# Patient Record
Sex: MALE | Race: WHITE | NOT HISPANIC OR LATINO | Employment: OTHER | URBAN - METROPOLITAN AREA
[De-identification: names, ages, dates, MRNs, and addresses within clinical notes are randomized per-mention and may not be internally consistent; named-entity substitution may affect disease eponyms.]

---

## 2018-06-27 ENCOUNTER — APPOINTMENT (OUTPATIENT)
Dept: RADIOLOGY | Facility: MEDICAL CENTER | Age: 68
End: 2018-06-27
Payer: COMMERCIAL

## 2018-06-27 ENCOUNTER — OFFICE VISIT (OUTPATIENT)
Dept: URGENT CARE | Facility: MEDICAL CENTER | Age: 68
End: 2018-06-27
Payer: COMMERCIAL

## 2018-06-27 VITALS
BODY MASS INDEX: 27.79 KG/M2 | SYSTOLIC BLOOD PRESSURE: 124 MMHG | HEIGHT: 70 IN | RESPIRATION RATE: 20 BRPM | WEIGHT: 194.13 LBS | OXYGEN SATURATION: 98 % | TEMPERATURE: 98.7 F | DIASTOLIC BLOOD PRESSURE: 80 MMHG | HEART RATE: 103 BPM

## 2018-06-27 DIAGNOSIS — J20.9 ACUTE BRONCHITIS, UNSPECIFIED ORGANISM: Primary | ICD-10-CM

## 2018-06-27 DIAGNOSIS — R05.9 COUGH: ICD-10-CM

## 2018-06-27 PROCEDURE — 71046 X-RAY EXAM CHEST 2 VIEWS: CPT

## 2018-06-27 PROCEDURE — 99213 OFFICE O/P EST LOW 20 MIN: CPT | Performed by: PHYSICIAN ASSISTANT

## 2018-06-27 RX ORDER — AZITHROMYCIN 250 MG/1
TABLET, FILM COATED ORAL
Qty: 6 TABLET | Refills: 0 | Status: SHIPPED | OUTPATIENT
Start: 2018-06-27 | End: 2018-07-01

## 2018-06-27 RX ORDER — PREDNISONE 10 MG/1
30 TABLET ORAL DAILY
Qty: 15 TABLET | Refills: 0 | Status: SHIPPED | OUTPATIENT
Start: 2018-06-27 | End: 2018-07-02

## 2018-06-27 RX ORDER — BENZONATATE 100 MG/1
100 CAPSULE ORAL 3 TIMES DAILY PRN
Qty: 30 CAPSULE | Refills: 0 | Status: SHIPPED | OUTPATIENT
Start: 2018-06-27 | End: 2020-03-27

## 2018-06-27 NOTE — PROGRESS NOTES
Hx of smoking , pt stated that he is always coughing, but much worse past few months  C/o fatigue, fever , and some diarrhea x 2 days    rhonic noted in Left lung field

## 2018-06-27 NOTE — PROGRESS NOTES
[facility]        NAME: Viv Bethea is a 79 y o  male  : 1950    MRN: [id number]  DATE: 2018  TIME: 1:28 PM    Assessment and Plan   Acute bronchitis, unspecified organism [J20 9]  1  Acute bronchitis, unspecified organism  XR chest pa & lateral    azithromycin (ZITHROMAX) 250 mg tablet    predniSONE 10 mg tablet    benzonatate (TESSALON PERLES) 100 mg capsule         Patient Instructions     No pneumonia noted on exam, will call if radiology report differs  Take azithromycin as directed  Take with food and eat yogurt or a probiotic to decrease GI upset  Take prednisone 30 mg x 5 days  Take with food  Use tessalon as needed for cough  Watch for any fevers or worsening shortness of breath and follow up for this  Follow up with PCP in 3-5 days  Proceed to  ER if symptoms worsen  Chief Complaint     Chief Complaint   Patient presents with    Cough     for months ,          History of Present Illness       This is a 79year old male presenting for cough x "months"  He states that he is a current everyday smoker, and has been coughing for "months to years " However, over the last 7 days the cough has worsened  Associated symptoms include subjective fever x 1 day, diarrhea x 1 day, fatigue, myalgias, intermittent shortness of breath, sinus congestion, sore throat  He denies any vomiting, chest pain  He has never been evaluated for his cough before  No medications for this  Review of Systems   Review of Systems   Constitutional: Positive for chills, fatigue and fever  HENT: Positive for congestion, postnasal drip, rhinorrhea and sore throat  Negative for ear pain  Respiratory: Positive for cough and shortness of breath  Negative for chest tightness and wheezing  Cardiovascular: Negative for chest pain  Gastrointestinal: Positive for diarrhea  Negative for abdominal pain, nausea and vomiting  Musculoskeletal: Positive for arthralgias and myalgias     Skin: Negative for rash  Neurological: Negative for dizziness, weakness and headaches  Current Medications       Current Outpatient Prescriptions:     azithromycin (ZITHROMAX) 250 mg tablet, Take 2 tablets today then 1 tablet daily x 4 days, Disp: 6 tablet, Rfl: 0    benzonatate (TESSALON PERLES) 100 mg capsule, Take 1 capsule (100 mg total) by mouth 3 (three) times a day as needed for cough, Disp: 30 capsule, Rfl: 0    predniSONE 10 mg tablet, Take 3 tablets (30 mg total) by mouth daily for 5 days, Disp: 15 tablet, Rfl: 0    Current Allergies     Allergies as of 06/27/2018    (No Known Allergies)            The following portions of the patient's history were reviewed and updated as appropriate: allergies, current medications, past family history, past medical history, past social history, past surgical history and problem list      History reviewed  No pertinent past medical history  Past Surgical History:   Procedure Laterality Date    COLON SURGERY         No family history on file  Medications have been verified  Objective   /80   Pulse 103   Temp 98 7 °F (37 1 °C) (Temporal)   Resp 20   Ht 5' 10" (1 778 m)   Wt 88 1 kg (194 lb 2 oz)   SpO2 98%   BMI 27 85 kg/m²        Physical Exam     Physical Exam   Constitutional: He appears well-developed and well-nourished  No distress  HENT:   Head: Normocephalic  Right Ear: Tympanic membrane, external ear and ear canal normal  No drainage or tenderness  Left Ear: Tympanic membrane, external ear and ear canal normal  No drainage or tenderness  Nose: Mucosal edema and rhinorrhea present  Mouth/Throat: Uvula is midline and mucous membranes are normal  Posterior oropharyngeal erythema present  No oropharyngeal exudate or posterior oropharyngeal edema  Eyes: Conjunctivae are normal  Pupils are equal, round, and reactive to light  Neck: Normal range of motion  Neck supple     Cardiovascular: Normal rate, regular rhythm, normal heart sounds and intact distal pulses  Pulmonary/Chest: He is in respiratory distress  He has no decreased breath sounds  He has wheezes  He has no rhonchi  He has no rales  Rhonchi and wheezing noted, worst in bilateral upper lung fields  Lymphadenopathy:     He has no cervical adenopathy  Neurological: He is alert  Skin: Skin is warm and dry  He is not diaphoretic  Nursing note and vitals reviewed

## 2018-06-27 NOTE — PATIENT INSTRUCTIONS
No pneumonia noted on exam, will call if radiology report differs  Take azithromycin as directed  Take with food and eat yogurt or a probiotic to decrease GI upset  Take prednisone 30 mg x 5 days  Take with food  Use tessalon as needed for cough  Watch for any fevers or worsening shortness of breath and follow up for this  Follow up with your PCP for persistent symptoms  Go to the ER for any distress  Acute Bronchitis   AMBULATORY CARE:   Acute bronchitis  is swelling and irritation in the air passages of your lungs  This irritation may cause you to cough or have other breathing problems  Acute bronchitis often starts because of another illness, such as a cold or the flu  The illness spreads from your nose and throat to your windpipe and airways  Bronchitis is often called a chest cold  Acute bronchitis lasts about 3 to 6 weeks and is usually not a serious illness  Your cough can last for several weeks  You may have any of the following symptoms:   · A cough with sputum that may be clear, yellow, or green    · Feeling more tired than usual, and body aches    · A fever and chills    · Wheezing when you breathe    · A tight chest or pain when you breathe or cough  Seek care immediately if:   · You cough up blood  · Your lips or fingernails turn blue  · You feel like you are not getting enough air when you breathe  Contact your healthcare provider if:   · You have a fever  · Your breathing problems do not go away or get worse  · Your cough does not get better within 4 weeks  · You have questions or concerns about your condition or care  Self-care:   · Get more rest   Rest helps your body to heal  Slowly start to do more each day  Rest when you feel it is needed  · Avoid irritants in the air  Avoid chemicals, fumes, and dust  Wear a face mask if you must work around dust or fumes  Stay inside on days when air pollution levels are high   If you have allergies, stay inside when pollen counts are high  Do not use aerosol products, such as spray-on deodorant, bug spray, and hair spray  · Do not smoke or be around others who smoke  Nicotine and other chemicals in cigarettes and cigars damages the cilia that move mucus out of your lungs  Ask your healthcare provider for information if you currently smoke and need help to quit  E-cigarettes or smokeless tobacco still contain nicotine  Talk to your healthcare provider before you use these products  · Drink liquids as directed  Liquids help keep your air passages moist and help you cough up mucus  You may need to drink more liquids when you have acute bronchitis  Ask how much liquid to drink each day and which liquids are best for you  · Use a humidifier or vaporizer  Use a cool mist humidifier or a vaporizer to increase air moisture in your home  This may make it easier for you to breathe and help decrease your cough  Prevent acute bronchitis by doing the following:   · Get the vaccinations you need  Ask your healthcare provider if you should get vaccinated against the flu or pneumonia  · Prevent the spread of germs  You can decrease your risk of acute bronchitis and other illnesses by doing the following:     [facility] your hands often with soap and water  Carry germ-killing hand lotion or gel with you  You can use the lotion or gel to clean your hands when soap and water are not available  ¨ Do not touch your eyes, nose, or mouth unless you have washed your hands first     ¨ Always cover your mouth when you cough to prevent the spread of germs  It is best to cough into a tissue or your shirt sleeve instead of into your hand  Ask those around you cover their mouths when they cough  ¨ Try to avoid people who have a cold or the flu  If you are sick, stay away from others as much as possible  Medicines:   Your healthcare provider may  give you any of the following:  · Ibuprofen or acetaminophen  are medicines that help lower your fever  They are available without a doctor's order  Ask your healthcare provider which medicine is right for you  Ask how much to take and how often to take it  Follow directions  These medicines can cause stomach bleeding if not taken correctly  Ibuprofen can cause kidney damage  Do not take ibuprofen if you have kidney disease, an ulcer, or allergies to aspirin  Acetaminophen can cause liver damage  Do not take more than 4,000 milligrams in 24 hours  · Decongestants  help loosen mucus in your lungs and make it easier to cough up  This can help you breathe easier  · Cough suppressants  decrease your urge to cough  If your cough produces mucus, do not take a cough suppressant unless your healthcare provider tells you to  Your healthcare provider may suggest that you take a cough suppressant at night so you can rest     · Inhalers  may be given  Your healthcare provider may give you one or more inhalers to help you breathe easier and cough less  An inhaler gives your medicine to open your airways  Ask your healthcare provider to show you how to use your inhaler correctly  Follow up with your healthcare provider as directed:  Write down questions you have so you will remember to ask them during your follow-up visits  © 2017 [employer]  Information is for End User's use only and may not be sold, redistributed or otherwise used for commercial purposes  All illustrations and images included in CareNotes® are the copyrighted property of [employer]  or [employer]  The above information is an  only  It is not intended as medical advice for individual conditions or treatments  Talk to your doctor, nurse or pharmacist before following any medical regimen to see if it is safe and effective for you

## 2020-03-27 ENCOUNTER — APPOINTMENT (EMERGENCY)
Dept: CT IMAGING | Facility: HOSPITAL | Age: 70
End: 2020-03-27
Payer: COMMERCIAL

## 2020-03-27 ENCOUNTER — HOSPITAL ENCOUNTER (OUTPATIENT)
Facility: HOSPITAL | Age: 70
Setting detail: OBSERVATION
Discharge: HOME | End: 2020-03-28
Attending: EMERGENCY MEDICINE | Admitting: FAMILY MEDICINE
Payer: COMMERCIAL

## 2020-03-27 ENCOUNTER — APPOINTMENT (EMERGENCY)
Dept: RADIOLOGY | Facility: HOSPITAL | Age: 70
End: 2020-03-27
Payer: COMMERCIAL

## 2020-03-27 DIAGNOSIS — J18.9 PNEUMONIA OF LEFT UPPER LOBE DUE TO INFECTIOUS ORGANISM: ICD-10-CM

## 2020-03-27 DIAGNOSIS — I16.0 HYPERTENSIVE URGENCY: ICD-10-CM

## 2020-03-27 DIAGNOSIS — R07.9 CHEST PAIN, UNSPECIFIED TYPE: Primary | ICD-10-CM

## 2020-03-27 DIAGNOSIS — K21.9 GERD (GASTROESOPHAGEAL REFLUX DISEASE): ICD-10-CM

## 2020-03-27 DIAGNOSIS — R91.8 LUNG MASS: ICD-10-CM

## 2020-03-27 DIAGNOSIS — R07.9 CHEST PAIN: ICD-10-CM

## 2020-03-27 PROBLEM — A41.9 SEPSIS (HCC): Status: ACTIVE | Noted: 2020-03-27

## 2020-03-27 PROBLEM — F10.10 ALCOHOL ABUSE: Status: ACTIVE | Noted: 2020-03-27

## 2020-03-27 PROBLEM — R93.89 ABNORMAL COMPUTED TOMOGRAPHY ANGIOGRAPHY (CTA): Status: ACTIVE | Noted: 2020-03-27

## 2020-03-27 LAB
ALBUMIN SERPL BCP-MCNC: 3.4 G/DL (ref 3.5–5)
ALP SERPL-CCNC: 96 U/L (ref 46–116)
ALT SERPL W P-5'-P-CCNC: 32 U/L (ref 12–78)
ANION GAP SERPL CALCULATED.3IONS-SCNC: 10 MMOL/L (ref 4–13)
AST SERPL W P-5'-P-CCNC: 18 U/L (ref 5–45)
BASOPHILS # BLD AUTO: 0.03 THOUSANDS/ΜL (ref 0–0.1)
BASOPHILS NFR BLD AUTO: 0 % (ref 0–1)
BILIRUB SERPL-MCNC: 0.33 MG/DL (ref 0.2–1)
BUN SERPL-MCNC: 16 MG/DL (ref 5–25)
CALCIUM SERPL-MCNC: 9.4 MG/DL (ref 8.3–10.1)
CHLORIDE SERPL-SCNC: 99 MMOL/L (ref 100–108)
CO2 SERPL-SCNC: 26 MMOL/L (ref 21–32)
CREAT SERPL-MCNC: 1.16 MG/DL (ref 0.6–1.3)
EOSINOPHIL # BLD AUTO: 0.06 THOUSAND/ΜL (ref 0–0.61)
EOSINOPHIL NFR BLD AUTO: 1 % (ref 0–6)
ERYTHROCYTE [DISTWIDTH] IN BLOOD BY AUTOMATED COUNT: 13.5 % (ref 11.6–15.1)
GFR SERPL CREATININE-BSD FRML MDRD: 64 ML/MIN/1.73SQ M
GLUCOSE SERPL-MCNC: 99 MG/DL (ref 65–140)
HCT VFR BLD AUTO: 46.2 % (ref 36.5–49.3)
HGB BLD-MCNC: 15 G/DL (ref 12–17)
IMM GRANULOCYTES # BLD AUTO: 0.06 THOUSAND/UL (ref 0–0.2)
IMM GRANULOCYTES NFR BLD AUTO: 1 % (ref 0–2)
LACTATE SERPL-SCNC: 1.2 MMOL/L (ref 0.5–2)
LIPASE SERPL-CCNC: 149 U/L (ref 73–393)
LYMPHOCYTES # BLD AUTO: 1.68 THOUSANDS/ΜL (ref 0.6–4.47)
LYMPHOCYTES NFR BLD AUTO: 13 % (ref 14–44)
MCH RBC QN AUTO: 32.1 PG (ref 26.8–34.3)
MCHC RBC AUTO-ENTMCNC: 32.5 G/DL (ref 31.4–37.4)
MCV RBC AUTO: 99 FL (ref 82–98)
MONOCYTES # BLD AUTO: 1 THOUSAND/ΜL (ref 0.17–1.22)
MONOCYTES NFR BLD AUTO: 8 % (ref 4–12)
NEUTROPHILS # BLD AUTO: 9.82 THOUSANDS/ΜL (ref 1.85–7.62)
NEUTS SEG NFR BLD AUTO: 77 % (ref 43–75)
NRBC BLD AUTO-RTO: 0 /100 WBCS
PLATELET # BLD AUTO: 244 THOUSANDS/UL (ref 149–390)
PMV BLD AUTO: 9.2 FL (ref 8.9–12.7)
POTASSIUM SERPL-SCNC: 4 MMOL/L (ref 3.5–5.3)
PROT SERPL-MCNC: 7.6 G/DL (ref 6.4–8.2)
RBC # BLD AUTO: 4.67 MILLION/UL (ref 3.88–5.62)
SODIUM SERPL-SCNC: 135 MMOL/L (ref 136–145)
TROPONIN I SERPL-MCNC: <0.02 NG/ML
WBC # BLD AUTO: 12.65 THOUSAND/UL (ref 4.31–10.16)

## 2020-03-27 PROCEDURE — 85025 COMPLETE CBC W/AUTO DIFF WBC: CPT | Performed by: EMERGENCY MEDICINE

## 2020-03-27 PROCEDURE — 74175 CTA ABDOMEN W/CONTRAST: CPT

## 2020-03-27 PROCEDURE — 84484 ASSAY OF TROPONIN QUANT: CPT | Performed by: EMERGENCY MEDICINE

## 2020-03-27 PROCEDURE — 84145 PROCALCITONIN (PCT): CPT | Performed by: EMERGENCY MEDICINE

## 2020-03-27 PROCEDURE — 83605 ASSAY OF LACTIC ACID: CPT | Performed by: EMERGENCY MEDICINE

## 2020-03-27 PROCEDURE — 99285 EMERGENCY DEPT VISIT HI MDM: CPT

## 2020-03-27 PROCEDURE — 96374 THER/PROPH/DIAG INJ IV PUSH: CPT

## 2020-03-27 PROCEDURE — 71046 X-RAY EXAM CHEST 2 VIEWS: CPT

## 2020-03-27 PROCEDURE — 83690 ASSAY OF LIPASE: CPT | Performed by: EMERGENCY MEDICINE

## 2020-03-27 PROCEDURE — 93005 ELECTROCARDIOGRAM TRACING: CPT

## 2020-03-27 PROCEDURE — 99285 EMERGENCY DEPT VISIT HI MDM: CPT | Performed by: EMERGENCY MEDICINE

## 2020-03-27 PROCEDURE — 87040 BLOOD CULTURE FOR BACTERIA: CPT | Performed by: EMERGENCY MEDICINE

## 2020-03-27 PROCEDURE — 36415 COLL VENOUS BLD VENIPUNCTURE: CPT | Performed by: EMERGENCY MEDICINE

## 2020-03-27 PROCEDURE — 99220 PR INITIAL OBSERVATION CARE/DAY 70 MINUTES: CPT | Performed by: FAMILY MEDICINE

## 2020-03-27 PROCEDURE — 71275 CT ANGIOGRAPHY CHEST: CPT

## 2020-03-27 PROCEDURE — 80053 COMPREHEN METABOLIC PANEL: CPT | Performed by: EMERGENCY MEDICINE

## 2020-03-27 RX ORDER — LABETALOL 20 MG/4 ML (5 MG/ML) INTRAVENOUS SYRINGE
10 ONCE
Status: COMPLETED | OUTPATIENT
Start: 2020-03-27 | End: 2020-03-27

## 2020-03-27 RX ORDER — CALCIUM CARBONATE 200(500)MG
1000 TABLET,CHEWABLE ORAL DAILY PRN
Status: DISCONTINUED | OUTPATIENT
Start: 2020-03-27 | End: 2020-03-28 | Stop reason: HOSPADM

## 2020-03-27 RX ORDER — NICOTINE 21 MG/24HR
1 PATCH, TRANSDERMAL 24 HOURS TRANSDERMAL DAILY
Status: DISCONTINUED | OUTPATIENT
Start: 2020-03-28 | End: 2020-03-28 | Stop reason: HOSPADM

## 2020-03-27 RX ORDER — AZITHROMYCIN 250 MG/1
250 TABLET, FILM COATED ORAL EVERY 24 HOURS
Status: DISCONTINUED | OUTPATIENT
Start: 2020-03-28 | End: 2020-03-28

## 2020-03-27 RX ORDER — ASPIRIN 325 MG
325 TABLET ORAL ONCE
Status: COMPLETED | OUTPATIENT
Start: 2020-03-27 | End: 2020-03-27

## 2020-03-27 RX ORDER — ACETAMINOPHEN 325 MG/1
650 TABLET ORAL EVERY 4 HOURS PRN
Status: DISCONTINUED | OUTPATIENT
Start: 2020-03-27 | End: 2020-03-28 | Stop reason: HOSPADM

## 2020-03-27 RX ORDER — BENZONATATE 100 MG/1
100 CAPSULE ORAL 3 TIMES DAILY PRN
Status: DISCONTINUED | OUTPATIENT
Start: 2020-03-27 | End: 2020-03-28 | Stop reason: HOSPADM

## 2020-03-27 RX ORDER — AZITHROMYCIN 250 MG/1
250 TABLET, FILM COATED ORAL EVERY 24 HOURS
Status: DISCONTINUED | OUTPATIENT
Start: 2020-03-28 | End: 2020-03-27

## 2020-03-27 RX ADMIN — Medication 1000 MG: at 22:45

## 2020-03-27 RX ADMIN — ASPIRIN 325 MG: 325 TABLET ORAL at 22:30

## 2020-03-27 RX ADMIN — IOHEXOL 100 ML: 350 INJECTION, SOLUTION INTRAVENOUS at 21:10

## 2020-03-27 RX ADMIN — LABETALOL 20 MG/4 ML (5 MG/ML) INTRAVENOUS SYRINGE 10 MG: at 21:18

## 2020-03-28 VITALS
WEIGHT: 208.56 LBS | OXYGEN SATURATION: 94 % | HEIGHT: 70 IN | SYSTOLIC BLOOD PRESSURE: 124 MMHG | TEMPERATURE: 97.5 F | RESPIRATION RATE: 18 BRPM | DIASTOLIC BLOOD PRESSURE: 84 MMHG | HEART RATE: 85 BPM | BODY MASS INDEX: 29.86 KG/M2

## 2020-03-28 PROBLEM — I16.0 HYPERTENSIVE URGENCY: Status: RESOLVED | Noted: 2020-03-27 | Resolved: 2020-03-28

## 2020-03-28 PROBLEM — A41.9 SEPSIS (HCC): Status: RESOLVED | Noted: 2020-03-27 | Resolved: 2020-03-28

## 2020-03-28 PROBLEM — R07.9 CHEST PAIN: Status: RESOLVED | Noted: 2020-03-27 | Resolved: 2020-03-28

## 2020-03-28 LAB
ALBUMIN SERPL BCP-MCNC: 3 G/DL (ref 3.5–5)
ALP SERPL-CCNC: 87 U/L (ref 46–116)
ALT SERPL W P-5'-P-CCNC: 21 U/L (ref 12–78)
ANION GAP SERPL CALCULATED.3IONS-SCNC: 9 MMOL/L (ref 4–13)
AST SERPL W P-5'-P-CCNC: 15 U/L (ref 5–45)
ATRIAL RATE: 109 BPM
BASOPHILS # BLD AUTO: 0.03 THOUSANDS/ΜL (ref 0–0.1)
BASOPHILS NFR BLD AUTO: 0 % (ref 0–1)
BILIRUB SERPL-MCNC: 0.52 MG/DL (ref 0.2–1)
BUN SERPL-MCNC: 15 MG/DL (ref 5–25)
CALCIUM SERPL-MCNC: 8.8 MG/DL (ref 8.3–10.1)
CHLORIDE SERPL-SCNC: 104 MMOL/L (ref 100–108)
CHOLEST SERPL-MCNC: 179 MG/DL (ref 50–200)
CO2 SERPL-SCNC: 21 MMOL/L (ref 21–32)
CREAT SERPL-MCNC: 1.03 MG/DL (ref 0.6–1.3)
EOSINOPHIL # BLD AUTO: 0.05 THOUSAND/ΜL (ref 0–0.61)
EOSINOPHIL NFR BLD AUTO: 1 % (ref 0–6)
ERYTHROCYTE [DISTWIDTH] IN BLOOD BY AUTOMATED COUNT: 13.7 % (ref 11.6–15.1)
EST. AVERAGE GLUCOSE BLD GHB EST-MCNC: 114 MG/DL
GFR SERPL CREATININE-BSD FRML MDRD: 74 ML/MIN/1.73SQ M
GLUCOSE SERPL-MCNC: 108 MG/DL (ref 65–140)
HBA1C MFR BLD: 5.6 %
HCT VFR BLD AUTO: 42 % (ref 36.5–49.3)
HDLC SERPL-MCNC: 46 MG/DL
HGB BLD-MCNC: 13.9 G/DL (ref 12–17)
IMM GRANULOCYTES # BLD AUTO: 0.05 THOUSAND/UL (ref 0–0.2)
IMM GRANULOCYTES NFR BLD AUTO: 1 % (ref 0–2)
LDLC SERPL CALC-MCNC: 113 MG/DL (ref 0–100)
LYMPHOCYTES # BLD AUTO: 1.6 THOUSANDS/ΜL (ref 0.6–4.47)
LYMPHOCYTES NFR BLD AUTO: 17 % (ref 14–44)
MCH RBC QN AUTO: 32.6 PG (ref 26.8–34.3)
MCHC RBC AUTO-ENTMCNC: 33.1 G/DL (ref 31.4–37.4)
MCV RBC AUTO: 99 FL (ref 82–98)
MONOCYTES # BLD AUTO: 1.02 THOUSAND/ΜL (ref 0.17–1.22)
MONOCYTES NFR BLD AUTO: 11 % (ref 4–12)
NEUTROPHILS # BLD AUTO: 6.46 THOUSANDS/ΜL (ref 1.85–7.62)
NEUTS SEG NFR BLD AUTO: 70 % (ref 43–75)
NONHDLC SERPL-MCNC: 133 MG/DL
NRBC BLD AUTO-RTO: 0 /100 WBCS
P AXIS: 40 DEGREES
PLATELET # BLD AUTO: 225 THOUSANDS/UL (ref 149–390)
PMV BLD AUTO: 9.4 FL (ref 8.9–12.7)
POTASSIUM SERPL-SCNC: 4.2 MMOL/L (ref 3.5–5.3)
PR INTERVAL: 158 MS
PROCALCITONIN SERPL-MCNC: 0.08 NG/ML
PROCALCITONIN SERPL-MCNC: 0.09 NG/ML
PROT SERPL-MCNC: 6.8 G/DL (ref 6.4–8.2)
QRS AXIS: -9 DEGREES
QRSD INTERVAL: 80 MS
QT INTERVAL: 312 MS
QTC INTERVAL: 420 MS
RBC # BLD AUTO: 4.26 MILLION/UL (ref 3.88–5.62)
SODIUM SERPL-SCNC: 134 MMOL/L (ref 136–145)
T WAVE AXIS: 58 DEGREES
TRIGL SERPL-MCNC: 99 MG/DL
TROPONIN I SERPL-MCNC: <0.02 NG/ML
TROPONIN I SERPL-MCNC: <0.02 NG/ML
VENTRICULAR RATE: 109 BPM
WBC # BLD AUTO: 9.21 THOUSAND/UL (ref 4.31–10.16)

## 2020-03-28 PROCEDURE — 84484 ASSAY OF TROPONIN QUANT: CPT | Performed by: PHYSICIAN ASSISTANT

## 2020-03-28 PROCEDURE — 94664 DEMO&/EVAL PT USE INHALER: CPT

## 2020-03-28 PROCEDURE — 80053 COMPREHEN METABOLIC PANEL: CPT | Performed by: PHYSICIAN ASSISTANT

## 2020-03-28 PROCEDURE — 99236 HOSP IP/OBS SAME DATE HI 85: CPT | Performed by: INTERNAL MEDICINE

## 2020-03-28 PROCEDURE — 84145 PROCALCITONIN (PCT): CPT | Performed by: PHYSICIAN ASSISTANT

## 2020-03-28 PROCEDURE — 99217 PR OBSERVATION CARE DISCHARGE MANAGEMENT: CPT | Performed by: FAMILY MEDICINE

## 2020-03-28 PROCEDURE — 83036 HEMOGLOBIN GLYCOSYLATED A1C: CPT | Performed by: PHYSICIAN ASSISTANT

## 2020-03-28 PROCEDURE — 93010 ELECTROCARDIOGRAM REPORT: CPT | Performed by: INTERNAL MEDICINE

## 2020-03-28 PROCEDURE — 85025 COMPLETE CBC W/AUTO DIFF WBC: CPT | Performed by: PHYSICIAN ASSISTANT

## 2020-03-28 PROCEDURE — 80061 LIPID PANEL: CPT | Performed by: PHYSICIAN ASSISTANT

## 2020-03-28 PROCEDURE — 94762 N-INVAS EAR/PLS OXIMTRY CONT: CPT

## 2020-03-28 PROCEDURE — 94760 N-INVAS EAR/PLS OXIMETRY 1: CPT

## 2020-03-28 RX ORDER — AMLODIPINE BESYLATE 5 MG/1
5 TABLET ORAL DAILY
Qty: 30 TABLET | Refills: 1 | Status: SHIPPED | OUTPATIENT
Start: 2020-03-29 | End: 2020-03-28 | Stop reason: SDUPTHER

## 2020-03-28 RX ORDER — PANTOPRAZOLE SODIUM 40 MG/1
40 TABLET, DELAYED RELEASE ORAL DAILY
Qty: 40 TABLET | Refills: 1 | Status: SHIPPED | OUTPATIENT
Start: 2020-03-28 | End: 2020-06-03

## 2020-03-28 RX ORDER — AMLODIPINE BESYLATE 5 MG/1
5 TABLET ORAL DAILY
Status: DISCONTINUED | OUTPATIENT
Start: 2020-03-28 | End: 2020-03-28 | Stop reason: HOSPADM

## 2020-03-28 RX ORDER — AMLODIPINE BESYLATE 5 MG/1
5 TABLET ORAL DAILY
Qty: 30 TABLET | Refills: 1 | Status: SHIPPED | OUTPATIENT
Start: 2020-03-28 | End: 2020-03-28 | Stop reason: SDUPTHER

## 2020-03-28 RX ORDER — AMLODIPINE BESYLATE 5 MG/1
5 TABLET ORAL DAILY
Qty: 30 TABLET | Refills: 0 | Status: SHIPPED | OUTPATIENT
Start: 2020-03-29 | End: 2020-08-12 | Stop reason: HOSPADM

## 2020-03-28 RX ADMIN — NICOTINE 1 PATCH: 14 PATCH TRANSDERMAL at 08:45

## 2020-03-28 RX ADMIN — FOLIC ACID: 5 INJECTION, SOLUTION INTRAMUSCULAR; INTRAVENOUS; SUBCUTANEOUS at 08:56

## 2020-03-28 RX ADMIN — AMLODIPINE BESYLATE 5 MG: 5 TABLET ORAL at 14:12

## 2020-03-28 RX ADMIN — AZITHROMYCIN 500 MG: 500 INJECTION, POWDER, LYOPHILIZED, FOR SOLUTION INTRAVENOUS at 00:25

## 2020-03-28 RX ADMIN — ACETAMINOPHEN 650 MG: 325 TABLET ORAL at 00:48

## 2020-03-28 NOTE — ASSESSMENT & PLAN NOTE
· Interestingly patient CTA did show mass suspicious for lung cancer  · masslike opacity in the left upper lobe measuring approximately 4 5 x 4 3 x 4 8 cm with faint internal enhancement suspicious for lung cancer  · Patient will likely need outpatient follow-up and repeat imaging with pulmonology

## 2020-03-28 NOTE — CONSULTS
Pulmonary Consultation   Bucky Rinne 71 y o  male MRN: [id number]  Unit/Bed#: S -01 Encounter: [id number]      Reason for consultation:  Lung mass, pneumonia of the left upper lobe    Requesting physician: Suri Castro PA-C    Impressions/Recommendations:    1  Chest pain-improving  1  Likely multifactorial with hypertensive urgency at time of admission with Sbp >200 on admissionand medication noncompliance  2  Troponin negative x 2  3  Pain may be secondary to #2  2  Abnormal chest CTA   1  Left upper lobe mass like opacity 4 5x4 3x4 8 concerning for malignancy with   2  Suspicion for acute bacterial process low-no change in cough, no sputum production, procalcitonin negative x2, no significant fever or leukocytosis-discontinue abx  3  Continue to follow COVID testing  4  Possible bronchoscopy with biopsy vs PET-CT-will discuss with attending: bronchoscopy would be delayed pending COVID results   3  CTA with ascending thoracic aneurysm   1  Management per IM  2  Will need outpatient follow up   4  Tobacco abuse with emphysema seen on chest CT  1  Needs outpatient pulmonary follow up  2  NRT  3  Complete cessation  5  Alcohol abuse-drinks 1 bottle of whiskey per day  1  CIWA protocol  2  Management per IM    History of Present Illness   HPI:  Bucky Rinne is a 71 y o  male seen in consult for lung  Mass and pneumonia of the left upper lobe  PMH significant for: abdominal surgery resulting in intestinal abscess in 2011-resolved, tobacco abuse and is a current 1ppd smoker, alcohol abuse-drinks 1 bottle of whiskey per day, and HTN with medication non compliance  He presented to the hospital with sudden onset chest pain that began while eating  The pain was described as midsternal and constant rate 5-6/10 prompting ED visit  Denied additional accompanying symptoms    Troponin negative x 2At time of admission CXR identified left upper lobe opacity and CTA dissection of the chest and abdomen identified a left upper lobe mass like opacity 4 5x 4 3x4 8 concerning for malignancy  Started on CAP antibiotic coverage with rocephin and zithromax  Procalcitonin x2 negative without fever and only mild leukocytosis at admission  Pulmonary consult placed to evaluate lung mass and possible pneumonia  To note chest CT also identified ascending thoracic aneurysm 4 4cm      Patient Continues to report chest pressure that is midsternal and extends to the left  neck  Denies: fevers, chills, night sweats, SOB, cough or sputum/hemoptysis  Denies recent weight loss or change in appetite  From a pulmonary standpoint does not follow outpatient with pulmonary  Does not have a daily inhaler or nebulizer regimen  No previous hospitalizations per chart due to breathing  Only exacerbation per chart 6/2018 due to bronchitis and was treated outpatient with zithromax and 5 day prednisone course  Current 1 ppd smoker for the past 35 years  Denies known sick contacts or COVID exposure, but due to travel into [place] for work and abnormal chest imaging COVID sample obtained and is pending       Review of systems:  12 point review of systems was completed and was otherwise negative except as listed in HPI  Historical Information   Past Medical History:   Diagnosis Date    Alcohol abuse 3/27/2020    Community acquired pneumonia 3/27/2020    Hypertension      Past Surgical History:   Procedure Laterality Date    COLON SURGERY       History reviewed  No pertinent family history      Occupational history: works in [place] as     Tobacco history: estimated 35 pack years, current 1 ppd smoker    Meds/Allergies   Current Facility-Administered Medications   Medication Dose Route Frequency    acetaminophen (TYLENOL) tablet 650 mg  650 mg Oral Q4H PRN    azithromycin (ZITHROMAX) tablet 250 mg  250 mg Oral Q24H    And    cefTRIAXone (ROCEPHIN) 1,000 mg in dextrose 5 % 50 mL IVPB  1,000 mg Intravenous Q24H    benzonatate (TESSALON PERLES) capsule 100 mg  100 mg Oral TID PRN    calcium carbonate (TUMS) chewable tablet 1,000 mg  1,000 mg Oral Daily PRN    enoxaparin (LOVENOX) subcutaneous injection 40 mg  40 mg Subcutaneous Daily    folic acid 1 mg, thiamine (VITAMIN B1) 100 mg in sodium chloride 0 9 % 100 mL IV piggyback   Intravenous Daily    nicotine (NICODERM CQ) 14 mg/24hr TD 24 hr patch 1 patch  1 patch Transdermal Daily     No medications prior to admission  No Known Allergies    Vitals: Blood pressure 124/84, pulse 85, temperature 97 5 °F (36 4 °C), temperature source Oral, resp  rate 18, height 5' 10" (1 778 m), weight 94 6 kg (208 lb 8 9 oz), SpO2 94 % , RA, Body mass index is 29 92 kg/m²  Intake/Output Summary (Last 24 hours) at 3/28/2020 0907  Last data filed at 3/28/2020 0856  Gross per 24 hour   Intake    Output 1000 ml   Net -1000 ml           Labs: I have personally reviewed pertinent lab results  , CBC:   Lab Results   Component Value Date    WBC 9 21 03/28/2020    HGB 13 9 03/28/2020    HCT 42 0 03/28/2020    MCV 99 (H) 03/28/2020     03/28/2020    MCH 32 6 03/28/2020    MCHC 33 1 03/28/2020    RDW 13 7 03/28/2020    MPV 9 4 03/28/2020    NRBC 0 03/28/2020   , CMP:   Lab Results   Component Value Date    SODIUM 134 (L) 03/28/2020    K 4 2 03/28/2020     03/28/2020    CO2 21 03/28/2020    BUN 15 03/28/2020    CREATININE 1 03 03/28/2020    CALCIUM 8 8 03/28/2020    AST 15 03/28/2020    ALT 21 03/28/2020    ALKPHOS 87 03/28/2020    EGFR 74 03/28/2020   , Procalcitonin 3/27  09, 3/28  08      Imaging and other studies: I have personally reviewed pertinent reports   , I have personally reviewed pertinent films in PACS and CXR 3/27/2020  IMPRESSION:     Increased opacity within the left upper lung field     CTA dissection protocol chest and abdomen 3/27/2020    CHEST     LUNGS: Emphysematous changes of the lungs    Large masslike opacity in the left upper lobe contacting the lateral superior mediastinum measuring 4 5 x 4 3 x 4 8 cm with faint internal enhancement   There are surrounding nodules   measuring up to 4 mm  Additional 4 mm nodule in the left upper lobe     Thickening of the bronchi likely due to bronchitis  Trace bilateral dependent atelectasis  Elevation of right hemidiaphragm  Small calcified granuloma in the right lower lobe      PLEURA:  Unremarkable      HEART/GREAT VESSELS:  The heart is normal in size  Coronary artery calcifications  No pericardial effusion  No central pulmonary embolism      MEDIASTINUM AND ALYSE:  There is a 2 2 x 1 6 x 2 2 cm fluid density structure in the anterior mediastinum     Bilateral   hilar lymph nodes measure up to 9 mm in short axis  No significant mediastinal lymphadenopathy by CT size criteria  A precarinal lymph node measures 7 mm in short axis       Pulmonary function testing: none    EKG, Pathology, and Other Studies: I have personally reviewed pertinent reports    EKG 3/28/2020ST    Code Status: Level 1 - Full Code      KHANG Fuentes

## 2020-03-28 NOTE — ASSESSMENT & PLAN NOTE
· Patient current daily drinker approx a bottle of whiskey per day   · CIWA protocol    · No signs of withdrawal at this time

## 2020-03-28 NOTE — H&P
H&P- Robe Mckeon 1950, 71 y o  male MRN: [id number]  Unit/Bed#: S -01 Encounter: [id number]  Primary Care Provider: No primary care provider on file  Date and time admitted to hospital: 3/27/2020  7:44 PM    * Chest pain  Assessment & Plan   Patient Presentation:  Chest pain which began while patient was eating  He has had chest pain which has been constant and worse with his heart beat   Likely etiology:  Likely secondary to hypertension but rule out ACS   SHANNAN: 2   Initial Troponin:  Negative  o Trend x3 or to peak   Initial EKG:  No ST T wave changes concerning for ischemia  o Monitor on telemetry   Check fasting lipid panel and A1c   Admit under Observation Status  Abnormal computed tomography angiography (CTA)  Assessment & Plan  · Interestingly patient CTA did show mass suspicious for lung cancer  · masslike opacity in the left upper lobe measuring approximately 4 5 x 4 3 x 4 8 cm with faint internal enhancement suspicious for lung cancer  · Patient will likely need outpatient follow-up and repeat imaging with pulmonology  Community acquired pneumonia  Assessment & Plan  · CTA dissection study showed possible superimposed pneumonia  · Treating as community-acquired with cold rule out  IV antibiotics:  Ceftriaxone and azithromycin  Respiratory protocol, hold on nebs given COVID r/o   Obtain sputum culture and Gram stain, strep and Legionella antigens  Obtain procalcitonin  Monitor respiratory status  Sepsis ([address] )  Assessment & Plan  · Given tachycardia, leukocytosis, pneumonia on CT scan POA   · Present on admission  · Blood cultures pending'  · Wbc 12 on admission currently afebrile   · Monitor WBC and fever   · IV fluids  · Started on ceftriaxone and azithromycin in the ED   · Will continue   · COVID pending     Hypertensive urgency  Assessment & Plan  · Patient noted to be very hypertensive in the emergency department at greater than 740 systolic    · Was given labetalol 10 mg which dropped his pressure is significantly to 990 systolic  · Will hold on further antihypertensives for now  · Patient states that he has not been taking his antihypertensive medications as he should  · Will monitor BP closely  Alcohol abuse  Assessment & Plan  · Patient current daily drinker approx a bottle of whiskey per day   · CIWA protocol  · No signs of withdrawal at this time         VTE Prophylaxis: Enoxaparin (Lovenox)  / sequential compression device   Code Status:  Full code  POLST: There is no POLST form on file for this patient (pre-hospital)  Discussion with family:  Patient    Anticipated Length of Stay:  Patient will be admitted on an Observation basis with an anticipated length of stay of  < 2 midnights  Justification for Hospital Stay:  Chest pain rule out lab to be seen by pulmonology given new finding of lung mass    Total Time for Visit, including Counseling / Coordination of Care: 45 minutes  Greater than 50% of this total time spent on direct patient counseling and coordination of care  Chief Complaint:   Chest pain    History of Present Illness:    Sheila Abdi is a 71 y o  male who presents with chest pain  Patient's past medical history of hypertension but is currently not on any medications  Presents to the emergency department with 1 day history of chest pain which began at 11:00 a m  Today after patient was sitting down and eating  He states that the pain began in the center of his chest and has been constant since then  He described it as about a 6/10 and currently at about a 5/10  He says that his pain is worse with his heartbeat  Does not have any significant family history for cardiac disease  He denies any diaphoresis, arm pain, or jaw pain associated with his symptoms  Interestingly patient did have CTA done to rule out dissection as he was significantly hypertensive in the emergency department    On CTA they found new lung mass which is suspicious for lung cancer with possible superimposed pneumonia  Given CTA findings patient was ruled out for cold bid, however he denies any sick contacts but he does work in [place]  He denies any travel except for going to work  States that he has not had any subjective fevers recently as well  He denies any worsening cough apart from his smoker's cough  He states that he has been smoking approximately a pack a day for the past 30-40 years  He states that his chest pain is worse with exertion  Review of Systems:    Review of Systems   Constitutional: Negative for chills, fatigue, fever and unexpected weight change  Respiratory: Positive for cough and chest tightness  Negative for shortness of breath  Cardiovascular: Positive for chest pain  Negative for palpitations  Gastrointestinal: Negative for abdominal pain, diarrhea, nausea and vomiting  Genitourinary: Negative for decreased urine volume, dysuria, frequency and urgency  Musculoskeletal: Negative for arthralgias  Neurological: Negative for dizziness, syncope, light-headedness and headaches  All other systems reviewed and are negative  Past Medical and Surgical History:     Past Medical History:   Diagnosis Date    Alcohol abuse 3/27/2020    Community acquired pneumonia 3/27/2020    Hypertension        Past Surgical History:   Procedure Laterality Date    COLON SURGERY         Meds/Allergies:    Prior to Admission medications    Medication Sig Start Date End Date Taking? Authorizing Provider   benzonatate (TESSALON PERLES) 100 mg capsule Take 1 capsule (100 mg total) by mouth 3 (three) times a day as needed for cough 6/27/18 3/27/20  Asael Cota PA-C     I have reviewed home medications with patient personally      Allergies: No Known Allergies    Social History:     Marital Status: Single   Occupation:  Works as a   Patient Pre-hospital Living Situation:  Lives at home  Patient Pre-hospital Level of Mobility:  Ambulates  Patient Pre-hospital Diet Restrictions:  Non  Substance Use History:   Social History     Substance and Sexual Activity   Alcohol Use Yes    Comment: every other day     Social History     Tobacco Use   Smoking Status Current Every Day Smoker    Packs/day: 1 00   Smokeless Tobacco Never Used     Social History     Substance and Sexual Activity   Drug Use Never       Family History:    History reviewed  No pertinent family history  Physical Exam:     Vitals:   Blood Pressure: 109/70 (03/27/20 2300)  Pulse: 84 (03/27/20 2300)  Temperature: 98 9 °F (37 2 °C) (03/27/20 2245)  Temp Source: Oral (03/27/20 2245)  Respirations: 20 (03/27/20 2300)  Height: 5' 10" (177 8 cm) (03/27/20 2245)  Weight - Scale: 94 6 kg (208 lb 8 9 oz) (03/27/20 2245)  SpO2: 96 % (03/27/20 2300)    Physical Exam   Constitutional: He is oriented to person, place, and time  He appears well-developed and well-nourished  No distress  HENT:   Head: Normocephalic and atraumatic  Mouth/Throat: Mucous membranes are normal  Mucous membranes are not pale, not dry and not cyanotic  Eyes: Pupils are equal, round, and reactive to light  Conjunctivae and EOM are normal  Right eye exhibits no discharge  Left eye exhibits no discharge  Neck: Normal range of motion  Neck supple  No JVD present  Cardiovascular: Normal rate, regular rhythm and normal heart sounds  No murmur heard  Pulmonary/Chest: Effort normal and breath sounds normal  He has no wheezes  He has no rales  Coughing with inspiration  Abdominal: Soft  Bowel sounds are normal  He exhibits no distension  There is no tenderness  Musculoskeletal: Normal range of motion  He exhibits no edema or tenderness  No lower extremity edema   Neurological: He is alert and oriented to person, place, and time  No cranial nerve deficit or sensory deficit  Skin: Skin is warm and dry  Capillary refill takes less than 2 seconds  He is not diaphoretic  No erythema  No pallor  Psychiatric: He has a normal mood and affect  Nursing note and vitals reviewed  Additional Data:     Lab Results: I have personally reviewed pertinent reports  Results from last 7 days   Lab Units 03/27/20  2031   WBC Thousand/uL 12 65*   HEMOGLOBIN g/dL 15 0   HEMATOCRIT % 46 2   PLATELETS Thousands/uL 244   NEUTROS PCT % 77*   LYMPHS PCT % 13*   MONOS PCT % 8   EOS PCT % 1     Results from last 7 days   Lab Units 03/27/20 2026   SODIUM mmol/L 135*   POTASSIUM mmol/L 4 0   CHLORIDE mmol/L 99*   CO2 mmol/L 26   BUN mg/dL 16   CREATININE mg/dL 1 16   ANION GAP mmol/L 10   CALCIUM mg/dL 9 4   ALBUMIN g/dL 3 4*   TOTAL BILIRUBIN mg/dL 0 33   ALK PHOS U/L 96   ALT U/L 32   AST U/L 18   GLUCOSE RANDOM mg/dL 99                 Results from last 7 days   Lab Units 03/27/20  2303   LACTIC ACID mmol/L 1 2       Imaging: I have personally reviewed pertinent reports  CTA dissection protocol chest and abdomen   Final Result by Batool Collier MD (03/27 2154)      1  Aneurysmal ascending thoracic aorta measuring up to 4 4 cm in diameter  No evidence of aortic dissection  2   Emphysematous changes of the lung with masslike opacity in the left upper lobe measuring approximately 4 5 x 4 3 x 4 8 cm with faint internal enhancement suspicious for lung cancer and probable superimposed pneumonia  Tissue sampling and/or PET/CT    or close interval follow-up is recommended  There are bilateral upper lobe nodules measuring up to 4 mm  3   Thickening of the bronchi compatible with bronchitis  4   Bilateral hilar lymph nodes measure up to 9 mm in short axis  No significant mediastinal lymphadenopathy however there is a 2 2 x 1 6 x 2 2 cm fluid density structure in the anterior mediastinum which likely represents a small amount of loculated    mediastinal fluid however low-density lymph node deposit is not excluded         I personally discussed this study with [address] [place] [address] on 3/27/2020 at 9:54 PM    The study was marked in EPIC for significant notification  Workstation performed: [id number]         XR chest 2 views   ED Interpretation by Nevin Holley DO (03/27 2031)   Left upper lobe infiltrate  Final Result by  (03/27 2333)   Addendum 1 of 1 by Maylin Gallegos MD (03/27 2148)   ADDENDUM:      See CT angiogram of the chest and abdomen performed subsequently for    further evaluation  Final          EKG, Pathology, and Other Studies Reviewed on Admission:   · EKG:  Sinus tachycardia with no ST T wave changes of ischemia  Ventricular rate of 109  · Chest x-ray:  Increased opacity in the left upper lung field  Allscripts / Epic Records Reviewed: Yes     ** Please Note: This note has been constructed using a voice recognition system   **

## 2020-03-28 NOTE — ASSESSMENT & PLAN NOTE
· CTA dissection study showed possible superimposed pneumonia  · Treating as community-acquired with cold rule out  IV antibiotics:  Ceftriaxone and azithromycin  Respiratory protocol, hold on nebs given COVID r/o   Obtain sputum culture and Gram stain, strep and Legionella antigens  Obtain procalcitonin  Monitor respiratory status

## 2020-03-28 NOTE — PLAN OF CARE
Problem: Potential for Falls  Goal: Patient will remain free of falls  Description  INTERVENTIONS:  - Assess patient frequently for physical needs  -  Identify cognitive and physical deficits and behaviors that affect risk of falls  -  [place] fall precautions as indicated by assessment   - Educate patient/family on patient safety including physical limitations  - Instruct patient to call for assistance with activity based on assessment  - Modify environment to reduce risk of injury  - Consider OT/PT consult to assist with strengthening/mobility  Outcome: Progressing     Problem: Nutrition/Hydration-ADULT  Goal: Nutrient/Hydration intake appropriate for improving, restoring or maintaining nutritional needs  Description  Monitor and assess patient's nutrition/hydration status for malnutrition  Collaborate with interdisciplinary team and initiate plan and interventions as ordered  Monitor patient's weight and dietary intake as ordered or per policy  Utilize nutrition screening tool and intervene as necessary  Determine patient's food preferences and provide high-protein, high-caloric foods as appropriate       INTERVENTIONS:  - Monitor oral intake, urinary output, labs, and treatment plans  - Assess nutrition and hydration status and recommend course of action  - Evaluate amount of meals eaten  - Assist patient with eating if necessary   - Allow adequate time for meals  - Recommend/ encourage appropriate diets, oral nutritional supplements, and vitamin/mineral supplements  - Order, calculate, and assess calorie counts as needed  - Recommend, monitor, and adjust tube feedings and TPN/PPN based on assessed needs  - Assess need for intravenous fluids  - Provide specific nutrition/hydration education as appropriate  - Include patient/family/caregiver in decisions related to nutrition  Outcome: Progressing     Problem: PAIN - ADULT  Goal: Verbalizes/displays adequate comfort level or baseline comfort level  Description  Interventions:  - Encourage patient to monitor pain and request assistance  - Assess pain using appropriate pain scale  - Administer analgesics based on type and severity of pain and evaluate response  - Implement non-pharmacological measures as appropriate and evaluate response  - Consider cultural and social influences on pain and pain management  - Notify physician/advanced practitioner if interventions unsuccessful or patient reports new pain  Outcome: Progressing     Problem: INFECTION - ADULT  Goal: Absence or prevention of progression during hospitalization  Description  INTERVENTIONS:  - Assess and monitor for signs and symptoms of infection  - Monitor lab/diagnostic results  - Monitor all insertion sites, i e  indwelling lines, tubes, and drains  - Monitor endotracheal if appropriate and nasal secretions for changes in amount and color  - [place] appropriate cooling/warming therapies per order  - Administer medications as ordered  - Instruct and encourage patient and family to use good hand hygiene technique  - Identify and instruct in appropriate isolation precautions for identified infection/condition  Outcome: Progressing     Problem: SAFETY ADULT  Goal: Maintain or return to baseline ADL function  Description  INTERVENTIONS:  -  Assess patient's ability to carry out ADLs; assess patient's baseline for ADL function and identify physical deficits which impact ability to perform ADLs (bathing, care of mouth/teeth, toileting, grooming, dressing, etc )  - Assess/evaluate cause of self-care deficits   - Assess range of motion  - Assess patient's mobility; develop plan if impaired  - Assess patient's need for assistive devices and provide as appropriate  - Encourage maximum independence but intervene and supervise when necessary  - Involve family in performance of ADLs  - Assess for home care needs following discharge   - Consider OT consult to assist with ADL evaluation and planning for discharge  - Provide patient education as appropriate  Outcome: Progressing  Goal: Maintain or return mobility status to optimal level  Description  INTERVENTIONS:  - Assess patient's baseline mobility status (ambulation, transfers, stairs, etc )    - Identify cognitive and physical deficits and behaviors that affect mobility  - Identify mobility aids required to assist with transfers and/or ambulation (gait belt, sit-to-stand, lift, walker, cane, etc )  - [place] fall precautions as indicated by assessment  - Record patient progress and toleration of activity level on Mobility SBAR; progress patient to next Phase/Stage  - Instruct patient to call for assistance with activity based on assessment  - Consider rehabilitation consult to assist with strengthening/weightbearing, etc   Outcome: Progressing     Problem: DISCHARGE PLANNING  Goal: Discharge to home or other facility with appropriate resources  Description  INTERVENTIONS:  - Identify barriers to discharge w/patient and caregiver  - Arrange for needed discharge resources and transportation as appropriate  - Identify discharge learning needs (meds, wound care, etc )  - Arrange for interpretive services to assist at discharge as needed  - Refer to Case Management Department for coordinating discharge planning if the patient needs post-hospital services based on physician/advanced practitioner order or complex needs related to functional status, cognitive ability, or social support system  Outcome: Progressing     Problem: Knowledge Deficit  Goal: Patient/family/caregiver demonstrates understanding of disease process, treatment plan, medications, and discharge instructions  Description  Complete learning assessment and assess knowledge base    Interventions:  - Provide teaching at level of understanding  - Provide teaching via preferred learning methods  Outcome: Progressing

## 2020-03-28 NOTE — UTILIZATION REVIEW
Initial Clinical Review    Admission: Date/Time/Statement: Admission Orders (From admission, onward)     Ordered        03/27/20 2229  Place in Observation  Once                   Orders Placed This Encounter   Procedures    Place in Observation     Standing Status:   Standing     Number of Occurrences:   1     Order Specific Question:   Admitting Physician     Answer:   Travis Bynum, [address] [place] [address]     Order Specific Question:   Level of Care     Answer:   Med Surg [16]     ED Arrival Information     Expected Arrival Acuity Means of Arrival Escorted By Service Admission Type    - 3/27/2020 19:39 Emergent Walk-In Family Member Hospitalist Emergency    Arrival Complaint    Cough Chest Pain Low grade fever        Chief Complaint   Patient presents with    Chest Pain     pt states new onset fatigue 2 days ago, has chronic cough but unsure if worse than normal  pt states after dinner he feveloped a pain in his upper abdomen/chest      Assessment/Plan:     71year old male presents to ed from home for evaluation and treatment of abdominal and chest pain  PMHX HTN, multiple abdominal surgeries, smoker  On arrival, patient states he developed abdominal pain at approximately 11 am after eating  He  Took tums and pepto bismol without relief  Pain is in hs chest and neck at times  And is worse when he takes a deep breath  Sometimes his is racing  Physical examination significant for tachycardia,  Hypertension, Wbc 1 65, Ct shows emphysematous changes, bronchitis and lung mass suspicious for lung cancer and  Probable pneumonia  X ray shows DOMI infiltrate  Ekg shows sinus tach with occasional pvcs  Blood cultures drawn  Treated in ed with iv rocephin, aspirin and iv labetalol  Admit to observation for pneumonia,  Chest pain, lung mass, sepsis alcohol abuse, hypertension   Plan  Includes Obtain procalcitonin / corona test / lactic acid x2/ Troponin / strep pneumoniae /  Legionella / sputum culture,  Follow up blood culture,  continue iv rocephin and po azithromycin, telemetry, consult pulmonology, [facility] assessments, airborne isolation  Currently [facility] 2/10         ED Triage Vitals [03/1950]   98 7 °F (37 1 °C) (!) 112 18 (!) 200/105 98 %      Oral Monitor         Pain Score       5       03/27/20 94 6 kg (208 lb 8 9 oz)     Additional Vital Signs:     Date/  Time  Temp  Pulse  Resp  BP  MAP (mmHg)  SpO2  O2 Device   03/28/20 0030    86  18      94 %  None (Room air)   03/27/20 2300    84  20  109/70  86  96 %  None (Room air)   03/27/20 2245  98 9 °F (37 2 °C)  84  20  128/81  100  95 %  None (Room air)   03/27/20 2200    88  20  119/80  95  96 %  None (Room air)   03/27/20 2145    84  20  130/79  99  97 %  None (Room air)   03/27/20 2130    80  20  125/73  94  96 %  None (Room air)   03/27/20 2115    108  Abnormal   20  180/106  Abnormal   133  97 %  None (Room air)   03/27/20 2100    110  Abnormal     149/101  Abnormal   121  96 %  None (Room air)   03/27/20 2045    110  Abnormal   20  162/106  Abnormal   127  97 %  None (Room air)   03/27/20 2030    110  Abnormal   20  167/105  Abnormal   130  98 %  None (Room air)   03/27/20 2000              None (Room air)   03/1950  98 7 °F (37 1 °C)  112  Abnormal   18  200/105  Abnormal      98 %  None (Room air)       Pain :  5/10  4/10 3/10        Pertinent Labs/Diagnostic Test Results:     ECG 12 Lead Documentation Only  Date/Time: 3/27/2020 8:26 PM    ECG reviewed by me, the ED Provider: yes    Patient location:  ED   Previous ECG:     Previous ECG:  Unavailable    Comparison to cardiac monitor: Yes    Comments:      Sinus tachycardia rate of 109 beats per minute  Normal intervals  Leftward axis  Normal QRS  No ST T wave abnormalities  Occasional PVCs  No old for comparison    CTA dissection protocol chest and abdomen   Final  (03/27 2154)      1  Aneurysmal ascending thoracic aorta measuring up to 4 4 cm in diameter    No evidence of aortic dissection  2   Emphysematous changes of the lung with masslike opacity in the left upper lobe measuring approximately 4 5 x 4 3 x 4 8 cm with faint internal enhancement suspicious for lung cancer and probable superimposed pneumonia  Tissue sampling and/or PET/CT or close interval follow-up is recommended  There are bilateral upper lobe nodules measuring up to 4 mm  3   Thickening of the bronchi compatible with bronchitis  4   Bilateral hilar lymph nodes measure up to 9 mm in short axis  No significant mediastinal lymphadenopathy however there is a 2 2 x 1 6 x 2 2 cm fluid density structure in the anterior mediastinum which likely represents a small amount of loculated mediastinal fluid however low-density lymph node deposit is not excluded  XR chest 2 views   ED  (03/27 2031)   Left upper lobe infiltrate             Results from last 7 days   Lab Units 03/28/20 0451 03/27/20 2031   WBC Thousand/uL 9 21 12 65*   HEMOGLOBIN g/dL 13 9 15 0   HEMATOCRIT % 42 0 46 2   PLATELETS Thousands/uL 225 244   NEUTROS ABS Thousands/µL 6 46 9 82*         Results from last 7 days   Lab Units 03/28/20 0451 03/27/20 2026   SODIUM mmol/L 134* 135*   POTASSIUM mmol/L 4 2 4 0   CHLORIDE mmol/L 104 99*   CO2 mmol/L 21 26   ANION GAP mmol/L 9 10   BUN mg/dL 15 16   CREATININE mg/dL 1 03 1 16   EGFR ml/min/1 73sq m 74 64   CALCIUM mg/dL 8 8 9 4     Results from last 7 days   Lab Units 03/28/20 0451 03/27/20 2026   AST U/L 15 18   ALT U/L 21 32   ALK PHOS U/L 87 96   TOTAL PROTEIN g/dL 6 8 7 6   ALBUMIN g/dL 3 0* 3 4*   TOTAL BILIRUBIN mg/dL 0 52 0 33         Results from last 7 days   Lab Units 03/28/20  0451 03/27/20 2026   GLUCOSE RANDOM mg/dL 108 99         Results from last 7 days   Lab Units 03/28/20 0031   HEMOGLOBIN A1C % 5 6   EAG mg/dl 114       Results from last 7 days   Lab Units 03/28/20 0451 03/28/20 0031 03/27/20 2026   TROPONIN I ng/mL <0 02 <0 02 <0 02     Results from last 7 days Lab Units 03/28/20  0031 03/27/20  2244   PROCALCITONIN ng/ml 0 08 0 09     Results from last 7 days   Lab Units 03/27/20  2303   LACTIC ACID mmol/L 1 2       Results from last 7 days   Lab Units 03/27/20  2026   LIPASE u/L 149       Results from last 7 days   Lab Units 03/27/20  2215 03/27/20  2200   BLOOD CULTURE  Received in Microbiology Lab  Culture in Progress  Received in Microbiology Lab  Culture in Progress  ED Treatment:   Medication Administration from 03/27/2020 1939 to 03/27/2020 2302       Date/Time Order Dose Route Action     03/27/2020 2118 Labetalol HCl (NORMODYNE) injection 10 mg 10 mg Intravenous Given     03/27/2020 2230 aspirin tablet 325 mg 325 mg Oral Given     03/27/2020 2245 ceftriaxone (ROCEPHIN) 1 g/50 mL in dextrose IVPB 1,000 mg Intravenous New Bag        Past Medical History:   Diagnosis Date    Alcohol abuse 3/27/2020    Community acquired pneumonia 3/27/2020    Hypertension      Present on Admission:  **None**      Admitting Diagnosis:     Lung mass [R91 8]  Chest pain [R07 9]  Hypertensive urgency [I16 0]  Pneumonia of left upper lobe due to infectious organism ([address] ) [J18 1]  Chest pain, unspecified type [R07 9]    Age/Sex: 71 y o  male     Admission Orders:    Scheduled Medications:    Medications:  azithromycin 250 mg Oral Q24H   And      cefTRIAXone 1,000 mg Intravenous Q24H   enoxaparin 40 mg Subcutaneous Daily   folic acid 1 mg, thiamine 100 mg in 0 9% sodium chloride 100 mL IVPB  Intravenous Daily   nicotine 1 patch Transdermal Daily     Continuous IV Infusions:     PRN Meds:    acetaminophen 650 mg Oral Q4H PRN   benzonatate 100 mg Oral TID PRN   calcium carbonate 1,000 mg Oral Daily PRN       IP CONSULT TO PULMONOLOGY    Network Utilization Review Department  [email or web address]  ATTENTION: Please call with any questions or concerns to [phone] and carefully listen to the prompts so that you are directed to the right person   All voicemails are confidential   Shruthi Yahir all requests for admission clinical reviews, approved or denied determinations and any other requests to dedicated fax number below belonging to the campus where the patient is receiving treatment   List of dedicated fax numbers for the Facilities:  [address] [place] [address] DENIALS (Administrative/Medical Necessity) [phone]   [address]  (Maternity/NICU/Pediatrics) [phone]   Angy Boateng [phone]   [facility] [phone]   Saulobrian Gill [phone]   [facility] [phone]   [address] [place] [address] [phone]   [facility]  [phone]   [address] [facility] [address]  [address] [facility] [address] [facility] [phone]

## 2020-03-28 NOTE — ASSESSMENT & PLAN NOTE
· Given tachycardia, leukocytosis, possible pneumonia on CT scan POA   · Present on admission  · Blood cultures pending'    Most likely reactive due to patient's hypertensive urgency  Patient no longer tachycardic and white blood cell count now normal   Blood cultures are pending but procalcitonin negative x2  No need for antibiotics  Doubt underlying bacterial infection  Could have viral pneumonia but he is stable from a respiratory standpoint

## 2020-03-28 NOTE — ASSESSMENT & PLAN NOTE
· Patient noted to be very hypertensive in the emergency department at greater than 255 systolic  · Was given labetalol 10 mg which dropped his pressure is significantly to 008 systolic  Patient states he does have hypertension but does not take any medication for  I am going to prescribe him metoprolol  He is feeling much better overall  Okay for discharge home

## 2020-03-28 NOTE — ASSESSMENT & PLAN NOTE
 Patient Presentation:  Chest pain which began while patient was eating  He has had chest pain which has been constant and worse with his heart beat   Likely etiology:  Likely secondary to hypertension but rule out ACS   SHANNAN: 2   Initial Troponin:  Negative  o Trend x3 or to peak   Initial EKG:  No ST T wave changes concerning for ischemia  o Monitor on telemetry   Check fasting lipid panel and A1c   Admit under Observation Status

## 2020-03-28 NOTE — ASSESSMENT & PLAN NOTE
· Patient current daily drinker approx a bottle of whiskey per day   · No signs of withdrawal at this time

## 2020-03-28 NOTE — DISCHARGE SUMMARY
Discharge- Juaquin Han 1950, 71 y o  male MRN: [id number]    Unit/Bed#: S -01 Encounter: [id number]    Primary Care Provider: No primary care provider on file  Date and time admitted to hospital: 3/27/2020  7:44 PM      Alcohol abuse  Assessment & Plan  · Patient current daily drinker approx a bottle of whiskey per day   · No signs of withdrawal at this time     Abnormal computed tomography angiography (CTA)  Assessment & Plan  · Interestingly patient CTA did show mass suspicious for lung cancer  · masslike opacity in the left upper lobe measuring approximately 4 5 x 4 3 x 4 8 cm with faint internal enhancement suspicious for lung cancer    Discussed with pulmonology  Plan is for outpatient follow-up  This was discussed with patient who expressed understanding  Community acquired pneumonia  Assessment & Plan  · CTA dissection study showed possible superimposed pneumonia    If patient has pneumonia it is likely viral in nature as procalcitonin was negative x2  Antibiotics stopped  COVID-19 testing pending  Patient stable from respiratory standpoint  Okay for discharge  Sepsis (HCC)resolved as of 3/28/2020  Assessment & Plan  · Given tachycardia, leukocytosis, possible pneumonia on CT scan POA   · Present on admission  · Blood cultures pending'    Most likely reactive due to patient's hypertensive urgency  Patient no longer tachycardic and white blood cell count now normal   Blood cultures are pending but procalcitonin negative x2  No need for antibiotics  Doubt underlying bacterial infection  Could have viral pneumonia but he is stable from a respiratory standpoint  Hypertensive urgencyresolved as of 3/28/2020  Assessment & Plan  · Patient noted to be very hypertensive in the emergency department at greater than 320 systolic  · Was given labetalol 10 mg which dropped his pressure is significantly to 433 systolic      Patient states he does have hypertension but does not take any medication for  I am going to prescribe him on amlodipine  He is feeling much better overall  Okay for discharge home  * Chest painresolved as of 3/28/2020  Assessment & Plan   Patient Presentation:  Chest pain which began while patient was eating  He has had chest pain which has been constant and worse with his heart beat   Likely etiology:  Likely secondary to hypertension but rule out ACS   SHANNAN: 2   Per pulmonology, GERD could have also contributed to patient's chest pain  Will prescribe Protonix  Troponins were negative x3  EKG unremarkable  Chest pain better with blood pressure now controlled  I was going to discharge on metoprolol but patient's daughter states that he took in the past and could not tolerate  Therefore, will prescribe amlodipine instead  Disposition:     Home     Reason for Admission:  Chest pain, hypertensive urgency    Discharge Diagnoses:     Principal Problem (Resolved):    Chest pain  Active Problems:    Community acquired pneumonia    Abnormal computed tomography angiography (CTA)    Alcohol abuse  Resolved Problems:    Hypertensive urgency    Sepsis [facility])      Consultations During Hospital Stay:  · Pulmonology    Procedures Performed:     · None    Significant Findings / Test Results:     CTA dissection protocol chest and abdomen [[id number]] Collected: 03/27/20 2119   Order Status: Completed Updated: 03/27/20 2155   Narrative:     CTA - CHEST AND ABDOMEN  - WITHOUT AND WITH IV CONTRAST    INDICATION:   Chest pain or back pain, aortic dissection suspected  COMPARISON:  Radiograph of the chest performed earlier the same day      TECHNIQUE: CT examination of the chest and abdomen was performed both prior to and after the administration of intravenous contrast   Thin section angiographic arterial phase post contrast technique was used in order to evaluate for aortic dissection     3D reformatted images and volume rendering were performed on an independent workstation   Additionally, axial, sagittal, and coronal 2D reformatted images were created from the source data and submitted for interpretation  Radiation dose length product (DLP) for this visit: [id number] mGy-cm    This examination, like all CT scans performed in the [facility], was performed utilizing techniques to minimize radiation dose exposure, including the use of iterative   reconstruction and automated exposure control  IV Contrast:  100 mL of iohexol (OMNIPAQUE)  was administered intravenously without immediate adverse reaction  Enteric Contrast: Enteric contrast was not administered  FINDINGS:     AORTA: The ascending thoracic aorta is aneurysmal measuring 4 4 cm in diameter   The aorta tapers to 2 8 cm at the level of the aortic arch  There is common origin of the right brachiocephalic artery and left common carotid artery   Mild atherosclerotic   plaque of the thoracic aorta   No evidence of aortic dissection   There is mild atherosclerotic narrowing of the left subclavian artery at its origin   Moderate atherosclerotic calcified and noncalcified plaques of the abdominal aorta  CHEST    LUNGS: Emphysematous changes of the lungs   Large masslike opacity in the left upper lobe contacting the lateral superior mediastinum measuring 4 5 x 4 3 x 4 8 cm with faint internal enhancement (series 4, image 101)   There are surrounding nodules   measuring up to 4 mm for example (series 4, image 137) and (series 4, image 111)   Additional 4 mm nodule in the left upper lobe (series 4, image 174)   Thickening of the bronchi likely due to bronchitis   Trace bilateral dependent atelectasis     Elevation of right hemidiaphragm   Small calcified granuloma in the right lower lobe  PLEURA:  Unremarkable  HEART/GREAT VESSELS:  The heart is normal in size   Coronary artery calcifications   No pericardial effusion   No central pulmonary embolism      MEDIASTINUM AND AYLSE: Claudette Packer is a 2 2 x 1 6 x 2 2 cm fluid density structure in the anterior mediastinum (series 4, image 148) which likely represents a small amount of loculated mediastinal fluid and less likely low-density lymphadenopathy   Bilateral   hilar lymph nodes measure up to 9 mm in short axis   No significant mediastinal lymphadenopathy by CT size criteria   A precarinal lymph node measures 7 mm in short axis  CHEST WALL AND LOWER NECK:   Unremarkable  ABDOMEN    LIVER/BILIARY TREE:  Unremarkable  GALLBLADDER:  There are gallstone(s) within the gallbladder, without pericholecystic inflammatory changes  SPLEEN:  Unremarkable  PANCREAS:  Unremarkable  ADRENAL GLANDS:  Mild thickening of bilateral adrenal glands  KIDNEYS/URETERS:  Unremarkable  No hydronephrosis  VISUALIZED STOMACH, BOWEL AND APPENDIX:  No bowel obstruction   Diverticulosis of the colon      VISUALIZED ABDOMINOPELVIC CAVITY:  No ascites or free intraperitoneal air  No lymphadenopathy      ABDOMINAL WALL:  Large lobulated ventral abdominal wall hernia containing multiple loops of nonobstructive small and large bowel  OSSEOUS STRUCTURES:  No acute fracture or destructive osseous lesion  Impression:       1   Aneurysmal ascending thoracic aorta measuring up to 4 4 cm in diameter   No evidence of aortic dissection  2   Emphysematous changes of the lung with masslike opacity in the left upper lobe measuring approximately 4 5 x 4 3 x 4 8 cm with faint internal enhancement suspicious for lung cancer and probable superimposed pneumonia   Tissue sampling and/or PET/CT   or close interval follow-up is recommended   There are bilateral upper lobe nodules measuring up to 4 mm  3   Thickening of the bronchi compatible with bronchitis    4   Bilateral hilar lymph nodes measure up to 9 mm in short axis   No significant mediastinal lymphadenopathy however there is a 2 2 x 1 6 x 2 2 cm fluid density structure in the anterior mediastinum which likely represents a small amount of loculated   mediastinal fluid however low-density lymph node deposit is not excluded      I personally discussed this study with Luis Liu on 3/27/2020 at 9:54 PM   The study was marked in EPIC for significant notification  Workstation performed: [id number]   XR chest 2 views [[id number]] Collected: 03/27/20 2108   Order Status: Completed Updated: 03/27/20 2149   Addenda:       ADDENDUM:   See CT angiogram of the chest and abdomen performed subsequently for   further evaluation  Signed: 03/27/20 2148 by Yaya Mitchell MD   Narrative:     CHEST     INDICATION:   Chest Pain  COMPARISON: Maricel Queenie PERFORMED/VIEWS: Berton Nipple CHEST PA & LATERAL  The frontal view was performed utilizing dual energy radiographic technique  FINDINGS:    Cardiomediastinal silhouette appears unremarkable  Increased opacity in the left upper lung field which may be due to pneumonia in the appropriate clinical setting  No pneumothorax or pleural effusion  Osseous structures appear within normal limits for patient age  Impression:       Increased opacity within the left upper lung field which may be due to pneumonia in the appropriate clinical setting  Incidental Findings:   · Aneurysmal dilatation of ascending thoracic aorta up to 4 4 cm  Left upper lobe lung mass  Both of these findings were discussed with patient in detail including plans for follow-up  Test Results Pending at Discharge (will require follow up): · Blood cultures, COVID-19 test     Outpatient Tests Requested:  · Further imaging regarding left upper lobe lung mass as per pulmonology  · Repeat CT scan in 1 year for aneurysmal dilatation of ascending thoracic aorta  Complications:  None    Hospital Course:     Patient had presented to the emergency room for chest pain  This started 1 day prior to presentation  He has a history of hypertension but does not take any medication for it    He used to take medication for it but stopped taking it due to side effects  His blood pressure was 200/105 upon presentation  It came down to normal with just 1 dose of labetalol      CTA of the chest and abdomen was done to look for any aortic dissection  No aortic dissection was seen  However, aneurysmal dilatation of the ascending thoracic aorta measuring up to 4 4 cm was seen  Also, incidentally a mass in the left upper lobe measuring 4 5 x 4 3 x 4 8 cm was seen concerning for lung cancer  Patient does have a long history of smoking      There was also evidence of superimposed pneumonia and bronchitis  Antibiotics were started but procalcitonin was negative x2  Therefore antibiotics were stopped  Patient was tested for COVID-19 and this test is currently pending  I clinically doubt he has it at this moment as he has not spiked any fevers and is not requiring any supplemental oxygen      Aneurysmal dilatation of ascending thoracic aortic aneurysm was discussed with Cardiology  Because it is 4 4 cm it can be monitored at this time  Etiology is likely due to ongoing tobacco use and uncontrolled hypertension  This finding was discussed with patient including recommendation for repeat CT scan in 1 year  He expressed understanding      Left upper lobe lung mass was discussed with pulmonology  They will reach out to patient and have him follow up on this as an outpatient  This mass was also discussed with patient including the probability that it could be cancer  I also discussed following up with pulmonology as an outpatient  He expressed understanding  I also told him to find a primary care physician and follow-up with them      Patient is otherwise stable and is okay for discharge home  His troponins were negative x3 and his EKG is unremarkable  His chest pain is much improved and he denies any shortness of breath  Etiology of his chest pain was likely due to uncontrolled hypertension     initially I was going to discharge him on metoprolol  However, patient's daughter states that he took it in the past and did not tolerate it  Therefore I will discharge him on amlodipine  Also, I will provide a prescription for pantoprazole as pulmonology feels as if GERD contributed to his chest pain considering it came on after he ate  Condition at Discharge: stable     Discharge Day Visit / Exam:     * Please refer to separate progress note for these details *    Discussion with Family:  With patient's permission, hospital course, CT scan findings and plan going for was discussed with his daughter over the phone  She expressed appreciation  Discharge instructions/Information to patient and family:   See after visit summary for information provided to patient and family  Provisions for Follow-Up Care:  See after visit summary for information related to follow-up care and any pertinent home health orders  Planned Readmission:  None    Discharge Statement:  I spent 30 minutes discharging the patient  This time was spent on the day of discharge  I had direct contact with the patient on the day of discharge  Greater than 50% of the total time was spent examining patient, answering all patient questions, arranging and discussing plan of care with patient as well as directly providing post-discharge instructions  Additional time then spent on discharge activities  Discharge Medications:  See after visit summary for reconciled discharge medications provided to patient and family  ** Please Note: This note has been constructed using a voice recognition system   **

## 2020-03-28 NOTE — ASSESSMENT & PLAN NOTE
· Interestingly patient CTA did show mass suspicious for lung cancer  · masslike opacity in the left upper lobe measuring approximately 4 5 x 4 3 x 4 8 cm with faint internal enhancement suspicious for lung cancer    Discussed with pulmonology  Plan is for outpatient follow-up  This was discussed with patient who expressed understanding

## 2020-03-28 NOTE — ED PROVIDER NOTES
History  Chief Complaint   Patient presents with    Chest Pain     pt states new onset fatigue 2 days ago, has chronic cough but unsure if worse than normal  pt states after dinner he feveloped a pain in his upper abdomen/chest      Patient is a 54-year-old male with a history of hypertension and multiple abdominal surgeries who presents with abdominal and chest pain  Patient states that he developed abdominal pain after eating pork and sauerkraut around 11:00 a m  This morning  He took Tums and Pepto-Bismol without relief  He states that the pain is somewhat vague  Has noticed it in his chest and in his neck at times  He states that the pain is worse when he takes a deep breath or his heart starts racing  He admits to a chronic cough which is at baseline but denies fever, chills, shortness of breath, back pain or other concerns  Patient admits to history of hypertension but does not take any medications for it  He has no other complaints at this time  History provided by:  Patient  Chest Pain   Pain location:  Epigastric and substernal area  Pain radiates to:  Neck  Pain radiates to the back: no    Timing:  Constant  Progression:  Waxing and waning  Chronicity:  New  Associated symptoms: abdominal pain and cough (chronic)    Associated symptoms: no back pain, no diaphoresis, no dizziness, no dysphagia, no fever, no headache, no nausea, no palpitations, no shortness of breath and not vomiting        None       Past Medical History:   Diagnosis Date    Alcohol abuse 3/27/2020    Community acquired pneumonia 3/27/2020    Hypertension        Past Surgical History:   Procedure Laterality Date    COLON SURGERY         History reviewed  No pertinent family history  I have reviewed and agree with the history as documented      E-Cigarette/Vaping     E-Cigarette/Vaping Substances     Social History     Tobacco Use    Smoking status: Current Every Day Smoker     Packs/day: 1 00    Smokeless tobacco: Never Used   Substance Use Topics    Alcohol use: Yes     Comment: every other day    Drug use: Never       Review of Systems   Constitutional: Negative for chills, diaphoresis and fever  HENT: Negative for nosebleeds, sore throat and trouble swallowing  Eyes: Negative for photophobia, pain and visual disturbance  Respiratory: Positive for cough (chronic)  Negative for chest tightness and shortness of breath  Cardiovascular: Positive for chest pain  Negative for palpitations and leg swelling  Gastrointestinal: Positive for abdominal pain  Negative for constipation, diarrhea, nausea and vomiting  Endocrine: Negative for polydipsia and polyuria  Genitourinary: Negative for difficulty urinating, dysuria and hematuria  Musculoskeletal: Negative for back pain, neck pain and neck stiffness  Skin: Negative for pallor and rash  Neurological: Negative for dizziness, syncope, light-headedness and headaches  All other systems reviewed and are negative  Physical Exam  Physical Exam   Constitutional: He is oriented to person, place, and time  He appears well-developed and well-nourished  No distress  HENT:   Head: Normocephalic and atraumatic  Mouth/Throat: Oropharynx is clear and moist and mucous membranes are normal    Eyes: Pupils are equal, round, and reactive to light  EOM are normal    Neck: Normal range of motion  Neck supple  Cardiovascular: Regular rhythm, normal heart sounds, intact distal pulses and normal pulses  Tachycardia present  Pulmonary/Chest: Effort normal and breath sounds normal  No respiratory distress  Abdominal: Soft  He exhibits no distension  There is no tenderness  There is no rigidity, no rebound and no guarding  Musculoskeletal: Normal range of motion  He exhibits no edema or tenderness  Lymphadenopathy:     He has no cervical adenopathy  Neurological: He is alert and oriented to person, place, and time  He has normal strength   No cranial nerve deficit or sensory deficit  Skin: Skin is warm and dry  Capillary refill takes less than 2 seconds  Psychiatric: He has a normal mood and affect  Nursing note and vitals reviewed        Vital Signs  ED Triage Vitals [03/1950]   Temperature Pulse Respirations Blood Pressure SpO2   98 7 °F (37 1 °C) (!) 112 18 (!) 200/105 98 %      Temp Source Heart Rate Source Patient Position - Orthostatic VS BP Location FiO2 (%)   Oral Monitor Sitting Right arm --      Pain Score       5           Vitals:    03/27/20 2145 03/27/20 2200 03/27/20 2245 03/27/20 2300   BP: 130/79 119/80 128/81 109/70   Pulse: 84 88 84 84   Patient Position - Orthostatic VS: Sitting Sitting Sitting Sitting         Visual Acuity      ED Medications  Medications   azithromycin (ZITHROMAX) 500 mg in sodium chloride 0 9% 250mL IVPB 500 mg (has no administration in time range)   nicotine (NICODERM CQ) 14 mg/24hr TD 24 hr patch 1 patch (has no administration in time range)   calcium carbonate (TUMS) chewable tablet 1,000 mg (has no administration in time range)   benzonatate (TESSALON PERLES) capsule 100 mg (has no administration in time range)   enoxaparin (LOVENOX) subcutaneous injection 40 mg (has no administration in time range)   acetaminophen (TYLENOL) tablet 650 mg (has no administration in time range)   cefTRIAXone (ROCEPHIN) 1,000 mg in dextrose 5 % 50 mL IVPB (has no administration in time range)     And   azithromycin (ZITHROMAX) tablet 250 mg (has no administration in time range)   folic acid 1 mg, thiamine (VITAMIN B1) 100 mg in sodium chloride 0 9 % 100 mL IV piggyback (has no administration in time range)   Labetalol HCl (NORMODYNE) injection 10 mg (10 mg Intravenous Given 3/27/20 2118)   iohexol (OMNIPAQUE) 350 MG/ML injection (MULTI-DOSE) 100 mL (100 mL Intravenous Given 3/27/20 2110)   aspirin tablet 325 mg (325 mg Oral Given 3/27/20 2230)   ceftriaxone (ROCEPHIN) 1 g/50 mL in dextrose IVPB (1,000 mg Intravenous New Bag 3/27/20 2245) Diagnostic Studies  Results Reviewed     Procedure Component Value Units Date/Time    Lactic acid x2 Q2H [[id number]]  (Normal) Collected:  03/27/20 2303    Lab Status:  Final result Specimen:  Blood Updated:  03/27/20 2323     LACTIC ACID 1 2 mmol/L     Narrative:       Result may be elevated if tourniquet was used during collection  Coronavirus 2019-nCoV PA [facility] [[id number]] Collected:  03/27/20 2244    Lab Status: In process Specimen:  Nasopharyngeal Swab Updated:  03/27/20 2256    Procalcitonin [[id number]] Collected:  03/27/20 2244    Lab Status: In process Specimen:  Blood from Arm, Right Updated:  03/27/20 2253    Blood culture #1 [[id number]] Collected:  03/27/20 2200    Lab Status: In process Specimen:  Blood from Arm, Right Updated:  03/27/20 2253    Blood culture #2 [[id number]] Collected:  03/27/20 2215    Lab Status:   In process Specimen:  Blood from Hand, Right Updated:  03/27/20 2253    Lactic acid x2 Q2H [[id number]]     Lab Status:  No result Specimen:  Blood     Troponin I [[id number]]  (Normal) Collected:  03/27/20 2026    Lab Status:  Final result Specimen:  Blood Updated:  03/27/20 2049     Troponin I <0 02 ng/mL     Comprehensive metabolic panel [[id number]]  (Abnormal) Collected:  03/27/20 2026    Lab Status:  Final result Specimen:  Blood Updated:  03/27/20 2048     Sodium 135 mmol/L      Potassium 4 0 mmol/L      Chloride 99 mmol/L      CO2 26 mmol/L      ANION GAP 10 mmol/L      BUN 16 mg/dL      Creatinine 1 16 mg/dL      Glucose 99 mg/dL      Calcium 9 4 mg/dL      AST 18 U/L      ALT 32 U/L      Alkaline Phosphatase 96 U/L      Total Protein 7 6 g/dL      Albumin 3 4 g/dL      Total Bilirubin 0 33 mg/dL      eGFR 64 ml/min/1 73sq m     Narrative:       Andrew guidelines for Chronic Kidney Disease (CKD):     Stage 1 with normal or high GFR (GFR > 90 mL/min/1 73 square meters)    Stage 2 Mild CKD (GFR = 60-89 mL/min/1 73 square meters)   Stage 3A Moderate CKD (GFR = 45-59 mL/min/1 73 square meters)    Stage 3B Moderate CKD (GFR = 30-44 mL/min/1 73 square meters)    Stage 4 Severe CKD (GFR = 15-29 mL/min/1 73 square meters)    Stage 5 End Stage CKD (GFR <15 mL/min/1 73 square meters)  Note: GFR calculation is accurate only with a steady state creatinine    Lipase [[id number]]  (Normal) Collected:  03/27/20 2026    Lab Status:  Final result Specimen:  Blood Updated:  03/27/20 2048     Lipase 149 u/L     CBC and differential [[id number]]  (Abnormal) Resulted:  03/27/20 2031    Lab Status:  Final result Specimen:  Blood Updated:  03/27/20 2031     WBC 12 65 Thousand/uL      RBC 4 67 Million/uL      Hemoglobin 15 0 g/dL      Hematocrit 46 2 %      MCV 99 fL      MCH 32 1 pg      MCHC 32 5 g/dL      RDW 13 5 %      MPV 9 2 fL      Platelets 229 Thousands/uL      nRBC 0 /100 WBCs      Neutrophils Relative 77 %      Immat GRANS % 1 %      Lymphocytes Relative 13 %      Monocytes Relative 8 %      Eosinophils Relative 1 %      Basophils Relative 0 %      Neutrophils Absolute 9 82 Thousands/µL      Immature Grans Absolute 0 06 Thousand/uL      Lymphocytes Absolute 1 68 Thousands/µL      Monocytes Absolute 1 00 Thousand/µL      Eosinophils Absolute 0 06 Thousand/µL      Basophils Absolute 0 03 Thousands/µL                  CTA dissection protocol chest and abdomen   Final Result by Lisa Morales MD (03/27 2154)      1  Aneurysmal ascending thoracic aorta measuring up to 4 4 cm in diameter  No evidence of aortic dissection  2   Emphysematous changes of the lung with masslike opacity in the left upper lobe measuring approximately 4 5 x 4 3 x 4 8 cm with faint internal enhancement suspicious for lung cancer and probable superimposed pneumonia  Tissue sampling and/or PET/CT    or close interval follow-up is recommended  There are bilateral upper lobe nodules measuring up to 4 mm  3   Thickening of the bronchi compatible with bronchitis     4   Bilateral hilar lymph nodes measure up to 9 mm in short axis  No significant mediastinal lymphadenopathy however there is a 2 2 x 1 6 x 2 2 cm fluid density structure in the anterior mediastinum which likely represents a small amount of loculated    mediastinal fluid however low-density lymph node deposit is not excluded  I personally discussed this study with Evie Barraza on 3/27/2020 at 9:54 PM    The study was marked in EPIC for significant notification  Workstation performed: [id number]         XR chest 2 views   ED Interpretation by Getachwe Brown DO (03/27 2031)   Left upper lobe infiltrate  Final Result by  (03/27 2336)   Addendum 1 of 1 by Rosy Newsome MD (03/27 2148)   ADDENDUM:      See CT angiogram of the chest and abdomen performed subsequently for    further evaluation  Final                 Procedures  ECG 12 Lead Documentation Only  Date/Time: 3/27/2020 8:26 PM  Performed by: Getachew Brown DO  Authorized by: Getachew Brown DO     ECG reviewed by me, the ED Provider: yes    Patient location:  ED  Previous ECG:     Previous ECG:  Unavailable    Comparison to cardiac monitor: Yes    Comments:      Sinus tachycardia rate of 109 beats per minute  Normal intervals  Leftward axis  Normal QRS  No ST T wave abnormalities  Occasional PVCs  No old for comparison  ED Course  ED Course as of Mar 27 2336   Fri Mar 27, 2020   2100 Blood pressure remains elevated  Will give a dose of IV labetalol  HEART Risk Score      Most Recent Value   Heart Score Risk Calculator   History  0 Filed at: 03/27/2020 2155   ECG  0 Filed at: 03/27/2020 2155   Age  2 Filed at: 03/27/2020 2155   Risk Factors  1 Filed at: 03/27/2020 2155   Troponin  0 Filed at: 03/27/2020 2155   HEART Score  3 Filed at: 03/27/2020 2155                  Initial Sepsis Screening     Row Name 03/27/20 2234                Is the patient's history suggestive of a new or worsening infection?   (!) Yes (Proceed)  -CD        Suspected source of infection  pneumonia  -CD        Are two or more of the following signs & symptoms of infection both present and new to the patient? (!) Yes (Proceed)  -CD        Indicate SIRS criteria  Leukocytosis (WBC > 14046 IJL); Tachycardia > 90 bpm  -CD        If the answer is yes to both questions, suspicion of sepsis is present          If severe sepsis is present AND tissue hypoperfusion perists in the hour after fluid resuscitation or lactate > 4, the patient meets criteria for SEPTIC SHOCK          Are any of the following organ dysfunction criteria present within 6 hours of suspected infection and SIRS criteria that are NOT considered to be chronic conditions? No  -CD        Organ dysfunction          Date of presentation of severe sepsis          Time of presentation of severe sepsis          Tissue hypoperfusion persists in the hour after crystalloid fluid administration, evidenced, by either:          Was hypotension present within one hour of the conclusion of crystalloid fluid administration?           Date of presentation of septic shock          Time of presentation of septic shock            User Key  (r) = Recorded By, (t) = Taken By, (c) = Cosigned By    Initials Name Provider Type    CD Arcenio BloodgoodDO Physician          SHANNAN Risk Score      Most Recent Value   Age >= 72  1 Filed at: 03/27/2020 2324   Known CAD (stenosis >= 50%)  0 Filed at: 03/27/2020 2324   Recent (<=24 hrs) Service Angina  0 Filed at: 03/27/2020 2324   ST Deviation >= 0 5 mm  0 Filed at: 03/27/2020 2324   3+ CAD Risk Factors (FHx, HTN, HLP, DM, Smoker)  1 Filed at: 03/27/2020 2324   Aspirin Use Past 7 Days  0 Filed at: 03/27/2020 2324   Elevated Cardiac Markers  0 Filed at: 03/27/2020 2324   SHANNAN Risk Score (Calculated)  2 Filed at: 03/27/2020 2324              MDM  Number of Diagnoses or Management Options  Chest pain, unspecified type: new and requires workup  Hypertensive urgency: new and requires workup  Lung mass: new and requires workup  Pneumonia of left upper lobe due to infectious organism [facility]): new and requires workup  Diagnosis management comments: Patient presents with nonspecific chest and abdominal pain  Patient was noted to be very hypertensive and tachycardic  I was initially concerned for aortic dissection  However CTA negative for dissection  Does reveal a left upper lobe mass with probable superimposed pneumonia  Will treat with azithromycin and ceftriaxone  Will also test for COVID-19  Patient was given a dose of IV labetalol for hypertensive urgency and concern for hypertensive emergency  He responded with a significant drop in his systolic and diastolic blood pressures  Will continue to monitor blood pressure for hypotension or significant hypertension  Patient will need further workup for the lung mass at some point  Will hospitalize overnight for serial troponins         Amount and/or Complexity of Data Reviewed  Clinical lab tests: ordered and reviewed  Tests in the radiology section of CPT®: ordered and reviewed  Tests in the medicine section of CPT®: ordered and reviewed  Review and summarize past medical records: yes  Discuss the patient with other providers: yes  Independent visualization of images, tracings, or specimens: yes    Risk of Complications, Morbidity, and/or Mortality  Presenting problems: high  Diagnostic procedures: high  Management options: moderate    Patient Progress  Patient progress: stable        Disposition  Final diagnoses:   Chest pain, unspecified type   Hypertensive urgency   Lung mass   Pneumonia of left upper lobe due to infectious organism [facility])     Time reflects when diagnosis was documented in both MDM as applicable and the Disposition within this note     Time User Action Codes Description Comment    3/27/2020 10:28 PM Tl MARTINEZ Add [R07 9] Chest pain, unspecified type     3/27/2020 10:28 PM Joselyn Mitchell [I16 0] Hypertensive urgency     3/27/2020 10:28 PM Tracie Yen Add [R91 8] Lung mass     3/27/2020 10:28 PM Peyton MARTINEZ Add [J18 1] Pneumonia of left upper lobe due to infectious organism [facility])       ED Disposition     ED Disposition Condition Date/Time Comment    Admit Stable Fri Mar 27, 2020 10:28 PM Case was discussed with MARIELLE and the patient's admission status was agreed to be Admission Status: observation status to the service of Dr Tyson Elizalde   Follow-up Information    None         There are no discharge medications for this patient  No discharge procedures on file      PDMP Review     None          ED Provider  Electronically Signed by           Harmeet Herrera DO  03/27/20 2466

## 2020-03-28 NOTE — RESPIRATORY THERAPY NOTE
RT Protocol Note  Maciej Viera 71 y o  male MRN: [id number]  Unit/Bed#: S -01 Encounter: [id number]    Assessment    Principal Problem:    Chest pain  Active Problems:    Hypertensive urgency    Community acquired pneumonia    Sepsis ([address] )    Abnormal computed tomography angiography (CTA)    Alcohol abuse      Home Pulmonary Medications:  NONE       Past Medical History:   Diagnosis Date    Alcohol abuse 3/27/2020    Community acquired pneumonia 3/27/2020    Hypertension      Social History     Socioeconomic History    Marital status: Single     Spouse name: None    Number of children: None    Years of education: None    Highest education level: None   Occupational History    None   Social Needs    Financial resource strain: None    Food insecurity:     Worry: None     Inability: None    Transportation needs:     Medical: None     Non-medical: None   Tobacco Use    Smoking status: Current Every Day Smoker     Packs/day: 1 00    Smokeless tobacco: Never Used   Substance and Sexual Activity    Alcohol use: Yes     Comment: every other day    Drug use: Never    Sexual activity: None   Lifestyle    Physical activity:     Days per week: None     Minutes per session: None    Stress: None   Relationships    Social connections:     Talks on phone: None     Gets together: None     Attends Sikh service: None     Active member of club or organization: None     Attends meetings of clubs or organizations: None     Relationship status: None    Intimate partner violence:     Fear of current or ex partner: None     Emotionally abused: None     Physically abused: None     Forced sexual activity: None   Other Topics Concern    None   Social History Narrative    None       Subjective         Objective    Physical Exam:   Assessment Type: Assess only  General Appearance: Alert, Awake  Respiratory Pattern: Symmetrical, Spontaneous  Chest Assessment: Chest expansion symmetrical  Bilateral Breath Sounds: Diminished  Cough: None    Vitals:  Blood pressure 109/70, pulse 86, temperature 98 9 °F (37 2 °C), temperature source Oral, resp  rate 18, height 5' 10" (1 778 m), weight 94 6 kg (208 lb 8 9 oz), SpO2 94 %  Imaging and other studies: I have personally reviewed pertinent reports  Plan    Respiratory Plan: Discontinue Protocol        Resp Comments: Pt assessed for Respiratory Protocol  BS diminihsed, SPO2 94% on room air  No respiratory nebs needed at this time  D/C protocol  May re-evaluate if there is a change inpt respiratory status

## 2020-03-28 NOTE — ASSESSMENT & PLAN NOTE
· Given tachycardia, leukocytosis, pneumonia on CT scan POA   · Present on admission  · Blood cultures pending'  · Wbc 12 on admission currently afebrile   · Monitor WBC and fever   · IV fluids  · Started on ceftriaxone and azithromycin in the ED   · Will continue   · COVID pending

## 2020-03-28 NOTE — ASSESSMENT & PLAN NOTE
· Patient noted to be very hypertensive in the emergency department at greater than 271 systolic  · Was given labetalol 10 mg which dropped his pressure is significantly to 756 systolic  · Will hold on further antihypertensives for now  · Patient states that he has not been taking his antihypertensive medications as he should  · Will monitor BP closely

## 2020-03-28 NOTE — DISCHARGE INSTR - AVS FIRST PAGE
[address]    Your healthcare provider and/or public health staff have evaluated you and have determined that you do not need to remain in the hospital at this time  At this time you can be isolated at home where you will be monitored by staff from your local or state health department  You should carefully follow the prevention and isolation steps below until a healthcare provider or local or state health department says that you can return to your normal activities  Stay home except to get medical care    People who are mildly ill with COVID-19 are able to isolate at home during their illness  You should restrict activities outside your home, except for getting medical care  Do not go to work, school, or public areas  Avoid using public transportation, ride-sharing, or taxis  Separate yourself from other people and animals in your home    People: As much as possible, you should stay in a specific room and away from other people in your home  Also, you should use a separate bathroom, if available  Animals: You should restrict contact with pets and other animals while you are sick with COVID-19, just like you would around other people  Although there have not been reports of pets or other animals becoming sick with COVID-19, it is still recommended that people sick with COVID-19 limit contact with animals until more information is known about the virus  When possible, have another member of your household care for your animals while you are sick  If you are sick with COVID-19, avoid contact with your pet, including petting, snuggling, being kissed or licked, and sharing food  If you must care for your pet or be around animals while you are sick, wash your hands before and after you interact with pets and wear a facemask  See COVID-19 and Animals for more information      Call ahead before visiting your doctor    If you have a medical appointment, call the healthcare provider and tell them that you have or may have COVID-19  This will help the healthcare providers office take steps to keep other people from getting infected or exposed  Wear a facemask    You should wear a facemask when you are around other people (e g , sharing a room or vehicle) or pets and before you enter a healthcare providers office  If you are not able to wear a facemask (for example, because it causes trouble breathing), then people who live with you should not stay in the same room with you, or they should wear a facemask if they enter your room  Cover your coughs and sneezes    Cover your mouth and nose with a tissue when you cough or sneeze  Throw used tissues in a lined trash can  Immediately wash your hands with soap and water for at least 20 seconds or, if soap and water are not available, clean your hands with an alcohol-based hand  that contains at least 60% alcohol  Clean your hands often    Wash your hands often with soap and water for at least 20 seconds, especially after blowing your nose, coughing, or sneezing; going to the bathroom; and before eating or preparing food  If soap and water are not readily available, use an alcohol-based hand  with at least 60% alcohol, covering all surfaces of your hands and rubbing them together until they feel dry  Soap and water are the best option if hands are visibly dirty  Avoid touching your eyes, nose, and mouth with unwashed hands  Avoid sharing personal household items    You should not share dishes, drinking glasses, cups, eating utensils, towels, or bedding with other people or pets in your home  After using these items, they should be washed thoroughly with soap and water  Clean all high-touch surfaces everyday    High touch surfaces include counters, tabletops, doorknobs, bathroom fixtures, toilets, phones, keyboards, tablets, and bedside tables  Also, clean any surfaces that may have blood, stool, or body fluids on them   Use a household cleaning spray or wipe, according to the label instructions  Labels contain instructions for safe and effective use of the cleaning product including precautions you should take when applying the product, such as wearing gloves and making sure you have good ventilation during use of the product  Monitor your symptoms    Seek prompt medical attention if your illness is worsening (e g , difficulty breathing)  Before seeking care, call your healthcare provider and tell them that you have, or are being evaluated for, COVID-19  Put on a facemask before you enter the facility  These steps will help the healthcare providers office to keep other people in the office or waiting room from getting infected or exposed  Ask your healthcare provider to call the local or [facility] health department  Persons who are placed under active monitoring or facilitated self-monitoring should follow instructions provided by their local health department or occupational health professionals, as appropriate  If you have a medical emergency and need to call 911, notify the dispatch personnel that you have, or are being evaluated for COVID-19  If possible, put on a facemask before emergency medical services arrive  Discontinuing home isolation    Patients with confirmed COVID-19 should remain under home isolation precautions until the risk of secondary transmission to others is thought to be low  The decision to discontinue home isolation precautions should be made on a case-by-case basis, in consultation with healthcare providers and [facility] and [facility] health departments  Source: [employer]     We will call you with the results of your COVID-19 test     Other instructions:    Dear Sydnee Dave,     It was our pleasure to care for you here at [facility]    It is our hope that we were always able to exceed the expected standards for your care during your stay  You were hospitalized due to chest pain and hypertensive urgency  You were cared for on the 3rd floor under the service of Mirian Cast DO with the [facility] who covers for your primary care physician (PCP), No primary care provider on file  , while you were hospitalized  If you have any questions or concerns related to this hospitalization, you may contact us at [phone]  For follow up as well as medication refills, we recommend that you follow up with your primary care physician  A registered nurse will reach out to you by phone within a few days after your discharge to answer any additional questions that you may have after going home  However, at this time we provide for you here, the most important instructions / recommendations at discharge:     · Notable Medication Adjustments -   · Please start taking amlodipine and pantoprazole as directed  · Testing Required after Discharge -   · Further testing will be ordered by our pulmonology team   · Important follow up information -   · Please find a primary care physician and follow-up with them within 1-2 weeks  · Also, please follow-up with our pulmonology team regarding the mass that was noted in your left lung on CT scan  They should be calling you with an appointment  · Other Instructions -   · As discussed, aneurysmal dilation of the ascending thoracic aorta was also noted on the CT scan  When you are established with a primary care physician please have them order repeat CT scan in 1 year  · Please review this entire after visit summary as additional general instructions including medication list, appointments, activity, diet, any pertinent wound care, and other additional recommendations from your care team that may be provided for you        Sincerely,     Mirian Cast DO

## 2020-03-28 NOTE — ASSESSMENT & PLAN NOTE
· CTA dissection study showed possible superimposed pneumonia    If patient has pneumonia it is likely viral in nature as procalcitonin was negative x2  Antibiotics stopped  COVID-19 testing pending  Patient stable from respiratory standpoint  Okay for discharge

## 2020-03-28 NOTE — ASSESSMENT & PLAN NOTE
 Patient Presentation:  Chest pain which began while patient was eating  He has had chest pain which has been constant and worse with his heart beat   Likely etiology:  Likely secondary to hypertension but rule out ACS   SHANNAN: 2      Troponins were negative x3  EKG unremarkable  Chest pain better with blood pressure now controlled  Will discharge on metoprolol

## 2020-03-30 ENCOUNTER — TELEPHONE (OUTPATIENT)
Dept: PULMONOLOGY | Facility: CLINIC | Age: 70
End: 2020-03-30

## 2020-03-30 DIAGNOSIS — R93.89 ABNORMAL COMPUTED TOMOGRAPHY ANGIOGRAPHY (CTA): Primary | ICD-10-CM

## 2020-03-30 LAB — CORONAVIRUS 2019-NCOV: NOT DETECTED

## 2020-03-30 NOTE — TELEPHONE ENCOUNTER
Monique to inform pt of PET scan being scheduled at [place] [id number] for 4/9/2020 at 9:30am  He will than be called with results and determine if and when he needs to follow up with Dr Ankit Jc

## 2020-03-30 NOTE — TELEPHONE ENCOUNTER
Spoke with pt, he stated he was tested for COVID on Friday  He stated if the results are negative, he will go ahead with the PET scan  He will also contact the office if it comes back positive   Angely Reed

## 2020-04-02 LAB
BACTERIA BLD CULT: NORMAL
BACTERIA BLD CULT: NORMAL

## 2020-04-15 ENCOUNTER — TELEPHONE (OUTPATIENT)
Dept: PULMONOLOGY | Facility: CLINIC | Age: 70
End: 2020-04-15

## 2020-04-15 ENCOUNTER — HOSPITAL ENCOUNTER (OUTPATIENT)
Dept: RADIOLOGY | Age: 70
Discharge: HOME | End: 2020-04-15
Payer: COMMERCIAL

## 2020-04-15 DIAGNOSIS — D38.1 NEOPLASM OF UNCERTAIN BEHAVIOR OF LEFT UPPER LOBE OF LUNG: ICD-10-CM

## 2020-04-15 LAB — GLUCOSE SERPL-MCNC: 93 MG/DL (ref 65–140)

## 2020-04-15 PROCEDURE — A9552 F18 FDG: HCPCS

## 2020-04-15 PROCEDURE — 78815 PET IMAGE W/CT SKULL-THIGH: CPT

## 2020-04-15 PROCEDURE — 82948 REAGENT STRIP/BLOOD GLUCOSE: CPT

## 2020-04-20 ENCOUNTER — TELEPHONE (OUTPATIENT)
Dept: PULMONOLOGY | Facility: CLINIC | Age: 70
End: 2020-04-20

## 2020-04-21 ENCOUNTER — TELEMEDICINE (OUTPATIENT)
Dept: PULMONOLOGY | Facility: CLINIC | Age: 70
End: 2020-04-21
Payer: COMMERCIAL

## 2020-04-21 VITALS — HEIGHT: 70 IN | BODY MASS INDEX: 28.63 KG/M2 | WEIGHT: 200 LBS

## 2020-04-21 DIAGNOSIS — R93.89 ABNORMAL COMPUTED TOMOGRAPHY ANGIOGRAPHY (CTA): ICD-10-CM

## 2020-04-21 DIAGNOSIS — R91.8 LUNG MASS: Primary | ICD-10-CM

## 2020-04-21 PROCEDURE — 99213 OFFICE O/P EST LOW 20 MIN: CPT | Performed by: PHYSICIAN ASSISTANT

## 2020-04-29 ENCOUNTER — HOSPITAL ENCOUNTER (OUTPATIENT)
Dept: RADIOLOGY | Facility: HOSPITAL | Age: 70
Discharge: HOME | End: 2020-04-29
Admitting: STUDENT IN AN ORGANIZED HEALTH CARE EDUCATION/TRAINING PROGRAM
Payer: COMMERCIAL

## 2020-04-29 VITALS
HEIGHT: 70 IN | HEART RATE: 92 BPM | BODY MASS INDEX: 28.63 KG/M2 | RESPIRATION RATE: 18 BRPM | WEIGHT: 200 LBS | OXYGEN SATURATION: 97 % | SYSTOLIC BLOOD PRESSURE: 134 MMHG | TEMPERATURE: 97.6 F | DIASTOLIC BLOOD PRESSURE: 82 MMHG

## 2020-04-29 DIAGNOSIS — R91.8 LUNG MASS: ICD-10-CM

## 2020-04-29 LAB
INR PPP: 0.98 (ref 0.84–1.19)
PROTHROMBIN TIME: 12.6 SECONDS (ref 11.6–14.5)

## 2020-04-29 PROCEDURE — 32405 PR BIOPSY LUNG/MEDIASTINUM PERCUTANEOUS NEEDLE: CPT | Performed by: RADIOLOGY

## 2020-04-29 PROCEDURE — 85610 PROTHROMBIN TIME: CPT | Performed by: STUDENT IN AN ORGANIZED HEALTH CARE EDUCATION/TRAINING PROGRAM

## 2020-04-29 PROCEDURE — 32405 HB PERCUT BX LUNG/MEDIASTINUM: CPT

## 2020-04-29 PROCEDURE — 99152 MOD SED SAME PHYS/QHP 5/>YRS: CPT | Performed by: RADIOLOGY

## 2020-04-29 PROCEDURE — 88341 IMHCHEM/IMCYTCHM EA ADD ANTB: CPT | Performed by: PATHOLOGY

## 2020-04-29 PROCEDURE — 88305 TISSUE EXAM BY PATHOLOGIST: CPT | Performed by: PATHOLOGY

## 2020-04-29 PROCEDURE — 88342 IMHCHEM/IMCYTCHM 1ST ANTB: CPT | Performed by: PATHOLOGY

## 2020-04-29 PROCEDURE — 77012 CT SCAN FOR NEEDLE BIOPSY: CPT | Performed by: RADIOLOGY

## 2020-04-29 PROCEDURE — 77012 CT SCAN FOR NEEDLE BIOPSY: CPT

## 2020-04-29 PROCEDURE — 99152 MOD SED SAME PHYS/QHP 5/>YRS: CPT

## 2020-04-29 PROCEDURE — 99153 MOD SED SAME PHYS/QHP EA: CPT

## 2020-04-29 RX ORDER — FENTANYL CITRATE 50 UG/ML
INJECTION, SOLUTION INTRAMUSCULAR; INTRAVENOUS CODE/TRAUMA/SEDATION MEDICATION
Status: COMPLETED | OUTPATIENT
Start: 2020-04-29 | End: 2020-04-29

## 2020-04-29 RX ORDER — SODIUM CHLORIDE 9 MG/ML
75 INJECTION, SOLUTION INTRAVENOUS CONTINUOUS
Status: DISCONTINUED | OUTPATIENT
Start: 2020-04-29 | End: 2020-04-30 | Stop reason: HOSPADM

## 2020-04-29 RX ORDER — MIDAZOLAM HYDROCHLORIDE 2 MG/2ML
INJECTION, SOLUTION INTRAMUSCULAR; INTRAVENOUS CODE/TRAUMA/SEDATION MEDICATION
Status: COMPLETED | OUTPATIENT
Start: 2020-04-29 | End: 2020-04-29

## 2020-04-29 RX ADMIN — FENTANYL CITRATE 25 MCG: 50 INJECTION, SOLUTION INTRAMUSCULAR; INTRAVENOUS at 08:58

## 2020-04-29 RX ADMIN — SODIUM CHLORIDE 75 ML/HR: 0.9 INJECTION, SOLUTION INTRAVENOUS at 06:30

## 2020-04-29 RX ADMIN — MIDAZOLAM 1 MG: 1 INJECTION INTRAMUSCULAR; INTRAVENOUS at 08:46

## 2020-04-29 RX ADMIN — FENTANYL CITRATE 25 MCG: 50 INJECTION, SOLUTION INTRAMUSCULAR; INTRAVENOUS at 09:03

## 2020-04-29 RX ADMIN — MIDAZOLAM 0.5 MG: 1 INJECTION INTRAMUSCULAR; INTRAVENOUS at 08:55

## 2020-04-29 RX ADMIN — MIDAZOLAM 0.5 MG: 1 INJECTION INTRAMUSCULAR; INTRAVENOUS at 09:03

## 2020-04-29 RX ADMIN — FENTANYL CITRATE 50 MCG: 50 INJECTION, SOLUTION INTRAMUSCULAR; INTRAVENOUS at 08:46

## 2020-04-29 RX ADMIN — FENTANYL CITRATE 25 MCG: 50 INJECTION, SOLUTION INTRAMUSCULAR; INTRAVENOUS at 08:55

## 2020-04-29 RX ADMIN — MIDAZOLAM 0.5 MG: 1 INJECTION INTRAMUSCULAR; INTRAVENOUS at 08:58

## 2020-05-01 DIAGNOSIS — C80.1 ADENOCARCINOMA (HCC): Primary | ICD-10-CM

## 2020-05-03 DIAGNOSIS — C80.1 ADENOCARCINOMA (HCC): Primary | ICD-10-CM

## 2020-05-04 ENCOUNTER — TELEPHONE (OUTPATIENT)
Dept: SURGICAL ONCOLOGY | Facility: CLINIC | Age: 70
End: 2020-05-04

## 2020-05-04 ENCOUNTER — TELEPHONE (OUTPATIENT)
Dept: PULMONOLOGY | Facility: CLINIC | Age: 70
End: 2020-05-04

## 2020-05-04 ENCOUNTER — TELEPHONE (OUTPATIENT)
Dept: CARDIAC SURGERY | Facility: CLINIC | Age: 70
End: 2020-05-04

## 2020-05-06 ENCOUNTER — TELEPHONE (OUTPATIENT)
Dept: SURGICAL ONCOLOGY | Facility: CLINIC | Age: 70
End: 2020-05-06

## 2020-05-07 ENCOUNTER — TELEMEDICINE (OUTPATIENT)
Dept: CARDIAC SURGERY | Facility: CLINIC | Age: 70
End: 2020-05-07
Payer: COMMERCIAL

## 2020-05-07 ENCOUNTER — DOCUMENTATION (OUTPATIENT)
Dept: CARDIAC SURGERY | Facility: CLINIC | Age: 70
End: 2020-05-07

## 2020-05-07 DIAGNOSIS — Z72.0 TOBACCO ABUSE: ICD-10-CM

## 2020-05-07 DIAGNOSIS — R91.8 LUNG MASS: ICD-10-CM

## 2020-05-07 DIAGNOSIS — C34.12 MALIGNANT NEOPLASM OF UPPER LOBE OF LEFT LUNG (HCC): Primary | ICD-10-CM

## 2020-05-07 PROCEDURE — 99203 OFFICE O/P NEW LOW 30 MIN: CPT | Performed by: THORACIC SURGERY (CARDIOTHORACIC VASCULAR SURGERY)

## 2020-05-08 ENCOUNTER — DOCUMENTATION (OUTPATIENT)
Dept: CARDIAC SURGERY | Facility: CLINIC | Age: 70
End: 2020-05-08

## 2020-05-12 ENCOUNTER — HOSPITAL ENCOUNTER (OUTPATIENT)
Dept: PULMONOLOGY | Facility: HOSPITAL | Age: 70
Discharge: HOME | End: 2020-05-12
Payer: COMMERCIAL

## 2020-05-12 DIAGNOSIS — R91.8 LUNG MASS: ICD-10-CM

## 2020-05-12 PROCEDURE — 94010 BREATHING CAPACITY TEST: CPT | Performed by: INTERNAL MEDICINE

## 2020-05-12 PROCEDURE — 94726 PLETHYSMOGRAPHY LUNG VOLUMES: CPT

## 2020-05-12 PROCEDURE — 94010 BREATHING CAPACITY TEST: CPT

## 2020-05-12 PROCEDURE — 94729 DIFFUSING CAPACITY: CPT

## 2020-05-12 PROCEDURE — 94726 PLETHYSMOGRAPHY LUNG VOLUMES: CPT | Performed by: INTERNAL MEDICINE

## 2020-05-12 PROCEDURE — 94729 DIFFUSING CAPACITY: CPT | Performed by: INTERNAL MEDICINE

## 2020-05-12 PROCEDURE — 94760 N-INVAS EAR/PLS OXIMETRY 1: CPT

## 2020-05-19 ENCOUNTER — TELEPHONE (OUTPATIENT)
Dept: CARDIAC SURGERY | Facility: CLINIC | Age: 70
End: 2020-05-19

## 2020-05-21 ENCOUNTER — OFFICE VISIT (OUTPATIENT)
Dept: CARDIAC SURGERY | Facility: CLINIC | Age: 70
End: 2020-05-21
Payer: COMMERCIAL

## 2020-05-21 ENCOUNTER — DOCUMENTATION (OUTPATIENT)
Dept: CARDIAC SURGERY | Facility: CLINIC | Age: 70
End: 2020-05-21

## 2020-05-21 VITALS
BODY MASS INDEX: 29.95 KG/M2 | WEIGHT: 209.2 LBS | SYSTOLIC BLOOD PRESSURE: 145 MMHG | DIASTOLIC BLOOD PRESSURE: 86 MMHG | HEIGHT: 70 IN | HEART RATE: 101 BPM | OXYGEN SATURATION: 98 % | TEMPERATURE: 98.7 F

## 2020-05-21 DIAGNOSIS — C80.1 ADENOCARCINOMA (HCC): ICD-10-CM

## 2020-05-21 DIAGNOSIS — C34.12 MALIGNANT NEOPLASM OF UPPER LOBE OF LEFT LUNG (HCC): Primary | ICD-10-CM

## 2020-05-21 PROCEDURE — 99214 OFFICE O/P EST MOD 30 MIN: CPT | Performed by: PHYSICIAN ASSISTANT

## 2020-05-21 RX ORDER — GABAPENTIN 300 MG/1
600 CAPSULE ORAL ONCE
Status: CANCELLED | OUTPATIENT
Start: 2020-05-21 | End: 2020-05-21

## 2020-05-21 RX ORDER — HEPARIN SODIUM 5000 [USP'U]/ML
5000 INJECTION, SOLUTION INTRAVENOUS; SUBCUTANEOUS
Status: CANCELLED | OUTPATIENT
Start: 2020-05-21 | End: 2020-05-22

## 2020-05-21 RX ORDER — CEFAZOLIN SODIUM 2 G/50ML
2000 SOLUTION INTRAVENOUS ONCE
Status: CANCELLED | OUTPATIENT
Start: 2020-05-21 | End: 2020-05-21

## 2020-05-21 RX ORDER — ACETAMINOPHEN 325 MG/1
975 TABLET ORAL ONCE
Status: CANCELLED | OUTPATIENT
Start: 2020-05-21 | End: 2020-05-21

## 2020-05-21 NOTE — H&P (VIEW-ONLY)
Thoracic Follow-Up  Assessment/Plan:    Malignant neoplasm of upper lobe of left lung [facility])  Mr Main Mir has a PET avid, biopsy proven 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, which looks to localized to his left lung  There is some left hilar activity on PET, which may indicate N1 disease  He still may be a candidate for resection, since his PFT's were essentially normal, with exception of a slightly diminished DLCO  However, secondary to the tumor size and hilar activity, Dr Carri Sharp will perform a cervical mediastinoscopy prior to resection in the same sitting  If the lymph nodes are negative, we will proceed with a left thoracoscopic upper lobectomy, possible thoracotomy  We will tentatively book the case for June 10th, with the idea that he will quit smoking by tomorrow  We will also perform a nicotine level stat on admit  Diagnoses and all orders for this visit:    Malignant neoplasm of upper lobe of left lung (HCC)  -     Type and screen; Future  -     APTT; Future  -     Case request operating room: BRONCHOSCOPY FLEXIBLE, MEDIASTINOSCOPY, THORACOSCOPY VIDEO ASSISTED SURGERY (VATS), LOBECTOMY LUNG, THORACOTOMY; Standing  -     CBC and Platelet; Future  -     Protime-INR; Future  -     Basic metabolic panel; Future  -     PAT Covid Screening; Future  -     Case request operating room: BRONCHOSCOPY FLEXIBLE, MEDIASTINOSCOPY, THORACOSCOPY VIDEO ASSISTED SURGERY (VATS), LOBECTOMY LUNG, THORACOTOMY    Adenocarcinoma ([facility] [address] )  -     Ambulatory referral to Thoracic Surgery    Other orders  -     Diet NPO; Sips with meds; Standing  -     Nursing communication Please give pre-op Carbohydrate drink to patient 2-4 hours prior to surgery; Standing  -     Void on call to OR; Standing  -     Insert peripheral IV;  Standing  -     Place sequential compression device; Standing  -     heparin (porcine) subcutaneous injection 5,000 Units  -     acetaminophen (TYLENOL) tablet 975 mg  -     gabapentin (NEURONTIN) capsule 600 mg  -     ceFAZolin (ANCEF) IVPB (premix) 2,000 mg 50 mL          Thoracic History       Diagnosis: Left upper lobe adenocarcinoma   Procedure: 1  IR biopsy 4/29/20  Pathology: 1  Adenocarcinoma of the lung      Patient ID: Carito Perez is a 71 y o  male  HPI     Mr Steph Johnson is a 70 yo gentleman who was referred to our office for a left upper lobe adenocarcinoma  A CTA from 3/27/20 revealed an incidental 4 5 x 4 3 x 4 8 cm left upper lobe lung mass  PET from 4/15/20 had an associated SUV of 22 3 with the left upper lobe mass, extending to pleural surface  There is some left suprahilar activity with a SUV of 3  There is also a 4 mm right upper lobe nodule too small for PET eval  He had a telemedicine visit with Dr Celia Simmons and she had suggested PFT's, IR biopsy, and smoking cessation  IR biopsy performed on 4/29/20 was consistent with adenocarcinoma  PFT's from 5/12/20 demonstrated a FVC of 4 45L, 112%, FEV1 of 3 14, 104%, and a DLCO of 62% predicted  He returns today to discuss these results  On discussion, he feels pretty well  He does have a chronic cough with clear sputum production, some fatigue, and dyspnea on exertion  He tried quitting smoking for 2 days, but relapsed  He is currently smoking 3/4 ppd  He denies fever, chills, weight loss, headaches, dizziness, or chest pain       The following portions of the patient's history were reviewed and updated as appropriate: allergies, current medications, past family history, past medical history, past social history, past surgical history and problem list     Past Medical History:   Diagnosis Date    Alcohol abuse 3/27/2020    Chest pain     Community acquired pneumonia 3/27/2020    Hypertension     Left upper lobe pulmonary nodule     Malignant neoplasm of upper lobe of left lung ([address] ) 5/7/2020     Past Surgical History:   Procedure Laterality Date    COLON SURGERY      CT NEEDLE BIOPSY LUNG  4/29/2020     Family History   Problem Relation Age of Onset    Ovarian cancer Mother     Liver cancer Father      Social History     Socioeconomic History    Marital status: Single     Spouse name: Not on file    Number of children: Not on file    Years of education: Not on file    Highest education level: Not on file   Occupational History    Not on file   Social Needs    Financial resource strain: Not on file    Food insecurity:     Worry: Not on file     Inability: Not on file    Transportation needs:     Medical: Not on file     Non-medical: Not on file   Tobacco Use    Smoking status: Current Every Day Smoker     Packs/day: 1 00     Years: 55 00     Pack years: 55 00     Types: Cigarettes    Smokeless tobacco: Never Used   Substance and Sexual Activity    Alcohol use: Yes     Comment: every other day    Drug use: Never    Sexual activity: Not on file   Lifestyle    Physical activity:     Days per week: Not on file     Minutes per session: Not on file    Stress: Not on file   Relationships    Social connections:     Talks on phone: Not on file     Gets together: Not on file     Attends [employer] service: Not on file     Active member of club or organization: Not on file     Attends meetings of clubs or organizations: Not on file     Relationship status: Not on file    Intimate partner violence:     Fear of current or ex partner: Not on file     Emotionally abused: Not on file     Physically abused: Not on file     Forced sexual activity: Not on file   Other Topics Concern    Not on file   Social History Narrative    Not on file     No Known Allergies  Current Outpatient Medications on File Prior to Visit   Medication Sig Dispense Refill    amLODIPine (NORVASC) 5 mg tablet Take 1 tablet (5 mg total) by mouth daily 30 tablet 0    pantoprazole (PROTONIX) 40 mg tablet Take 1 tablet (40 mg total) by mouth daily 40 tablet 1     No current facility-administered medications on file prior to visit          Review of Systems Constitutional: Positive for fatigue  Negative for chills and fever  HENT: Negative for congestion, trouble swallowing and voice change  Respiratory: Positive for cough and shortness of breath  Negative for wheezing  Gastrointestinal: Positive for nausea  Musculoskeletal:        + back brace    Neurological: Negative for dizziness and headaches  Psychiatric/Behavioral: Negative for agitation and behavioral problems  All other systems reviewed and are negative  Objective:   Physical Exam   Constitutional: He is oriented to person, place, and time  He appears well-developed and well-nourished  HENT:   Head: Normocephalic and atraumatic  Eyes: EOM are normal  No scleral icterus    + glasses   Neck: Normal range of motion  Neck supple  Cardiovascular: Normal rate, regular rhythm and normal heart sounds  Pulmonary/Chest: Effort normal and breath sounds normal  No respiratory distress  He has no wheezes  Abdominal: Soft  Bowel sounds are normal  He exhibits no distension  Neurological: He is alert and oriented to person, place, and time  Skin: Skin is warm and dry  He is not diaphoretic  Psychiatric: He has a normal mood and affect  His behavior is normal    Vitals reviewed  /86 (BP Location: Left arm, Patient Position: Sitting, Cuff Size: Large)   Pulse 101   Temp 98 7 °F (37 1 °C)   Ht 5' 10" (1 778 m)   Wt 94 9 kg (209 lb 3 2 oz)   SpO2 98%   BMI 30 02 kg/m²     Nm Pet Ct Skull Base To Mid Thigh    Addendum Date: 4/21/2020 Addendum   ADDENDUM: The apical lung mass is on the LEFT, not right  Result Date: 4/21/2020  Narrative PET/CT SCAN INDICATION:  D38 1: Neoplasm of uncertain behavior of trachea, bronchus and lung   , abnormal CT, left upper lung mass MODIFIER: PI COMPARISON: 3/27/2020 CELL TYPE:  None TECHNIQUE:   10 5 mCi F-18-FDG administered IV   Multiplanar attenuation corrected and non attenuation corrected PET images were acquired 60 minutes post injection  Contiguous, low dose, axial CT sections were obtained from the vertex through the femurs   Intravenous contrast material was not utilized  This examination, like all CT scans performed in the [facility], was performed utilizing techniques to minimize radiation dose exposure, including the use of iterative reconstruction and automated exposure control  Fasting serum glucose: 93 mg/dl FINDINGS: VISUALIZED BRAIN:   No acute abnormalities are seen  HEAD/NECK:   There is a physiologic distribution of FDG  No FDG avid cervical adenopathy is seen  CT images: Maxillary sinus mucosal thickening  CHEST:   4 4 x 5 4 cm right apical lung mass infiltrate intense FDG activity, SUV 22 3, most compatible with malignancy  There is surrounding interstitial thickening and groundglass densities suggesting tumor infiltration  This mass also extends to the adjacent  pleural surface  Mild left suprahilar activity, SUV 3, is concerning for tumor infiltration  No hypermetabolic right hilar or result adenopathy  4 mm right upper lung nodule series 3 image 119 is too small for accurate PET evaluation  CT images: Right lung base granuloma  Aneurysmal ascending thoracic aorta measuring 4 3 x 4 cm  ABDOMEN:   No FDG avid soft tissue lesions are seen  Minimally nodular left adrenal without significant FDG activity  CT images: Cholelithiasis  Stable ventral hernia containing multiple bowel loops  Colon diverticula  PELVIS: No FDG avid soft tissue lesions are seen  CT images: Stable  OSSEOUS STRUCTURES: No FDG avid lesions are seen  CT images: No significant findings  Impression 1   4 4 x 5 4 cm right apical lung mass demonstrates intense FDG activity, most compatible with malignancy  This mass extends to the adjacent pleural surface and left suprahilar region  Tissue sampling recommended  2   No right hilar or mediastinal hypermetabolic adenopathy   3   No hypermetabolic metastases visualized in the neck, abdomen, pelvis  4   Aneurysmal ascending thoracic aorta measuring 4 3 x 4 cm  The study was marked in EPIC for significant notification   Workstation performed: [id number]

## 2020-06-03 ENCOUNTER — ANESTHESIA EVENT (OUTPATIENT)
Dept: PERIOP | Facility: HOSPITAL | Age: 70
DRG: 003 | End: 2020-06-03
Payer: COMMERCIAL

## 2020-06-03 RX ORDER — CALCIUM CARBONATE 200(500)MG
1 TABLET,CHEWABLE ORAL AS NEEDED
COMMUNITY
End: 2020-07-30 | Stop reason: HOSPADM

## 2020-06-03 NOTE — PRE-PROCEDURE INSTRUCTIONS
Pre-Surgery Instructions:   Medication Instructions    amLODIPine (NORVASC) 5 mg tablet Instructed patient per Anesthesia Guidelines   calcium carbonate (TUMS) 500 mg chewable tablet Instructed patient per Anesthesia Guidelines  Spoke with pt daughter  covid and bw to be done 6/5--wind gap  Pt has chronic sob and cough  No fever or sore throat per daughter  She verbalized understanding of shower instructions  Pt instructed to take norvasc on day of surgery with 1-2 sips of water

## 2020-06-03 NOTE — PRE-PROCEDURE INSTRUCTIONS
Pre-Surgery Instructions:   Medication Instructions    amLODIPine (NORVASC) 5 mg tablet Instructed patient per Anesthesia Guidelines   calcium carbonate (TUMS) 500 mg chewable tablet Instructed patient per Anesthesia Guidelines

## 2020-06-05 ENCOUNTER — APPOINTMENT (OUTPATIENT)
Dept: URGENT CARE | Facility: MEDICAL CENTER | Age: 70
End: 2020-06-05
Payer: COMMERCIAL

## 2020-06-05 ENCOUNTER — APPOINTMENT (OUTPATIENT)
Dept: LAB | Facility: MEDICAL CENTER | Age: 70
End: 2020-06-05
Payer: COMMERCIAL

## 2020-06-05 ENCOUNTER — DOCUMENTATION (OUTPATIENT)
Dept: URGENT CARE | Facility: MEDICAL CENTER | Age: 70
End: 2020-06-05

## 2020-06-05 DIAGNOSIS — C34.12 MALIGNANT NEOPLASM OF UPPER LOBE OF LEFT LUNG (HCC): ICD-10-CM

## 2020-06-05 LAB
ABO GROUP BLD: NORMAL
ANION GAP SERPL CALCULATED.3IONS-SCNC: 6 MMOL/L (ref 4–13)
APTT PPP: 27 SECONDS (ref 23–37)
BLD GP AB SCN SERPL QL: NEGATIVE
BUN SERPL-MCNC: 17 MG/DL (ref 5–25)
CALCIUM SERPL-MCNC: 9.6 MG/DL (ref 8.3–10.1)
CHLORIDE SERPL-SCNC: 105 MMOL/L (ref 100–108)
CO2 SERPL-SCNC: 25 MMOL/L (ref 21–32)
CREAT SERPL-MCNC: 1.2 MG/DL (ref 0.6–1.3)
ERYTHROCYTE [DISTWIDTH] IN BLOOD BY AUTOMATED COUNT: 14 % (ref 11.6–15.1)
GFR SERPL CREATININE-BSD FRML MDRD: 61 ML/MIN/1.73SQ M
GLUCOSE SERPL-MCNC: 126 MG/DL (ref 65–140)
HCT VFR BLD AUTO: 44.5 % (ref 36.5–49.3)
HGB BLD-MCNC: 14.4 G/DL (ref 12–17)
INR PPP: 1 (ref 0.84–1.19)
MCH RBC QN AUTO: 31.2 PG (ref 26.8–34.3)
MCHC RBC AUTO-ENTMCNC: 32.4 G/DL (ref 31.4–37.4)
MCV RBC AUTO: 97 FL (ref 82–98)
PLATELET # BLD AUTO: 265 THOUSANDS/UL (ref 149–390)
PMV BLD AUTO: 9.9 FL (ref 8.9–12.7)
POTASSIUM SERPL-SCNC: 4.4 MMOL/L (ref 3.5–5.3)
PROTHROMBIN TIME: 12.8 SECONDS (ref 11.6–14.5)
RBC # BLD AUTO: 4.61 MILLION/UL (ref 3.88–5.62)
RH BLD: POSITIVE
SODIUM SERPL-SCNC: 136 MMOL/L (ref 136–145)
SPECIMEN EXPIRATION DATE: NORMAL
WBC # BLD AUTO: 7.82 THOUSAND/UL (ref 4.31–10.16)

## 2020-06-05 PROCEDURE — 86901 BLOOD TYPING SEROLOGIC RH(D): CPT

## 2020-06-05 PROCEDURE — 86900 BLOOD TYPING SEROLOGIC ABO: CPT

## 2020-06-05 PROCEDURE — 86850 RBC ANTIBODY SCREEN: CPT

## 2020-06-05 PROCEDURE — 85610 PROTHROMBIN TIME: CPT

## 2020-06-05 PROCEDURE — 36415 COLL VENOUS BLD VENIPUNCTURE: CPT

## 2020-06-05 PROCEDURE — U0003 INFECTIOUS AGENT DETECTION BY NUCLEIC ACID (DNA OR RNA); SEVERE ACUTE RESPIRATORY SYNDROME CORONAVIRUS 2 (SARS-COV-2) (CORONAVIRUS DISEASE [COVID-19]), AMPLIFIED PROBE TECHNIQUE, MAKING USE OF HIGH THROUGHPUT TECHNOLOGIES AS DESCRIBED BY CMS-2020-01-R: HCPCS | Performed by: PHYSICIAN ASSISTANT

## 2020-06-05 PROCEDURE — 85027 COMPLETE CBC AUTOMATED: CPT

## 2020-06-05 PROCEDURE — 85730 THROMBOPLASTIN TIME PARTIAL: CPT

## 2020-06-05 PROCEDURE — 80048 BASIC METABOLIC PNL TOTAL CA: CPT

## 2020-06-06 LAB — SARS-COV-2 RNA SPEC QL NAA+PROBE: NOT DETECTED

## 2020-06-10 ENCOUNTER — APPOINTMENT (INPATIENT)
Dept: RADIOLOGY | Facility: HOSPITAL | Age: 70
DRG: 003 | End: 2020-06-10
Payer: COMMERCIAL

## 2020-06-10 ENCOUNTER — ANESTHESIA (OUTPATIENT)
Dept: PERIOP | Facility: HOSPITAL | Age: 70
DRG: 003 | End: 2020-06-10
Payer: COMMERCIAL

## 2020-06-10 ENCOUNTER — HOSPITAL ENCOUNTER (INPATIENT)
Facility: HOSPITAL | Age: 70
LOS: 50 days | DRG: 003 | End: 2020-07-30
Attending: THORACIC SURGERY (CARDIOTHORACIC VASCULAR SURGERY) | Admitting: THORACIC SURGERY (CARDIOTHORACIC VASCULAR SURGERY)
Payer: COMMERCIAL

## 2020-06-10 DIAGNOSIS — C34.12 MALIGNANT NEOPLASM OF UPPER LOBE OF LEFT LUNG (HCC): ICD-10-CM

## 2020-06-10 DIAGNOSIS — R53.81 PHYSICAL DECONDITIONING: ICD-10-CM

## 2020-06-10 DIAGNOSIS — Z79.899 POLYPHARMACY: ICD-10-CM

## 2020-06-10 DIAGNOSIS — J96.01 ACUTE RESPIRATORY FAILURE WITH HYPOXIA (HCC): Primary | ICD-10-CM

## 2020-06-10 LAB
ABO GROUP BLD: NORMAL
ANION GAP SERPL CALCULATED.3IONS-SCNC: 7 MMOL/L (ref 4–13)
BUN SERPL-MCNC: 15 MG/DL (ref 5–25)
CALCIUM SERPL-MCNC: 8.5 MG/DL (ref 8.3–10.1)
CHLORIDE SERPL-SCNC: 111 MMOL/L (ref 100–108)
CO2 SERPL-SCNC: 20 MMOL/L (ref 21–32)
CREAT SERPL-MCNC: 1.27 MG/DL (ref 0.6–1.3)
ERYTHROCYTE [DISTWIDTH] IN BLOOD BY AUTOMATED COUNT: 14.1 % (ref 11.6–15.1)
GFR SERPL CREATININE-BSD FRML MDRD: 57 ML/MIN/1.73SQ M
GLUCOSE SERPL-MCNC: 175 MG/DL (ref 65–140)
HCT VFR BLD AUTO: 39.8 % (ref 36.5–49.3)
HGB BLD-MCNC: 12.9 G/DL (ref 12–17)
MAGNESIUM SERPL-MCNC: 2.9 MG/DL (ref 1.6–2.6)
MCH RBC QN AUTO: 31.8 PG (ref 26.8–34.3)
MCHC RBC AUTO-ENTMCNC: 32.4 G/DL (ref 31.4–37.4)
MCV RBC AUTO: 98 FL (ref 82–98)
PLATELET # BLD AUTO: 211 THOUSANDS/UL (ref 149–390)
PMV BLD AUTO: 9.2 FL (ref 8.9–12.7)
POTASSIUM SERPL-SCNC: 3.9 MMOL/L (ref 3.5–5.3)
RBC # BLD AUTO: 4.06 MILLION/UL (ref 3.88–5.62)
RH BLD: POSITIVE
SODIUM SERPL-SCNC: 138 MMOL/L (ref 136–145)
WBC # BLD AUTO: 13.59 THOUSAND/UL (ref 4.31–10.16)

## 2020-06-10 PROCEDURE — 80048 BASIC METABOLIC PNL TOTAL CA: CPT | Performed by: PHYSICIAN ASSISTANT

## 2020-06-10 PROCEDURE — 88331 PATH CONSLTJ SURG 1 BLK 1SPC: CPT | Performed by: PATHOLOGY

## 2020-06-10 PROCEDURE — 86900 BLOOD TYPING SEROLOGIC ABO: CPT | Performed by: THORACIC SURGERY (CARDIOTHORACIC VASCULAR SURGERY)

## 2020-06-10 PROCEDURE — 88313 SPECIAL STAINS GROUP 2: CPT | Performed by: PATHOLOGY

## 2020-06-10 PROCEDURE — 94760 N-INVAS EAR/PLS OXIMETRY 1: CPT

## 2020-06-10 PROCEDURE — 88305 TISSUE EXAM BY PATHOLOGIST: CPT | Performed by: PATHOLOGY

## 2020-06-10 PROCEDURE — 0W9B00Z DRAINAGE OF LEFT PLEURAL CAVITY WITH DRAINAGE DEVICE, OPEN APPROACH: ICD-10-PCS | Performed by: THORACIC SURGERY (CARDIOTHORACIC VASCULAR SURGERY)

## 2020-06-10 PROCEDURE — 88309 TISSUE EXAM BY PATHOLOGIST: CPT | Performed by: PATHOLOGY

## 2020-06-10 PROCEDURE — 88341 IMHCHEM/IMCYTCHM EA ADD ANTB: CPT | Performed by: PATHOLOGY

## 2020-06-10 PROCEDURE — 71045 X-RAY EXAM CHEST 1 VIEW: CPT

## 2020-06-10 PROCEDURE — 88333 PATH CONSLTJ SURG CYTO XM 1: CPT | Performed by: PATHOLOGY

## 2020-06-10 PROCEDURE — 32663 THORACOSCOPY W/LOBECTOMY: CPT | Performed by: THORACIC SURGERY (CARDIOTHORACIC VASCULAR SURGERY)

## 2020-06-10 PROCEDURE — C9290 INJ, BUPIVACAINE LIPOSOME: HCPCS | Performed by: THORACIC SURGERY (CARDIOTHORACIC VASCULAR SURGERY)

## 2020-06-10 PROCEDURE — 39402 MEDIASTINOSCPY W/LMPH NOD BX: CPT | Performed by: THORACIC SURGERY (CARDIOTHORACIC VASCULAR SURGERY)

## 2020-06-10 PROCEDURE — 32674 THORACOSCOPY LYMPH NODE EXC: CPT | Performed by: THORACIC SURGERY (CARDIOTHORACIC VASCULAR SURGERY)

## 2020-06-10 PROCEDURE — 88342 IMHCHEM/IMCYTCHM 1ST ANTB: CPT | Performed by: PATHOLOGY

## 2020-06-10 PROCEDURE — 83735 ASSAY OF MAGNESIUM: CPT | Performed by: PHYSICIAN ASSISTANT

## 2020-06-10 PROCEDURE — 0BJ08ZZ INSPECTION OF TRACHEOBRONCHIAL TREE, VIA NATURAL OR ARTIFICIAL OPENING ENDOSCOPIC: ICD-10-PCS | Performed by: THORACIC SURGERY (CARDIOTHORACIC VASCULAR SURGERY)

## 2020-06-10 PROCEDURE — 88331 PATH CONSLTJ SURG 1 BLK 1SPC: CPT | Performed by: THORACIC SURGERY (CARDIOTHORACIC VASCULAR SURGERY)

## 2020-06-10 PROCEDURE — 80323 ALKALOIDS NOS: CPT | Performed by: PHYSICIAN ASSISTANT

## 2020-06-10 PROCEDURE — 07B74ZX EXCISION OF THORAX LYMPHATIC, PERCUTANEOUS ENDOSCOPIC APPROACH, DIAGNOSTIC: ICD-10-PCS | Performed by: THORACIC SURGERY (CARDIOTHORACIC VASCULAR SURGERY)

## 2020-06-10 PROCEDURE — 0BTG4ZZ RESECTION OF LEFT UPPER LUNG LOBE, PERCUTANEOUS ENDOSCOPIC APPROACH: ICD-10-PCS | Performed by: THORACIC SURGERY (CARDIOTHORACIC VASCULAR SURGERY)

## 2020-06-10 PROCEDURE — 85027 COMPLETE CBC AUTOMATED: CPT | Performed by: PHYSICIAN ASSISTANT

## 2020-06-10 PROCEDURE — 86901 BLOOD TYPING SEROLOGIC RH(D): CPT | Performed by: THORACIC SURGERY (CARDIOTHORACIC VASCULAR SURGERY)

## 2020-06-10 PROCEDURE — C2615 SEALANT, PULMONARY, LIQUID: HCPCS | Performed by: THORACIC SURGERY (CARDIOTHORACIC VASCULAR SURGERY)

## 2020-06-10 RX ORDER — GLYCOPYRROLATE 0.2 MG/ML
INJECTION INTRAMUSCULAR; INTRAVENOUS AS NEEDED
Status: DISCONTINUED | OUTPATIENT
Start: 2020-06-10 | End: 2020-06-10 | Stop reason: SURG

## 2020-06-10 RX ORDER — SUCCINYLCHOLINE/SOD CL,ISO/PF 100 MG/5ML
SYRINGE (ML) INTRAVENOUS AS NEEDED
Status: DISCONTINUED | OUTPATIENT
Start: 2020-06-10 | End: 2020-06-10 | Stop reason: SURG

## 2020-06-10 RX ORDER — POLYETHYLENE GLYCOL 3350 17 G/17G
17 POWDER, FOR SOLUTION ORAL DAILY
Status: DISCONTINUED | OUTPATIENT
Start: 2020-06-10 | End: 2020-06-20

## 2020-06-10 RX ORDER — HYDROMORPHONE HCL/PF 1 MG/ML
0.5 SYRINGE (ML) INJECTION EVERY 4 HOURS PRN
Status: DISCONTINUED | OUTPATIENT
Start: 2020-06-10 | End: 2020-06-12

## 2020-06-10 RX ORDER — ONDANSETRON 2 MG/ML
INJECTION INTRAMUSCULAR; INTRAVENOUS AS NEEDED
Status: DISCONTINUED | OUTPATIENT
Start: 2020-06-10 | End: 2020-06-10 | Stop reason: SURG

## 2020-06-10 RX ORDER — GABAPENTIN 300 MG/1
600 CAPSULE ORAL ONCE
Status: COMPLETED | OUTPATIENT
Start: 2020-06-10 | End: 2020-06-10

## 2020-06-10 RX ORDER — SODIUM CHLORIDE 9 MG/ML
INJECTION INTRAVENOUS AS NEEDED
Status: DISCONTINUED | OUTPATIENT
Start: 2020-06-10 | End: 2020-06-10 | Stop reason: HOSPADM

## 2020-06-10 RX ORDER — ACETAMINOPHEN 325 MG/1
975 TABLET ORAL ONCE
Status: COMPLETED | OUTPATIENT
Start: 2020-06-10 | End: 2020-06-10

## 2020-06-10 RX ORDER — ONDANSETRON 2 MG/ML
4 INJECTION INTRAMUSCULAR; INTRAVENOUS ONCE AS NEEDED
Status: DISCONTINUED | OUTPATIENT
Start: 2020-06-10 | End: 2020-06-10 | Stop reason: HOSPADM

## 2020-06-10 RX ORDER — PROPOFOL 10 MG/ML
INJECTION, EMULSION INTRAVENOUS AS NEEDED
Status: DISCONTINUED | OUTPATIENT
Start: 2020-06-10 | End: 2020-06-10 | Stop reason: SURG

## 2020-06-10 RX ORDER — ACETAMINOPHEN 325 MG/1
975 TABLET ORAL EVERY 6 HOURS
Status: DISCONTINUED | OUTPATIENT
Start: 2020-06-10 | End: 2020-06-20

## 2020-06-10 RX ORDER — BUPIVACAINE HYDROCHLORIDE 2.5 MG/ML
INJECTION, SOLUTION EPIDURAL; INFILTRATION; INTRACAUDAL AS NEEDED
Status: DISCONTINUED | OUTPATIENT
Start: 2020-06-10 | End: 2020-06-10 | Stop reason: HOSPADM

## 2020-06-10 RX ORDER — MIDAZOLAM HYDROCHLORIDE 2 MG/2ML
INJECTION, SOLUTION INTRAMUSCULAR; INTRAVENOUS AS NEEDED
Status: DISCONTINUED | OUTPATIENT
Start: 2020-06-10 | End: 2020-06-10 | Stop reason: SURG

## 2020-06-10 RX ORDER — NEOSTIGMINE METHYLSULFATE 1 MG/ML
INJECTION INTRAVENOUS AS NEEDED
Status: DISCONTINUED | OUTPATIENT
Start: 2020-06-10 | End: 2020-06-10 | Stop reason: SURG

## 2020-06-10 RX ORDER — SENNOSIDES 8.6 MG
1 TABLET ORAL DAILY
Status: DISCONTINUED | OUTPATIENT
Start: 2020-06-10 | End: 2020-06-20

## 2020-06-10 RX ORDER — SODIUM CHLORIDE 9 MG/ML
INJECTION, SOLUTION INTRAVENOUS CONTINUOUS PRN
Status: DISCONTINUED | OUTPATIENT
Start: 2020-06-10 | End: 2020-06-10 | Stop reason: SURG

## 2020-06-10 RX ORDER — OXYCODONE HYDROCHLORIDE 5 MG/1
5 TABLET ORAL EVERY 4 HOURS PRN
Status: DISCONTINUED | OUTPATIENT
Start: 2020-06-10 | End: 2020-06-20

## 2020-06-10 RX ORDER — OXYCODONE HYDROCHLORIDE 5 MG/1
2.5 TABLET ORAL EVERY 4 HOURS PRN
Status: DISCONTINUED | OUTPATIENT
Start: 2020-06-10 | End: 2020-06-20

## 2020-06-10 RX ORDER — CEFAZOLIN SODIUM 2 G/50ML
2000 SOLUTION INTRAVENOUS ONCE
Status: COMPLETED | OUTPATIENT
Start: 2020-06-10 | End: 2020-06-10

## 2020-06-10 RX ORDER — GABAPENTIN 300 MG/1
300 CAPSULE ORAL 3 TIMES DAILY
Status: DISCONTINUED | OUTPATIENT
Start: 2020-06-10 | End: 2020-06-20

## 2020-06-10 RX ORDER — SODIUM CHLORIDE, SODIUM LACTATE, POTASSIUM CHLORIDE, CALCIUM CHLORIDE 600; 310; 30; 20 MG/100ML; MG/100ML; MG/100ML; MG/100ML
50 INJECTION, SOLUTION INTRAVENOUS CONTINUOUS
Status: DISCONTINUED | OUTPATIENT
Start: 2020-06-10 | End: 2020-06-11

## 2020-06-10 RX ORDER — ALBUMIN, HUMAN INJ 5% 5 %
12.5 SOLUTION INTRAVENOUS ONCE
Status: COMPLETED | OUTPATIENT
Start: 2020-06-10 | End: 2020-06-11

## 2020-06-10 RX ORDER — PANTOPRAZOLE SODIUM 40 MG/1
40 TABLET, DELAYED RELEASE ORAL
Status: DISCONTINUED | OUTPATIENT
Start: 2020-06-11 | End: 2020-06-20

## 2020-06-10 RX ORDER — HEPARIN SODIUM 5000 [USP'U]/ML
5000 INJECTION, SOLUTION INTRAVENOUS; SUBCUTANEOUS
Status: COMPLETED | OUTPATIENT
Start: 2020-06-10 | End: 2020-06-10

## 2020-06-10 RX ORDER — MAGNESIUM SULFATE 500 MG/ML
VIAL (ML) INJECTION AS NEEDED
Status: DISCONTINUED | OUTPATIENT
Start: 2020-06-10 | End: 2020-06-10 | Stop reason: SURG

## 2020-06-10 RX ORDER — DEXAMETHASONE SODIUM PHOSPHATE 10 MG/ML
INJECTION, SOLUTION INTRAMUSCULAR; INTRAVENOUS AS NEEDED
Status: DISCONTINUED | OUTPATIENT
Start: 2020-06-10 | End: 2020-06-10 | Stop reason: SURG

## 2020-06-10 RX ORDER — FENTANYL CITRATE 50 UG/ML
INJECTION, SOLUTION INTRAMUSCULAR; INTRAVENOUS AS NEEDED
Status: DISCONTINUED | OUTPATIENT
Start: 2020-06-10 | End: 2020-06-10 | Stop reason: SURG

## 2020-06-10 RX ORDER — MEPERIDINE HYDROCHLORIDE 25 MG/ML
12.5 INJECTION INTRAMUSCULAR; INTRAVENOUS; SUBCUTANEOUS ONCE
Status: DISCONTINUED | OUTPATIENT
Start: 2020-06-10 | End: 2020-06-10 | Stop reason: HOSPADM

## 2020-06-10 RX ORDER — DOCUSATE SODIUM 100 MG/1
100 CAPSULE, LIQUID FILLED ORAL 2 TIMES DAILY
Status: DISCONTINUED | OUTPATIENT
Start: 2020-06-10 | End: 2020-06-20

## 2020-06-10 RX ORDER — SODIUM CHLORIDE, SODIUM LACTATE, POTASSIUM CHLORIDE, CALCIUM CHLORIDE 600; 310; 30; 20 MG/100ML; MG/100ML; MG/100ML; MG/100ML
60 INJECTION, SOLUTION INTRAVENOUS CONTINUOUS
Status: DISCONTINUED | OUTPATIENT
Start: 2020-06-10 | End: 2020-06-11

## 2020-06-10 RX ORDER — LABETALOL 20 MG/4 ML (5 MG/ML) INTRAVENOUS SYRINGE
5
Status: DISCONTINUED | OUTPATIENT
Start: 2020-06-10 | End: 2020-06-10 | Stop reason: HOSPADM

## 2020-06-10 RX ORDER — PROMETHAZINE HYDROCHLORIDE 25 MG/ML
12.5 INJECTION, SOLUTION INTRAMUSCULAR; INTRAVENOUS ONCE AS NEEDED
Status: DISCONTINUED | OUTPATIENT
Start: 2020-06-10 | End: 2020-06-10 | Stop reason: HOSPADM

## 2020-06-10 RX ORDER — SODIUM CHLORIDE, SODIUM LACTATE, POTASSIUM CHLORIDE, CALCIUM CHLORIDE 600; 310; 30; 20 MG/100ML; MG/100ML; MG/100ML; MG/100ML
125 INJECTION, SOLUTION INTRAVENOUS CONTINUOUS
Status: DISCONTINUED | OUTPATIENT
Start: 2020-06-10 | End: 2020-06-10

## 2020-06-10 RX ORDER — EPHEDRINE SULFATE 50 MG/ML
INJECTION INTRAVENOUS AS NEEDED
Status: DISCONTINUED | OUTPATIENT
Start: 2020-06-10 | End: 2020-06-10 | Stop reason: SURG

## 2020-06-10 RX ORDER — KETAMINE HCL IN NACL, ISO-OSM 100MG/10ML
SYRINGE (ML) INJECTION AS NEEDED
Status: DISCONTINUED | OUTPATIENT
Start: 2020-06-10 | End: 2020-06-10 | Stop reason: SURG

## 2020-06-10 RX ORDER — ALBUMIN, HUMAN INJ 5% 5 %
SOLUTION INTRAVENOUS CONTINUOUS PRN
Status: DISCONTINUED | OUTPATIENT
Start: 2020-06-10 | End: 2020-06-10 | Stop reason: SURG

## 2020-06-10 RX ORDER — LIDOCAINE HYDROCHLORIDE 10 MG/ML
0.5 INJECTION, SOLUTION EPIDURAL; INFILTRATION; INTRACAUDAL; PERINEURAL ONCE AS NEEDED
Status: DISCONTINUED | OUTPATIENT
Start: 2020-06-10 | End: 2020-06-10 | Stop reason: HOSPADM

## 2020-06-10 RX ORDER — AMLODIPINE BESYLATE 5 MG/1
5 TABLET ORAL DAILY
Status: DISCONTINUED | OUTPATIENT
Start: 2020-06-10 | End: 2020-06-20

## 2020-06-10 RX ORDER — LIDOCAINE HYDROCHLORIDE 10 MG/ML
INJECTION, SOLUTION EPIDURAL; INFILTRATION; INTRACAUDAL; PERINEURAL AS NEEDED
Status: DISCONTINUED | OUTPATIENT
Start: 2020-06-10 | End: 2020-06-10 | Stop reason: SURG

## 2020-06-10 RX ORDER — FENTANYL CITRATE/PF 50 MCG/ML
25 SYRINGE (ML) INJECTION
Status: COMPLETED | OUTPATIENT
Start: 2020-06-10 | End: 2020-06-10

## 2020-06-10 RX ORDER — ROCURONIUM BROMIDE 10 MG/ML
INJECTION, SOLUTION INTRAVENOUS AS NEEDED
Status: DISCONTINUED | OUTPATIENT
Start: 2020-06-10 | End: 2020-06-10 | Stop reason: SURG

## 2020-06-10 RX ORDER — HYDROMORPHONE HCL/PF 1 MG/ML
0.5 SYRINGE (ML) INJECTION
Status: DISCONTINUED | OUTPATIENT
Start: 2020-06-10 | End: 2020-06-10 | Stop reason: HOSPADM

## 2020-06-10 RX ORDER — ALBUTEROL SULFATE 2.5 MG/3ML
2.5 SOLUTION RESPIRATORY (INHALATION) ONCE AS NEEDED
Status: DISCONTINUED | OUTPATIENT
Start: 2020-06-10 | End: 2020-06-10 | Stop reason: HOSPADM

## 2020-06-10 RX ORDER — ONDANSETRON 2 MG/ML
4 INJECTION INTRAMUSCULAR; INTRAVENOUS EVERY 6 HOURS PRN
Status: DISCONTINUED | OUTPATIENT
Start: 2020-06-10 | End: 2020-07-30 | Stop reason: HOSPADM

## 2020-06-10 RX ADMIN — ALBUMIN (HUMAN): 12.5 SOLUTION INTRAVENOUS at 11:22

## 2020-06-10 RX ADMIN — PHENYLEPHRINE HYDROCHLORIDE 200 MCG: 10 INJECTION INTRAVENOUS at 13:27

## 2020-06-10 RX ADMIN — EPHEDRINE SULFATE 10 MG: 50 INJECTION, SOLUTION INTRAVENOUS at 09:24

## 2020-06-10 RX ADMIN — PHENYLEPHRINE HYDROCHLORIDE 200 MCG: 10 INJECTION INTRAVENOUS at 11:14

## 2020-06-10 RX ADMIN — CEFAZOLIN SODIUM 2000 MG: 2 SOLUTION INTRAVENOUS at 08:40

## 2020-06-10 RX ADMIN — FENTANYL CITRATE 25 MCG: 50 INJECTION, SOLUTION INTRAMUSCULAR; INTRAVENOUS at 16:10

## 2020-06-10 RX ADMIN — EPHEDRINE SULFATE 10 MG: 50 INJECTION, SOLUTION INTRAVENOUS at 09:35

## 2020-06-10 RX ADMIN — SODIUM CHLORIDE, SODIUM LACTATE, POTASSIUM CHLORIDE, AND CALCIUM CHLORIDE 60 ML/HR: .6; .31; .03; .02 INJECTION, SOLUTION INTRAVENOUS at 23:53

## 2020-06-10 RX ADMIN — ROCURONIUM BROMIDE 20 MG: 10 INJECTION, SOLUTION INTRAVENOUS at 09:59

## 2020-06-10 RX ADMIN — EPHEDRINE SULFATE 10 MG: 50 INJECTION, SOLUTION INTRAVENOUS at 08:44

## 2020-06-10 RX ADMIN — PHENYLEPHRINE HYDROCHLORIDE 200 MCG: 10 INJECTION INTRAVENOUS at 09:24

## 2020-06-10 RX ADMIN — PROPOFOL 50 MG: 10 INJECTION, EMULSION INTRAVENOUS at 10:10

## 2020-06-10 RX ADMIN — Medication 50 MG: at 10:47

## 2020-06-10 RX ADMIN — PHENYLEPHRINE HYDROCHLORIDE 200 MCG: 10 INJECTION INTRAVENOUS at 08:43

## 2020-06-10 RX ADMIN — MIDAZOLAM 1 MG: 1 INJECTION INTRAMUSCULAR; INTRAVENOUS at 08:22

## 2020-06-10 RX ADMIN — PROPOFOL 200 MG: 10 INJECTION, EMULSION INTRAVENOUS at 08:33

## 2020-06-10 RX ADMIN — FENTANYL CITRATE 25 MCG: 50 INJECTION, SOLUTION INTRAMUSCULAR; INTRAVENOUS at 15:04

## 2020-06-10 RX ADMIN — FENTANYL CITRATE 100 MCG: 50 INJECTION, SOLUTION INTRAMUSCULAR; INTRAVENOUS at 08:33

## 2020-06-10 RX ADMIN — ACETAMINOPHEN 975 MG: 325 TABLET ORAL at 19:15

## 2020-06-10 RX ADMIN — ROCURONIUM BROMIDE 30 MG: 10 INJECTION, SOLUTION INTRAVENOUS at 09:17

## 2020-06-10 RX ADMIN — ALBUMIN (HUMAN) 12.5 G: 12.5 INJECTION, SOLUTION INTRAVENOUS at 13:46

## 2020-06-10 RX ADMIN — Medication 100 MG: at 08:33

## 2020-06-10 RX ADMIN — MIDAZOLAM 1 MG: 1 INJECTION INTRAMUSCULAR; INTRAVENOUS at 08:31

## 2020-06-10 RX ADMIN — FENTANYL CITRATE 50 MCG: 50 INJECTION, SOLUTION INTRAMUSCULAR; INTRAVENOUS at 13:38

## 2020-06-10 RX ADMIN — PHENYLEPHRINE HYDROCHLORIDE 200 MCG: 10 INJECTION INTRAVENOUS at 11:20

## 2020-06-10 RX ADMIN — GLYCOPYRROLATE 0.5 MG: 0.2 INJECTION, SOLUTION INTRAMUSCULAR; INTRAVENOUS at 13:09

## 2020-06-10 RX ADMIN — SODIUM CHLORIDE: 0.9 INJECTION, SOLUTION INTRAVENOUS at 08:36

## 2020-06-10 RX ADMIN — GABAPENTIN 300 MG: 300 CAPSULE ORAL at 19:15

## 2020-06-10 RX ADMIN — MAGNESIUM SULFATE HEPTAHYDRATE 3 G: 500 INJECTION, SOLUTION INTRAMUSCULAR; INTRAVENOUS at 10:57

## 2020-06-10 RX ADMIN — DEXAMETHASONE SODIUM PHOSPHATE 10 MG: 10 INJECTION, SOLUTION INTRAMUSCULAR; INTRAVENOUS at 09:06

## 2020-06-10 RX ADMIN — EPHEDRINE SULFATE 5 MG: 50 INJECTION, SOLUTION INTRAVENOUS at 11:14

## 2020-06-10 RX ADMIN — ROCURONIUM BROMIDE 5 MG: 10 INJECTION, SOLUTION INTRAVENOUS at 08:33

## 2020-06-10 RX ADMIN — DOCUSATE SODIUM 100 MG: 100 CAPSULE, LIQUID FILLED ORAL at 19:15

## 2020-06-10 RX ADMIN — OXYCODONE HYDROCHLORIDE 5 MG: 5 TABLET ORAL at 20:01

## 2020-06-10 RX ADMIN — SODIUM CHLORIDE 1 MCG/MIN: 0.9 INJECTION, SOLUTION INTRAVENOUS at 08:35

## 2020-06-10 RX ADMIN — PHENYLEPHRINE HYDROCHLORIDE 100 MCG: 10 INJECTION INTRAVENOUS at 13:22

## 2020-06-10 RX ADMIN — PHENYLEPHRINE HYDROCHLORIDE 200 MCG: 10 INJECTION INTRAVENOUS at 09:35

## 2020-06-10 RX ADMIN — EPHEDRINE SULFATE 10 MG: 50 INJECTION, SOLUTION INTRAVENOUS at 08:41

## 2020-06-10 RX ADMIN — HYDROMORPHONE HYDROCHLORIDE 0.5 MG: 1 INJECTION, SOLUTION INTRAMUSCULAR; INTRAVENOUS; SUBCUTANEOUS at 17:01

## 2020-06-10 RX ADMIN — ROCURONIUM BROMIDE 10 MG: 10 INJECTION, SOLUTION INTRAVENOUS at 09:06

## 2020-06-10 RX ADMIN — PHENYLEPHRINE HYDROCHLORIDE 200 MCG: 10 INJECTION INTRAVENOUS at 08:38

## 2020-06-10 RX ADMIN — ACETAMINOPHEN 975 MG: 325 TABLET ORAL at 06:28

## 2020-06-10 RX ADMIN — LIDOCAINE HYDROCHLORIDE 50 MG: 10 INJECTION, SOLUTION EPIDURAL; INFILTRATION; INTRACAUDAL; PERINEURAL at 08:33

## 2020-06-10 RX ADMIN — EPHEDRINE SULFATE 10 MG: 50 INJECTION, SOLUTION INTRAVENOUS at 09:10

## 2020-06-10 RX ADMIN — PHENYLEPHRINE HYDROCHLORIDE 200 MCG: 10 INJECTION INTRAVENOUS at 08:40

## 2020-06-10 RX ADMIN — ONDANSETRON 4 MG: 2 INJECTION INTRAMUSCULAR; INTRAVENOUS at 09:06

## 2020-06-10 RX ADMIN — PHENYLEPHRINE HYDROCHLORIDE 200 MCG: 10 INJECTION INTRAVENOUS at 11:36

## 2020-06-10 RX ADMIN — SODIUM CHLORIDE, SODIUM LACTATE, POTASSIUM CHLORIDE, AND CALCIUM CHLORIDE: .6; .31; .03; .02 INJECTION, SOLUTION INTRAVENOUS at 08:10

## 2020-06-10 RX ADMIN — FENTANYL CITRATE 25 MCG: 50 INJECTION, SOLUTION INTRAMUSCULAR; INTRAVENOUS at 14:26

## 2020-06-10 RX ADMIN — NEOSTIGMINE METHYLSULFATE 3 MG: 1 INJECTION, SOLUTION INTRAVENOUS at 13:09

## 2020-06-10 RX ADMIN — ROCURONIUM BROMIDE 35 MG: 10 INJECTION, SOLUTION INTRAVENOUS at 08:40

## 2020-06-10 RX ADMIN — PHENYLEPHRINE HYDROCHLORIDE 100 MCG: 10 INJECTION INTRAVENOUS at 08:36

## 2020-06-10 RX ADMIN — PHENYLEPHRINE HYDROCHLORIDE 200 MCG: 10 INJECTION INTRAVENOUS at 13:25

## 2020-06-10 RX ADMIN — HEPARIN SODIUM 5000 UNITS: 5000 INJECTION INTRAVENOUS; SUBCUTANEOUS at 07:12

## 2020-06-10 RX ADMIN — PHENYLEPHRINE HYDROCHLORIDE 200 MCG: 10 INJECTION INTRAVENOUS at 08:55

## 2020-06-10 RX ADMIN — EPHEDRINE SULFATE 10 MG: 50 INJECTION, SOLUTION INTRAVENOUS at 08:55

## 2020-06-10 RX ADMIN — PHENYLEPHRINE HYDROCHLORIDE 200 MCG: 10 INJECTION INTRAVENOUS at 09:10

## 2020-06-10 RX ADMIN — PHENYLEPHRINE HYDROCHLORIDE 100 MCG: 10 INJECTION INTRAVENOUS at 12:48

## 2020-06-10 RX ADMIN — HYDROMORPHONE HYDROCHLORIDE 0.5 MG: 1 INJECTION, SOLUTION INTRAMUSCULAR; INTRAVENOUS; SUBCUTANEOUS at 16:45

## 2020-06-10 RX ADMIN — ONDANSETRON 4 MG: 2 INJECTION INTRAMUSCULAR; INTRAVENOUS at 13:02

## 2020-06-10 RX ADMIN — GABAPENTIN 600 MG: 300 CAPSULE ORAL at 06:28

## 2020-06-10 RX ADMIN — HYDROMORPHONE HYDROCHLORIDE 0.5 MG: 1 INJECTION, SOLUTION INTRAMUSCULAR; INTRAVENOUS; SUBCUTANEOUS at 16:30

## 2020-06-10 RX ADMIN — PHENYLEPHRINE HYDROCHLORIDE 200 MCG: 10 INJECTION INTRAVENOUS at 12:50

## 2020-06-10 RX ADMIN — FENTANYL CITRATE 25 MCG: 50 INJECTION, SOLUTION INTRAMUSCULAR; INTRAVENOUS at 15:14

## 2020-06-10 RX ADMIN — EPHEDRINE SULFATE 5 MG: 50 INJECTION, SOLUTION INTRAVENOUS at 08:37

## 2020-06-10 RX ADMIN — FENTANYL CITRATE 50 MCG: 50 INJECTION, SOLUTION INTRAMUSCULAR; INTRAVENOUS at 10:44

## 2020-06-10 RX ADMIN — GABAPENTIN 300 MG: 300 CAPSULE ORAL at 21:16

## 2020-06-10 RX ADMIN — GLYCOPYRROLATE 0.2 MG: 0.2 INJECTION, SOLUTION INTRAMUSCULAR; INTRAVENOUS at 08:22

## 2020-06-10 RX ADMIN — ROCURONIUM BROMIDE 10 MG: 10 INJECTION, SOLUTION INTRAVENOUS at 11:13

## 2020-06-10 RX ADMIN — CEFAZOLIN SODIUM 2000 MG: 2 SOLUTION INTRAVENOUS at 12:25

## 2020-06-10 NOTE — PLAN OF CARE
Problem: CARDIOVASCULAR - ADULT  Goal: Maintains optimal cardiac output and hemodynamic stability  Description  INTERVENTIONS:  - Monitor I/O, vital signs and rhythm  - Monitor for S/S and trends of decreased cardiac output  - Administer and titrate ordered vasoactive medications to optimize hemodynamic stability  - Assess quality of pulses, skin color and temperature  - Assess for signs of decreased coronary artery perfusion  - Instruct patient to report change in severity of symptoms  Outcome: Progressing  Goal: Absence of cardiac dysrhythmias or at baseline rhythm  Description  INTERVENTIONS:  - Continuous cardiac monitoring, vital signs, obtain 12 lead EKG if ordered  - Administer antiarrhythmic and heart rate control medications as ordered  - Monitor electrolytes and administer replacement therapy as ordered  Outcome: Progressing     Problem: RESPIRATORY - ADULT  Goal: Achieves optimal ventilation and oxygenation  Description  INTERVENTIONS:  - Assess for changes in respiratory status  - Assess for changes in mentation and behavior  - Position to facilitate oxygenation and minimize respiratory effort  - Oxygen administered by appropriate delivery if ordered  - Initiate smoking cessation education as indicated  - Encourage broncho-pulmonary hygiene including cough, deep breathe, Incentive Spirometry  - Assess the need for suctioning and aspirate as needed  - Assess and instruct to report SOB or any respiratory difficulty  - Respiratory Therapy support as indicated  Outcome: Progressing     Problem: SKIN/TISSUE INTEGRITY - ADULT  Goal: Skin integrity remains intact  Description  INTERVENTIONS  - Identify patients at risk for skin breakdown  - Assess and monitor skin integrity  - Assess and monitor nutrition and hydration status  - Monitor labs (i e  albumin)  - Assess for incontinence   - Turn and reposition patient  - Assist with mobility/ambulation  - Relieve pressure over bony prominences  - Avoid friction and shearing  - Provide appropriate hygiene as needed including keeping skin clean and dry  - Evaluate need for skin moisturizer/barrier cream  - Collaborate with interdisciplinary team (i e  Nutrition, Rehabilitation, etc )   - Patient/family teaching  Outcome: Progressing  Goal: Incision(s), wounds(s) or drain site(s) healing without S/S of infection  Description  INTERVENTIONS  - Assess and document risk factors for skin impairment   - Assess and document dressing, incision, wound bed, drain sites and surrounding tissue  - Consider nutrition services referral as needed  - Oral mucous membranes remain intact  - Provide patient/ family education  Outcome: Progressing  Goal: Oral mucous membranes remain intact  Description  INTERVENTIONS  - Assess oral mucosa and hygiene practices  - Implement preventative oral hygiene regimen  - Implement oral medicated treatments as ordered  - Initiate Nutrition services referral as needed  Outcome: Progressing     Problem: MUSCULOSKELETAL - ADULT  Goal: Maintain or return mobility to safest level of function  Description  INTERVENTIONS:  - Assess patient's ability to carry out ADLs; assess patient's baseline for ADL function and identify physical deficits which impact ability to perform ADLs (bathing, care of mouth/teeth, toileting, grooming, dressing, etc )  - Assess/evaluate cause of self-care deficits   - Assess range of motion  - Assess patient's mobility  - Assess patient's need for assistive devices and provide as appropriate  - Encourage maximum independence but intervene and supervise when necessary  - Involve family in performance of ADLs  - Assess for home care needs following discharge   - Consider OT consult to assist with ADL evaluation and planning for discharge  - Provide patient education as appropriate  Outcome: Progressing  Goal: Maintain proper alignment of affected body part  Description  INTERVENTIONS:  - Support, maintain and protect limb and body alignment  - Provide patient/ family with appropriate education  Outcome: Progressing

## 2020-06-10 NOTE — OP NOTE
OPERATIVE REPORT  PATIENT NAME: Babak Vigil    :  1950  MRN: [id number]  Pt Location: BE OR ROOM 08    SURGERY DATE: 6/10/2020    Surgeon(s) and Role:     * Hillary Navarro MD - Primary     * Karthik Rivas MD - Assisting     * Alferd Sandifer, PA-C - Assisting    Preop Diagnosis:  Malignant neoplasm of upper lobe of left lung ([address] ) [C34 12]    Post-Op Diagnosis Codes:     * Malignant neoplasm of upper lobe of left lung ([address] ) [C34 12]    Procedure(s) (LRB):  BRONCHOSCOPY FLEXIBLE (N/A)  MEDIASTINOSCOPY (N/A)  THORACOSCOPY VIDEO ASSISTED SURGERY (VATS) (Left)  UPPER LOBECTOMY OF LUNG; MEDIASTINAL  LYMPH NODE DISSECTION (Left)    Specimen(s):  ID Type Source Tests Collected by Time Destination   1 : level 4R Tissue Lymph Node TISSUE EXAM Hillary Navarro MD 6/10/2020 9092    2 : Level 2R Tissue Lymph Node TISSUE EXAM Hillary Navarro MD 6/10/2020 1143    3 : Level 4L #1 Tissue Lymph Node TISSUE EXAM Hillary Navarro MD 6/10/2020 6393    4 : Level 4L #2 Tissue Lymph Node TISSUE EXAM Hillary Navarro MD 6/10/2020 5680    5 : Level 7 Tissue Lymph Node TISSUE EXAM Hillary Navarro MD 6/10/2020 1742    6 : Level 2L Tissue Lymph Node TISSUE EXAM Hillary Navarro MD 6/10/2020 0944    7 : Level 10 posterior *SEE COMMENTS Tissue Lymph Node TISSUE EXAM Hillary Navarro MD 6/10/2020 1129    8 : Level 8 Tissue Lymph Node TISSUE EXAM Hillary Navarro MD 6/10/2020 1136    9 : Level 10 anterior Tissue Lymph Node TISSUE EXAM Hillary Navarro MD 6/10/2020 1149    10 : Level 11 Tissue Lymph Node TISSUE EXAM Hillary Navarro MD 6/10/2020 1216    11 : LEFT UPPER LOBE  Tissue Lung TISSUE EXAM Hillary Navarro MD 6/10/2020 0928    12 : Level 5 Tissue Lymph Node TISSUE EXAM Hillary Navarro MD 6/10/2020 1239    13 : Level 6 Tissue Lymph Node TISSUE EXAM Hillary Navarro MD 6/10/2020 1241        Estimated Blood Loss:   150 mL    Drains:  Chest Tube 1 Left Pleural 28 Fr  (Active)   Number of days: 0       Urethral Catheter Latex 16 Fr  (Active)   Number of days: 0       Anesthesia Type:   General    Operative Indications:  Malignant neoplasm of upper lobe of left lung ([address] ) [C34 12]    Operative Findings:  Large left upper lobe tumor infiltrating into the chest wall  Complications:   None    Procedure and Technique:  INDICATION:       Marko Cheema is a 71 y o  male with a biopsy proven adenocarcinoma  The tumor was large and he had some PET avid hilar activity  I discussed that he is a good candidate for resection but with mediastinal staging first  The potential risks and benefits of the surgery were explained to the patient and he elected to proceed  All of his questions were answered  DETAILS OF PROCEDURE:  The patient was correctly identified by name and medical record number in the holding area and brought to the operative suite, where he was placed supine on the operative table  After satisfactory induction of general endotracheal anesthesia, a flexible bronchoscope was passed through the endotracheal tube visualizing the distal trachea, mitchel, right and left main stem bronchi, including all of the primary and secondary divisions  No evidence of any endobronchial tumor was noted  No suspicion or identified risk for TB or other airborne infectious disease; bronchoscopy procedure being performed for diagnostic purposes  Flexible bronchoscopy was then terminated and the scope was withdrawn  The patient was positioned supine with a shoulder roll underneath to provide optimal cervical extension  The neck and chest were prepped and draped in the usual sterile fashion  A 2 5 cm curvalinear transverse skin incision was then made 1 fingerbreath above the sternal notch using a #15-0 blade scalpel  Dissection was carried down through the subcutaneous tissue and platysma with electrocautery   The strap muscles were then identified and divided with electrocautery at the vertical midline along the median raphe, revealing the underlying pretracheal fascia  The pretracheal fascia was entered sharply with Metzenbaum scissors, and digital dissection was then used to develop a plane along the anterior trachea with care to avoid the innominate artery  The mediastinoscope was introduced into this tract  A combination of blunt dissection and electrocautery were used to expose the lateral borders of the trachea all the way down to the mitchel under mediastinoscopic visualization  Station 2R, 4R, 2L, 4L and 7 lymph nodes were biopsied and sent for frozen section  Hemostasis was excellent  After the biopsies were complete, the mediastinoscope was withdrawn  The strap muscles were brought together at the vertical midline and closed with #3-0 vicryl  The platysma was closed transversely with #3-0 Vicryl  A deep dermal #3-0 vicryl and subcuticular #4-0 Monocryl were used to close the skin in separate layers  The wound was dressed with Dermabond  The frozen sections from the mediastinoscopy were negative for malignancy so we proceeded with the lobectomy  The patient was positioned in the right lateral decubitus position, prepped and draped in the usual fashion  A time-out was performed to confirm procedure and laterality  A 1 5cm incision was made in the 7th intercostal space, in line with the scapula tip  This was carried down to the pleura and the thoracic cavity was entered  The thoracoscope was introduced and the cavity explored  An additional port was placed in the 7th intercostal space in the posterior axillary line  An access port was then created in the 4th intercostal space in the anterior axillary line  An intercostal nerve block was then applied using Exparel into rib spaces 3-10  The entire lung was then mobilized using a combination of electrocautery and sharp dissection to divide adhesions to the mediastinum   The tumor was densely adherent to the anterior chest wall  This was taken down with Bovie electrocautery  Clips were left in place at the site of where the tumor was adherent to the chest wall  The inferior pulmonary ligament was taken down using electrocautery  No level 9 lymph nodes were identified  The overlying pleura was incised up the posterior hilum  Level 8 and 10P lymph nodes were identified and sent for pathology  The lung was then retracted inferiorly and posteriorly, exposing the superior mediastinal structures  The superior pulmonary vein was identified as well as the inferior pulmonary vein  The superior pulmonary vein was circumferentially dissected along with the lingular branches using a blunt dissection  Using a vascular load on the stapler, the vein was divided  This was after confirming there was a separate inferior pulmonary vein  Attention was then turned to the pulmonary artery  The apicoposterior branch and the lingular branches were directly next to each other coming off of the continuation PA  These were encircled together using blunt dissection  This was stapled using a vascular load stapler  A smaller posterior branch was then identified and taken with a stapler  Further dissection showed there were no other branches coming off of the pulmonary artery  After the arterial branches were taken, attention was turned to the bronchus  The upper lobe bronchus, along with the lingular segment was identified  The gloria tissue in station 11 around the bronchus was dissected and removed and sent for pathology  The upper lobe bronchus was circumferentially dissected  The stapler was passed around them and clamped  The bronchus was then stapled  All that remained was the fissure, attention was turned to the fissure  The lobectomy was completed with serial firings of the thick load tissue stapler  The specimen was placed in a Lap-Sac and removed from the chest  The specimen was sent off the table          The resection bed and all dissected areas were examined and hemostasis was deemed as adequate  Dissection of stations 5 and 6 was then performed and these lymph nodes were sent off the table to pathology  Hemostasis was obtained by placing Surgicel into the lymph node beds  A posterior apical 28F chest tube was placed and secured to the skin with 0 prolene sutures  Progel was used at the staple line and one additional staple ine along the lower lobe where a small rent was stapled across  The lung was re-expanded under direct visualization  The wounds were closed in layers using Vicryl suture followed by subcuticular monofilament suture  Dermabond was applied to each wound  At the end of the procedure, the instrument, sponge and needle counts were confirmed to be correct x2  The patient tolerated the procedure well and was delivered to the PACU  I was present for the entire procedure, I was present for all critical portions of the procedure and A qualified resident physician was not available and so Dr Ysabel Snyder was scrubbed for this case to assist with dissection, stapling, retraction and resection of the lobe      Patient Disposition:  PACU  and hemodynamically stable    SIGNATURE: Lyle Quiroz MD  DATE: Ranjana 10, 2020  TIME: 1:16 PM

## 2020-06-10 NOTE — INTERVAL H&P NOTE
H&P reviewed  After examining the patient I find no changes in the patients condition since the H&P had been written  Vitals:    06/10/20 0624   BP: 154/88   Pulse: 93   Resp: 16   Temp: 97 8 °F (36 6 °C)   SpO2: 99%     Safe to proceed  Patient quit smoking 12 days ago       Kaur Dunlap MD  Thoracic Surgery  (Available on [employer])  Office: [phone]

## 2020-06-11 LAB
ANION GAP SERPL CALCULATED.3IONS-SCNC: 7 MMOL/L (ref 4–13)
BUN SERPL-MCNC: 15 MG/DL (ref 5–25)
CALCIUM SERPL-MCNC: 8.1 MG/DL (ref 8.3–10.1)
CHLORIDE SERPL-SCNC: 107 MMOL/L (ref 100–108)
CO2 SERPL-SCNC: 21 MMOL/L (ref 21–32)
CREAT SERPL-MCNC: 1 MG/DL (ref 0.6–1.3)
GFR SERPL CREATININE-BSD FRML MDRD: 76 ML/MIN/1.73SQ M
GLUCOSE SERPL-MCNC: 134 MG/DL (ref 65–140)
MAGNESIUM SERPL-MCNC: 2.5 MG/DL (ref 1.6–2.6)
POTASSIUM SERPL-SCNC: 4.3 MMOL/L (ref 3.5–5.3)
SODIUM SERPL-SCNC: 135 MMOL/L (ref 136–145)

## 2020-06-11 PROCEDURE — 80048 BASIC METABOLIC PNL TOTAL CA: CPT | Performed by: THORACIC SURGERY (CARDIOTHORACIC VASCULAR SURGERY)

## 2020-06-11 PROCEDURE — 83735 ASSAY OF MAGNESIUM: CPT | Performed by: THORACIC SURGERY (CARDIOTHORACIC VASCULAR SURGERY)

## 2020-06-11 PROCEDURE — 94760 N-INVAS EAR/PLS OXIMETRY 1: CPT

## 2020-06-11 PROCEDURE — 99024 POSTOP FOLLOW-UP VISIT: CPT | Performed by: THORACIC SURGERY (CARDIOTHORACIC VASCULAR SURGERY)

## 2020-06-11 RX ORDER — CALCIUM CARBONATE 200(500)MG
500 TABLET,CHEWABLE ORAL DAILY PRN
Status: DISCONTINUED | OUTPATIENT
Start: 2020-06-11 | End: 2020-06-11

## 2020-06-11 RX ORDER — CALCIUM CARBONATE 200(500)MG
500 TABLET,CHEWABLE ORAL 3 TIMES DAILY PRN
Status: DISCONTINUED | OUTPATIENT
Start: 2020-06-11 | End: 2020-06-20

## 2020-06-11 RX ADMIN — HYDROMORPHONE HYDROCHLORIDE 0.5 MG: 1 INJECTION, SOLUTION INTRAMUSCULAR; INTRAVENOUS; SUBCUTANEOUS at 07:23

## 2020-06-11 RX ADMIN — OXYCODONE HYDROCHLORIDE 5 MG: 5 TABLET ORAL at 04:20

## 2020-06-11 RX ADMIN — DOCUSATE SODIUM 100 MG: 100 CAPSULE, LIQUID FILLED ORAL at 08:15

## 2020-06-11 RX ADMIN — CALCIUM CARBONATE (ANTACID) CHEW TAB 500 MG 500 MG: 500 CHEW TAB at 14:50

## 2020-06-11 RX ADMIN — ACETAMINOPHEN 975 MG: 325 TABLET ORAL at 20:46

## 2020-06-11 RX ADMIN — GABAPENTIN 300 MG: 300 CAPSULE ORAL at 17:22

## 2020-06-11 RX ADMIN — PANTOPRAZOLE SODIUM 40 MG: 40 TABLET, DELAYED RELEASE ORAL at 05:18

## 2020-06-11 RX ADMIN — OXYCODONE HYDROCHLORIDE 5 MG: 5 TABLET ORAL at 18:44

## 2020-06-11 RX ADMIN — GABAPENTIN 300 MG: 300 CAPSULE ORAL at 20:47

## 2020-06-11 RX ADMIN — DOCUSATE SODIUM 100 MG: 100 CAPSULE, LIQUID FILLED ORAL at 17:23

## 2020-06-11 RX ADMIN — ACETAMINOPHEN 975 MG: 325 TABLET ORAL at 13:53

## 2020-06-11 RX ADMIN — CALCIUM CARBONATE (ANTACID) CHEW TAB 500 MG 500 MG: 500 CHEW TAB at 20:46

## 2020-06-11 RX ADMIN — ENOXAPARIN SODIUM 40 MG: 40 INJECTION SUBCUTANEOUS at 08:15

## 2020-06-11 RX ADMIN — AMLODIPINE BESYLATE 5 MG: 5 TABLET ORAL at 08:15

## 2020-06-11 RX ADMIN — ACETAMINOPHEN 975 MG: 325 TABLET ORAL at 00:12

## 2020-06-11 RX ADMIN — OXYCODONE HYDROCHLORIDE 5 MG: 5 TABLET ORAL at 14:50

## 2020-06-11 RX ADMIN — ACETAMINOPHEN 975 MG: 325 TABLET ORAL at 07:23

## 2020-06-11 RX ADMIN — GABAPENTIN 300 MG: 300 CAPSULE ORAL at 08:15

## 2020-06-11 RX ADMIN — SENNOSIDES 8.6 MG: 8.6 TABLET ORAL at 08:15

## 2020-06-11 NOTE — PROGRESS NOTES
Pt noted to have crepitus on left side of chest, b/l neck and sternal notch  Chest tube has had an air leak, sats on 2L at 97%, pt in slight discomfort  Resident notified  Will continue to monitor

## 2020-06-11 NOTE — RESTORATIVE TECHNICIAN NOTE
Restorative Specialist Mobility Note       Activity: Ambulate in jason, Chair     Assistive Device: None

## 2020-06-11 NOTE — PROGRESS NOTES
Progress Note - Thoracic Surgery   Lux Womack 71 y o  male MRN: [id number]  Unit/Bed#: [facility] 412-01 Encounter: [id number]    Assessment/Plan:  71 y o  male with VATS LULobectomy, bronchoscopy, mediastinoscopy; POD#1    - maintain CT to sxn  - Pippa out  - CARMELINA  - DVT ppx  - analgesia prn    Subjective/Objective     Subjective: No acute events      Objective:     Vitals: Temp:  [96 9 °F (36 1 °C)-98 8 °F (37 1 °C)] 98 8 °F (37 1 °C)  HR:  [] 76  Resp:  [11-20] 18  BP: ()/(54-86) 144/86  Arterial Line BP: ()/(58-74) 70/58  Body mass index is 30 37 kg/m²  I/O       06/09 0701 - 06/10 0700 06/10 0701 - 06/11 0700 06/11 0701 - 06/12 0700    P  O   690     I V  (mL/kg)  1177 (12 3)     IV Piggyback  250     Total Intake(mL/kg)  2117 (22 1)     Urine (mL/kg/hr)  1225 (0 5)     Blood  150     Chest Tube  300     Total Output  1675     Net  +442                  Physical Exam:  GEN: NAD  HEENT: MMM  CV: RRR  Lung: Normal effort  Ab: Soft, NT/ND  Extrem: No CCE  Neuro: A+Ox3    Lab, Imaging and other studies: I have personally reviewed pertinent reports      VTE Pharmacologic Prophylaxis: Enoxaparin (Lovenox)  VTE Mechanical Prophylaxis: sequential compression device

## 2020-06-11 NOTE — UTILIZATION REVIEW
Initial Clinical Review    Elective  surgical procedure  Age/Sex: 71 y o  male  Surgery Date: 06/10/2020  Procedure: BRONCHOSCOPY FLEXIBLE (N/A)  MEDIASTINOSCOPY (N/A)  THORACOSCOPY VIDEO ASSISTED SURGERY (VATS) (Left)  UPPER LOBECTOMY OF LUNG; MEDIASTINAL  LYMPH NODE DISSECTION (Left)  Anesthesia: General  Operative Findings: Large left upper lobe tumor infiltrating into the chest wall  POD#1 Progress Note (06/11/2020): VATS LULobectomy, bronchoscopy, mediastinoscopy  Maintain chest tube to suction  Sero sang 300 out, + AL  DC A  Line  DC IV flds  DC norwood  Analgesia PRN    Up and OOB as tolerated    Admission Orders: Date/Time/Statement: Admission Orders (From admission, onward)     Ordered        06/10/20 1332  Inpatient Admission  Once                   Orders Placed This Encounter   Procedures    Inpatient Admission     Standing Status:   Standing     Number of Occurrences:   1     Order Specific Question:   Admitting Physician     Answer:   Sherri Stahl [[id number]]     Order Specific Question:   Level of Care     Answer:   Level 1 Stepdown [13]     Order Specific Question:   Estimated length of stay     Answer:   Inpatient Only Surgery     Vital Signs: /84 (BP Location: Right arm) Comment: Map 107  Pulse 74   Temp 98 9 °F (37 2 °C) (Oral)   Resp 18   Ht 5' 10" (1 778 m)   Wt 96 kg (211 lb 10 3 oz)   SpO2 96%   BMI 30 37 kg/m²   Diet: As tolerated  Mobility: Up and OOB as tolerated  DVT Prophylaxis: Ambulating  Medications/Pain Control:   Scheduled Medications:  Medications:  acetaminophen 975 mg Oral Q6H   amLODIPine 5 mg Oral Daily   docusate sodium 100 mg Oral BID   enoxaparin 40 mg Subcutaneous Daily   gabapentin 300 mg Oral TID   pantoprazole 40 mg Oral Early Morning   polyethylene glycol 17 g Oral Daily   senna 1 tablet Oral Daily   Continuous IV Infusions:   PRN Meds:  HYDROmorphone 0 5 mg Intravenous Q4H PRN   ondansetron 4 mg Intravenous Q6H PRN   oxyCODONE 2 5 mg Oral Q4H PRN oxyCODONE 5 mg Oral Q4H PRN         Network Utilization Review Department  [email or web address]  ATTENTION: Please call with any questions or concerns to [phone] and carefully listen to the prompts so that you are directed to the right person  All voicemails are confidential   Lynette Kussmaul all requests for admission clinical reviews, approved or denied determinations and any other requests to dedicated fax number below belonging to the campus where the patient is receiving treatment   List of dedicated fax numbers for the Facilities:  [address] [place] [address] DENIALS (Administrative/Medical Necessity) [phone]   [address] (Maternity/NICU/Pediatrics) [phone]   Joshua Ro [phone]   [facility] [phone]   Errol Benz [phone]   [facility] [phone]   [address] [place] [address] [phone]   [facility]  [phone]   [address] [facility]  [address] [facility] [address] [facility] [phone]

## 2020-06-11 NOTE — RESTORATIVE TECHNICIAN NOTE
Restorative Specialist Mobility Note       Activity: Ambulate in jason, Chair     Assistive Device: Other (Comment)(held IV pole)

## 2020-06-11 NOTE — DISCHARGE INSTRUCTIONS
Gently wash your incisions daily with soap and water, do not soak in a tub  Do not apply any lotions, creams, or ointments to incisions  No lifting over 10 lbs or strenuous exercise  No driving until seen at your post operative visit  The blue stitch will be removed at your post operative visit  Please obtain a pa/lat chest xray at a [facility] within 3 days of your follow up visit  You will be prescribed 3 medications to take at home, that should be taken exactly as directed  These have been shown to greatly improve post-operative pain:     1  Gabapentin 300mg tablet  Take 1 tablet by mouth 3x a day for 3 weeks  2    Tylenol 325mg tablet  Take 2 tablets by mouth, every 6 hours, for 1 week  You will also be prescribed a medication that you may take on an as needed basis:  Oxycodone 5mg tablet  Take 1-2 tablets every 4 hours as needed for pain

## 2020-06-12 ENCOUNTER — APPOINTMENT (INPATIENT)
Dept: RADIOLOGY | Facility: HOSPITAL | Age: 70
DRG: 003 | End: 2020-06-12
Payer: COMMERCIAL

## 2020-06-12 ENCOUNTER — TELEPHONE (OUTPATIENT)
Dept: CARDIAC SURGERY | Facility: CLINIC | Age: 70
End: 2020-06-12

## 2020-06-12 LAB
ANION GAP SERPL CALCULATED.3IONS-SCNC: 4 MMOL/L (ref 4–13)
BUN SERPL-MCNC: 14 MG/DL (ref 5–25)
CALCIUM SERPL-MCNC: 8.2 MG/DL (ref 8.3–10.1)
CHLORIDE SERPL-SCNC: 105 MMOL/L (ref 100–108)
CO2 SERPL-SCNC: 26 MMOL/L (ref 21–32)
CREAT SERPL-MCNC: 0.88 MG/DL (ref 0.6–1.3)
ERYTHROCYTE [DISTWIDTH] IN BLOOD BY AUTOMATED COUNT: 14.3 % (ref 11.6–15.1)
GFR SERPL CREATININE-BSD FRML MDRD: 88 ML/MIN/1.73SQ M
GLUCOSE SERPL-MCNC: 91 MG/DL (ref 65–140)
HCT VFR BLD AUTO: 41.5 % (ref 36.5–49.3)
HGB BLD-MCNC: 13.1 G/DL (ref 12–17)
MAGNESIUM SERPL-MCNC: 2.4 MG/DL (ref 1.6–2.6)
MCH RBC QN AUTO: 31.2 PG (ref 26.8–34.3)
MCHC RBC AUTO-ENTMCNC: 31.6 G/DL (ref 31.4–37.4)
MCV RBC AUTO: 99 FL (ref 82–98)
PLATELET # BLD AUTO: 203 THOUSANDS/UL (ref 149–390)
PMV BLD AUTO: 9.5 FL (ref 8.9–12.7)
POTASSIUM SERPL-SCNC: 4 MMOL/L (ref 3.5–5.3)
RBC # BLD AUTO: 4.2 MILLION/UL (ref 3.88–5.62)
SODIUM SERPL-SCNC: 135 MMOL/L (ref 136–145)
WBC # BLD AUTO: 11.2 THOUSAND/UL (ref 4.31–10.16)

## 2020-06-12 PROCEDURE — 99024 POSTOP FOLLOW-UP VISIT: CPT | Performed by: THORACIC SURGERY (CARDIOTHORACIC VASCULAR SURGERY)

## 2020-06-12 PROCEDURE — 83735 ASSAY OF MAGNESIUM: CPT | Performed by: PHYSICIAN ASSISTANT

## 2020-06-12 PROCEDURE — 71046 X-RAY EXAM CHEST 2 VIEWS: CPT

## 2020-06-12 PROCEDURE — 80048 BASIC METABOLIC PNL TOTAL CA: CPT | Performed by: PHYSICIAN ASSISTANT

## 2020-06-12 PROCEDURE — 85027 COMPLETE CBC AUTOMATED: CPT | Performed by: PHYSICIAN ASSISTANT

## 2020-06-12 RX ORDER — ACETAMINOPHEN 325 MG/1
650 TABLET ORAL EVERY 6 HOURS
Qty: 56 TABLET | Refills: 0 | Status: SHIPPED | OUTPATIENT
Start: 2020-06-12 | End: 2020-07-30 | Stop reason: HOSPADM

## 2020-06-12 RX ORDER — DOCUSATE SODIUM 100 MG/1
100 CAPSULE, LIQUID FILLED ORAL 2 TIMES DAILY
Qty: 10 CAPSULE | Refills: 0 | Status: SHIPPED | OUTPATIENT
Start: 2020-06-12 | End: 2020-07-30

## 2020-06-12 RX ORDER — OXYCODONE HYDROCHLORIDE 5 MG/1
5 TABLET ORAL EVERY 4 HOURS PRN
Qty: 30 TABLET | Refills: 0 | Status: SHIPPED | OUTPATIENT
Start: 2020-06-12 | End: 2020-07-30

## 2020-06-12 RX ORDER — GABAPENTIN 300 MG/1
300 CAPSULE ORAL 3 TIMES DAILY
Qty: 63 CAPSULE | Refills: 0 | Status: SHIPPED | OUTPATIENT
Start: 2020-06-12 | End: 2020-07-30 | Stop reason: HOSPADM

## 2020-06-12 RX ADMIN — GABAPENTIN 300 MG: 300 CAPSULE ORAL at 15:57

## 2020-06-12 RX ADMIN — GABAPENTIN 300 MG: 300 CAPSULE ORAL at 20:20

## 2020-06-12 RX ADMIN — ACETAMINOPHEN 975 MG: 325 TABLET ORAL at 17:52

## 2020-06-12 RX ADMIN — SENNOSIDES 8.6 MG: 8.6 TABLET ORAL at 10:59

## 2020-06-12 RX ADMIN — GABAPENTIN 300 MG: 300 CAPSULE ORAL at 11:02

## 2020-06-12 RX ADMIN — PANTOPRAZOLE SODIUM 40 MG: 40 TABLET, DELAYED RELEASE ORAL at 05:57

## 2020-06-12 RX ADMIN — DOCUSATE SODIUM 100 MG: 100 CAPSULE, LIQUID FILLED ORAL at 11:00

## 2020-06-12 RX ADMIN — OXYCODONE HYDROCHLORIDE 5 MG: 5 TABLET ORAL at 12:37

## 2020-06-12 RX ADMIN — POLYETHYLENE GLYCOL 3350 17 G: 17 POWDER, FOR SOLUTION ORAL at 11:02

## 2020-06-12 RX ADMIN — OXYCODONE HYDROCHLORIDE 5 MG: 5 TABLET ORAL at 00:44

## 2020-06-12 RX ADMIN — OXYCODONE HYDROCHLORIDE 5 MG: 5 TABLET ORAL at 05:57

## 2020-06-12 RX ADMIN — AMLODIPINE BESYLATE 5 MG: 5 TABLET ORAL at 11:02

## 2020-06-12 RX ADMIN — ENOXAPARIN SODIUM 40 MG: 40 INJECTION SUBCUTANEOUS at 11:04

## 2020-06-12 RX ADMIN — CALCIUM CARBONATE (ANTACID) CHEW TAB 500 MG 500 MG: 500 CHEW TAB at 15:58

## 2020-06-12 RX ADMIN — ACETAMINOPHEN 975 MG: 325 TABLET ORAL at 11:00

## 2020-06-12 NOTE — PROGRESS NOTES
Pt called and stated he feels he's not able to take a deep breath  Pt on 2LNC and sats at 96-97%  Lungs coarse, still have crepitus around the L side of neck, shoulder and chest  Thopaz canister changed  Air leak is minimal at 10-20mL now  Pt was given 5mg oxy for a 10/10 pain and is now improving  Dr Ton Bettencourt made aware  Will monitor closely

## 2020-06-12 NOTE — PLAN OF CARE
Problem: CARDIOVASCULAR - ADULT  Goal: Maintains optimal cardiac output and hemodynamic stability  Description  INTERVENTIONS:  - Monitor I/O, vital signs and rhythm  - Monitor for S/S and trends of decreased cardiac output  - Administer and titrate ordered vasoactive medications to optimize hemodynamic stability  - Assess quality of pulses, skin color and temperature  - Assess for signs of decreased coronary artery perfusion  - Instruct patient to report change in severity of symptoms  Outcome: Progressing  Goal: Absence of cardiac dysrhythmias or at baseline rhythm  Description  INTERVENTIONS:  - Continuous cardiac monitoring, vital signs, obtain 12 lead EKG if ordered  - Administer antiarrhythmic and heart rate control medications as ordered  - Monitor electrolytes and administer replacement therapy as ordered  Outcome: Progressing     Problem: RESPIRATORY - ADULT  Goal: Achieves optimal ventilation and oxygenation  Description  INTERVENTIONS:  - Assess for changes in respiratory status  - Assess for changes in mentation and behavior  - Position to facilitate oxygenation and minimize respiratory effort  - Oxygen administered by appropriate delivery if ordered  - Initiate smoking cessation education as indicated  - Encourage broncho-pulmonary hygiene including cough, deep breathe, Incentive Spirometry  - Assess the need for suctioning and aspirate as needed  - Assess and instruct to report SOB or any respiratory difficulty  - Respiratory Therapy support as indicated  Outcome: Progressing     Problem: SKIN/TISSUE INTEGRITY - ADULT  Goal: Skin integrity remains intact  Description  INTERVENTIONS  - Identify patients at risk for skin breakdown  - Assess and monitor skin integrity  - Assess and monitor nutrition and hydration status  - Monitor labs (i e  albumin)  - Assess for incontinence   - Turn and reposition patient  - Assist with mobility/ambulation  - Relieve pressure over bony prominences  - Avoid friction and shearing  - Provide appropriate hygiene as needed including keeping skin clean and dry  - Evaluate need for skin moisturizer/barrier cream  - Collaborate with interdisciplinary team (i e  Nutrition, Rehabilitation, etc )   - Patient/family teaching  Outcome: Progressing  Goal: Incision(s), wounds(s) or drain site(s) healing without S/S of infection  Description  INTERVENTIONS  - Assess and document risk factors for skin impairment   - Assess and document dressing, incision, wound bed, drain sites and surrounding tissue  - Consider nutrition services referral as needed  - Oral mucous membranes remain intact  - Provide patient/ family education  Outcome: Progressing  Goal: Oral mucous membranes remain intact  Description  INTERVENTIONS  - Assess oral mucosa and hygiene practices  - Implement preventative oral hygiene regimen  - Implement oral medicated treatments as ordered  - Initiate Nutrition services referral as needed  Outcome: Progressing     Problem: MUSCULOSKELETAL - ADULT  Goal: Maintain or return mobility to safest level of function  Description  INTERVENTIONS:  - Assess patient's ability to carry out ADLs; assess patient's baseline for ADL function and identify physical deficits which impact ability to perform ADLs (bathing, care of mouth/teeth, toileting, grooming, dressing, etc )  - Assess/evaluate cause of self-care deficits   - Assess range of motion  - Assess patient's mobility  - Assess patient's need for assistive devices and provide as appropriate  - Encourage maximum independence but intervene and supervise when necessary  - Involve family in performance of ADLs  - Assess for home care needs following discharge   - Consider OT consult to assist with ADL evaluation and planning for discharge  - Provide patient education as appropriate  Outcome: Progressing  Goal: Maintain proper alignment of affected body part  Description  INTERVENTIONS:  - Support, maintain and protect limb and body alignment  - Provide patient/ family with appropriate education  Outcome: Progressing     Problem: Potential for Falls  Goal: Patient will remain free of falls  Description  INTERVENTIONS:  - Assess patient frequently for physical needs  -  Identify cognitive and physical deficits and behaviors that affect risk of falls    -  [place] fall precautions as indicated by assessment   - Educate patient/family on patient safety including physical limitations  - Instruct patient to call for assistance with activity based on assessment  - Modify environment to reduce risk of injury  - Consider OT/PT consult to assist with strengthening/mobility  Outcome: Progressing

## 2020-06-12 NOTE — PROGRESS NOTES
Progress Note - Thoracic Surgery   Sherice Jimenes 71 y o  male MRN: [id number]  Unit/Bed#: [facility] 412-01 Encounter: [id number]    Assessment/Plan:  71 y o  male with VATS LULobectomy, bronchoscopy, mediastinoscopy; POD#2  - Thopaz unit malfunctioned, required canister change early AM 6/12    - maintain CT to WS  - CXR  - CARMELINA  - DVT ppx  - analgesia prn  - Ambulate  - IS    Subjective/Objective     Subjective: No acute events      Objective:     Vitals: Temp:  [97 3 °F (36 3 °C)-98 9 °F (37 2 °C)] 98 3 °F (36 8 °C)  HR:  [74-89] 78  Resp:  [18] 18  BP: (134-147)/(84-96) 134/88  Body mass index is 30 37 kg/m²  I/O       06/09 0701 - 06/10 0700 06/10 0701 - 06/11 0700 06/11 0701 - 06/12 0700    P  O   690     I V  (mL/kg)  1177 (12 3)     IV Piggyback  250     Total Intake(mL/kg)  2117 (22 1)     Urine (mL/kg/hr)  1225 (0 5)     Blood  150     Chest Tube  300     Total Output  1675     Net  +442                  Physical Exam:  GEN: NAD  HEENT: MMM  CV: RRR  Lung: Normal effort  Chest: CT with SS out  Crepitus present throughout chest wall  Ab: Soft, NT/ND  Extrem: No CCE  Neuro: A+Ox3    Lab, Imaging and other studies: I have personally reviewed pertinent reports      VTE Pharmacologic Prophylaxis: Enoxaparin (Lovenox)  VTE Mechanical Prophylaxis: sequential compression device

## 2020-06-12 NOTE — RESPIRATORY THERAPY NOTE
resp care      06/12/20 1121   Respiratory Assessment   Assessment Type Assess only   General Appearance Alert; Awake   Respiratory Pattern Normal   Chest Assessment Chest expansion symmetrical   Bilateral Breath Sounds Diminished   Cough None   Resp Comments pt using IS independently and without difficulty, pt denies sob,will D/C ACP at this time

## 2020-06-12 NOTE — UTILIZATION REVIEW
Continued Stay Review    Date: 06/12/2020                        POD #2 s/p VATS LULobectomy  Current Patient Class: Inpatient  Current Level of Care: Med/Surg    HPI:69 y o  male initially admitted on 06/10/2020     Assessment/Plan: Overnight, air leak on CT went from 500 to 0 believed to be 2/2 a malfunction Thopaz unit  Thopaz unit working okay now  Air leak is 0  SubQ emphysema significantly worsened overnight, now through out upper torso, bilateral neck and face and down left arm  CT site evaluated - CT securely in place and not kinked  Will get stat CXR this AM   Leave CT to water seal   Up and OOB, ambulating 4x/day  IS 10x/hr  Diet as tolerated  Analgesia PRN  Pertinent Labs/Diagnostic Results:      06/12/2020 @ 0839  Chest X:  Left upper lobectomy with left chest tube with tiny left pneumothorax  Extensive new pneumomediastinum  Marked worsening of extensive subcutaneous emphysema, greatest in the left chest wall but also extensive in the lower neck      Results from last 7 days   Lab Units 06/12/20  0456 06/10/20  1354   WBC Thousand/uL 11 20* 13 59*   HEMOGLOBIN g/dL 13 1 12 9   HEMATOCRIT % 41 5 39 8   PLATELETS Thousands/uL 203 211     Results from last 7 days   Lab Units 06/12/20  0456 06/11/20  0834 06/10/20  1354   SODIUM mmol/L 135* 135* 138   POTASSIUM mmol/L 4 0 4 3 3 9   CHLORIDE mmol/L 105 107 111*   CO2 mmol/L 26 21 20*   ANION GAP mmol/L 4 7 7   BUN mg/dL 14 15 15   CREATININE mg/dL 0 88 1 00 1 27   EGFR ml/min/1 73sq m 88 76 57   CALCIUM mg/dL 8 2* 8 1* 8 5   MAGNESIUM mg/dL 2 4 2 5 2 9*     Results from last 7 days   Lab Units 06/12/20  0456 06/11/20  0834 06/10/20  1354   GLUCOSE RANDOM mg/dL 91 134 175*     Vital Signs: BP (!) 156/102 (BP Location: Right arm) Comment: Map 123  Pulse 100   Temp 97 5 °F (36 4 °C) (Oral)   Resp 18   Ht 5' 10" (1 778 m)   Wt 96 kg (211 lb 10 3 oz)   SpO2 93%   BMI 30 37 kg/m²     Medications:   Scheduled Medications:  Medications:  acetaminophen 975 mg Oral Q6H   amLODIPine 5 mg Oral Daily   docusate sodium 100 mg Oral BID   enoxaparin 40 mg Subcutaneous Daily   gabapentin 300 mg Oral TID   pantoprazole 40 mg Oral Early Morning   polyethylene glycol 17 g Oral Daily   senna 1 tablet Oral Daily   Continuous IV Infusions:   PRN Meds:  calcium carbonate 500 mg Oral TID PRN   ondansetron 4 mg Intravenous Q6H PRN   oxyCODONE 2 5 mg Oral Q4H PRN   oxyCODONE 5 mg Oral Q4H PRN       Discharge Plan: TBD    Network Utilization Review Department  [email or web address]  ATTENTION: Please call with any questions or concerns to [phone] and carefully listen to the prompts so that you are directed to the right person  All voicemails are confidential   Harlene Pair all requests for admission clinical reviews, approved or denied determinations and any other requests to dedicated fax number below belonging to the campus where the patient is receiving treatment   List of dedicated fax numbers for the Facilities:  [address] DENIALS (Administrative/Medical Necessity) [phone]   [address] (Maternity/NICU/Pediatrics) [phone]   Zita Garcia [phone]   Thereasa Roys [phone]   Toy Moisés [phone]   [facility] [phone]   1205 [facility] 1525 [facility] [phone]   [facility]  [phone]   [address] [facility]  2401 [facility] 1000 [address] [facility] [phone]

## 2020-06-13 ENCOUNTER — APPOINTMENT (INPATIENT)
Dept: RADIOLOGY | Facility: HOSPITAL | Age: 70
DRG: 003 | End: 2020-06-13
Payer: COMMERCIAL

## 2020-06-13 PROCEDURE — 71045 X-RAY EXAM CHEST 1 VIEW: CPT

## 2020-06-13 PROCEDURE — 99024 POSTOP FOLLOW-UP VISIT: CPT | Performed by: THORACIC SURGERY (CARDIOTHORACIC VASCULAR SURGERY)

## 2020-06-13 RX ADMIN — GABAPENTIN 300 MG: 300 CAPSULE ORAL at 22:13

## 2020-06-13 RX ADMIN — DOCUSATE SODIUM 100 MG: 100 CAPSULE, LIQUID FILLED ORAL at 08:45

## 2020-06-13 RX ADMIN — ENOXAPARIN SODIUM 40 MG: 40 INJECTION SUBCUTANEOUS at 08:46

## 2020-06-13 RX ADMIN — OXYCODONE HYDROCHLORIDE 5 MG: 5 TABLET ORAL at 14:44

## 2020-06-13 RX ADMIN — OXYCODONE HYDROCHLORIDE 5 MG: 5 TABLET ORAL at 00:19

## 2020-06-13 RX ADMIN — GABAPENTIN 300 MG: 300 CAPSULE ORAL at 08:45

## 2020-06-13 RX ADMIN — PANTOPRAZOLE SODIUM 40 MG: 40 TABLET, DELAYED RELEASE ORAL at 05:08

## 2020-06-13 RX ADMIN — OXYCODONE HYDROCHLORIDE 5 MG: 5 TABLET ORAL at 22:12

## 2020-06-13 RX ADMIN — AMLODIPINE BESYLATE 5 MG: 5 TABLET ORAL at 08:45

## 2020-06-13 RX ADMIN — ACETAMINOPHEN 975 MG: 325 TABLET ORAL at 11:12

## 2020-06-13 RX ADMIN — POLYETHYLENE GLYCOL 3350 17 G: 17 POWDER, FOR SOLUTION ORAL at 08:47

## 2020-06-13 RX ADMIN — OXYCODONE HYDROCHLORIDE 5 MG: 5 TABLET ORAL at 08:45

## 2020-06-13 RX ADMIN — CALCIUM CARBONATE (ANTACID) CHEW TAB 500 MG 500 MG: 500 CHEW TAB at 05:08

## 2020-06-13 RX ADMIN — SENNOSIDES 8.6 MG: 8.6 TABLET ORAL at 08:46

## 2020-06-13 RX ADMIN — OXYCODONE HYDROCHLORIDE 2.5 MG: 5 TABLET ORAL at 04:00

## 2020-06-13 NOTE — PROGRESS NOTES
Progress Note - Thoracic Surgery   Jami Harris 71 y o  male MRN: [id number]  Unit/Bed#: [facility] 412-01 Encounter: [id number]    Assessment/Plan:  71 y o  male with VATS LULobectomy, bronchoscopy, mediastinoscopy; POD#3    Chest tube with 0 cc of drainage, 20-60 cc air leak    · Diet as tolerated  · Follow up AM CXR  · Continue CT to waterseal  · Pain control  · DVT ppx    Subjective/Objective     Subjective: No acute events  Patient states the swelling is approximately the same  Denies shortness of breath  Objective:     Vitals: Temp:  [97 2 °F (36 2 °C)-98 8 °F (37 1 °C)] 98 2 °F (36 8 °C)  HR:  [] 97  Resp:  [16-18] 18  BP: (134-156)/() 143/95  Body mass index is 30 37 kg/m²  Intake/Output Summary (Last 24 hours) at 6/12/2020 2336  Last data filed at 6/12/2020 2220  Gross per 24 hour   Intake 952 ml   Output 600 ml   Net 352 ml       Physical Exam:  GEN: NAD  HEENT: MMM  CV: Warm/well perfused  Lung: normal effort  Left CT WS, +AL  Ab: Soft, NT/ND  Extrem: No CCE  Neuro: A+Ox3, motor and sensation grossly intact    Lab, Imaging and other studies: I have personally reviewed pertinent reports      VTE Pharmacologic Prophylaxis: Enoxaparin (Lovenox)  VTE Mechanical Prophylaxis: sequential compression device

## 2020-06-14 ENCOUNTER — APPOINTMENT (INPATIENT)
Dept: RADIOLOGY | Facility: HOSPITAL | Age: 70
DRG: 003 | End: 2020-06-14
Payer: COMMERCIAL

## 2020-06-14 PROCEDURE — 97163 PT EVAL HIGH COMPLEX 45 MIN: CPT

## 2020-06-14 PROCEDURE — 99024 POSTOP FOLLOW-UP VISIT: CPT | Performed by: THORACIC SURGERY (CARDIOTHORACIC VASCULAR SURGERY)

## 2020-06-14 PROCEDURE — 71046 X-RAY EXAM CHEST 2 VIEWS: CPT

## 2020-06-14 RX ADMIN — OXYCODONE HYDROCHLORIDE 5 MG: 5 TABLET ORAL at 05:07

## 2020-06-14 RX ADMIN — POLYETHYLENE GLYCOL 3350 17 G: 17 POWDER, FOR SOLUTION ORAL at 09:07

## 2020-06-14 RX ADMIN — AMLODIPINE BESYLATE 5 MG: 5 TABLET ORAL at 09:04

## 2020-06-14 RX ADMIN — DOCUSATE SODIUM 100 MG: 100 CAPSULE, LIQUID FILLED ORAL at 09:04

## 2020-06-14 RX ADMIN — CALCIUM CARBONATE (ANTACID) CHEW TAB 500 MG 500 MG: 500 CHEW TAB at 13:03

## 2020-06-14 RX ADMIN — ENOXAPARIN SODIUM 40 MG: 40 INJECTION SUBCUTANEOUS at 09:04

## 2020-06-14 RX ADMIN — GABAPENTIN 300 MG: 300 CAPSULE ORAL at 09:04

## 2020-06-14 RX ADMIN — PANTOPRAZOLE SODIUM 40 MG: 40 TABLET, DELAYED RELEASE ORAL at 05:07

## 2020-06-14 NOTE — PROGRESS NOTES
Progress Note - Thoracic Surgery   Kika Frias 71 y o  male MRN: [id number]  Unit/Bed#: [facility] 200-18 Encounter: [id number]    Assessment/Plan:  71 y o  male with VATS LULobectomy, bronchoscopy, mediastinoscopy 6/10    Chest tube with 150 cc of drainage, 220-280 cc air leak    · Diet as tolerated  · Continue CT to water seal, monitor air leak  · F/u AM CXR  · Pain control  · DVT ppx  · Pulm toilet/OOB      Subjective/Objective     Subjective: subQ emphysema the same today  Is very discouraged and uncomfortable especially with inability to open eyes  Pain controlled      Objective:     Vitals: Temp:  [97 4 °F (36 3 °C)-98 3 °F (36 8 °C)] 97 8 °F (36 6 °C)  HR:  [] 93  Resp:  [16-20] 20  BP: (122-167)/(84-99) 144/97  Body mass index is 29 51 kg/m²  Intake/Output Summary (Last 24 hours) at 6/14/2020 0757  Last data filed at 6/14/2020 7477  Gross per 24 hour   Intake 730 ml   Output 2025 ml   Net -1295 ml       Physical Exam:  NAD, alert and oriented x3  Normocephalic, atraumatic  MMM, EOMI, PERRLA  Norm resp effort on RA  L CT to water seal with -280  L chest/mediastinoscopy incisions cdi  RRR  Abd soft, NT/ND  No calf tenderness or peripheral edema  Motor/sensation intact in distal extremities  CN grossly intact  -rash/lesions      Lab, Imaging and other studies: I have personally reviewed pertinent reports      VTE Pharmacologic Prophylaxis: Enoxaparin (Lovenox)  VTE Mechanical Prophylaxis: sequential compression device

## 2020-06-14 NOTE — PHYSICAL THERAPY NOTE
Physical Therapy Evaluation     Patient's Name: Paula Winslow    Admitting Diagnosis  Malignant neoplasm of upper lobe of left lung ([facility] [address] ) [C34 12]    Problem List  Patient Active Problem List   Diagnosis    Community acquired pneumonia    Abnormal computed tomography angiography (CTA)    Alcohol abuse    Malignant neoplasm of upper lobe of left lung ([facility] [address] )    Tobacco abuse       Past Medical History  Past Medical History:   Diagnosis Date    Alcohol abuse 3/27/2020    Chest pain     Community acquired pneumonia 3/27/2020    Hypertension     Left upper lobe pulmonary nodule     Malignant neoplasm of upper lobe of left lung ([facility] [address] ) 5/7/2020       Past Surgical History  Past Surgical History:   Procedure Laterality Date    COLON SURGERY      CT NEEDLE BIOPSY LUNG  4/29/2020    AR BRONCHOSCOPY,DIAGNOSTIC N/A 6/10/2020    Procedure: BRONCHOSCOPY FLEXIBLE;  Surgeon: Melchor Rodas MD;  [facility];  Service: Thoracic    AR MEDIASTINOSCOPY WITH LYMPH NODE BIOPSY/IES N/A 6/10/2020    Procedure: MEDIASTINOSCOPY;  Surgeon: Melchor Rodas MD;  [facility];  Service: Thoracic    AR THORACOSCOPY SURG LOBECTOMY Left 6/10/2020    Procedure: THORACOSCOPY VIDEO ASSISTED SURGERY (VATS); Surgeon: Melchor Rodas MD;  [facility];  Service: Thoracic    AR THORACOSCOPY SURG LOBECTOMY Left 6/10/2020    Procedure: UPPER LOBECTOMY OF LUNG; MEDIASTINAL  LYMPH NODE DISSECTION;  Surgeon: Melchor Rodas MD;  [facility];  Service: Thoracic    WRIST SURGERY Right     orif          06/14/20 0953   Note Type   Note type Eval only   Pain Assessment   Pain Assessment Tool Pain Assessment not indicated - pt denies pain   Home Living   Type of [address] Two level; Able to live on main level with bedroom/bathroom  (1 small JONAH; 1st floor set-up)   Prior Function   Level of [place] Independent with ADLs and functional mobility  (amb w/o AD)   Lives With Family;Daughter   Receives Help From Family   Restrictions/Precautions   Braces or Orthoses   (denies)   Other Precautions Multiple lines;Telemetry; Fall Risk;Visual impairment; Chair Alarm  ((L) CT; facial edema affecting eyes opening; chair alarm on)   General   Additional Pertinent History cleared for assessment (spoke to luca)   Cognition   Overall Cognitive Status [facility]   Arousal/Participation Alert   Orientation Level Oriented to person;Oriented to place;Oriented to situation   Memory Within functional limits   Following Commands Follows one step commands without difficulty   Comments Alert; in the chair; reports he cannot see much due to swelling; agreeable to try mobilization w/ guiding provided;   RUE Assessment   RUE Assessment WFL  (AROM)   LUE Assessment   LUE Assessment WFL  (AROM)   RLE Assessment   RLE Assessment WFL  (AROM)   Strength RLE   RLE Overall Strength   (fair +/ good - (grossly))   LLE Assessment   LLE Assessment WFL  (AROM)   Strength LLE   LLE Overall Strength   (fair +/ good - (grossly))   Bed Mobility   Additional Comments Chair alarm re-activated at the end of session; call bell w/in reach   Transfers   Sit to Stand 4  Minimal assistance  (2 trials)   Additional items Assist x 1;Verbal cues   Stand to Sit 4  Minimal assistance  (2 trials)   Additional items Assist x 1;Verbal cues   Ambulation/Elevation   Gait pattern Excessively slow; Short stride; Inconsistent evan; Foward flexed   Gait Assistance 4  Minimal assist   Additional items Assist x 1;Verbal cues; Tactile cues  (chair follow)   Assistive Device Rolling walker   Distance 2 x 150 ft w/ seated rest period in between  (O2 sat at 100 % post 1st bout of amb)   Balance   Static Sitting Fair   Static Standing Fair -   Ambulatory Poor +   Activity Tolerance   Activity Tolerance Patient limited by fatigue   Medical Staff Made Aware spoke to Dr Arreguin Seen spoke to TRI Crow   Assessment   Prognosis Good   Problem List Decreased strength;Decreased endurance; Impaired balance;Decreased mobility; Impaired vision;Obesity   Assessment Pt is 71 y o  male admitted with Dx of malignant neoplasm of upper lobe of left lung and underwent BRONCHOSCOPY FLEXIBLE, MEDIASTINOSCOPY, THORACOSCOPY VIDEO ASSISTED SURGERY (VATS) (Left), UPPER LOBECTOMY OF LUNG and MEDIASTINAL LYMPH NODE DISSECTION (Left) on 6/10/2020  Pt 's comorbidities affecting POC include: alcohol abuse, HTN and personal factors of: JONAH  Pt's clinical presentation is currently unstable/unpredictable which is evident in ongoing telem monitoring, abn lab values, CT in place, and visual impairment due to facial swelling  Pt presents w/ min postop guarding, min overall weakness, incl decreased LE strength, decreased functional endurance and activity tolerance requiring seated rest period during amb, min impaired balance w/ associated gait deviations (rw was introduced), visual impairments and fall risk  Will cont to follow pt in PT for progressive mobilization to address above functional deficits and to max level of (I), endurance, and safety  Otherwise, anticipate pt will return home w/ available family support upon D/C provided he cont improving w/ mobility skills, safety, and endurance and when medically cleared; home PT follow up is recommended at this time; will follow  Barriers to Discharge Inaccessible home environment; Other (Comment)  (med status)   Goals   Patient Goals to get better   STG Expiration Date 06/24/20   Short Term Goal #1 7-10 days  Pt will amb 300 ft w/ least restrictive assistive device PRN, mod (I) in order to facilitate safe return to premorbid environment and community amb status  Pt will negotiate 1 step w/ appropriate AD, (S)x1 in order to navigate in and out of home environment safely  Pt will achieve (I) level w/ bed mob in order to facilitate safety with OOB and back to bed transitions in own living environment   Pt will perform transfers w/ mod (I) to assure (I) and safety w/ functional mobility/transitions w/ all aspects of mobility/locomotion  Pt will participate in LE therex and balance activities to max progression w/ mobility skills  PT Treatment Day 0   Plan   Treatment/Interventions Functional transfer training;LE strengthening/ROM; Elevations; Therapeutic exercise; Endurance training;Equipment eval/education; Bed mobility;Gait training;Spoke to MD;Spoke to nursing   PT Frequency Other (Comment)  (4-6x/wk)   Recommendation   PT Discharge Recommendation Return to previous environment with social support;Home with skilled therapy  (home PT; cont w/ 1st floor set-up)   Equipment Recommended Walker  (at this time; r/o [facility])   Modified [place] Scale   Modified [place] Scale 4         Andrei Duarte, PT

## 2020-06-14 NOTE — PLAN OF CARE
Problem: PHYSICAL THERAPY ADULT  Goal: Performs mobility at highest level of function for planned discharge setting  See evaluation for individualized goals  Description  Treatment/Interventions: Functional transfer training, LE strengthening/ROM, Elevations, Therapeutic exercise, Endurance training, Equipment eval/education, Bed mobility, Gait training, Spoke to MD, Spoke to nursing  Equipment Recommended: Walker(at this time; r/o [facility])       See flowsheet documentation for full assessment, interventions and recommendations  Note:   Prognosis: Good  Problem List: Decreased strength, Decreased endurance, Impaired balance, Decreased mobility, Impaired vision, Obesity  Assessment: Pt is 71 y o  male admitted with Dx of malignant neoplasm of upper lobe of left lung and underwent BRONCHOSCOPY FLEXIBLE, MEDIASTINOSCOPY, THORACOSCOPY VIDEO ASSISTED SURGERY (VATS) (Left), UPPER LOBECTOMY OF LUNG and MEDIASTINAL LYMPH NODE DISSECTION (Left) on 6/10/2020  Pt 's comorbidities affecting POC include: alcohol abuse, HTN and personal factors of: JONAH  Pt's clinical presentation is currently unstable/unpredictable which is evident in ongoing telem monitoring, abn lab values, CT in place, and visual impairment due to facial swelling  Pt presents w/ min postop guarding, min overall weakness, incl decreased LE strength, decreased functional endurance and activity tolerance requiring seated rest period during amb, min impaired balance w/ associated gait deviations (rw was introduced), visual impairments and fall risk  Will cont to follow pt in PT for progressive mobilization to address above functional deficits and to max level of (I), endurance, and safety  Otherwise, anticipate pt will return home w/ available family support upon D/C provided he cont improving w/ mobility skills, safety, and endurance and when medically cleared; home PT follow up is recommended at this time; will follow    Barriers to Discharge: Inaccessible home environment, Other (Comment)(med status)     PT Discharge Recommendation: Return to previous environment with social support, Home with skilled therapy(home PT; cont w/ 1st floor set-up)          See flowsheet documentation for full assessment

## 2020-06-14 NOTE — RESPIRATORY THERAPY NOTE
RT Protocol Note  Paula Winslow 71 y o  male MRN: [id number]  Unit/Bed#: [facility] 412-01 Encounter: [id number]    Assessment    Principal Problem:    Malignant neoplasm of upper lobe of left lung [facility])      Home Pulmonary Medications:  none  Home Devices/Therapy: (none)    Past Medical History:   Diagnosis Date    Alcohol abuse 3/27/2020    Chest pain     Community acquired pneumonia 3/27/2020    Hypertension     Left upper lobe pulmonary nodule     Malignant neoplasm of upper lobe of left lung ([address] ) 5/7/2020     Social History     Socioeconomic History    Marital status: Single     Spouse name: None    Number of children: None    Years of education: None    Highest education level: None   Occupational History    None   Social Needs    Financial resource strain: None    Food insecurity:     Worry: None     Inability: None    Transportation needs:     Medical: None     Non-medical: None   Tobacco Use    Smoking status: Former Smoker     Packs/day: 1 00     Years: 55 00     Pack years: 55 00     Types: Cigarettes    Smokeless tobacco: Never Used   Substance and Sexual Activity    Alcohol use: Yes     Frequency: 4 or more times a week     Comment: "we cant tell how much"  per pt daughter     Drug use: Never    Sexual activity: None   Lifestyle    Physical activity:     Days per week: None     Minutes per session: None    Stress: None   Relationships    Social connections:     Talks on phone: None     Gets together: None     Attends Restoration service: None     Active member of club or organization: None     Attends meetings of clubs or organizations: None     Relationship status: None    Intimate partner violence:     Fear of current or ex partner: None     Emotionally abused: None     Physically abused: None     Forced sexual activity: None   Other Topics Concern    None   Social History Narrative    None       Subjective         Objective    Physical Exam:   Assessment Type: (P) Assess only  General Appearance: (P) Alert  Respiratory Pattern: (P) Normal  Chest Assessment: (P) Chest expansion symmetrical  Bilateral Breath Sounds: (P) Diminished, Clear  Cough: (P) Strong, Dry, Non-productive  O2 Device: (P) RA    Vitals:  Blood pressure 144/97, pulse 93, temperature 97 8 °F (36 6 °C), temperature source Oral, resp  rate 20, height 5' 10" (1 778 m), weight 93 3 kg (205 lb 11 oz), SpO2 96 %  Imaging and other studies: I have personally reviewed pertinent reports  O2 Device: (P) RA     Plan    Respiratory Plan: (P) No distress/Pulmonary history, Discontinue Protocol  Airway Clearance Plan: Incentive Spirometer     Resp Comments: (P) Pt assessed per protocol  Admitted for lung sx, currently with subQ air  Breathsounds clear, IS to 1750, conversational with SpO2 96% on [employer]  Plan to DC from protocol

## 2020-06-15 ENCOUNTER — APPOINTMENT (INPATIENT)
Dept: RADIOLOGY | Facility: HOSPITAL | Age: 70
DRG: 003 | End: 2020-06-15
Payer: COMMERCIAL

## 2020-06-15 ENCOUNTER — TELEPHONE (OUTPATIENT)
Dept: OTHER | Facility: OTHER | Age: 70
End: 2020-06-15

## 2020-06-15 LAB
ANION GAP SERPL CALCULATED.3IONS-SCNC: 9 MMOL/L (ref 4–13)
BASOPHILS # BLD AUTO: 0.01 THOUSANDS/ΜL (ref 0–0.1)
BASOPHILS NFR BLD AUTO: 0 % (ref 0–1)
BUN SERPL-MCNC: 14 MG/DL (ref 5–25)
CALCIUM SERPL-MCNC: 8.4 MG/DL (ref 8.3–10.1)
CHLORIDE SERPL-SCNC: 95 MMOL/L (ref 100–108)
CO2 SERPL-SCNC: 22 MMOL/L (ref 21–32)
CREAT SERPL-MCNC: 0.75 MG/DL (ref 0.6–1.3)
EOSINOPHIL # BLD AUTO: 0.09 THOUSAND/ΜL (ref 0–0.61)
EOSINOPHIL NFR BLD AUTO: 1 % (ref 0–6)
ERYTHROCYTE [DISTWIDTH] IN BLOOD BY AUTOMATED COUNT: 13.4 % (ref 11.6–15.1)
GFR SERPL CREATININE-BSD FRML MDRD: 94 ML/MIN/1.73SQ M
GLUCOSE SERPL-MCNC: 98 MG/DL (ref 65–140)
HCT VFR BLD AUTO: 40.2 % (ref 36.5–49.3)
HGB BLD-MCNC: 13.4 G/DL (ref 12–17)
IMM GRANULOCYTES # BLD AUTO: 0.16 THOUSAND/UL (ref 0–0.2)
IMM GRANULOCYTES NFR BLD AUTO: 2 % (ref 0–2)
LYMPHOCYTES # BLD AUTO: 1.31 THOUSANDS/ΜL (ref 0.6–4.47)
LYMPHOCYTES NFR BLD AUTO: 15 % (ref 14–44)
MCH RBC QN AUTO: 30.8 PG (ref 26.8–34.3)
MCHC RBC AUTO-ENTMCNC: 33.3 G/DL (ref 31.4–37.4)
MCV RBC AUTO: 92 FL (ref 82–98)
MONOCYTES # BLD AUTO: 0.42 THOUSAND/ΜL (ref 0.17–1.22)
MONOCYTES NFR BLD AUTO: 5 % (ref 4–12)
NEUTROPHILS # BLD AUTO: 6.75 THOUSANDS/ΜL (ref 1.85–7.62)
NEUTS SEG NFR BLD AUTO: 77 % (ref 43–75)
NRBC BLD AUTO-RTO: 0 /100 WBCS
PLATELET # BLD AUTO: 259 THOUSANDS/UL (ref 149–390)
PMV BLD AUTO: 9.5 FL (ref 8.9–12.7)
POTASSIUM SERPL-SCNC: 3.9 MMOL/L (ref 3.5–5.3)
RBC # BLD AUTO: 4.35 MILLION/UL (ref 3.88–5.62)
SODIUM SERPL-SCNC: 126 MMOL/L (ref 136–145)
WBC # BLD AUTO: 8.74 THOUSAND/UL (ref 4.31–10.16)

## 2020-06-15 PROCEDURE — 99024 POSTOP FOLLOW-UP VISIT: CPT | Performed by: THORACIC SURGERY (CARDIOTHORACIC VASCULAR SURGERY)

## 2020-06-15 PROCEDURE — 85025 COMPLETE CBC W/AUTO DIFF WBC: CPT | Performed by: SURGERY

## 2020-06-15 PROCEDURE — 71045 X-RAY EXAM CHEST 1 VIEW: CPT

## 2020-06-15 PROCEDURE — 97167 OT EVAL HIGH COMPLEX 60 MIN: CPT

## 2020-06-15 PROCEDURE — 80048 BASIC METABOLIC PNL TOTAL CA: CPT | Performed by: SURGERY

## 2020-06-15 PROCEDURE — 97116 GAIT TRAINING THERAPY: CPT

## 2020-06-15 RX ORDER — LIDOCAINE HYDROCHLORIDE AND EPINEPHRINE 10; 10 MG/ML; UG/ML
20 INJECTION, SOLUTION INFILTRATION; PERINEURAL ONCE
Status: COMPLETED | OUTPATIENT
Start: 2020-06-15 | End: 2020-06-15

## 2020-06-15 RX ADMIN — PANTOPRAZOLE SODIUM 40 MG: 40 TABLET, DELAYED RELEASE ORAL at 06:09

## 2020-06-15 RX ADMIN — GABAPENTIN 300 MG: 300 CAPSULE ORAL at 21:34

## 2020-06-15 RX ADMIN — DOCUSATE SODIUM 100 MG: 100 CAPSULE, LIQUID FILLED ORAL at 08:44

## 2020-06-15 RX ADMIN — OXYCODONE HYDROCHLORIDE 5 MG: 5 TABLET ORAL at 23:08

## 2020-06-15 RX ADMIN — SENNOSIDES 8.6 MG: 8.6 TABLET ORAL at 08:44

## 2020-06-15 RX ADMIN — LIDOCAINE HYDROCHLORIDE,EPINEPHRINE BITARTRATE 20 ML: 10; .01 INJECTION, SOLUTION INFILTRATION; PERINEURAL at 15:50

## 2020-06-15 RX ADMIN — GABAPENTIN 300 MG: 300 CAPSULE ORAL at 18:24

## 2020-06-15 RX ADMIN — ACETAMINOPHEN 975 MG: 325 TABLET ORAL at 18:22

## 2020-06-15 RX ADMIN — ENOXAPARIN SODIUM 40 MG: 40 INJECTION SUBCUTANEOUS at 08:42

## 2020-06-15 RX ADMIN — DOCUSATE SODIUM 100 MG: 100 CAPSULE, LIQUID FILLED ORAL at 18:23

## 2020-06-15 RX ADMIN — GABAPENTIN 300 MG: 300 CAPSULE ORAL at 08:42

## 2020-06-15 RX ADMIN — AMLODIPINE BESYLATE 5 MG: 5 TABLET ORAL at 08:42

## 2020-06-15 RX ADMIN — OXYCODONE HYDROCHLORIDE 2.5 MG: 5 TABLET ORAL at 18:31

## 2020-06-15 NOTE — PROGRESS NOTES
Patient with increase of crepitus, unable to open his eyes this morning, states he is sob as well  Put patient on 2L he is 97%  Chest tube c/d/i   Will let white team know, surgical resident did see patient this am

## 2020-06-15 NOTE — PLAN OF CARE
Problem: PHYSICAL THERAPY ADULT  Goal: Performs mobility at highest level of function for planned discharge setting  See evaluation for individualized goals  Description  Treatment/Interventions: Functional transfer training, LE strengthening/ROM, Elevations, Therapeutic exercise, Endurance training, Equipment eval/education, Bed mobility, Gait training, Spoke to MD, Spoke to nursing  Equipment Recommended: Walker(at this time; r/o [facility])       See flowsheet documentation for full assessment, interventions and recommendations  Outcome: Not Progressing  Note:   Prognosis: Good  Problem List: Decreased strength, Decreased endurance, Impaired balance, Decreased mobility  Assessment: Pt seen for session fr setup, transfers, gait training, repositioning  Pt noted to have increased impulsivity with all mobility tasks  frequently tries to move w/ out cues despite low vision, and note unsafe use of RW   was able to ambulate an increased distance but needs constant cues and assist for safety  For this reason, lean towards rehab at d/c   will follow for progress, and is pt makes improvements, could possibly progress towards home as per eval   will follow for needs  Barriers to Discharge: Inaccessible home environment, Other (Comment)(med status)     PT Discharge Recommendation: (S) Post-Acute Rehabilitation Services(see above for changes to recs)     PT - OK to Discharge: No    See flowsheet documentation for full assessment

## 2020-06-15 NOTE — PROCEDURES
Decompressive incision to left chest wall with VAC placement    The patient was identified on his hospital bed  Verbal consent was obtained  His left upper chest wall was cleaned with chloraprep and draped into a sterile field  Time-out was done  Local was infiltrated into the subcutaneous tissues  A 5cm transverse incision was made in the upper left chest and carried through to the subcutaneous fat layer using a #15 blade  There was bubbling of air noted from the wound  Hemostasis was achieved with 3-0 vicryl suture  A VAC dressing was then placed, connected to suction at 125mmHg and had good seal  The patient tolerated the procedure well  Counts were correct      David Madrid MD

## 2020-06-15 NOTE — PROGRESS NOTES
Progress Note - Thoracic Surgery   Neil Dye 71 y o  male MRN: [id number]  Unit/Bed#: [facility] 412-01 Encounter: [id number]    Assessment/Plan:  71 y o  male with VATS LULobectomy, bronchoscopy, mediastinoscopy 6/10    Chest tube with 100cc of drainage, +200 cc air leak    · Diet as tolerated  · Continue CT to water seal, monitor air leak -- will discuss increasing suction  · F/u AM CXR  · Pain control  · DVT ppx  · Pulm toilet/OOB      Subjective/Objective     Subjective: Worsening facial swelling  Difficulty opening eyelids  Comfortable on 2L NC  Objective:     Vitals: Temp:  [97 7 °F (36 5 °C)-99 8 °F (37 7 °C)] 98 2 °F (36 8 °C)  HR:  [] 99  Resp:  [16-20] 20  BP: (131-159)/(70-87) 143/87  Body mass index is 29 83 kg/m²  Intake/Output Summary (Last 24 hours) at 6/15/2020 0809  Last data filed at 6/15/2020 0601  Gross per 24 hour   Intake 480 ml   Output 300 ml   Net 180 ml       Physical Exam:  GEN: NAD, diffuse swelling including face, with associated crepitus, nasally voice  HEENT: MMM  CV: warm/well perfused  Lung: normal effort  L CT sxn +AL  Ab: Soft, NT/ND  Extrem: No CCE  Neuro: A+Ox3, motor and sensation grossly intact      Lab, Imaging and other studies: I have personally reviewed pertinent reports      VTE Pharmacologic Prophylaxis: Enoxaparin (Lovenox)  VTE Mechanical Prophylaxis: sequential compression device

## 2020-06-15 NOTE — OCCUPATIONAL THERAPY NOTE
Occupational Therapy Evaluation     Patient Name: Jami Harris  Today's Date: 6/15/2020  Problem List  Principal Problem:    Malignant neoplasm of upper lobe of left lung [facility])    Past Medical History  Past Medical History:   Diagnosis Date    Alcohol abuse 3/27/2020    Chest pain     Community acquired pneumonia 3/27/2020    Hypertension     Left upper lobe pulmonary nodule     Malignant neoplasm of upper lobe of left lung ([address] ) 5/7/2020     Past Surgical History  Past Surgical History:   Procedure Laterality Date    COLON SURGERY      CT NEEDLE BIOPSY LUNG  4/29/2020    NC BRONCHOSCOPY,DIAGNOSTIC N/A 6/10/2020    Procedure: BRONCHOSCOPY FLEXIBLE;  Surgeon: Matt Cifuentes MD;  [facility];  Service: Thoracic    NC MEDIASTINOSCOPY WITH LYMPH NODE BIOPSY/IES N/A 6/10/2020    Procedure: MEDIASTINOSCOPY;  Surgeon: Matt Cifuentes MD;  [facility];  Service: Thoracic    NC THORACOSCOPY SURG LOBECTOMY Left 6/10/2020    Procedure: THORACOSCOPY VIDEO ASSISTED SURGERY (VATS); Surgeon: Matt Cifuentes MD;  [facility];  Service: Thoracic    NC THORACOSCOPY SURG LOBECTOMY Left 6/10/2020    Procedure: UPPER LOBECTOMY OF LUNG; MEDIASTINAL  LYMPH NODE DISSECTION;  Surgeon: Matt Cifuentes MD;  [facility];  Service: Thoracic    WRIST SURGERY Right     orif         06/15/20 0940   Note Type   Note type Eval/Treat   Restrictions/Precautions   Weight Bearing Precautions Per Order No   Other Precautions Multiple lines;O2;Fall Risk;Telemetry; Visual impairment  (visual impairment due to crepitus )   Pain Assessment   Pain Assessment Tool Pain Assessment not indicated - pt denies pain   Pain Score No Pain   Home Living   Type of Home House   Home Layout Two level; Able to live on main level with bedroom/bathroom   Bathroom Shower/Tub Walk-in shower   Bathroom Toilet Standard   Prior Function   Level of [place] Independent with ADLs and functional mobility   Lives With Daughter  (Son in law)   Evangelist Fishman 32 in the last 6 months 0   Vocational Retired   Lifestyle   Autonomy pta pt reports I in ADLs/IADLs/functional mobility   Reciprocal Relationships supportive dtr and son in law   Service to Others retired    Jan 139 enjoys doing "nothing"   Psychosocial   Psychosocial ([address]) X   Patient Behaviors/Mood Anxious   Subjective   Subjective "I can't see"   ADL   Where Assessed Chair   Eating Assistance 4  Minimal Assistance   Grooming Assistance 4  Minimal Assistance   [address] 3  Moderate Assistance   LB Bathing Assistance 2  Maximal Assistance   [address] [facility] [facility] 3  Moderate Assistance    [facility] 2  Maximal Assistance   Transfers   Sit to Stand 4  Minimal assistance   Additional items Assist x 1   Stand to Sit 4  Minimal assistance   Additional items Assist x 1   Balance   Static Sitting Good   Dynamic Sitting Fair   Static Standing Fair -   Dynamic Standing Poor +   Ambulatory Poor +   Activity Tolerance   Activity Tolerance Treatment limited secondary to medical complications (Comment)  (Facial crepitus)   Medical Staff Made Aware  [facility] okay to see per RN   RUE Assessment   RUE Assessment WFL   LUE Assessment   LUE Assessment WFL   Hand Function   Gross Motor Coordination Functional   Fine Motor Coordination Functional   Cognition   Overall Cognitive Status Impaired   Arousal/Participation Responsive   Attention Attends with cues to redirect   Orientation Level Oriented X4   Memory Decreased recall of recent events;Decreased recall of precautions   Following Commands Follows one step commands with increased time or repetition   Comments increased verbal cues for safety, increased impulsivity   Assessment   Limitation Decreased ADL status; Decreased Safe judgement during ADL;Decreased cognition;Decreased endurance;Decreased high-level ADLs; Decreased self-care trans   Prognosis Good   Assessment Pt is a 71 y o  YO  male admitted to [facility] on 6/10/2020 w/ malignant neoplasm of upper lobe of L lung s/p "BRONCHOSCOPY FLEXIBLE, MEDIASTINOSCOPY, THORACOSCOPY VIDEO ASSISTED SURGERY (VATS) (Left), UPPER LOBECTOMY OF LUNG and MEDIASTINAL LYMPH NODE DISSECTION (Left) on 6/10/2020"  Pt  has a past medical history of Alcohol abuse, Chest pain, Community acquired pneumonia, Hypertension, Left upper lobe pulmonary nodule, and Malignant neoplasm of upper lobe of left lung ([facility] [address] )      Pt with active OT orders and ambulate  orders   Pt resides in a [facility] with first floor setup w/ son and dtr in law  Pt was I w/  ADLS and IADLS, (+) drove, & required no use of DME PTA  Currently pt is Max for [employer], Mod A for UB ADLs, and Min A for functional mobility  Pt is limited at this time 2*: endurance, activity tolerance, functional mobility, functional standing tolerance, unsupportive home environment, decreased I w/ ADLS/IADLS, decreased safety awareness and decreased insight into deficits  The following Occupational Performance Areas to address include: grooming, bathing/shower, toilet hygiene, dressing, functional mobility and household maintenance  Based on the aforementioned OT evaluation, functional performance deficits, and assessments, pt has been identified as a high complexity evaluation  From OT standpoint, anticipate d/c STR  Pt to continue to benefit from acute immediate OT services to address the following goals 3-5x/week to  w/in 7-10 days: [employer]    Goals   Patient Goals to be able to see better   LTG Time Frame 10-14   Plan   Treatment Interventions ADL retraining   Goal Expiration Date 20   OT Frequency 3-5x/wk   Recommendation   OT Discharge Recommendation Post-Acute Rehabilitation Services   OT - OK to Discharge Yes   Modified [place] Scale   Modified [place] Scale 4     GOALS    1) Pt will increase activity tolerance to G for 30 min txment sessions    2) Pt will complete UB/LB dressing/self care w/ mod I using adaptive device and DME as needed    3) Pt will complete bathing w/ Mod I w/ use of AE and DME as needed    4) Pt will complete toileting w/ mod I w/ G hygiene/thoroughness using DME as needed    5) Pt will improve functional transfers to Mod I on/off all surfaces using DME as needed w/ G balance/safety     6) Pt will improve functional mobility during ADL/IADL/leisure tasks to Mod I using DME as needed w/ G balance/safety     7) Pt will participate in simulated IADL management task to increase independence to  w/ G safety and endurance    8) Pt will demonstrate G carryover of pt/caregiver education and training as appropriate      9) Pt will demonstrate 100% carryover of energy conservation techniques t/o functional I/ADL/leisure tasks w/o cues s/p skilled education    10) Pt will engage in ongoing cognitive assessment w/ G participation to assist w/ safe d/c planning/recommendations (as needed)    Leena De La Torre MS, OTR/L

## 2020-06-15 NOTE — UTILIZATION REVIEW
Continued Stay Review    Date:    06/14/2020                   VATS LULobectomy, bronchoscopy, mediastinoscopy 6/10  Current Patient Class: Inpatient  Current Level of Care:  Med/Surg    HPI:69 y o  male initially admitted on 06/10/2020    Assessment/Plan: Place chest tube pack to -20 cm suction due to increased subcutaneous emphysema  Awaiting resolution of air leak until removing tube  Encourage coughing deep breath and IS  Analgesia PRN  Ambulate TID        Pertinent Labs/Diagnostic Results:     Results from last 7 days   Lab Units 06/15/20  0437 06/12/20  0456 06/10/20  1354   WBC Thousand/uL 8 74 11 20* 13 59*   HEMOGLOBIN g/dL 13 4 13 1 12 9   HEMATOCRIT % 40 2 41 5 39 8   PLATELETS Thousands/uL 259 203 211   NEUTROS ABS Thousands/µL 6 75  --   --      Results from last 7 days   Lab Units 06/15/20  0437 06/12/20  0456 06/11/20  0834 06/10/20  1354   SODIUM mmol/L 126* 135* 135* 138   POTASSIUM mmol/L 3 9 4 0 4 3 3 9   CHLORIDE mmol/L 95* 105 107 111*   CO2 mmol/L 22 26 21 20*   ANION GAP mmol/L 9 4 7 7   BUN mg/dL 14 14 15 15   CREATININE mg/dL 0 75 0 88 1 00 1 27   EGFR ml/min/1 73sq m 94 88 76 57   CALCIUM mg/dL 8 4 8 2* 8 1* 8 5   MAGNESIUM mg/dL  --  2 4 2 5 2 9*     Results from last 7 days   Lab Units 06/15/20  0437 06/12/20  0456 06/11/20  0834 06/10/20  1354   GLUCOSE RANDOM mg/dL 98 91 134 175*     Vital Signs:   Date/Time  Temp  Pulse  Resp  BP  MAP (mmHg)  SpO2  O2 Flow Rate (L/min)  O2 Device  Patient Position - Orthostatic VS   06/14/20 2058  97 8 °F (36 6 °C)  99  18  155/87  112  95 %    None (Room air)  Sitting   06/14/20 1455  97 7 °F (36 5 °C)  98  16  136/74  97  97 %    None (Room air)  Sitting   06/14/20 1155  99 8 °F (37 7 °C)  92  18  131/71  91  96 %    None (Room air)  Sitting   06/14/20 0700  97 8 °F (36 6 °C)  93  20  144/97  117  96 %    None (Room air)  Sitting   06/14/20 0230  98 °F (36 7 °C)  100  18  157/89  116  97 %    None (Room air)  Lying       Medications: Scheduled Medications:  Medications:  acetaminophen 975 mg Oral Q6H   amLODIPine 5 mg Oral Daily   docusate sodium 100 mg Oral BID   enoxaparin 40 mg Subcutaneous Daily   gabapentin 300 mg Oral TID   lidocaine-epinephrine 20 mL Infiltration Once   pantoprazole 40 mg Oral Early Morning   polyethylene glycol 17 g Oral Daily   senna 1 tablet Oral Daily   Continuous IV Infusions:   PRN Meds:  calcium carbonate 500 mg Oral TID PRN   ondansetron 4 mg Intravenous Q6H PRN   oxyCODONE 2 5 mg Oral Q4H PRN   oxyCODONE 5 mg Oral Q4H PRN     Discharge Plan: TBD    Network Utilization Review Department  [email or web address]  ATTENTION: Please call with any questions or concerns to [phone] and carefully listen to the prompts so that you are directed to the right person  All voicemails are confidential   Farrel Bodily all requests for admission clinical reviews, approved or denied determinations and any other requests to dedicated fax number below belonging to the campus where the patient is receiving treatment   List of dedicated fax numbers for the Facilities:  [address] DENIALS (Administrative/Medical Necessity) [phone]   [address] (Maternity/NICU/Pediatrics) [phone]   Delaney Hendricks [phone]   Abiola Rodriguez [phone]   Sariah Hooks [phone]   Cassandra Brennan [phone]   [address] [phone]   [facility]  [phone]   [address] [facility] [address]  [address] [facility] [address] [facility] [phone]

## 2020-06-15 NOTE — RESTORATIVE TECHNICIAN NOTE
Restorative Specialist Mobility Note       Activity: Ambulate in jason, Chair     Assistive Device: Front wheel walker

## 2020-06-15 NOTE — PLAN OF CARE
Problem: OCCUPATIONAL THERAPY ADULT  Goal: Performs self-care activities at highest level of function for planned discharge setting  See evaluation for individualized goals  Description  Treatment Interventions: ADL retraining          See flowsheet documentation for full assessment, interventions and recommendations  Note:   Limitation: Decreased ADL status, Decreased Safe judgement during ADL, Decreased cognition, Decreased endurance, Decreased high-level ADLs, Decreased self-care trans  Prognosis: Good  Assessment: Pt is a 71 y o  YO  male admitted to [facility] on 6/10/2020 w/ malignant neoplasm of upper lobe of L lung s/p "BRONCHOSCOPY FLEXIBLE, MEDIASTINOSCOPY, THORACOSCOPY VIDEO ASSISTED SURGERY (VATS) (Left), UPPER LOBECTOMY OF LUNG and MEDIASTINAL LYMPH NODE DISSECTION (Left) on 6/10/2020"  Pt  has a past medical history of Alcohol abuse, Chest pain, Community acquired pneumonia, Hypertension, Left upper lobe pulmonary nodule, and Malignant neoplasm of upper lobe of left lung ([facility] [address] )      Pt with active OT orders and ambulate  orders   Pt resides in a [facility] with first floor setup w/ son and dtr in law  Pt was I w/  ADLS and IADLS, (+) drove, & required no use of DME PTA  Currently pt is Max for [employer], Mod A for UB ADLs, and Min A for functional mobility  Pt is limited at this time 2*: endurance, activity tolerance, functional mobility, functional standing tolerance, unsupportive home environment, decreased I w/ ADLS/IADLS, decreased safety awareness and decreased insight into deficits  The following Occupational Performance Areas to address include: grooming, bathing/shower, toilet hygiene, dressing, functional mobility and household maintenance  Based on the aforementioned OT evaluation, functional performance deficits, and assessments, pt has been identified as a high complexity evaluation  From OT standpoint, anticipate d/c STR   Pt to continue to benefit from acute immediate OT services to address the following goals 3-5x/week to  w/in 7-10 days:   OT Discharge Recommendation: Post-Acute Rehabilitation Services  OT - OK to Discharge:  Yes

## 2020-06-15 NOTE — PHYSICAL THERAPY NOTE
Physical Therapy Treatment Note       06/15/20 1005   Pain Assessment   Pain Assessment Tool Narvaez-Baker FACES   Pain Score No Pain   Restrictions/Precautions   Weight Bearing Precautions Per Order No   Other Precautions Multiple lines;O2;Telemetry; Fall Risk  (facail edema w/ limited vision due to same)   General   Chart Reviewed Yes   Family/Caregiver Present No   Cognition   Overall Cognitive Status Impaired   Arousal/Participation Responsive   Attention Attends with cues to redirect   Orientation Level Oriented X4   Memory Unable to assess   Following Commands Follows one step commands inconsistently   Subjective   Subjective pt w/ facial edema, and reports difficulty w/ vision due to same  impulsive and needs safety cues   Transfers   Sit to Stand 4  Minimal assistance   Additional items Assist x 1   Stand to Sit 4  Minimal assistance   Additional items Assist x 1   Ambulation/Elevation   Gait pattern   (slow, lateral sway, diff w/ obstacle avoidance 2* vision)   Gait Assistance 4  Minimal assist   Additional items Assist x 1  (w/ 2nd for chair follow)   Assistive Device Rolling walker   Distance 160'x2, standing rest x 1-2 min   Balance   Static Sitting Good   Dynamic Sitting Fair   Static Standing Fair -   Dynamic Standing Poor +   Ambulatory Poor +   Endurance Deficit   Endurance Deficit Yes   Activity Tolerance   Activity Tolerance Patient limited by fatigue;Treatment limited secondary to medical complications (Comment)   Nurse Made Aware yes   Assessment   Prognosis Good   Problem List Decreased strength;Decreased endurance; Impaired balance;Decreased mobility   Assessment Pt seen for session fr setup, transfers, gait training, repositioning  Pt noted to have increased impulsivity with all mobility tasks  frequently tries to move w/ out cues despite low vision, and note unsafe use of RW   was able to ambulate an increased distance but needs constant cues and assist for safety    For this reason, lean towards rehab at d/c   will follow for progress, and is pt makes improvements, could possibly progress towards home as per eval   will follow for needs   Goals   Patient Goals to go home   STG Expiration Date 06/24/20   PT Treatment Day 1   Plan   Treatment/Interventions Functional transfer training;LE strengthening/ROM; Therapeutic exercise; Endurance training;Equipment eval/education; Bed mobility;Gait training;Patient/family training   Progress Progressing toward goals   PT Frequency Other (Comment)  (3-5x/wk)   Recommendation   PT Discharge Recommendation Post-Acute Rehabilitation Services  (see above for changes to recs)   Equipment Recommended Carlos Mayer   PT - OK to Discharge No   Frank Pride PT, DPT CSRS

## 2020-06-16 PROCEDURE — 97110 THERAPEUTIC EXERCISES: CPT

## 2020-06-16 PROCEDURE — 99024 POSTOP FOLLOW-UP VISIT: CPT | Performed by: THORACIC SURGERY (CARDIOTHORACIC VASCULAR SURGERY)

## 2020-06-16 PROCEDURE — 97530 THERAPEUTIC ACTIVITIES: CPT

## 2020-06-16 PROCEDURE — 0J960ZZ DRAINAGE OF CHEST SUBCUTANEOUS TISSUE AND FASCIA, OPEN APPROACH: ICD-10-PCS | Performed by: THORACIC SURGERY (CARDIOTHORACIC VASCULAR SURGERY)

## 2020-06-16 RX ADMIN — PANTOPRAZOLE SODIUM 40 MG: 40 TABLET, DELAYED RELEASE ORAL at 06:07

## 2020-06-16 RX ADMIN — ACETAMINOPHEN 975 MG: 325 TABLET ORAL at 03:51

## 2020-06-16 RX ADMIN — ENOXAPARIN SODIUM 40 MG: 40 INJECTION SUBCUTANEOUS at 09:59

## 2020-06-16 RX ADMIN — AMLODIPINE BESYLATE 5 MG: 5 TABLET ORAL at 09:59

## 2020-06-16 RX ADMIN — ACETAMINOPHEN 975 MG: 325 TABLET ORAL at 10:00

## 2020-06-16 RX ADMIN — POLYETHYLENE GLYCOL 3350 17 G: 17 POWDER, FOR SOLUTION ORAL at 09:59

## 2020-06-16 NOTE — OCCUPATIONAL THERAPY NOTE
OccupationalTherapy Progress Note     Patient Name: Lux Womack  Today's Date: 6/16/2020  Problem List  Principal Problem:    Malignant neoplasm of upper lobe of left lung [facility])            06/16/20 1105   Restrictions/Precautions   Other Precautions Multiple lines;Telemetry; Chair Alarm; Fall Risk;Visual impairment  (CT, wound vac)   General   Response to Previous Treatment Patient with no complaints from previous session   Lifestyle   Autonomy pta pt reports I in ADLs/IADLs/functional mobility   Reciprocal Relationships supportive dtr and son in law   Service to Others retired    Jan [address] enjoys doing "nothing"   Pain Assessment   Pain Assessment Tool 0-10   Pain Score No Pain   Transfers   Sit to Stand 5  Supervision   Additional items Increased time required;Verbal cues   Stand to Sit 5  Supervision   Additional items Increased time required;Verbal cues   Functional Mobility   Functional Mobility 4  Minimal assistance  (A of 2nd person for line management and chair follow )   Additional Comments Pt demonstrated household mobility with 1 seated rest break   Additional items Rolling walker   Cognition   Overall Cognitive Status Impaired   Arousal/Participation Alert; Cooperative   Attention Attends with cues to redirect   Orientation Level Oriented to person;Oriented to place;Oriented to situation   Memory Decreased recall of precautions   Following Commands Follows one step commands with increased time or repetition   Comments Pt is pleasant and cooperative  Requires VC for safety and correct technique throughout  Vision   Vision Comments Pt reports that his vision has improved since yesterday, as he is no longer experiencing double vision      Activity Tolerance   Activity Tolerance Patient limited by fatigue   Medical Staff Made Aware RN confirmed okay to see pt   Assessment   Assessment Patient participated in Skilled OT session this date with interventions consisting of deep breathing technique,  therapeutic activities to: increase activity tolerance and increase OOB/ sitting tolerance   Patient agreeable to OT treatment session, upon arrival patient was found seated OOB to Chair  Patient requiring verbal cues for safety and verbal cues for correct technique  Patient continues to be functioning below baseline level, occupational performance remains limited secondary to factors listed above and increased risk for falls and injury  From OT standpoint, recommendation at time of d/c would be Short Term Rehab  Patient to benefit from continued Occupational Therapy treatment while in the hospital to address deficits as defined above and maximize level of functional independence with ADLs and functional mobility  Plan   Treatment Interventions Functional transfer training; Endurance training   Goal Expiration Date 06/29/20   OT Treatment Day 1   OT Frequency 3-5x/wk   Recommendation   OT Discharge Recommendation Post-Acute Rehabilitation Services   OT - OK to Discharge Yes  (When medically appropriate)   Modified [place] Scale   Modified [place] Scale 4     Anne Brown, ALYSIA, OTR/L

## 2020-06-16 NOTE — ASSESSMENT & PLAN NOTE
72 yo DOMI mass s/p 6/10 Bronchoscopy, mediastinoscopy, VATS LIVIER lobectomy/POD6  W/ persistent AL and subcutaneous emphysema with wound vac decompression     L CT: 100 serosang, -900    Plan   Regular diet  Wound vac  L CT, WS

## 2020-06-16 NOTE — PLAN OF CARE
Problem: CARDIOVASCULAR - ADULT  Goal: Maintains optimal cardiac output and hemodynamic stability  Description  INTERVENTIONS:  - Monitor I/O, vital signs and rhythm  - Monitor for S/S and trends of decreased cardiac output  - Administer and titrate ordered vasoactive medications to optimize hemodynamic stability  - Assess quality of pulses, skin color and temperature  - Assess for signs of decreased coronary artery perfusion  - Instruct patient to report change in severity of symptoms  Outcome: Progressing  Goal: Absence of cardiac dysrhythmias or at baseline rhythm  Description  INTERVENTIONS:  - Continuous cardiac monitoring, vital signs, obtain 12 lead EKG if ordered  - Administer antiarrhythmic and heart rate control medications as ordered  - Monitor electrolytes and administer replacement therapy as ordered  Outcome: Progressing     Problem: RESPIRATORY - ADULT  Goal: Achieves optimal ventilation and oxygenation  Description  INTERVENTIONS:  - Assess for changes in respiratory status  - Assess for changes in mentation and behavior  - Position to facilitate oxygenation and minimize respiratory effort  - Oxygen administered by appropriate delivery if ordered  - Initiate smoking cessation education as indicated  - Encourage broncho-pulmonary hygiene including cough, deep breathe, Incentive Spirometry  - Assess the need for suctioning and aspirate as needed  - Assess and instruct to report SOB or any respiratory difficulty  - Respiratory Therapy support as indicated  Outcome: Progressing     Problem: SKIN/TISSUE INTEGRITY - ADULT  Goal: Skin integrity remains intact  Description  INTERVENTIONS  - Identify patients at risk for skin breakdown  - Assess and monitor skin integrity  - Assess and monitor nutrition and hydration status  - Monitor labs (i e  albumin)  - Assess for incontinence   - Turn and reposition patient  - Assist with mobility/ambulation  - Relieve pressure over bony prominences  - Avoid friction and shearing  - Provide appropriate hygiene as needed including keeping skin clean and dry  - Evaluate need for skin moisturizer/barrier cream  - Collaborate with interdisciplinary team (i e  Nutrition, Rehabilitation, etc )   - Patient/family teaching  Outcome: Progressing  Goal: Incision(s), wounds(s) or drain site(s) healing without S/S of infection  Description  INTERVENTIONS  - Assess and document risk factors for skin impairment   - Assess and document dressing, incision, wound bed, drain sites and surrounding tissue  - Consider nutrition services referral as needed  - Oral mucous membranes remain intact  - Provide patient/ family education  Outcome: Progressing  Goal: Oral mucous membranes remain intact  Description  INTERVENTIONS  - Assess oral mucosa and hygiene practices  - Implement preventative oral hygiene regimen  - Implement oral medicated treatments as ordered  - Initiate Nutrition services referral as needed  Outcome: Progressing     Problem: MUSCULOSKELETAL - ADULT  Goal: Maintain or return mobility to safest level of function  Description  INTERVENTIONS:  - Assess patient's ability to carry out ADLs; assess patient's baseline for ADL function and identify physical deficits which impact ability to perform ADLs (bathing, care of mouth/teeth, toileting, grooming, dressing, etc )  - Assess/evaluate cause of self-care deficits   - Assess range of motion  - Assess patient's mobility  - Assess patient's need for assistive devices and provide as appropriate  - Encourage maximum independence but intervene and supervise when necessary  - Involve family in performance of ADLs  - Assess for home care needs following discharge   - Consider OT consult to assist with ADL evaluation and planning for discharge  - Provide patient education as appropriate  Outcome: Progressing  Goal: Maintain proper alignment of affected body part  Description  INTERVENTIONS:  - Support, maintain and protect limb and body alignment  - Provide patient/ family with appropriate education  Outcome: Progressing     Problem: Potential for Falls  Goal: Patient will remain free of falls  Description  INTERVENTIONS:  - Assess patient frequently for physical needs  -  Identify cognitive and physical deficits and behaviors that affect risk of falls    -  [place] fall precautions as indicated by assessment   - Educate patient/family on patient safety including physical limitations  - Instruct patient to call for assistance with activity based on assessment  - Modify environment to reduce risk of injury  - Consider OT/PT consult to assist with strengthening/mobility  Outcome: Progressing

## 2020-06-16 NOTE — PROGRESS NOTES
Progress Note - [facility] 1950, 71 y o  male MRN: [id number]    Unit/Bed#: [facility] 410-01 Encounter: [id number]    Primary Care Provider: Don Jc MD   Date and time admitted to hospital: 6/10/2020  5:52 AM        * Malignant neoplasm of upper lobe of left lung [facility])  Assessment & Plan  72 yo DOMI mass s/p 6/10 Bronchoscopy, mediastinoscopy, VATS LIVIER lobectomy/POD6  W/ persistent AL and subcutaneous emphysema with wound vac decompression  L CT: 100 serosang, -900    Plan   Regular diet  Wound vac  L CT, WS        Subjective/Objective     Subjective:   No acute events overnight  Objective:    Blood pressure 108/71, pulse (!) 127, temperature 98 7 °F (37 1 °C), temperature source Oral, resp  rate 18, height 5' 10" (1 778 m), weight 93 9 kg (207 lb 0 2 oz), SpO2 97 %  ,Body mass index is 29 7 kg/m²  Intake/Output Summary (Last 24 hours) at 6/16/2020 0727  Last data filed at 6/16/2020 5811  Gross per 24 hour   Intake 1680 ml   Output 1850 ml   Net -170 ml       Invasive Devices     Peripheral Intravenous Line            Peripheral IV 06/14/20 Right Antecubital 2 days          Drain            Chest Tube 1 Left Pleural 28 Fr  5 days                Physical Exam:   Gen:  Well-developed, well-nourished male in NAD w/extensive subcutaneous emphysema  CV: RRR  Lungs: Normal respiratory effort on RA  Incisions c/d/i  L CT, L chest vac  Abd: soft, nontender, nondistended,  Neuro:  AxO x3      Results from last 7 days   Lab Units 06/15/20  0437 06/12/20  0456 06/10/20  1354   WBC Thousand/uL 8 74 11 20* 13 59*   HEMOGLOBIN g/dL 13 4 13 1 12 9   HEMATOCRIT % 40 2 41 5 39 8   PLATELETS Thousands/uL 259 203 211     Results from last 7 days   Lab Units 06/15/20  0437 06/12/20  0456 06/11/20  0834   POTASSIUM mmol/L 3 9 4 0 4 3   CHLORIDE mmol/L 95* 105 107   CO2 mmol/L 22 26 21   BUN mg/dL 14 14 15   CREATININE mg/dL 0 75 0 88 1 00   CALCIUM mg/dL 8 4 8 2* 8 1*

## 2020-06-16 NOTE — PLAN OF CARE
Problem: CARDIOVASCULAR - ADULT  Goal: Maintains optimal cardiac output and hemodynamic stability  Description  INTERVENTIONS:  - Monitor I/O, vital signs and rhythm  - Monitor for S/S and trends of decreased cardiac output  - Administer and titrate ordered vasoactive medications to optimize hemodynamic stability  - Assess quality of pulses, skin color and temperature  - Assess for signs of decreased coronary artery perfusion  - Instruct patient to report change in severity of symptoms  Outcome: Progressing  Goal: Absence of cardiac dysrhythmias or at baseline rhythm  Description  INTERVENTIONS:  - Continuous cardiac monitoring, vital signs, obtain 12 lead EKG if ordered  - Administer antiarrhythmic and heart rate control medications as ordered  - Monitor electrolytes and administer replacement therapy as ordered  Outcome: Progressing     Problem: RESPIRATORY - ADULT  Goal: Achieves optimal ventilation and oxygenation  Description  INTERVENTIONS:  - Assess for changes in respiratory status  - Assess for changes in mentation and behavior  - Position to facilitate oxygenation and minimize respiratory effort  - Oxygen administered by appropriate delivery if ordered  - Initiate smoking cessation education as indicated  - Encourage broncho-pulmonary hygiene including cough, deep breathe, Incentive Spirometry  - Assess the need for suctioning and aspirate as needed  - Assess and instruct to report SOB or any respiratory difficulty  - Respiratory Therapy support as indicated  Outcome: Progressing     Problem: SKIN/TISSUE INTEGRITY - ADULT  Goal: Skin integrity remains intact  Description  INTERVENTIONS  - Identify patients at risk for skin breakdown  - Assess and monitor skin integrity  - Assess and monitor nutrition and hydration status  - Monitor labs (i e  albumin)  - Assess for incontinence   - Turn and reposition patient  - Assist with mobility/ambulation  - Relieve pressure over bony prominences  - Avoid friction and shearing  - Provide appropriate hygiene as needed including keeping skin clean and dry  - Evaluate need for skin moisturizer/barrier cream  - Collaborate with interdisciplinary team (i e  Nutrition, Rehabilitation, etc )   - Patient/family teaching  Outcome: Progressing  Goal: Incision(s), wounds(s) or drain site(s) healing without S/S of infection  Description  INTERVENTIONS  - Assess and document risk factors for skin impairment   - Assess and document dressing, incision, wound bed, drain sites and surrounding tissue  - Consider nutrition services referral as needed  - Oral mucous membranes remain intact  - Provide patient/ family education  Outcome: Progressing  Goal: Oral mucous membranes remain intact  Description  INTERVENTIONS  - Assess oral mucosa and hygiene practices  - Implement preventative oral hygiene regimen  - Implement oral medicated treatments as ordered  - Initiate Nutrition services referral as needed  Outcome: Progressing     Problem: MUSCULOSKELETAL - ADULT  Goal: Maintain or return mobility to safest level of function  Description  INTERVENTIONS:  - Assess patient's ability to carry out ADLs; assess patient's baseline for ADL function and identify physical deficits which impact ability to perform ADLs (bathing, care of mouth/teeth, toileting, grooming, dressing, etc )  - Assess/evaluate cause of self-care deficits   - Assess range of motion  - Assess patient's mobility  - Assess patient's need for assistive devices and provide as appropriate  - Encourage maximum independence but intervene and supervise when necessary  - Involve family in performance of ADLs  - Assess for home care needs following discharge   - Consider OT consult to assist with ADL evaluation and planning for discharge  - Provide patient education as appropriate  Outcome: Progressing  Goal: Maintain proper alignment of affected body part  Description  INTERVENTIONS:  - Support, maintain and protect limb and body alignment  - Provide patient/ family with appropriate education  Outcome: Progressing     Problem: Potential for Falls  Goal: Patient will remain free of falls  Description  INTERVENTIONS:  - Assess patient frequently for physical needs  -  Identify cognitive and physical deficits and behaviors that affect risk of falls    -  [place] fall precautions as indicated by assessment   - Educate patient/family on patient safety including physical limitations  - Instruct patient to call for assistance with activity based on assessment  - Modify environment to reduce risk of injury  - Consider OT/PT consult to assist with strengthening/mobility  Outcome: Progressing

## 2020-06-16 NOTE — PHYSICAL THERAPY NOTE
PHYSICAL THERAPY NOTE          Patient Name: Shree Hoover  Today's Date: 6/16/2020 06/16/20 1122   Pain Assessment   Pain Assessment Tool Pain Assessment not indicated - pt denies pain   Pain Score No Pain   Restrictions/Precautions   Other Precautions Multiple lines;Telemetry; Chair Alarm; Fall Risk;Visual impairment;Hard of hearing  ((L) CT; wound vac; chair alarm re-activated)   General   Chart Reviewed Yes   Additional Pertinent History Pt underwent decompressive incision to left chest wall with VAC placement on 6/15/2020; cleared for Tx session (spoke to nsg)   Response to Previous Treatment Patient with no complaints from previous session  Cognition   Overall Cognitive Status Impaired   Arousal/Participation Alert; Cooperative   Attention Attends with cues to redirect   Orientation Level Oriented to person;Oriented to place;Oriented to situation   Memory Decreased recall of recent events;Decreased recall of precautions   Following Commands Follows one step commands with increased time or repetition  ([place])   Subjective   Subjective  pt reports the facial edema is improving and he can seen better as well;   Transfers   Sit to Stand 5  Supervision  (2 trials; reviewed hands placement)   Additional items Verbal cues   Stand to Sit 5  Supervision  (2 trials; reviewed hands placement)   Additional items Assist x 1;Verbal cues   Ambulation/Elevation   Gait pattern Excessively slow; Short stride; Inconsistent evan   Gait Assistance 4  Minimal assist   Additional items Assist x 1;Verbal cues; Tactile cues  (stand by (A) of 2 more staff for lines and chair follow)   Assistive Device Rolling walker   Distance 2 x 250 ft w/ 5 min seated rest period in between;   Balance   Static Sitting Fair +   Dynamic Sitting Fair   Static Standing Fair -   Ambulatory Poor +   Activity Tolerance   Activity Tolerance Patient limited by fatigue Nurse Made Aware spoke to Maryan, [address] [place] [address]   Exercises   Knee AROM Long Arc Quad Sitting;10 reps;AROM; Bilateral  (2 sets)   Ankle Pumps Sitting;10 reps;AROM; Bilateral  (2 sets)   Marching Sitting;10 reps;AROM; Bilateral  (2 sets)   Equipment Use   Comments SCDs re-activated; call bell w/in reach; chair alarm on   Assessment   Prognosis Good   Problem List Decreased strength;Decreased endurance; Impaired balance;Decreased mobility; Impaired hearing; Impaired vision;Obesity   Assessment Pt cont to gradually improve in functional endurance and mobility skills progressing w/ amb distances while still using rw and requiring (A)x1 for navigation in the environment; pt also still remains to be generally weak and deconditioned w/ decreased functional endurance and inconsistent safety awareness (also w/ ongoing visual scanning deficit due to facial edema) affecting his balance and mobility skills; based on above, cont to recommend rehab upon D/C when medically cleared; will follow  Barriers to Discharge Inaccessible home environment; Other (Comment)  (med status)   Goals   Patient Goals to get better   STG Expiration Date 06/24/20   PT Treatment Day 2   Plan   Treatment/Interventions Functional transfer training;LE strengthening/ROM; Elevations; Therapeutic exercise; Endurance training;Cognitive reorientation;Equipment eval/education; Bed mobility;Gait training;Spoke to nursing;Spoke to case management;OT   Progress Progressing toward goals   PT Frequency Other (Comment)  (4-6x/wk)   Recommendation   PT Discharge Recommendation Post-Acute Rehabilitation Services   Equipment Recommended Angel Luis Mir [place]

## 2020-06-16 NOTE — NURSING NOTE
Pt's wound dressing (gauze with abdominal pad) changed at 1245  Noted to be saturated with serous fluid  At 1430 pt alerted RN that dressing felt wet  RN noted that gauze was saturated with a serous fluid  Dr Rachel bettencourt texted and made aware  Reports he is on his way

## 2020-06-16 NOTE — PLAN OF CARE
Problem: OCCUPATIONAL THERAPY ADULT  Goal: Performs self-care activities at highest level of function for planned discharge setting  See evaluation for individualized goals  Description  Treatment Interventions: ADL retraining          See flowsheet documentation for full assessment, interventions and recommendations  Outcome: Progressing  Note:   Limitation: Decreased ADL status, Decreased Safe judgement during ADL, Decreased cognition, Decreased endurance, Decreased high-level ADLs, Decreased self-care trans  Prognosis: Good  Assessment: Patient participated in Skilled OT session this date with interventions consisting of deep breathing technique,  therapeutic activities to: increase activity tolerance and increase OOB/ sitting tolerance   Patient agreeable to OT treatment session, upon arrival patient was found seated OOB to Chair  Patient requiring verbal cues for safety and verbal cues for correct technique  Patient continues to be functioning below baseline level, occupational performance remains limited secondary to factors listed above and increased risk for falls and injury  From OT standpoint, recommendation at time of d/c would be Short Term Rehab  Patient to benefit from continued Occupational Therapy treatment while in the hospital to address deficits as defined above and maximize level of functional independence with ADLs and functional mobility        OT Discharge Recommendation: Post-Acute Rehabilitation Services  OT - OK to Discharge: Yes(When medically appropriate)

## 2020-06-16 NOTE — PLAN OF CARE
Problem: PHYSICAL THERAPY ADULT  Goal: Performs mobility at highest level of function for planned discharge setting  See evaluation for individualized goals  Description  Treatment/Interventions: Functional transfer training, LE strengthening/ROM, Elevations, Therapeutic exercise, Endurance training, Equipment eval/education, Bed mobility, Gait training, Spoke to MD, Spoke to nursing  Equipment Recommended: Walker(at this time; r/o [facility])       See flowsheet documentation for full assessment, interventions and recommendations  Outcome: Progressing  Note:   Prognosis: Good  Problem List: Decreased strength, Decreased endurance, Impaired balance, Decreased mobility, Impaired hearing, Impaired vision, Obesity  Assessment: Pt cont to gradually improve in functional endurance and mobility skills progressing w/ amb distances while still using rw and requiring (A)x1 for navigation in the environment; pt also still remains to be generally weak and deconditioned w/ decreased functional endurance and inconsistent safety awareness (also w/ ongoing visual scanning deficit due to facial edema) affecting his balance and mobility skills; based on above, cont to recommend rehab upon D/C when medically cleared; will follow  Barriers to Discharge: Inaccessible home environment, Other (Comment)(med status)     PT Discharge Recommendation: Post-Acute Rehabilitation Services     PT - OK to Discharge: No    See flowsheet documentation for full assessment

## 2020-06-16 NOTE — UTILIZATION REVIEW
Continued Stay Review    Date: 6/16                        Current Patient Class: Inpatient  Current Level of Care: Em    HPI:69 y o  male initially admitted on 6/10    Assessment/Plan: POD #5 s/p VATS LULobectomy complicated by significant subQ emphysema and prolonged air leak    6/15 Decompressive incision to left chest wall with VAC placement  6/15 - Subcutaneous emphysema is stable today, although worsened over the past 2 days  Chest tube to water seal today  6/16 - VAC placed over chest wall incision yesterday and he has had modest improvement in subQ emphysema since  Leave chest tube to waer seal  Air leak remains 700-900  UP and ambulating  Pertinent Labs/Diagnostic Results:   PCXR - Once again identified is extensive subcutaneous emphysema throughout the chest and neck  Left chest tube unchanged in position with a small suspected apical pneumothorax        Results from last 7 days   Lab Units 06/15/20  0437 06/12/20  0456 06/10/20  1354   WBC Thousand/uL 8 74 11 20* 13 59*   HEMOGLOBIN g/dL 13 4 13 1 12 9   HEMATOCRIT % 40 2 41 5 39 8   PLATELETS Thousands/uL 259 203 211   NEUTROS ABS Thousands/µL 6 75  --   --          Results from last 7 days   Lab Units 06/15/20  0437 06/12/20  0456 06/11/20  0834 06/10/20  1354   SODIUM mmol/L 126* 135* 135* 138   POTASSIUM mmol/L 3 9 4 0 4 3 3 9   CHLORIDE mmol/L 95* 105 107 111*   CO2 mmol/L 22 26 21 20*   ANION GAP mmol/L 9 4 7 7   BUN mg/dL 14 14 15 15   CREATININE mg/dL 0 75 0 88 1 00 1 27   EGFR ml/min/1 73sq m 94 88 76 57   CALCIUM mg/dL 8 4 8 2* 8 1* 8 5   MAGNESIUM mg/dL  --  2 4 2 5 2 9*             Results from last 7 days   Lab Units 06/15/20  0437 06/12/20  0456 06/11/20  0834 06/10/20  1354   GLUCOSE RANDOM mg/dL 98 91 134 175*       Vital Signs:   06/16/20 1449  97 7 °F (36 5 °C)  88  18  122/78  93  96 %      Sitting   06/16/20 0700  97 7 °F (36 5 °C)  83  17  119/68  88  99 %    None (Room air)  Sitting   06/15/20 2343  98 7 °F (37 1 °C) 127Abnormal   18  108/71  82  97 %    Nasal cannula         Medications:   Scheduled Medications:    Medications:  acetaminophen 975 mg Oral Q6H   amLODIPine 5 mg Oral Daily   docusate sodium 100 mg Oral BID   enoxaparin 40 mg Subcutaneous Daily   gabapentin 300 mg Oral TID   pantoprazole 40 mg Oral Early Morning   polyethylene glycol 17 g Oral Daily   senna 1 tablet Oral Daily     Continuous IV Infusions: None     PRN Meds:    calcium carbonate 500 mg Oral TID PRN   ondansetron 4 mg Intravenous Q6H PRN   oxyCODONE 2 5 mg Oral Q4H PRN   oxyCODONE 5 mg Oral Q4H PRN       Discharge Plan: TBD    Network Utilization Review Department  [email or web address]  ATTENTION: Please call with any questions or concerns to [phone] and carefully listen to the prompts so that you are directed to the right person  All voicemails are confidential   Karenjames Tanja all requests for admission clinical reviews, approved or denied determinations and any other requests to dedicated fax number below belonging to the campus where the patient is receiving treatment   List of dedicated fax numbers for the Facilities:  [address] DENIALS (Administrative/Medical Necessity) [phone]   [address] (Maternity/NICU/Pediatrics) [phone]   [facility] [phone]   [facility] [phone]   Jairo Frances [phone]   [facility] [phone]   [facility] [address] [phone]   [facility]  [phone]   [address] [place] [address], [address]  [address] [phone]

## 2020-06-16 NOTE — PLAN OF CARE
Problem: CARDIOVASCULAR - ADULT  Goal: Maintains optimal cardiac output and hemodynamic stability  Description  INTERVENTIONS:  - Monitor I/O, vital signs and rhythm  - Monitor for S/S and trends of decreased cardiac output  - Administer and titrate ordered vasoactive medications to optimize hemodynamic stability  - Assess quality of pulses, skin color and temperature  - Assess for signs of decreased coronary artery perfusion  - Instruct patient to report change in severity of symptoms  6/15/2020 2039 by Mckenna Rice RN  Outcome: Progressing  6/15/2020 2039 by Mckenna Rice RN  Outcome: Progressing  Goal: Absence of cardiac dysrhythmias or at baseline rhythm  Description  INTERVENTIONS:  - Continuous cardiac monitoring, vital signs, obtain 12 lead EKG if ordered  - Administer antiarrhythmic and heart rate control medications as ordered  - Monitor electrolytes and administer replacement therapy as ordered  6/15/2020 2039 by Mckenna Rice RN  Outcome: Progressing  6/15/2020 2039 by Mckenna Rice RN  Outcome: Progressing     Problem: RESPIRATORY - ADULT  Goal: Achieves optimal ventilation and oxygenation  Description  INTERVENTIONS:  - Assess for changes in respiratory status  - Assess for changes in mentation and behavior  - Position to facilitate oxygenation and minimize respiratory effort  - Oxygen administered by appropriate delivery if ordered  - Initiate smoking cessation education as indicated  - Encourage broncho-pulmonary hygiene including cough, deep breathe, Incentive Spirometry  - Assess the need for suctioning and aspirate as needed  - Assess and instruct to report SOB or any respiratory difficulty  - Respiratory Therapy support as indicated  6/15/2020 2039 by Mckenna Rice RN  Outcome: Progressing  6/15/2020 2039 by Mckenna Rice RN  Outcome: Progressing     Problem: SKIN/TISSUE INTEGRITY - ADULT  Goal: Skin integrity remains intact  Description  INTERVENTIONS  - Identify patients at risk for skin breakdown  - Assess and monitor skin integrity  - Assess and monitor nutrition and hydration status  - Monitor labs (i e  albumin)  - Assess for incontinence   - Turn and reposition patient  - Assist with mobility/ambulation  - Relieve pressure over bony prominences  - Avoid friction and shearing  - Provide appropriate hygiene as needed including keeping skin clean and dry  - Evaluate need for skin moisturizer/barrier cream  - Collaborate with interdisciplinary team (i e  Nutrition, Rehabilitation, etc )   - Patient/family teaching  6/15/2020 2039 by Erum Lira RN  Outcome: Progressing  6/15/2020 2039 by Erum Lira RN  Outcome: Progressing  Goal: Incision(s), wounds(s) or drain site(s) healing without S/S of infection  Description  INTERVENTIONS  - Assess and document risk factors for skin impairment   - Assess and document dressing, incision, wound bed, drain sites and surrounding tissue  - Consider nutrition services referral as needed  - Oral mucous membranes remain intact  - Provide patient/ family education  6/15/2020 2039 by Erum Lira RN  Outcome: Progressing  6/15/2020 2039 by Erum Lira RN  Outcome: Progressing  Goal: Oral mucous membranes remain intact  Description  INTERVENTIONS  - Assess oral mucosa and hygiene practices  - Implement preventative oral hygiene regimen  - Implement oral medicated treatments as ordered  - Initiate Nutrition services referral as needed  6/15/2020 2039 by Erum Lira RN  Outcome: Progressing  6/15/2020 2039 by Erum Lira RN  Outcome: Progressing     Problem: MUSCULOSKELETAL - ADULT  Goal: Maintain or return mobility to safest level of function  Description  INTERVENTIONS:  - Assess patient's ability to carry out ADLs; assess patient's baseline for ADL function and identify physical deficits which impact ability to perform ADLs (bathing, care of mouth/teeth, toileting, grooming, dressing, etc )  - Assess/evaluate cause of self-care deficits   - Assess range of motion  - Assess patient's mobility  - Assess patient's need for assistive devices and provide as appropriate  - Encourage maximum independence but intervene and supervise when necessary  - Involve family in performance of ADLs  - Assess for home care needs following discharge   - Consider OT consult to assist with ADL evaluation and planning for discharge  - Provide patient education as appropriate  6/15/2020 2039 by Yuko Gregorio RN  Outcome: Progressing  6/15/2020 2039 by Yuko Gregorio RN  Outcome: Progressing  Goal: Maintain proper alignment of affected body part  Description  INTERVENTIONS:  - Support, maintain and protect limb and body alignment  - Provide patient/ family with appropriate education  6/15/2020 2039 by Yuko Gregorio RN  Outcome: Progressing  6/15/2020 2039 by Yuko Gregorio RN  Outcome: Progressing     Problem: Potential for Falls  Goal: Patient will remain free of falls  Description  INTERVENTIONS:  - Assess patient frequently for physical needs  -  Identify cognitive and physical deficits and behaviors that affect risk of falls    -  [place] fall precautions as indicated by assessment   - Educate patient/family on patient safety including physical limitations  - Instruct patient to call for assistance with activity based on assessment  - Modify environment to reduce risk of injury  - Consider OT/PT consult to assist with strengthening/mobility  6/15/2020 2039 by Yuko Gregorio RN  Outcome: Progressing  6/15/2020 2039 by Yuko Gregorio RN  Outcome: Progressing

## 2020-06-17 ENCOUNTER — APPOINTMENT (INPATIENT)
Dept: RADIOLOGY | Facility: HOSPITAL | Age: 70
DRG: 003 | End: 2020-06-17
Payer: COMMERCIAL

## 2020-06-17 LAB
ANION GAP SERPL CALCULATED.3IONS-SCNC: 8 MMOL/L (ref 4–13)
BASOPHILS # BLD MANUAL: 0 THOUSAND/UL (ref 0–0.1)
BASOPHILS NFR MAR MANUAL: 0 % (ref 0–1)
BUN SERPL-MCNC: 12 MG/DL (ref 5–25)
CALCIUM SERPL-MCNC: 8.6 MG/DL (ref 8.3–10.1)
CHLORIDE SERPL-SCNC: 102 MMOL/L (ref 100–108)
CO2 SERPL-SCNC: 22 MMOL/L (ref 21–32)
CREAT SERPL-MCNC: 0.81 MG/DL (ref 0.6–1.3)
EOSINOPHIL # BLD MANUAL: 0 THOUSAND/UL (ref 0–0.4)
EOSINOPHIL NFR BLD MANUAL: 0 % (ref 0–6)
ERYTHROCYTE [DISTWIDTH] IN BLOOD BY AUTOMATED COUNT: 13.6 % (ref 11.6–15.1)
GFR SERPL CREATININE-BSD FRML MDRD: 91 ML/MIN/1.73SQ M
GLUCOSE SERPL-MCNC: 89 MG/DL (ref 65–140)
HCT VFR BLD AUTO: 37.6 % (ref 36.5–49.3)
HGB BLD-MCNC: 12.3 G/DL (ref 12–17)
LYMPHOCYTES # BLD AUTO: 1.14 THOUSAND/UL (ref 0.6–4.47)
LYMPHOCYTES # BLD AUTO: 14 % (ref 14–44)
MCH RBC QN AUTO: 31 PG (ref 26.8–34.3)
MCHC RBC AUTO-ENTMCNC: 32.7 G/DL (ref 31.4–37.4)
MCV RBC AUTO: 95 FL (ref 82–98)
METAMYELOCYTES NFR BLD MANUAL: 1 % (ref 0–1)
MONOCYTES # BLD AUTO: 0.24 THOUSAND/UL (ref 0–1.22)
MONOCYTES NFR BLD: 3 % (ref 4–12)
NEUTROPHILS # BLD MANUAL: 6.53 THOUSAND/UL (ref 1.85–7.62)
NEUTS BAND NFR BLD MANUAL: 1 % (ref 0–8)
NEUTS SEG NFR BLD AUTO: 79 % (ref 43–75)
NRBC BLD AUTO-RTO: 0 /100 WBCS
PLATELET # BLD AUTO: 268 THOUSANDS/UL (ref 149–390)
PLATELET BLD QL SMEAR: ADEQUATE
PMV BLD AUTO: 9.7 FL (ref 8.9–12.7)
POTASSIUM SERPL-SCNC: 3.9 MMOL/L (ref 3.5–5.3)
RBC # BLD AUTO: 3.97 MILLION/UL (ref 3.88–5.62)
RBC MORPH BLD: NORMAL
SODIUM SERPL-SCNC: 132 MMOL/L (ref 136–145)
TOTAL CELLS COUNTED SPEC: 100
VARIANT LYMPHS # BLD AUTO: 2 %
WBC # BLD AUTO: 8.16 THOUSAND/UL (ref 4.31–10.16)

## 2020-06-17 PROCEDURE — 97110 THERAPEUTIC EXERCISES: CPT

## 2020-06-17 PROCEDURE — 71046 X-RAY EXAM CHEST 2 VIEWS: CPT

## 2020-06-17 PROCEDURE — 85007 BL SMEAR W/DIFF WBC COUNT: CPT | Performed by: STUDENT IN AN ORGANIZED HEALTH CARE EDUCATION/TRAINING PROGRAM

## 2020-06-17 PROCEDURE — 80048 BASIC METABOLIC PNL TOTAL CA: CPT | Performed by: STUDENT IN AN ORGANIZED HEALTH CARE EDUCATION/TRAINING PROGRAM

## 2020-06-17 PROCEDURE — 85027 COMPLETE CBC AUTOMATED: CPT | Performed by: STUDENT IN AN ORGANIZED HEALTH CARE EDUCATION/TRAINING PROGRAM

## 2020-06-17 PROCEDURE — 97116 GAIT TRAINING THERAPY: CPT

## 2020-06-17 PROCEDURE — 99024 POSTOP FOLLOW-UP VISIT: CPT | Performed by: THORACIC SURGERY (CARDIOTHORACIC VASCULAR SURGERY)

## 2020-06-17 RX ADMIN — PANTOPRAZOLE SODIUM 40 MG: 40 TABLET, DELAYED RELEASE ORAL at 05:14

## 2020-06-17 RX ADMIN — CALCIUM CARBONATE (ANTACID) CHEW TAB 500 MG 500 MG: 500 CHEW TAB at 16:12

## 2020-06-17 RX ADMIN — AMLODIPINE BESYLATE 5 MG: 5 TABLET ORAL at 10:19

## 2020-06-17 RX ADMIN — CALCIUM CARBONATE (ANTACID) CHEW TAB 500 MG 500 MG: 500 CHEW TAB at 07:44

## 2020-06-17 RX ADMIN — GABAPENTIN 300 MG: 300 CAPSULE ORAL at 17:56

## 2020-06-17 RX ADMIN — ENOXAPARIN SODIUM 40 MG: 40 INJECTION SUBCUTANEOUS at 10:19

## 2020-06-17 NOTE — PHYSICAL THERAPY NOTE
Physical Therapy Progress Note     20 5102   Pain Assessment   Pain Assessment Tool 0-10   Pain Score No Pain   Restrictions/Precautions   Weight Bearing Precautions Per Order No   Other Precautions Multiple lines;Telemetry; Visual impairment;Hard of hearing; Fall Risk;O2  (2L O2, facial edema, chest tube)   General   Chart Reviewed Yes   Response to Previous Treatment Patient with no complaints from previous session  Family/Caregiver Present No   Cognition   Overall Cognitive Status Impaired   Arousal/Participation Alert; Cooperative   Attention Attends with cues to redirect   Orientation Level Oriented to person   Memory Decreased recall of recent events;Decreased recall of precautions   Following Commands Follows one step commands with increased time or repetition   Comments Pt was identified by name and , agreeable to treatment  Transfers   Sit to Stand 5  Supervision   Additional items Verbal cues   Stand to Sit 5  Supervision   Additional items Assist x 1;Verbal cues   Ambulation/Elevation   Gait pattern Excessively slow; Short stride;Narrow GEO; Forward Flexion   Gait Assistance 4  Minimal assist   Additional items Assist x 1;Verbal cues  (Ax2 for line mgmt)   Assistive Device Rolling walker   Distance 40', 110'x2   Stair Management Assistance 4  Minimal assist   Additional items Assist x 1;Verbal cues; Tactile cues; Increased time required   Stair Management Technique One rail R;Foreward; Step to pattern   Number of Stairs 2   Balance   Static Sitting Fair +   Dynamic Sitting Fair   Static Standing Fair -   Dynamic Standing Poor +   Ambulatory Poor +   Endurance Deficit   Endurance Deficit Yes   Activity Tolerance   Activity Tolerance Patient limited by fatigue   Nurse Made Aware yes, ok to see   Exercises   Knee AROM Long Arc Quad Sitting;20 reps;AROM; Bilateral   Ankle Pumps Sitting;20 reps;AROM; Bilateral   Marching Sitting;20 reps;AROM; Bilateral   Assessment   Prognosis Good   Problem List Decreased strength;Decreased endurance; Impaired balance;Decreased mobility; Impaired hearing; Impaired vision;Obesity   Assessment Pt was found seated in bedside chair to begin session  He was able to perform all xfers and mobility with (S) to min Ax1  Pt ambulated into the stairwell with Ax2 for line mgmt and was able to complete a total of 2 stairs  Decreased saftey awareness was present due to facial edema which limited his eyesight  Pt was able to ambulate into the hallway then using his RW with no LOB but needed cuing to stay within the RW as well as to widen his GEO  Pt ambulated back to the room where he was instructed on and performed seated TE as charted with no complaints  He felt comfortable going home pending his vision  Pt had all needs met and call bell in reach  Pt would benefit from continued PT in order to promote safe and functional mobility  Barriers to Discharge Other (Comment)  (eyesight, med status)   Goals   Patient Goals to go home   STG Expiration Date 06/24/20   Short Term Goal #1 7-10 days  Pt will amb 300 ft w/ least restrictive assistive device PRN, mod (I) in order to facilitate safe return to premorbid environment and community amb status  Pt will negotiate 1 step w/ appropriate AD, (S)x1 in order to navigate in and out of home environment safely  Pt will achieve (I) level w/ bed mob in order to facilitate safety with OOB and back to bed transitions in own living environment  Pt will perform transfers w/ mod (I) to assure (I) and safety w/ functional mobility/transitions w/ all aspects of mobility/locomotion  Pt will participate in LE therex and balance activities to max progression w/ mobility skills  PT Treatment Day 3   Plan   Treatment/Interventions Functional transfer training;LE strengthening/ROM; Elevations; Therapeutic exercise; Endurance training;Equipment eval/education; Bed mobility;Gait training;Spoke to MD;Spoke to nursing   Progress Progressing toward goals   PT Frequency Other (Comment)  (4-6x/wk)   Recommendation   PT Discharge Recommendation Return to previous environment with social support;Home with skilled therapy   Equipment Recommended Walker   PT - OK to Discharge Yes  (pending eyesight and being medically cleared)     Page Pacheco, PTA

## 2020-06-17 NOTE — PLAN OF CARE
Problem: PHYSICAL THERAPY ADULT  Goal: Performs mobility at highest level of function for planned discharge setting  See evaluation for individualized goals  Description  Treatment/Interventions: Functional transfer training, LE strengthening/ROM, Elevations, Therapeutic exercise, Endurance training, Equipment eval/education, Bed mobility, Gait training, Spoke to MD, Spoke to nursing  Equipment Recommended: Walker(at this time; r/o [facility])       See flowsheet documentation for full assessment, interventions and recommendations  Outcome: Progressing  Note:   Prognosis: Good  Problem List: Decreased strength, Decreased endurance, Impaired balance, Decreased mobility, Impaired hearing, Impaired vision, Obesity  Assessment: Pt was found seated in bedside chair to begin session  He was able to perform all xfers and mobility with (S) to min Ax1  Pt ambulated into the stairwell with Ax2 for line mgmt and was able to complete a total of 2 stairs  Decreased saftey awareness was present due to facial edema which limited his eyesight  Pt was able to ambulate into the hallway then using his RW with no LOB but needed cuing to stay within the RW as well as to widen his GEO  Pt ambulated back to the room where he was instructed on and performed seated TE as charted with no complaints  He felt comfortable going home pending his vision  Pt had all needs met and call bell in reach  Pt would benefit from continued PT in order to promote safe and functional mobility  Barriers to Discharge: Other (Comment)(eyesight, med status)     PT Discharge Recommendation: Return to previous environment with social support, Home with skilled therapy     PT - OK to Discharge: Yes(pending eyesight and being medically cleared)    See flowsheet documentation for full assessment

## 2020-06-17 NOTE — PROGRESS NOTES
Progress Note - Thoracic   Jacques Burton 71 y o  male MRN: [id number]  Unit/Bed#: [facility] 410-01 Encounter: [id number]    Assessment/Plan:  71 y o  male status post VATS left upper lobectomy complicated by prolonged air leak with significant subcu emphysema  Now status post wound VAC decompression over left chest wall  · Diet as tolerated  · Continue chest tube to water seal  · Out of bed/ambulate, continue incentive spirometry  · Continue left chest wound VAC to suction  · DVT ppx    Subjective/Objective     Subjective:  No acute events overnight  Denies shortness of breath  Swelling slightly worse overnight  Objective:     Vitals: Temp:  [97 7 °F (36 5 °C)-98 6 °F (37 °C)] 98 6 °F (37 °C)  HR:  [83-91] 91  Resp:  [17-18] 18  BP: (118-122)/(68-78) 118/70  Body mass index is 29 7 kg/m²  I/O       06/15 0701 - 06/16 0700 06/16 0701 - 06/17 0700    P  O  1680 2625    Total Intake(mL/kg) 1680 (17 9) 2625 (28)    Urine (mL/kg/hr) 1700 (0 8) 1725 (0 8)    Stool  0    Chest Tube 150 50    Total Output 1850 1775    Net -170 +850          Unmeasured Urine Occurrence 1 x 2 x    Unmeasured Stool Occurrence  2 x          Physical Exam:  GEN: NAD, diffuse swelling and crepitus  CV: RRR  Lung: normal effort, L CT WS +AL  L chest VAC sxn, w/ good seal  Ab: Soft, NT/ND  Extrem: No CCE  Neuro:  A+Ox3, motor and sensation grossly intact     Lab, Imaging and other studies: CBC with diff: No results found for: WBC, HGB, HCT, MCV, PLT, ADJUSTEDWBC, MCH, MCHC, RDW, MPV, NRBC, BMP/CMP: No results found for: SODIUM, K, CL, CO2, ANIONGAP, BUN, CREATININE, GLUCOSE, CALCIUM, AST, ALT, ALKPHOS, PROT, BILITOT, EGFR, Magnesium: No components found for: MAG  VTE Pharmacologic Prophylaxis: Enoxaparin (Lovenox)  VTE Mechanical Prophylaxis: sequential compression device

## 2020-06-17 NOTE — PLAN OF CARE
Problem: CARDIOVASCULAR - ADULT  Goal: Maintains optimal cardiac output and hemodynamic stability  Description  INTERVENTIONS:  - Monitor I/O, vital signs and rhythm  - Monitor for S/S and trends of decreased cardiac output  - Administer and titrate ordered vasoactive medications to optimize hemodynamic stability  - Assess quality of pulses, skin color and temperature  - Assess for signs of decreased coronary artery perfusion  - Instruct patient to report change in severity of symptoms  Outcome: Progressing  Goal: Absence of cardiac dysrhythmias or at baseline rhythm  Description  INTERVENTIONS:  - Continuous cardiac monitoring, vital signs, obtain 12 lead EKG if ordered  - Administer antiarrhythmic and heart rate control medications as ordered  - Monitor electrolytes and administer replacement therapy as ordered  Outcome: Progressing     Problem: RESPIRATORY - ADULT  Goal: Achieves optimal ventilation and oxygenation  Description  INTERVENTIONS:  - Assess for changes in respiratory status  - Assess for changes in mentation and behavior  - Position to facilitate oxygenation and minimize respiratory effort  - Oxygen administered by appropriate delivery if ordered  - Initiate smoking cessation education as indicated  - Encourage broncho-pulmonary hygiene including cough, deep breathe, Incentive Spirometry  - Assess the need for suctioning and aspirate as needed  - Assess and instruct to report SOB or any respiratory difficulty  - Respiratory Therapy support as indicated  Outcome: Progressing     Problem: SKIN/TISSUE INTEGRITY - ADULT  Goal: Skin integrity remains intact  Description  INTERVENTIONS  - Identify patients at risk for skin breakdown  - Assess and monitor skin integrity  - Assess and monitor nutrition and hydration status  - Monitor labs (i e  albumin)  - Assess for incontinence   - Turn and reposition patient  - Assist with mobility/ambulation  - Relieve pressure over bony prominences  - Avoid friction and shearing  - Provide appropriate hygiene as needed including keeping skin clean and dry  - Evaluate need for skin moisturizer/barrier cream  - Collaborate with interdisciplinary team (i e  Nutrition, Rehabilitation, etc )   - Patient/family teaching  Outcome: Progressing  Goal: Incision(s), wounds(s) or drain site(s) healing without S/S of infection  Description  INTERVENTIONS  - Assess and document risk factors for skin impairment   - Assess and document dressing, incision, wound bed, drain sites and surrounding tissue  - Consider nutrition services referral as needed  - Oral mucous membranes remain intact  - Provide patient/ family education  Outcome: Progressing  Goal: Oral mucous membranes remain intact  Description  INTERVENTIONS  - Assess oral mucosa and hygiene practices  - Implement preventative oral hygiene regimen  - Implement oral medicated treatments as ordered  - Initiate Nutrition services referral as needed  Outcome: Progressing     Problem: MUSCULOSKELETAL - ADULT  Goal: Maintain or return mobility to safest level of function  Description  INTERVENTIONS:  - Assess patient's ability to carry out ADLs; assess patient's baseline for ADL function and identify physical deficits which impact ability to perform ADLs (bathing, care of mouth/teeth, toileting, grooming, dressing, etc )  - Assess/evaluate cause of self-care deficits   - Assess range of motion  - Assess patient's mobility  - Assess patient's need for assistive devices and provide as appropriate  - Encourage maximum independence but intervene and supervise when necessary  - Involve family in performance of ADLs  - Assess for home care needs following discharge   - Consider OT consult to assist with ADL evaluation and planning for discharge  - Provide patient education as appropriate  Outcome: Progressing  Goal: Maintain proper alignment of affected body part  Description  INTERVENTIONS:  - Support, maintain and protect limb and body alignment  - Provide patient/ family with appropriate education  Outcome: Progressing     Problem: Potential for Falls  Goal: Patient will remain free of falls  Description  INTERVENTIONS:  - Assess patient frequently for physical needs  -  Identify cognitive and physical deficits and behaviors that affect risk of falls    -  [place] fall precautions as indicated by assessment   - Educate patient/family on patient safety including physical limitations  - Instruct patient to call for assistance with activity based on assessment  - Modify environment to reduce risk of injury  - Consider OT/PT consult to assist with strengthening/mobility  Outcome: Progressing     Problem: Prexisting or High Potential for Compromised Skin Integrity  Goal: Skin integrity is maintained or improved  Description  INTERVENTIONS:  - Identify patients at risk for skin breakdown  - Assess and monitor skin integrity  - Assess and monitor nutrition and hydration status  - Monitor labs   - Assess for incontinence   - Turn and reposition patient  - Assist with mobility/ambulation  - Relieve pressure over bony prominences  - Avoid friction and shearing  - Provide appropriate hygiene as needed including keeping skin clean and dry  - Evaluate need for skin moisturizer/barrier cream  - Collaborate with interdisciplinary team   - Patient/family teaching  - Consider wound care consult   Outcome: Progressing

## 2020-06-17 NOTE — PHYSICAL THERAPY NOTE
Physical Therapy Cancellation Note  Pt was off the floor at x-ray  Will follow up with pt at a later time as appropriate       Ruth Ramirez, PTA

## 2020-06-18 LAB — GLUCOSE SERPL-MCNC: 93 MG/DL (ref 65–140)

## 2020-06-18 PROCEDURE — 99024 POSTOP FOLLOW-UP VISIT: CPT | Performed by: THORACIC SURGERY (CARDIOTHORACIC VASCULAR SURGERY)

## 2020-06-18 PROCEDURE — 82948 REAGENT STRIP/BLOOD GLUCOSE: CPT

## 2020-06-18 PROCEDURE — 97116 GAIT TRAINING THERAPY: CPT

## 2020-06-18 RX ADMIN — ENOXAPARIN SODIUM 40 MG: 40 INJECTION SUBCUTANEOUS at 09:05

## 2020-06-18 NOTE — PHYSICAL THERAPY NOTE
Physical Therapy Treatment Note       06/18/20 0815   Pain Assessment   Pain Assessment Tool 0-10   Pain Score No Pain   Restrictions/Precautions   Weight Bearing Precautions Per Order No   Other Precautions Multiple lines; Fall Risk;Pain;Telemetry   General   Chart Reviewed Yes   Family/Caregiver Present No   Cognition   Overall Cognitive Status Impaired   Arousal/Participation Responsive   Attention Attends with cues to redirect   Orientation Level Oriented to person;Oriented to place   Following Commands Follows one step commands without difficulty   Subjective   Subjective states he feels OK   willing to mobilize   Transfers   Sit to Stand 4  Minimal assistance   Additional items Assist x 1   Stand to Sit 5  Supervision   Additional items Assist x 1;Verbal cues   Ambulation/Elevation   Gait pattern   (slow, wide GEO)   Gait Assistance 4  Minimal assist   Additional items Assist x 1  (w/ 1 other for lines)   Assistive Device Rolling walker   Distance 180'x2, standing rest x 1-2 min   time spent for setup, repositioning   Balance   Static Sitting Normal   Dynamic Sitting Good   Static Standing Fair   Dynamic Standing Fair -   Ambulatory Fair -   Endurance Deficit   Endurance Deficit Yes   Activity Tolerance   Activity Tolerance Patient limited by fatigue;Treatment limited secondary to medical complications (Comment)   Nurse Made Aware yes   Assessment   Prognosis Good   Problem List Decreased strength;Decreased endurance; Impaired balance;Decreased mobility; Decreased coordination;Decreased safety awareness   Assessment Pt seen for session for setup, transfers, gait training w/ rest time, repositioning  Pt cooperative  Note improved safety w/ mobility, less assist needed    Making progress, and given good support at home, now leaning towards home w/ home therapies, RW   Goals   Patient Goals to go home   STG Expiration Date 06/24/20   PT Treatment Day 4   Plan   Treatment/Interventions Functional transfer training;LE strengthening/ROM; Therapeutic exercise; Endurance training;Bed mobility;Gait training;Equipment eval/education;Patient/family training   Progress Progressing toward goals   PT Frequency Other (Comment)  (4-6x/wk)   Recommendation   PT Discharge Recommendation Return to previous environment with social support;Home with skilled therapy  (home therapies)   Equipment Recommended Walker   PT - OK to Discharge Yes  (when stable to home w/ home therapies, RW)   Marky Gomez PT, DPT CSRS

## 2020-06-18 NOTE — UTILIZATION REVIEW
Continued Stay Review    Date: 06/18/2020                         Current Patient Class: Inpatient  Current Level of Care: Med/Surg    HPI:69 y o  male initially admitted on 06/10/2020    Assessment/Plan:   06/17/2020  POD #7 s/p VATS LULobectomy c/b prolongd air leak and extensive subcutaneous emphysema requiring placement of VAC over chest wall to decrease the swelling  Cont Chest tubes to water seal   + AL  Continue VAC to suction  Up and ambulating  IS 10x/h    06/18/2020  POD #8 s/p VATS LULobectomy c/b prolonged air leak   + AL trending down  CARMELINA  Cont chest tube to water seal   Cont  VAC  Pulmonary toilet / IS  DVT prophylaxis      Pertinent Labs/Diagnostic Results:     Results from last 7 days   Lab Units 06/17/20  0518 06/15/20  0437 06/12/20  0456   WBC Thousand/uL 8 16 8 74 11 20*   HEMOGLOBIN g/dL 12 3 13 4 13 1   HEMATOCRIT % 37 6 40 2 41 5   PLATELETS Thousands/uL 268 259 203   NEUTROS ABS Thousands/µL  --  6 75  --    BANDS PCT % 1  --   --      Results from last 7 days   Lab Units 06/17/20  0518 06/15/20  0437 06/12/20  0456   SODIUM mmol/L 132* 126* 135*   POTASSIUM mmol/L 3 9 3 9 4 0   CHLORIDE mmol/L 102 95* 105   CO2 mmol/L 22 22 26   ANION GAP mmol/L 8 9 4   BUN mg/dL 12 14 14   CREATININE mg/dL 0 81 0 75 0 88   EGFR ml/min/1 73sq m 91 94 88   CALCIUM mg/dL 8 6 8 4 8 2*   MAGNESIUM mg/dL  --   --  2 4     Results from last 7 days   Lab Units 06/18/20  0546   POC GLUCOSE mg/dl 93     Results from last 7 days   Lab Units 06/17/20  0518 06/15/20  0437 06/12/20  0456   GLUCOSE RANDOM mg/dL 89 98 91     Results from last 7 days   Lab Units 06/17/20  0518   TOTAL COUNTED  100     Vital Signs:   Date/Time  Temp  Pulse  Resp  BP  MAP (mmHg)  SpO2  O2 Flow Rate (L/min)  O2 Device  Patient Position - Orthostatic VS   06/18/20 1458  98 7 °F (37 1 °C)  99  18  111/70     100 %  2 L/min  Nasal cannula  Sitting   BP: Map 84 at 06/18/20 1458   06/18/20 0915                None (Room air)   06/18/20 0739  99 1 °F (37 3 °C)  94  16  108/76    94 %    None (Room air)  Sitting   06/17/20 2300  97 6 °F (36 4 °C)  85  14  131/74  95  100 %    Nasal cannula  Lying   06/17/20 2000              2 L/min  Nasal cannula     06/17/20 1900  99 °F (37 2 °C)  93  18  121/61  83  97 %    Nasal cannula  Sitting   06/17/20 1500  98 4 °F (36 9 °C)  80  20  135/80  100  99 %    Nasal cannula  Sitting   06/17/20 0900              2 L/min  Nasal cannula     06/17/20 0711  97 3 °F (36 3 °C)Abnormal   89  12  132/73     99 %  2 L/min  Nasal cannula  Sitting   BP: Map 97 at 06/17/20 0711     Medications:   Scheduled Medications:  Medications:  acetaminophen 975 mg Oral Q6H   amLODIPine 5 mg Oral Daily   docusate sodium 100 mg Oral BID   enoxaparin 40 mg Subcutaneous Daily   gabapentin 300 mg Oral TID   pantoprazole 40 mg Oral Early Morning   polyethylene glycol 17 g Oral Daily   senna 1 tablet Oral Daily   Continuous IV Infusions:   PRN Meds:  calcium carbonate 500 mg Oral TID PRN   ondansetron 4 mg Intravenous Q6H PRN   oxyCODONE 2 5 mg Oral Q4H PRN   oxyCODONE 5 mg Oral Q4H PRN     Discharge Plan: D    Network Utilization Review Department  [email or web address]  ATTENTION: Please call with any questions or concerns to [phone] and carefully listen to the prompts so that you are directed to the right person  All voicemails are confidential   Rohti Woodruff all requests for admission clinical reviews, approved or denied determinations and any other requests to dedicated fax number below belonging to the campus where the patient is receiving treatment   List of dedicated fax numbers for the Facilities:  [address] DENIALS (Administrative/Medical Necessity) [phone]   [address] [place] [address] (Maternity/NICU/Pediatrics) [phone]   Gabriele Mike [address] [facility] [phone] [address] [phone]   [facility]  [phone] [address] [facility]  [phone]   [address] [place] [address] [phone]

## 2020-06-18 NOTE — PLAN OF CARE
Problem: CARDIOVASCULAR - ADULT  Goal: Maintains optimal cardiac output and hemodynamic stability  Description  INTERVENTIONS:  - Monitor I/O, vital signs and rhythm  - Monitor for S/S and trends of decreased cardiac output  - Administer and titrate ordered vasoactive medications to optimize hemodynamic stability  - Assess quality of pulses, skin color and temperature  - Assess for signs of decreased coronary artery perfusion  - Instruct patient to report change in severity of symptoms  6/18/2020 0853 by Gagan Escobar RN  Outcome: Progressing  6/18/2020 0852 by Gagan Escobar RN  Outcome: Progressing  Goal: Absence of cardiac dysrhythmias or at baseline rhythm  Description  INTERVENTIONS:  - Continuous cardiac monitoring, vital signs, obtain 12 lead EKG if ordered  - Administer antiarrhythmic and heart rate control medications as ordered  - Monitor electrolytes and administer replacement therapy as ordered  6/18/2020 0853 by Gagan Escobar RN  Outcome: Progressing  6/18/2020 0852 by Gagan Escobar RN  Outcome: Progressing     Problem: RESPIRATORY - ADULT  Goal: Achieves optimal ventilation and oxygenation  Description  INTERVENTIONS:  - Assess for changes in respiratory status  - Assess for changes in mentation and behavior  - Position to facilitate oxygenation and minimize respiratory effort  - Oxygen administered by appropriate delivery if ordered  - Initiate smoking cessation education as indicated  - Encourage broncho-pulmonary hygiene including cough, deep breathe, Incentive Spirometry  - Assess the need for suctioning and aspirate as needed  - Assess and instruct to report SOB or any respiratory difficulty  - Respiratory Therapy support as indicated  6/18/2020 0853 by Gagan Escobar RN  Outcome: Progressing  6/18/2020 0852 by Gagan Escobar RN  Outcome: Progressing     Problem: SKIN/TISSUE INTEGRITY - ADULT  Goal: Skin integrity remains intact  Description  INTERVENTIONS  - Identify patients at risk for skin breakdown  - Assess and monitor skin integrity  - Assess and monitor nutrition and hydration status  - Monitor labs (i e  albumin)  - Assess for incontinence   - Turn and reposition patient  - Assist with mobility/ambulation  - Relieve pressure over bony prominences  - Avoid friction and shearing  - Provide appropriate hygiene as needed including keeping skin clean and dry  - Evaluate need for skin moisturizer/barrier cream  - Collaborate with interdisciplinary team (i e  Nutrition, Rehabilitation, etc )   - Patient/family teaching  6/18/2020 6534 by Teto Gastelum RN  Outcome: Progressing  6/18/2020 0852 by Teto Gastelum RN  Outcome: Progressing  Goal: Incision(s), wounds(s) or drain site(s) healing without S/S of infection  Description  INTERVENTIONS  - Assess and document risk factors for skin impairment   - Assess and document dressing, incision, wound bed, drain sites and surrounding tissue  - Consider nutrition services referral as needed  - Oral mucous membranes remain intact  - Provide patient/ family education  6/18/2020 0853 by Teto Gastelum RN  Outcome: Progressing  6/18/2020 0852 by Teto Gastelum RN  Outcome: Progressing  Goal: Oral mucous membranes remain intact  Description  INTERVENTIONS  - Assess oral mucosa and hygiene practices  - Implement preventative oral hygiene regimen  - Implement oral medicated treatments as ordered  - Initiate Nutrition services referral as needed  6/18/2020 0853 by Teto Gastelum RN  Outcome: Progressing  6/18/2020 0852 by Teto Gastelum RN  Outcome: Progressing     Problem: MUSCULOSKELETAL - ADULT  Goal: Maintain or return mobility to safest level of function  Description  INTERVENTIONS:  - Assess patient's ability to carry out ADLs; assess patient's baseline for ADL function and identify physical deficits which impact ability to perform ADLs (bathing, care of mouth/teeth, toileting, grooming, dressing, etc )  - Assess/evaluate cause of self-care deficits   - Assess range of motion  - Assess patient's mobility  - Assess patient's need for assistive devices and provide as appropriate  - Encourage maximum independence but intervene and supervise when necessary  - Involve family in performance of ADLs  - Assess for home care needs following discharge   - Consider OT consult to assist with ADL evaluation and planning for discharge  - Provide patient education as appropriate  6/18/2020 0853 by Tisha Mckeon RN  Outcome: Progressing  6/18/2020 0852 by Tisha Mckeon RN  Outcome: Progressing  Goal: Maintain proper alignment of affected body part  Description  INTERVENTIONS:  - Support, maintain and protect limb and body alignment  - Provide patient/ family with appropriate education  6/18/2020 0853 by Tisha Mckeon RN  Outcome: Progressing  6/18/2020 0852 by Tisha Mckeon RN  Outcome: Progressing     Problem: Potential for Falls  Goal: Patient will remain free of falls  Description  INTERVENTIONS:  - Assess patient frequently for physical needs  -  Identify cognitive and physical deficits and behaviors that affect risk of falls    -  [place] fall precautions as indicated by assessment   - Educate patient/family on patient safety including physical limitations  - Instruct patient to call for assistance with activity based on assessment  - Modify environment to reduce risk of injury  - Consider OT/PT consult to assist with strengthening/mobility  6/18/2020 0853 by Tisha Mckeon RN  Outcome: Progressing  6/18/2020 0852 by Tisha Mkceon RN  Outcome: Progressing     Problem: Prexisting or High Potential for Compromised Skin Integrity  Goal: Skin integrity is maintained or improved  Description  INTERVENTIONS:  - Identify patients at risk for skin breakdown  - Assess and monitor skin integrity  - Assess and monitor nutrition and hydration status  - Monitor labs   - Assess for incontinence   - Turn and reposition patient  - Assist with mobility/ambulation  - Relieve pressure over bony prominences  - Avoid friction and shearing  - Provide appropriate hygiene as needed including keeping skin clean and dry  - Evaluate need for skin moisturizer/barrier cream  - Collaborate with interdisciplinary team   - Patient/family teaching  - Consider wound care consult   6/18/2020 0853 by Tammie Mcgarry RN  Outcome: Progressing  6/18/2020 0852 by Tammie Mcgarry RN  Outcome: Progressing

## 2020-06-18 NOTE — PLAN OF CARE
Problem: PHYSICAL THERAPY ADULT  Goal: Performs mobility at highest level of function for planned discharge setting  See evaluation for individualized goals  Description  Treatment/Interventions: Functional transfer training, LE strengthening/ROM, Elevations, Therapeutic exercise, Endurance training, Equipment eval/education, Bed mobility, Gait training, Spoke to MD, Spoke to nursing  Equipment Recommended: Walker(at this time; r/o [facility])       See flowsheet documentation for full assessment, interventions and recommendations  Note:   Prognosis: Good  Problem List: Decreased strength, Decreased endurance, Impaired balance, Decreased mobility, Decreased coordination, Decreased safety awareness  Assessment: Pt seen for session for setup, transfers, gait training w/ rest time, repositioning  Pt cooperative  Note improved safety w/ mobility, less assist needed  Making progress, and given good support at home, now leaning towards home w/ home therapies, RW  Barriers to Discharge: Other (Comment)(eyesight, med status)     PT Discharge Recommendation: Return to previous environment with social support, Home with skilled therapy(home therapies)     PT - OK to Discharge: (S) Yes(when stable to home w/ home therapies, RW)    See flowsheet documentation for full assessment

## 2020-06-18 NOTE — PLAN OF CARE
Problem: CARDIOVASCULAR - ADULT  Goal: Maintains optimal cardiac output and hemodynamic stability  Description  INTERVENTIONS:  - Monitor I/O, vital signs and rhythm  - Monitor for S/S and trends of decreased cardiac output  - Administer and titrate ordered vasoactive medications to optimize hemodynamic stability  - Assess quality of pulses, skin color and temperature  - Assess for signs of decreased coronary artery perfusion  - Instruct patient to report change in severity of symptoms  Outcome: Progressing  Goal: Absence of cardiac dysrhythmias or at baseline rhythm  Description  INTERVENTIONS:  - Continuous cardiac monitoring, vital signs, obtain 12 lead EKG if ordered  - Administer antiarrhythmic and heart rate control medications as ordered  - Monitor electrolytes and administer replacement therapy as ordered  Outcome: Progressing     Problem: RESPIRATORY - ADULT  Goal: Achieves optimal ventilation and oxygenation  Description  INTERVENTIONS:  - Assess for changes in respiratory status  - Assess for changes in mentation and behavior  - Position to facilitate oxygenation and minimize respiratory effort  - Oxygen administered by appropriate delivery if ordered  - Initiate smoking cessation education as indicated  - Encourage broncho-pulmonary hygiene including cough, deep breathe, Incentive Spirometry  - Assess the need for suctioning and aspirate as needed  - Assess and instruct to report SOB or any respiratory difficulty  - Respiratory Therapy support as indicated  Outcome: Progressing     Problem: SKIN/TISSUE INTEGRITY - ADULT  Goal: Skin integrity remains intact  Description  INTERVENTIONS  - Identify patients at risk for skin breakdown  - Assess and monitor skin integrity  - Assess and monitor nutrition and hydration status  - Monitor labs (i e  albumin)  - Assess for incontinence   - Turn and reposition patient  - Assist with mobility/ambulation  - Relieve pressure over bony prominences  - Avoid friction and shearing  - Provide appropriate hygiene as needed including keeping skin clean and dry  - Evaluate need for skin moisturizer/barrier cream  - Collaborate with interdisciplinary team (i e  Nutrition, Rehabilitation, etc )   - Patient/family teaching  Outcome: Progressing  Goal: Incision(s), wounds(s) or drain site(s) healing without S/S of infection  Description  INTERVENTIONS  - Assess and document risk factors for skin impairment   - Assess and document dressing, incision, wound bed, drain sites and surrounding tissue  - Consider nutrition services referral as needed  - Oral mucous membranes remain intact  - Provide patient/ family education  Outcome: Progressing  Goal: Oral mucous membranes remain intact  Description  INTERVENTIONS  - Assess oral mucosa and hygiene practices  - Implement preventative oral hygiene regimen  - Implement oral medicated treatments as ordered  - Initiate Nutrition services referral as needed  Outcome: Progressing     Problem: MUSCULOSKELETAL - ADULT  Goal: Maintain or return mobility to safest level of function  Description  INTERVENTIONS:  - Assess patient's ability to carry out ADLs; assess patient's baseline for ADL function and identify physical deficits which impact ability to perform ADLs (bathing, care of mouth/teeth, toileting, grooming, dressing, etc )  - Assess/evaluate cause of self-care deficits   - Assess range of motion  - Assess patient's mobility  - Assess patient's need for assistive devices and provide as appropriate  - Encourage maximum independence but intervene and supervise when necessary  - Involve family in performance of ADLs  - Assess for home care needs following discharge   - Consider OT consult to assist with ADL evaluation and planning for discharge  - Provide patient education as appropriate  Outcome: Progressing  Goal: Maintain proper alignment of affected body part  Description  INTERVENTIONS:  - Support, maintain and protect limb and body alignment  - Provide patient/ family with appropriate education  Outcome: Progressing     Problem: Potential for Falls  Goal: Patient will remain free of falls  Description  INTERVENTIONS:  - Assess patient frequently for physical needs  -  Identify cognitive and physical deficits and behaviors that affect risk of falls    -  [place] fall precautions as indicated by assessment   - Educate patient/family on patient safety including physical limitations  - Instruct patient to call for assistance with activity based on assessment  - Modify environment to reduce risk of injury  - Consider OT/PT consult to assist with strengthening/mobility  Outcome: Progressing     Problem: Prexisting or High Potential for Compromised Skin Integrity  Goal: Skin integrity is maintained or improved  Description  INTERVENTIONS:  - Identify patients at risk for skin breakdown  - Assess and monitor skin integrity  - Assess and monitor nutrition and hydration status  - Monitor labs   - Assess for incontinence   - Turn and reposition patient  - Assist with mobility/ambulation  - Relieve pressure over bony prominences  - Avoid friction and shearing  - Provide appropriate hygiene as needed including keeping skin clean and dry  - Evaluate need for skin moisturizer/barrier cream  - Collaborate with interdisciplinary team   - Patient/family teaching  - Consider wound care consult   Outcome: Progressing

## 2020-06-18 NOTE — PROGRESS NOTES
Progress Note - Thoracic Surgery   Joaquin Claros 71 y o  male MRN: [id number]  Unit/Bed#: [facility] 410-01 Encounter: [id number]    Assessment:  71 M status post thoracoscopic left upper lobectomy with prolonged air leak, chest VAC      Plan:  Diet as tolerated  Continue chest tube to water seal  Continue VAC  Pulmonary toilet  Out of bed and ambulate  DVT prophylaxis    Subjective/Objective     Subjective: No acute events overnight  Feeling better  Objective:    Blood pressure 131/74, pulse 85, temperature 97 6 °F (36 4 °C), temperature source Oral, resp  rate 14, height 5' 10" (1 778 m), weight 93 2 kg (205 lb 7 5 oz), SpO2 100 %  ,Body mass index is 29 48 kg/m²  Intake/Output Summary (Last 24 hours) at 6/18/2020 0737  Last data filed at 6/18/2020 0709  Gross per 24 hour   Intake 1580 ml   Output 2600 ml   Net -1020 ml       Invasive Devices     Peripheral Intravenous Line            Peripheral IV 06/18/20 Dorsal (posterior); Right Forearm less than 1 day          Drain            Chest Tube 1 Left Pleural 28 Fr  7 days                Physical Exam:   General: NAD, AAOx3  Eyes: PERRL  ENT: moist mucous membranes  Neck: supple  Subcutaneous crepitus stable  CV: RRR +S1/S2  Chest: breath sounds bilaterally  Chest tube in place, air leak present  Abdomen: soft, NT ND  Extremities: atraumatic      Results from last 7 days   Lab Units 06/17/20  0518 06/15/20  0437 06/12/20  0456   WBC Thousand/uL 8 16 8 74 11 20*   HEMOGLOBIN g/dL 12 3 13 4 13 1   HEMATOCRIT % 37 6 40 2 41 5   PLATELETS Thousands/uL 268 259 203     Results from last 7 days   Lab Units 06/17/20  0518 06/15/20  0437 06/12/20  0456   POTASSIUM mmol/L 3 9 3 9 4 0   CHLORIDE mmol/L 102 95* 105   CO2 mmol/L 22 22 26   BUN mg/dL 12 14 14   CREATININE mg/dL 0 81 0 75 0 88   CALCIUM mg/dL 8 6 8 4 8 2*

## 2020-06-19 ENCOUNTER — TELEPHONE (OUTPATIENT)
Dept: OTHER | Facility: OTHER | Age: 70
End: 2020-06-19

## 2020-06-19 ENCOUNTER — TELEPHONE (OUTPATIENT)
Dept: CARDIAC SURGERY | Facility: CLINIC | Age: 70
End: 2020-06-19

## 2020-06-19 LAB
COTININE SERPL-MCNC: 26.7 NG/ML
NICOTINE SERPL-MCNC: <1 NG/ML

## 2020-06-19 PROCEDURE — 97110 THERAPEUTIC EXERCISES: CPT

## 2020-06-19 PROCEDURE — 99024 POSTOP FOLLOW-UP VISIT: CPT | Performed by: THORACIC SURGERY (CARDIOTHORACIC VASCULAR SURGERY)

## 2020-06-19 PROCEDURE — 97116 GAIT TRAINING THERAPY: CPT

## 2020-06-19 RX ADMIN — GABAPENTIN 300 MG: 300 CAPSULE ORAL at 17:37

## 2020-06-19 RX ADMIN — ENOXAPARIN SODIUM 40 MG: 40 INJECTION SUBCUTANEOUS at 09:07

## 2020-06-19 RX ADMIN — GABAPENTIN 300 MG: 300 CAPSULE ORAL at 20:24

## 2020-06-19 RX ADMIN — OXYCODONE HYDROCHLORIDE 5 MG: 5 TABLET ORAL at 14:15

## 2020-06-19 RX ADMIN — ACETAMINOPHEN 975 MG: 325 TABLET ORAL at 17:37

## 2020-06-19 RX ADMIN — DOCUSATE SODIUM 100 MG: 100 CAPSULE, LIQUID FILLED ORAL at 17:37

## 2020-06-19 RX ADMIN — ACETAMINOPHEN 975 MG: 325 TABLET ORAL at 10:45

## 2020-06-19 RX ADMIN — AMLODIPINE BESYLATE 5 MG: 5 TABLET ORAL at 09:08

## 2020-06-19 RX ADMIN — GABAPENTIN 300 MG: 300 CAPSULE ORAL at 09:08

## 2020-06-19 RX ADMIN — ACETAMINOPHEN 975 MG: 325 TABLET ORAL at 04:23

## 2020-06-19 NOTE — PROGRESS NOTES
Progress Note - Thoracic Surgery   Wai Pearson 71 y o  male MRN: [id number]  Unit/Bed#: [facility] 410-01 Encounter: [id number]    Assessment:  71 M status post thoracoscopic left upper lobectomy with prolonged air leak, chest VAC    L CT H2O 50cc drainage +AL @ 850cc    Plan:  · Diet as tolerated  · Continue chest tube to water seal  · Continue VAC  · Pulmonary toilet  · Out of bed and ambulate  · DVT prophylaxis    Subjective/Objective     Subjective: No acute events  Patient frustrated with his clinical course  I spent a significant amount of time with the patient overnight and again this morning  Objective:    Blood pressure 132/83, pulse 91, temperature 98 5 °F (36 9 °C), temperature source Oral, resp  rate 18, height 5' 10" (1 778 m), weight 94 3 kg (207 lb 14 3 oz), SpO2 98 %  ,Body mass index is 29 83 kg/m²  Intake/Output Summary (Last 24 hours) at 6/19/2020 0610  Last data filed at 6/19/2020 0540  Gross per 24 hour   Intake 960 ml   Output 3055 ml   Net -2095 ml       Invasive Devices     Peripheral Intravenous Line            Peripheral IV 06/18/20 Dorsal (posterior); Right Forearm 1 day          Drain            Chest Tube 1 Left Pleural 28 Fr  8 days                Physical Exam:   GEN: NAD, diffuse subQ emphysema  CV: warm/well perfused  Lung: normal effort  L CT H2O +AL  L Chest with VAC dressing  Ab: Soft, NT/ND  Extrem: No CCE  Neuro:  A+Ox3, motor and sensation grossly intact      Results from last 7 days   Lab Units 06/17/20  0518 06/15/20  0437   WBC Thousand/uL 8 16 8 74   HEMOGLOBIN g/dL 12 3 13 4   HEMATOCRIT % 37 6 40 2   PLATELETS Thousands/uL 268 259     Results from last 7 days   Lab Units 06/17/20  0518 06/15/20  0437   POTASSIUM mmol/L 3 9 3 9   CHLORIDE mmol/L 102 95*   CO2 mmol/L 22 22   BUN mg/dL 12 14   CREATININE mg/dL 0 81 0 75   CALCIUM mg/dL 8 6 8 4

## 2020-06-19 NOTE — PROGRESS NOTES
Left upper chest wall vac change  Left upper chest wall vac taken down - 1 piece black foam removed  VAC replaced with 1 piece black foam and secured with good seal  Wound bed was clean  Patient tolerated well      Opal Srivastava MD

## 2020-06-19 NOTE — PLAN OF CARE
Problem: PHYSICAL THERAPY ADULT  Goal: Performs mobility at highest level of function for planned discharge setting  See evaluation for individualized goals  Description  Treatment/Interventions: Functional transfer training, LE strengthening/ROM, Therapeutic exercise, Elevations, Endurance training, Gait training, Spoke to nursing  Equipment Recommended: Walker(RW)       See flowsheet documentation for full assessment, interventions and recommendations  Outcome: Progressing  Note:   Prognosis: Good  Problem List: Decreased strength, Decreased endurance, Impaired balance, Decreased mobility, Decreased coordination, Decreased cognition, Impaired judgement, Decreased safety awareness  Assessment: Patient seated in bed side recliner, agreeable to participate in therapy  He was able to transfer min A, and ambulate with use of RW, household distances with cues to avoid obstacles due to vision  He was able to negotiate 1 step, 7x with use of 1 rail  Patient is making good progress at this time, leaning towards home therapies as he progresses  Barriers to Discharge: (eyesight)     PT Discharge Recommendation: Return to previous environment with social support, Home with skilled therapy(home PT)     PT - OK to Discharge: Yes(when stable with home therapies)    See flowsheet documentation for full assessment

## 2020-06-19 NOTE — PHYSICAL THERAPY NOTE
Physical Therapy Progress Note        06/19/20 1500   Pain Assessment   Pain Assessment Tool 0-10   Pain Score Worst Possible Pain   Hospital Pain Intervention(s) Ambulation/increased activity;Repositioned   Restrictions/Precautions   Weight Bearing Precautions Per Order No   Other Precautions Multiple lines; Fall Risk;Pain;Telemetry   General   Chart Reviewed Yes   Response to Previous Treatment Patient with no complaints from previous session  Cognition   Overall Cognitive Status Impaired   Arousal/Participation Responsive   Comments Patient is pleasant and cooperative throughout session   Transfers   Sit to Stand 4  Minimal assistance   Additional items Assist x 1   Stand to Sit 5  Supervision   Additional items Assist x 1;Verbal cues   Ambulation/Elevation   Gait pattern Excessively slow; Wide GEO   Gait Assistance 4  Minimal assist   Additional items Assist x 1   Assistive Device Rolling walker   Distance 150 feet x 2   Stair Management Assistance 4  Minimal assist   Additional items Assist x 1;Verbal cues   Stair Management Technique One rail R   Number of Stairs 7   Balance   Static Sitting Normal   Dynamic Sitting Good   Static Standing Fair   Dynamic Standing Fair -   Endurance Deficit   Endurance Deficit Yes   Activity Tolerance   Activity Tolerance Patient limited by fatigue   Nurse Made Aware Appropriate to see per RN   Exercises   Hip Flexion Sitting;10 reps;AROM; Bilateral   Hip Abduction Sitting;10 reps;AROM; Bilateral   Knee AROM Long Arc Quad Sitting;10 reps;AROM; Bilateral   Assessment   Prognosis Good   Problem List Decreased strength;Decreased endurance; Impaired balance;Decreased mobility; Decreased coordination;Decreased cognition; Impaired judgement;Decreased safety awareness   Assessment Patient seated in bed side recliner, agreeable to participate in therapy  He was able to transfer min A, and ambulate with use of RW, household distances with cues to avoid obstacles due to vision   He was able to negotiate 1 step, 7x with use of 1 rail  Patient is making good progress at this time, leaning towards home therapies as he progresses  Barriers to Discharge   (eyesight)   Goals   Patient Goals To go home   STG Expiration Date 06/24/20   PT Treatment Day 5   Plan   Treatment/Interventions Functional transfer training;LE strengthening/ROM; Therapeutic exercise;Elevations; Endurance training;Gait training;Spoke to nursing   Progress Progressing toward goals   PT Frequency   (4-6x a week)   Recommendation   PT Discharge Recommendation Return to previous environment with social support;Home with skilled therapy  (home PT)   Equipment Recommended Walker  (RW)   PT - OK to Discharge Yes  (when stable with home therapies)     Ailyn Poster, PTA

## 2020-06-20 ENCOUNTER — APPOINTMENT (INPATIENT)
Dept: RADIOLOGY | Facility: HOSPITAL | Age: 70
DRG: 003 | End: 2020-06-20
Payer: COMMERCIAL

## 2020-06-20 ENCOUNTER — APPOINTMENT (INPATIENT)
Dept: NON INVASIVE DIAGNOSTICS | Facility: HOSPITAL | Age: 70
DRG: 003 | End: 2020-06-20
Payer: COMMERCIAL

## 2020-06-20 LAB
ALBUMIN SERPL BCP-MCNC: 2.1 G/DL (ref 3.5–5)
ALBUMIN SERPL BCP-MCNC: 2.4 G/DL (ref 3.5–5)
ALP SERPL-CCNC: 76 U/L (ref 46–116)
ALP SERPL-CCNC: 86 U/L (ref 46–116)
ALT SERPL W P-5'-P-CCNC: 148 U/L (ref 12–78)
ALT SERPL W P-5'-P-CCNC: 172 U/L (ref 12–78)
ANION GAP SERPL CALCULATED.3IONS-SCNC: 11 MMOL/L (ref 4–13)
ANION GAP SERPL CALCULATED.3IONS-SCNC: 14 MMOL/L (ref 4–13)
APTT PPP: 30 SECONDS (ref 23–37)
ARTERIAL PATENCY WRIST A: YES
AST SERPL W P-5'-P-CCNC: 109 U/L (ref 5–45)
AST SERPL W P-5'-P-CCNC: 116 U/L (ref 5–45)
ATRIAL RATE: 122 BPM
BACTERIA UR QL AUTO: ABNORMAL /HPF
BASE EX.OXY STD BLDV CALC-SCNC: 80.7 % (ref 60–80)
BASE EXCESS BLDA CALC-SCNC: -5 MMOL/L
BASE EXCESS BLDA CALC-SCNC: -7 MMOL/L
BASE EXCESS BLDA CALC-SCNC: -7.4 MMOL/L
BASE EXCESS BLDA CALC-SCNC: -8.8 MMOL/L
BASE EXCESS BLDA CALC-SCNC: -9.1 MMOL/L
BASE EXCESS BLDV CALC-SCNC: -9 MMOL/L
BASOPHILS # BLD MANUAL: 0 THOUSAND/UL (ref 0–0.1)
BASOPHILS NFR MAR MANUAL: 0 % (ref 0–1)
BILIRUB DIRECT SERPL-MCNC: 0.14 MG/DL (ref 0–0.2)
BILIRUB SERPL-MCNC: 0.34 MG/DL (ref 0.2–1)
BILIRUB SERPL-MCNC: 0.36 MG/DL (ref 0.2–1)
BILIRUB UR QL STRIP: NEGATIVE
BODY TEMPERATURE: 100.6 DEGREES FEHRENHEIT
BUN SERPL-MCNC: 14 MG/DL (ref 5–25)
BUN SERPL-MCNC: 15 MG/DL (ref 5–25)
CA-I BLD-SCNC: 1.44 MMOL/L (ref 1.12–1.32)
CA-I BLD-SCNC: >2.2 MMOL/L (ref 1.12–1.32)
CALCIUM SERPL-MCNC: 10 MG/DL (ref 8.3–10.1)
CALCIUM SERPL-MCNC: 8.5 MG/DL (ref 8.3–10.1)
CHLORIDE SERPL-SCNC: 101 MMOL/L (ref 100–108)
CHLORIDE SERPL-SCNC: 107 MMOL/L (ref 100–108)
CLARITY UR: CLEAR
CO2 SERPL-SCNC: 20 MMOL/L (ref 21–32)
CO2 SERPL-SCNC: 25 MMOL/L (ref 21–32)
COARSE GRAN CASTS URNS QL MICRO: ABNORMAL /LPF
COLOR UR: YELLOW
CREAT SERPL-MCNC: 1.41 MG/DL (ref 0.6–1.3)
CREAT SERPL-MCNC: 1.56 MG/DL (ref 0.6–1.3)
EOSINOPHIL # BLD MANUAL: 0 THOUSAND/UL (ref 0–0.4)
EOSINOPHIL NFR BLD MANUAL: 0 % (ref 0–6)
ERYTHROCYTE [DISTWIDTH] IN BLOOD BY AUTOMATED COUNT: 13.7 % (ref 11.6–15.1)
ERYTHROCYTE [DISTWIDTH] IN BLOOD BY AUTOMATED COUNT: 13.9 % (ref 11.6–15.1)
FINE GRAN CASTS URNS QL MICRO: ABNORMAL /LPF
FIO2 GAS DIL.REBREATH: 100 L
GFR SERPL CREATININE-BSD FRML MDRD: 45 ML/MIN/1.73SQ M
GFR SERPL CREATININE-BSD FRML MDRD: 50 ML/MIN/1.73SQ M
GLUCOSE SERPL-MCNC: 221 MG/DL (ref 65–140)
GLUCOSE SERPL-MCNC: 226 MG/DL (ref 65–140)
GLUCOSE SERPL-MCNC: 264 MG/DL (ref 65–140)
GLUCOSE SERPL-MCNC: 303 MG/DL (ref 65–140)
GLUCOSE UR STRIP-MCNC: ABNORMAL MG/DL
HCO3 BLDA-SCNC: 15.2 MMOL/L (ref 22–28)
HCO3 BLDA-SCNC: 16.1 MMOL/L (ref 22–28)
HCO3 BLDA-SCNC: 17.8 MMOL/L (ref 22–28)
HCO3 BLDA-SCNC: 19 MMOL/L (ref 22–28)
HCO3 BLDA-SCNC: 24.5 MMOL/L (ref 22–28)
HCO3 BLDV-SCNC: 18.8 MMOL/L (ref 24–30)
HCT VFR BLD AUTO: 39.9 % (ref 36.5–49.3)
HCT VFR BLD AUTO: 42.1 % (ref 36.5–49.3)
HCT VFR BLD CALC: 34 % (ref 36.5–49.3)
HCT VFR BLD CALC: 51 % (ref 36.5–49.3)
HGB BLD-MCNC: 13.1 G/DL (ref 12–17)
HGB BLD-MCNC: 13.9 G/DL (ref 12–17)
HGB BLDA-MCNC: 11.6 G/DL (ref 12–17)
HGB BLDA-MCNC: 17.3 G/DL (ref 12–17)
HGB UR QL STRIP.AUTO: ABNORMAL
HOROWITZ INDEX BLDA+IHG-RTO: 50 MM[HG]
HOROWITZ INDEX BLDA+IHG-RTO: 60 MM[HG]
HOROWITZ INDEX BLDA+IHG-RTO: 70 MM[HG]
HYALINE CASTS #/AREA URNS LPF: ABNORMAL /LPF
KETONES UR STRIP-MCNC: NEGATIVE MG/DL
LACTATE SERPL-SCNC: 10.6 MMOL/L (ref 0.5–2)
LACTATE SERPL-SCNC: 4.7 MMOL/L (ref 0.5–2)
LACTATE SERPL-SCNC: 5.6 MMOL/L (ref 0.5–2)
LACTATE SERPL-SCNC: 7.1 MMOL/L (ref 0.5–2)
LACTATE SERPL-SCNC: 9.4 MMOL/L (ref 0.5–2)
LEUKOCYTE ESTERASE UR QL STRIP: NEGATIVE
LYMPHOCYTES # BLD AUTO: 0.95 THOUSAND/UL (ref 0.6–4.47)
LYMPHOCYTES # BLD AUTO: 3 % (ref 14–44)
MAGNESIUM SERPL-MCNC: 1.9 MG/DL (ref 1.6–2.6)
MAGNESIUM SERPL-MCNC: 2.5 MG/DL (ref 1.6–2.6)
MCH RBC QN AUTO: 31.3 PG (ref 26.8–34.3)
MCH RBC QN AUTO: 31.6 PG (ref 26.8–34.3)
MCHC RBC AUTO-ENTMCNC: 32.8 G/DL (ref 31.4–37.4)
MCHC RBC AUTO-ENTMCNC: 33 G/DL (ref 31.4–37.4)
MCV RBC AUTO: 95 FL (ref 82–98)
MCV RBC AUTO: 96 FL (ref 82–98)
MONOCYTES # BLD AUTO: 0.32 THOUSAND/UL (ref 0–1.22)
MONOCYTES NFR BLD: 1 % (ref 4–12)
MYELOCYTES NFR BLD MANUAL: 2 % (ref 0–1)
NEUTROPHILS # BLD MANUAL: 28.41 THOUSAND/UL (ref 1.85–7.62)
NEUTS BAND NFR BLD MANUAL: 1 % (ref 0–8)
NEUTS SEG NFR BLD AUTO: 89 % (ref 43–75)
NITRITE UR QL STRIP: NEGATIVE
NON-SQ EPI CELLS URNS QL MICRO: ABNORMAL /HPF
NRBC BLD AUTO-RTO: 0 /100 WBCS
O2 CT BLDA-SCNC: 17.3 ML/DL (ref 16–23)
O2 CT BLDA-SCNC: 17.8 ML/DL (ref 16–23)
O2 CT BLDA-SCNC: 18.5 ML/DL (ref 16–23)
O2 CT BLDA-SCNC: 19.2 ML/DL (ref 16–23)
O2 CT BLDA-SCNC: 20 ML/DL (ref 16–23)
O2 CT BLDV-SCNC: 15.8 ML/DL
OXYHGB MFR BLDA: 91.4 % (ref 94–97)
OXYHGB MFR BLDA: 97.6 % (ref 94–97)
OXYHGB MFR BLDA: 98.2 % (ref 94–97)
OXYHGB MFR BLDA: 98.4 % (ref 94–97)
OXYHGB MFR BLDA: 98.4 % (ref 94–97)
P AXIS: 74 DEGREES
PCO2 BLD: >103 MM HG (ref 36–44)
PCO2 BLD: >103 MM HG (ref 42–50)
PCO2 BLDA: 28.8 MM HG (ref 36–44)
PCO2 BLDA: 31.9 MM HG (ref 36–44)
PCO2 BLDA: 33.3 MM HG (ref 36–44)
PCO2 BLDA: 41.5 MM HG (ref 36–44)
PCO2 BLDA: 65.5 MM HG (ref 36–44)
PCO2 BLDV: 47.7 MM HG (ref 42–50)
PCO2 TEMP ADJ BLDA: 34.9 MM HG (ref 36–44)
PEEP RESPIRATORY: 10 CM[H2O]
PEEP RESPIRATORY: 12 CM[H2O]
PEEP RESPIRATORY: 12 CM[H2O]
PH BLD: 6.93 [PH] (ref 7.35–7.45)
PH BLD: 7.1 [PH] (ref 7.3–7.4)
PH BLD: 7.33 [PH] (ref 7.35–7.45)
PH BLDA: 7.19 [PH] (ref 7.35–7.45)
PH BLDA: 7.28 [PH] (ref 7.35–7.45)
PH BLDA: 7.32 [PH] (ref 7.35–7.45)
PH BLDA: 7.34 [PH] (ref 7.35–7.45)
PH BLDA: 7.34 [PH] (ref 7.35–7.45)
PH BLDV: 7.21 [PH] (ref 7.3–7.4)
PH UR STRIP.AUTO: 6 [PH]
PHOSPHATE SERPL-MCNC: 4.7 MG/DL (ref 2.3–4.1)
PHOSPHATE SERPL-MCNC: 8.9 MG/DL (ref 2.3–4.1)
PLATELET # BLD AUTO: 253 THOUSANDS/UL (ref 149–390)
PLATELET # BLD AUTO: 295 THOUSANDS/UL (ref 149–390)
PLATELET BLD QL SMEAR: ADEQUATE
PMV BLD AUTO: 9.2 FL (ref 8.9–12.7)
PMV BLD AUTO: 9.3 FL (ref 8.9–12.7)
PO2 BLD: 155.5 MM HG (ref 75–129)
PO2 BLD: 45 MM HG (ref 35–45)
PO2 BLD: 61 MM HG (ref 75–129)
PO2 BLDA: 144 MM HG (ref 75–129)
PO2 BLDA: 148.8 MM HG (ref 75–129)
PO2 BLDA: 148.8 MM HG (ref 75–129)
PO2 BLDA: 176.4 MM HG (ref 75–129)
PO2 BLDA: 71.8 MM HG (ref 75–129)
PO2 BLDV: 52.5 MM HG (ref 35–45)
POTASSIUM BLD-SCNC: 4.2 MMOL/L (ref 3.5–5.3)
POTASSIUM BLD-SCNC: 4.9 MMOL/L (ref 3.5–5.3)
POTASSIUM SERPL-SCNC: 3.4 MMOL/L (ref 3.5–5.3)
POTASSIUM SERPL-SCNC: 3.5 MMOL/L (ref 3.5–5.3)
PR INTERVAL: 167 MS
PRESSURE CONTROL: 22
PROCALCITONIN SERPL-MCNC: 1.85 NG/ML
PROCALCITONIN SERPL-MCNC: 5.85 NG/ML
PROT SERPL-MCNC: 5.3 G/DL (ref 6.4–8.2)
PROT SERPL-MCNC: 5.6 G/DL (ref 6.4–8.2)
PROT UR STRIP-MCNC: ABNORMAL MG/DL
QRS AXIS: 72 DEGREES
QRSD INTERVAL: 75 MS
QT INTERVAL: 304 MS
QTC INTERVAL: 433 MS
RBC # BLD AUTO: 4.15 MILLION/UL (ref 3.88–5.62)
RBC # BLD AUTO: 4.44 MILLION/UL (ref 3.88–5.62)
RBC #/AREA URNS AUTO: ABNORMAL /HPF
RBC MORPH BLD: NORMAL
SODIUM BLD-SCNC: 133 MMOL/L (ref 136–145)
SODIUM BLD-SCNC: 150 MMOL/L (ref 136–145)
SODIUM SERPL-SCNC: 137 MMOL/L (ref 136–145)
SODIUM SERPL-SCNC: 141 MMOL/L (ref 136–145)
SP GR UR STRIP.AUTO: 1.02 (ref 1–1.03)
SPECIMEN SOURCE: ABNORMAL
T WAVE AXIS: 68 DEGREES
TROPONIN I SERPL-MCNC: 0.85 NG/ML
TROPONIN I SERPL-MCNC: 2.75 NG/ML
TROPONIN I SERPL-MCNC: 2.86 NG/ML
TROPONIN I SERPL-MCNC: 3 NG/ML
UROBILINOGEN UR QL STRIP.AUTO: 0.2 E.U./DL
VARIANT LYMPHS # BLD AUTO: 4 %
VENT AC: 24
VENT- AC: AC
VENTRICULAR RATE: 122 BPM
WBC # BLD AUTO: 31.57 THOUSAND/UL (ref 4.31–10.16)
WBC # BLD AUTO: 33.03 THOUSAND/UL (ref 4.31–10.16)
WBC #/AREA URNS AUTO: ABNORMAL /HPF

## 2020-06-20 PROCEDURE — 71045 X-RAY EXAM CHEST 1 VIEW: CPT

## 2020-06-20 PROCEDURE — 83605 ASSAY OF LACTIC ACID: CPT | Performed by: PHYSICIAN ASSISTANT

## 2020-06-20 PROCEDURE — 99024 POSTOP FOLLOW-UP VISIT: CPT | Performed by: THORACIC SURGERY (CARDIOTHORACIC VASCULAR SURGERY)

## 2020-06-20 PROCEDURE — 85027 COMPLETE CBC AUTOMATED: CPT | Performed by: SURGERY

## 2020-06-20 PROCEDURE — 99291 CRITICAL CARE FIRST HOUR: CPT | Performed by: PHYSICIAN ASSISTANT

## 2020-06-20 PROCEDURE — 93970 EXTREMITY STUDY: CPT

## 2020-06-20 PROCEDURE — 92950 HEART/LUNG RESUSCITATION CPR: CPT

## 2020-06-20 PROCEDURE — NC001 PR NO CHARGE: Performed by: THORACIC SURGERY (CARDIOTHORACIC VASCULAR SURGERY)

## 2020-06-20 PROCEDURE — 93306 TTE W/DOPPLER COMPLETE: CPT

## 2020-06-20 PROCEDURE — 82947 ASSAY GLUCOSE BLOOD QUANT: CPT

## 2020-06-20 PROCEDURE — 5A1955Z RESPIRATORY VENTILATION, GREATER THAN 96 CONSECUTIVE HOURS: ICD-10-PCS | Performed by: SURGERY

## 2020-06-20 PROCEDURE — 85007 BL SMEAR W/DIFF WBC COUNT: CPT | Performed by: SURGERY

## 2020-06-20 PROCEDURE — 82803 BLOOD GASES ANY COMBINATION: CPT

## 2020-06-20 PROCEDURE — 81001 URINALYSIS AUTO W/SCOPE: CPT | Performed by: PHYSICIAN ASSISTANT

## 2020-06-20 PROCEDURE — 82330 ASSAY OF CALCIUM: CPT

## 2020-06-20 PROCEDURE — 5A12012 PERFORMANCE OF CARDIAC OUTPUT, SINGLE, MANUAL: ICD-10-PCS | Performed by: EMERGENCY MEDICINE

## 2020-06-20 PROCEDURE — 0B110F4 BYPASS TRACHEA TO CUTANEOUS WITH TRACHEOSTOMY DEVICE, OPEN APPROACH: ICD-10-PCS | Performed by: SURGERY

## 2020-06-20 PROCEDURE — 85730 THROMBOPLASTIN TIME PARTIAL: CPT | Performed by: EMERGENCY MEDICINE

## 2020-06-20 PROCEDURE — 84145 PROCALCITONIN (PCT): CPT | Performed by: PHYSICIAN ASSISTANT

## 2020-06-20 PROCEDURE — 84295 ASSAY OF SERUM SODIUM: CPT

## 2020-06-20 PROCEDURE — 84484 ASSAY OF TROPONIN QUANT: CPT | Performed by: SURGERY

## 2020-06-20 PROCEDURE — 06HY33Z INSERTION OF INFUSION DEVICE INTO LOWER VEIN, PERCUTANEOUS APPROACH: ICD-10-PCS | Performed by: INTERNAL MEDICINE

## 2020-06-20 PROCEDURE — 87081 CULTURE SCREEN ONLY: CPT | Performed by: PHYSICIAN ASSISTANT

## 2020-06-20 PROCEDURE — 83735 ASSAY OF MAGNESIUM: CPT | Performed by: PHYSICIAN ASSISTANT

## 2020-06-20 PROCEDURE — NC001 PR NO CHARGE: Performed by: INTERNAL MEDICINE

## 2020-06-20 PROCEDURE — 93970 EXTREMITY STUDY: CPT | Performed by: SURGERY

## 2020-06-20 PROCEDURE — 84132 ASSAY OF SERUM POTASSIUM: CPT

## 2020-06-20 PROCEDURE — NC001 PR NO CHARGE: Performed by: SURGERY

## 2020-06-20 PROCEDURE — 82805 BLOOD GASES W/O2 SATURATION: CPT | Performed by: STUDENT IN AN ORGANIZED HEALTH CARE EDUCATION/TRAINING PROGRAM

## 2020-06-20 PROCEDURE — 93005 ELECTROCARDIOGRAM TRACING: CPT

## 2020-06-20 PROCEDURE — 82805 BLOOD GASES W/O2 SATURATION: CPT | Performed by: PHYSICIAN ASSISTANT

## 2020-06-20 PROCEDURE — 36556 INSERT NON-TUNNEL CV CATH: CPT | Performed by: SURGERY

## 2020-06-20 PROCEDURE — 84484 ASSAY OF TROPONIN QUANT: CPT | Performed by: PHYSICIAN ASSISTANT

## 2020-06-20 PROCEDURE — 31500 INSERT EMERGENCY AIRWAY: CPT | Performed by: SURGERY

## 2020-06-20 PROCEDURE — 36620 INSERTION CATHETER ARTERY: CPT | Performed by: SURGERY

## 2020-06-20 PROCEDURE — 87040 BLOOD CULTURE FOR BACTERIA: CPT | Performed by: PHYSICIAN ASSISTANT

## 2020-06-20 PROCEDURE — 99291 CRITICAL CARE FIRST HOUR: CPT | Performed by: EMERGENCY MEDICINE

## 2020-06-20 PROCEDURE — 93010 ELECTROCARDIOGRAM REPORT: CPT | Performed by: INTERNAL MEDICINE

## 2020-06-20 PROCEDURE — 80076 HEPATIC FUNCTION PANEL: CPT | Performed by: PHYSICIAN ASSISTANT

## 2020-06-20 PROCEDURE — 99292 CRITICAL CARE ADDL 30 MIN: CPT | Performed by: EMERGENCY MEDICINE

## 2020-06-20 PROCEDURE — 83605 ASSAY OF LACTIC ACID: CPT | Performed by: SURGERY

## 2020-06-20 PROCEDURE — 85014 HEMATOCRIT: CPT

## 2020-06-20 PROCEDURE — 80048 BASIC METABOLIC PNL TOTAL CA: CPT | Performed by: PHYSICIAN ASSISTANT

## 2020-06-20 PROCEDURE — 36600 WITHDRAWAL OF ARTERIAL BLOOD: CPT

## 2020-06-20 PROCEDURE — 84100 ASSAY OF PHOSPHORUS: CPT | Performed by: PHYSICIAN ASSISTANT

## 2020-06-20 PROCEDURE — 94002 VENT MGMT INPAT INIT DAY: CPT

## 2020-06-20 PROCEDURE — 80053 COMPREHEN METABOLIC PANEL: CPT | Performed by: SURGERY

## 2020-06-20 PROCEDURE — 85027 COMPLETE CBC AUTOMATED: CPT | Performed by: PHYSICIAN ASSISTANT

## 2020-06-20 PROCEDURE — 94760 N-INVAS EAR/PLS OXIMETRY 1: CPT

## 2020-06-20 PROCEDURE — 82805 BLOOD GASES W/O2 SATURATION: CPT | Performed by: SURGERY

## 2020-06-20 RX ORDER — SODIUM CHLORIDE, SODIUM GLUCONATE, SODIUM ACETATE, POTASSIUM CHLORIDE, MAGNESIUM CHLORIDE, SODIUM PHOSPHATE, DIBASIC, AND POTASSIUM PHOSPHATE .53; .5; .37; .037; .03; .012; .00082 G/100ML; G/100ML; G/100ML; G/100ML; G/100ML; G/100ML; G/100ML
1000 INJECTION, SOLUTION INTRAVENOUS ONCE
Status: COMPLETED | OUTPATIENT
Start: 2020-06-20 | End: 2020-06-20

## 2020-06-20 RX ORDER — SODIUM CHLORIDE, SODIUM GLUCONATE, SODIUM ACETATE, POTASSIUM CHLORIDE, MAGNESIUM CHLORIDE, SODIUM PHOSPHATE, DIBASIC, AND POTASSIUM PHOSPHATE .53; .5; .37; .037; .03; .012; .00082 G/100ML; G/100ML; G/100ML; G/100ML; G/100ML; G/100ML; G/100ML
500 INJECTION, SOLUTION INTRAVENOUS ONCE
Status: COMPLETED | OUTPATIENT
Start: 2020-06-20 | End: 2020-06-20

## 2020-06-20 RX ORDER — NOREPINEPHRINE BITARTRATE 1 MG/ML
INJECTION, SOLUTION INTRAVENOUS
Status: DISPENSED
Start: 2020-06-20 | End: 2020-06-20

## 2020-06-20 RX ORDER — HEPARIN SODIUM 1000 [USP'U]/ML
3600 INJECTION, SOLUTION INTRAVENOUS; SUBCUTANEOUS
Status: DISCONTINUED | OUTPATIENT
Start: 2020-06-20 | End: 2020-06-22

## 2020-06-20 RX ORDER — PROPOFOL 10 MG/ML
5-50 INJECTION, EMULSION INTRAVENOUS
Status: DISCONTINUED | OUTPATIENT
Start: 2020-06-20 | End: 2020-06-20

## 2020-06-20 RX ORDER — MIDAZOLAM HYDROCHLORIDE 2 MG/2ML
4 INJECTION, SOLUTION INTRAMUSCULAR; INTRAVENOUS ONCE
Status: COMPLETED | OUTPATIENT
Start: 2020-06-20 | End: 2020-06-20

## 2020-06-20 RX ORDER — MIDAZOLAM HYDROCHLORIDE 2 MG/2ML
INJECTION, SOLUTION INTRAMUSCULAR; INTRAVENOUS
Status: DISPENSED
Start: 2020-06-20 | End: 2020-06-21

## 2020-06-20 RX ORDER — HEPARIN SODIUM 1000 [USP'U]/ML
7200 INJECTION, SOLUTION INTRAVENOUS; SUBCUTANEOUS
Status: DISCONTINUED | OUTPATIENT
Start: 2020-06-20 | End: 2020-06-22

## 2020-06-20 RX ORDER — FENTANYL CITRATE-0.9 % NACL/PF 10 MCG/ML
75 PLASTIC BAG, INJECTION (ML) INTRAVENOUS CONTINUOUS
Status: DISCONTINUED | OUTPATIENT
Start: 2020-06-20 | End: 2020-06-30

## 2020-06-20 RX ORDER — EPINEPHRINE 1 MG/ML
INJECTION, SOLUTION, CONCENTRATE INTRAVENOUS
Status: DISPENSED
Start: 2020-06-20 | End: 2020-06-20

## 2020-06-20 RX ORDER — ALBUMIN, HUMAN INJ 5% 5 %
25 SOLUTION INTRAVENOUS ONCE
Status: COMPLETED | OUTPATIENT
Start: 2020-06-20 | End: 2020-06-20

## 2020-06-20 RX ORDER — SODIUM CHLORIDE, SODIUM GLUCONATE, SODIUM ACETATE, POTASSIUM CHLORIDE, MAGNESIUM CHLORIDE, SODIUM PHOSPHATE, DIBASIC, AND POTASSIUM PHOSPHATE .53; .5; .37; .037; .03; .012; .00082 G/100ML; G/100ML; G/100ML; G/100ML; G/100ML; G/100ML; G/100ML
100 INJECTION, SOLUTION INTRAVENOUS CONTINUOUS
Status: DISCONTINUED | OUTPATIENT
Start: 2020-06-20 | End: 2020-06-23

## 2020-06-20 RX ORDER — CHLORHEXIDINE GLUCONATE 0.12 MG/ML
15 RINSE ORAL EVERY 12 HOURS SCHEDULED
Status: DISCONTINUED | OUTPATIENT
Start: 2020-06-20 | End: 2020-07-30 | Stop reason: HOSPADM

## 2020-06-20 RX ORDER — FENTANYL CITRATE 50 UG/ML
50 INJECTION, SOLUTION INTRAMUSCULAR; INTRAVENOUS
Status: DISCONTINUED | OUTPATIENT
Start: 2020-06-20 | End: 2020-06-25

## 2020-06-20 RX ORDER — FENTANYL CITRATE 50 UG/ML
INJECTION, SOLUTION INTRAMUSCULAR; INTRAVENOUS
Status: COMPLETED
Start: 2020-06-20 | End: 2020-06-20

## 2020-06-20 RX ORDER — HEPARIN SODIUM 1000 [USP'U]/ML
7200 INJECTION, SOLUTION INTRAVENOUS; SUBCUTANEOUS ONCE
Status: COMPLETED | OUTPATIENT
Start: 2020-06-20 | End: 2020-06-20

## 2020-06-20 RX ORDER — CALCIUM CHLORIDE 100 MG/ML
SYRINGE (ML) INTRAVENOUS CODE/TRAUMA/SEDATION MEDICATION
Status: COMPLETED | OUTPATIENT
Start: 2020-06-20 | End: 2020-06-20

## 2020-06-20 RX ORDER — MIDAZOLAM HYDROCHLORIDE 2 MG/2ML
5 INJECTION, SOLUTION INTRAMUSCULAR; INTRAVENOUS ONCE
Status: COMPLETED | OUTPATIENT
Start: 2020-06-20 | End: 2020-06-20

## 2020-06-20 RX ORDER — VECURONIUM BROMIDE 1 MG/ML
INJECTION, POWDER, LYOPHILIZED, FOR SOLUTION INTRAVENOUS CODE/TRAUMA/SEDATION MEDICATION
Status: COMPLETED | OUTPATIENT
Start: 2020-06-20 | End: 2020-06-20

## 2020-06-20 RX ORDER — ACETAMINOPHEN 160 MG/5ML
650 SUSPENSION, ORAL (FINAL DOSE FORM) ORAL EVERY 6 HOURS PRN
Status: DISCONTINUED | OUTPATIENT
Start: 2020-06-20 | End: 2020-06-25

## 2020-06-20 RX ORDER — HEPARIN SODIUM 10000 [USP'U]/100ML
3-30 INJECTION, SOLUTION INTRAVENOUS
Status: DISCONTINUED | OUTPATIENT
Start: 2020-06-20 | End: 2020-06-22

## 2020-06-20 RX ORDER — EPINEPHRINE 0.1 MG/ML
SYRINGE (ML) INJECTION CODE/TRAUMA/SEDATION MEDICATION
Status: COMPLETED | OUTPATIENT
Start: 2020-06-20 | End: 2020-06-20

## 2020-06-20 RX ORDER — CALCIUM CHLORIDE 100 MG/ML
1 INJECTION INTRAVENOUS; INTRAVENTRICULAR ONCE
Status: COMPLETED | OUTPATIENT
Start: 2020-06-20 | End: 2020-06-20

## 2020-06-20 RX ORDER — PROPOFOL 10 MG/ML
INJECTION, EMULSION INTRAVENOUS
Status: COMPLETED | OUTPATIENT
Start: 2020-06-20 | End: 2020-06-20

## 2020-06-20 RX ORDER — MIDAZOLAM HYDROCHLORIDE 2 MG/2ML
INJECTION, SOLUTION INTRAMUSCULAR; INTRAVENOUS
Status: DISPENSED
Start: 2020-06-20 | End: 2020-06-20

## 2020-06-20 RX ORDER — MIDAZOLAM HYDROCHLORIDE 2 MG/2ML
INJECTION, SOLUTION INTRAMUSCULAR; INTRAVENOUS
Status: COMPLETED
Start: 2020-06-20 | End: 2020-06-20

## 2020-06-20 RX ORDER — ALBUMIN, HUMAN INJ 5% 5 %
SOLUTION INTRAVENOUS
Status: DISPENSED
Start: 2020-06-20 | End: 2020-06-21

## 2020-06-20 RX ADMIN — SODIUM CHLORIDE 1000 ML: 0.9 INJECTION, SOLUTION INTRAVENOUS at 08:29

## 2020-06-20 RX ADMIN — PROPOFOL 5 MCG/KG/MIN: 10 INJECTION, EMULSION INTRAVENOUS at 05:38

## 2020-06-20 RX ADMIN — FENTANYL CITRATE 50 MCG: 50 INJECTION INTRAMUSCULAR; INTRAVENOUS at 20:39

## 2020-06-20 RX ADMIN — SODIUM CHLORIDE 30 MCG/MIN: 0.9 INJECTION, SOLUTION INTRAVENOUS at 19:55

## 2020-06-20 RX ADMIN — SODIUM BICARBONATE 50 MEQ: 84 INJECTION INTRAVENOUS at 11:02

## 2020-06-20 RX ADMIN — VANCOMYCIN HYDROCHLORIDE 2000 MG: 1 INJECTION, POWDER, LYOPHILIZED, FOR SOLUTION INTRAVENOUS at 09:34

## 2020-06-20 RX ADMIN — CALCIUM CHLORIDE 1 G: 100 INJECTION PARENTERAL at 05:24

## 2020-06-20 RX ADMIN — ACETAMINOPHEN 650 MG: 650 SUSPENSION ORAL at 13:41

## 2020-06-20 RX ADMIN — SODIUM BICARBONATE 50 MEQ: 84 INJECTION INTRAVENOUS at 05:24

## 2020-06-20 RX ADMIN — MIDAZOLAM HYDROCHLORIDE 4 MG: 2 INJECTION, SOLUTION INTRAMUSCULAR; INTRAVENOUS at 15:05

## 2020-06-20 RX ADMIN — CHLORHEXIDINE GLUCONATE 0.12% ORAL RINSE 15 ML: 1.2 LIQUID ORAL at 20:42

## 2020-06-20 RX ADMIN — PROPOFOL 10 MCG/KG/MIN: 10 INJECTION, EMULSION INTRAVENOUS at 07:00

## 2020-06-20 RX ADMIN — METRONIDAZOLE 500 MG: 500 INJECTION, SOLUTION INTRAVENOUS at 09:44

## 2020-06-20 RX ADMIN — EPINEPHRINE 1 MG: 0.1 INJECTION INTRACARDIAC; INTRAVENOUS at 05:13

## 2020-06-20 RX ADMIN — FENTANYL CITRATE 50 MCG: 50 INJECTION, SOLUTION INTRAMUSCULAR; INTRAVENOUS at 07:20

## 2020-06-20 RX ADMIN — EPINEPHRINE 1 MG: 0.1 INJECTION INTRACARDIAC; INTRAVENOUS at 05:25

## 2020-06-20 RX ADMIN — HEPARIN SODIUM AND DEXTROSE 18 UNITS/KG/HR: 10000; 5 INJECTION INTRAVENOUS at 19:59

## 2020-06-20 RX ADMIN — EPINEPHRINE 1 MG: 0.1 INJECTION INTRACARDIAC; INTRAVENOUS at 05:22

## 2020-06-20 RX ADMIN — EPINEPHRINE 1 MG: 0.1 INJECTION INTRACARDIAC; INTRAVENOUS at 05:53

## 2020-06-20 RX ADMIN — FENTANYL CITRATE 50 MCG: 50 INJECTION INTRAMUSCULAR; INTRAVENOUS at 19:29

## 2020-06-20 RX ADMIN — EPINEPHRINE 15 MCG/MIN: 1 INJECTION, SOLUTION, CONCENTRATE INTRAVENOUS at 11:17

## 2020-06-20 RX ADMIN — EPINEPHRINE 1 MG: 0.1 INJECTION INTRACARDIAC; INTRAVENOUS at 05:27

## 2020-06-20 RX ADMIN — SODIUM CHLORIDE, SODIUM GLUCONATE, SODIUM ACETATE, POTASSIUM CHLORIDE, MAGNESIUM CHLORIDE, SODIUM PHOSPHATE, DIBASIC, AND POTASSIUM PHOSPHATE 1000 ML: .53; .5; .37; .037; .03; .012; .00082 INJECTION, SOLUTION INTRAVENOUS at 11:02

## 2020-06-20 RX ADMIN — CALCIUM CHLORIDE 1 G: 100 INJECTION, SOLUTION INTRAVENOUS; INTRAVENTRICULAR at 15:40

## 2020-06-20 RX ADMIN — Medication 0.1 MG/KG/HR: at 09:02

## 2020-06-20 RX ADMIN — VASOPRESSIN 0.04 UNITS/MIN: 20 INJECTION INTRAVENOUS at 21:48

## 2020-06-20 RX ADMIN — Medication 0.2 MG/KG/HR: at 21:47

## 2020-06-20 RX ADMIN — CHLORHEXIDINE GLUCONATE 0.12% ORAL RINSE 15 ML: 1.2 LIQUID ORAL at 09:02

## 2020-06-20 RX ADMIN — HYDROCORTISONE SODIUM SUCCINATE 100 MG: 100 INJECTION, POWDER, FOR SOLUTION INTRAMUSCULAR; INTRAVENOUS at 09:02

## 2020-06-20 RX ADMIN — SODIUM BICARBONATE 50 MEQ: 84 INJECTION INTRAVENOUS at 05:13

## 2020-06-20 RX ADMIN — FENTANYL CITRATE 50 MCG: 50 INJECTION INTRAMUSCULAR; INTRAVENOUS at 22:07

## 2020-06-20 RX ADMIN — ENOXAPARIN SODIUM 40 MG: 40 INJECTION SUBCUTANEOUS at 09:02

## 2020-06-20 RX ADMIN — SODIUM CHLORIDE, SODIUM GLUCONATE, SODIUM ACETATE, POTASSIUM CHLORIDE, MAGNESIUM CHLORIDE, SODIUM PHOSPHATE, DIBASIC, AND POTASSIUM PHOSPHATE 1000 ML: .53; .5; .37; .037; .03; .012; .00082 INJECTION, SOLUTION INTRAVENOUS at 08:29

## 2020-06-20 RX ADMIN — MIDAZOLAM 5 MG: 1 INJECTION INTRAMUSCULAR; INTRAVENOUS at 08:48

## 2020-06-20 RX ADMIN — EPINEPHRINE 18 MCG/MIN: 1 INJECTION, SOLUTION, CONCENTRATE INTRAVENOUS at 13:47

## 2020-06-20 RX ADMIN — SODIUM CHLORIDE, SODIUM GLUCONATE, SODIUM ACETATE, POTASSIUM CHLORIDE, MAGNESIUM CHLORIDE, SODIUM PHOSPHATE, DIBASIC, AND POTASSIUM PHOSPHATE 500 ML: .53; .5; .37; .037; .03; .012; .00082 INJECTION, SOLUTION INTRAVENOUS at 19:27

## 2020-06-20 RX ADMIN — EPINEPHRINE 11 MCG/MIN: 1 INJECTION, SOLUTION, CONCENTRATE INTRAVENOUS at 07:50

## 2020-06-20 RX ADMIN — VASOPRESSIN 0.04 UNITS/MIN: 20 INJECTION INTRAVENOUS at 13:07

## 2020-06-20 RX ADMIN — SODIUM CHLORIDE 30 MCG/MIN: 0.9 INJECTION, SOLUTION INTRAVENOUS at 17:44

## 2020-06-20 RX ADMIN — METRONIDAZOLE 500 MG: 500 INJECTION, SOLUTION INTRAVENOUS at 17:36

## 2020-06-20 RX ADMIN — EPINEPHRINE 1 MG: 0.1 INJECTION INTRACARDIAC; INTRAVENOUS at 05:30

## 2020-06-20 RX ADMIN — Medication 100 MCG/HR: at 17:37

## 2020-06-20 RX ADMIN — VECURONIUM BROMIDE 10 MG: 1 INJECTION, POWDER, LYOPHILIZED, FOR SOLUTION INTRAVENOUS at 05:37

## 2020-06-20 RX ADMIN — HYDROCORTISONE SODIUM SUCCINATE 50 MG: 100 INJECTION, POWDER, FOR SOLUTION INTRAMUSCULAR; INTRAVENOUS at 17:35

## 2020-06-20 RX ADMIN — HEPARIN SODIUM 7200 UNITS: 1000 INJECTION INTRAVENOUS; SUBCUTANEOUS at 20:08

## 2020-06-20 RX ADMIN — Medication 50 MCG/HR: at 08:12

## 2020-06-20 RX ADMIN — SODIUM CHLORIDE 30 MCG/MIN: 0.9 INJECTION, SOLUTION INTRAVENOUS at 22:10

## 2020-06-20 RX ADMIN — CEFEPIME HYDROCHLORIDE 2000 MG: 2 INJECTION, POWDER, FOR SOLUTION INTRAVENOUS at 21:44

## 2020-06-20 RX ADMIN — SODIUM CHLORIDE 30 MCG/MIN: 0.9 INJECTION, SOLUTION INTRAVENOUS at 07:00

## 2020-06-20 RX ADMIN — SODIUM CHLORIDE, SODIUM GLUCONATE, SODIUM ACETATE, POTASSIUM CHLORIDE, MAGNESIUM CHLORIDE, SODIUM PHOSPHATE, DIBASIC, AND POTASSIUM PHOSPHATE 1000 ML: .53; .5; .37; .037; .03; .012; .00082 INJECTION, SOLUTION INTRAVENOUS at 15:46

## 2020-06-20 RX ADMIN — SODIUM CHLORIDE, SODIUM GLUCONATE, SODIUM ACETATE, POTASSIUM CHLORIDE, MAGNESIUM CHLORIDE, SODIUM PHOSPHATE, DIBASIC, AND POTASSIUM PHOSPHATE 1000 ML: .53; .5; .37; .037; .03; .012; .00082 INJECTION, SOLUTION INTRAVENOUS at 13:45

## 2020-06-20 RX ADMIN — CALCIUM CHLORIDE 1 G: 100 INJECTION PARENTERAL at 05:28

## 2020-06-20 RX ADMIN — EPINEPHRINE 15 MCG/MIN: 1 INJECTION, SOLUTION, CONCENTRATE INTRAVENOUS at 19:54

## 2020-06-20 RX ADMIN — SODIUM BICARBONATE 100 MEQ: 84 INJECTION INTRAVENOUS at 05:28

## 2020-06-20 RX ADMIN — MIDAZOLAM 4 MG: 1 INJECTION INTRAMUSCULAR; INTRAVENOUS at 15:05

## 2020-06-20 RX ADMIN — SODIUM CHLORIDE 30 MCG/MIN: 0.9 INJECTION, SOLUTION INTRAVENOUS at 12:54

## 2020-06-20 RX ADMIN — SODIUM CHLORIDE, SODIUM GLUCONATE, SODIUM ACETATE, POTASSIUM CHLORIDE, MAGNESIUM CHLORIDE, SODIUM PHOSPHATE, DIBASIC, AND POTASSIUM PHOSPHATE 100 ML/HR: .53; .5; .37; .037; .03; .012; .00082 INJECTION, SOLUTION INTRAVENOUS at 09:43

## 2020-06-20 RX ADMIN — FENTANYL CITRATE 50 MCG: 50 INJECTION INTRAMUSCULAR; INTRAVENOUS at 08:00

## 2020-06-20 RX ADMIN — ALBUMIN (HUMAN) 25 G: 12.5 INJECTION, SOLUTION INTRAVENOUS at 15:43

## 2020-06-20 RX ADMIN — EPINEPHRINE 1 MG: 0.1 INJECTION INTRACARDIAC; INTRAVENOUS at 05:14

## 2020-06-20 RX ADMIN — EPINEPHRINE 19 MCG/MIN: 1 INJECTION, SOLUTION, CONCENTRATE INTRAVENOUS at 16:59

## 2020-06-20 RX ADMIN — VASOPRESSIN 0.04 UNITS/MIN: 20 INJECTION INTRAVENOUS at 08:15

## 2020-06-20 RX ADMIN — CEFEPIME HYDROCHLORIDE 2000 MG: 2 INJECTION, POWDER, FOR SOLUTION INTRAVENOUS at 09:34

## 2020-06-20 RX ADMIN — MIDAZOLAM 1 MG/HR: 5 INJECTION INTRAMUSCULAR; INTRAVENOUS at 23:05

## 2020-06-20 NOTE — PROGRESS NOTES
Thoracic Surgery Attending Note:    Spoke with the daughter and updated her about events overnight  All questions were answered   I will continue to update her through the day    Maritza Chavis MD  Thoracic Surgery  (Available on [employer] Text)  Office: [phone]

## 2020-06-20 NOTE — CONSULTS
Consult - Surgical Critical Care  Tess Lanza 71 y o  male MRN: [id number]  Unit/Bed#: ICU 02 Encounter: [id number]     Physician Requesting Consult: Radhika Mayo MD  Consults     Reason for Consultation / Chief Complaint:   Acute hypoxic respiratory failure, cardiac arrest, shock, and surgical airway following unsuccessful attempt to secure with oral endotracheal tube  History of Present Illness:  Tess Lanza is a 71 y o  male is now POD#10 following VATS LULobectomy complicated by prolonged air leak and significant subcutaneous emphysema  He was a rapid response on 2 separate occasions overnight for respiratory distress and desaturation  Despite increase in supplemental oxygen following first rapid response failed to improve  He eventually went unresponsive, bradycardic, and ultimately pulseless  CPR and ACLS protocol initiated until ROSC  Attempts made to secure airway with oral endotracheal tube unsuccessful resulting in emergent bedside cricothyroidotomy and placement of 6 0 ET tube  Post intubation with frothy aspirated secretions that was suctioned  He was transported to the ICU with secure airway after confirmation and need of vasopressors to maintain MAP>65  His L chest tube remains on suction and has chest wall VAC to suction for his extensive subcutaneous emphysema  His vasopressor requirements have escalated since arrival and is now on Epinephirine/Levophed/Vaso  Unable to acquired full exam since intubated and sedated   History obtained from chart review      Past Medical History:  Past Medical History:   Diagnosis Date    Alcohol abuse 3/27/2020    Chest pain     Community acquired pneumonia 3/27/2020    Hypertension     Left upper lobe pulmonary nodule     Malignant neoplasm of upper lobe of left lung ([address] ) 5/7/2020        Past Surgical History:  Past Surgical History:   Procedure Laterality Date    COLON SURGERY      CT NEEDLE BIOPSY LUNG  4/29/2020    [place] [address] N/A 6/10/2020    Procedure: BRONCHOSCOPY FLEXIBLE;  Surgeon: Eloina Corbin MD;  [facility];  Service: Thoracic    [place] MEDIASTINOSCOPY WITH LYMPH NODE BIOPSY/IES N/A 6/10/2020    Procedure: MEDIASTINOSCOPY;  Surgeon: Eloina Corbin MD;  [facility];  Service: Thoracic    ME THORACOSCOPY SURG LOBECTOMY Left 6/10/2020    Procedure: THORACOSCOPY VIDEO ASSISTED SURGERY (VATS); Surgeon: Eloina Corbin MD;  [facility];  Service: Thoracic    ME THORACOSCOPY SURG LOBECTOMY Left 6/10/2020    Procedure: UPPER LOBECTOMY OF LUNG; MEDIASTINAL  LYMPH NODE DISSECTION;  Surgeon: Eloina Corbin MD;  [facility];  Service: Thoracic    WRIST SURGERY Right     orif        Past Family History:  Family History   Problem Relation Age of Onset    Ovarian cancer Mother     Liver cancer Father         Social History:  E-Cigarette/Vaping    E-Cigarette Use Never User      E-Cigarette/Vaping Substances    Nicotine No     THC No     CBD No     Flavoring No     Other No     Unknown No      Social History     Tobacco Use   Smoking Status Former Smoker    Packs/day: 1 00    Years: 55 00    Pack years: 55 00    Types: Cigarettes   Smokeless Tobacco Never Used     Social History     Substance and Sexual Activity   Alcohol Use Yes    Frequency: 4 or more times a week    Comment: "we cant tell how much"  per pt daughter      Social History     Substance and Sexual Activity   Drug Use Never     Marital Status: Single     Home Medications:   Prior to Admission medications    Medication Sig Start Date End Date Taking?  Authorizing Provider   amLODIPine (NORVASC) 5 mg tablet Take 1 tablet (5 mg total) by mouth daily 3/29/20  Yes Ketan Frias,    calcium carbonate (TUMS) 500 mg chewable tablet Chew 1 tablet as needed    Yes Historical Provider, MD   acetaminophen (TYLENOL) 325 mg tablet Take 2 tablets (650 mg total) by mouth every 6 (six) hours for 7 days 6/12/20 6/19/20  Ritesh Gonzales PA-C   docusate sodium (COLACE) 100 mg capsule Take 1 capsule (100 mg total) by mouth 2 (two) times a day 6/12/20   Ritesh Gonzales PA-C   gabapentin (NEURONTIN) 300 mg capsule Take 1 capsule (300 mg total) by mouth 3 (three) times a day 6/12/20   Ritesh Gonzales PA-C   oxyCODONE (ROXICODONE) 5 mg immediate release tablet Take 1 tablet (5 mg total) by mouth every 4 (four) hours as needed for severe pain for up to 10 daysMax Daily Amount: 30 mg 6/12/20 6/22/20  Ritesh Gonzales PA-C        Inpatient Medications:  Scheduled Meds:  Current Facility-Administered Medications:  cefepime 2,000 mg Intravenous Q12H Mera Sawant PA-C Last Rate: Stopped (06/20/20 1000)   chlorhexidine 15 mL [employer]it Q12H [id number] [address]    enoxaparin 40 mg Subcutaneous Daily Mera Sawant PA-C    epinephrine 1-20 mcg/min Intravenous Titrated Mera Sawant PA-C Last Rate: 15 mcg/min (06/20/20 1101)   EPINEPHrine PF        fentaNYL 50 mcg/hr Intravenous Continuous Mera Sawant PA-C Last Rate: 50 mcg/hr (06/20/20 0812)   fentanyl citrate (PF) 50 mcg Intravenous Q1H PRN Mera Sawant PA-C    hydrocortisone sodium succinate 50 mg Intravenous Q6H [address] Mera Sawant PA-C    ketamine 0 1 mg/kg/hr Intravenous Continuous Mera Sawant PA-C Last Rate: 0 1 mg/kg/hr (06/20/20 0902)   metroNIDAZOLE 500 mg Intravenous Q8H Mera Sawant PA-C Last Rate: Stopped (06/20/20 1000)   midazolam        multi-electrolyte 1,000 mL Intravenous Once Mera Sawant PA-C    multi-electrolyte 100 mL/hr Intravenous Continuous Mera Sawant PA-C Last Rate: 100 mL/hr (06/20/20 0943)   norepinephrine        norepinephrine 1-30 mcg/min Intravenous Titrated Mera Sawant PA-C Last Rate: 30 mcg/min (06/20/20 0700)   ondansetron 4 mg Intravenous Q6H PRN Mera Sawant PA-C    propofol 5-50 mcg/kg/min Intravenous Titrated Mera Sawant PA-C Last Rate: Stopped (06/20/20 1103) sodium bicarbonate        [START ON 2020] vancomycin 15 mg/kg Intravenous Daily PRN Chantelle Painting MD    vasopressin (PITRESSIN) in 0 9 % sodium chloride 100 mL 0 04 Units/min Intravenous Continuous Ranjana Lynne PA-C Last Rate: 0 04 Units/min (20 0815)     Continuous Infusions:  epinephrine 1-20 mcg/min Last Rate: 15 mcg/min (20 1101)   fentaNYL 50 mcg/hr Last Rate: 50 mcg/hr (20 0812)   ketamine 0 1 mg/kg/hr Last Rate: 0 1 mg/kg/hr (20 0902)   multi-electrolyte 100 mL/hr Last Rate: 100 mL/hr (20 0943)   norepinephrine 1-30 mcg/min Last Rate: 30 mcg/min (20 0700)   propofol 5-50 mcg/kg/min Last Rate: Stopped (20 1103)   vasopressin (PITRESSIN) in 0 9 % sodium chloride 100 mL 0 04 Units/min Last Rate: 0 04 Units/min (20 0815)     PRN Meds:    fentanyl citrate (PF) 50 mcg Q1H PRN   ondansetron 4 mg Q6H PRN   [START ON 2020] vancomycin 15 mg/kg Daily PRN        Allergies:  No Known Allergies     ROS:  Unable to complete full ROS since intubated and sedated  Review of Systems   Constitutional:        Severe distress  Intubated and sedated   HENT:        + cricothyroidotomy with 6 0 ET tube secured in place    Respiratory: Positive for shortness of breath  Skin:        Extensive subcutaneous emphysema         Vitals:  Vitals:    20 0800 20 0900 20 1000 20 1100   BP:       BP Location:       Pulse: (!) 110 (!) 130 (!) 130 (!) 120   Resp: (!) 24 (!) 37 (!) 31 (!) 25   Temp:  99 °F (37 2 °C) 99 3 °F (37 4 °C) 99 3 °F (37 4 °C)   TempSrc:  Oral     SpO2:  97%  100%   Weight:       Height:         Temperature:   Temp (24hrs), Av 7 °F (37 1 °C), Min:97 3 °F (36 3 °C), Max:99 3 °F (37 4 °C)    Current Temperature: 99 3 °F (37 4 °C)     Weights:   IBW: 73 kg  Body mass index is 29 83 kg/m²       Non-Invasive/Invasive Ventilation Settings:  Respiratory    Lab Data (Last 4 hours)       0929            pH, Arterial 7 190           pCO2, Arterial       65 5           pO2, Arterial       144 0           HCO3, Arterial       24 5           Base Excess, Arterial       -5 0                O2/Vent Data           Most Recent         Vent Mode   AC/PC      Resp Rate (BPM) (BPM)   24      Pressure Control (cmH2O) (cm)   22      Insp Time (sec) (sec)   1 15      FiO2 (%) (%)   70      PEEP (cmH2O) (cmH2O)   12      MV   16 1                Lab Results   Component Value Date    PHART 7 190 (LL) 06/20/2020    EIQ2QJS 65 5 (HH) 06/20/2020    PO2ART 144 0 (H) 06/20/2020    WCC4HDV 24 5 06/20/2020    BEART -5 0 06/20/2020    SOURCE Line, Arterial 06/20/2020          Physical Exam:  Physical Exam   Constitutional: He appears distressed  Face, neck, and truncal subcutaneous emphysema    HENT:   Head: Normocephalic and atraumatic  Eyes: Pupils are equal, round, and reactive to light  Conjunctivae are normal    Neck: Neck supple    + 6 0 ET tube  Subcutaneous emphysema  Cardiovascular: Regular rhythm  Sinus Tachycardic   Pulmonary/Chest: He is in respiratory distress  + mechanical ventilation  L chest tube to suction with intermittent air leak    Abdominal: Soft  He exhibits no distension and no mass  Genitourinary:   Genitourinary Comments: + norwood   Neurological: No sensory deficit  Sedated  + localizes pain  GCS 7T   Skin: Skin is warm and dry          Labs:  Results from last 7 days   Lab Units 06/20/20  0647 06/20/20  0534 06/20/20  0508 06/17/20  0518 06/15/20  0437   WBC Thousand/uL 31 57*  --   --  8 16 8 74   HEMOGLOBIN g/dL 13 9  --   --  12 3 13 4   I STAT HEMOGLOBIN g/dl  --  11 6* 17 3*  --   --    HEMATOCRIT % 42 1  --   --  37 6 40 2   HEMATOCRIT, ISTAT %  --  34* 51*  --   --    PLATELETS Thousands/uL 295  --   --  268 259   NEUTROS PCT %  --   --   --   --  77*   MONOS PCT %  --   --   --   --  5   MONO PCT % 1*  --   --  3*  --     Results from last 7 days   Lab Units 06/20/20  0647 06/20/20  0534 06/20/20  0508 06/17/20  0518 06/15/20  0437   SODIUM mmol/L 137  --   --  132* 126*   POTASSIUM mmol/L 3 4*  --   --  3 9 3 9   CHLORIDE mmol/L 101  --   --  102 95*   CO2 mmol/L 25  --   --  22 22   BUN mg/dL 14  --   --  12 14   CREATININE mg/dL 1 41*  --   --  0 81 0 75   CALCIUM mg/dL 10 0  --   --  8 6 8 4   ALK PHOS U/L 86  --   --   --   --    ALT U/L 172*  --   --   --   --    AST U/L 116*  --   --   --   --    GLUCOSE, ISTAT mg/dl  --  264* 221*  --   --      Results from last 7 days   Lab Units 06/20/20  0647   MAGNESIUM mg/dL 2 5     Results from last 7 days   Lab Units 06/20/20  0647   PHOSPHORUS mg/dL 8 9*          Results from last 7 days   Lab Units 06/20/20  0928   LACTIC ACID mmol/L 4 7*     0   Lab Value Date/Time    TROPONINI 2 86 (H) 06/20/2020 0928    TROPONINI 0 85 (H) 06/20/2020 0647    TROPONINI <0 02 03/28/2020 0451    TROPONINI <0 02 03/28/2020 0031    TROPONINI <0 02 03/27/2020 2026        Imaging:  I have personally reviewed pertinent films in PACS  EKG: Pen  Micro:  Lab Results   Component Value Date    BLOODCX No Growth After 5 Days  03/27/2020    BLOODCX No Growth After 5 Days  03/27/2020       Assessment: 72 y/o M s/p VATS for LULobectomy complicated by prolonged air leak and subcutaneous emphysema went into cardiorespiratory arrest with ROSC is now intubated for acute hypoxic respiratory failure, aspiration pneumonia, shock, elevated troponin         Plan:                  Neuro:   Propofol/Fentanyl for sedation and analgesia  Transition to Versed and/or Ketamine gtt if unable to maintain adequate sedation due to hypotension from propofol  CV:   Maintain MAP>65  Titrate pressor requirements as able starting with Epinephrine  Start deescalating levophed once Epinephrine at 5mcg/min for predominant Beta effect if shock state is cardiogenic in origin     Initiate stress dose steroids   Trend lactate and base deficit for goal directed resuscitation   TTE   Trend Troponin EKG                 Lung:   Maintain sats>92   AC/PC: Pi:22 FIO2:90 PEEP:15  Titrate vent requirements as able starting with FIO2  ABG prn  VAP bundle  Maintain L chest tube to suction and chest wall VAC to negative suction  Management per thoracic surgery  Monitor output                 GI:   NPO/NGT   Stress ulcer prophylaxis   Hold TF baring high pressor requirements                  FEN:   Isolyte @ 100cc/hr   Replete electrolytes prn                  :   Maintain norwood   Monitor uop closely                  ID:   Elevated Procalcitonin  Trend   Empiric antibiotic coverage for aspiration pneumonia   Vanc/Cefepime/Flagyl   Follow up cultures   Tylenol prn                  Heme:   No acute issues  DVT prophylaxis                  Endo:   Monitor glucose prn   Stress dose steroids                  Disposition: Critically ill  To remain in ICU with goal of stabilizing his hemodynamic instability and respiratory status  Transition to standard tracheostomy to be done once stable  VTE Pharmacologic Prophylaxis: Enoxaparin (Lovenox)  VTE Mechanical Prophylaxis: sequential compression device     Invasive lines and devices: Invasive Devices     Central Venous Catheter Line            CVC Central Lines 06/20/20 less than 1 day          Peripheral Intravenous Line            Peripheral IV 06/18/20 Dorsal (posterior); Right Forearm 2 days    Peripheral IV 06/20/20 Distal;Right;Upper;Ventral (anterior) Arm less than 1 day          Arterial Line            Arterial Line 06/20/20 Radial less than 1 day          Drain            Chest Tube 1 Left Pleural 28 Fr  9 days    NG/OG/Enteral Tube Nasogastric 12 Fr Right nares less than 1 day    Urethral Catheter 16 Fr  less than 1 day          Airway            ETT  6 mm less than 1 day                 Code Status: Level 1 - Full Code  POA: Yes  POLST:       Given critical illness, patient length of stay will require greater than two midnights       Counseling / Coordination of Care  Total Critical Care time spent 80 minutes excluding procedures, teaching and family updates  Portions of the record may have been created with voice recognition software  Occasional wrong word or "sound a like" substitutions may have occurred due to the inherent limitations of voice recognition software  Read the chart carefully and recognize, using context, where substitutions have occurred          Dianelys Whitfield

## 2020-06-20 NOTE — PROGRESS NOTES
Vancomycin IV Pharmacy-to-Dose Consultation    Joaquin Claros is a 71 y o  male who is currently receiving Vancomycin IV with management by the Pharmacy Consult service  Relevant clinical data and objective history reviewed:  Temp Readings from Last 3 Encounters:   06/19/20 99 °F (37 2 °C) (Oral)   05/21/20 98 7 °F (37 1 °C)   04/29/20 97 6 °F (36 4 °C) (Tympanic)     /76   Pulse (!) 140   Temp 99 °F (37 2 °C) (Oral)   Resp (!) 23   Ht 5' 10" (1 778 m)   Wt 94 3 kg (207 lb 14 3 oz)   SpO2 92%   BMI 29 83 kg/m²     I/O last 3 completed shifts: In: 1240 [P O :1240]  Out: 8698 [Urine:3200; Chest Tube:20]    Lab Results   Component Value Date/Time    BUN 14 06/20/2020 06:47 AM    WBC 31 57 (HH) 06/20/2020 06:47 AM    HGB 13 9 06/20/2020 06:47 AM    HCT 42 1 06/20/2020 06:47 AM    MCV 95 06/20/2020 06:47 AM     06/20/2020 06:47 AM     Creatinine   Date Value Ref Range Status   06/20/2020 1 41 (H) 0 60 - 1 30 mg/dL Final     Comment:     Standardized to IDMS reference method   06/17/2020 0 81 0 60 - 1 30 mg/dL Final     Comment:     Standardized to IDMS reference method     Vancomycin Assessment:  Indication: Pneumonia    Status: critically ill s/p cardiac arrest  Renal Function: Scr= 1 41 (most recent Scr b/t 0 7-0 9)  Potential Nephrotoxicity Factors:  Medications: vasopressors  Days of Therapy: 1  Current Dose: 2000 mg x1   Goal Trough: 15-20 (appropriate for most indications)   Goal AUC(24h): 400-600    Vancomycin Plan:  New Dosing: change to 1500 mg daily PRN when random level < 20   Next Level: random level 6/21 with AM labs    Pharmacy will continue to follow closely for s/sx of nephrotoxicity, infusion reactions and appropriateness of therapy  BMP and CBC will be ordered per protocol  We will continue to follow the patient's culture results and clinical progress daily       Yaw HuD  Clinical Pharmacist, Emergency Medicine

## 2020-06-20 NOTE — RAPID RESPONSE
Progress Note - Rapid Response   Carito Perez 71 y o  male MRN: [id number]    Time Called ( Time): 5115  Date Called:  06/20/2020  Level of Care: SD2  Room#:  [id number]  [id number] Time ( Time): 4041  Event End Time ( Time): 0500  Primary reason for call: Other Respiratory distress  Interventions:  Airway/Breathing:  O2 Mask/Nasal  Circulation: N/A  Other Treatments: N/A       Assessment:   1  Respiratory distress, Acute hypoxic respiratory failure, status post thoracoscopic left upper lobectomy with chest VAC/prolonged air leak    Plan:   · Upgraded to SD  Continue to closely monitor respiratory status  Consider hi flow nasal cannula for respiratory distress  · Maintain chest tube and VAC to suction per thoracic surgery       HPI/Chief Complaint (Background/Situation):   Carito Perez is a 71y o  year old male with past medical history of 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 status post thoracoscopic left upper lobectomy with chest VAC, prolonged air leak  Rapid response called for sudden onset dyspnea  Placed with adequate O2 saturation however placed on non-rebreather for increased WOB  Assessed by critical care and primary surgical team at the bedside  Stat chest x-ray was obtained which appeared to be unchanged from prior chest x-ray  Chest tube in place to water seal  Patient with notable bilateral subcutaneous emphysema of the neck/anterior upper thorax, dysphonia which apparently has been present for past several days   Upgraded to SD unit for further close monitoring     Historical Information   Past Medical History:   Diagnosis Date    Alcohol abuse 3/27/2020    Chest pain     Community acquired pneumonia 3/27/2020    Hypertension     Left upper lobe pulmonary nodule     Malignant neoplasm of upper lobe of left lung ([address] ) 5/7/2020     Past Surgical History:   Procedure Laterality Date    COLON SURGERY      CT NEEDLE BIOPSY LUNG  4/29/2020    [place] [address] N/A 6/10/2020    Procedure: BRONCHOSCOPY FLEXIBLE;  Surgeon: Sb Daniels MD;  [facility];  Service: Thoracic    [place] MEDIASTINOSCOPY WITH LYMPH NODE BIOPSY/IES N/A 6/10/2020    Procedure: MEDIASTINOSCOPY;  Surgeon: Sb Daniels MD;  [facility];  Service: Thoracic    PA THORACOSCOPY SURG LOBECTOMY Left 6/10/2020    Procedure: THORACOSCOPY VIDEO ASSISTED SURGERY (VATS);   Surgeon: Sb Daniels MD;  [facility];  Service: Thoracic    PA THORACOSCOPY SURG LOBECTOMY Left 6/10/2020    Procedure: UPPER LOBECTOMY OF LUNG; MEDIASTINAL  LYMPH NODE DISSECTION;  Surgeon: Sb Daniels MD;  [facility];  Service: Thoracic    WRIST SURGERY Right     orif     Social History   Social History     Substance and Sexual Activity   Alcohol Use Yes    Frequency: 4 or more times a week    Comment: "we cant tell how much"  per pt daughter      Social History     Substance and Sexual Activity   Drug Use Never     Social History     Tobacco Use   Smoking Status Former Smoker    Packs/day: 1 00    Years: 55 00    Pack years: 55 00    Types: Cigarettes   Smokeless Tobacco Never Used     Family History:   Family History   Problem Relation Age of Onset    Ovarian cancer Mother     Liver cancer Father        Meds/Allergies     Current Facility-Administered Medications:  [MAR Hold] acetaminophen 975 mg Oral Q6H Maria Luz Jo PA-C   [MAR Hold] amLODIPine 5 mg Oral Daily Hector Pretty PA-C   [facility] Hold] calcium carbonate 500 mg Oral TID PRN Denise Chang MD   [facility] Hold] docusate sodium 100 mg Oral BID Hector Pretty PA-C   [facility] Hold] enoxaparin 40 mg Subcutaneous Daily Maria Luz Jo PA-C   EPINEPHrine PF       fentanyl citrate (PF)       [MAR Hold] gabapentin 300 mg Oral TID Hector Pretty PA-C   norepinephrine       [MAR Hold] ondansetron 4 mg Intravenous Q6H PRN Maria Luz Jo PA-C   [facility] Hold] oxyCODONE 2 5 mg Oral Q4H PRN Tamica Cruz PA-C   [facility] Hold] oxyCODONE 5 mg Oral Q4H PRN Tamica Cruz PA-C   [facility] Hold] pantoprazole 40 mg Oral Early Morning Tamica Cruz PA-C   [facility] Hold] polyethylene glycol 17 g Oral Daily Maria Luz Jo PA-C   [facility] Hold] senna 1 tablet Oral Daily Maria Luz Jo PA-C   sodium bicarbonate                No Known Allergies    Physical Exam:  Gen:  Distressed, AOx3  HEENT:  NC/AT  Neck:  Supple, subcu emphysema  Chest:  Tachypneic, subcutaneous emphysema present  Cor:  Positive S1/S2, no MRG  Abd:  Soft, nontender, BS positive x4  Ext:  Moving all extremities spontaneously  Neuro:  AO x3, motor and sensation grossly intact  Skin:  No rashes, wounds apparent      Intake/Output Summary (Last 24 hours) at 6/20/2020 0719  Last data filed at 6/19/2020 2200  Gross per 24 hour   Intake 520 ml   Output 2245 ml   Net -1725 ml       Respiratory    Lab Data (Last 4 hours)      06/20 0647            pH, Arterial       7 278           pCO2, Arterial       41 5           pO2, Arterial       71 8           HCO3, Arterial       19 0           Base Excess, Arterial       -7 4                O2/Vent Data       06/20 0638   Most Recent         Vent Mode AC/PC  AC/PC      Patient safety screen outcome:   Failed      Resp Rate (BPM) (BPM) 24  24      Pressure Control (cmH2O) (cm) 24  24      Insp Time (sec) (sec) 1 25  1 25      FiO2 (%) (%) 100  100      PEEP (cmH2O) (cmH2O) 16  16      MV 16 1  13 2                Invasive Devices     Peripheral Intravenous Line            Peripheral IV 06/18/20 Dorsal (posterior); Right Forearm 2 days          Drain            Chest Tube 1 Left Pleural 28 Fr  9 days          Airway            ETT  6 mm less than 1 day                DIAGNOSTIC DATA:    Lab: I have personally reviewed pertinent lab results     CBC:   Results from last 7 days   Lab Units 06/20/20  0534  06/17/20  0518   WBC Thousand/uL  --   --  8 16   HEMOGLOBIN g/dL  --   --  12 3   I STAT HEMOGLOBIN g/dl 11 6* < >  --    HEMATOCRIT %  --   --  37 6   HEMATOCRIT, ISTAT % 34*   < >  --    PLATELETS Thousands/uL  --   --  268    < > = values in this interval not displayed  CMP:   Results from last 7 days   Lab Units 06/20/20  0534 06/20/20  0508 06/17/20  0518 06/15/20  0437   POTASSIUM mmol/L  --   --  3 9 3 9   CHLORIDE mmol/L  --   --  102 95*   CO2 mmol/L  --   --  22 22   BUN mg/dL  --   --  12 14   CREATININE mg/dL  --   --  0 81 0 75   CALCIUM mg/dL  --   --  8 6 8 4   GLUCOSE, ISTAT mg/dl 264* 221*  --   --      PT/INR:   No results found for: PT, INR,   Magnesium: No components found for: MAG,   Phosphorous: No results found for: PHOS    Microbiology:  Lab Results   Component Value Date    BLOODCX No Growth After 5 Days  03/27/2020    BLOODCX No Growth After 5 Days  03/27/2020         OUTCOME:   Transfer to step down unit  Family notified of transfer: yes, see thoracic documentation from later this AM  Family member contacted: Yes, daughter per thoracic surgery  Code Status: Level 1 - Full Code  Critical Care Time: Total Critical Care time spent 30 minutes excluding procedures, teaching and family updates

## 2020-06-20 NOTE — PROGRESS NOTES
Progress Note - Thoracic Surgery   Shelbi Puckett 71 y o  male MRN: [id number]  Unit/Bed#: [facility] 410-01 Encounter: [id number]    Assessment:  71 M status post thoracoscopic left upper lobectomy with prolonged air leak, chest VAC    L CT H2O 20cc drainage +AL @ 850cc  Obtained CXR as pt stated he was SOB  It appears the same as previous CXR 3 days ago  CT in place, air leak from 800-1L per minute  This has been consistent for 72 hours according to the opaz recording  Despite education provided yesterday, patient required ongoing reassurance  His respirations are non-labored, he is breathing room air, and measured SpO2 is 98%    Plan:  · Diet as tolerated  · Continue chest tube to water seal  · Continue VAC  · Pulmonary toilet  · Out of bed and ambulate  · DVT prophylaxis    Subjective/Objective     Subjective: No acute events  Patient remains frustrated with his clinical course  I spent a significant amount of time with the patient overnight and again this morning  Objective:    Blood pressure 137/72, pulse 78, temperature 99 °F (37 2 °C), temperature source Oral, resp  rate 18, height 5' 10" (1 778 m), weight 94 3 kg (207 lb 14 3 oz), SpO2 98 %  ,Body mass index is 29 83 kg/m²  Intake/Output Summary (Last 24 hours) at 6/20/2020 0300  Last data filed at 6/19/2020 2200  Gross per 24 hour   Intake 760 ml   Output 2245 ml   Net -1485 ml       Invasive Devices     Peripheral Intravenous Line            Peripheral IV 06/18/20 Dorsal (posterior); Right Forearm 1 day          Drain            Chest Tube 1 Left Pleural 28 Fr  9 days                Physical Exam:   GEN: NAD, diffuse subQ emphysema  CV: warm/well perfused  Lung: normal effort  L CT H2O +AL  Left Chest with VAC dressing  Ab: Soft, NT/ND  Extrem: No CCE  Neuro:  A+Ox3, motor and sensation grossly intact      Results from last 7 days   Lab Units 06/17/20  0518 06/15/20  0437   WBC Thousand/uL 8 16 8 74   HEMOGLOBIN g/dL 12 3 13 4   HEMATOCRIT % 37 6 40 2   PLATELETS Thousands/uL 268 259     Results from last 7 days   Lab Units 06/17/20  0518 06/15/20  0437   POTASSIUM mmol/L 3 9 3 9   CHLORIDE mmol/L 102 95*   CO2 mmol/L 22 22   BUN mg/dL 12 14   CREATININE mg/dL 0 81 0 75   CALCIUM mg/dL 8 6 8 4

## 2020-06-20 NOTE — PLAN OF CARE
Problem: CARDIOVASCULAR - ADULT  Goal: Maintains optimal cardiac output and hemodynamic stability  Description  INTERVENTIONS:  - Monitor I/O, vital signs and rhythm  - Monitor for S/S and trends of decreased cardiac output  - Administer and titrate ordered vasoactive medications to optimize hemodynamic stability  - Assess quality of pulses, skin color and temperature  - Assess for signs of decreased coronary artery perfusion  - Instruct patient to report change in severity of symptoms  Outcome: Progressing  Goal: Absence of cardiac dysrhythmias or at baseline rhythm  Description  INTERVENTIONS:  - Continuous cardiac monitoring, vital signs, obtain 12 lead EKG if ordered  - Administer antiarrhythmic and heart rate control medications as ordered  - Monitor electrolytes and administer replacement therapy as ordered  Outcome: Progressing     Problem: RESPIRATORY - ADULT  Goal: Achieves optimal ventilation and oxygenation  Description  INTERVENTIONS:  - Assess for changes in respiratory status  - Assess for changes in mentation and behavior  - Position to facilitate oxygenation and minimize respiratory effort  - Oxygen administered by appropriate delivery if ordered  - Initiate smoking cessation education as indicated  - Encourage broncho-pulmonary hygiene including cough, deep breathe, Incentive Spirometry  - Assess the need for suctioning and aspirate as needed  - Assess and instruct to report SOB or any respiratory difficulty  - Respiratory Therapy support as indicated  Outcome: Progressing     Problem: SKIN/TISSUE INTEGRITY - ADULT  Goal: Skin integrity remains intact  Description  INTERVENTIONS  - Identify patients at risk for skin breakdown  - Assess and monitor skin integrity  - Assess and monitor nutrition and hydration status  - Monitor labs (i e  albumin)  - Assess for incontinence   - Turn and reposition patient  - Assist with mobility/ambulation  - Relieve pressure over bony prominences  - Avoid friction and shearing  - Provide appropriate hygiene as needed including keeping skin clean and dry  - Evaluate need for skin moisturizer/barrier cream  - Collaborate with interdisciplinary team (i e  Nutrition, Rehabilitation, etc )   - Patient/family teaching  Outcome: Progressing  Goal: Incision(s), wounds(s) or drain site(s) healing without S/S of infection  Description  INTERVENTIONS  - Assess and document risk factors for skin impairment   - Assess and document dressing, incision, wound bed, drain sites and surrounding tissue  - Consider nutrition services referral as needed  - Oral mucous membranes remain intact  - Provide patient/ family education  Outcome: Progressing  Goal: Oral mucous membranes remain intact  Description  INTERVENTIONS  - Assess oral mucosa and hygiene practices  - Implement preventative oral hygiene regimen  - Implement oral medicated treatments as ordered  - Initiate Nutrition services referral as needed  Outcome: Progressing     Problem: MUSCULOSKELETAL - ADULT  Goal: Maintain or return mobility to safest level of function  Description  INTERVENTIONS:  - Assess patient's ability to carry out ADLs; assess patient's baseline for ADL function and identify physical deficits which impact ability to perform ADLs (bathing, care of mouth/teeth, toileting, grooming, dressing, etc )  - Assess/evaluate cause of self-care deficits   - Assess range of motion  - Assess patient's mobility  - Assess patient's need for assistive devices and provide as appropriate  - Encourage maximum independence but intervene and supervise when necessary  - Involve family in performance of ADLs  - Assess for home care needs following discharge   - Consider OT consult to assist with ADL evaluation and planning for discharge  - Provide patient education as appropriate  Outcome: Progressing  Goal: Maintain proper alignment of affected body part  Description  INTERVENTIONS:  - Support, maintain and protect limb and body alignment  - Provide patient/ family with appropriate education  Outcome: Progressing     Problem: Potential for Falls  Goal: Patient will remain free of falls  Description  INTERVENTIONS:  - Assess patient frequently for physical needs  -  Identify cognitive and physical deficits and behaviors that affect risk of falls  -  Mayo fall precautions as indicated by assessment   - Educate patient/family on patient safety including physical limitations  - Instruct patient to call for assistance with activity based on assessment  - Modify environment to reduce risk of injury  - Consider OT/PT consult to assist with strengthening/mobility  Outcome: Progressing     Problem: Prexisting or High Potential for Compromised Skin Integrity  Goal: Skin integrity is maintained or improved  Description  INTERVENTIONS:  - Identify patients at risk for skin breakdown  - Assess and monitor skin integrity  - Assess and monitor nutrition and hydration status  - Monitor labs   - Assess for incontinence   - Turn and reposition patient  - Assist with mobility/ambulation  - Relieve pressure over bony prominences  - Avoid friction and shearing  - Provide appropriate hygiene as needed including keeping skin clean and dry  - Evaluate need for skin moisturizer/barrier cream  - Collaborate with interdisciplinary team   - Patient/family teaching  - Consider wound care consult   Outcome: Progressing     Problem: Nutrition/Hydration-ADULT  Goal: Nutrient/Hydration intake appropriate for improving, restoring or maintaining nutritional needs  Description  Monitor and assess patient's nutrition/hydration status for malnutrition  Collaborate with interdisciplinary team and initiate plan and interventions as ordered  Monitor patient's weight and dietary intake as ordered or per policy  Utilize nutrition screening tool and intervene as necessary  Determine patient's food preferences and provide high-protein, high-caloric foods as appropriate  INTERVENTIONS:  - Monitor oral intake, urinary output, labs, and treatment plans  - Assess nutrition and hydration status and recommend course of action  - Evaluate amount of meals eaten  - Assist patient with eating if necessary   - Allow adequate time for meals  - Recommend/ encourage appropriate diets, oral nutritional supplements, and vitamin/mineral supplements  - Order, calculate, and assess calorie counts as needed  - Recommend, monitor, and adjust tube feedings and TPN/PPN based on assessed needs  - Assess need for intravenous fluids  - Provide specific nutrition/hydration education as appropriate  - Include patient/family/caregiver in decisions related to nutrition  Outcome: Progressing     Problem: SAFETY,RESTRAINT: NV/NON-SELF DESTRUCTIVE BEHAVIOR  Goal: Remains free of harm/injury (restraint for non violent/non self-detsructive behavior)  Description  INTERVENTIONS:  - Instruct patient/family regarding restraint use   - Assess and monitor physiologic and psychological status   - Provide interventions and comfort measures to meet assessed patient needs   - Identify and implement measures to help patient regain control  - Assess readiness for release of restraint   Outcome: Progressing  Goal: Returns to optimal restraint-free functioning  Description  INTERVENTIONS:  - Assess the patient's behavior and symptoms that indicate continued need for restraint  - Identify and implement measures to help patient regain control  - Assess readiness for release of restraint   Outcome: Progressing

## 2020-06-20 NOTE — PROGRESS NOTES
06/20/20 [address] [place] [address] Affiliation none   Plan of Care   Comments PT's daughter requested Drew jaquez  Explored hope      Assessment Completed by: Unit visit

## 2020-06-20 NOTE — PROCEDURES
Central Line Insertion  Date/Time: 6/20/2020 7:28 AM  Performed by: Chalo Diallo MD  Authorized by: Chalo Diallo MD     Patient location:  ICU  Other Assisting Provider: Yes (comment) Melissa Brown DO)    Consent:     Consent obtained:  Emergent situation  Universal protocol:     Patient identity confirmed:  Arm band  Pre-procedure details:     Hand hygiene: Hand hygiene performed prior to insertion      Skin preparation:  2% chlorhexidine  Indications:     Central line indications: medications requiring central line and hemodynamic monitoring    Sedation:     Sedation type: Moderate (conscious) sedation  Procedure details:     Location:  Left femoral    Vessel type: vein      Laterality:  Left    Approach: percutaneous technique used      Patient position:  Flat    Catheter type:  Triple lumen    Landmarks identified: yes      Ultrasound guidance: yes      Number of attempts:  1    Successful placement: yes    Post-procedure details:     Post-procedure:  Dressing applied and line sutured    Assessment:  Blood return through all ports    Patient tolerance of procedure:   Tolerated well, no immediate complications

## 2020-06-20 NOTE — PROGRESS NOTES
Initial rapid response called between 3:30 and 4:00, patient cried "I can't breathe I can't breathe", patient evaluated while on Nonrebreather, was increasingly more lethargic, but was decided to increase level of care to step down with a 1:1 to monitor airway protection  As medical colleagues left, patient began to decompensate in bed, not following commands and became nonverbal  After a gas was drawn with a PH of 6 9, the patient began to fatuma and a sat pleath was unobtainable  Attempts at oral intubation lead to a PEA arrest with Epi and compressions started  After multiple rounds of compressions w/ epi, bicarb, calcium, a surgical airway was placed and patient bagged up to [address]

## 2020-06-20 NOTE — PROCEDURES
Procedure  Intubation  Date/Time: 6/20/2020 9:22 AM  Performed by: Rosmery Torres DO  Authorized by: Rosmery Torres DO     Patient location:  Bedside  Consent:     Consent obtained:  Emergent situation  Pre-procedure details:     Patient status:  Unresponsive  Indications:     Indications for intubation: respiratory failure and hypoxemia    Procedure details:     Preoxygenation:  Bag valve mask    CPR in progress: yes      Intubation method:  Surgical    Cricothyroidotomy Procedure (use [facility]):  Initial oral intubation attempted by MICU team  However, oral endotracheal airway unable to be secured  Due to the need for emergent airway management, the patient's neck was prepped in sterile fashion and cricothyroidotomy was performed using open surgical technique and 6 0 ETT was placed and secured into the opening      Tube size (mm):  6 0    Tube type:  Cuffed    Number of attempts:  1  Placement assessment:     Breath sounds:  Diminished    Placement verification: condensation, CXR verification and ETCO2 detector

## 2020-06-20 NOTE — PROCEDURES
Arterial Line Insertion  Date/Time: 6/20/2020 9:14 AM  Performed by: Nuris Herring MD  Authorized by: Nuris Herring MD     Patient location:  ICU  Other Assisting Provider: Yes (comment) (Dr Keo Vo)    Consent:     Consent obtained:  Emergent situation  Universal protocol:     Patient identity confirmed:  Arm band  Indications:     Indications: hemodynamic monitoring    Pre-procedure details:     Skin preparation:  Chlorhexidine  Procedure details:     Location / Tip of Catheter:  Radial    Laterality:  Right    Placement technique:  Percutaneous and ultrasound guided    Successful placement: yes      Transducer: waveform confirmed    Post-procedure details:     Post-procedure:  Biopatch applied    Patient tolerance of procedure:   Tolerated well, no immediate complications

## 2020-06-20 NOTE — QUICK NOTE
Quick progress note after AM progress note      Patient seen at 3 AM and CXR obtained with good sats  Chest tube working  Around 4 AM, patient became increasingly agitated  He was moved to the bed  His breathing became labored and he became mildly hypoxic while agitated  Given his extensive SubQ air, I called a rapid response  He was placed on non-rebreather  His O2 sat came up to 100% immediately and remained there as he was transitioned back to room air  He remained agitated and began to look pale and cyanotic  We were not able to obtain a reliable O2 sat  The patient stopped providing verbal responses  I called the in-house trauma surgeon as this was a change in mental status and I anticipated intubation with a difficult airway  He began to stop following verbal commands  Another rapid response was called  His chest tube was placed to suction  We prepared to intubate  The patient's pulse became weak and thready  We proceeded to bag-valve mask him  Intubation was prepared  The patient was unable to be intubated from above  His sats dropped, his HR dropped, and pulses were lost  Compressions were begun and incision was made in the midline neck  The patient was intubated via emergent cricothyroidotomy  ROSC was achieved  He was moved to the unit for further care      Emilie Hernandez MD

## 2020-06-20 NOTE — PROGRESS NOTES
sbp 60's consistently  Epinephrine titrated up to 15mcg, 1 amp bicarb and 1l isolyte given rapidly as per order

## 2020-06-20 NOTE — PROGRESS NOTES
Rapid response called approximately 415 in the morning secondary to patient complaining of shortness of breath  Patient is status post lobectomy on left for lung mass  Patient has known subcutaneous emphysema  He has a left chest tube as well as a left upper chest event placed for decompression of subcutaneous emphysema  Patient's air leak has been consistent according to surgery resident  Patient was evaluated noticed to have significant subcutaneous emphysema of his face with eyelid swollen, neck appear to be significantly distended, subcutaneous emphysema appreciated through anterior chest wall as well as back and upper extremities  His voice sounded slightly muffled and his breathing was audible but not quite stridorous  It was reported that patient has been consistently in this state for the past few days  Due to patient's significant emphysema and critical care medicine not familiar with patient's clinical presentation over the past few days recommend higher level of care and monitoring for potential airway compromise as patient complains of shortness of breath  Also recommend considering high-flow nasal cannula for flow for respiratory support  Maintain chest tube and VAC dressing as per thoracic surgery  Surgical resident discussed with thoracic surgery as well as acute Care surgery and they will continue to monitor him on a higher level of care  Second rapid response occurred approximately 5:00 a m  With patient having worsening shortness of breath and concern for airway compromise  Acute Care surgery and critical care medicine at bedside  Emergency department at bedside for airway  Prior to initiating interventions to secure airway equipment available for emergent surgical airway  Patient was noted to have 30 pulses and received epinephrine prior to attempted airway intervention    With residents 1st attempt to evaluate patient's airway unable to discern any landmarks due to significant tissue emphysema  Travessa [id number] utilized to view patient's airway without good view  CPR initiated patient did not have palpable pulses  ACLS protocol initiated with medications  Lao blade with bougie able to visualize small opening in airway but unclear if it was patient's airway  Bougie introduced in to opening and endotracheal tube placed  Patient initially had breath sounds and color change on CO2 detector  After bagging for few minutes patient had decreased color change and noted to have also pulses  Unable to revisional eyes airway with low rigid scope or Travessa [id number]  Code efforts continued and surgical airway performed  6 0 endotracheal tube placed over bougie after entering airway  Good color change appreciated on CO2 detector and breath sounds heard bilaterally  Patient regained spontaneous circulation maintaining appropriate blood pressure and heart rate  Oxygen saturations ranged between 74% to 94%  Patient was placed on transport ventilator and had decline in oxygen saturation  Patient was removed from transport ventilator and had frothy secretions which were aspirated  Patient was bagged with a PEEP of 10 for recruitment with improvement in oxygen saturation  Vecuronium and propofol initiated for sedation and paralysis with emergent surgical airway performs  Patient transferred to ICU by critical care medicine and surgical critical care  Surgical critical care team to assume care and communicate with thoracic team and patient's family  Medical critical care signed off  Please refer to code sheet for medication administration and ABG results  Critical care time 62 minutes, critical care time does not include procedures

## 2020-06-20 NOTE — RESPIRATORY THERAPY NOTE
RT Protocol Note  Padmini Zhang 71 y o  male MRN: [id number]  Unit/Bed#: ICU 02 Encounter: [id number]    Assessment    Principal Problem:    Malignant neoplasm of upper lobe of left lung [facility])      Home Pulmonary Medications:  n/a  Home Devices/Therapy: (none)    Past Medical History:   Diagnosis Date    Alcohol abuse 3/27/2020    Chest pain     Community acquired pneumonia 3/27/2020    Hypertension     Left upper lobe pulmonary nodule     Malignant neoplasm of upper lobe of left lung ([address] ) 5/7/2020     Social History     Socioeconomic History    Marital status: Single     Spouse name: None    Number of children: None    Years of education: None    Highest education level: None   Occupational History    None   Social Needs    Financial resource strain: None    Food insecurity:     Worry: None     Inability: None    Transportation needs:     Medical: None     Non-medical: None   Tobacco Use    Smoking status: Former Smoker     Packs/day: 1 00     Years: 55 00     Pack years: 55 00     Types: Cigarettes    Smokeless tobacco: Never Used   Substance and Sexual Activity    Alcohol use: Yes     Frequency: 4 or more times a week     Comment: "we cant tell how much"  per pt daughter     Drug use: Never    Sexual activity: None   Lifestyle    Physical activity:     Days per week: None     Minutes per session: None    Stress: None   Relationships    Social connections:     Talks on phone: None     Gets together: None     Attends Evangelical service: None     Active member of club or organization: None     Attends meetings of clubs or organizations: None     Relationship status: None    Intimate partner violence:     Fear of current or ex partner: None     Emotionally abused: None     Physically abused: None     Forced sexual activity: None   Other Topics Concern    None   Social History Narrative    None       Subjective         Objective    Physical Exam:   Assessment Type: Assess only  General Appearance: Sedated  Respiratory Pattern: Assisted  Chest Assessment: Chest expansion symmetrical  Bilateral Breath Sounds: Diminished  R Breath Sounds: Clear, Diminished  L Breath Sounds: Clear, Diminished  Cough: Productive, Strong  Suction: ET Tube    Vitals:  Blood pressure 112/76, pulse (!) 140, temperature 99 °F (37 2 °C), temperature source Oral, resp  rate (!) 23, height 5' 10" (1 778 m), weight 94 3 kg (207 lb 14 3 oz), SpO2 92 %  Results from last 7 days   Lab Units 06/20/20  0647   PH ART  7 278*   PCO2 ART mm Hg 41 5   PO2 ART mm Hg 71 8*   HCO3 ART mmol/L 19 0*   BASE EXC ART mmol/L -7 4   O2 CONTENT ART mL/dL 18 5   O2 HGB, ARTERIAL % 91 4*   ABG SOURCE  Line, Arterial       Imaging and other studies: I have personally reviewed pertinent reports  O2 Device: RA     Plan    Respiratory Plan: Vent/NIV/HFNC  Airway Clearance Plan: Incentive Spirometer     Resp Comments: (P) Pt evaluated per resp protocol s/p code and cricothyroidectomy  Pt was originally admitted for a DOMI lobectomy  He has no other pulmonary hx and per his MR does not use any pulmonary meds @ home  Will continue to monitor pt per resp protocol

## 2020-06-20 NOTE — PROGRESS NOTES
06/20/20 0500   Clinical Encounter Type   Visited With Health care provider   Crisis Visit Code  (Rapid Response)   Referral From Nurse

## 2020-06-21 PROBLEM — J96.01 ACUTE RESPIRATORY FAILURE WITH HYPOXIA (HCC): Status: ACTIVE | Noted: 2020-06-21

## 2020-06-21 PROBLEM — N17.9 AKI (ACUTE KIDNEY INJURY) (HCC): Status: ACTIVE | Noted: 2020-06-21

## 2020-06-21 PROBLEM — T79.7XXA SUBCUTANEOUS EMPHYSEMA (HCC): Status: ACTIVE | Noted: 2020-06-21

## 2020-06-21 PROBLEM — E87.2 LACTIC ACIDOSIS: Status: ACTIVE | Noted: 2020-06-21

## 2020-06-21 LAB
ALBUMIN SERPL BCP-MCNC: 2.2 G/DL (ref 3.5–5)
ALP SERPL-CCNC: 55 U/L (ref 46–116)
ALT SERPL W P-5'-P-CCNC: 92 U/L (ref 12–78)
ANION GAP SERPL CALCULATED.3IONS-SCNC: 10 MMOL/L (ref 4–13)
APTT PPP: 117 SECONDS (ref 23–37)
APTT PPP: 64 SECONDS (ref 23–37)
APTT PPP: 67 SECONDS (ref 23–37)
APTT PPP: >210 SECONDS (ref 23–37)
ARTERIAL PATENCY WRIST A: YES
ARTERIAL PATENCY WRIST A: YES
AST SERPL W P-5'-P-CCNC: 37 U/L (ref 5–45)
BASE EXCESS BLDA CALC-SCNC: -0.8 MMOL/L
BASE EXCESS BLDA CALC-SCNC: 0.1 MMOL/L
BASE EXCESS BLDA CALC-SCNC: 0.1 MMOL/L
BASE EXCESS BLDA CALC-SCNC: 0.2 MMOL/L
BILIRUB DIRECT SERPL-MCNC: 0.15 MG/DL (ref 0–0.2)
BILIRUB SERPL-MCNC: 0.44 MG/DL (ref 0.2–1)
BODY TEMPERATURE: 98.8 DEGREES FEHRENHEIT
BODY TEMPERATURE: 99.3 DEGREES FEHRENHEIT
BUN SERPL-MCNC: 10 MG/DL (ref 5–25)
CA-I BLD-SCNC: 1.08 MMOL/L (ref 1.12–1.32)
CALCIUM SERPL-MCNC: 7.6 MG/DL (ref 8.3–10.1)
CHLORIDE SERPL-SCNC: 109 MMOL/L (ref 100–108)
CO2 SERPL-SCNC: 22 MMOL/L (ref 21–32)
CREAT SERPL-MCNC: 0.87 MG/DL (ref 0.6–1.3)
ERYTHROCYTE [DISTWIDTH] IN BLOOD BY AUTOMATED COUNT: 14.1 % (ref 11.6–15.1)
GFR SERPL CREATININE-BSD FRML MDRD: 88 ML/MIN/1.73SQ M
GLUCOSE SERPL-MCNC: 140 MG/DL (ref 65–140)
HCO3 BLDA-SCNC: 21.6 MMOL/L (ref 22–28)
HCO3 BLDA-SCNC: 21.9 MMOL/L (ref 22–28)
HCO3 BLDA-SCNC: 22.4 MMOL/L (ref 22–28)
HCO3 BLDA-SCNC: 24.4 MMOL/L (ref 22–28)
HCT VFR BLD AUTO: 32 % (ref 36.5–49.3)
HGB BLD-MCNC: 10.8 G/DL (ref 12–17)
HOROWITZ INDEX BLDA+IHG-RTO: 10 MM[HG]
HOROWITZ INDEX BLDA+IHG-RTO: 40 MM[HG]
HOROWITZ INDEX BLDA+IHG-RTO: 40 MM[HG]
HOROWITZ INDEX BLDA+IHG-RTO: 50 MM[HG]
LACTATE SERPL-SCNC: 1.5 MMOL/L (ref 0.5–2)
LACTATE SERPL-SCNC: 2.3 MMOL/L (ref 0.5–2)
LACTATE SERPL-SCNC: 3.1 MMOL/L (ref 0.5–2)
MAGNESIUM SERPL-MCNC: 1.9 MG/DL (ref 1.6–2.6)
MCH RBC QN AUTO: 31.8 PG (ref 26.8–34.3)
MCHC RBC AUTO-ENTMCNC: 33.8 G/DL (ref 31.4–37.4)
MCV RBC AUTO: 94 FL (ref 82–98)
NRBC BLD AUTO-RTO: 0 /100 WBCS
O2 CT BLDA-SCNC: 15 ML/DL (ref 16–23)
O2 CT BLDA-SCNC: 15.6 ML/DL (ref 16–23)
O2 CT BLDA-SCNC: 16.1 ML/DL (ref 16–23)
O2 CT BLDA-SCNC: 17.4 ML/DL (ref 16–23)
OXYHGB MFR BLDA: 97.3 % (ref 94–97)
OXYHGB MFR BLDA: 98.3 % (ref 94–97)
OXYHGB MFR BLDA: 98.5 % (ref 94–97)
OXYHGB MFR BLDA: 98.7 % (ref 94–97)
PCO2 BLDA: 27.3 MM HG (ref 36–44)
PCO2 BLDA: 28.4 MM HG (ref 36–44)
PCO2 BLDA: 29.3 MM HG (ref 36–44)
PCO2 BLDA: 38.2 MM HG (ref 36–44)
PCO2 TEMP ADJ BLDA: 27.8 MM HG (ref 36–44)
PCO2 TEMP ADJ BLDA: 29.4 MM HG (ref 36–44)
PEEP RESPIRATORY: 10 CM[H2O]
PH BLD: 7.48 [PH] (ref 7.35–7.45)
PH BLD: 7.52 [PH] (ref 7.35–7.45)
PH BLDA: 7.42 [PH] (ref 7.35–7.45)
PH BLDA: 7.49 [PH] (ref 7.35–7.45)
PH BLDA: 7.51 [PH] (ref 7.35–7.45)
PH BLDA: 7.52 [PH] (ref 7.35–7.45)
PHOSPHATE SERPL-MCNC: 2 MG/DL (ref 2.3–4.1)
PLATELET # BLD AUTO: 150 THOUSANDS/UL (ref 149–390)
PMV BLD AUTO: 9.3 FL (ref 8.9–12.7)
PO2 BLD: 151.5 MM HG (ref 75–129)
PO2 BLD: 163.5 MM HG (ref 75–129)
PO2 BLDA: 112 MM HG (ref 75–129)
PO2 BLDA: 142.2 MM HG (ref 75–129)
PO2 BLDA: 149.1 MM HG (ref 75–129)
PO2 BLDA: 162.9 MM HG (ref 75–129)
POTASSIUM SERPL-SCNC: 3.2 MMOL/L (ref 3.5–5.3)
PRESSURE CONTROL: 16
PRESSURE CONTROL: 18
PRESSURE CONTROL: 22
PRESSURE CONTROL: 22
PROT SERPL-MCNC: 4.8 G/DL (ref 6.4–8.2)
RBC # BLD AUTO: 3.4 MILLION/UL (ref 3.88–5.62)
SODIUM SERPL-SCNC: 141 MMOL/L (ref 136–145)
SPECIMEN SOURCE: ABNORMAL
VANCOMYCIN SERPL-MCNC: 4.5 UG/ML
VENT AC: 18
VENT AC: 22
VENT AC: 24
VENT AC: 24
VENT- AC: AC
WBC # BLD AUTO: 14.26 THOUSAND/UL (ref 4.31–10.16)

## 2020-06-21 PROCEDURE — 85730 THROMBOPLASTIN TIME PARTIAL: CPT | Performed by: EMERGENCY MEDICINE

## 2020-06-21 PROCEDURE — 80202 ASSAY OF VANCOMYCIN: CPT | Performed by: THORACIC SURGERY (CARDIOTHORACIC VASCULAR SURGERY)

## 2020-06-21 PROCEDURE — 94003 VENT MGMT INPAT SUBQ DAY: CPT

## 2020-06-21 PROCEDURE — 85730 THROMBOPLASTIN TIME PARTIAL: CPT | Performed by: THORACIC SURGERY (CARDIOTHORACIC VASCULAR SURGERY)

## 2020-06-21 PROCEDURE — 80048 BASIC METABOLIC PNL TOTAL CA: CPT | Performed by: PHYSICIAN ASSISTANT

## 2020-06-21 PROCEDURE — 99024 POSTOP FOLLOW-UP VISIT: CPT | Performed by: THORACIC SURGERY (CARDIOTHORACIC VASCULAR SURGERY)

## 2020-06-21 PROCEDURE — C9113 INJ PANTOPRAZOLE SODIUM, VIA: HCPCS | Performed by: PHYSICIAN ASSISTANT

## 2020-06-21 PROCEDURE — 84100 ASSAY OF PHOSPHORUS: CPT | Performed by: PHYSICIAN ASSISTANT

## 2020-06-21 PROCEDURE — 82330 ASSAY OF CALCIUM: CPT | Performed by: PHYSICIAN ASSISTANT

## 2020-06-21 PROCEDURE — 83605 ASSAY OF LACTIC ACID: CPT | Performed by: PHYSICIAN ASSISTANT

## 2020-06-21 PROCEDURE — 85027 COMPLETE CBC AUTOMATED: CPT | Performed by: PHYSICIAN ASSISTANT

## 2020-06-21 PROCEDURE — 94760 N-INVAS EAR/PLS OXIMETRY 1: CPT

## 2020-06-21 PROCEDURE — 82805 BLOOD GASES W/O2 SATURATION: CPT | Performed by: PHYSICIAN ASSISTANT

## 2020-06-21 PROCEDURE — 83735 ASSAY OF MAGNESIUM: CPT | Performed by: PHYSICIAN ASSISTANT

## 2020-06-21 PROCEDURE — 80076 HEPATIC FUNCTION PANEL: CPT | Performed by: PHYSICIAN ASSISTANT

## 2020-06-21 PROCEDURE — 85730 THROMBOPLASTIN TIME PARTIAL: CPT | Performed by: STUDENT IN AN ORGANIZED HEALTH CARE EDUCATION/TRAINING PROGRAM

## 2020-06-21 PROCEDURE — 99291 CRITICAL CARE FIRST HOUR: CPT | Performed by: EMERGENCY MEDICINE

## 2020-06-21 RX ORDER — POTASSIUM CHLORIDE 14.9 MG/ML
20 INJECTION INTRAVENOUS ONCE
Status: COMPLETED | OUTPATIENT
Start: 2020-06-21 | End: 2020-06-21

## 2020-06-21 RX ORDER — PANTOPRAZOLE SODIUM 40 MG/1
40 INJECTION, POWDER, FOR SOLUTION INTRAVENOUS DAILY
Status: DISCONTINUED | OUTPATIENT
Start: 2020-06-21 | End: 2020-06-23

## 2020-06-21 RX ORDER — CALCIUM GLUCONATE 20 MG/ML
2 INJECTION, SOLUTION INTRAVENOUS ONCE
Status: COMPLETED | OUTPATIENT
Start: 2020-06-21 | End: 2020-06-21

## 2020-06-21 RX ORDER — MAGNESIUM SULFATE HEPTAHYDRATE 40 MG/ML
2 INJECTION, SOLUTION INTRAVENOUS ONCE
Status: COMPLETED | OUTPATIENT
Start: 2020-06-21 | End: 2020-06-21

## 2020-06-21 RX ORDER — MIDAZOLAM HYDROCHLORIDE 2 MG/2ML
INJECTION, SOLUTION INTRAMUSCULAR; INTRAVENOUS
Status: COMPLETED
Start: 2020-06-21 | End: 2020-06-21

## 2020-06-21 RX ORDER — AMOXICILLIN 250 MG
1 CAPSULE ORAL 2 TIMES DAILY
Status: DISCONTINUED | OUTPATIENT
Start: 2020-06-21 | End: 2020-07-30 | Stop reason: HOSPADM

## 2020-06-21 RX ADMIN — HYDROCORTISONE SODIUM SUCCINATE 50 MG: 100 INJECTION, POWDER, FOR SOLUTION INTRAMUSCULAR; INTRAVENOUS at 12:07

## 2020-06-21 RX ADMIN — Medication 0.2 MG/KG/HR: at 09:39

## 2020-06-21 RX ADMIN — SODIUM CHLORIDE 22 MCG/MIN: 0.9 INJECTION, SOLUTION INTRAVENOUS at 02:42

## 2020-06-21 RX ADMIN — SODIUM CHLORIDE, SODIUM GLUCONATE, SODIUM ACETATE, POTASSIUM CHLORIDE, MAGNESIUM CHLORIDE, SODIUM PHOSPHATE, DIBASIC, AND POTASSIUM PHOSPHATE 100 ML/HR: .53; .5; .37; .037; .03; .012; .00082 INJECTION, SOLUTION INTRAVENOUS at 15:47

## 2020-06-21 RX ADMIN — SODIUM CHLORIDE 30 MCG/MIN: 0.9 INJECTION, SOLUTION INTRAVENOUS at 00:21

## 2020-06-21 RX ADMIN — CALCIUM GLUCONATE 2 G: 20 INJECTION, SOLUTION INTRAVENOUS at 06:41

## 2020-06-21 RX ADMIN — VASOPRESSIN 0.04 UNITS/MIN: 20 INJECTION INTRAVENOUS at 05:56

## 2020-06-21 RX ADMIN — POTASSIUM CHLORIDE 20 MEQ: 14.9 INJECTION, SOLUTION INTRAVENOUS at 08:29

## 2020-06-21 RX ADMIN — VANCOMYCIN HYDROCHLORIDE 1250 MG: 10 INJECTION, POWDER, LYOPHILIZED, FOR SOLUTION INTRAVENOUS at 21:03

## 2020-06-21 RX ADMIN — CHLORHEXIDINE GLUCONATE 0.12% ORAL RINSE 15 ML: 1.2 LIQUID ORAL at 21:40

## 2020-06-21 RX ADMIN — POTASSIUM PHOSPHATE, MONOBASIC AND POTASSIUM PHOSPHATE, DIBASIC 30 MMOL: 224; 236 INJECTION, SOLUTION, CONCENTRATE INTRAVENOUS at 08:31

## 2020-06-21 RX ADMIN — Medication 0.2 MG/KG/HR: at 21:40

## 2020-06-21 RX ADMIN — HYDROCORTISONE SODIUM SUCCINATE 50 MG: 100 INJECTION, POWDER, FOR SOLUTION INTRAMUSCULAR; INTRAVENOUS at 00:14

## 2020-06-21 RX ADMIN — MAGNESIUM SULFATE HEPTAHYDRATE 2 G: 40 INJECTION, SOLUTION INTRAVENOUS at 08:29

## 2020-06-21 RX ADMIN — CHLORHEXIDINE GLUCONATE 0.12% ORAL RINSE 15 ML: 1.2 LIQUID ORAL at 08:29

## 2020-06-21 RX ADMIN — MIDAZOLAM 4 MG: 1 INJECTION INTRAMUSCULAR; INTRAVENOUS at 12:04

## 2020-06-21 RX ADMIN — METRONIDAZOLE 500 MG: 500 INJECTION, SOLUTION INTRAVENOUS at 10:03

## 2020-06-21 RX ADMIN — HEPARIN SODIUM AND DEXTROSE 12 UNITS/KG/HR: 10000; 5 INJECTION INTRAVENOUS at 12:44

## 2020-06-21 RX ADMIN — CEFEPIME HYDROCHLORIDE 2000 MG: 2 INJECTION, POWDER, FOR SOLUTION INTRAVENOUS at 21:06

## 2020-06-21 RX ADMIN — HYDROCORTISONE SODIUM SUCCINATE 50 MG: 100 INJECTION, POWDER, FOR SOLUTION INTRAMUSCULAR; INTRAVENOUS at 17:12

## 2020-06-21 RX ADMIN — MIDAZOLAM 2 MG/HR: 5 INJECTION INTRAMUSCULAR; INTRAVENOUS at 21:40

## 2020-06-21 RX ADMIN — MIDAZOLAM 5 MG: 1 INJECTION INTRAMUSCULAR; INTRAVENOUS at 10:02

## 2020-06-21 RX ADMIN — VASOPRESSIN 0.04 UNITS/MIN: 20 INJECTION INTRAVENOUS at 23:23

## 2020-06-21 RX ADMIN — SODIUM CHLORIDE 8 MCG/MIN: 0.9 INJECTION, SOLUTION INTRAVENOUS at 14:40

## 2020-06-21 RX ADMIN — HYDROCORTISONE SODIUM SUCCINATE 50 MG: 100 INJECTION, POWDER, FOR SOLUTION INTRAMUSCULAR; INTRAVENOUS at 05:50

## 2020-06-21 RX ADMIN — PANTOPRAZOLE SODIUM 40 MG: 40 INJECTION, POWDER, FOR SOLUTION INTRAVENOUS at 08:29

## 2020-06-21 RX ADMIN — SENNOSIDES AND DOCUSATE SODIUM 1 TABLET: 8.6; 5 TABLET ORAL at 17:12

## 2020-06-21 RX ADMIN — METRONIDAZOLE 500 MG: 500 INJECTION, SOLUTION INTRAVENOUS at 02:26

## 2020-06-21 RX ADMIN — SODIUM CHLORIDE 10 MCG/MIN: 0.9 INJECTION, SOLUTION INTRAVENOUS at 07:42

## 2020-06-21 RX ADMIN — VANCOMYCIN HYDROCHLORIDE 1250 MG: 10 INJECTION, POWDER, LYOPHILIZED, FOR SOLUTION INTRAVENOUS at 08:42

## 2020-06-21 RX ADMIN — METRONIDAZOLE 500 MG: 500 INJECTION, SOLUTION INTRAVENOUS at 17:12

## 2020-06-21 RX ADMIN — EPINEPHRINE 8 MCG/MIN: 1 INJECTION, SOLUTION, CONCENTRATE INTRAVENOUS at 00:13

## 2020-06-21 RX ADMIN — Medication 50 MCG/HR: at 10:35

## 2020-06-21 RX ADMIN — VASOPRESSIN 0.04 UNITS/MIN: 20 INJECTION INTRAVENOUS at 14:40

## 2020-06-21 RX ADMIN — CEFEPIME HYDROCHLORIDE 2000 MG: 2 INJECTION, POWDER, FOR SOLUTION INTRAVENOUS at 10:37

## 2020-06-21 NOTE — RESPIRATORY THERAPY NOTE
RT Ventilator Management Note  Joaquin Claros 71 y o  male MRN: [id number]  Unit/Bed#: ICU 02 Encounter: [id number]      Daily Screen       6/20/2020  0710 6/21/2020  0709          Patient safety screen outcome[de-identified]  Failed  Failed      Not Ready for Weaning due to[de-identified]  FiO2 >60%;PEEP > 8cmH2O;Underline problem not resolved; Hemodynamically unstable  PEEP > 8cmH2O;Underline problem not resolved              Physical Exam:   Assessment Type: (P) Assess only  General Appearance: (P) Sedated  Respiratory Pattern: (P) Assisted  Chest Assessment: (P) Chest expansion symmetrical, Trachea midline  Bilateral Breath Sounds: (P) Diminished  R Breath Sounds: (P) Diminished  L Breath Sounds: (P) Diminished  Cough: None  Suction: (P) ET Tube      Resp Comments: (P) Pt remains on AC/PC  VS are stable and pt appears to be comfortably sedated  Will continue to monitor per protocol through out the shift

## 2020-06-21 NOTE — QUICK NOTE
Late entry   INTERVAL CRITICAL CARE NOTE:    Pressor requirement increasing with rising lactate  Bilateral venous duplex to r/o DVT if shock state from obstructive source secondary to PE  In total received 4-4 5L of isolyte and 500mL-1L albumin with improvement of hemodynamics  TTE done but unable to assess function from extensive subcutaneous emphysema  In the setting of poor cardiac windows from extensive subcutaneous emphysema and inability to assess cardiac function will initiate heparin gtt for positive RLE below knee DVT in the event PE also a contributor to shock in addition to sepsis  Plan to titrate Epi to 5 mcg/min for greater beta adrenergic effects and then titrate levo as feasible  His continuous air leak persist  Subcutaneous emphesema the same  Skip lactate  Abdomen soft and nontender  No facial grimace on exam  At this time mesenteric ischemia the unlikely source of his elevated lactate  Will follow closely  Thoracic surgery aware and agrees plan

## 2020-06-21 NOTE — PROGRESS NOTES
Daily Progress Note - Critical Care   Padmini Zhang 71 y o  male MRN: [id number]  Unit/Bed#: ICU 02 Encounter: [id number]        ----------------------------------------------------------------------------------------  HPI/24hr events: Improved pressor requirements overnight  Able to wean oxygen support to 40% FiO2 and PEEP 10  Started on versed drip at 1 mg/hr   Received an additional 500 ml isolyte for elevated lactic acid      ---------------------------------------------------------------------------------------  SUBJECTIVE  Unable to provide 2/2 intubated/sedated    Review of Systems  ---------------------------------------------------------------------------------------  Assessment and Plan:    Neuro:    Acute encephalopathy  o Sedation/Analgesia   - Ketamine 0 2 mcg/kg/hr  - Fentanyl 50 mcg/hr  - Versed 1 mg/hr  - Goal RASS -1 to -2 given unstable airway  o PRN fentanyl for agitation/pain (4 doses/24h)   Regulate sleep/wake cycle   Trend neuro exam given cardiac arrest      CV:    Shock, septic vs cardiogenic vs obstructive; s/p respiratory arrest; type 2 MI  o Troponin has peaked, likely demand from arrest  o Vasopressor support  - Levophed 10 mcg/min  - Epinephrine 4 mcg/min  - Vasopressin 0 04 unis/min  - Continue low dose epinephrine for possible cardiac etiology   - Titrate levophed to goal MAP > 65  - Continue stress dose steroids with solu-cortef 50 mg IV q6h, consider weaning tomorrow if continues to have hemodynamic improvement  o Unable to obtain TTE given significant subcutaneous emphysema, unable to obtain NONA given unstable airway  o Heparin drip initiated for possible PE as etiology of respiratory failure, attempt to obtain CTA PE study once stable airway achieved    Hx HTN  o Continue to hold outpatient antihypertensives       Pulm:   Acute hypoxic respiratory failure s/p cricothyroidotomy   o AM /27/179/21 BE 0 1  o Vent settings: AC/PC 24/22/10/40%  o Decrease inspiratory pressure to 18 and RR to 22 given respiratory alkalosis  o Wean PEEP to 8 today   o Needs formal airway revision with tracheostomy    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 on 6/10 with left chest wall vac placement on 6/15  o Chest tube in place to suction with continuous air leak, continue to monitor output, management per thoracic surgery      GI:    Mild transaminitis, improved  o Repeat in AM   Restart bowel regimen    Add stress ulcer prophylaxis with protonix       :    ROGER, resolved  o Trend renal function  o Strict I/Os  o Gaytan catheter in place       F/E/N:    Isolyte 100 ml/hr   Replete electrolytes   NPO, start trickle TFs today if no operative plan for airway revision    Lactic acidosis - improved, continue to trend q6h until cleared       Heme/Onc:    Anemia  o Likely dilutional given significant volume resuscitation   o Repeat in AM      Endo:    Hyperglycemia, stress induced  o Goal blood glucose 140-180      ID:    Possible septic shock, possible pneumonia   o F/U cultures  o Continue cefepime/vancomycin/flagyl   o Trend WBC/fever curve  o Trend procalcitonin       MSK/Skin:    Repositioning limited 2/2 unstable airway       Disposition: Continue Critical Care   Code Status: Level 1 - Full Code  ---------------------------------------------------------------------------------------  ICU CORE MEASURES    Prophylaxis   VTE Pharmacologic Prophylaxis: Heparin Drip  VTE Mechanical Prophylaxis: sequential compression device  Stress Ulcer Prophylaxis: Pantoprazole IV     ABCDE Protocol (if indicated)  Plan to perform spontaneous awakening trial today? No  Plan to perform spontaneous breathing trial today? No  Obvious barriers to extubation?  Yes  CAM-ICU: Positive    Invasive Devices Review  Invasive Devices     Central Venous Catheter Line            CVC Central Lines 06/20/20 less than 1 day          Peripheral Intravenous Line            Peripheral IV 06/18/20 Dorsal (posterior); Right Forearm 3 days    Peripheral IV 20 Distal;Right;Upper;Ventral (anterior) Arm 1 day          Arterial Line            Arterial Line 20 Radial less than 1 day          Drain            Chest Tube 1 Left Pleural 28 Fr  10 days    NG/OG/Enteral Tube Nasogastric 12 Fr Right nares less than 1 day    Urethral Catheter 16 Fr  less than 1 day          Airway            ETT  6 mm 1 day              Can any invasive devices be discontinued today? No  ---------------------------------------------------------------------------------------  OBJECTIVE    Vitals   Vitals:    20 0200 20 0300 20 0400 20 0500   BP: 121/72 131/70 119/70 114/72   Pulse: 80 90 82 78   Resp: (!) 24 (!) 24 (!) 24 (!) 24   Temp: 99 °F (37 2 °C) 100 °F (37 8 °C) 99 7 °F (37 6 °C) 99 3 °F (37 4 °C)   TempSrc:       SpO2: 100% 100% 99% 100%   Weight:       Height:         Temp (24hrs), Av °F (37 8 °C), Min:99 °F (37 2 °C), Max:100 8 °F (38 2 °C)  Current: Temperature: 99 3 °F (37 4 °C)    Respiratory:  Invasive/non-invasive ventilation settings   Respiratory    Lab Data (Last 4 hours)       0544            pH, Arterial       7 523           pCO2, Arterial       27 3           pO2, Arterial       149 1           HCO3, Arterial       21 9           Base Excess, Arterial       0 1                O2/Vent Data        0400   Most Recent         Vent Mode AC/PC  AC/PC      Resp Rate (BPM) (BPM) 24  24      Pressure Control (cmH2O) (cm) 22  22      Insp Time (sec) (sec) 1 15  1 15      FiO2 (%) (%) 40  40      PEEP (cmH2O) (cmH2O) 10  10      MV 13 2  13 2                  Physical Exam   Constitutional: He appears well-developed and well-nourished  He is sedated and restrained  HENT:   Head: Atraumatic  Eyes: Pupils are equal, round, and reactive to light  Neck:   Cricothyroidotomy in place with 6 0 ETT   Cardiovascular: Normal rate and regular rhythm     Pulmonary/Chest: Tachypnea noted  Left chest tube in place with continuous air leak   Left anterior chest wall vac in place   Wheeze on left  Rhonchi on right    Abdominal: Soft  Bowel sounds are normal  He exhibits no distension  There is no tenderness  Genitourinary:   Genitourinary Comments: + norwood   Musculoskeletal: He exhibits edema (1+)  Neurological: GCS eye subscore is 3  GCS verbal subscore is 1  GCS motor subscore is 6  Follows commands in feet  Moves hands spontaneously    Skin: Skin is warm and dry     Crepitus across entire chest, face and bilateral arms       Laboratory and Diagnostics:  Results from last 7 days   Lab Units 06/21/20  0544 06/20/20  1319 06/20/20  0647 06/20/20  0534 06/20/20  0508 06/17/20  0518 06/15/20  0437   WBC Thousand/uL 14 26* 33 03* 31 57*  --   --  8 16 8 74   HEMOGLOBIN g/dL 10 8* 13 1 13 9  --   --  12 3 13 4   I STAT HEMOGLOBIN g/dl  --   --   --  11 6* 17 3*  --   --    HEMATOCRIT % 32 0* 39 9 42 1  --   --  37 6 40 2   HEMATOCRIT, ISTAT %  --   --   --  34* 51*  --   --    PLATELETS Thousands/uL 150 253 295  --   --  268 259   NEUTROS PCT %  --   --   --   --   --   --  77*   BANDS PCT %  --   --  1  --   --  1  --    MONOS PCT %  --   --   --   --   --   --  5   MONO PCT %  --   --  1*  --   --  3*  --      Results from last 7 days   Lab Units 06/21/20  0544 06/20/20  1319 06/20/20  0647 06/17/20  0518 06/15/20  0437   SODIUM mmol/L 141 141 137 132* 126*   POTASSIUM mmol/L 3 2* 3 5 3 4* 3 9 3 9   CHLORIDE mmol/L 109* 107 101 102 95*   CO2 mmol/L 22 20* 25 22 22   ANION GAP mmol/L 10 14* 11 8 9   BUN mg/dL 10 15 14 12 14   CREATININE mg/dL 0 87 1 56* 1 41* 0 81 0 75   CALCIUM mg/dL 7 6* 8 5 10 0 8 6 8 4   GLUCOSE RANDOM mg/dL 140 226* 303* 89 98   ALT U/L 92* 148* 172*  --   --    AST U/L 37 109* 116*  --   --    ALK PHOS U/L 55 76 86  --   --    ALBUMIN g/dL 2 2* 2 1* 2 4*  --   --    TOTAL BILIRUBIN mg/dL 0 44 0 34 0 36  --   --      Results from last 7 days   Lab Units 06/21/20  0544 20  1319 20  0647   MAGNESIUM mg/dL 1 9 1 9 2 5   PHOSPHORUS mg/dL 2 0* 4 7* 8 9*      Results from last 7 days   Lab Units 20  0218 20  1841   PTT seconds >210* 30      Results from last 7 days   Lab Units 20  1759 20  1319 20  0928 20  0647   TROPONIN I ng/mL 2 75* 3 00* 2 86* 0 85*     Results from last 7 days   Lab Units 20  0218 20  2159 20  1708 20  1319 20  0928 20  0647   LACTIC ACID mmol/L 3 1* 7 1* 10 6* 9 4* 4 7* 5 6*     ABG:  Results from last 7 days   Lab Units 20  0544   PH ART  7 523*   PCO2 ART mm Hg 27 3*   PO2 ART mm Hg 149 1*   HCO3 ART mmol/L 21 9*   BASE EXC ART mmol/L 0 1   ABG SOURCE  Line, Arterial     VBG:  Results from last 7 days   Lab Units 20  0544  20  1343   PH MICHELLE   --   --  7 213*   PCO2 MICHELLE mm Hg  --   --  47 7   PO2 MICHELLE mm Hg  --   --  52 5*   HCO3 MICHELLE mmol/L  --   --  18 8*   BASE EXC MICHELLE mmol/L  --   --  -9 0   ABG SOURCE  Line, Arterial   < >  --     < > = values in this interval not displayed  Results from last 7 days   Lab Units 20  17520  0928   PROCALCITONIN ng/ml 5 85* 1 85*       Micro  Results from last 7 days   Lab Units 20  0929   BLOOD CULTURE  Received in Microbiology Lab  Culture in Progress  Received in Microbiology Lab  Culture in Progress  EKG: Sinus tachycardia  Imagin/20 LE Duplex: Acute deep vein thrombosis is noted in the gastrocnemius vein in the proximal calf  I have personally reviewed pertinent reports  and I have personally reviewed pertinent films in PACS    Intake and Output  I/O        07 -  0700  07 -  0700    P  O  520     I V  (mL/kg)  51436 (114 3)    NG/GT  60    IV Piggyback  2350    Total Intake(mL/kg) 520 (5 5) 78136 (139 8)    Urine (mL/kg/hr) 2225 (1) 1780 (0 8)    Emesis/NG output  0    Stool 0     Chest Tube 20 280    Total Output 2245 2060    Net -7929 +[phone] Unmeasured Stool Occurrence 1 x         UOP: 75 ml/hr     Height and Weights   Height: 5' 10" (177 8 cm)  IBW: 73 kg  Body mass index is 29 83 kg/m²  Weight (last 2 days)     Date/Time   Weight    06/19/20 0600   94 3 (207 89)                Nutrition       Diet Orders   (From admission, onward)             Start     Ordered    06/20/20 0730  Diet NPO  Diet effective now     Question Answer Comment   Diet Type NPO    RD to adjust diet per protocol?  Yes        06/20/20 0731                Active Medications  Scheduled Meds:    Current Facility-Administered Medications:  acetaminophen 650 mg Oral Q6H PRN Ronald Pop Petit-Me    calcium gluconate 2 g Intravenous Once Heber Morgan PA-C Last Rate: 2 g (06/21/20 0641)   cefepime 2,000 mg Intravenous Q12H Heber Morgan PA-C Last Rate: 2,000 mg (06/20/20 2144)   chlorhexidine 15 mL [facility] Heber Morgan PA-C    epinephrine 1-20 mcg/min Intravenous Titrated Heber Morgan PA-C Last Rate: 1 mcg/min (06/21/20 0640)   fentaNYL 50 mcg/hr Intravenous Continuous Ronald Pop Petit-Me Last Rate: 50 mcg/hr (06/20/20 2306)   fentanyl citrate (PF) 50 mcg Intravenous Q1H PRN Heber Morgan PA-C    heparin (porcine) 3-30 Units/kg/hr (Order-Specific) Intravenous Titrated Dick Winslow MD Last Rate: 15 Units/kg/hr (06/21/20 0442)   heparin (porcine) 3,600 Units Intravenous Q1H PRN Dick Winslow MD    heparin (porcine) 7,200 Units Intravenous Q1H PRN Dick Winslow MD    hydrocortisone sodium succinate 50 mg Intravenous Q6H [facility] Heber Morgan PA-C    ketamine 0 2 mg/kg/hr Intravenous Continuous Heber Big, PA-C Last Rate: 0 2 mg/kg/hr (06/20/20 2147)   metroNIDAZOLE 500 mg Intravenous Q8H Heber Eddie, PA-C Last Rate: 500 mg (06/21/20 0226)   midazolam 1 mg/hr Intravenous Continuous Ronald Pop Petit-Me Last Rate: 1 mg/hr (06/20/20 2305)   multi-electrolyte 100 mL/hr Intravenous Continuous Heber YESSENIA Morgan Last Rate: 100 mL/hr (06/20/20 1202)   norepinephrine 1-30 mcg/min Intravenous Titrated Kalpesh Cola, PA-C Last Rate: 10 mcg/min (06/21/20 0541)   ondansetron 4 mg Intravenous Q6H PRN Kalpesh Cola, PA-C    vancomycin 15 mg/kg Intravenous Daily PRN Fadumo Art MD    vasopressin (PITRESSIN) in 0 9 % sodium chloride 100 mL 0 04 Units/min Intravenous Continuous Kalpesh Cola, PA-C Last Rate: 0 04 Units/min (06/21/20 0556)     Continuous Infusions:    epinephrine 1-20 mcg/min Last Rate: 1 mcg/min (06/21/20 0640)   fentaNYL 50 mcg/hr Last Rate: 50 mcg/hr (06/20/20 2306)   heparin (porcine) 3-30 Units/kg/hr (Order-Specific) Last Rate: 15 Units/kg/hr (06/21/20 0442)   ketamine 0 2 mg/kg/hr Last Rate: 0 2 mg/kg/hr (06/20/20 2147)   midazolam 1 mg/hr Last Rate: 1 mg/hr (06/20/20 2305)   multi-electrolyte 100 mL/hr Last Rate: 100 mL/hr (06/20/20 1202)   norepinephrine 1-30 mcg/min Last Rate: 10 mcg/min (06/21/20 0541)   vasopressin (PITRESSIN) in 0 9 % sodium chloride 100 mL 0 04 Units/min Last Rate: 0 04 Units/min (06/21/20 0556)     PRN Meds:     acetaminophen 650 mg Q6H PRN   fentanyl citrate (PF) 50 mcg Q1H PRN   heparin (porcine) 3,600 Units Q1H PRN   heparin (porcine) 7,200 Units Q1H PRN   ondansetron 4 mg Q6H PRN   vancomycin 15 mg/kg Daily PRN       Allergies   No Known Allergies  ---------------------------------------------------------------------------------------  Advance Directive and Living Will: Yes    Power of : Yes  POLST:    ---------------------------------------------------------------------------------------  Care Time Delivered:   Upon my evaluation, this patient had a high probability of imminent or life-threatening deterioration due to hypoxia, shock, which required my direct attention, intervention, and personal management    I have personally provided 38 minutes (0600 to [phone]) of critical care time, exclusive of procedures, teaching, family meetings, and any prior time recorded by providers other than myself  Sarita Pacheco PA-C      Portions of the record may have been created with voice recognition software  Occasional wrong word or "sound a like" substitutions may have occurred due to the inherent limitations of voice recognition software    Read the chart carefully and recognize, using context, where substitutions have occurred

## 2020-06-21 NOTE — PROGRESS NOTES
Progress Note - Thoracic Surgery   Jami Harris 71 y o  male MRN: [id number]  Unit/Bed#: ICU 02 Encounter: [id number]    Assessment:  80-year-old male status post left VATS upper lobectomy for left upper lobe tumor infiltrating into the chest wall  Postop course complicated by prolonged air leak with significant subcutaneous emphysema  Now status post code event possibly from pulmonary embolism and status post bedside cricothyroidotomy  Plan:  -Wean pressors as able  -Airway secured through cricothyroidotomy  Will need formalization to tracheostomy once patient more stable, we will discuss with attending  Wean vent as able  -maintain left chest tube to suction  -maintain left chest wall VAC  -broad-spectrum antibiotics per ICU  -stress dose steroids per ICU  -monitor and correct electrolytes  -continue heparin drip for below knee DVT noted on duplex  -analgesia  -continue ICU level of care    Subjective/Objective     Chief Complaint:     Subjective:  Agitated yesterday requiring addition of midazolam drip  Overall hemodynamics are improving with decreasing pressor requirements  Unable to obtain echocardiogram yesterday due to significant subcutaneous emphysema      Objective:     Vitals: Blood pressure 114/72, pulse 78, temperature 99 3 °F (37 4 °C), resp  rate (!) 24, height 5' 10" (1 778 m), weight 94 3 kg (207 lb 14 3 oz), SpO2 100 %  ,Body mass index is 29 83 kg/m²  I/O       06/19 0701 - 06/20 0700 06/20 0701 - 06/21 0700    P  O  520     I V  (mL/kg)  17552 (114 3)    NG/GT  60    IV Piggyback  2350    Total Intake(mL/kg) 520 (5 5) 43392 (139 8)    Urine (mL/kg/hr) 2225 (1) 1780 (0 8)    Emesis/NG output  0    Stool 0     Chest Tube 20 280    Total Output 2245 2060    Net -1725 +21475          Unmeasured Stool Occurrence 1 x           Physical Exam:  No acute distress  Nonlabored respirations on vent through cricothyroidotomy which is secured    Tachycardic to 100s, maps in 120s  Left chest incisions clean dry and intact  Left chest tube in place to suction with air leak and serosanguineous drainage  Significant diffuse subcutaneous emphysema    Lab, Imaging and other studies:   CBC with diff:   Lab Results   Component Value Date    WBC 14 26 (H) 06/21/2020    HGB 10 8 (L) 06/21/2020    HCT 32 0 (L) 06/21/2020    MCV 94 06/21/2020     06/21/2020    MCH 31 8 06/21/2020    MCHC 33 8 06/21/2020    RDW 14 1 06/21/2020    MPV 9 3 06/21/2020    NRBC 0 06/21/2020   , BMP/CMP:   Lab Results   Component Value Date    SODIUM 141 06/21/2020    K 3 2 (L) 06/21/2020     (H) 06/21/2020    CO2 22 06/21/2020    BUN 10 06/21/2020    CREATININE 0 87 06/21/2020    CALCIUM 7 6 (L) 06/21/2020    AST 37 06/21/2020    ALT 92 (H) 06/21/2020    ALKPHOS 55 06/21/2020    EGFR 88 06/21/2020   , Magnesium: No components found for: MAG, Coags:   Lab Results   Component Value Date    PTT >210 (HH) 06/21/2020   , Blood Culture:   Lab Results   Component Value Date    BLOODCX Received in Microbiology Lab  Culture in Progress  06/20/2020    BLOODCX Received in Microbiology Lab  Culture in Progress   06/20/2020   , Urine Culture: No results found for: URINECX, Wound Culure: No results found for: WOUNDCULT  VTE Pharmacologic Prophylaxis: Heparin  VTE Mechanical Prophylaxis: sequential compression device

## 2020-06-21 NOTE — RESPIRATORY THERAPY NOTE
RT Ventilator Management Note  Louie Fields 71 y o  male MRN: [id number]  Unit/Bed#: ICU 02 Encounter: [id number]      Daily Screen       6/20/2020  0710             Patient safety screen outcome[de-identified]  Failed    Not Ready for Weaning due to[de-identified]  FiO2 >60%;PEEP > 8cmH2O;Underline problem not resolved; Hemodynamically unstable            Physical Exam:   Assessment Type: (P) Assess only  Bilateral Breath Sounds: Diminished(sl coarse)  Cough: (P) None      Resp Comments: (P) Pt  doing well on A/C  fiO2 decreased to 40% and PEEP decreased to 10  No other changes throughout the night

## 2020-06-21 NOTE — PROGRESS NOTES
Vancomycin IV Pharmacy-to-Dose Consultation    Neil Gandara is a 71 y o  male who is currently receiving Vancomycin IV with management by the Pharmacy Consult service  Relevant clinical data and objective history reviewed:  Temp Readings from Last 3 Encounters:   06/21/20 99 3 °F (37 4 °C)   05/21/20 98 7 °F (37 1 °C)   04/29/20 97 6 °F (36 4 °C) (Tympanic)     /72   Pulse 78   Temp 99 3 °F (37 4 °C)   Resp (!) 24   Ht 5' 10" (1 778 m)   Wt 94 3 kg (207 lb 14 3 oz)   SpO2 100%   BMI 29 83 kg/m²     I/O last 3 completed shifts: In: 07489 [P O :100; I V :78870; NG/GT:60; IV Piggyback:2350]  Out: 2880 [Urine:2580;  Chest Tube:300]    Lab Results   Component Value Date/Time    BUN 10 06/21/2020 05:44 AM    WBC 14 26 (H) 06/21/2020 05:44 AM    HGB 10 8 (L) 06/21/2020 05:44 AM    HCT 32 0 (L) 06/21/2020 05:44 AM    MCV 94 06/21/2020 05:44 AM     06/21/2020 05:44 AM     Creatinine   Date Value Ref Range Status   06/21/2020 0 87 0 60 - 1 30 mg/dL Final     Comment:     Standardized to IDMS reference method   06/20/2020 1 56 (H) 0 60 - 1 30 mg/dL Final     Comment:     Standardized to IDMS reference method     Vancomycin Rm   Date Value Ref Range Status   06/21/2020 4 5 ug/mL Final     Vancomycin Assessment:  Indication: Pneumonia    Micro: 6/20 BC x2- pending; MRSA- pending  Procalcitonin: 6/20= 5 85  Renal Function: SCr= 0 87 (decreased from 1 56 yesterday)  Potential Nephrotoxicity Factors:  Medications: vasopressors  Patient-Factors: hypotension  Days of Therapy: 2  Current Dose: 1500 mg daily PRN when random level < 20 (dose prior to level: 2000 mg x1 6/20 @ 0934)  Goal Trough: 15-20 (appropriate for most indications)   Goal AUC(24h): 400-600  Last Level: 6/21 @ 0544= 4 5    Vancomycin Plan:  New Dosing: change to 1250 mg q12h (next dose: 6/21 @ 0830)  Predicted Trough / AUC: 16 7/596  Next Level: 6/22 @ 2000  Renal Function Monitoring: renal function significantly improved from yesterday - now started on standing regimen  Monitor closely for additional changes  Pharmacy will continue to follow closely for s/sx of nephrotoxicity, infusion reactions and appropriateness of therapy  BMP and CBC will be ordered per protocol  We will continue to follow the patient's culture results and clinical progress daily       Bruna Hendrix, PharmD  Clinical Pharmacist, Emergency Medicine

## 2020-06-22 ENCOUNTER — APPOINTMENT (INPATIENT)
Dept: RADIOLOGY | Facility: HOSPITAL | Age: 70
DRG: 003 | End: 2020-06-22
Payer: COMMERCIAL

## 2020-06-22 ENCOUNTER — APPOINTMENT (INPATIENT)
Dept: NON INVASIVE DIAGNOSTICS | Facility: HOSPITAL | Age: 70
DRG: 003 | End: 2020-06-22
Payer: COMMERCIAL

## 2020-06-22 ENCOUNTER — ANESTHESIA EVENT (INPATIENT)
Dept: PERIOP | Facility: HOSPITAL | Age: 70
DRG: 003 | End: 2020-06-22
Payer: COMMERCIAL

## 2020-06-22 ENCOUNTER — ANESTHESIA (INPATIENT)
Dept: PERIOP | Facility: HOSPITAL | Age: 70
DRG: 003 | End: 2020-06-22
Payer: COMMERCIAL

## 2020-06-22 PROBLEM — N17.9 AKI (ACUTE KIDNEY INJURY) (HCC): Status: RESOLVED | Noted: 2020-06-21 | Resolved: 2020-06-22

## 2020-06-22 PROBLEM — E87.2 LACTIC ACIDOSIS: Status: RESOLVED | Noted: 2020-06-21 | Resolved: 2020-06-22

## 2020-06-22 LAB
ALBUMIN SERPL BCP-MCNC: 1.9 G/DL (ref 3.5–5)
ALP SERPL-CCNC: 50 U/L (ref 46–116)
ALT SERPL W P-5'-P-CCNC: 56 U/L (ref 12–78)
ANION GAP SERPL CALCULATED.3IONS-SCNC: 3 MMOL/L (ref 4–13)
APTT PPP: 27 SECONDS (ref 23–37)
APTT PPP: 64 SECONDS (ref 23–37)
APTT PPP: 96 SECONDS (ref 23–37)
AST SERPL W P-5'-P-CCNC: 20 U/L (ref 5–45)
BASE EXCESS BLDA CALC-SCNC: -3.2 MMOL/L
BASOPHILS # BLD AUTO: 0.03 THOUSANDS/ΜL (ref 0–0.1)
BASOPHILS NFR BLD AUTO: 0 % (ref 0–1)
BILIRUB SERPL-MCNC: 0.26 MG/DL (ref 0.2–1)
BODY TEMPERATURE: 99 DEGREES FEHRENHEIT
BUN SERPL-MCNC: 14 MG/DL (ref 5–25)
CA-I BLD-SCNC: 1.07 MMOL/L (ref 1.12–1.32)
CALCIUM SERPL-MCNC: 7.7 MG/DL (ref 8.3–10.1)
CHLORIDE SERPL-SCNC: 111 MMOL/L (ref 100–108)
CO2 SERPL-SCNC: 26 MMOL/L (ref 21–32)
CREAT SERPL-MCNC: 0.68 MG/DL (ref 0.6–1.3)
EOSINOPHIL # BLD AUTO: 0.19 THOUSAND/ΜL (ref 0–0.61)
EOSINOPHIL NFR BLD AUTO: 1 % (ref 0–6)
ERYTHROCYTE [DISTWIDTH] IN BLOOD BY AUTOMATED COUNT: 14.5 % (ref 11.6–15.1)
ERYTHROCYTE [DISTWIDTH] IN BLOOD BY AUTOMATED COUNT: 14.5 % (ref 11.6–15.1)
GFR SERPL CREATININE-BSD FRML MDRD: 97 ML/MIN/1.73SQ M
GLUCOSE SERPL-MCNC: 105 MG/DL (ref 65–140)
HCO3 BLDA-SCNC: 20.3 MMOL/L (ref 22–28)
HCT VFR BLD AUTO: 27.7 % (ref 36.5–49.3)
HCT VFR BLD AUTO: 28.5 % (ref 36.5–49.3)
HGB BLD-MCNC: 8.9 G/DL (ref 12–17)
HGB BLD-MCNC: 9 G/DL (ref 12–17)
HOROWITZ INDEX BLDA+IHG-RTO: 60 MM[HG]
I-TIME: 1.15
IMM GRANULOCYTES # BLD AUTO: 0.14 THOUSAND/UL (ref 0–0.2)
IMM GRANULOCYTES NFR BLD AUTO: 1 % (ref 0–2)
INR PPP: 1.12 (ref 0.84–1.19)
LYMPHOCYTES # BLD AUTO: 1.19 THOUSANDS/ΜL (ref 0.6–4.47)
LYMPHOCYTES NFR BLD AUTO: 8 % (ref 14–44)
MAGNESIUM SERPL-MCNC: 2.3 MG/DL (ref 1.6–2.6)
MCH RBC QN AUTO: 30.9 PG (ref 26.8–34.3)
MCH RBC QN AUTO: 31.6 PG (ref 26.8–34.3)
MCHC RBC AUTO-ENTMCNC: 31.2 G/DL (ref 31.4–37.4)
MCHC RBC AUTO-ENTMCNC: 32.5 G/DL (ref 31.4–37.4)
MCV RBC AUTO: 97 FL (ref 82–98)
MCV RBC AUTO: 99 FL (ref 82–98)
MONOCYTES # BLD AUTO: 0.58 THOUSAND/ΜL (ref 0.17–1.22)
MONOCYTES NFR BLD AUTO: 4 % (ref 4–12)
MRSA NOSE QL CULT: NORMAL
NEUTROPHILS # BLD AUTO: 13.7 THOUSANDS/ΜL (ref 1.85–7.62)
NEUTS SEG NFR BLD AUTO: 86 % (ref 43–75)
NRBC BLD AUTO-RTO: 0 /100 WBCS
O2 CT BLDA-SCNC: 14.7 ML/DL (ref 16–23)
OXYHGB MFR BLDA: 97.4 % (ref 94–97)
PCO2 BLDA: 31.1 MM HG (ref 36–44)
PCO2 TEMP ADJ BLDA: 39.3 MM HG (ref 36–44)
PEEP RESPIRATORY: 8 CM[H2O]
PH BLD: 7.43 [PH] (ref 7.35–7.45)
PH BLDA: 7.43 [PH] (ref 7.35–7.45)
PHOSPHATE SERPL-MCNC: 2.4 MG/DL (ref 2.3–4.1)
PLATELET # BLD AUTO: 113 THOUSANDS/UL (ref 149–390)
PLATELET # BLD AUTO: 123 THOUSANDS/UL (ref 149–390)
PMV BLD AUTO: 9.4 FL (ref 8.9–12.7)
PMV BLD AUTO: 9.8 FL (ref 8.9–12.7)
PO2 BLD: 109.5 MM HG (ref 75–129)
PO2 BLDA: 117.6 MM HG (ref 75–129)
POTASSIUM SERPL-SCNC: 3.9 MMOL/L (ref 3.5–5.3)
PRESSURE CONTROL: 16
PROT SERPL-MCNC: 4.6 G/DL (ref 6.4–8.2)
PROTHROMBIN TIME: 14 SECONDS (ref 11.6–14.5)
RBC # BLD AUTO: 2.85 MILLION/UL (ref 3.88–5.62)
RBC # BLD AUTO: 2.88 MILLION/UL (ref 3.88–5.62)
SODIUM SERPL-SCNC: 140 MMOL/L (ref 136–145)
SPECIMEN SOURCE: ABNORMAL
VANCOMYCIN TROUGH SERPL-MCNC: 10.5 UG/ML (ref 10–20)
VENT AC: 18
VENT- AC: AC
WBC # BLD AUTO: 13.35 THOUSAND/UL (ref 4.31–10.16)
WBC # BLD AUTO: 15.83 THOUSAND/UL (ref 4.31–10.16)

## 2020-06-22 PROCEDURE — 83735 ASSAY OF MAGNESIUM: CPT | Performed by: PHYSICIAN ASSISTANT

## 2020-06-22 PROCEDURE — C9113 INJ PANTOPRAZOLE SODIUM, VIA: HCPCS | Performed by: PHYSICIAN ASSISTANT

## 2020-06-22 PROCEDURE — 80053 COMPREHEN METABOLIC PANEL: CPT | Performed by: EMERGENCY MEDICINE

## 2020-06-22 PROCEDURE — 82805 BLOOD GASES W/O2 SATURATION: CPT | Performed by: PHYSICIAN ASSISTANT

## 2020-06-22 PROCEDURE — C1769 GUIDE WIRE: HCPCS | Performed by: THORACIC SURGERY (CARDIOTHORACIC VASCULAR SURGERY)

## 2020-06-22 PROCEDURE — 84100 ASSAY OF PHOSPHORUS: CPT | Performed by: PHYSICIAN ASSISTANT

## 2020-06-22 PROCEDURE — 80202 ASSAY OF VANCOMYCIN: CPT | Performed by: PHYSICIAN ASSISTANT

## 2020-06-22 PROCEDURE — 85730 THROMBOPLASTIN TIME PARTIAL: CPT | Performed by: EMERGENCY MEDICINE

## 2020-06-22 PROCEDURE — 71275 CT ANGIOGRAPHY CHEST: CPT

## 2020-06-22 PROCEDURE — 94003 VENT MGMT INPAT SUBQ DAY: CPT

## 2020-06-22 PROCEDURE — 94760 N-INVAS EAR/PLS OXIMETRY 1: CPT

## 2020-06-22 PROCEDURE — 85730 THROMBOPLASTIN TIME PARTIAL: CPT | Performed by: THORACIC SURGERY (CARDIOTHORACIC VASCULAR SURGERY)

## 2020-06-22 PROCEDURE — 94640 AIRWAY INHALATION TREATMENT: CPT

## 2020-06-22 PROCEDURE — 93971 EXTREMITY STUDY: CPT | Performed by: SURGERY

## 2020-06-22 PROCEDURE — A7521 TRACH/LARYN TUBE CUFFED: HCPCS | Performed by: THORACIC SURGERY (CARDIOTHORACIC VASCULAR SURGERY)

## 2020-06-22 PROCEDURE — 74177 CT ABD & PELVIS W/CONTRAST: CPT

## 2020-06-22 PROCEDURE — 82330 ASSAY OF CALCIUM: CPT | Performed by: PHYSICIAN ASSISTANT

## 2020-06-22 PROCEDURE — 71045 X-RAY EXAM CHEST 1 VIEW: CPT

## 2020-06-22 PROCEDURE — 85610 PROTHROMBIN TIME: CPT | Performed by: PHYSICIAN ASSISTANT

## 2020-06-22 PROCEDURE — 85025 COMPLETE CBC W/AUTO DIFF WBC: CPT | Performed by: EMERGENCY MEDICINE

## 2020-06-22 PROCEDURE — 93971 EXTREMITY STUDY: CPT

## 2020-06-22 PROCEDURE — 99233 SBSQ HOSP IP/OBS HIGH 50: CPT | Performed by: SURGERY

## 2020-06-22 PROCEDURE — 31613 TRACHEOSTOMA REVJ SIMPLE: CPT | Performed by: THORACIC SURGERY (CARDIOTHORACIC VASCULAR SURGERY)

## 2020-06-22 PROCEDURE — 0BW1XFZ REVISION OF TRACHEOSTOMY DEVICE IN TRACHEA, EXTERNAL APPROACH: ICD-10-PCS | Performed by: THORACIC SURGERY (CARDIOTHORACIC VASCULAR SURGERY)

## 2020-06-22 PROCEDURE — 85027 COMPLETE CBC AUTOMATED: CPT | Performed by: PHYSICIAN ASSISTANT

## 2020-06-22 PROCEDURE — 0BW17FZ REVISION OF TRACHEOSTOMY DEVICE IN TRACHEA, VIA NATURAL OR ARTIFICIAL OPENING: ICD-10-PCS | Performed by: THORACIC SURGERY (CARDIOTHORACIC VASCULAR SURGERY)

## 2020-06-22 PROCEDURE — 85730 THROMBOPLASTIN TIME PARTIAL: CPT | Performed by: PHYSICIAN ASSISTANT

## 2020-06-22 PROCEDURE — 99024 POSTOP FOLLOW-UP VISIT: CPT | Performed by: THORACIC SURGERY (CARDIOTHORACIC VASCULAR SURGERY)

## 2020-06-22 RX ORDER — HEPARIN SODIUM 1000 [USP'U]/ML
7200 INJECTION, SOLUTION INTRAVENOUS; SUBCUTANEOUS ONCE
Status: COMPLETED | OUTPATIENT
Start: 2020-06-22 | End: 2020-06-22

## 2020-06-22 RX ORDER — HEPARIN SODIUM 1000 [USP'U]/ML
7200 INJECTION, SOLUTION INTRAVENOUS; SUBCUTANEOUS
Status: DISCONTINUED | OUTPATIENT
Start: 2020-06-22 | End: 2020-06-25

## 2020-06-22 RX ORDER — HEPARIN SODIUM 10000 [USP'U]/100ML
3-30 INJECTION, SOLUTION INTRAVENOUS
Status: DISCONTINUED | OUTPATIENT
Start: 2020-06-22 | End: 2020-06-25

## 2020-06-22 RX ORDER — FENTANYL CITRATE 50 UG/ML
50 INJECTION, SOLUTION INTRAMUSCULAR; INTRAVENOUS ONCE
Status: COMPLETED | OUTPATIENT
Start: 2020-06-22 | End: 2020-06-22

## 2020-06-22 RX ORDER — PROPOFOL 10 MG/ML
INJECTION, EMULSION INTRAVENOUS AS NEEDED
Status: DISCONTINUED | OUTPATIENT
Start: 2020-06-22 | End: 2020-06-22 | Stop reason: SURG

## 2020-06-22 RX ORDER — CALCIUM GLUCONATE 20 MG/ML
2 INJECTION, SOLUTION INTRAVENOUS ONCE
Status: COMPLETED | OUTPATIENT
Start: 2020-06-22 | End: 2020-06-22

## 2020-06-22 RX ORDER — ROCURONIUM BROMIDE 10 MG/ML
INJECTION, SOLUTION INTRAVENOUS AS NEEDED
Status: DISCONTINUED | OUTPATIENT
Start: 2020-06-22 | End: 2020-06-22 | Stop reason: SURG

## 2020-06-22 RX ORDER — HEPARIN SODIUM 5000 [USP'U]/ML
5000 INJECTION, SOLUTION INTRAVENOUS; SUBCUTANEOUS EVERY 8 HOURS SCHEDULED
Status: DISCONTINUED | OUTPATIENT
Start: 2020-06-22 | End: 2020-06-22

## 2020-06-22 RX ORDER — ALBUTEROL SULFATE 2.5 MG/3ML
2.5 SOLUTION RESPIRATORY (INHALATION) EVERY 4 HOURS PRN
Status: DISCONTINUED | OUTPATIENT
Start: 2020-06-22 | End: 2020-06-28

## 2020-06-22 RX ORDER — HEPARIN SODIUM 1000 [USP'U]/ML
3600 INJECTION, SOLUTION INTRAVENOUS; SUBCUTANEOUS
Status: DISCONTINUED | OUTPATIENT
Start: 2020-06-22 | End: 2020-06-25

## 2020-06-22 RX ORDER — SODIUM CHLORIDE 9 MG/ML
INJECTION, SOLUTION INTRAVENOUS CONTINUOUS PRN
Status: DISCONTINUED | OUTPATIENT
Start: 2020-06-22 | End: 2020-06-22 | Stop reason: SURG

## 2020-06-22 RX ADMIN — MIDAZOLAM 3 MG/HR: 5 INJECTION INTRAMUSCULAR; INTRAVENOUS at 20:57

## 2020-06-22 RX ADMIN — CEFEPIME HYDROCHLORIDE 2000 MG: 2 INJECTION, POWDER, FOR SOLUTION INTRAVENOUS at 17:32

## 2020-06-22 RX ADMIN — ALBUTEROL SULFATE 2.5 MG: 2.5 SOLUTION RESPIRATORY (INHALATION) at 08:30

## 2020-06-22 RX ADMIN — PANTOPRAZOLE SODIUM 40 MG: 40 INJECTION, POWDER, FOR SOLUTION INTRAVENOUS at 09:13

## 2020-06-22 RX ADMIN — Medication 0.2 MG/KG/HR: at 09:41

## 2020-06-22 RX ADMIN — HYDROCORTISONE SODIUM SUCCINATE 50 MG: 100 INJECTION, POWDER, FOR SOLUTION INTRAMUSCULAR; INTRAVENOUS at 20:40

## 2020-06-22 RX ADMIN — FENTANYL CITRATE 50 MCG: 50 INJECTION INTRAMUSCULAR; INTRAVENOUS at 23:25

## 2020-06-22 RX ADMIN — ROCURONIUM BROMIDE 50 MG: 10 INJECTION, SOLUTION INTRAVENOUS at 10:31

## 2020-06-22 RX ADMIN — PHENYLEPHRINE HYDROCHLORIDE 200 MCG: 10 INJECTION INTRAVENOUS at 11:05

## 2020-06-22 RX ADMIN — SENNOSIDES AND DOCUSATE SODIUM 1 TABLET: 8.6; 5 TABLET ORAL at 17:32

## 2020-06-22 RX ADMIN — HEPARIN SODIUM AND DEXTROSE 18 UNITS/KG/HR: 10000; 5 INJECTION INTRAVENOUS at 18:24

## 2020-06-22 RX ADMIN — HYDROCORTISONE SODIUM SUCCINATE 50 MG: 100 INJECTION, POWDER, FOR SOLUTION INTRAMUSCULAR; INTRAVENOUS at 06:00

## 2020-06-22 RX ADMIN — IOHEXOL 100 ML: 350 INJECTION, SOLUTION INTRAVENOUS at 12:22

## 2020-06-22 RX ADMIN — HYDROCORTISONE SODIUM SUCCINATE 50 MG: 100 INJECTION, POWDER, FOR SOLUTION INTRAMUSCULAR; INTRAVENOUS at 00:17

## 2020-06-22 RX ADMIN — METRONIDAZOLE 500 MG: 500 INJECTION, SOLUTION INTRAVENOUS at 02:15

## 2020-06-22 RX ADMIN — CEFEPIME HYDROCHLORIDE 2000 MG: 2 INJECTION, POWDER, FOR SOLUTION INTRAVENOUS at 09:15

## 2020-06-22 RX ADMIN — VANCOMYCIN HYDROCHLORIDE 1250 MG: 10 INJECTION, POWDER, LYOPHILIZED, FOR SOLUTION INTRAVENOUS at 20:38

## 2020-06-22 RX ADMIN — SODIUM CHLORIDE, SODIUM GLUCONATE, SODIUM ACETATE, POTASSIUM CHLORIDE, MAGNESIUM CHLORIDE, SODIUM PHOSPHATE, DIBASIC, AND POTASSIUM PHOSPHATE 100 ML/HR: .53; .5; .37; .037; .03; .012; .00082 INJECTION, SOLUTION INTRAVENOUS at 17:40

## 2020-06-22 RX ADMIN — FENTANYL CITRATE 50 MCG: 50 INJECTION INTRAMUSCULAR; INTRAVENOUS at 01:39

## 2020-06-22 RX ADMIN — PROPOFOL 50 MG: 10 INJECTION, EMULSION INTRAVENOUS at 10:31

## 2020-06-22 RX ADMIN — VANCOMYCIN HYDROCHLORIDE 1250 MG: 10 INJECTION, POWDER, LYOPHILIZED, FOR SOLUTION INTRAVENOUS at 09:15

## 2020-06-22 RX ADMIN — Medication 75 MCG/HR: at 04:57

## 2020-06-22 RX ADMIN — CALCIUM GLUCONATE 2 G: 20 INJECTION, SOLUTION INTRAVENOUS at 09:22

## 2020-06-22 RX ADMIN — PHENYLEPHRINE HYDROCHLORIDE 200 MCG: 10 INJECTION INTRAVENOUS at 11:15

## 2020-06-22 RX ADMIN — HEPARIN SODIUM AND DEXTROSE 18 UNITS/KG/HR: 10000; 5 INJECTION INTRAVENOUS at 15:40

## 2020-06-22 RX ADMIN — SODIUM CHLORIDE: 0.9 INJECTION, SOLUTION INTRAVENOUS at 10:28

## 2020-06-22 RX ADMIN — SENNOSIDES AND DOCUSATE SODIUM 1 TABLET: 8.6; 5 TABLET ORAL at 09:13

## 2020-06-22 RX ADMIN — METRONIDAZOLE 500 MG: 500 INJECTION, SOLUTION INTRAVENOUS at 09:14

## 2020-06-22 RX ADMIN — CHLORHEXIDINE GLUCONATE 0.12% ORAL RINSE 15 ML: 1.2 LIQUID ORAL at 20:40

## 2020-06-22 RX ADMIN — PROPOFOL 50 MG: 10 INJECTION, EMULSION INTRAVENOUS at 11:03

## 2020-06-22 RX ADMIN — FENTANYL CITRATE 50 MCG: 50 INJECTION INTRAMUSCULAR; INTRAVENOUS at 01:22

## 2020-06-22 RX ADMIN — POTASSIUM PHOSPHATE, MONOBASIC AND POTASSIUM PHOSPHATE, DIBASIC 12 MMOL: 224; 236 INJECTION, SOLUTION, CONCENTRATE INTRAVENOUS at 09:14

## 2020-06-22 RX ADMIN — CHLORHEXIDINE GLUCONATE 0.12% ORAL RINSE 15 ML: 1.2 LIQUID ORAL at 09:13

## 2020-06-22 RX ADMIN — FENTANYL CITRATE 50 MCG: 50 INJECTION INTRAMUSCULAR; INTRAVENOUS at 20:29

## 2020-06-22 RX ADMIN — HEPARIN SODIUM 7200 UNITS: 1000 INJECTION INTRAVENOUS; SUBCUTANEOUS at 15:37

## 2020-06-22 RX ADMIN — METRONIDAZOLE 500 MG: 500 INJECTION, SOLUTION INTRAVENOUS at 17:33

## 2020-06-22 RX ADMIN — Medication 50 MCG/HR: at 20:24

## 2020-06-22 NOTE — RESPIRATORY THERAPY NOTE
RT Ventilator Management Note  Siria Joseph 71 y o  male MRN: [id number]  Unit/Bed#: ICU 02 Encounter: [id number]      Daily Screen       6/21/2020  0709 6/22/2020  0700          Patient safety screen outcome[de-identified]  Failed  Failed      Not Ready for Weaning due to[de-identified]  PEEP > 8cmH2O;Underline problem not resolved                Physical Exam:   Assessment Type: Assess only  General Appearance: Sedated  Respiratory Pattern: Assisted  Chest Assessment: Chest expansion symmetrical  R Breath Sounds: Coarse  Cough: Productive  Suction: ET Tube  O2 Device: Vent       Resp Comments: Pt remained on A/C PC settings today  Pt now has a #6 warren lambert  Pt remains sedated and stable

## 2020-06-22 NOTE — UTILIZATION REVIEW
Continued Stay Review    Date: 6-20                        Current Patient Class: inpatient  Current Level of Care: medical surgical changed to critical care     HPI:69 y o  male initially admitted on 6-10-20     Assessment/Plan:  6-19   POD 9  Vats LIVIER Lobectomy  L CT H2O 50cc drainage +AL @ 850cc  Air leak developed in chest tube today  Emphysema is currently stable  Patient and daughter spoke with thoracic surgeon :resolution of an air leak is best served with time  Very rarely, you can return to the operating room and attempt to find what is leaking air but this is not successful the majority of the time and I would be concerned that this could potentially make him worse  We also discussed potentially intervening with an endobronchial valve  I explained that this is a less invasive procedure but that an endobronchial valve does not have excellent efficacy  I explained that an endobronchial valve would likely decrease his air leak but would not resolved completely  The patient and the daughter understand and have agreed to continue watchful and hopeful waiting through the next 48 hours and then we will readdress at that time depending on his progress through the next 2 days        6-20    Pod 10 vats LIVIER Lobectomy complicated by prolonged air leak and significant subcutaneous emphysema  2 rapid responses over night  Emergently intubated at bedside  Transferred to icu for multi system organ dysfunction , severe circulatory shock, respiratory failure and difficult airway necessitating cricothyroidotomy  Continue vasopressor support, mechanical ventilation, NPO, heparin gtt for below knee dvt found on duplex, broad spectrum iv antibiotics, iv stress steroids        Pertinent Labs/Diagnostic Results:       Results from last 7 days   Lab Units 06/20/20  1319 06/20/20  0647 06/20/20  0534  06/17/20  0518   WBC Thousand/uL 33 03* 31 57*  --   --  8 16   HEMOGLOBIN g/dL 13 1 13 9  --   --  12 3   I STAT HEMOGLOBIN g/dl  --   --  11 6*   < >  --    HEMATOCRIT % 39 9 42 1  --   --  37 6   HEMATOCRIT, ISTAT %  --   --  34*   < >  --    PLATELETS Thousands/uL 253 295  --   --  268   NEUTROS ABS Thousands/µL  --   --   --   --   --    BANDS PCT %  --  1  --   --  1    < > = values in this interval not displayed           Results from last 7 days   Lab Units 06/20/20  1319 06/20/20  0647 06/20/20  0534 06/20/20  0508 06/17/20  0518   SODIUM mmol/L 141 137  --   --  132*   POTASSIUM mmol/L 3 5 3 4*  --   --  3 9   CHLORIDE mmol/L 107 101  --   --  102   CO2 mmol/L 20* 25  --   --  22   ANION GAP mmol/L 14* 11  --   --  8   BUN mg/dL 15 14  --   --  12   CREATININE mg/dL 1 56* 1 41*  --   --  0 81   EGFR ml/min/1 73sq m 45 50  --   --  91   CALCIUM mg/dL 8 5 10 0  --   --  8 6   CALCIUM, IONIZED mmol/L  --   --   --   --   --    CALCIUM, IONIZED, ISTAT mmol/L  --   --  >2 20* 1 44*  --    MAGNESIUM mg/dL 1 9 2 5  --   --   --    PHOSPHORUS mg/dL 4 7* 8 9*  --   --   --      Results from last 7 days   Lab Units 06/20/20  1319 06/20/20  0647   AST U/L 109* 116*   ALT U/L 148* 172*   ALK PHOS U/L 76 86   TOTAL PROTEIN g/dL 5 3* 5 6*   ALBUMIN g/dL 2 1* 2 4*   TOTAL BILIRUBIN mg/dL 0 34 0 36   BILIRUBIN DIRECT mg/dL 0 14  --      Results from last 7 days   Lab Units 06/18/20  0546   POC GLUCOSE mg/dl 93     Results from last 7 days   Lab Units 06/20/20  1319 06/20/20  0647 06/17/20  0518   GLUCOSE RANDOM mg/dL 226* 303* 89         Results from last 7 days   Lab Units 06/20/20  1343   PH MICHELLE  7 213*   PCO2 MICHELLE mm Hg 47 7   PO2 MICHELLE mm Hg 52 5*   HCO3 MICHELLE mmol/L 18 8*   BASE EXC MICHELLE mmol/L -9 0   O2 CONTENT MICHELLE ml/dL 15 8   O2 HGB, VENOUS % 80 7*     Results from last 7 days   Lab Units 06/20/20  0534 06/20/20  0508   PH, MICHELLE I-STAT  7 097*  --    PCO2, MICHELLE ISTAT mm HG >103 0*  --    PO2, MICHELLE ISTAT mm HG 45 0  --    ISTAT PH ART   --  6 931*   I STAT ART PCO2 mm HG  --  >103 0*   I STAT ART PO2 mm HG  --  61 0*         Results from last 7 days   Lab Units 06/20/20  1759 06/20/20  1319 06/20/20  0928 06/20/20  0647   TROPONIN I ng/mL 2 75* 3 00* 2 86* 0 85*         Results from last 7 days   Lab Units 06/22/20  0444 06/21/20  2319 06/21/20  1651   PTT seconds 64* 64* 67*         Results from last 7 days   Lab Units 06/20/20  1759 06/20/20  0928   PROCALCITONIN ng/ml 5 85* 1 85*     Results from last 7 days   Lab Units 06/20/20  2159 06/20/20  1708   LACTIC ACID mmol/L 7 1* 10 6*       Results from last 7 days   Lab Units 06/20/20  0955   CLARITY UA  Clear   COLOR UA  Yellow   SPEC GRAV UA  1 017   PH UA  6 0   GLUCOSE UA mg/dl 100 (1/10%)*   KETONES UA mg/dl Negative   BLOOD UA  Moderate*   PROTEIN UA mg/dl 100 (2+)*   NITRITE UA  Negative   BILIRUBIN UA  Negative   UROBILINOGEN UA E U /dl 0 2   LEUKOCYTES UA  Negative   WBC UA /hpf 2-4*   RBC UA /hpf 10-20*   BACTERIA UA /hpf Moderate*   EPITHELIAL CELLS WET PREP /hpf Occasional         Results from last 7 days   Lab Units 06/20/20  0929   BLOOD CULTURE  No Growth at 24 hrs  No Growth at 24 hrs  Results from last 7 days   Lab Units 06/17/20  0518   TOTAL COUNTED  100           Vital Signs:     6-19   Blood pressure 132/83, pulse 91, temperature 98 5 °F (36 9 °C), temperature source Oral, resp  rate 18, height 5' 10" (1 778 m), weight 94 3 kg (207 lb 14 3 oz), SpO2 98 %  ,Body mass index is 29 83 kg/m²      6-20   Temp:  [99 °F (37 2 °C)-100 8 °F (38 2 °C)] 100 8 °F (38 2 °C)  HR:  [0-150] 92  Resp:  [18-37] 24  BP: ()/() 106/62  Arterial Line BP: ()/(66-98) 90/76         Medications:   Scheduled Medications:    Inpatient Medications:  Scheduled Meds:  Current Facility-Administered Medications:  cefepime 2,000 mg Intravenous Q12H Last Rate: Stopped (06/20/20 1000)   chlorhexidine 15 mL Swish [employer] Spit Q12H [address]     enoxaparin 40 mg Subcutaneous Daily     epinephrine 1-20 mcg/min Intravenous Titrated Last Rate: 15 mcg/min (06/20/20 1101)   EPINEPHrine PF           fentaNYL 50 mcg/hr Intravenous Continuous Last Rate: 50 mcg/hr (06/20/20 0812)   fentanyl citrate (PF) 50 mcg Intravenous Q1H PRN     hydrocortisone sodium succinate 50 mg Intravenous Q6H [address]     ketamine 0 1 mg/kg/hr Intravenous Continuous Last Rate: 0 1 mg/kg/hr (06/20/20 0902)   metroNIDAZOLE 500 mg Intravenous Q8H Last Rate: Stopped (06/20/20 1000)   midazolam           multi-electrolyte 1,000 mL Intravenous Once     multi-electrolyte 100 mL/hr Intravenous Continuous Last Rate: 100 mL/hr (06/20/20 0943)   norepinephrine           norepinephrine 1-30 mcg/min Intravenous Titrated Last Rate: 30 mcg/min (06/20/20 0700)   ondansetron 4 mg Intravenous Q6H PRN     propofol 5-50 mcg/kg/min Intravenous Titrated Last Rate: Stopped (06/20/20 1103)   sodium bicarbonate           [START ON 6/21/2020] vancomycin 15 mg/kg Intravenous Daily PRN     vasopressin (PITRESSIN) in 0 9 % sodium chloride 100 mL 0 04 Units/min Intravenous Continuous Last Rate: 0 04 Units/min (06/20/20 0815)      Continuous Infusions:  epinephrine 1-20 mcg/min Last Rate: 15 mcg/min (06/20/20 1101)   fentaNYL 50 mcg/hr Last Rate: 50 mcg/hr (06/20/20 0812)   ketamine 0 1 mg/kg/hr Last Rate: 0 1 mg/kg/hr (06/20/20 0902)   multi-electrolyte 100 mL/hr Last Rate: 100 mL/hr (06/20/20 0943)   norepinephrine 1-30 mcg/min Last Rate: 30 mcg/min (06/20/20 0700)   propofol 5-50 mcg/kg/min Last Rate: Stopped (06/20/20 1103)   vasopressin (PITRESSIN) in 0 9 % sodium chloride 100 mL 0 04 Units/min Last Rate: 0 04 Units/min (06/20/20 0815)      PRN Meds:     fentanyl citrate (PF) 50 mcg Q1H PRN   ondansetron 4 mg Q6H PRN   [START ON 6/21/2020] vancomycin 15 mg/kg Daily PRN             Discharge Plan: to be determined     Network Utilization Review Department  [email or web address]  ATTENTION: Please call with any questions or concerns to [phone] and carefully listen to the prompts so that you are directed to the right person   All voicemails are confidential   Char Bee all requests for admission clinical reviews, approved or denied determinations and any other requests to dedicated fax number below belonging to the campus where the patient is receiving treatment   List of dedicated fax numbers for the Facilities:  [address] [place] [address] DENIALS (Administrative/Medical Necessity) [phone]   [address]  (Maternity/NICU/Pediatrics) [phone]   Sarah Delcid [phone]   Anny Cole [phone]   Perico Vo [phone]   Trenton Dunlap [phone]   [address] [place] [address] [phone]   [facility]  [phone]   [address] [facility]  [address] [facility] [address] [facility] [phone]

## 2020-06-22 NOTE — PROGRESS NOTES
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  Postop course complicated by prolonged air leak with significant subcutaneous emphysema  Patient had a code event over the weekend probably from pulmonary embolism requiring bedside cricothyroidotomy secondary to difficult intubation  Hemodynamically improving  Left chest tube with 110 cc out past 24 hours    Plan:  Wean vent as able  Wean pressors to of  Maintain cricothyroidotomy for now  Continue sedation per ICU  Plan to formalize to tracheostomy this week, we will discuss timing with attending  Needs cross-sectional imaging of the chest to rule out PE  Appreciate Cardiology recommendations  Maintain left chest tube to suction  Continue empiric broad-spectrum antibiotics  Trend white blood cell count and follow-up blood cultures  Continue heparin drip for now  ICU level of care    Subjective/Objective     Chief Complaint:     Subjective:  Coughing spells overnight with increased secretions  FiO2 vent increased to 80% but now decreased to 60%  Repeat chest x-ray appeared grossly stable  Patient's pressor requirements are coming back home, he is on Levo at 3  Objective:     Vitals: Blood pressure 109/72, pulse 70, temperature 99 °F (37 2 °C), temperature source Oral, resp  rate 18, height 5' 10" (1 778 m), weight 94 3 kg (207 lb 14 3 oz), SpO2 100 %  ,Body mass index is 29 83 kg/m²      I/O       06/20 0701 - 06/21 0700 06/21 0701 - 06/22 0700    I V  (mL/kg) 35799 (114 3) 3936 5 (41 7)    NG/GT 60 20    IV Piggyback 2350 1450    Feedings  229    Total Intake(mL/kg) 16809 (139 8) 5635 5 (59 8)    Urine (mL/kg/hr) 1780 (0 8) 1145 (0 5)    Emesis/NG output 0 0    Chest Tube 280 110    Total Output 2060 1255    Net +[id number] +4380 5 Physical Exam:   No acute distress  Regular rate and rhythm  Nonlabored respirations on vent  Cricothyroidotomy in place and secure  Left chest tube to suction without air leak with serous drainage  Left chest wall VAC in place with good seal    Lab, Imaging and other studies:   CBC with diff:   Lab Results   Component Value Date    WBC 15 83 (H) 06/22/2020    HGB 9 0 (L) 06/22/2020    HCT 27 7 (L) 06/22/2020    MCV 97 06/22/2020     (L) 06/22/2020    MCH 31 6 06/22/2020    MCHC 32 5 06/22/2020    RDW 14 5 06/22/2020    MPV 9 4 06/22/2020    NRBC 0 06/22/2020   , BMP/CMP:   Lab Results   Component Value Date    SODIUM 140 06/22/2020    K 3 9 06/22/2020     (H) 06/22/2020    CO2 26 06/22/2020    BUN 14 06/22/2020    CREATININE 0 68 06/22/2020    CALCIUM 7 7 (L) 06/22/2020    AST 20 06/22/2020    ALT 56 06/22/2020    ALKPHOS 50 06/22/2020    EGFR 97 06/22/2020   , Magnesium: No components found for: MAG, Coags:   Lab Results   Component Value Date    PTT 64 (H) 06/22/2020   , Blood Culture: No results found for: BLOODCX, Urine Culture: No results found for: URINECX, Wound Culure: No results found for: WOUNDCULT  VTE Pharmacologic Prophylaxis: Heparin  VTE Mechanical Prophylaxis: sequential compression device

## 2020-06-22 NOTE — PROGRESS NOTES
Daily Progress Note - Critical Care   Benoit Law 71 y o  male MRN: [id number]  Unit/Bed#: ICU 02 Encounter: [id number]        ----------------------------------------------------------------------------------------  HPI/24hr events:  Overnight patient became more agitated and desatted to the mid 76s  He was pulling lower tidal volumes  His sedation was increased and his FiO2 was increased to 100% which subsequently increased his SPO2 to 99%  FIO2 was able to be decreased to 60% over the next few hours  Current vent settings are AC/PC 18/16/8/60%  Fentanyl and versed gtts were both increased 2/2 agitation and vent dyssynchrony  ---------------------------------------------------------------------------------------  SUBJECTIVE  Events as noted above      Review of Systems   Unable to perform ROS: Intubated     Review of systems was unable to be performed secondary to intubation  ---------------------------------------------------------------------------------------  Assessment and Plan:    Neuro:    Diagnosis:  Sedation/analgesia  o Plan:  Continue ketamine 0 2 mcg/kg/hr  o Continue fentanyl -> increased to 75 mcg/hr overnight for agitation  o Continue Versed -> increased to 3 mg/hr overnight for agitation  o Prn fentanyl    Regulate sleep-wake cycle   Trend neuro exam given recent cardiac arrest      CV:    Diagnosis: shock secondary to sepsis versus cardiogenic versus obstructive in setting of type 2 MI    o Plan:  Troponin peaked at 3 and has trended down  o Continue vasopressor support to maintain MAP>65  - Have successfully weaned epinephrine and vasopressin  - Still on Levophed at 3 mcg/min, will continue to wean as able  o Continue stress dose steroids - solu-cortef 50mg IV q6hrs  o Unable to perform TTE with significant subcutaneous emphysema  o Unable to perform NONA given on stable airway with cricothyroidotomy  o Heparin drip was started due to concern for pulmonary embolism as etiology of respiratory failure  - Plan to obtain CTA PE once airway is stable    Diagnosis:  History of hypertension  o Plan:  Continue to hold home antihypertensives      Pulm:   Diagnosis:  Acute hypoxic respiratory failure status post cricothyroidotomy  o Plan: AM AB /117/BD3 2  o Current vent settings: AC/PC 18/16/8/60%  o Wean vent as able   o Patient will require formal airway revision with tracheostomy, timing TBD   Diagnosis:  Left upper lobe adenocarcinoma status post VATS left upper lobectomy 6/10 requiring left chest wall decompression for significant subcutaneous emphysema on 6/15  o Plan:  Maintain left-sided chest tube to -20 mmHg  - Monitor air leak and output  - Management per thoracic surgery  o Maintain Left chest wall wound VAC      GI:    Diagnosis:  Transaminitis, resolved  o Plan:  Continue to trend LFTs   Bowel regimen   Protonix for stress ulcer prophylaxis      :    Diagnosis:  ROGER resolved  o Plan:  Creatinine stable at 0 68  o Trend kidney function daily  o UO 1 1L over last 24 hrs        F/E/N:    Plan: F: [place]@[facility]   E: replete as indicated, K>4, Phos>3, Mag>2   Kphos given today for K 3 9 and phos 2 4   N: NPO      Heme/Onc:    Diagnosis: anemia, likely dilutional   o Plan: Hgb 9 0 from 10  o Continue to trend H&H      Endo:    Diagnosis:  Hyperglycemia  o Plan: trend BG        ID:    Diagnosis: Possible septic shock 2/2 PNA  o Plan: Continue Cefepime/Flagly/Vanco   o WBC 15 8 from 14 from 33  o Trend WBC daily   o F/u blood cx -> Cassandra@[facility] hrs  o F/u MRSA cx -> results still pending, d/c vanco if neg       MSK/Skin:    No active issues   limited repositioning 2/2 unstable airway          Disposition: Continue Critical Care   Code Status: Level 1 - Full Code  ---------------------------------------------------------------------------------------  ICU CORE MEASURES    Prophylaxis   VTE Pharmacologic Prophylaxis: Heparin Drip  VTE Mechanical Prophylaxis: sequential compression device  Stress Ulcer Prophylaxis: Pantoprazole IV     ABCDE Protocol (if indicated)  Plan to perform spontaneous awakening trial today? No  Plan to perform spontaneous breathing trial today? No  Obvious barriers to extubation? Yes  CAM-ICU: Negative    Invasive Devices Review  Invasive Devices     Central Venous Catheter Line            CVC Central Lines 20 1 day          Arterial Line            Arterial Line 20 Radial 1 day          Drain            Chest Tube 1 Left Pleural 28 Fr  11 days    NG/OG/Enteral Tube Nasogastric 12 Fr Right nares 1 day    Urethral Catheter 16 Fr  1 day          Airway            ETT  6 mm 1 day              Can any invasive devices be discontinued today?  No  ---------------------------------------------------------------------------------------  OBJECTIVE    Vitals   Vitals:    20 2326 20 0016 20 0305 20 0409   BP:       Pulse:  74  70   Resp:  18  18   Temp:  99 2 °F (37 3 °C)  99 °F (37 2 °C)   TempSrc:  Oral  Oral   SpO2: 98% 98% 100% 100%   Weight:       Height:         Temp (24hrs), Av 6 °F (37 °C), Min:97 2 °F (36 2 °C), Max:99 2 °F (37 3 °C)  Current: Temperature: 99 °F (37 2 °C)  Wt Readings from Last 3 Encounters:   20 94 3 kg (207 lb 14 3 oz)   20 94 9 kg (209 lb 3 2 oz)   20 90 7 kg (200 lb)     Temp Readings from Last 3 Encounters:   20 99 °F (37 2 °C) (Oral)   20 98 7 °F (37 1 °C)   20 97 6 °F (36 4 °C) (Tympanic)     BP Readings from Last 3 Encounters:   20 109/72   20 145/86   20 134/82     Pulse Readings from Last 3 Encounters:   20 68   20 101   20 92         Respiratory:  SpO2: SpO2: 100 %  O2 Flow Rate (L/min): 2 L/min    Invasive/non-invasive ventilation settings   Respiratory    Lab Data (Last 4 hours)       0447            pH, Arterial       7 432           pCO2, Arterial       31 1           pO2, Arterial       117 6           HCO3, Arterial       20 3           Base Excess, Arterial       -3 2                O2/Vent Data       06/22 0305   Most Recent         Vent Mode AC/PC  AC/PC      Resp Rate (BPM) (BPM) 18  18      Pressure Control (cmH2O) (cm) 16  16      Insp Time (sec) (sec) 1 15  1 15      FiO2 (%) (%) 60  60      PEEP (cmH2O) (cmH2O) 8  8      MV 8 1  8 1                  Physical Exam   Constitutional: He appears well-developed and well-nourished  No distress  HENT:   Head: Normocephalic  Cricothyroidotomy secured    Eyes: Pupils are equal, round, and reactive to light  EOM are normal  No scleral icterus  Neck: Normal range of motion  No JVD present  Cardiovascular: Normal rate, regular rhythm and normal heart sounds  Pulmonary/Chest: Effort normal  No respiratory distress  On mechanical ventilation AC/PC  Significant crepitus in chest wall  L CT in place -20 mmHg, rolling air leak   L chest wall wound vac in place    Abdominal: Soft  He exhibits no distension  There is no tenderness  There is no rebound and no guarding  Musculoskeletal: Normal range of motion  He exhibits no edema or deformity  Neurological:   Opens his eyes to name   [id number]   Skin: Skin is warm and dry  He is not diaphoretic  Psychiatric:   Unable to assess 2/2 sedation    Nursing note and vitals reviewed          Laboratory and Diagnostics:  Results from last 7 days   Lab Units 06/22/20  0444 06/21/20  0544 06/20/20  1319 06/20/20  0647 06/20/20  0534 06/20/20  0508 06/17/20  0518   WBC Thousand/uL 15 83* 14 26* 33 03* 31 57*  --   --  8 16   HEMOGLOBIN g/dL 9 0* 10 8* 13 1 13 9  --   --  12 3   I STAT HEMOGLOBIN g/dl  --   --   --   --  11 6* 17 3*  --    HEMATOCRIT % 27 7* 32 0* 39 9 42 1  --   --  37 6   HEMATOCRIT, ISTAT %  --   --   --   --  34* 51*  --    PLATELETS Thousands/uL 113* 150 253 295  --   --  268   NEUTROS PCT % 86*  --   --   --   --   --   --    BANDS PCT %  --   --   --  1  --   --  1   MONOS PCT % 4  --   --   --   -- --   --    MONO PCT %  --   --   --  1*  --   --  3*     Results from last 7 days   Lab Units 06/21/20  0544 06/20/20  1319 06/20/20  0647 06/17/20  0518   SODIUM mmol/L 141 141 137 132*   POTASSIUM mmol/L 3 2* 3 5 3 4* 3 9   CHLORIDE mmol/L 109* 107 101 102   CO2 mmol/L 22 20* 25 22   ANION GAP mmol/L 10 14* 11 8   BUN mg/dL 10 15 14 12   CREATININE mg/dL 0 87 1 56* 1 41* 0 81   CALCIUM mg/dL 7 6* 8 5 10 0 8 6   GLUCOSE RANDOM mg/dL 140 226* 303* 89   ALT U/L 92* 148* 172*  --    AST U/L 37 109* 116*  --    ALK PHOS U/L 55 76 86  --    ALBUMIN g/dL 2 2* 2 1* 2 4*  --    TOTAL BILIRUBIN mg/dL 0 44 0 34 0 36  --      Results from last 7 days   Lab Units 06/22/20  0444 06/21/20  0544 06/20/20  1319 06/20/20  0647   MAGNESIUM mg/dL 2 3 1 9 1 9 2 5   PHOSPHORUS mg/dL 2 4 2 0* 4 7* 8 9*      Results from last 7 days   Lab Units 06/22/20  0444 06/21/20  2319 06/21/20  1651 06/21/20  0918 06/21/20  0218 06/20/20  1841   PTT seconds 64* 64* 67* 117* >210* 30      Results from last 7 days   Lab Units 06/20/20  1759 06/20/20  1319 06/20/20  0928 06/20/20  0647   TROPONIN I ng/mL 2 75* 3 00* 2 86* 0 85*     Results from last 7 days   Lab Units 06/21/20  0918 06/21/20  0544 06/21/20  0218 06/20/20  2159 06/20/20  1708 06/20/20  1319 06/20/20  0928   LACTIC ACID mmol/L 1 5 2 3* 3 1* 7 1* 10 6* 9 4* 4 7*     ABG:  Results from last 7 days   Lab Units 06/22/20  0447   PH ART  7 432   PCO2 ART mm Hg 31 1*   PO2 ART mm Hg 117 6   HCO3 ART mmol/L 20 3*   BASE EXC ART mmol/L -3 2   ABG SOURCE  Line, Arterial     VBG:  Results from last 7 days   Lab Units 06/22/20  0447  06/20/20  1343   PH MICHELLE   --   --  7 213*   PCO2 MICHELLE mm Hg  --   --  47 7   PO2 MICHELLE mm Hg  --   --  52 5*   HCO3 MICHELLE mmol/L  --   --  18 8*   BASE EXC MICHELLE mmol/L  --   --  -9 0   ABG SOURCE  Line, Arterial   < >  --     < > = values in this interval not displayed       Results from last 7 days   Lab Units 06/20/20  1759 06/20/20  0928   PROCALCITONIN ng/ml 5 85* 1 85* Micro  Results from last 7 days   Lab Units 06/20/20  0929   BLOOD CULTURE  No Growth at 24 hrs  No Growth at 24 hrs  EKG: reviewed  Imaging:  I have personally reviewed pertinent reports  Intake and Output  I/O       06/20 0701 - 06/21 0700 06/21 0701 - 06/22 0700    I V  (mL/kg) 94431 (114 3) 3936 5 (41 7)    NG/GT 60 20    IV Piggyback 2350 1450    Feedings  229    Total Intake(mL/kg) 07241 (139 8) 5635 5 (59 8)    Urine (mL/kg/hr) 1780 (0 8) 1145 (0 5)    Emesis/NG output 0 0    Chest Tube 280 110    Total Output 2060 1255    Net +48041 +4380 5              UOP: 1 2 L out over last 24 hrs     Height and Weights   Height: 5' 10" (177 8 cm)  IBW: 73 kg  Body mass index is 29 83 kg/m²  Weight (last 2 days)     None            Nutrition       Diet Orders   (From admission, onward)             Start     Ordered    06/21/20 1225  Diet Enteral/Parenteral; Tube Feeding No Oral Diet; Jevity 1 2 Javier; Continuous; 20  Diet effective now     Question Answer Comment   Diet Type Enteral/Parenteral    Enteral/Parenteral Tube Feeding No Oral Diet    Tube Feeding Formula: Jevity 1 2 Javier    Bolus/Cyclic/Continuous Continuous    Tube Feeding Goal Rate (mL/hr): 20    RD to adjust diet per protocol?  Yes        06/21/20 1224              TF held at this time       Active Medications  Scheduled Meds:  Current Facility-Administered Medications:  acetaminophen 650 mg Oral Q6H PRN Andre Paris Petit-Me    cefepime 2,000 mg Intravenous Q12H Barbara Naylor PA-C Last Rate: Stopped (06/21/20 2136)   chlorhexidine 15 mL [facility] [place], [place]    epinephrine 1-20 mcg/min Intravenous Titrated Barbara Naylor PA-C Last Rate: Stopped (06/21/20 1306)   fentaNYL 75 mcg/hr Intravenous Continuous Gabriel Dines, DO Last Rate: 75 mcg/hr (06/22/20 2167)   fentanyl citrate (PF) 50 mcg Intravenous Q1H PRN Barbara Naylor PA-C    heparin (porcine) 3-30 Units/kg/hr (Order-Specific) Intravenous Titrated Deja Page MD Last Rate: 12 Units/kg/hr (06/21/20 1244)   heparin (porcine) 3,600 Units Intravenous Q1H PRN Deja Page MD    heparin (porcine) 7,200 Units Intravenous Q1H PRN Deja Page MD    hydrocortisone sodium succinate 50 mg Intravenous Q6H [facility] Bianca Clemens PA-C    ketamine 0 2 mg/kg/hr Intravenous Continuous Bianca Clemens PA-C Last Rate: 0 2 mg/kg/hr (06/21/20 2140)   metroNIDAZOLE 500 mg Intravenous Q8H Bianca Clemens PA-C Last Rate: 500 mg (06/22/20 0215)   midazolam 3 mg/hr Intravenous Continuous Pauline Jimenez DO Last Rate: 3 mg/hr (06/22/20 0140)   multi-electrolyte 100 mL/hr Intravenous Continuous Bianca Clemens PA-C Last Rate: 100 mL/hr (06/21/20 1547)   norepinephrine 1-30 mcg/min Intravenous Titrated Bianca Clemens PA-C Last Rate: 3 mcg/min (06/22/20 0306)   ondansetron 4 mg Intravenous Q6H PRN Bianca Clemens PA-C    pantoprazole 40 mg Intravenous Daily Bianca Clemens PA-C    senna-docusate sodium 1 tablet Per NG Tube BID Bianca Clemens PA-C    vancomycin 1,250 mg Intravenous Q12H Matt Cifuentes MD Last Rate: Stopped (06/21/20 2233)   vasopressin (PITRESSIN) in 0 9 % sodium chloride 100 mL 0 04 Units/min Intravenous Continuous Bianca Clemens PA-C Last Rate: Stopped (06/22/20 0155)     Continuous Infusions:    epinephrine 1-20 mcg/min Last Rate: Stopped (06/21/20 1306)   fentaNYL 75 mcg/hr Last Rate: 75 mcg/hr (06/22/20 0457)   heparin (porcine) 3-30 Units/kg/hr (Order-Specific) Last Rate: 12 Units/kg/hr (06/21/20 1244)   ketamine 0 2 mg/kg/hr Last Rate: 0 2 mg/kg/hr (06/21/20 2140)   midazolam 3 mg/hr Last Rate: 3 mg/hr (06/22/20 0140)   multi-electrolyte 100 mL/hr Last Rate: 100 mL/hr (06/21/20 1547)   norepinephrine 1-30 mcg/min Last Rate: 3 mcg/min (06/22/20 0306)   vasopressin (PITRESSIN) in 0 9 % sodium chloride 100 mL 0 04 Units/min Last Rate: Stopped (06/22/20 0155)     PRN Meds:     acetaminophen 650 mg Q6H PRN   fentanyl citrate (PF) 50 mcg Q1H PRN   heparin (porcine) 3,600 Units Q1H PRN   heparin (porcine) 7,200 Units Q1H PRN   ondansetron 4 mg Q6H PRN       Allergies   No Known Allergies  ---------------------------------------------------------------------------------------  Advance Directive and Living Will: Yes    Power of : Yes  POLST:    ---------------------------------------------------------------------------------------  Care Time Delivered:   Upon my evaluation, this patient had a high probability of imminent or life-threatening deterioration due to acute hypoxic respiratory failure, unstable airway, which required my direct attention, intervention, and personal management  I have personally provided 20 minutes of critical care time, exclusive of procedures, teaching, family meetings, and any prior time recorded by providers other than myself  Julisa Power DO      Portions of the record may have been created with voice recognition software  Occasional wrong word or "sound a like" substitutions may have occurred due to the inherent limitations of voice recognition software    Read the chart carefully and recognize, using context, where substitutions have occurred

## 2020-06-22 NOTE — RESPIRATORY THERAPY NOTE
RT Ventilator Management Note  Sherice Jimenes 71 y o  male MRN: [id number]  Unit/Bed#: ICU 02 Encounter: [id number]      Daily Screen       6/20/2020  0710 6/21/2020  0709          Patient safety screen outcome[de-identified]  Failed  Failed      Not Ready for Weaning due to[de-identified]  FiO2 >60%;PEEP > 8cmH2O;Underline problem not resolved; Hemodynamically unstable  PEEP > 8cmH2O;Underline problem not resolved              Physical Exam:   Assessment Type: (P) Assess only      Resp Comments: (P) Pt  doing well on A/C at this time  FiO2 titrated to 60%  Will continue to titrate as able

## 2020-06-22 NOTE — UTILIZATION REVIEW
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     Vats DOMI lobectomy complicated by  Prolonged an air leak      Assessment/Plan:    6-21-20  Critical care   Continue arterial line, cvc line,  Ventilator, ngt, chest tube to suction and urethral catheter  currently on vasopressin, levophed  Unable to get echo due to massive subcutaneous emphysema  NPO  Continue iv antibiotics, iv sedation, iv epinephrine,iv vasopressin, iv fluids    She received 1 dose of iv calcium gluconate  6-22 critical care  Desaturated to 76s  Sedation increased and sats improved to 99%  Patient becoming restless and agitated  Current  vent  Settings are  AC/PC 18 /16 / 8 / 60%  Chest tube, ngt, urethral catheter, arterial line an cvc line continued  Anesthesia Start Date/Time: 06/22/20 1026   Procedure: TRACHEOSTOMY REVISION, BRONCHOSCOPY (N/A Throat)   Anesthesia type: general   Diagnosis: Acute respiratory failure with hypoxia (HCC) [J96 01]   Pre-op diagnosis: Acute respiratory failure with hypoxia (HCC) [J96 01]     Procedure(s) (LRB):  TRACHEOSTOMY REVISION, BRONCHOSCOPY (N/A)      Estimated Blood Loss:   Minimal     Drains:       Chest Tube 1 Left Pleural 28 Fr  (Active)   Function -20 cm H2O 6/22/2020  4:09 AM   Chest Tube Air Leak Yes 6/22/2020  4:09 AM   Patency Intervention Tip/tilt 6/22/2020  4:09 AM   Drainage Description Serous 6/22/2020  6:08 AM   Dressing Status Clean;Dry; Intact 6/22/2020  4:09 AM   Site Assessment Unable to assess 6/22/2020  4:09 AM   Surrounding Skin Dry; Intact; Other (Comment) 6/22/2020  4:09 AM   Output (mL) 100 mL 6/22/2020  6:08 AM   Number of days: 12       NG/OG/Enteral Tube Nasogastric 12 Fr Right nares (Active)   Urethral Catheter 16 Fr   (Active)       Anesthesia Type: General     Operative Indications:  Acute respiratory failure with hypoxia (HCC) [J96 01]     Operative Findings:  Large neck incision from emergency cricothyroidotomy  Successful exchange of 6-0 ETT to a 6-0 tracheostomy     Complications:   None      Pertinent Labs/Diagnostic Results:       Results from last 7 days   Lab Units 06/22/20  0444 06/21/20  0544 06/20/20  1319 06/20/20  0647 06/20/20  0534  06/17/20  0518   WBC Thousand/uL 15 83* 14 26* 33 03* 31 57*  --   --  8 16   HEMOGLOBIN g/dL 9 0* 10 8* 13 1 13 9  --   --  12 3   I STAT HEMOGLOBIN g/dl  --   --   --   --  11 6*   < >  --    HEMATOCRIT % 27 7* 32 0* 39 9 42 1  --   --  37 6   HEMATOCRIT, ISTAT %  --   --   --   --  34*   < >  --    PLATELETS Thousands/uL 113* 150 253 295  --   --  268   NEUTROS ABS Thousands/µL 13 70*  --   --   --   --   --   --    BANDS PCT %  --   --   --  1  --   --  1    < > = values in this interval not displayed           Results from last 7 days   Lab Units 06/22/20  0444 06/21/20  0544 06/20/20  1319 06/20/20  0647 06/20/20  0534 06/20/20  0508 06/17/20  0518   SODIUM mmol/L 140 141 141 137  --   --  132*   POTASSIUM mmol/L 3 9 3 2* 3 5 3 4*  --   --  3 9   CHLORIDE mmol/L 111* 109* 107 101  --   --  102   CO2 mmol/L 26 22 20* 25  --   --  22   ANION GAP mmol/L 3* 10 14* 11  --   --  8   BUN mg/dL 14 10 15 14  --   --  12   CREATININE mg/dL 0 68 0 87 1 56* 1 41*  --   --  0 81   EGFR ml/min/1 73sq m 97 88 45 50  --   --  91   CALCIUM mg/dL 7 7* 7 6* 8 5 10 0  --   --  8 6   CALCIUM, IONIZED mmol/L 1 07* 1 08*  --   --   --   --   --    CALCIUM, IONIZED, ISTAT mmol/L  --   --   --   --  >2 20* 1 44*  --    MAGNESIUM mg/dL 2 3 1 9 1 9 2 5  --   --   --    PHOSPHORUS mg/dL 2 4 2 0* 4 7* 8 9*  --   --   --      Results from last 7 days   Lab Units 06/22/20  0444 06/21/20  0544 06/20/20  1319 06/20/20  0647   AST U/L 20 37 109* 116*   ALT U/L 56 92* 148* 172*   ALK PHOS U/L 50 55 76 86   TOTAL PROTEIN g/dL 4 6* 4 8* 5 3* 5 6* ALBUMIN g/dL 1 9* 2 2* 2 1* 2 4*   TOTAL BILIRUBIN mg/dL 0 26 0 44 0 34 0 36   BILIRUBIN DIRECT mg/dL  --  0 15 0 14  --      Results from last 7 days   Lab Units 06/18/20  0546   POC GLUCOSE mg/dl 93     Results from last 7 days   Lab Units 06/22/20  0444 06/21/20  0544 06/20/20  1319 06/20/20  0647 06/17/20  0518   GLUCOSE RANDOM mg/dL 105 140 226* 303* 89       Results from last 7 days   Lab Units 06/22/20  0447 06/21/20  1509 06/21/20  0918   PH ART  7 432 7 423 7 514*   PCO2 ART mm Hg 31 1* 38 2 28 4*   PO2 ART mm Hg 117 6 112 0 142 2*   HCO3 ART mmol/L 20 3* 24 4 22 4   BASE EXC ART mmol/L -3 2 0 1 0 2   O2 CONTENT ART mL/dL 14 7* 15 0* 15 6*   O2 HGB, ARTERIAL % 97 4* 97 3* 98 3*   ABG SOURCE  Line, Arterial Line, Arterial Line, Arterial     Results from last 7 days   Lab Units 06/20/20  1343   PH MICHELLE  7 213*   PCO2 MICHELLE mm Hg 47 7   PO2 MICHELLE mm Hg 52 5*   HCO3 MICHELLE mmol/L 18 8*   BASE EXC MICHELLE mmol/L -9 0   O2 CONTENT MICHELLE ml/dL 15 8   O2 HGB, VENOUS % 80 7*     Results from last 7 days   Lab Units 06/20/20  0534 06/20/20  0508   PH, MICHELLE I-STAT  7 097*  --    PCO2, MICHELLE ISTAT mm HG >103 0*  --    PO2, MICHELLE ISTAT mm HG 45 0  --    ISTAT PH ART   --  6 931*   I STAT ART PCO2 mm HG  --  >103 0*   I STAT ART PO2 mm HG  --  61 0*         Results from last 7 days   Lab Units 06/20/20  1759 06/20/20  1319 06/20/20  0928 06/20/20  0647   TROPONIN I ng/mL 2 75* 3 00* 2 86* 0 85*         Results from last 7 days   Lab Units 06/22/20  0444 06/21/20  2319 06/21/20  1651   PTT seconds 64* 64* 67*         Results from last 7 days   Lab Units 06/20/20  1759 06/20/20  0928   PROCALCITONIN ng/ml 5 85* 1 85*     Results from last 7 days   Lab Units 06/21/20  0918 06/21/20  0544 06/21/20  0218 06/20/20  2159 06/20/20  1708   LACTIC ACID mmol/L 1 5 2 3* 3 1* 7 1* 10 6*     Results from last 7 days   Lab Units 06/20/20  0955   CLARITY UA  Clear   COLOR UA  Yellow   SPEC GRAV UA  1 017   PH UA  6 0   GLUCOSE UA mg/dl 100 (1/10%)* KETONES UA mg/dl Negative   BLOOD UA  Moderate*   PROTEIN UA mg/dl 100 (2+)*   NITRITE UA  Negative   BILIRUBIN UA  Negative   UROBILINOGEN UA E U /dl 0 2   LEUKOCYTES UA  Negative   WBC UA /hpf 2-4*   RBC UA /hpf 10-20*   BACTERIA UA /hpf Moderate*   EPITHELIAL CELLS WET PREP /hpf Occasional     Results from last 7 days   Lab Units 06/20/20  0929   BLOOD CULTURE  No Growth at 24 hrs  No Growth at 24 hrs         Vital Signs:       Date/Time  Temp  Pulse  Resp  BP  MAP (mmHg)  Arterial Line BP  MAP  SpO2  O2 Device  CO  CI   06/22/20 1300  97 9 °F (36 6 °C)  74  17      128/68  88 mmHg  100 %    5 4 L/min  2 6 L/min/m2   06/22/20 1238                100 %         06/22/20 1023                  Other (comment)        O2 Device: ETT at 06/22/20 1023   06/22/20 1000  98 2 °F (36 8 °C)  76  17          100 %         06/22/20 0900  98 6 °F (37 °C)  100  19      150/74  100 mmHg  100 %         06/22/20 0800  98 2 °F (36 8 °C)  78  18      114/64  80 mmHg  95 %         06/22/20 0700    70  17          100 %         06/22/20 0600  98 3 °F (36 8 °C)  68  17      96/56  68 mmHg  100 %    4 7 L/min  2 1 L/min/m2   06/22/20 0500  98 2 °F (36 8 °C)  68  18      108/62  76 mmHg  100 %    4 5 L/min  2 L/min/m2   06/22/20 0409  99 °F (37 2 °C)  70  18      100/58  70 mmHg  100 %  Ventilator  4 8 L/min  2 L/min/m2   06/22/20 0400    70  17      98/58  70 mmHg  99 %    4 8 L/min  2 L/min/m2   06/22/20 0305                100 %         06/22/20 0300    72  18      102/60  74 mmHg  100 %    4 5 L/min  2 2 L/min/m2   06/22/20 0200    72  18      92/56  70 mmHg  94 %    5 L/min  2 2 L/min/m2   06/22/20 0100    74  18      90/56  68 mmHg  96 %    4 7 L/min  2 1 L/min/m2   06/22/20 0016  99 2 °F (37 3 °C)  74  18      94/60  72 mmHg  98 %  Ventilator       06/22/20 0000    72  18      96/60  72 mmHg  98 %    4 8 L/min  2 1 L/min/m2   06/21/20 2321               98 %         06/21/20 2300    72  18      104/64  78 mmHg  98 %    5 1 L/min  2 1 L/min/m2   06/21/20 2200    74  18      102/62  76 mmHg  97 %         06/21/20 2100    72  18  109/72  82  108/64  80 mmHg  98 %    5 L/min  2 3 L/min/m2   06/21/20 2000    74  18  117/89  104  116/70  88 mmHg  98 %         06/21/20 1947  99 2 °F (37 3 °C)  74  17      116/70  86 mmHg  98 %  Ventilator  5 2 L/min  2 5 L/min/m2   06/21/20 1916                98 %         06/21/20 1900    72  18  106/75  85  110/68  84 mmHg  97 %         06/21/20 1800    74  17  104/70  83  108/66  82 mmHg  97 %    4 9 L/min  2 5 L/min/m2   06/21/20 1700    76  18  116/67  84  118/72  90 mmHg  98 %    6 L/min  2 8 L/min/m2   06/21/20 1600    74  17  115/72  90  116/72  88 mmHg  99 %    5 L/min  2 3 L/min/m2   06/21/20 1517                100 %         06/21/20 1500    76  18  113/82  91  114/70  86 mmHg  100 %         06/21/20 1400    78  18  106/73  89  112/68  84 mmHg  99 %    5 L/min  2 4 L/min/m2   06/21/20 1200  97 2 °F (36 2 °C)Abnormal   90  18  119/83  94  124/76  96 mmHg  94 %    4 9 L/min  2 3 L/min/m2   06/21/20 1115                99 %         06/21/20 1000  97 5 °F (36 4 °C)  90  22  102/69  80  102/62  78 mmHg  95 %         06/21/20 0900  99 °F (37 2 °C)  76  21  104/76  86  96/74  84 mmHg  100 %         06/21/20 0800  99 °F (37 2 °C)  78  21  107/77  87  94/74  84 mmHg  100 %    3 7 L/min  1 7 L/min/m2   06/21/20 0709                100 %         06/21/20 0500  99 3 °F (37 4 °C)  78  24Abnormal   114/72  88  104/68  82 mmHg  100 %         06/21/20 0400  99 7 °F (37 6 °C)  82  24Abnormal   119/70  90  114/72  90 mmHg  99 %         06/21/20 0300  100 °F (37 8 °C)  90  24Abnormal   131/70  100  96/80  90 mmHg  100 %         06/21/20 0200  99 °F (37 2 °C)  80  24Abnormal   121/72  97  104/80  94 mmHg  100 %         06/21/20 0100  99 3 °F (37 4 °C)  80  24Abnormal   125/72  99  102/78  90 mmHg  100 %         06/21/20 0000  99 7 °F (37 6 °C)  82  23Abnormal   120/67  92  94/76  86 mmHg  100 %                         Medications:   Scheduled Medications:    Medications:  cefepime 2,000 mg Intravenous Q8H   chlorhexidine 15 mL Swish & Spit Q12H [facility]   heparin (porcine) 5,000 Units Subcutaneous Q8H [facility]   hydrocortisone sodium succinate 50 mg Intravenous Q12H [facility]   metroNIDAZOLE 500 mg Intravenous Q8H   pantoprazole 40 mg Intravenous Daily   senna-docusate sodium 1 tablet Per NG Tube BID   vancomycin 1,250 mg Intravenous Q12H     Continuous IV Infusions:    fentaNYL 75 mcg/hr Intravenous Continuous   ketamine 0 2 mg/kg/hr Intravenous Continuous   midazolam 3 mg/hr Intravenous Continuous   multi-electrolyte 100 mL/hr Intravenous Continuous   norepinephrine 1-30 mcg/min Intravenous Titrated   vasopressin (PITRESSIN) in 0 9 % sodium chloride 100 mL 0 04 Units/min Intravenous Continuous     PRN Meds:    acetaminophen 650 mg Oral Q6H PRN   albuterol 2 5 mg Nebulization Q4H PRN   fentanyl citrate (PF) 50 mcg Intravenous Q1H PRN   ondansetron 4 mg Intravenous Q6H PRN       Discharge Plan: to be determined     Network Utilization Review Department  [email or web address]  ATTENTION: Please call with any questions or concerns to [phone] and carefully listen to the prompts so that you are directed to the right person  All voicemails are confidential   [facility] all requests for admission clinical reviews, approved or denied determinations and any other requests to dedicated fax number below belonging to the campus where the patient is receiving treatment   List of dedicated fax numbers for the Facilities:  [address] DENIALS (Administrative/Medical Necessity) [phone]   [address] (Maternity/NICU/Pediatrics) [phone]   María Austin [address] [place] [address] [place] [phone]   Carmen Bibles [phone]   [address] [phone]   [facility]  [phone]   [place] [address] [phone]   [address] [place] [address] [phone]

## 2020-06-22 NOTE — OCCUPATIONAL THERAPY NOTE
OT Cancel note:    Pt not appropriate for OT treatment at this time  He is now vented/sedated s/p code event 6/20  Will continue to follow and re-evaluate as appropriate

## 2020-06-22 NOTE — OP NOTE
OPERATIVE REPORT  PATIENT NAME: Cramer Ripper    :  1950  MRN: [id number]  Pt Location: BE OR ROOM 08    SURGERY DATE: 2020    Surgeon(s) and Role:     * Marlen Schroeder MD - Primary     * Alcon Kirkland MD - Assisting     * Nina Cannon MD - Assisting    Preop Diagnosis:  Acute respiratory failure with hypoxia ([address] ) [J96 01]    Post-Op Diagnosis Codes:     * Acute respiratory failure with hypoxia ([address] ) [J96 01]    Procedure(s) (LRB):  TRACHEOSTOMY REVISION, BRONCHOSCOPY (N/A)    Specimen(s):  * No specimens in log *    Estimated Blood Loss:   Minimal    Drains:  Chest Tube 1 Left Pleural 28 Fr  (Active)   Function -20 cm H2O 2020  4:09 AM   Chest Tube Air Leak Yes 2020  4:09 AM   Patency Intervention Tip/tilt 2020  4:09 AM   Drainage Description Serous 2020  6:08 AM   Dressing Status Clean;Dry; Intact 2020  4:09 AM   Site Assessment Unable to assess 2020  4:09 AM   Surrounding Skin Dry; Intact; Other (Comment) 2020  4:09 AM   Output (mL) 100 mL 2020  6:08 AM   Number of days: 12       NG/OG/Enteral Tube Nasogastric 12 Fr Right nares (Active)   Placement Reverification Auscultation 2020  4:09 AM   Site Assessment Clean;Dry; Intact 2020  4:09 AM   Status Tube feed stopped or held 2020  6:30 AM   Drainage Appearance None 2020  4:00 PM   Intake (mL) 30 mL 2020  6:30 AM   Output (mL) 0 mL 2020  6:08 AM   Number of days: 2       Urethral Catheter 16 Fr   (Active)   Reasons to continue Urinary Catheter  Accurate I&O assessment in critically ill patients (48 hr  max) 2020 10:23 AM   Goal for Removal Remove after 48 hrs of I/O monitoring 2020  7:47 PM   Site Assessment Clean;Skin intact;Edema 2020  4:09 AM   Collection Container Standard drainage bag 2020  4:09 AM   Securement Method Securing device (Describe) 2020  4:09 AM   Output (mL) 65 mL 2020  6:08 AM   Number of days: 2       [REMOVED] Urethral Catheter Latex 16 Fr  (Removed)   Reasons to continue Urinary Catheter  Post-operative urological requirements 6/11/2020 12:00 AM   Goal for Removal Voiding trial when ambulation improves 6/11/2020 12:00 AM   Site Assessment Clean;Skin intact 6/11/2020 12:00 AM   Collection Container Standard drainage bag 6/11/2020 12:00 AM   Securement Method Securing device (Describe) 6/11/2020 12:00 AM   Output (mL) 525 mL 6/11/2020  4:23 AM   Number of days: 1       Anesthesia Type:   General    Operative Indications:  Acute respiratory failure with hypoxia ([address] ) [J96 01]    Operative Findings:  Large neck incision from emergency cricothyroidotomy  Successful exchange of 6-0 ETT to a 6-0 tracheostomy    Complications:   None    Procedure and Technique:  Mr Monika Linda is a patient is well known to me  He is status VATS left upper lobectomy on Ranjana 10, 2020  His postoperative course was complicated by a prolonged air leak and subcutaneous emphysema  On June 20, 2020 the patient had a rapid response event and acute desaturation  He was unable to be intubated successfully and he underwent an emergency cricothyroidotomy in order to emergently access his airway  He has been stabilized over the past 48 hours and the decision was made to taken to the OR today to formalize his trach  The patient was brought to the OR and he was placed supine on the OR table  Shoulder roll was placed to perform adequate neck extension  The endotracheal tube was in his neck  This was prepped into the field  The patient was prepped and draped in the usual fashion  A time-out was performed to confirm patient and procedure  The incision from the emergency cricothyroidotomy was opened  The wound was retracted with a weighed [place] retractor  It was adequately irrigated  There was some bleeding in the subcutaneous tissue and hemostasis was achieved using Bovie electrocautery  Access to the trachea was clearly identified    Using a nasotracheal suction to, this was inserted through the endotracheal tube into the airway and the endotracheal tube was removed  Attempts were made to place an 8-0 tracheostomy to but it was unable to be placed into the airway  The patient began to desaturate so the decision was made to place a 6-0 tracheostomy 2  The tracheostomy tube was placed into the airway and the ventilator was attached  End-tidal CO2 was obtained  The endotracheal tube was secured in place to the skin using 2 Prolene sutures  The subcutaneous tissue overlying the trachea was then irrigated further and loosely brought together with 3 0 Vicryl  The skin was closed with simple interrupted nylon in order to allow the wound to drain fluid if needed  After closure, a bronchoscopy was inserted into the airway  Both the right side and left side were identified  There was trace amount of blood that were suctioned and irrigated  No gross purulence was identified  The bronchoscopy was performed for therapeutic purposes and there was no concern for infectious organism or tuberculosis  The procedure was then completed and the patient was transferred back to the ICU in a stable but critical condition  I was present for the entire procedure and I was present for all critical portions of the procedure    Patient Disposition:  Critical Care Unit and intubated and hemodynamically stable      SIGNATURE: Kena Hopper MD  DATE: June 22, 2020  TIME: 1:11 PM

## 2020-06-23 PROBLEM — I82.462 ACUTE DEEP VEIN THROMBOSIS (DVT) OF CALF MUSCLE VEIN OF LEFT LOWER EXTREMITY (HCC): Status: ACTIVE | Noted: 2020-06-23

## 2020-06-23 PROBLEM — J95.811 POSTPROCEDURAL PNEUMOTHORAX: Status: ACTIVE | Noted: 2020-06-23

## 2020-06-23 PROBLEM — I82.621 ACUTE DEEP VEIN THROMBOSIS (DVT) OF BRACHIAL VEIN OF RIGHT UPPER EXTREMITY (HCC): Status: ACTIVE | Noted: 2020-06-23

## 2020-06-23 PROBLEM — Z93.0 TRACHEOSTOMY IN PLACE (HCC): Status: ACTIVE | Noted: 2020-06-23

## 2020-06-23 PROBLEM — J18.9 PNEUMONIA: Status: ACTIVE | Noted: 2020-06-23

## 2020-06-23 PROBLEM — I82.A11 ACUTE DEEP VEIN THROMBOSIS (DVT) OF AXILLARY VEIN OF RIGHT UPPER EXTREMITY (HCC): Status: ACTIVE | Noted: 2020-06-23

## 2020-06-23 LAB
ANION GAP SERPL CALCULATED.3IONS-SCNC: 5 MMOL/L (ref 4–13)
APTT PPP: 57 SECONDS (ref 23–37)
APTT PPP: 73 SECONDS (ref 23–37)
APTT PPP: 82 SECONDS (ref 23–37)
BASE EXCESS BLDA CALC-SCNC: -0.5 MMOL/L
BASOPHILS # BLD AUTO: 0.05 THOUSANDS/ΜL (ref 0–0.1)
BASOPHILS NFR BLD AUTO: 0 % (ref 0–1)
BODY TEMPERATURE: 99 DEGREES FEHRENHEIT
BUN SERPL-MCNC: 16 MG/DL (ref 5–25)
CALCIUM SERPL-MCNC: 7 MG/DL (ref 8.3–10.1)
CHLORIDE SERPL-SCNC: 112 MMOL/L (ref 100–108)
CO2 SERPL-SCNC: 24 MMOL/L (ref 21–32)
CREAT SERPL-MCNC: 0.65 MG/DL (ref 0.6–1.3)
EOSINOPHIL # BLD AUTO: 0.57 THOUSAND/ΜL (ref 0–0.61)
EOSINOPHIL NFR BLD AUTO: 4 % (ref 0–6)
ERYTHROCYTE [DISTWIDTH] IN BLOOD BY AUTOMATED COUNT: 14.5 % (ref 11.6–15.1)
GFR SERPL CREATININE-BSD FRML MDRD: 99 ML/MIN/1.73SQ M
GLUCOSE SERPL-MCNC: 93 MG/DL (ref 65–140)
HCO3 BLDA-SCNC: 24.5 MMOL/L (ref 22–28)
HCT VFR BLD AUTO: 28.7 % (ref 36.5–49.3)
HGB BLD-MCNC: 9.2 G/DL (ref 12–17)
HOROWITZ INDEX BLDA+IHG-RTO: 50 MM[HG]
IMM GRANULOCYTES # BLD AUTO: 0.09 THOUSAND/UL (ref 0–0.2)
IMM GRANULOCYTES NFR BLD AUTO: 1 % (ref 0–2)
LYMPHOCYTES # BLD AUTO: 1.28 THOUSANDS/ΜL (ref 0.6–4.47)
LYMPHOCYTES NFR BLD AUTO: 9 % (ref 14–44)
MAGNESIUM SERPL-MCNC: 2.5 MG/DL (ref 1.6–2.6)
MCH RBC QN AUTO: 31.8 PG (ref 26.8–34.3)
MCHC RBC AUTO-ENTMCNC: 32.1 G/DL (ref 31.4–37.4)
MCV RBC AUTO: 99 FL (ref 82–98)
MONOCYTES # BLD AUTO: 0.72 THOUSAND/ΜL (ref 0.17–1.22)
MONOCYTES NFR BLD AUTO: 5 % (ref 4–12)
NEUTROPHILS # BLD AUTO: 11.23 THOUSANDS/ΜL (ref 1.85–7.62)
NEUTS SEG NFR BLD AUTO: 81 % (ref 43–75)
NRBC BLD AUTO-RTO: 0 /100 WBCS
O2 CT BLDA-SCNC: 14.1 ML/DL (ref 16–23)
OXYHGB MFR BLDA: 96.1 % (ref 94–97)
PCO2 BLDA: 41.8 MM HG (ref 36–44)
PEEP RESPIRATORY: 8 CM[H2O]
PH BLDA: 7.39 [PH] (ref 7.35–7.45)
PHOSPHATE SERPL-MCNC: 2.7 MG/DL (ref 2.3–4.1)
PLATELET # BLD AUTO: 132 THOUSANDS/UL (ref 149–390)
PMV BLD AUTO: 10.2 FL (ref 8.9–12.7)
PO2 BLDA: 94.3 MM HG (ref 75–129)
POTASSIUM SERPL-SCNC: 4 MMOL/L (ref 3.5–5.3)
PRESSURE CONTROL: 16
RBC # BLD AUTO: 2.89 MILLION/UL (ref 3.88–5.62)
SODIUM SERPL-SCNC: 141 MMOL/L (ref 136–145)
SPECIMEN SOURCE: ABNORMAL
VENT AC: 18
VENT- AC: AC
WBC # BLD AUTO: 13.94 THOUSAND/UL (ref 4.31–10.16)

## 2020-06-23 PROCEDURE — 85730 THROMBOPLASTIN TIME PARTIAL: CPT | Performed by: PHYSICIAN ASSISTANT

## 2020-06-23 PROCEDURE — 83735 ASSAY OF MAGNESIUM: CPT | Performed by: PHYSICIAN ASSISTANT

## 2020-06-23 PROCEDURE — C9113 INJ PANTOPRAZOLE SODIUM, VIA: HCPCS | Performed by: PHYSICIAN ASSISTANT

## 2020-06-23 PROCEDURE — 80048 BASIC METABOLIC PNL TOTAL CA: CPT | Performed by: PHYSICIAN ASSISTANT

## 2020-06-23 PROCEDURE — 84100 ASSAY OF PHOSPHORUS: CPT | Performed by: PHYSICIAN ASSISTANT

## 2020-06-23 PROCEDURE — 85025 COMPLETE CBC W/AUTO DIFF WBC: CPT | Performed by: PHYSICIAN ASSISTANT

## 2020-06-23 PROCEDURE — 99024 POSTOP FOLLOW-UP VISIT: CPT | Performed by: THORACIC SURGERY (CARDIOTHORACIC VASCULAR SURGERY)

## 2020-06-23 PROCEDURE — 94003 VENT MGMT INPAT SUBQ DAY: CPT

## 2020-06-23 PROCEDURE — 85730 THROMBOPLASTIN TIME PARTIAL: CPT | Performed by: THORACIC SURGERY (CARDIOTHORACIC VASCULAR SURGERY)

## 2020-06-23 PROCEDURE — 82805 BLOOD GASES W/O2 SATURATION: CPT | Performed by: STUDENT IN AN ORGANIZED HEALTH CARE EDUCATION/TRAINING PROGRAM

## 2020-06-23 PROCEDURE — 94760 N-INVAS EAR/PLS OXIMETRY 1: CPT

## 2020-06-23 PROCEDURE — 99024 POSTOP FOLLOW-UP VISIT: CPT | Performed by: PHYSICIAN ASSISTANT

## 2020-06-23 PROCEDURE — 99233 SBSQ HOSP IP/OBS HIGH 50: CPT | Performed by: SURGERY

## 2020-06-23 RX ORDER — MIDAZOLAM HYDROCHLORIDE 2 MG/2ML
INJECTION, SOLUTION INTRAMUSCULAR; INTRAVENOUS
Status: COMPLETED
Start: 2020-06-23 | End: 2020-06-23

## 2020-06-23 RX ORDER — MIDAZOLAM HYDROCHLORIDE 2 MG/2ML
1 INJECTION, SOLUTION INTRAMUSCULAR; INTRAVENOUS ONCE
Status: COMPLETED | OUTPATIENT
Start: 2020-06-23 | End: 2020-06-23

## 2020-06-23 RX ORDER — POLYETHYLENE GLYCOL 3350 17 G/17G
17 POWDER, FOR SOLUTION ORAL DAILY
Status: DISCONTINUED | OUTPATIENT
Start: 2020-06-23 | End: 2020-07-30 | Stop reason: HOSPADM

## 2020-06-23 RX ORDER — KETAMINE HCL IN NACL, ISO-OSM 100MG/10ML
SYRINGE (ML) INJECTION
Status: DISCONTINUED
Start: 2020-06-23 | End: 2020-06-23 | Stop reason: WASHOUT

## 2020-06-23 RX ORDER — METRONIDAZOLE 500 MG/1
500 TABLET ORAL EVERY 8 HOURS
Status: COMPLETED | OUTPATIENT
Start: 2020-06-23 | End: 2020-06-27

## 2020-06-23 RX ORDER — OXYCODONE HCL 5 MG/5 ML
5 SOLUTION, ORAL ORAL EVERY 4 HOURS
Status: DISCONTINUED | OUTPATIENT
Start: 2020-06-23 | End: 2020-06-25

## 2020-06-23 RX ADMIN — POLYETHYLENE GLYCOL 3350 17 G: 17 POWDER, FOR SOLUTION ORAL at 10:40

## 2020-06-23 RX ADMIN — OXYCODONE HYDROCHLORIDE 5 MG: 5 SOLUTION ORAL at 10:40

## 2020-06-23 RX ADMIN — CHLORHEXIDINE GLUCONATE 0.12% ORAL RINSE 15 ML: 1.2 LIQUID ORAL at 22:00

## 2020-06-23 RX ADMIN — OXYCODONE HYDROCHLORIDE 5 MG: 5 SOLUTION ORAL at 17:10

## 2020-06-23 RX ADMIN — HEPARIN SODIUM AND DEXTROSE 18 UNITS/KG/HR: 10000; 5 INJECTION INTRAVENOUS at 12:35

## 2020-06-23 RX ADMIN — Medication 75 MCG/HR: at 10:55

## 2020-06-23 RX ADMIN — DEXMEDETOMIDINE 1.2 MCG/KG/HR: 100 INJECTION, SOLUTION, CONCENTRATE INTRAVENOUS at 13:21

## 2020-06-23 RX ADMIN — FENTANYL CITRATE 50 MCG: 50 INJECTION INTRAMUSCULAR; INTRAVENOUS at 03:23

## 2020-06-23 RX ADMIN — CEFEPIME HYDROCHLORIDE 2000 MG: 2 INJECTION, POWDER, FOR SOLUTION INTRAVENOUS at 02:00

## 2020-06-23 RX ADMIN — HYDROCORTISONE SODIUM SUCCINATE 50 MG: 100 INJECTION, POWDER, FOR SOLUTION INTRAMUSCULAR; INTRAVENOUS at 22:00

## 2020-06-23 RX ADMIN — OXYCODONE HYDROCHLORIDE 5 MG: 5 SOLUTION ORAL at 22:00

## 2020-06-23 RX ADMIN — SENNOSIDES AND DOCUSATE SODIUM 1 TABLET: 8.6; 5 TABLET ORAL at 17:24

## 2020-06-23 RX ADMIN — HEPARIN SODIUM 3600 UNITS: 1000 INJECTION INTRAVENOUS; SUBCUTANEOUS at 05:58

## 2020-06-23 RX ADMIN — CHLORHEXIDINE GLUCONATE 0.12% ORAL RINSE 15 ML: 1.2 LIQUID ORAL at 08:38

## 2020-06-23 RX ADMIN — MIDAZOLAM 5 MG: 1 INJECTION INTRAMUSCULAR; INTRAVENOUS at 18:25

## 2020-06-23 RX ADMIN — FENTANYL CITRATE 50 MCG: 50 INJECTION INTRAMUSCULAR; INTRAVENOUS at 22:46

## 2020-06-23 RX ADMIN — CEFEPIME HYDROCHLORIDE 2000 MG: 2 INJECTION, POWDER, FOR SOLUTION INTRAVENOUS at 17:09

## 2020-06-23 RX ADMIN — FENTANYL CITRATE 50 MCG: 50 INJECTION INTRAMUSCULAR; INTRAVENOUS at 15:41

## 2020-06-23 RX ADMIN — SODIUM CHLORIDE, SODIUM GLUCONATE, SODIUM ACETATE, POTASSIUM CHLORIDE, MAGNESIUM CHLORIDE, SODIUM PHOSPHATE, DIBASIC, AND POTASSIUM PHOSPHATE 100 ML/HR: .53; .5; .37; .037; .03; .012; .00082 INJECTION, SOLUTION INTRAVENOUS at 05:30

## 2020-06-23 RX ADMIN — METRONIDAZOLE 500 MG: 500 INJECTION, SOLUTION INTRAVENOUS at 02:19

## 2020-06-23 RX ADMIN — DEXMEDETOMIDINE 1.4 MCG/KG/HR: 100 INJECTION, SOLUTION, CONCENTRATE INTRAVENOUS at 20:22

## 2020-06-23 RX ADMIN — Medication 0.15 MG/KG/HR: at 16:21

## 2020-06-23 RX ADMIN — FENTANYL CITRATE 50 MCG: 50 INJECTION INTRAMUSCULAR; INTRAVENOUS at 17:45

## 2020-06-23 RX ADMIN — DEXMEDETOMIDINE 1.4 MCG/KG/HR: 100 INJECTION, SOLUTION, CONCENTRATE INTRAVENOUS at 10:23

## 2020-06-23 RX ADMIN — VANCOMYCIN HYDROCHLORIDE 1250 MG: 1 INJECTION, POWDER, LYOPHILIZED, FOR SOLUTION INTRAVENOUS at 04:21

## 2020-06-23 RX ADMIN — DEXMEDETOMIDINE 1.4 MCG/KG/HR: 100 INJECTION, SOLUTION, CONCENTRATE INTRAVENOUS at 23:30

## 2020-06-23 RX ADMIN — OXYCODONE HYDROCHLORIDE 5 MG: 5 SOLUTION ORAL at 14:35

## 2020-06-23 RX ADMIN — DEXMEDETOMIDINE 1.2 MCG/KG/HR: 100 INJECTION, SOLUTION, CONCENTRATE INTRAVENOUS at 17:08

## 2020-06-23 RX ADMIN — MIDAZOLAM 1 MG: 1 INJECTION INTRAMUSCULAR; INTRAVENOUS at 23:20

## 2020-06-23 RX ADMIN — Medication 0.15 MG/KG/HR: at 00:00

## 2020-06-23 RX ADMIN — CEFEPIME HYDROCHLORIDE 2000 MG: 2 INJECTION, POWDER, FOR SOLUTION INTRAVENOUS at 10:18

## 2020-06-23 RX ADMIN — HYDROCORTISONE SODIUM SUCCINATE 50 MG: 100 INJECTION, POWDER, FOR SOLUTION INTRAMUSCULAR; INTRAVENOUS at 08:37

## 2020-06-23 RX ADMIN — PANTOPRAZOLE SODIUM 40 MG: 40 INJECTION, POWDER, FOR SOLUTION INTRAVENOUS at 08:37

## 2020-06-23 RX ADMIN — METRONIDAZOLE 500 MG: 500 TABLET ORAL at 17:24

## 2020-06-23 RX ADMIN — Medication 75 MCG/HR: at 23:31

## 2020-06-23 RX ADMIN — SENNOSIDES AND DOCUSATE SODIUM 1 TABLET: 8.6; 5 TABLET ORAL at 08:38

## 2020-06-23 RX ADMIN — METRONIDAZOLE 500 MG: 500 TABLET ORAL at 11:14

## 2020-06-23 NOTE — OCCUPATIONAL THERAPY NOTE
OT CANCEL NOTE    OT orders received  Chart reviewed  Pt is currently intubated/sedated and not appropriate to engage in skilled OT services at this time  Will continue to follow pt on caseload and see pt when medically stable and as clinically appropriate for re-evaluation      Julia Valencia MS, OTR/L

## 2020-06-23 NOTE — RESPIRATORY THERAPY NOTE
RT Ventilator Management Note  Wilfrido Bowser 71 y o  male MRN: [id number]  Unit/Bed#: ICU 02 Encounter: [id number]      Daily Screen       6/22/2020  0700 6/23/2020  0720          Patient safety screen outcome[de-identified]  Failed  Failed      Not Ready for Weaning due to[de-identified]    Underline problem not resolved              Physical Exam:   Assessment Type: (P) Assess only  General Appearance: (P) Sedated  Respiratory Pattern: (P) Assisted  Chest Assessment: (P) Subcutaneous emphysema, Chest expansion symmetrical  Bilateral Breath Sounds: (P) Diminished  Cough: (P) None  Suction: (P) Trach  O2 Device: (P) vent  Subjective Data: (P) trach      Resp Comments: (P) Pt resting comfortably on current AC/PC settings  FiO2 decreased to 40% for SpO2 of 99%  Pt continues with subcutaneous B/L  Will continue to monitor

## 2020-06-23 NOTE — PROGRESS NOTES
Vancomycin Assessment    Lux Womack is a 71 y o  male who is currently receiving vancomycin 1250mg iv q12 for Pneumonia     Relevant clinical data and objective history reviewed:  Creatinine   Date Value Ref Range Status   06/22/2020 0 68 0 60 - 1 30 mg/dL Final     Comment:     Standardized to IDMS reference method   06/21/2020 0 87 0 60 - 1 30 mg/dL Final     Comment:     Standardized to IDMS reference method   06/20/2020 1 56 (H) 0 60 - 1 30 mg/dL Final     Comment:     Standardized to IDMS reference method     Vancomycin Rm   Date Value Ref Range Status   06/21/2020 4 5 ug/mL Final     /72   Pulse 74   Temp 98 6 °F (37 °C)   Resp 17   Ht 5' 10" (1 778 m)   Wt 94 6 kg (208 lb 8 9 oz)   SpO2 98%   BMI 29 92 kg/m²   I/O last 3 completed shifts: In: 9447 2 [I V :6445 2; NG/GT:165; IV Piggyback:2400; Feedings:437]  Out: 2265 [Urine:1885; Chest Tube:380]  Lab Results   Component Value Date/Time    BUN 14 06/22/2020 04:44 AM    WBC 13 35 (H) 06/22/2020 02:34 PM    HGB 8 9 (L) 06/22/2020 02:34 PM    HCT 28 5 (L) 06/22/2020 02:34 PM    MCV 99 (H) 06/22/2020 02:34 PM     (L) 06/22/2020 02:34 PM     Temp Readings from Last 3 Encounters:   06/22/20 98 6 °F (37 °C)   05/21/20 98 7 °F (37 1 °C)   04/29/20 97 6 °F (36 4 °C) (Tympanic)     Vancomycin Days of Therapy: 3    Assessment/Plan  The patient is currently on vancomycin utilizing scheduled dosing  Baseline risks associated with therapy include: concomitant nephrotoxic medications, advanced age and dehydration  The patient is receiving 1250mg iv q12 with the most recent vancomycin level being at steady-state and sub-therapeutic based on a goal of 15-20 (appropriate for most indications) ; therefore, after clinical evaluation will be changed to 1250mg iv q8   Pharmacy will continue to follow closely for s/sx of nephrotoxicity, infusion reactions and appropriateness of therapy  BMP and CBC will be ordered per protocol    Plan for trough as patient approaches steady state, prior to the 5th  dose at approximately 0400 06/24/20  Pharmacy will continue to follow the patients culture results and clinical progress daily      Chicho Aaron, Pharmacist

## 2020-06-23 NOTE — CONSULTS
Vancomycin IV Pharmacy-to-Dose Consultation    Carito Perez is a 71 y o  male who was receiving vancomycin IV with management by the Pharmacy Consult service  The patient's vancomycin therapy has been discontinued  Thank you for allowing us to take part in this patient's care  Pharmacy will sign-off at this point; please call if there are any questions        Gaby Ewing, PharmD, [facility] Pharmacist   [phone]

## 2020-06-23 NOTE — RESPIRATORY THERAPY NOTE
RT Ventilator Management Note  Shannon Collier 71 y o  male MRN: [id number]  Unit/Bed#: ICU 02 Encounter: [id number]      Daily Screen       6/22/2020  0700 6/23/2020  0720          Patient safety screen outcome[de-identified]  Failed  Failed      Not Ready for Weaning due to[de-identified]    Underline problem not resolved              Physical Exam:   Assessment Type: Assess only  General Appearance: Sedated  Respiratory Pattern: Assisted  Chest Assessment: Subcutaneous emphysema, Chest expansion symmetrical  Bilateral Breath Sounds: Diminished  Cough: None  Suction: Trach  O2 Device: vent  Subjective Data: trach      Resp Comments: (P) Pt  found on CPAP  Vent changed back to A/C as ordered until srttings can be confirmed

## 2020-06-23 NOTE — PROGRESS NOTES
Progress Note - Thoracic Surgery   Edward Knowles 71 y o  male MRN: [id number]  Unit/Bed#: ICU 02 Encounter: [id number]    Assessment:  69yo male s/p L VATS upper lobectomy  Post op prolonged airleak with significant subcutaneous emphysema  Also had code event requiring cricothyrodotomy for difficult intubation and now s/p formalization to tracheostomy  Off pressors now  Plan:  Wean vent as able  Wean sedative medications  Trach care  Continue anticoagulation for RUE occlusive axillary and brachial vein thrombus  May discontinue antibiotics, no evidence of infection  Keep chest tube to suction  Check CXR today  Continue tube feeds  May remove L chest wall VAC and close wound  ICU level of care    Subjective/Objective     Chief Complaint:     Subjective: Underwent formalization to tracheostomy yesterday  Off vasopressors  Intermittently agitated  Objective:     Vitals: Blood pressure 109/72, pulse 76, temperature 99 °F (37 2 °C), resp  rate 18, height 5' 10" (1 778 m), weight 95 kg (209 lb 7 oz), SpO2 98 %  ,Body mass index is 30 05 kg/m²      I/O       06/21 0701 - 06/22 0700 06/22 0701 - 06/23 0700 06/23 0701 - 06/24 0700    I V  (mL/kg) 4240 7 (44 8) 3971 8 (41 8)     NG/GT 65 100     IV Piggyback 1450 1350     Feedings 277 871     Total Intake(mL/kg) 6032 7 (63 8) 6292 8 (66 2)     Urine (mL/kg/hr) 1210 (0 5) 1255 (0 6)     Emesis/NG output 0 0     Chest Tube 210 370     Total Output 1420 1625     Net +4612 7 +4667 8                  Physical Exam:   NAD  RRR  nonlabored respirations on vent  Trach in place  L chest tube to suction with no appreciable airleak  Significant diffuse subcutaneous emphysema which is improving  L chest VAC in place with good seal  Abdomen soft, nt, nd    Lab, Imaging and other studies:   CBC with diff:   Lab Results   Component Value Date    WBC 13 94 (H) 06/23/2020    HGB 9 2 (L) 06/23/2020    HCT 28 7 (L) 06/23/2020    MCV 99 (H) 06/23/2020     (L) 06/23/2020 MCH 31 8 06/23/2020    MCHC 32 1 06/23/2020    RDW 14 5 06/23/2020    MPV 10 2 06/23/2020    NRBC 0 06/23/2020   , BMP/CMP:   Lab Results   Component Value Date    SODIUM 141 06/23/2020    K 4 0 06/23/2020     (H) 06/23/2020    CO2 24 06/23/2020    BUN 16 06/23/2020    CREATININE 0 65 06/23/2020    CALCIUM 7 0 (L) 06/23/2020    EGFR 99 06/23/2020   , Magnesium: No components found for: MAG, Coags:   Lab Results   Component Value Date    PTT 57 (H) 06/23/2020    INR 1 12 06/22/2020   , Blood Culture: No results found for: BLOODCX, Urine Culture: No results found for: URINECX, Wound Culure: No results found for: WOUNDCULT  VTE Pharmacologic Prophylaxis: Heparin  VTE Mechanical Prophylaxis: sequential compression device

## 2020-06-23 NOTE — PROGRESS NOTES
Left chest tube to pleurevac switched to thopaz unit  Placed to -20 suction  Air leak of 0cc/min  Will continue to monitor     Martin Donnelly PA-C

## 2020-06-23 NOTE — PROGRESS NOTES
Post-op check - Thoracic Surgery   Benoit Law 71 y o  male MRN: [id number]  Unit/Bed#: ICU 02 Encounter: [id number]    Assessment:  71 M status post tracheostomy revision    Plan:  Wean ventilator  Heparin gtt  Antibiotics  Continue supportive care per ICU    Subjective/Objective     Subjective: No acute events since surgery  Off vasopressors since daytime  Ventilator settings down to 50%/8    Objective:    Blood pressure 109/72, pulse 80, temperature 99 °F (37 2 °C), resp  rate 18, height 5' 10" (1 778 m), weight 94 6 kg (208 lb 8 9 oz), SpO2 98 %  ,Body mass index is 29 92 kg/m²        Intake/Output Summary (Last 24 hours) at 6/23/2020 0227  Last data filed at 6/23/2020 0214  Gross per 24 hour   Intake 5567 03 ml   Output 1475 ml   Net 4092 03 ml       Invasive Devices     Central Venous Catheter Line            CVC Central Lines 06/20/20 2 days          Arterial Line            Arterial Line 06/20/20 Radial 2 days          Drain            Chest Tube 1 Left Pleural 28 Fr  12 days    NG/OG/Enteral Tube Nasogastric 12 Fr Right nares 2 days    Urethral Catheter 16 Fr  2 days          Airway            Surgical Airway Cuffed less than 1 day    Surgical Airway Shiley Cuffed less than 1 day                Physical Exam:   General: sedated  Eyes: open to stimulus  ENT: moist mucous membranes  Neck: supple, trach in place  CV: RRR +S1/S2  Chest: breath sounds bilaterally, chest tube in place  Abdomen: soft, NT ND  Extremities: atraumatic

## 2020-06-23 NOTE — PROGRESS NOTES
Daily Progress Note - Critical Care   Santiago Howard 71 y o  male MRN: [id number]  Unit/Bed#: ICU 02 Encounter: [id number]        ----------------------------------------------------------------------------------------  HPI/24hr events:  Patient underwent formalization of his airway with tracheostomy yesterday morning  Patient tolerated procedure well without any complications  On his way back up from the OR he stopped at CT scan where he had a CTA PE performed which was negative for PE but showed a moderate to large left pneumothorax and a left chest wall defect extending into the pleural cavity between the 4th and 5th ribs  He also had a right upper extremity venous duplex performed for right upper extremity swelling which was positive for an acute occlusive DVT in the right axillary and brachial veins  Thoracic surgery was made aware of the findings on CT scan  Patient is currently on AC/PC PEEP 8, 50% FIO2  Fentanyl 75  Versed 2  Ketamine 0 15  Heparin gtt    ---------------------------------------------------------------------------------------  SUBJECTIVE  Overnight patient became agitated requiring increased in fentanyl to 75 and Versed to 2  Since this change she has been resting comfortably  Review of Systems   Unable to perform ROS: Intubated     Review of systems was unable to be performed secondary to intubation  ---------------------------------------------------------------------------------------  Assessment and Plan:    Neuro:   · Diagnosis:  Sedation/analgesia  ? Plan:  Continue ketamine 0 15 mcg/kg/hr  ? Continue fentanyl -> increased to 75 mcg/hr overnight for agitation  ? Continue Versed -> increased to 2 mg/hr overnight for agitation  ? Prn fentanyl   · Regulate sleep-wake cycle  · Trend neuro exam given recent cardiac arrest        CV:   · Diagnosis: shock secondary to sepsis versus cardiogenic versus obstructive in setting of type 2 MI  ?  Plan:  Troponin peaked at 3 and has trended down  ? Vasopressor support weaned yesterday, maintain MAPs>65  ? Wean stress dose steroids - solu-cortef 50mg IV q6hrs  ? Unable to perform TTE with significant subcutaneous emphysema  ? Unable to perform NONA given on stable airway with cricothyroidotomy  § CTA PE negative for pulmonary embolism   · Diagnosis:  History of hypertension  ? Plan:  Continue to hold home antihypertensives        Pulm:  · Diagnosis:  Acute hypoxic respiratory failure status post cricothyroidotomy, now s/p formalization of airway with tracheostomy 6/22  ? Plan: F/u AM ABG  ? Current vent settings: AC/PC 18/16/8/50%  ? 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 Plan:  Maintain left-sided chest tube to -20 mmHg  § Monitor air leak and output  § Management per thoracic surgery  ? Maintain Left chest wall wound VAC        GI:   · Diagnosis:  Transaminitis, resolved  ? Plan:  Continue to trend LFTs  · Bowel regimen  · Protonix for stress ulcer prophylaxis        :   · Diagnosis:  ROGER resolved  ? Plan:  Creatinine stable at 0 68  ? Trend kidney function daily  ? UO 1 0L over last 24 hrs          F/E/N:   · Plan: F: [email or web address]  · E: replete as indicated, K>4, Phos>3, Mag>2  · N: TF Jevity 1 2@ 60        Heme/Onc:   · Diagnosis: LLE DVT, RUE DVT  ? Continue heparin gtt  ? CTA PE negative for PE  · Diagnosis: anemia, likely dilutional   ? Plan: Hgb 9 2 from 8 9  ? Continue to trend H&H        Endo:   · Diagnosis:  Hyperglycemia  ? Plan: trend BG        ID:   · Diagnosis: Possible septic shock 2/2 PNA  ? Plan: Continue Cefepime/Flagly/Vanco   ? WBC stable at 13  ? Trend WBC daily   ? F/u blood cx -> [email or web address] hrs  ?  F/u MRSA cx -> Negative, d/c vanco today         MSK/Skin:   · No active issues  · limited repositioning 2/2 unstable airway         Disposition: Continue Critical Care   Code Status: Level 1 - Full Code  ---------------------------------------------------------------------------------------  ICU CORE MEASURES    Prophylaxis   VTE Pharmacologic Prophylaxis: Heparin Drip  VTE Mechanical Prophylaxis: sequential compression device  Stress Ulcer Prophylaxis: Pantoprazole IV     ABCDE Protocol (if indicated)  Plan to perform spontaneous awakening trial today? Yes  Plan to perform spontaneous breathing trial today? Yes  Obvious barriers to extubation? Not applicable  CAM-ICU: Negative    Invasive Devices Review  Invasive Devices     Central Venous Catheter Line            CVC Central Lines 20 2 days          Arterial Line            Arterial Line 20 Radial 2 days          Drain            Chest Tube 1 Left Pleural 28 Fr  12 days    NG/OG/Enteral Tube Nasogastric 12 Fr Right nares 2 days    Urethral Catheter 16 Fr  2 days          Airway            Surgical Airway Cuffed less than 1 day    Surgical Airway Shiley Cuffed less than 1 day              Can any invasive devices be discontinued today?  No  ---------------------------------------------------------------------------------------  OBJECTIVE    Vitals   Vitals:    20 0100 20 0200 20 0341 20 0356   BP:       Pulse: 80 76  82   Resp: 18 19  18   Temp: 99 °F (37 2 °C) 98 6 °F (37 °C)  99 °F (37 2 °C)   TempSrc:    Bladder   SpO2: 98% 99% 97% 97%   Weight:       Height:         Temp (24hrs), Av 5 °F (36 9 °C), Min:97 9 °F (36 6 °C), Max:99 3 °F (37 4 °C)  Current: Temperature: 99 °F (37 2 °C)  Wt Readings from Last 3 Encounters:   20 94 6 kg (208 lb 8 9 oz)   20 94 9 kg (209 lb 3 2 oz)   20 90 7 kg (200 lb)     Temp Readings from Last 3 Encounters:   20 99 °F (37 2 °C) (Bladder)   20 98 7 °F (37 1 °C)   20 97 6 °F (36 4 °C) (Tympanic)     BP Readings from Last 3 Encounters:   20 109/72   20 145/86   20 134/82     Pulse Readings from Last 3 Encounters: 06/23/20 82   05/21/20 101   04/29/20 92         Respiratory:  SpO2: SpO2: 97 %  O2 Flow Rate (L/min): 2 L/min    Invasive/non-invasive ventilation settings   Respiratory    Lab Data (Last 4 hours)    None         O2/Vent Data (Last 4 hours)      06/23 0341           Vent Mode AC/PC       Resp Rate (BPM) (BPM) 18       Pressure Control (cmH2O) (cm) 16       Insp Time (sec) (sec) 0 9       FiO2 (%) (%) 50       PEEP (cmH2O) (cmH2O) 8       MV 11 4                   Physical Exam   Constitutional: He appears well-developed and well-nourished  No distress  HENT:   Head: Normocephalic and atraumatic  Tracheostomy in place    Eyes: Pupils are equal, round, and reactive to light  EOM are normal  No scleral icterus  Neck: Normal range of motion  No JVD present  Cardiovascular: Normal rate, regular rhythm and normal heart sounds  Pulmonary/Chest: Effort normal  No respiratory distress  On mechanical ventilation AC/PC  Crepitus present along chest wall extending into the neck, improving    L CT -20 mmHg, + AL, serous drainage   L chest wall wound vac    Abdominal: Soft  He exhibits no distension  There is no tenderness  There is no rebound and no guarding  Musculoskeletal: Normal range of motion  He exhibits no edema or deformity  Neurological: He is alert  Opens eyes to his name  Follows basic commands  Making spontaneous purposeful movements   GCS 10T   Skin: Skin is warm and dry  He is not diaphoretic  Psychiatric:   Unable to assess 2/2 sedation/intubation    Nursing note and vitals reviewed          Laboratory and Diagnostics:  Results from last 7 days   Lab Units 06/22/20  1434 06/22/20  0444 06/21/20  0544 06/20/20  1319 06/20/20  0647 06/20/20  0534 06/20/20  0508 06/17/20  0518   WBC Thousand/uL 13 35* 15 83* 14 26* 33 03* 31 57*  --   --  8 16   HEMOGLOBIN g/dL 8 9* 9 0* 10 8* 13 1 13 9  --   --  12 3   I STAT HEMOGLOBIN g/dl  --   --   --   --   --  11 6* 17 3*  --    HEMATOCRIT % 28 5* 27 7* 32 0* 39 9 42 1  --   --  37 6   HEMATOCRIT, ISTAT %  --   --   --   --   --  34* 51*  --    PLATELETS Thousands/uL 123* 113* 150 253 295  --   --  268   NEUTROS PCT %  --  86*  --   --   --   --   --   --    BANDS PCT %  --   --   --   --  1  --   --  1   MONOS PCT %  --  4  --   --   --   --   --   --    MONO PCT %  --   --   --   --  1*  --   --  3*     Results from last 7 days   Lab Units 06/22/20 0444 06/21/20  0544 06/20/20  1319 06/20/20  0647 06/17/20  0518   SODIUM mmol/L 140 141 141 137 132*   POTASSIUM mmol/L 3 9 3 2* 3 5 3 4* 3 9   CHLORIDE mmol/L 111* 109* 107 101 102   CO2 mmol/L 26 22 20* 25 22   ANION GAP mmol/L 3* 10 14* 11 8   BUN mg/dL 14 10 15 14 12   CREATININE mg/dL 0 68 0 87 1 56* 1 41* 0 81   CALCIUM mg/dL 7 7* 7 6* 8 5 10 0 8 6   GLUCOSE RANDOM mg/dL 105 140 226* 303* 89   ALT U/L 56 92* 148* 172*  --    AST U/L 20 37 109* 116*  --    ALK PHOS U/L 50 55 76 86  --    ALBUMIN g/dL 1 9* 2 2* 2 1* 2 4*  --    TOTAL BILIRUBIN mg/dL 0 26 0 44 0 34 0 36  --      Results from last 7 days   Lab Units 06/22/20 0444 06/21/20  0544 06/20/20  1319 06/20/20  0647   MAGNESIUM mg/dL 2 3 1 9 1 9 2 5   PHOSPHORUS mg/dL 2 4 2 0* 4 7* 8 9*      Results from last 7 days   Lab Units 06/22/20  2152 06/22/20  1434 06/22/20  0444 06/21/20  2319 06/21/20  1651 06/21/20  0918 06/21/20  0218   INR   --  1 12  --   --   --   --   --    PTT seconds 96* 27 64* 64* 67* 117* >210*      Results from last 7 days   Lab Units 06/20/20  1759 06/20/20  1319 06/20/20  0928 06/20/20  0647   TROPONIN I ng/mL 2 75* 3 00* 2 86* 0 85*     Results from last 7 days   Lab Units 06/21/20  0918 06/21/20  0544 06/21/20  0218 06/20/20  2159 06/20/20  1708 06/20/20  1319 06/20/20  0928   LACTIC ACID mmol/L 1 5 2 3* 3 1* 7 1* 10 6* 9 4* 4 7*     ABG:  Results from last 7 days   Lab Units 06/22/20  0447   PH ART  7 432   PCO2 ART mm Hg 31 1*   PO2 ART mm Hg 117 6   HCO3 ART mmol/L 20 3*   BASE EXC ART mmol/L -3 2   ABG SOURCE  Line, Arterial     VBG:  Results from last 7 days   Lab Units 06/22/20  0447  06/20/20  1343   PH MICHELLE   --   --  7 213*   PCO2 MICHELLE mm Hg  --   --  47 7   PO2 MICHELLE mm Hg  --   --  52 5*   HCO3 MICHELLE mmol/L  --   --  18 8*   BASE EXC MICHELLE mmol/L  --   --  -9 0   ABG SOURCE  Line, Arterial   < >  --     < > = values in this interval not displayed  Results from last 7 days   Lab Units 06/20/20  1759 06/20/20  0928   PROCALCITONIN ng/ml 5 85* 1 85*       Micro  Results from last 7 days   Lab Units 06/20/20  0955 06/20/20  0929   BLOOD CULTURE   --  No Growth at 48 hrs  No Growth at 48 hrs  MRSA CULTURE ONLY  No Methicillin Resistant Staphlyococcus aureus (MRSA) isolated  --        EKG: reviewed  Imaging:  I have personally reviewed pertinent reports  Intake and Output  I/O       06/21 0701 - 06/22 0700 06/22 0701 - 06/23 0700    I V  (mL/kg) 4240 7 (44 8) 3679 4 (38 9)    NG/GT 65 100    IV Piggyback 1450 1350    Feedings 277 446    Total Intake(mL/kg) 6032 7 (63 8) 5575 4 (58 9)    Urine (mL/kg/hr) 1210 (0 5) 1080 (0 5)    Emesis/NG output 0 0    Chest Tube 210 170    Total Output 1420 1250    Net +4612 7 +4325 4              UOP: 1,080 L over last 24 hrs     Height and Weights   Height: 5' 10" (177 8 cm)  IBW: 73 kg  Body mass index is 29 92 kg/m²  Weight (last 2 days)     Date/Time   Weight    06/22/20 0600   94 6 (208 56)                Nutrition       Diet Orders   (From admission, onward)             Start     Ordered    06/22/20 1353  Diet Enteral/Parenteral; Tube Feeding No Oral Diet; Jevity 1 2 Javier; Continuous; 60  Diet effective now     Question Answer Comment   Diet Type Enteral/Parenteral    Enteral/Parenteral Tube Feeding No Oral Diet    Tube Feeding Formula: Jevity 1 2 Javier    Bolus/Cyclic/Continuous Continuous    Tube Feeding Goal Rate (mL/hr): 60    RD to adjust diet per protocol? Yes        06/22/20 1352              TF currently running at 60 ml/hr with a goal of 60 ml/hr   Formula: Lana Eldridge 1  2      Active Medications  Scheduled Meds:  Current Facility-Administered Medications:  acetaminophen 650 mg Oral Q6H PRN Honor Filler, PA-C    albuterol 2 5 mg Nebulization Q4H PRN [place] Filler, PA-C    cefepime 2,000 mg Intravenous Q8H Honor Filler, PA-C Last Rate: Stopped (06/23/20 0230)   chlorhexidine 15 mL Swish & Spit Q12H [address] [place] Filler, PA-C    fentaNYL 75 mcg/hr Intravenous Continuous Honor Filler, PA-C Last Rate: 75 mcg/hr (06/22/20 2252)   fentanyl citrate (PF) 50 mcg Intravenous Q1H PRN Honor Filler, PA-C    heparin (porcine) 3-30 Units/kg/hr (Order-Specific) Intravenous Titrated Honor Filler, PA-C Last Rate: 16 Units/kg/hr (06/22/20 2239)   heparin (porcine) 3,600 Units Intravenous Q1H PRN Honor Filler, PA-C    heparin (porcine) 7,200 Units Intravenous Q1H PRN Honor Filler, PA-C    hydrocortisone sodium succinate 50 mg Intravenous Q12H [address] [place] Filler, PA-C    ketamine 0 2 mg/kg/hr Intravenous Continuous Honor Filler, PA-C Last Rate: 0 15 mg/kg/hr (06/23/20 0000)   metroNIDAZOLE 500 mg Intravenous Q8H Honor Filler, PA-C Last Rate: Stopped (06/23/20 0259)   midazolam 3 mg/hr Intravenous Continuous Honor Filler, PA-C Last Rate: 2 mg/hr (06/23/20 0411)   multi-electrolyte 100 mL/hr Intravenous Continuous Honor Filler, PA-C Last Rate: 100 mL/hr (06/22/20 1740)   norepinephrine 1-30 mcg/min Intravenous Titrated Honor Filler, PA-C Last Rate: Stopped (06/22/20 1300)   ondansetron 4 mg Intravenous Q6H PRN Honor Filler, PA-C    pantoprazole 40 mg Intravenous Daily Honor Filler, PA-C    senna-docusate sodium 1 tablet Per NG Tube BID [place] Filler, PA-C    vancomycin 1,250 mg Intravenous Q8H Honor Filler, PA-C Last Rate: 1,250 mg (06/23/20 0421)   vasopressin (PITRESSIN) in 0 9 % sodium chloride 100 mL 0 04 Units/min Intravenous Continuous Honor Filler, PA-C Last Rate: Stopped (06/22/20 0155)     Continuous Infusions:    fentaNYL 75 mcg/hr Last Rate: 75 mcg/hr (06/22/20 2252)   heparin (porcine) 3-30 Units/kg/hr (Order-Specific) Last Rate: 16 Units/kg/hr (06/22/20 2239)   ketamine 0 2 mg/kg/hr Last Rate: 0 15 mg/kg/hr (06/23/20 0000)   midazolam 3 mg/hr Last Rate: 2 mg/hr (06/23/20 0411)   multi-electrolyte 100 mL/hr Last Rate: 100 mL/hr (06/22/20 1740)   norepinephrine 1-30 mcg/min Last Rate: Stopped (06/22/20 1300)   vasopressin (PITRESSIN) in 0 9 % sodium chloride 100 mL 0 04 Units/min Last Rate: Stopped (06/22/20 0155)     PRN Meds:     acetaminophen 650 mg Q6H PRN   albuterol 2 5 mg Q4H PRN   fentanyl citrate (PF) 50 mcg Q1H PRN   heparin (porcine) 3,600 Units Q1H PRN   heparin (porcine) 7,200 Units Q1H PRN   ondansetron 4 mg Q6H PRN       Allergies   No Known Allergies  ---------------------------------------------------------------------------------------  Advance Directive and Living Will: Yes    Power of : Yes  POLST:    ---------------------------------------------------------------------------------------  Care Time Delivered:   Upon my evaluation, this patient had a high probability of imminent or life-threatening deterioration due to complex post-operative course, which required my direct attention, intervention, and personal management  I have personally provided 10 minutes of critical care time, exclusive of procedures, teaching, family meetings, and any prior time recorded by providers other than myself  Marie Lang DO      Portions of the record may have been created with voice recognition software  Occasional wrong word or "sound a like" substitutions may have occurred due to the inherent limitations of voice recognition software    Read the chart carefully and recognize, using context, where substitutions have occurred

## 2020-06-23 NOTE — PHYSICAL THERAPY NOTE
Physical Therapy Cancellation Note    PT orders received  Chart review completed  Pt currently intubated and sedated  Spoke to nsg, not appropriate for skilled PT at this time  PT will follow and eval as medically appropriate      Jonathon Ballesteros PT

## 2020-06-23 NOTE — RESPIRATORY THERAPY NOTE
RT Ventilator Management Note  Shelbi Puckett 71 y o  male MRN: [id number]  Unit/Bed#: ICU 02 Encounter: [id number]      Daily Screen       6/21/2020  0709 6/22/2020  0700          Patient safety screen outcome[de-identified]  Failed  Failed      Not Ready for Weaning due to[de-identified]  PEEP > 8cmH2O;Underline problem not resolved                Physical Exam:   Assessment Type: (P) Assess only  Cough: (P) None      Resp Comments: (P) Pt  doing well on pressure control  No changes throughout the night

## 2020-06-24 ENCOUNTER — APPOINTMENT (INPATIENT)
Dept: RADIOLOGY | Facility: HOSPITAL | Age: 70
DRG: 003 | End: 2020-06-24
Payer: COMMERCIAL

## 2020-06-24 LAB
ANION GAP SERPL CALCULATED.3IONS-SCNC: 6 MMOL/L (ref 4–13)
APTT PPP: 66 SECONDS (ref 23–37)
BASE EXCESS BLDA CALC-SCNC: -2 MMOL/L
BASOPHILS # BLD AUTO: 0.03 THOUSANDS/ΜL (ref 0–0.1)
BASOPHILS NFR BLD AUTO: 0 % (ref 0–1)
BUN SERPL-MCNC: 16 MG/DL (ref 5–25)
CA-I BLD-SCNC: 1.13 MMOL/L (ref 1.12–1.32)
CALCIUM SERPL-MCNC: 7.5 MG/DL (ref 8.3–10.1)
CHLORIDE SERPL-SCNC: 110 MMOL/L (ref 100–108)
CO2 SERPL-SCNC: 24 MMOL/L (ref 21–32)
CREAT SERPL-MCNC: 0.59 MG/DL (ref 0.6–1.3)
EOSINOPHIL # BLD AUTO: 0.44 THOUSAND/ΜL (ref 0–0.61)
EOSINOPHIL NFR BLD AUTO: 4 % (ref 0–6)
ERYTHROCYTE [DISTWIDTH] IN BLOOD BY AUTOMATED COUNT: 14.5 % (ref 11.6–15.1)
GFR SERPL CREATININE-BSD FRML MDRD: 103 ML/MIN/1.73SQ M
GLUCOSE SERPL-MCNC: 126 MG/DL (ref 65–140)
HCO3 BLDA-SCNC: 22.4 MMOL/L (ref 22–28)
HCT VFR BLD AUTO: 30.6 % (ref 36.5–49.3)
HGB BLD-MCNC: 9.7 G/DL (ref 12–17)
HOROWITZ INDEX BLDA+IHG-RTO: 40 MM[HG]
IMM GRANULOCYTES # BLD AUTO: 0.09 THOUSAND/UL (ref 0–0.2)
IMM GRANULOCYTES NFR BLD AUTO: 1 % (ref 0–2)
LYMPHOCYTES # BLD AUTO: 1.18 THOUSANDS/ΜL (ref 0.6–4.47)
LYMPHOCYTES NFR BLD AUTO: 11 % (ref 14–44)
MAGNESIUM SERPL-MCNC: 2.4 MG/DL (ref 1.6–2.6)
MCH RBC QN AUTO: 31.3 PG (ref 26.8–34.3)
MCHC RBC AUTO-ENTMCNC: 31.7 G/DL (ref 31.4–37.4)
MCV RBC AUTO: 99 FL (ref 82–98)
MONOCYTES # BLD AUTO: 0.57 THOUSAND/ΜL (ref 0.17–1.22)
MONOCYTES NFR BLD AUTO: 6 % (ref 4–12)
NEUTROPHILS # BLD AUTO: 8.11 THOUSANDS/ΜL (ref 1.85–7.62)
NEUTS SEG NFR BLD AUTO: 78 % (ref 43–75)
NRBC BLD AUTO-RTO: 0 /100 WBCS
O2 CT BLDA-SCNC: 14.2 ML/DL (ref 16–23)
OXYHGB MFR BLDA: 94.6 % (ref 94–97)
PCO2 BLDA: 36.5 MM HG (ref 36–44)
PEEP RESPIRATORY: 8 CM[H2O]
PH BLDA: 7.41 [PH] (ref 7.35–7.45)
PHOSPHATE SERPL-MCNC: 2.6 MG/DL (ref 2.3–4.1)
PLATELET # BLD AUTO: 139 THOUSANDS/UL (ref 149–390)
PMV BLD AUTO: 10.5 FL (ref 8.9–12.7)
PO2 BLDA: 78.1 MM HG (ref 75–129)
POTASSIUM SERPL-SCNC: 3.9 MMOL/L (ref 3.5–5.3)
PRESSURE CONTROL: 16
RBC # BLD AUTO: 3.1 MILLION/UL (ref 3.88–5.62)
SODIUM SERPL-SCNC: 140 MMOL/L (ref 136–145)
SPECIMEN SOURCE: ABNORMAL
VENT AC: 18
VENT- AC: AC
WBC # BLD AUTO: 10.42 THOUSAND/UL (ref 4.31–10.16)

## 2020-06-24 PROCEDURE — 85730 THROMBOPLASTIN TIME PARTIAL: CPT | Performed by: THORACIC SURGERY (CARDIOTHORACIC VASCULAR SURGERY)

## 2020-06-24 PROCEDURE — 80048 BASIC METABOLIC PNL TOTAL CA: CPT | Performed by: NURSE PRACTITIONER

## 2020-06-24 PROCEDURE — 85025 COMPLETE CBC W/AUTO DIFF WBC: CPT | Performed by: NURSE PRACTITIONER

## 2020-06-24 PROCEDURE — 94640 AIRWAY INHALATION TREATMENT: CPT

## 2020-06-24 PROCEDURE — 84100 ASSAY OF PHOSPHORUS: CPT | Performed by: NURSE PRACTITIONER

## 2020-06-24 PROCEDURE — 99291 CRITICAL CARE FIRST HOUR: CPT | Performed by: SURGERY

## 2020-06-24 PROCEDURE — 82330 ASSAY OF CALCIUM: CPT | Performed by: NURSE PRACTITIONER

## 2020-06-24 PROCEDURE — 99024 POSTOP FOLLOW-UP VISIT: CPT | Performed by: THORACIC SURGERY (CARDIOTHORACIC VASCULAR SURGERY)

## 2020-06-24 PROCEDURE — NC001 PR NO CHARGE: Performed by: NURSE PRACTITIONER

## 2020-06-24 PROCEDURE — 83735 ASSAY OF MAGNESIUM: CPT | Performed by: NURSE PRACTITIONER

## 2020-06-24 PROCEDURE — 94003 VENT MGMT INPAT SUBQ DAY: CPT

## 2020-06-24 PROCEDURE — 94760 N-INVAS EAR/PLS OXIMETRY 1: CPT

## 2020-06-24 PROCEDURE — 71045 X-RAY EXAM CHEST 1 VIEW: CPT

## 2020-06-24 PROCEDURE — 82805 BLOOD GASES W/O2 SATURATION: CPT | Performed by: NURSE PRACTITIONER

## 2020-06-24 RX ORDER — MIDAZOLAM HYDROCHLORIDE 2 MG/2ML
2 INJECTION, SOLUTION INTRAMUSCULAR; INTRAVENOUS ONCE
Status: COMPLETED | OUTPATIENT
Start: 2020-06-24 | End: 2020-06-24

## 2020-06-24 RX ORDER — LIDOCAINE HYDROCHLORIDE 10 MG/ML
10 INJECTION, SOLUTION EPIDURAL; INFILTRATION; INTRACAUDAL; PERINEURAL ONCE
Status: COMPLETED | OUTPATIENT
Start: 2020-06-24 | End: 2020-06-24

## 2020-06-24 RX ORDER — SODIUM CHLORIDE, SODIUM GLUCONATE, SODIUM ACETATE, POTASSIUM CHLORIDE, MAGNESIUM CHLORIDE, SODIUM PHOSPHATE, DIBASIC, AND POTASSIUM PHOSPHATE .53; .5; .37; .037; .03; .012; .00082 G/100ML; G/100ML; G/100ML; G/100ML; G/100ML; G/100ML; G/100ML
500 INJECTION, SOLUTION INTRAVENOUS ONCE
Status: COMPLETED | OUTPATIENT
Start: 2020-06-24 | End: 2020-06-24

## 2020-06-24 RX ORDER — MIDAZOLAM HYDROCHLORIDE 2 MG/2ML
INJECTION, SOLUTION INTRAMUSCULAR; INTRAVENOUS
Status: COMPLETED
Start: 2020-06-24 | End: 2020-06-24

## 2020-06-24 RX ORDER — FUROSEMIDE 10 MG/ML
20 INJECTION INTRAMUSCULAR; INTRAVENOUS ONCE
Status: COMPLETED | OUTPATIENT
Start: 2020-06-24 | End: 2020-06-24

## 2020-06-24 RX ORDER — QUETIAPINE FUMARATE 25 MG/1
50 TABLET, FILM COATED ORAL 2 TIMES DAILY
Status: DISCONTINUED | OUTPATIENT
Start: 2020-06-24 | End: 2020-06-29

## 2020-06-24 RX ORDER — LIDOCAINE HYDROCHLORIDE 20 MG/ML
15 SOLUTION OROPHARYNGEAL 4 TIMES DAILY PRN
Status: DISCONTINUED | OUTPATIENT
Start: 2020-06-24 | End: 2020-07-30 | Stop reason: HOSPADM

## 2020-06-24 RX ORDER — PANTOPRAZOLE SODIUM 20 MG/1
20 TABLET, DELAYED RELEASE ORAL DAILY
COMMUNITY
End: 2020-08-12 | Stop reason: HOSPADM

## 2020-06-24 RX ORDER — OLANZAPINE 10 MG/1
5 INJECTION, POWDER, LYOPHILIZED, FOR SOLUTION INTRAMUSCULAR ONCE
Status: COMPLETED | OUTPATIENT
Start: 2020-06-24 | End: 2020-06-24

## 2020-06-24 RX ORDER — BISACODYL 10 MG
10 SUPPOSITORY, RECTAL RECTAL ONCE
Status: COMPLETED | OUTPATIENT
Start: 2020-06-24 | End: 2020-06-24

## 2020-06-24 RX ADMIN — FENTANYL CITRATE 50 MCG: 50 INJECTION INTRAMUSCULAR; INTRAVENOUS at 13:04

## 2020-06-24 RX ADMIN — Medication 75 MCG/HR: at 22:44

## 2020-06-24 RX ADMIN — QUETIAPINE FUMARATE 50 MG: 25 TABLET ORAL at 18:20

## 2020-06-24 RX ADMIN — Medication 0.15 MG/KG/HR: at 07:46

## 2020-06-24 RX ADMIN — HEPARIN SODIUM AND DEXTROSE 18 UNITS/KG/HR: 10000; 5 INJECTION INTRAVENOUS at 20:31

## 2020-06-24 RX ADMIN — FENTANYL CITRATE 50 MCG: 50 INJECTION INTRAMUSCULAR; INTRAVENOUS at 01:31

## 2020-06-24 RX ADMIN — SENNOSIDES AND DOCUSATE SODIUM 1 TABLET: 8.6; 5 TABLET ORAL at 18:20

## 2020-06-24 RX ADMIN — LIDOCAINE HYDROCHLORIDE 10 ML: 10 INJECTION, SOLUTION EPIDURAL; INFILTRATION; INTRACAUDAL; PERINEURAL at 14:40

## 2020-06-24 RX ADMIN — DEXMEDETOMIDINE 1.4 MCG/KG/HR: 100 INJECTION, SOLUTION, CONCENTRATE INTRAVENOUS at 08:59

## 2020-06-24 RX ADMIN — FENTANYL CITRATE 50 MCG: 50 INJECTION INTRAMUSCULAR; INTRAVENOUS at 17:37

## 2020-06-24 RX ADMIN — HEPARIN SODIUM AND DEXTROSE 18 UNITS/KG/HR: 10000; 5 INJECTION INTRAVENOUS at 03:49

## 2020-06-24 RX ADMIN — METRONIDAZOLE 500 MG: 500 TABLET ORAL at 01:44

## 2020-06-24 RX ADMIN — OXYCODONE HYDROCHLORIDE 5 MG: 5 SOLUTION ORAL at 01:44

## 2020-06-24 RX ADMIN — POTASSIUM & SODIUM PHOSPHATES POWDER PACK 280-160-250 MG 1 PACKET: 280-160-250 PACK at 11:16

## 2020-06-24 RX ADMIN — OXYCODONE HYDROCHLORIDE 5 MG: 5 SOLUTION ORAL at 16:30

## 2020-06-24 RX ADMIN — CHLORHEXIDINE GLUCONATE 0.12% ORAL RINSE 15 ML: 1.2 LIQUID ORAL at 08:46

## 2020-06-24 RX ADMIN — FENTANYL CITRATE 50 MCG: 50 INJECTION INTRAMUSCULAR; INTRAVENOUS at 20:26

## 2020-06-24 RX ADMIN — MIDAZOLAM 1 MG/HR: 5 INJECTION INTRAMUSCULAR; INTRAVENOUS at 03:35

## 2020-06-24 RX ADMIN — DEXMEDETOMIDINE 1.4 MCG/KG/HR: 100 INJECTION, SOLUTION, CONCENTRATE INTRAVENOUS at 15:35

## 2020-06-24 RX ADMIN — METRONIDAZOLE 500 MG: 500 TABLET ORAL at 09:18

## 2020-06-24 RX ADMIN — FUROSEMIDE 20 MG: 10 INJECTION, SOLUTION INTRAMUSCULAR; INTRAVENOUS at 11:16

## 2020-06-24 RX ADMIN — DEXMEDETOMIDINE 1.4 MCG/KG/HR: 100 INJECTION, SOLUTION, CONCENTRATE INTRAVENOUS at 02:44

## 2020-06-24 RX ADMIN — OXYCODONE HYDROCHLORIDE 5 MG: 5 SOLUTION ORAL at 20:30

## 2020-06-24 RX ADMIN — HYDROCORTISONE SODIUM SUCCINATE 50 MG: 100 INJECTION, POWDER, FOR SOLUTION INTRAMUSCULAR; INTRAVENOUS at 08:43

## 2020-06-24 RX ADMIN — ALBUTEROL SULFATE 2.5 MG: 2.5 SOLUTION RESPIRATORY (INHALATION) at 19:40

## 2020-06-24 RX ADMIN — Medication 75 MCG/HR: at 13:05

## 2020-06-24 RX ADMIN — QUETIAPINE FUMARATE 50 MG: 25 TABLET ORAL at 11:16

## 2020-06-24 RX ADMIN — MIDAZOLAM 2 MG: 1 INJECTION INTRAMUSCULAR; INTRAVENOUS at 02:30

## 2020-06-24 RX ADMIN — OXYCODONE HYDROCHLORIDE 5 MG: 5 SOLUTION ORAL at 13:06

## 2020-06-24 RX ADMIN — DEXMEDETOMIDINE 1.4 MCG/KG/HR: 100 INJECTION, SOLUTION, CONCENTRATE INTRAVENOUS at 05:47

## 2020-06-24 RX ADMIN — OLANZAPINE 5 MG: 10 INJECTION, POWDER, FOR SOLUTION INTRAMUSCULAR at 16:34

## 2020-06-24 RX ADMIN — SENNOSIDES AND DOCUSATE SODIUM 1 TABLET: 8.6; 5 TABLET ORAL at 08:46

## 2020-06-24 RX ADMIN — ACETAMINOPHEN 650 MG: 650 SUSPENSION ORAL at 20:30

## 2020-06-24 RX ADMIN — MIDAZOLAM HYDROCHLORIDE 2 MG: 2 INJECTION, SOLUTION INTRAMUSCULAR; INTRAVENOUS at 02:30

## 2020-06-24 RX ADMIN — POLYETHYLENE GLYCOL 3350 17 G: 17 POWDER, FOR SOLUTION ORAL at 08:47

## 2020-06-24 RX ADMIN — SODIUM CHLORIDE, SODIUM GLUCONATE, SODIUM ACETATE, POTASSIUM CHLORIDE, MAGNESIUM CHLORIDE, SODIUM PHOSPHATE, DIBASIC, AND POTASSIUM PHOSPHATE 500 ML: .53; .5; .37; .037; .03; .012; .00082 INJECTION, SOLUTION INTRAVENOUS at 02:47

## 2020-06-24 RX ADMIN — METRONIDAZOLE 500 MG: 500 TABLET ORAL at 18:20

## 2020-06-24 RX ADMIN — DEXMEDETOMIDINE 1.4 MCG/KG/HR: 100 INJECTION, SOLUTION, CONCENTRATE INTRAVENOUS at 18:40

## 2020-06-24 RX ADMIN — OXYCODONE HYDROCHLORIDE 5 MG: 5 SOLUTION ORAL at 05:27

## 2020-06-24 RX ADMIN — CEFEPIME HYDROCHLORIDE 2000 MG: 2 INJECTION, POWDER, FOR SOLUTION INTRAVENOUS at 16:25

## 2020-06-24 RX ADMIN — DEXMEDETOMIDINE 1.4 MCG/KG/HR: 100 INJECTION, SOLUTION, CONCENTRATE INTRAVENOUS at 22:01

## 2020-06-24 RX ADMIN — DEXMEDETOMIDINE 1.4 MCG/KG/HR: 100 INJECTION, SOLUTION, CONCENTRATE INTRAVENOUS at 19:20

## 2020-06-24 RX ADMIN — CEFEPIME HYDROCHLORIDE 2000 MG: 2 INJECTION, POWDER, FOR SOLUTION INTRAVENOUS at 08:50

## 2020-06-24 RX ADMIN — FENTANYL CITRATE 50 MCG: 50 INJECTION INTRAMUSCULAR; INTRAVENOUS at 10:58

## 2020-06-24 RX ADMIN — CHLORHEXIDINE GLUCONATE 0.12% ORAL RINSE 15 ML: 1.2 LIQUID ORAL at 20:30

## 2020-06-24 RX ADMIN — BISACODYL 10 MG: 10 SUPPOSITORY RECTAL at 11:17

## 2020-06-24 RX ADMIN — DEXMEDETOMIDINE 1.4 MCG/KG/HR: 100 INJECTION, SOLUTION, CONCENTRATE INTRAVENOUS at 12:32

## 2020-06-24 RX ADMIN — OXYCODONE HYDROCHLORIDE 5 MG: 5 SOLUTION ORAL at 08:46

## 2020-06-24 RX ADMIN — CEFEPIME HYDROCHLORIDE 2000 MG: 2 INJECTION, POWDER, FOR SOLUTION INTRAVENOUS at 01:40

## 2020-06-24 RX ADMIN — FENTANYL CITRATE 50 MCG: 50 INJECTION INTRAMUSCULAR; INTRAVENOUS at 16:13

## 2020-06-24 NOTE — RESPIRATORY THERAPY NOTE
resp care      06/24/20 1142   Respiratory Assessment   Resp Comments new orders for TC weans, pt placed on a 40% TC wean, pt appears comfortable, will continue to monitor

## 2020-06-24 NOTE — PROGRESS NOTES
Progress Note - Thoracic Surgery   Neil Gandara 71 y o  male MRN: [id number]  Unit/Bed#: ICU 02 Encounter: [id number]    Assessment:  69yo male s/p L VATS upper lobectomy  Post op prolonged airleak with significant subcutaneous emphysema  Also had code event requiring cricothyrodotomy for difficult intubation and now s/p formalization to tracheostomy  Off pressors  Intermittent agitation  Chest tube with 255cc out past 24 hours    Plan:  Wean vent as able  Wean sedative medications  Frequent reorientation  Trach care  Continue anticoagulation for RUE occlusive axillary and brachial vein thrombus  Maintain chest tube to suction  Continue tube feeds at goal  May remove L chest wall VAC today and close wound  ICU level of care    Subjective/Objective     Chief Complaint:     Subjective: Very agitated overnight  Placed back of versed drip  500cc bolus for low urine output overnight  Objective:     Vitals: Blood pressure 109/72, pulse 58, temperature 98 2 °F (36 8 °C), resp  rate 18, height 5' 10" (1 778 m), weight 95 2 kg (209 lb 14 1 oz), SpO2 96 %  ,Body mass index is 30 11 kg/m²  I/O       06/21 0701 - 06/22 0700 06/22 0701 - 06/23 0700 06/23 0701 - 06/24 0700    I V  (mL/kg) 4240 7 (44 8) 3971 8 (41 8)     NG/GT 65 100     IV Piggyback 1450 1350     Feedings 277 871     Total Intake(mL/kg) 6032 7 (63 8) 6292 8 (66 2)     Urine (mL/kg/hr) 1210 (0 5) 1255 (0 6)     Emesis/NG output 0 0     Chest Tube 210 370     Total Output 1420 1625     Net +4612 7 +4667 8                  Physical Exam:   NAD   Resting comfortably  RRR  nonlabored respirations on vent  Trach in place  L chest tube to suction with no airleak  Significant diffuse subcutaneous emphysema which is improving  L chest VAC in place with good seal  Abdomen soft, nt, nd    Lab, Imaging and other studies:   CBC with diff:   Lab Results   Component Value Date    WBC 10 42 (H) 06/24/2020    HGB 9 7 (L) 06/24/2020    HCT 30 6 (L) 06/24/2020 MCV 99 (H) 06/24/2020     (L) 06/24/2020    MCH 31 3 06/24/2020    MCHC 31 7 06/24/2020    RDW 14 5 06/24/2020    MPV 10 5 06/24/2020    NRBC 0 06/24/2020   , BMP/CMP:   Lab Results   Component Value Date    SODIUM 140 06/24/2020    K 3 9 06/24/2020     (H) 06/24/2020    CO2 24 06/24/2020    BUN 16 06/24/2020    CREATININE 0 59 (L) 06/24/2020    CALCIUM 7 5 (L) 06/24/2020    EGFR 103 06/24/2020   , Magnesium: No components found for: MAG, Coags:   Lab Results   Component Value Date    PTT 66 (H) 06/24/2020   , Blood Culture: No results found for: BLOODCX, Urine Culture: No results found for: URINECX, Wound Culure: No results found for: WOUNDCULT  VTE Pharmacologic Prophylaxis: Heparin  VTE Mechanical Prophylaxis: sequential compression device

## 2020-06-24 NOTE — PROGRESS NOTES
Daily Progress Note - Critical Care   Jacques Burton 71 y o  male MRN: [id number]  Unit/Bed#: ICU 02 Encounter: [id number]        ----------------------------------------------------------------------------------------  HPI/24hr events:  Patient is Versed drip was discontinued yesterday and he was transitioned to Precedex  Vancomycin was discontinued as is MRSA swab was negative  Overnight patient became significantly agitated requiring multiple staff members to hold him down  He was thrashing all 4 extremities and trying to pull out lines including his tracheostomy  Patient was subsequently given 2 mg of Versed and restarted on a Versed drip  Since this change she has been at a RASS of 0 to -1     ---------------------------------------------------------------------------------------  SUBJECTIVE  Events as stated above  Patient is currently resting comfortably  Review of Systems   Unable to perform ROS: Intubated     Review of systems was unable to be performed secondary to intubation   ---------------------------------------------------------------------------------------  Assessment and Plan:    Neuro:   · Diagnosis:  Sedation/analgesia  ? Plan:  Continue ketamine 0 15 mcg/kg/hr  ? Continue fentanyl 75 mcg/hr  ? Continue Precedex 1 4 mcg/kg/hr   ? Versed gtt restarted overnight for significant agitation, now running at 1 mg/hr -> d/c today   ? Prn fentanyl   · Regulate sleep-wake cycle  · Trend neuro exam given recent cardiac arrest        CV:   · Diagnosis: shock secondary to sepsis versus cardiogenic versus obstructive in setting of type 2 MI    ? Plan:  Troponin peaked at 3 and has trended down  ? Vasopressor support weaned, maintain MAPs>65  ? Wean stress dose steroids - solu-cortef 50mg IV q12hrs  ? Unable to perform TTE with significant subcutaneous emphysema  ?  Unable to perform NONA given on stable airway with cricothyroidotomy  § CTA PE negative for pulmonary embolism · Diagnosis:  History of hypertension  ? Plan:  Continue to hold home antihypertensives        Pulm:  · Diagnosis:  Acute hypoxic respiratory failure status post cricothyroidotomy, now s/p formalization of airway with tracheostomy 6/22  ? Plan: F/u AM ABG  ? Current vent settings: AC/PC 18/16/8/40%  ? Wean vent as able   · Diagnosis:  Left upper lobe adenocarcinoma status post VATS left upper lobectomy 6/10 requiring left chest wall decompression for significant subcutaneous emphysema on 6/15  ? Plan:  Maintain left-sided chest tube to -20 mmHg  § Monitor air leak and output  § Management per thoracic surgery  ? Maintain Left chest wall wound VAC        GI:   · Diagnosis:  Transaminitis, resolved  ? Plan:  Continue to trend LFTs  · Bowel regimen  · Protonix for stress ulcer prophylaxis        :   · Diagnosis:  ROGER resolved  ? Plan:  Creatinine stable at 0 59  ? Trend kidney function daily  ?  cc over last 24 hrs, 500 cc bolus given overnight for low urine output         F/E/N:   · Plan: F: not currently on fluids, may need to restart or increase TFs given low urine output since d/cing fluids   · E: replete as indicated, K>4, Phos>3, Mag>2  · N: TF Jevity 1 2@ 60        Heme/Onc:   · Diagnosis: LLE DVT, RUE DVT  ? Continue heparin gtt  ? CTA PE negative for PE  · Diagnosis: anemia, likely dilutional   ? Plan: Hgb 9 7 from 9 2  ? Continue to trend H&H        Endo:   · Diagnosis:  Hyperglycemia  ? Plan: trend BG        ID:   · Diagnosis: Possible septic shock 2/2 PNA  ? Plan: Continue Cefepime/Flagyl    ? WBC stable at 10  ? Trend WBC daily   ? F/u blood cx -> Isaak@Meniga hrs  ?  F/u MRSA cx -> Negative        MSK/Skin:   · No active issues  · Frequent offloading/repositiong for pressure ulcer risk       Disposition: Continue Critical Care   Code Status: Level 1 - Full Code  ---------------------------------------------------------------------------------------  ICU CORE MEASURES    Prophylaxis   VTE Pharmacologic Prophylaxis: Heparin Drip  VTE Mechanical Prophylaxis: sequential compression device  Stress Ulcer Prophylaxis: Prophylaxis Not Indicated     ABCDE Protocol (if indicated)  Plan to perform spontaneous awakening trial today? Yes  Plan to perform spontaneous breathing trial today? Yes  Obvious barriers to extubation? No  CAM-ICU: Negative    Invasive Devices Review  Invasive Devices     Central Venous Catheter Line            CVC Central Lines 20 3 days          Arterial Line            Arterial Line 20 Radial 3 days          Drain            Chest Tube 1 Left Pleural 28 Fr  13 days    NG/OG/Enteral Tube Nasogastric 12 Fr Right nares 3 days    Urethral Catheter 16 Fr  3 days          Airway            Surgical Airway Cuffed 1 day    Surgical Airway Shiley Cuffed 1 day              Can any invasive devices be discontinued today?  No  ---------------------------------------------------------------------------------------  OBJECTIVE    Vitals   Vitals:    20 2346 20 0000 20 0008 20 0100   BP:       Pulse: 60 58  60   Resp: 18 18  20   Temp: 98 6 °F (37 °C) 98 6 °F (37 °C)  98 6 °F (37 °C)   TempSrc: Bladder      SpO2: 93% 94% 94% 96%   Weight:       Height:         Temp (24hrs), Av 8 °F (37 1 °C), Min:98 6 °F (37 °C), Max:99 °F (37 2 °C)  Current: Temperature: 98 6 °F (37 °C)  Wt Readings from Last 3 Encounters:   20 95 kg (209 lb 7 oz)   20 94 9 kg (209 lb 3 2 oz)   20 90 7 kg (200 lb)     Temp Readings from Last 3 Encounters:   20 98 6 °F (37 °C)   20 98 7 °F (37 1 °C)   20 97 6 °F (36 4 °C) (Tympanic)     BP Readings from Last 3 Encounters:   20 109/72   20 145/86   04/29/20 134/82     Pulse Readings from Last 3 Encounters:   20 60   20 101   20 92         Respiratory:  SpO2: SpO2: 96 %  O2 Flow Rate (L/min): 2 L/min    Invasive/non-invasive ventilation settings   Respiratory    Lab Data (Last 4 hours)    None O2/Vent Data (Last 4 hours)      06/24 0008           Vent Mode AC/PC       Resp Rate (BPM) (BPM) 18       Pressure Control (cmH2O) (cm) 16       Insp Time (sec) (sec) 0 9       FiO2 (%) (%) 40       PEEP (cmH2O) (cmH2O) 8       MV 10 6                   Physical Exam   Constitutional: He is oriented to person, place, and time  He appears well-developed and well-nourished  No distress  HENT:   Head: Normocephalic and atraumatic  Tracheostomy in place    Eyes: Pupils are equal, round, and reactive to light  EOM are normal  No scleral icterus  Neck: Normal range of motion  No JVD present  Cardiovascular: Normal rate, regular rhythm and normal heart sounds  Pulmonary/Chest: Effort normal  No respiratory distress  L CT -20 sxn, +air leak with serous drainage   Mechanical ventilation AC/PC 18/16/8/40%   Abdominal: Soft  He exhibits no distension  There is no tenderness  There is no rebound and no guarding  Genitourinary:   Genitourinary Comments: [place] in place with clear yellow urine in norwood bag   Musculoskeletal: Normal range of motion  He exhibits no edema or deformity  Neurological: He is alert and oriented to person, place, and time  No cranial nerve deficit  Skin: Skin is warm and dry  He is not diaphoretic  Psychiatric: He has a normal mood and affect           Laboratory and Diagnostics:  Results from last 7 days   Lab Units 06/23/20  0432 06/22/20  1434 06/22/20  0444 06/21/20  0544 06/20/20  1319 06/20/20  0647 06/20/20  0534  06/17/20  0518   WBC Thousand/uL 13 94* 13 35* 15 83* 14 26* 33 03* 31 57*  --   --  8 16   HEMOGLOBIN g/dL 9 2* 8 9* 9 0* 10 8* 13 1 13 9  --   --  12 3   I STAT HEMOGLOBIN g/dl  --   --   --   --   --   --  11 6*   < >  --    HEMATOCRIT % 28 7* 28 5* 27 7* 32 0* 39 9 42 1  --   --  37 6   HEMATOCRIT, ISTAT %  --   --   --   --   --   --  34*   < >  --    PLATELETS Thousands/uL 132* 123* 113* 150 253 295  --   --  268   NEUTROS PCT % 81*  --  86*  --   -- --   --   --   --    BANDS PCT %  --   --   --   --   --  1  --   --  1   MONOS PCT % 5  --  4  --   --   --   --   --   --    MONO PCT %  --   --   --   --   --  1*  --   --  3*    < > = values in this interval not displayed       Results from last 7 days   Lab Units 06/23/20 0432 06/22/20 0444 06/21/20  0544 06/20/20  1319 06/20/20  0647 06/17/20  0518   SODIUM mmol/L 141 140 141 141 137 132*   POTASSIUM mmol/L 4 0 3 9 3 2* 3 5 3 4* 3 9   CHLORIDE mmol/L 112* 111* 109* 107 101 102   CO2 mmol/L 24 26 22 20* 25 22   ANION GAP mmol/L 5 3* 10 14* 11 8   BUN mg/dL 16 14 10 15 14 12   CREATININE mg/dL 0 65 0 68 0 87 1 56* 1 41* 0 81   CALCIUM mg/dL 7 0* 7 7* 7 6* 8 5 10 0 8 6   GLUCOSE RANDOM mg/dL 93 105 140 226* 303* 89   ALT U/L  --  56 92* 148* 172*  --    AST U/L  --  20 37 109* 116*  --    ALK PHOS U/L  --  50 55 76 86  --    ALBUMIN g/dL  --  1 9* 2 2* 2 1* 2 4*  --    TOTAL BILIRUBIN mg/dL  --  0 26 0 44 0 34 0 36  --      Results from last 7 days   Lab Units 06/23/20 0432 06/22/20 0444 06/21/20  0544 06/20/20  1319 06/20/20  0647   MAGNESIUM mg/dL 2 5 2 3 1 9 1 9 2 5   PHOSPHORUS mg/dL 2 7 2 4 2 0* 4 7* 8 9*      Results from last 7 days   Lab Units 06/23/20 1757 06/23/20  1155 06/23/20 0432 06/22/20  2152 06/22/20  1434 06/22/20  0444 06/21/20  2319   INR   --   --   --   --  1 12  --   --    PTT seconds 73* 82* 57* 96* 27 64* 64*      Results from last 7 days   Lab Units 06/20/20 1759 06/20/20  1319 06/20/20  0928 06/20/20  0647   TROPONIN I ng/mL 2 75* 3 00* 2 86* 0 85*     Results from last 7 days   Lab Units 06/21/20  0918 06/21/20  0544 06/21/20  0218 06/20/20  2159 06/20/20  1708 06/20/20  1319 06/20/20  0928   LACTIC ACID mmol/L 1 5 2 3* 3 1* 7 1* 10 6* 9 4* 4 7*     ABG:  Results from last 7 days   Lab Units 06/23/20  0627   PH ART  7 386   PCO2 ART mm Hg 41 8   PO2 ART mm Hg 94 3   HCO3 ART mmol/L 24 5   BASE EXC ART mmol/L -0 5   ABG SOURCE  Line, Arterial     VBG:  Results from last 7 days Lab Units 06/23/20  0627  06/20/20  1343   PH MICHELLE   --   --  7 213*   PCO2 MICHELLE mm Hg  --   --  47 7   PO2 MICHELLE mm Hg  --   --  52 5*   HCO3 MICHELLE mmol/L  --   --  18 8*   BASE EXC MICHELLE mmol/L  --   --  -9 0   ABG SOURCE  Line, Arterial   < >  --     < > = values in this interval not displayed  Results from last 7 days   Lab Units 06/20/20  1759 06/20/20  0928   PROCALCITONIN ng/ml 5 85* 1 85*       Micro  Results from last 7 days   Lab Units 06/20/20  0955 06/20/20  0929   BLOOD CULTURE   --  No Growth at 72 hrs  No Growth at 72 hrs  MRSA CULTURE ONLY  No Methicillin Resistant Staphlyococcus aureus (MRSA) isolated  --        EKG: reviewed  Imaging:  I have personally reviewed pertinent reports  Intake and Output  I/O       06/22 0701 - 06/23 0700 06/23 0701 - 06/24 0700    P  O   0    I V  (mL/kg) 3971 8 (41 8) 1827 7 (19 2)    NG/ 375    IV Piggyback 1600 200    Feedings 871 1124    Total Intake(mL/kg) 6542 8 (68 9) 3526 7 (37 1)    Urine (mL/kg/hr) 1255 (0 6) 664 (0 3)    Emesis/NG output 0 0    Chest Tube 370 255    Total Output 1625 919    Net +4917 8 +2607 7              UOP: 664 cc over last 24 hrs    Height and Weights   Height: 5' 10" (177 8 cm)  IBW: 73 kg  Body mass index is 30 05 kg/m²  Weight (last 2 days)     Date/Time   Weight    06/23/20 0600   95 (209 44)    06/22/20 0600   94 6 (208 56)                Nutrition       Diet Orders   (From admission, onward)             Start     Ordered    06/22/20 1353  Diet Enteral/Parenteral; Tube Feeding No Oral Diet; Jevity 1 2 Javier; Continuous; 60  Diet effective now     Question Answer Comment   Diet Type Enteral/Parenteral    Enteral/Parenteral Tube Feeding No Oral Diet    Tube Feeding Formula: Jevity 1 2 Javier    Bolus/Cyclic/Continuous Continuous    Tube Feeding Goal Rate (mL/hr): 60    RD to adjust diet per protocol? Yes        06/22/20 1352              TF currently running at 60 ml/hr with a goal of 60 ml/hr   Formula: Jevity 1 2      Active Medications  Scheduled Meds:  Current Facility-Administered Medications:  acetaminophen 650 mg Oral Q6H PRN Dayanara Kaplan PA-C    albuterol 2 5 mg Nebulization Q4H PRN Dayanara Kaplan PA-C    cefepime 2,000 mg Intravenous Q8H KHANG Ramirez Last Rate: Stopped (06/24/20 0210)   chlorhexidine 15 mL Swish [employer] Spit Q12H [address] Dayanara Kaplan PA-C    dexmedetomidine 0 1-1 4 mcg/kg/hr Intravenous Titrated KHANG Ramirez Last Rate: 1 4 mcg/kg/hr (06/24/20 0244)   fentaNYL 75 mcg/hr Intravenous Continuous Dayanara Kaplan PA-C Last Rate: 75 mcg/hr (06/23/20 2331)   fentanyl citrate (PF) 50 mcg Intravenous Q1H PRN Dayanara Kaplan PA-C    heparin (porcine) 3-30 Units/kg/hr (Order-Specific) Intravenous Titrated Dayanara Kaplan PA-C Last Rate: 18 Units/kg/hr (06/23/20 1235)   heparin (porcine) 3,600 Units Intravenous Q1H PRN Dayanara Kaplan PA-C    heparin (porcine) 7,200 Units Intravenous Q1H PRN Dayanara Kaplan PA-C    hydrocortisone sodium succinate 50 mg Intravenous Q12H [address] Dayanara Kaplan PA-C    ketamine 0 15 mg/kg/hr Intravenous Continuous Johnathan Foot, DO Last Rate: 0 15 mg/kg/hr (06/23/20 1621)   metroNIDAZOLE 500 mg Oral Q8H Jarvis Wu MD    midazolam 1 mg/hr Intravenous Continuous Johnathan Foot, DO    midazolam        midazolam 2 mg Intravenous Once Krys Jimenez, DO    multi-electrolyte 500 mL Intravenous Once Krys Jimenez, DO    ondansetron 4 mg Intravenous Q6H PRN Dayanara Kaplan PA-C    oxyCODONE 5 mg Oral Q4H KHANG Christensen    polyethylene glycol 17 g Oral Daily KHANG Christensen    senna-docusate sodium 1 tablet Per NG Tube BID Dayanara Kaplan PA-C      Continuous Infusions:    dexmedetomidine 0 1-1 4 mcg/kg/hr Last Rate: 1 4 mcg/kg/hr (06/24/20 0874)   fentaNYL 75 mcg/hr Last Rate: 75 mcg/hr (06/23/20 7591)   heparin (porcine) 3-30 Units/kg/hr (Order-Specific) Last Rate: 18 Units/kg/hr (06/23/20 1235)   ketamine 0 15 mg/kg/hr Last Rate: 0 15 mg/kg/hr (06/23/20 1621)   midazolam 1 mg/hr      PRN Meds:     acetaminophen 650 mg Q6H PRN   albuterol 2 5 mg Q4H PRN   fentanyl citrate (PF) 50 mcg Q1H PRN   heparin (porcine) 3,600 Units Q1H PRN   heparin (porcine) 7,200 Units Q1H PRN   ondansetron 4 mg Q6H PRN       Allergies   No Known Allergies  ---------------------------------------------------------------------------------------  Advance Directive and Living Will: Yes    Power of : Yes  POLST:    ---------------------------------------------------------------------------------------  Care Time Delivered:   Upon my evaluation, this patient had a high probability of imminent or life-threatening deterioration due to complicated post-operative course, which required my direct attention, intervention, and personal management  I have personally provided 20 minutes of critical care time, exclusive of procedures, teaching, family meetings, and any prior time recorded by providers other than myself  Oumar Kirkland DO      Portions of the record may have been created with voice recognition software  Occasional wrong word or "sound a like" substitutions may have occurred due to the inherent limitations of voice recognition software    Read the chart carefully and recognize, using context, where substitutions have occurred

## 2020-06-24 NOTE — RESPIRATORY THERAPY NOTE
RT Ventilator Management Note  Denzel Angelo 71 y o  male MRN: [id number]  Unit/Bed#: ICU 02 Encounter: [id number]      Daily Screen       6/24/2020  1031 6/24/2020  1131          Patient safety screen outcome[de-identified]  Passed  Passed      Spont breathing trial % for 30 min:    Yes      Spont breathing trial outcome[de-identified]    Passed      Name of Medical Team Notified[de-identified]    [facility]      Preparing to extubate/ Notify Nurse:  Bettyjane Merlin  No (comment)      Extubation order obtained:    No (comment)      Consider Cuff Test:    No (comment)      Patient extubated:    No              Physical Exam:   Assessment Type: Assess only  General Appearance: Eyes do not open to any stimulus  Respiratory Pattern: Assisted  Chest Assessment: Subcutaneous emphysema  O2 Device: (P) vent      Resp Comments: (P) pt agitated, 02 sat decreased, pt placed back on vent cpap/ps, pt appears much more comfortable, will continue to monitor

## 2020-06-24 NOTE — RESPIRATORY THERAPY NOTE
RT Ventilator Management Note  Aleks Bender 71 y o  male MRN: [id number]  Unit/Bed#: ICU 02 Encounter: [id number]      Daily Screen       6/22/2020  0700 6/23/2020  0720          Patient safety screen outcome[de-identified]  Failed  Failed      Not Ready for Weaning due to[de-identified]    Underline problem not resolved              Physical Exam:   Assessment Type: (P) Assess only      Resp Comments: (P) Pt  doing well on pressure control  No changes throughout the night

## 2020-06-24 NOTE — UTILIZATION REVIEW
Continued Stay Review    Date: 6-24                            Current Patient Class: inpatient  Current Level of Care: critical care     HPI:69 y o  male initially admitted on  64- for L vats upper lobectomy complicated by prolonged air leak with significant subcutaneous emphysema  Code event requiring cricothyrodotomy for difficult intubation and now s/p tracheostomy  Significant upper extremity dvt  Assessment/Plan:     Attempts to wean from versed drip are unsuccessful resulting in agitation and tachycardia to 140  Patient received  zyprexa  And iv versed 2 mg x1  Continue to monitor   Tolerated trach collar prior to episode of agitation  Continue tube feeds  Trend and replete electrolytes  Continue iv antibiotics for possible aspiration pneumonia  Continue heparin gtt  For dvt  New orders:   Bilateral lower extremity venous duplex,iv lasix x1, iv versed 2 mg x1,  versed gtt  0 5 mg , iv multi electrolyte  500 bolus, xyprexa x1 seroquel bid, k phos oral, trach collar to oxygen     Pertinent Labs/Diagnostic Results:     Chest x ray  6-24   Persistent small left apical pneumothorax  Extensive subcutaneous emphysema      Persistent bilateral lung infiltrates  Results from last 7 days   Lab Units 06/24/20  0516 06/23/20  0432 06/22/20  1434 06/22/20  0444 06/21/20  0544  06/20/20  0647   WBC Thousand/uL 10 42* 13 94* 13 35* 15 83* 14 26*   < > 31 57*   HEMOGLOBIN g/dL 9 7* 9 2* 8 9* 9 0* 10 8*   < > 13 9   HEMATOCRIT % 30 6* 28 7* 28 5* 27 7* 32 0*   < > 42 1   PLATELETS Thousands/uL 139* 132* 123* 113* 150   < > 295   NEUTROS ABS Thousands/µL 8 11* 11 23*  --  13 70*  --   --   --    BANDS PCT %  --   --   --   --   --   --  1    < > = values in this interval not displayed           Results from last 7 days   Lab Units 06/24/20  0516 06/23/20  0432 06/22/20  0444 06/21/20  0544 06/20/20  1319  06/20/20  0534 06/20/20  0508   SODIUM mmol/L 140 141 140 141 141   < >  --   -- POTASSIUM mmol/L 3 9 4 0 3 9 3 2* 3 5   < >  --   --    CHLORIDE mmol/L 110* 112* 111* 109* 107   < >  --   --    CO2 mmol/L 24 24 26 22 20*   < >  --   --    ANION GAP mmol/L 6 5 3* 10 14*   < >  --   --    BUN mg/dL 16 16 14 10 15   < >  --   --    CREATININE mg/dL 0 59* 0 65 0 68 0 87 1 56*   < >  --   --    EGFR ml/min/1 73sq m 103 99 97 88 45   < >  --   --    CALCIUM mg/dL 7 5* 7 0* 7 7* 7 6* 8 5   < >  --   --    CALCIUM, IONIZED mmol/L 1 13  --  1 07* 1 08*  --   --   --   --    CALCIUM, IONIZED, ISTAT mmol/L  --   --   --   --   --   --  >2 20* 1 44*   MAGNESIUM mg/dL 2 4 2 5 2 3 1 9 1 9   < >  --   --    PHOSPHORUS mg/dL 2 6 2 7 2 4 2 0* 4 7*   < >  --   --     < > = values in this interval not displayed       Results from last 7 days   Lab Units 06/22/20  0444 06/21/20  0544 06/20/20  1319 06/20/20  0647   AST U/L 20 37 109* 116*   ALT U/L 56 92* 148* 172*   ALK PHOS U/L 50 55 76 86   TOTAL PROTEIN g/dL 4 6* 4 8* 5 3* 5 6*   ALBUMIN g/dL 1 9* 2 2* 2 1* 2 4*   TOTAL BILIRUBIN mg/dL 0 26 0 44 0 34 0 36   BILIRUBIN DIRECT mg/dL  --  0 15 0 14  --      Results from last 7 days   Lab Units 06/18/20  0546   POC GLUCOSE mg/dl 93     Results from last 7 days   Lab Units 06/24/20  0516 06/23/20  0432 06/22/20  0444 06/21/20  0544 06/20/20  1319 06/20/20  0647   GLUCOSE RANDOM mg/dL 126 93 105 140 226* 303*             No results found for: BETA-HYDROXYBUTYRATE   Results from last 7 days   Lab Units 06/24/20  0516 06/23/20  0627 06/22/20  0447   PH ART  7 405 7 386 7 432   PCO2 ART mm Hg 36 5 41 8 31 1*   PO2 ART mm Hg 78 1 94 3 117 6   HCO3 ART mmol/L 22 4 24 5 20 3*   BASE EXC ART mmol/L -2 0 -0 5 -3 2   O2 CONTENT ART mL/dL 14 2* 14 1* 14 7*   O2 HGB, ARTERIAL % 94 6 96 1 97 4*   ABG SOURCE  Line, Arterial Line, Arterial Line, Arterial     Results from last 7 days   Lab Units 06/20/20  1343   PH MICHELLE  7 213*   PCO2 MICHELLE mm Hg 47 7   PO2 MICHELLE mm Hg 52 5*   HCO3 MICHELLE mmol/L 18 8*   BASE EXC MICHELLE mmol/L -9 0   O2 CONTENT MICHELLE ml/dL 15 8   O2 HGB, VENOUS % 80 7*     Results from last 7 days   Lab Units 06/20/20  0534 06/20/20  0508   PH, MICHELLE I-STAT  7 097*  --    PCO2, MICHELLE ISTAT mm HG >103 0*  --    PO2, MICHELLE ISTAT mm HG 45 0  --    ISTAT PH ART   --  6 931*   I STAT ART PCO2 mm HG  --  >103 0*   I STAT ART PO2 mm HG  --  61 0*         Results from last 7 days   Lab Units 06/20/20  1759 06/20/20  1319 06/20/20  0928 06/20/20  0647   TROPONIN I ng/mL 2 75* 3 00* 2 86* 0 85*         Results from last 7 days   Lab Units 06/24/20  0516 06/23/20  1757 06/23/20  1155  06/22/20  1434   PROTIME seconds  --   --   --   --  14 0   INR   --   --   --   --  1 12   PTT seconds 66* 73* 82*   < > 27    < > = values in this interval not displayed  Results from last 7 days   Lab Units 06/20/20  1759 06/20/20  0928   PROCALCITONIN ng/ml 5 85* 1 85*     Results from last 7 days   Lab Units 06/21/20  0918 06/21/20  0544 06/21/20  0218 06/20/20  2159 06/20/20  1708   LACTIC ACID mmol/L 1 5 2 3* 3 1* 7 1* 10 6*     Results from last 7 days   Lab Units 06/20/20  0955   CLARITY UA  Clear   COLOR UA  Yellow   SPEC GRAV UA  1 017   PH UA  6 0   GLUCOSE UA mg/dl 100 (1/10%)*   KETONES UA mg/dl Negative   BLOOD UA  Moderate*   PROTEIN UA mg/dl 100 (2+)*   NITRITE UA  Negative   BILIRUBIN UA  Negative   UROBILINOGEN UA E U /dl 0 2   LEUKOCYTES UA  Negative   WBC UA /hpf 2-4*   RBC UA /hpf 10-20*   BACTERIA UA /hpf Moderate*   EPITHELIAL CELLS WET PREP /hpf Occasional     Results from last 7 days   Lab Units 06/20/20  0929   BLOOD CULTURE  No Growth at 72 hrs  No Growth at 72 hrs                 Vital Signs    Date/Time  Temp  Pulse  Resp  BP   Arterial Line BP  MAP  SpO2  FiO2 (%)  O2 Flow Rate (L/min)  O2 Device     06/24/20 1520                 40  10 L/min  Trach mask     06/24/20 1300  99 °F (37 2 °C)  140Abnormal   31Abnormal   190/110  Abnormal                   06/24/20 1200  99 °F (37 2 °C)  64  11Abnormal   116/84       96 %  40  10 L/min  Trach mask     06/24/20 1143                 40  10 L/min  Trach mask     06/24/20 1100  98 6 °F (37 °C)  78  13     128/64  86 mmHg  97 %           06/24/20 1000  98 6 °F (37 °C)  62  19     110/64  80 mmHg  95 %           06/24/20 0900  98 6 °F (37 °C)  70  21     118/68  86 mmHg  97 %           06/24/20 0800                 40    Ventilator     06/24/20 0720               97 %           06/24/20 0500  98 2 °F (36 8 °C)  58  18     122/68  86 mmHg  96 %           06/24/20 0409               96 %           06/24/20 0400  98 2 °F (36 8 °C)  60  19     114/64  82 mmHg  95 %           06/24/20 0335  98 2 °F (36 8 °C)  60  18     112/62  80 mmHg  95 %      Ventilator     06/24/20 0300  98 6 °F (37 °C)  62  18     120/66  84 mmHg  95 %           06/24/20 0200  98 6 °F (37 °C)  60  19     128/70  90 mmHg  95 %           06/24/20 0100  98 6 °F (37 °C)  60  20     122/68  86 mmHg  96 %           06/24/20 0008               94 %           06/24/20 0000  98 6 °F (37 °C)  58  18     108/62  78 mmHg  94 %                     Medications:   Scheduled Medications:    Medications:  cefepime 2,000 mg Intravenous Q8H   chlorhexidine 15 mL Swish [employer] Spit Q12H [address]   metroNIDAZOLE 500 mg Oral Q8H   OLANZapine 5 mg Intramuscular Once   oxyCODONE 5 mg Oral Q4H   polyethylene glycol 17 g Oral Daily   QUEtiapine 50 mg Oral BID   senna-docusate sodium 1 tablet Per NG Tube BID     Continuous IV Infusions:    dexmedetomidine 0 1-1 4 mcg/kg/hr Intravenous Titrated   fentaNYL 75 mcg/hr Intravenous Continuous   heparin (porcine) 3-30 Units/kg/hr (Order-Specific) Intravenous Titrated   ketamine 0 15 mg/kg/hr Intravenous Continuous   midazolam 0 5 mg/hr Intravenous Continuous     PRN Meds:    acetaminophen 650 mg Oral Q6H PRN   albuterol 2 5 mg Nebulization Q4H PRN   fentanyl citrate (PF) 50 mcg Intravenous Q1H PRN   heparin (porcine) 3,600 Units Intravenous Q1H PRN   heparin (porcine) 7,200 Units Intravenous Q1H PRN   ondansetron 4 mg Intravenous Q6H PRN       Discharge Plan: to be determined     Network Utilization Review Department  [email or web address]  ATTENTION: Please call with any questions or concerns to [phone] and carefully listen to the prompts so that you are directed to the right person  All voicemails are confidential   Lashawn Joseph all requests for admission clinical reviews, approved or denied determinations and any other requests to dedicated fax number below belonging to the campus where the patient is receiving treatment   List of dedicated fax numbers for the Facilities:  [address] DENIALS (Administrative/Medical Necessity) [phone]   [address] (Maternity/NICU/Pediatrics) [phone]   Ariella aPtel [phone]   [facility] [phone]   [address] [place] [address] [phone]   Emily Bowers [phone]   [address] [facility] [address] [facility] [phone]   Glenn Armas [phone]   [address]  [address] [facility] [address] [facility] [phone]

## 2020-06-24 NOTE — QUICK NOTE
Patient became very agitated trying to pull out lines and thrashing all 4 extremities  It took 4 staff members to hold him down  Patient was given 1 of versed and restarted the versed gtt  Fentanyl was also increased to 100  Restraints were reinforced

## 2020-06-24 NOTE — PROGRESS NOTES
Critical Care Interval Progress Note:   Benoit Law 71 y o  male MRN: [id number]    Unit/Bed#: ICU 02 Encounter: [id number]    Summary of Critical Care: Attempts to wean off versed infusion at a slow rate of 0 5 mg at a time unsuccessful - restarted at 1 mg this afternoon for agitation, unable to redirect, associated with tachycardia of 140's bpm  Patient given zyprexa 5 mg x 1  Monitor response  Per patient's daughter, patient has episodes of delirium with hospitalizations and has historically been difficult to control  Exam remains non-focal, moves all extremities equally, will follow commands when calm but not redirectable during episodes of agitation  Additionally, patient was tolerating trach collar today for several hours prior to episodes of agitation, changed back PS at this time  Counseling / Coordination of Care: Total Critical Care time spent 8 minutes excluding procedures, teaching and family updates

## 2020-06-24 NOTE — RESPIRATORY THERAPY NOTE
RT Ventilator Management Note  Wilfrido Bowser 71 y o  male MRN: [id number]  Unit/Bed#: ICU 02 Encounter: [id number]      Daily Screen       6/24/2020  0720 6/24/2020  1031          Patient safety screen outcome[de-identified]  Failed  Passed      Not Ready for Weaning due to[de-identified]  Underline problem not resolved                Physical Exam:   Assessment Type: Assess only  General Appearance: Eyes do not open to any stimulus  Respiratory Pattern: Assisted  Chest Assessment: Subcutaneous emphysema      Resp Comments: (P) verbal orders per physician to place pt on Tube Comp, pt does not open his eyes, appears comfortable will continue to monitor

## 2020-06-24 NOTE — RESPIRATORY THERAPY NOTE
RT Ventilator Management Note  Louie Fields 71 y o  male MRN: [id number]  Unit/Bed#: ICU 02 Encounter: [id number]      Daily Screen       6/23/2020 0720 6/24/2020  0720          Patient safety screen outcome[de-identified]  Failed  Failed      Not Ready for Weaning due to[de-identified]  Underline problem not resolved  Underline problem not resolved              Physical Exam:   Assessment Type: (P) Assess only  General Appearance: (P) Eyes do not open to any stimulus  Respiratory Pattern: (P) Assisted  Chest Assessment: (P) Subcutaneous emphysema      Resp Comments: (P) pt appears comfortable on current settings, no weaning attempted, will continue to monitor

## 2020-06-24 NOTE — PHYSICAL THERAPY NOTE
PHYSICAL THERAPY  Cancellation NOTE    Patient Name: Paula Winslow  Today's Date: 6/24/2020     Pt chart reviewed  Pt is currently intubated and not arousable to any stimulus;not appropriate to engage in skilled PT services at this time  Will continue to follow pt on caseload and see pt when medically stable and as clinically appropriate for re-evaluation      Sara Bowens, PT, DPT 6/24/2020

## 2020-06-24 NOTE — OCCUPATIONAL THERAPY NOTE
OT CANCEL NOTE    Pt chart reviewed  Pt is currently intubated and not arousable to any stimulus;not appropriate to engage in skilled OT services at this time  Will continue to follow pt on caseload and see pt when medically stable and as clinically appropriate for re-evaluation      Deysi Chen MS, OTR/L

## 2020-06-25 ENCOUNTER — APPOINTMENT (INPATIENT)
Dept: NON INVASIVE DIAGNOSTICS | Facility: HOSPITAL | Age: 70
DRG: 003 | End: 2020-06-25
Payer: COMMERCIAL

## 2020-06-25 LAB
ANION GAP SERPL CALCULATED.3IONS-SCNC: 5 MMOL/L (ref 4–13)
APTT PPP: 152 SECONDS (ref 23–37)
ATRIAL RATE: 119 BPM
BACTERIA BLD CULT: NORMAL
BACTERIA BLD CULT: NORMAL
BASOPHILS # BLD AUTO: 0.04 THOUSANDS/ΜL (ref 0–0.1)
BASOPHILS NFR BLD AUTO: 0 % (ref 0–1)
BUN SERPL-MCNC: 13 MG/DL (ref 5–25)
CALCIUM SERPL-MCNC: 7.3 MG/DL (ref 8.3–10.1)
CHLORIDE SERPL-SCNC: 111 MMOL/L (ref 100–108)
CO2 SERPL-SCNC: 27 MMOL/L (ref 21–32)
CREAT SERPL-MCNC: 0.81 MG/DL (ref 0.6–1.3)
EOSINOPHIL # BLD AUTO: 0.6 THOUSAND/ΜL (ref 0–0.61)
EOSINOPHIL NFR BLD AUTO: 6 % (ref 0–6)
ERYTHROCYTE [DISTWIDTH] IN BLOOD BY AUTOMATED COUNT: 14.4 % (ref 11.6–15.1)
GFR SERPL CREATININE-BSD FRML MDRD: 91 ML/MIN/1.73SQ M
GLUCOSE SERPL-MCNC: 114 MG/DL (ref 65–140)
HCT VFR BLD AUTO: 29.3 % (ref 36.5–49.3)
HGB BLD-MCNC: 9.1 G/DL (ref 12–17)
IMM GRANULOCYTES # BLD AUTO: 0.07 THOUSAND/UL (ref 0–0.2)
IMM GRANULOCYTES NFR BLD AUTO: 1 % (ref 0–2)
LYMPHOCYTES # BLD AUTO: 1.28 THOUSANDS/ΜL (ref 0.6–4.47)
LYMPHOCYTES NFR BLD AUTO: 13 % (ref 14–44)
MCH RBC QN AUTO: 31.4 PG (ref 26.8–34.3)
MCHC RBC AUTO-ENTMCNC: 31.1 G/DL (ref 31.4–37.4)
MCV RBC AUTO: 101 FL (ref 82–98)
MONOCYTES # BLD AUTO: 0.54 THOUSAND/ΜL (ref 0.17–1.22)
MONOCYTES NFR BLD AUTO: 5 % (ref 4–12)
NEUTROPHILS # BLD AUTO: 7.45 THOUSANDS/ΜL (ref 1.85–7.62)
NEUTS SEG NFR BLD AUTO: 75 % (ref 43–75)
NRBC BLD AUTO-RTO: 0 /100 WBCS
P AXIS: 59 DEGREES
PLATELET # BLD AUTO: 163 THOUSANDS/UL (ref 149–390)
PMV BLD AUTO: 10.7 FL (ref 8.9–12.7)
POTASSIUM SERPL-SCNC: 3.3 MMOL/L (ref 3.5–5.3)
PR INTERVAL: 158 MS
QRS AXIS: 19 DEGREES
QRSD INTERVAL: 75 MS
QT INTERVAL: 308 MS
QTC INTERVAL: 434 MS
RBC # BLD AUTO: 2.9 MILLION/UL (ref 3.88–5.62)
SODIUM SERPL-SCNC: 143 MMOL/L (ref 136–145)
T WAVE AXIS: 97 DEGREES
VENTRICULAR RATE: 119 BPM
WBC # BLD AUTO: 9.98 THOUSAND/UL (ref 4.31–10.16)

## 2020-06-25 PROCEDURE — 94003 VENT MGMT INPAT SUBQ DAY: CPT

## 2020-06-25 PROCEDURE — 93005 ELECTROCARDIOGRAM TRACING: CPT

## 2020-06-25 PROCEDURE — 94640 AIRWAY INHALATION TREATMENT: CPT

## 2020-06-25 PROCEDURE — 99024 POSTOP FOLLOW-UP VISIT: CPT | Performed by: THORACIC SURGERY (CARDIOTHORACIC VASCULAR SURGERY)

## 2020-06-25 PROCEDURE — 85025 COMPLETE CBC W/AUTO DIFF WBC: CPT | Performed by: NURSE PRACTITIONER

## 2020-06-25 PROCEDURE — 85730 THROMBOPLASTIN TIME PARTIAL: CPT | Performed by: THORACIC SURGERY (CARDIOTHORACIC VASCULAR SURGERY)

## 2020-06-25 PROCEDURE — 99223 1ST HOSP IP/OBS HIGH 75: CPT | Performed by: INTERNAL MEDICINE

## 2020-06-25 PROCEDURE — 80048 BASIC METABOLIC PNL TOTAL CA: CPT | Performed by: NURSE PRACTITIONER

## 2020-06-25 PROCEDURE — 99291 CRITICAL CARE FIRST HOUR: CPT | Performed by: SURGERY

## 2020-06-25 PROCEDURE — 94760 N-INVAS EAR/PLS OXIMETRY 1: CPT

## 2020-06-25 PROCEDURE — 93010 ELECTROCARDIOGRAM REPORT: CPT | Performed by: INTERNAL MEDICINE

## 2020-06-25 RX ORDER — OLANZAPINE 10 MG/1
5 INJECTION, POWDER, LYOPHILIZED, FOR SOLUTION INTRAMUSCULAR ONCE
Status: COMPLETED | OUTPATIENT
Start: 2020-06-25 | End: 2020-06-25

## 2020-06-25 RX ORDER — FUROSEMIDE 10 MG/ML
20 INJECTION INTRAMUSCULAR; INTRAVENOUS ONCE
Status: COMPLETED | OUTPATIENT
Start: 2020-06-25 | End: 2020-06-25

## 2020-06-25 RX ORDER — LORAZEPAM 0.5 MG/1
2 TABLET ORAL EVERY 4 HOURS
Status: DISCONTINUED | OUTPATIENT
Start: 2020-06-25 | End: 2020-06-29

## 2020-06-25 RX ORDER — OXYCODONE HCL 5 MG/5 ML
5 SOLUTION, ORAL ORAL EVERY 2 HOUR PRN
Status: DISCONTINUED | OUTPATIENT
Start: 2020-06-25 | End: 2020-06-29

## 2020-06-25 RX ORDER — POTASSIUM CHLORIDE 20MEQ/15ML
40 LIQUID (ML) ORAL ONCE
Status: COMPLETED | OUTPATIENT
Start: 2020-06-25 | End: 2020-06-25

## 2020-06-25 RX ORDER — LEVALBUTEROL 1.25 MG/.5ML
SOLUTION, CONCENTRATE RESPIRATORY (INHALATION)
Status: COMPLETED
Start: 2020-06-25 | End: 2020-06-25

## 2020-06-25 RX ORDER — OLANZAPINE 5 MG/1
5 TABLET ORAL
Status: DISCONTINUED | OUTPATIENT
Start: 2020-06-25 | End: 2020-06-29

## 2020-06-25 RX ORDER — OXYCODONE HCL 5 MG/5 ML
10 SOLUTION, ORAL ORAL EVERY 4 HOURS
Status: DISCONTINUED | OUTPATIENT
Start: 2020-06-25 | End: 2020-06-29

## 2020-06-25 RX ORDER — IPRATROPIUM BROMIDE AND ALBUTEROL SULFATE 2.5; .5 MG/3ML; MG/3ML
3 SOLUTION RESPIRATORY (INHALATION)
Status: DISCONTINUED | OUTPATIENT
Start: 2020-06-25 | End: 2020-06-25

## 2020-06-25 RX ORDER — SODIUM CHLORIDE FOR INHALATION 0.9 %
3 VIAL, NEBULIZER (ML) INHALATION EVERY 6 HOURS
Status: DISCONTINUED | OUTPATIENT
Start: 2020-06-25 | End: 2020-06-28

## 2020-06-25 RX ORDER — LANOLIN ALCOHOL/MO/W.PET/CERES
3 CREAM (GRAM) TOPICAL
Status: DISCONTINUED | OUTPATIENT
Start: 2020-06-25 | End: 2020-07-08

## 2020-06-25 RX ORDER — LEVALBUTEROL 1.25 MG/.5ML
1.25 SOLUTION, CONCENTRATE RESPIRATORY (INHALATION)
Status: DISCONTINUED | OUTPATIENT
Start: 2020-06-25 | End: 2020-06-28

## 2020-06-25 RX ORDER — SODIUM CHLORIDE 30 MG/ML INHALATION SOLUTION 30 MG/ML
SOLUTION INHALANT
Status: COMPLETED
Start: 2020-06-25 | End: 2020-06-25

## 2020-06-25 RX ORDER — ACETAMINOPHEN 160 MG/5ML
975 SUSPENSION, ORAL (FINAL DOSE FORM) ORAL EVERY 8 HOURS SCHEDULED
Status: DISCONTINUED | OUTPATIENT
Start: 2020-06-25 | End: 2020-06-29

## 2020-06-25 RX ADMIN — METRONIDAZOLE 500 MG: 500 TABLET ORAL at 09:00

## 2020-06-25 RX ADMIN — QUETIAPINE FUMARATE 50 MG: 25 TABLET ORAL at 17:03

## 2020-06-25 RX ADMIN — Medication 100 MCG/HR: at 10:02

## 2020-06-25 RX ADMIN — FENTANYL CITRATE 50 MCG: 50 INJECTION INTRAMUSCULAR; INTRAVENOUS at 08:40

## 2020-06-25 RX ADMIN — DEXMEDETOMIDINE 1.4 MCG/KG/HR: 100 INJECTION, SOLUTION, CONCENTRATE INTRAVENOUS at 14:30

## 2020-06-25 RX ADMIN — DEXMEDETOMIDINE 1.4 MCG/KG/HR: 100 INJECTION, SOLUTION, CONCENTRATE INTRAVENOUS at 20:54

## 2020-06-25 RX ADMIN — SENNOSIDES AND DOCUSATE SODIUM 1 TABLET: 8.6; 5 TABLET ORAL at 17:03

## 2020-06-25 RX ADMIN — ISODIUM CHLORIDE 3 ML: 0.03 SOLUTION RESPIRATORY (INHALATION) at 09:42

## 2020-06-25 RX ADMIN — DEXMEDETOMIDINE 1.4 MCG/KG/HR: 100 INJECTION, SOLUTION, CONCENTRATE INTRAVENOUS at 05:27

## 2020-06-25 RX ADMIN — CHLORHEXIDINE GLUCONATE 0.12% ORAL RINSE 15 ML: 1.2 LIQUID ORAL at 09:01

## 2020-06-25 RX ADMIN — OXYCODONE HYDROCHLORIDE 10 MG: 5 SOLUTION ORAL at 17:03

## 2020-06-25 RX ADMIN — ACETAMINOPHEN 975 MG: 650 SUSPENSION ORAL at 13:17

## 2020-06-25 RX ADMIN — ISODIUM CHLORIDE 3 ML: 0.03 SOLUTION RESPIRATORY (INHALATION) at 14:14

## 2020-06-25 RX ADMIN — DEXMEDETOMIDINE 1.4 MCG/KG/HR: 100 INJECTION, SOLUTION, CONCENTRATE INTRAVENOUS at 01:50

## 2020-06-25 RX ADMIN — FENTANYL CITRATE 50 MCG: 50 INJECTION INTRAMUSCULAR; INTRAVENOUS at 01:50

## 2020-06-25 RX ADMIN — CHLORHEXIDINE GLUCONATE 0.12% ORAL RINSE 15 ML: 1.2 LIQUID ORAL at 21:06

## 2020-06-25 RX ADMIN — DEXMEDETOMIDINE 1.4 MCG/KG/HR: 100 INJECTION, SOLUTION, CONCENTRATE INTRAVENOUS at 17:54

## 2020-06-25 RX ADMIN — LORAZEPAM 2 MG: 0.5 TABLET ORAL at 09:55

## 2020-06-25 RX ADMIN — LEVALBUTEROL HYDROCHLORIDE 1.25 MG: 1.25 SOLUTION, CONCENTRATE RESPIRATORY (INHALATION) at 19:21

## 2020-06-25 RX ADMIN — ISODIUM CHLORIDE 3 ML: 0.03 SOLUTION RESPIRATORY (INHALATION) at 19:26

## 2020-06-25 RX ADMIN — MELATONIN 3 MG: at 22:01

## 2020-06-25 RX ADMIN — POTASSIUM CHLORIDE 40 MEQ: 1.5 SOLUTION ORAL at 09:00

## 2020-06-25 RX ADMIN — OXYCODONE HYDROCHLORIDE 5 MG: 5 SOLUTION ORAL at 01:33

## 2020-06-25 RX ADMIN — ACETAMINOPHEN 650 MG: 650 SUSPENSION ORAL at 02:30

## 2020-06-25 RX ADMIN — KETAMINE HYDROCHLORIDE 18.9 MG/HR: 50 INJECTION INTRAMUSCULAR; INTRAVENOUS at 00:27

## 2020-06-25 RX ADMIN — DEXMEDETOMIDINE 1.4 MCG/KG/HR: 100 INJECTION, SOLUTION, CONCENTRATE INTRAVENOUS at 08:53

## 2020-06-25 RX ADMIN — LEVALBUTEROL HYDROCHLORIDE 1.25 MG: 1.25 SOLUTION, CONCENTRATE RESPIRATORY (INHALATION) at 14:14

## 2020-06-25 RX ADMIN — ENOXAPARIN SODIUM 100 MG: 100 INJECTION SUBCUTANEOUS at 21:06

## 2020-06-25 RX ADMIN — OXYCODONE HYDROCHLORIDE 10 MG: 5 SOLUTION ORAL at 13:18

## 2020-06-25 RX ADMIN — METRONIDAZOLE 500 MG: 500 TABLET ORAL at 01:33

## 2020-06-25 RX ADMIN — KETAMINE HYDROCHLORIDE 0.2 MG/KG/HR: 50 INJECTION INTRAMUSCULAR; INTRAVENOUS at 14:02

## 2020-06-25 RX ADMIN — DEXMEDETOMIDINE 1.4 MCG/KG/HR: 100 INJECTION, SOLUTION, CONCENTRATE INTRAVENOUS at 11:23

## 2020-06-25 RX ADMIN — SODIUM CHLORIDE SOLN NEBU 3% 4 ML: 3 NEBU SOLN at 19:22

## 2020-06-25 RX ADMIN — WATER: 1 INJECTION INTRAMUSCULAR; INTRAVENOUS; SUBCUTANEOUS at 09:49

## 2020-06-25 RX ADMIN — OLANZAPINE 5 MG: 10 INJECTION, POWDER, FOR SOLUTION INTRAMUSCULAR at 09:31

## 2020-06-25 RX ADMIN — FENTANYL CITRATE 50 MCG: 50 INJECTION INTRAMUSCULAR; INTRAVENOUS at 05:11

## 2020-06-25 RX ADMIN — ACETAMINOPHEN 975 MG: 650 SUSPENSION ORAL at 22:01

## 2020-06-25 RX ADMIN — LORAZEPAM 2 MG: 0.5 TABLET ORAL at 21:06

## 2020-06-25 RX ADMIN — OXYCODONE HYDROCHLORIDE 10 MG: 5 SOLUTION ORAL at 21:06

## 2020-06-25 RX ADMIN — FUROSEMIDE 20 MG: 10 INJECTION, SOLUTION INTRAMUSCULAR; INTRAVENOUS at 09:27

## 2020-06-25 RX ADMIN — QUETIAPINE FUMARATE 50 MG: 25 TABLET ORAL at 09:00

## 2020-06-25 RX ADMIN — CEFEPIME HYDROCHLORIDE 2000 MG: 2 INJECTION, POWDER, FOR SOLUTION INTRAVENOUS at 01:33

## 2020-06-25 RX ADMIN — FENTANYL CITRATE 50 MCG: 50 INJECTION INTRAMUSCULAR; INTRAVENOUS at 00:27

## 2020-06-25 RX ADMIN — CEFEPIME HYDROCHLORIDE 2000 MG: 2 INJECTION, POWDER, FOR SOLUTION INTRAVENOUS at 17:05

## 2020-06-25 RX ADMIN — CEFEPIME HYDROCHLORIDE 2000 MG: 2 INJECTION, POWDER, FOR SOLUTION INTRAVENOUS at 09:37

## 2020-06-25 RX ADMIN — POTASSIUM CHLORIDE 40 MEQ: 1.5 SOLUTION ORAL at 12:00

## 2020-06-25 RX ADMIN — LORAZEPAM 2 MG: 0.5 TABLET ORAL at 17:03

## 2020-06-25 RX ADMIN — OXYCODONE HYDROCHLORIDE 5 MG: 5 SOLUTION ORAL at 09:01

## 2020-06-25 RX ADMIN — METRONIDAZOLE 500 MG: 500 TABLET ORAL at 17:03

## 2020-06-25 RX ADMIN — SODIUM CHLORIDE 2 MCG/MIN: 0.9 INJECTION, SOLUTION INTRAVENOUS at 11:16

## 2020-06-25 RX ADMIN — OXYCODONE HYDROCHLORIDE 5 MG: 5 SOLUTION ORAL at 06:30

## 2020-06-25 RX ADMIN — MIDAZOLAM 1 MG/HR: 5 INJECTION INTRAMUSCULAR; INTRAVENOUS at 08:46

## 2020-06-25 RX ADMIN — LEVALBUTEROL HYDROCHLORIDE 1.25 MG: 1.25 SOLUTION, CONCENTRATE RESPIRATORY (INHALATION) at 09:41

## 2020-06-25 RX ADMIN — ENOXAPARIN SODIUM 100 MG: 100 INJECTION SUBCUTANEOUS at 12:03

## 2020-06-25 RX ADMIN — POLYETHYLENE GLYCOL 3350 17 G: 17 POWDER, FOR SOLUTION ORAL at 09:01

## 2020-06-25 RX ADMIN — FENTANYL CITRATE 50 MCG: 50 INJECTION INTRAMUSCULAR; INTRAVENOUS at 07:39

## 2020-06-25 RX ADMIN — Medication 100 MCG/HR: at 21:00

## 2020-06-25 RX ADMIN — LORAZEPAM 2 MG: 0.5 TABLET ORAL at 13:18

## 2020-06-25 NOTE — RESPIRATORY THERAPY NOTE
RT Ventilator Management Note  Sherice Jimenes 71 y o  male MRN: [id number]  Unit/Bed#: ICU 02 Encounter: [id number]      Daily Screen       6/24/2020  1131 6/25/2020  0728          Patient safety screen outcome[de-identified]  Passed  Passed      Spont breathing trial % for 30 min:  Yes        Spont breathing trial outcome[de-identified]  Passed        Name of Medical Team Notified[de-identified]  CCS        Preparing to extubate/ Notify Nurse:  No (comment)        Extubation order obtained:  No (comment)        Consider Cuff Test:  No (comment)        Patient extubated:  No                Physical Exam:   Assessment Type: Assess only  General Appearance: Eyes do not open to any stimulus  Respiratory Pattern: Assisted  Chest Assessment: Chest expansion symmetrical, Subcutaneous emphysema  Bilateral Breath Sounds: Coarse, Diminished  Cough: Productive  Suction: Trach      Resp Comments: (P) pt tolerating PSV well  no changes at this time   will continue to monitor

## 2020-06-25 NOTE — OCCUPATIONAL THERAPY NOTE
OT CANCEL NOTE    Pt chart reviewed  Pt is currently intubated and eyes do not open to any stimulus  Will hold OT re-evaluation  Will continue to follow pt on caseload and see pt when medically stable and as clinically appropriate      Lizette Avendaño MS, OTR/L

## 2020-06-25 NOTE — PROGRESS NOTES
Progress Note - Thoracic Surgery   Neil Gandara 71 y o  male MRN: [id number]  Unit/Bed#: ICU 02 Encounter: [id number]    Assessment:  71yo male s/p L VATS upper lobectomy  Post op prolonged airleak with significant subcutaneous emphysema  Also had code event requiring cricothyrodotomy for difficult intubation and now s/p formalization to tracheostomy  Intermittent agitation which has been the primary barrier to weaning off the vent/sedation    WBC 10 4  , serous, no AL      Plan:  Continue CT to -20 on topaz  Trach collar as agitation allows  Continue antibiotics per ICU  Heparin gtt for DVTs  TF at goal      Subjective/Objective   Chief Complaint:     Subjective: Sedation continues to be an issue, although the patient did tolerate trach collar for several hours yetserday  On PS 5/5 50% currently  Chest wall vac removed and closed yesterday  Objective:     Blood pressure (!) 84/58, pulse 68, temperature 100 4 °F (38 °C), temperature source Bladder, resp  rate 12, height 5' 10" (1 778 m), weight 95 2 kg (209 lb 14 1 oz), SpO2 93 %  ,Body mass index is 30 11 kg/m²  Intake/Output Summary (Last 24 hours) at 6/25/2020 0552  Last data filed at 6/25/2020 0400  Gross per 24 hour   Intake 3575 58 ml   Output 3600 ml   Net -24 42 ml       Invasive Devices     Central Venous Catheter Line            CVC Central Lines 06/20/20 4 days          Drain            Chest Tube 1 Left Pleural 28 Fr  14 days    NG/OG/Enteral Tube Nasogastric 12 Fr Right nares 4 days    Urethral Catheter 16 Fr  4 days          Airway            Surgical Airway Cuffed 2 days    Surgical Airway Shiley Cuffed 2 days              Physical Exam:   Gen: Sedated, NAD  HEENT: SubQ emphysema in neck and shoulders present but improved  Dressing to prior vac site c/d/i  Cardio: RRR  Lungs: CTAB   L CT serous, no AL  Abd: Soft, non distended, non tender    Lab, Imaging and other studies:CBC: No results found for: WBC, HGB, HCT, MCV, PLT, ADJUSTEDWBC, MCH, MCHC, RDW, MPV, NRBC, CMP: No results found for: SODIUM, K, CL, CO2, ANIONGAP, BUN, CREATININE, GLUCOSE, CALCIUM, AST, ALT, ALKPHOS, PROT, BILITOT, EGFR, Coagulation: No results found for: PT, INR, APTT  VTE Pharmacologic Prophylaxis: Heparin  VTE Mechanical Prophylaxis: sequential compression device

## 2020-06-25 NOTE — RESPIRATORY THERAPY NOTE
RT Ventilator Management Note  Esequiel Crabtree 71 y o  male MRN: [id number]  Unit/Bed#: ICU 02 Encounter: [id number]      Daily Screen       6/24/2020  1131 6/25/2020  0728          Patient safety screen outcome[de-identified]  Passed  Passed      Spont breathing trial % for 30 min:  Yes        Spont breathing trial outcome[de-identified]  Passed        Name of Medical Team Notified[de-identified]  CCS        Preparing to extubate/ Notify Nurse:  No (comment)        Extubation order obtained:  No (comment)        Consider Cuff Test:  No (comment)        Patient extubated:  No                Physical Exam:   Assessment Type: Assess only  General Appearance: Eyes do not open to any stimulus  Respiratory Pattern: Assisted  Chest Assessment: Chest expansion symmetrical, Subcutaneous emphysema  Bilateral Breath Sounds: Coarse, Diminished  Cough: Productive  Suction: Trach  O2 Device: Ventilator      Resp Comments: pt transitioned to 50% TC  at this time, SpO2 95%

## 2020-06-25 NOTE — PROGRESS NOTES
Daily Progress Note - Critical Care   Marko Cheema 71 y o  male MRN: [id number]  Unit/Bed#: ICU 02 Encounter: [id number]        ----------------------------------------------------------------------------------------  HPI/24hr events: s/p VATs 6/10 DOMI, pneumothorax with possible open pleural wound s/p wound VAC - removed yesterday by primary team, unstable airway and code 6/20 resulting in cric which was transitioned to tracheostomy 6/22  He remains sedated on ventilated due to agitation for which his daughter has been reporting to staff is likely delirium as he "always becomes confused, combative when he is hospitalized " Tolerated several hours of trach collar yesterday      ---------------------------------------------------------------------------------------  SUBJECTIVE  Intubated/sedated     Review of Systems   Reason unable to perform ROS: intubated, sedated  says yes to pain and no to being safe but otherwise is not consistent with responses      Review of systems was unable to be performed secondary to sedated/intaubted  ---------------------------------------------------------------------------------------  Assessment and Plan:    Neuro:    Diagnosis: encephalopathy   o Plan: likely multifactorial in setting of prolonged hospital course, polypharmacy, critical illness, surgical intervention  o Delirium precautions, melatonin qHS, seroquel started yesterday  o No evidence of toxic or infectious etiology  o Consider palliative care consult vs geriatrics for assistance with sedation/agiation in setting of suspected severe acute delirium    o Consider possible anoxic injury in setting of cardiac arrest this admission, exam is nonfocal/non-lateralizing   patient will follow commands when not agitated     Diagnosis: analgesia/sedation  o Plan: fentanyl gtt 100, Precedex 1 4, ketamine 0 2 mg/hr, versed 1 mg/hr - consider possible acute delirium related to ketamine, possible DC ketamine  o Scheduled oxy 5 q 4, PRN fentanyl for breakthrough pain/agitation   o Received 1 time dose Zyprexa yesterday, monitor QTC if continues to require       CV:    Diagnosis: Hypotension   o Plan: resolved, off pressors  o Steroids DC'd yesterday, endpoints normalized  o IV lasix given yesterday with adequate response   o Goal net negative for 24 hours - recieving 2L IVF daily with his sedation regimen       Pulm:   Diagnosis: acute hypoxic resp failure s/p cricothyroidotomy, now s/p formalization of airway w tracheostomy 6/22  o Plan: tolerated trach collar yesterday for several hours  o Pulm mechanics appear stable, cont vent wean if able today    Diagnosis: DOMI VATS related to adenocarcinoma   o Plan: chest tube to suction - 20  o No airleak  o Wound VAC removed by thoracic surgery yesterday      GI:    Diagnosis: GERD  o Plan: bowel regimen  o Cont home Protonix       :    Diagnosis:   o Plan: UOP 3 4L   o Lasix 20 given yesterday, goal net - 1L  o Creat 0 8 from 0 6      F/E/N:    Plan: jevity 1 2 at 60, await nutrition recs and adjust as necessary   K 3 3 - replete       Heme/Onc: RUE DVT, LLE DVT   Diagnosis: cont heparin gtt  o Duplex tomorrow for follow-up  o PE study negative   o HGB stable       Endo:    Diagnosis: no issues       ID:    Diagnosis: possible aspiration PNA  o Plan: cont cefepime/flagyl for 7 days (6/7)  o No leukocytosis, no fevers       MSK/Skin:    Diagnosis:   o Plan: offload, local skin care, frequent turns       Disposition: Continue Critical Care   Code Status: Level 1 - Full Code  ---------------------------------------------------------------------------------------  ICU CORE MEASURES    Prophylaxis   VTE Pharmacologic Prophylaxis: Heparin Drip  VTE Mechanical Prophylaxis: sequential compression device  Stress Ulcer Prophylaxis: Prophylaxis Not Indicated     ABCDE Protocol (if indicated)  Plan to perform spontaneous awakening trial today?  Yes  Plan to perform spontaneous breathing trial today? Yes  Obvious barriers to extubation? Yes  CAM-ICU: Positive    Invasive Devices Review  Invasive Devices     Central Venous Catheter Line            CVC Central Lines 20 4 days          Drain            Chest Tube 1 Left Pleural 28 Fr  14 days    NG/OG/Enteral Tube Nasogastric 12 Fr Right nares 4 days    Urethral Catheter 16 Fr  4 days          Airway            Surgical Airway Cuffed 2 days    Surgical Airway Shiley Cuffed 2 days              Can any invasive devices be discontinued today? No  ---------------------------------------------------------------------------------------  OBJECTIVE    Vitals   Vitals:    20 0200 20 0300 20 0311 20 0400   BP: (!) 87/50 (!) 86/50  (!) 84/58   BP Location:    Left arm   Pulse: 82 72  68   Resp: 13 12  12   Temp: (!) 100 8 °F (38 2 °C) (!) 100 8 °F (38 2 °C)  100 4 °F (38 °C)   TempSrc:    Bladder   SpO2: 91% 92% 93% 93%   Weight:       Height:         Temp (24hrs), Av °F (37 8 °C), Min:98 6 °F (37 °C), Max:101 1 °F (38 4 °C)  Current: Temperature: 100 4 °F (38 °C)      Respiratory:  SpO2: SpO2: 92 %  O2 Flow Rate (L/min): 10 L/min    Invasive/non-invasive ventilation settings   Respiratory    Lab Data (Last 4 hours)    None         O2/Vent Data (Last 4 hours)       031           Vent Mode CPAP/PS Spont       FIO2 (%) (%) 50       PEEP (cmH2O) (cmH2O) 5       Pressure Support (cmH2O) (cmH20) 5       MV (Obs) 10 3       RSBI 14                   Physical Exam   Constitutional: No distress  HENT:   Head: Normocephalic and atraumatic  Eyes: Pupils are equal, round, and reactive to light  Right eye exhibits no discharge  Left eye exhibits no discharge  Neck: No tracheal deviation present  Cardiovascular: Normal rate, regular rhythm, normal heart sounds and intact distal pulses  Pulmonary/Chest: Effort normal  No stridor  No respiratory distress  He has no wheezes   He has rales (loose crackels upper airways clears with suction/cough )  Abdominal: Soft  Bowel sounds are normal  He exhibits no distension  There is no tenderness  There is no guarding  healed laparotomy scars   Musculoskeletal: He exhibits no edema or deformity  Neurological: He is alert  Appears anxious, nods yes or no to some questions but does not appear consistent  Follows all commands with extremities and facial movements strength is equal bilaterally    Skin: He is not diaphoretic           Laboratory and Diagnostics:  Results from last 7 days   Lab Units 06/24/20  0516 06/23/20  0432 06/22/20  1434 06/22/20  0444 06/21/20  0544 06/20/20  1319 06/20/20  0647   WBC Thousand/uL 10 42* 13 94* 13 35* 15 83* 14 26* 33 03* 31 57*   HEMOGLOBIN g/dL 9 7* 9 2* 8 9* 9 0* 10 8* 13 1 13 9   HEMATOCRIT % 30 6* 28 7* 28 5* 27 7* 32 0* 39 9 42 1   PLATELETS Thousands/uL 139* 132* 123* 113* 150 253 295   NEUTROS PCT % 78* 81*  --  86*  --   --   --    BANDS PCT %  --   --   --   --   --   --  1   MONOS PCT % 6 5  --  4  --   --   --    MONO PCT %  --   --   --   --   --   --  1*     Results from last 7 days   Lab Units 06/24/20  0516 06/23/20 0432 06/22/20  0444 06/21/20  0544 06/20/20  1319 06/20/20  0647   SODIUM mmol/L 140 141 140 141 141 137   POTASSIUM mmol/L 3 9 4 0 3 9 3 2* 3 5 3 4*   CHLORIDE mmol/L 110* 112* 111* 109* 107 101   CO2 mmol/L 24 24 26 22 20* 25   ANION GAP mmol/L 6 5 3* 10 14* 11   BUN mg/dL 16 16 14 10 15 14   CREATININE mg/dL 0 59* 0 65 0 68 0 87 1 56* 1 41*   CALCIUM mg/dL 7 5* 7 0* 7 7* 7 6* 8 5 10 0   GLUCOSE RANDOM mg/dL 126 93 105 140 226* 303*   ALT U/L  --   --  56 92* 148* 172*   AST U/L  --   --  20 37 109* 116*   ALK PHOS U/L  --   --  50 55 76 86   ALBUMIN g/dL  --   --  1 9* 2 2* 2 1* 2 4*   TOTAL BILIRUBIN mg/dL  --   --  0 26 0 44 0 34 0 36     Results from last 7 days   Lab Units 06/24/20  0516 06/23/20  0432 06/22/20  0444 06/21/20  0544 06/20/20  1319 06/20/20  0647   MAGNESIUM mg/dL 2 4 2 5 2 3 1 9 1 9 2 5   PHOSPHORUS mg/dL 2 6 2 7 2 4 2 0* 4 7* 8 9*      Results from last 7 days   Lab Units 06/24/20  0516 06/23/20  1757 06/23/20  1155 06/23/20  0432 06/22/20  2152 06/22/20  1434 06/22/20  0444   INR   --   --   --   --   --  1 12  --    PTT seconds 66* 73* 82* 57* 96* 27 64*      Results from last 7 days   Lab Units 06/20/20  1759 06/20/20  1319 06/20/20  0928 06/20/20  0647   TROPONIN I ng/mL 2 75* 3 00* 2 86* 0 85*     Results from last 7 days   Lab Units 06/21/20  0918 06/21/20  0544 06/21/20  0218 06/20/20 2159 06/20/20  1708 06/20/20  1319 06/20/20  0928   LACTIC ACID mmol/L 1 5 2 3* 3 1* 7 1* 10 6* 9 4* 4 7*     ABG:  Results from last 7 days   Lab Units 06/24/20  0516   PH ART  7 405   PCO2 ART mm Hg 36 5   PO2 ART mm Hg 78 1   HCO3 ART mmol/L 22 4   BASE EXC ART mmol/L -2 0   ABG SOURCE  Line, Arterial     VBG:  Results from last 7 days   Lab Units 06/24/20  0516  06/20/20  1343   PH MICHELLE   --   --  7 213*   PCO2 MICHELLE mm Hg  --   --  47 7   PO2 MICHELLE mm Hg  --   --  52 5*   HCO3 MICHELLE mmol/L  --   --  18 8*   BASE EXC MICHELLE mmol/L  --   --  -9 0   ABG SOURCE  Line, Arterial   < >  --     < > = values in this interval not displayed  Results from last 7 days   Lab Units 06/20/20  1759 06/20/20  0928   PROCALCITONIN ng/ml 5 85* 1 85*       Micro  Results from last 7 days   Lab Units 06/20/20  0955 06/20/20  0929   BLOOD CULTURE   --  No Growth After 4 Days  No Growth After 4 Days  MRSA CULTURE ONLY  No Methicillin Resistant Staphlyococcus aureus (MRSA) isolated  --        EKG:   Imaging:  I have personally reviewed pertinent reports  Intake and Output  I/O       06/23 0701 - 06/24 0700 06/24 0701 - 06/25 0700    P  O  0 0    I V  (mL/kg) 2623 7 (27 6) 1845 6 (19 4)    NG/ 310    IV Piggyback 200 100    Feedings 1363 1320    Total Intake(mL/kg) 4571 7 (48) 3575 6 (37 6)    Urine (mL/kg/hr) 859 (0 4) 3275 (1 4)    Emesis/NG output 0 0    Chest Tube 535 325    Total Output 1394 3600    Net +3177 7 -24 4              UOP: ml/hr     Height and Weights   Height: 5' 10" (177 8 cm)  IBW: 73 kg  Body mass index is 30 11 kg/m²  Weight (last 2 days)     Date/Time   Weight    06/24/20 0600   95 2 (209 88)    06/23/20 0600   95 (209 44)                Nutrition       Diet Orders   (From admission, onward)             Start     Ordered    06/22/20 1353  Diet Enteral/Parenteral; Tube Feeding No Oral Diet; Jevity 1 2 Javier; Continuous; 60  Diet effective now     Question Answer Comment   Diet Type Enteral/Parenteral    Enteral/Parenteral Tube Feeding No Oral Diet    Tube Feeding Formula: Jevity 1 2 Javier    Bolus/Cyclic/Continuous Continuous    Tube Feeding Goal Rate (mL/hr): 60    RD to adjust diet per protocol? Yes        06/22/20 1352              TF currently running at  ml/hr with a goal of  ml/hr  Formula:        Active Medications  Scheduled Meds:  Current Facility-Administered Medications:  acetaminophen 650 mg Oral Q6H PRN Saranyamichael Ochoa, PA-C    albuterol 2 5 mg Nebulization Q4H PRN Saranya Ochoa, PA-C    cefepime 2,000 mg Intravenous Q8H KHANG Patel Last Rate: 2,000 mg (06/25/20 0133)   chlorhexidine 15 mL Swish & Spit Q12H [facility] Saranya Ochoa PA-C    dexmedetomidine 0 1-1 4 mcg/kg/hr Intravenous Titrated CARMEL PatelNP Last Rate: 1 4 mcg/kg/hr (06/25/20 0527)   fentaNYL 100 mcg/hr Intravenous Continuous Jazmine Cooper MD Last Rate: 100 mcg/hr (06/25/20 2007)   fentanyl citrate (PF) 50 mcg Intravenous Q1H PRN Saranya Ochoa, PA-C    heparin (porcine) 3-30 Units/kg/hr (Order-Specific) Intravenous Titrated Saranya Don, PA-C Last Rate: 18 Units/kg/hr (06/24/20 2031)   heparin (porcine) 3,600 Units Intravenous Q1H PRN [place] Mealbonita, PA-C    heparin (porcine) 7,200 Units Intravenous Q1H PRN [place] Don, PA-C    ketamine 18 9 mg/hr Intravenous Continuous Jazmine Cooper MD Last Rate: 18 9 mg/hr (06/25/20 0027)   Lidocaine Viscous HCl 15 mL Swish & Spit 4x Daily PRN Morton Pain, MD    metroNIDAZOLE 500 mg Oral Q8H Corin Holder MD    midazolam 1 mg/hr Intravenous Continuous Chalo Diallo MD Last Rate: 1 mg/hr (06/24/20 2314)   ondansetron 4 mg Intravenous Q6H PRN Ny Fiore PA-C    oxyCODONE 5 mg Oral Q4H KHANG Christensen    polyethylene glycol 17 g Oral Daily HKANG Christensen    QUEtiapine 50 mg Oral BID KHANG Parson    senna-docusate sodium 1 tablet Per NG Tube BID Ny Fiore PA-C      Continuous Infusions:    dexmedetomidine 0 1-1 4 mcg/kg/hr Last Rate: 1 4 mcg/kg/hr (06/25/20 0527)   fentaNYL 100 mcg/hr Last Rate: 100 mcg/hr (06/25/20 0212)   heparin (porcine) 3-30 Units/kg/hr (Order-Specific) Last Rate: 18 Units/kg/hr (06/24/20 2031)   ketamine 18 9 mg/hr Last Rate: 18 9 mg/hr (06/25/20 0027)   midazolam 1 mg/hr Last Rate: 1 mg/hr (06/24/20 2314)     PRN Meds:     acetaminophen 650 mg Q6H PRN   albuterol 2 5 mg Q4H PRN   fentanyl citrate (PF) 50 mcg Q1H PRN   heparin (porcine) 3,600 Units Q1H PRN   heparin (porcine) 7,200 Units Q1H PRN   Lidocaine Viscous HCl 15 mL 4x Daily PRN   ondansetron 4 mg Q6H PRN       Allergies   No Known Allergies  ---------------------------------------------------------------------------------------  Advance Directive and Living Will: Yes    Power of : Yes  POLST:    ---------------------------------------------------------------------------------------  Care Time Delivered:   Upon my evaluation, this patient had a high probability of imminent or life-threatening deterioration due to delirium agiation, resp failure , which required my direct attention, intervention, and personal management  I have personally provided 30 minutes (7:30 to 8) of critical care time, exclusive of procedures, teaching, family meetings, and any prior time recorded by providers other than myself  KHANG Parson      Portions of the record may have been created with voice recognition software    Occasional wrong word or "sound a like" substitutions may have occurred due to the inherent limitations of voice recognition software    Read the chart carefully and recognize, using context, where substitutions have occurred

## 2020-06-25 NOTE — PLAN OF CARE
Problem: CARDIOVASCULAR - ADULT  Goal: Maintains optimal cardiac output and hemodynamic stability  Description  INTERVENTIONS:  - Monitor I/O, vital signs and rhythm  - Monitor for S/S and trends of decreased cardiac output  - Administer and titrate ordered vasoactive medications to optimize hemodynamic stability  - Assess quality of pulses, skin color and temperature  - Assess for signs of decreased coronary artery perfusion  - Instruct patient to report change in severity of symptoms  Outcome: Progressing  Goal: Absence of cardiac dysrhythmias or at baseline rhythm  Description  INTERVENTIONS:  - Continuous cardiac monitoring, vital signs, obtain 12 lead EKG if ordered  - Administer antiarrhythmic and heart rate control medications as ordered  - Monitor electrolytes and administer replacement therapy as ordered  Outcome: Progressing     Problem: RESPIRATORY - ADULT  Goal: Achieves optimal ventilation and oxygenation  Description  INTERVENTIONS:  - Assess for changes in respiratory status  - Assess for changes in mentation and behavior  - Position to facilitate oxygenation and minimize respiratory effort  - Oxygen administered by appropriate delivery if ordered  - Initiate smoking cessation education as indicated  - Encourage broncho-pulmonary hygiene including cough, deep breathe, Incentive Spirometry  - Assess the need for suctioning and aspirate as needed  - Assess and instruct to report SOB or any respiratory difficulty  - Respiratory Therapy support as indicated  Outcome: Progressing     Problem: SKIN/TISSUE INTEGRITY - ADULT  Goal: Skin integrity remains intact  Description  INTERVENTIONS  - Identify patients at risk for skin breakdown  - Assess and monitor skin integrity  - Assess and monitor nutrition and hydration status  - Monitor labs (i e  albumin)  - Assess for incontinence   - Turn and reposition patient  - Assist with mobility/ambulation  - Relieve pressure over bony prominences  - Avoid friction and shearing  - Provide appropriate hygiene as needed including keeping skin clean and dry  - Evaluate need for skin moisturizer/barrier cream  - Collaborate with interdisciplinary team (i e  Nutrition, Rehabilitation, etc )   - Patient/family teaching  Outcome: Progressing  Goal: Incision(s), wounds(s) or drain site(s) healing without S/S of infection  Description  INTERVENTIONS  - Assess and document risk factors for skin impairment   - Assess and document dressing, incision, wound bed, drain sites and surrounding tissue  - Consider nutrition services referral as needed  - Oral mucous membranes remain intact  - Provide patient/ family education  Outcome: Progressing  Goal: Oral mucous membranes remain intact  Description  INTERVENTIONS  - Assess oral mucosa and hygiene practices  - Implement preventative oral hygiene regimen  - Implement oral medicated treatments as ordered  - Initiate Nutrition services referral as needed  Outcome: Progressing     Problem: MUSCULOSKELETAL - ADULT  Goal: Maintain proper alignment of affected body part  Description  INTERVENTIONS:  - Support, maintain and protect limb and body alignment  - Provide patient/ family with appropriate education  Outcome: Progressing     Problem: Potential for Falls  Goal: Patient will remain free of falls  Description  INTERVENTIONS:  - Assess patient frequently for physical needs  -  Identify cognitive and physical deficits and behaviors that affect risk of falls    -  [place] fall precautions as indicated by assessment   - Educate patient/family on patient safety including physical limitations  - Instruct patient to call for assistance with activity based on assessment  - Modify environment to reduce risk of injury  - Consider OT/PT consult to assist with strengthening/mobility  Outcome: Progressing     Problem: Prexisting or High Potential for Compromised Skin Integrity  Goal: Skin integrity is maintained or improved  Description  INTERVENTIONS:  - Identify patients at risk for skin breakdown  - Assess and monitor skin integrity  - Assess and monitor nutrition and hydration status  - Monitor labs   - Assess for incontinence   - Turn and reposition patient  - Assist with mobility/ambulation  - Relieve pressure over bony prominences  - Avoid friction and shearing  - Provide appropriate hygiene as needed including keeping skin clean and dry  - Evaluate need for skin moisturizer/barrier cream  - Collaborate with interdisciplinary team   - Patient/family teaching  - Consider wound care consult   Outcome: Progressing     Problem: Nutrition/Hydration-ADULT  Goal: Nutrient/Hydration intake appropriate for improving, restoring or maintaining nutritional needs  Description  Monitor and assess patient's nutrition/hydration status for malnutrition  Collaborate with interdisciplinary team and initiate plan and interventions as ordered  Monitor patient's weight and dietary intake as ordered or per policy  Utilize nutrition screening tool and intervene as necessary  Determine patient's food preferences and provide high-protein, high-caloric foods as appropriate       INTERVENTIONS:  - Monitor oral intake, urinary output, labs, and treatment plans  - Assess nutrition and hydration status and recommend course of action  - Evaluate amount of meals eaten  - Assist patient with eating if necessary   - Allow adequate time for meals  - Recommend/ encourage appropriate diets, oral nutritional supplements, and vitamin/mineral supplements  - Order, calculate, and assess calorie counts as needed  - Recommend, monitor, and adjust tube feedings and TPN/PPN based on assessed needs  - Assess need for intravenous fluids  - Provide specific nutrition/hydration education as appropriate  - Include patient/family/caregiver in decisions related to nutrition  Outcome: Progressing     Problem: SAFETY,RESTRAINT: NV/NON-SELF DESTRUCTIVE BEHAVIOR  Goal: Remains free of harm/injury (restraint for non violent/non self-detsructive behavior)  Description  INTERVENTIONS:  - Instruct patient/family regarding restraint use   - Assess and monitor physiologic and psychological status   - Provide interventions and comfort measures to meet assessed patient needs   - Identify and implement measures to help patient regain control  - Assess readiness for release of restraint   Outcome: Progressing     Problem: MUSCULOSKELETAL - ADULT  Goal: Maintain or return mobility to safest level of function  Description  INTERVENTIONS:  - Assess patient's ability to carry out ADLs; assess patient's baseline for ADL function and identify physical deficits which impact ability to perform ADLs (bathing, care of mouth/teeth, toileting, grooming, dressing, etc )  - Assess/evaluate cause of self-care deficits   - Assess range of motion  - Assess patient's mobility  - Assess patient's need for assistive devices and provide as appropriate  - Encourage maximum independence but intervene and supervise when necessary  - Involve family in performance of ADLs  - Assess for home care needs following discharge   - Consider OT consult to assist with ADL evaluation and planning for discharge  - Provide patient education as appropriate  Outcome: Not Progressing     Problem: SAFETY,RESTRAINT: NV/NON-SELF DESTRUCTIVE BEHAVIOR  Goal: Returns to optimal restraint-free functioning  Description  INTERVENTIONS:  - Assess the patient's behavior and symptoms that indicate continued need for restraint  - Identify and implement measures to help patient regain control  - Assess readiness for release of restraint   Outcome: Not Progressing

## 2020-06-26 ENCOUNTER — APPOINTMENT (INPATIENT)
Dept: NON INVASIVE DIAGNOSTICS | Facility: HOSPITAL | Age: 70
DRG: 003 | End: 2020-06-26
Payer: COMMERCIAL

## 2020-06-26 ENCOUNTER — APPOINTMENT (INPATIENT)
Dept: RADIOLOGY | Facility: HOSPITAL | Age: 70
DRG: 003 | End: 2020-06-26
Payer: COMMERCIAL

## 2020-06-26 LAB
ANION GAP SERPL CALCULATED.3IONS-SCNC: 4 MMOL/L (ref 4–13)
BASOPHILS # BLD AUTO: 0.04 THOUSANDS/ΜL (ref 0–0.1)
BASOPHILS NFR BLD AUTO: 0 % (ref 0–1)
BUN SERPL-MCNC: 12 MG/DL (ref 5–25)
CA-I BLD-SCNC: 1.08 MMOL/L (ref 1.12–1.32)
CALCIUM SERPL-MCNC: 7.5 MG/DL (ref 8.3–10.1)
CHLORIDE SERPL-SCNC: 109 MMOL/L (ref 100–108)
CO2 SERPL-SCNC: 28 MMOL/L (ref 21–32)
CREAT SERPL-MCNC: 0.74 MG/DL (ref 0.6–1.3)
EOSINOPHIL # BLD AUTO: 0.96 THOUSAND/ΜL (ref 0–0.61)
EOSINOPHIL NFR BLD AUTO: 9 % (ref 0–6)
ERYTHROCYTE [DISTWIDTH] IN BLOOD BY AUTOMATED COUNT: 15 % (ref 11.6–15.1)
GFR SERPL CREATININE-BSD FRML MDRD: 94 ML/MIN/1.73SQ M
GLUCOSE SERPL-MCNC: 146 MG/DL (ref 65–140)
HCT VFR BLD AUTO: 29.3 % (ref 36.5–49.3)
HGB BLD-MCNC: 9.1 G/DL (ref 12–17)
IMM GRANULOCYTES # BLD AUTO: 0.08 THOUSAND/UL (ref 0–0.2)
IMM GRANULOCYTES NFR BLD AUTO: 1 % (ref 0–2)
LYMPHOCYTES # BLD AUTO: 1.23 THOUSANDS/ΜL (ref 0.6–4.47)
LYMPHOCYTES NFR BLD AUTO: 12 % (ref 14–44)
MAGNESIUM SERPL-MCNC: 2.2 MG/DL (ref 1.6–2.6)
MCH RBC QN AUTO: 31.5 PG (ref 26.8–34.3)
MCHC RBC AUTO-ENTMCNC: 31.1 G/DL (ref 31.4–37.4)
MCV RBC AUTO: 101 FL (ref 82–98)
MONOCYTES # BLD AUTO: 0.62 THOUSAND/ΜL (ref 0.17–1.22)
MONOCYTES NFR BLD AUTO: 6 % (ref 4–12)
NEUTROPHILS # BLD AUTO: 7.42 THOUSANDS/ΜL (ref 1.85–7.62)
NEUTS SEG NFR BLD AUTO: 72 % (ref 43–75)
NRBC BLD AUTO-RTO: 0 /100 WBCS
PHOSPHATE SERPL-MCNC: 3.2 MG/DL (ref 2.3–4.1)
PLATELET # BLD AUTO: 185 THOUSANDS/UL (ref 149–390)
PMV BLD AUTO: 10.9 FL (ref 8.9–12.7)
POTASSIUM SERPL-SCNC: 3.7 MMOL/L (ref 3.5–5.3)
RBC # BLD AUTO: 2.89 MILLION/UL (ref 3.88–5.62)
SODIUM SERPL-SCNC: 141 MMOL/L (ref 136–145)
WBC # BLD AUTO: 10.35 THOUSAND/UL (ref 4.31–10.16)

## 2020-06-26 PROCEDURE — 99232 SBSQ HOSP IP/OBS MODERATE 35: CPT | Performed by: FAMILY MEDICINE

## 2020-06-26 PROCEDURE — 84100 ASSAY OF PHOSPHORUS: CPT | Performed by: NURSE PRACTITIONER

## 2020-06-26 PROCEDURE — 94760 N-INVAS EAR/PLS OXIMETRY 1: CPT

## 2020-06-26 PROCEDURE — 93971 EXTREMITY STUDY: CPT | Performed by: SURGERY

## 2020-06-26 PROCEDURE — 99291 CRITICAL CARE FIRST HOUR: CPT | Performed by: EMERGENCY MEDICINE

## 2020-06-26 PROCEDURE — 94640 AIRWAY INHALATION TREATMENT: CPT

## 2020-06-26 PROCEDURE — 82330 ASSAY OF CALCIUM: CPT | Performed by: NURSE PRACTITIONER

## 2020-06-26 PROCEDURE — 36569 INSJ PICC 5 YR+ W/O IMAGING: CPT

## 2020-06-26 PROCEDURE — 71045 X-RAY EXAM CHEST 1 VIEW: CPT

## 2020-06-26 PROCEDURE — 93971 EXTREMITY STUDY: CPT

## 2020-06-26 PROCEDURE — 05HY33Z INSERTION OF INFUSION DEVICE INTO UPPER VEIN, PERCUTANEOUS APPROACH: ICD-10-PCS | Performed by: EMERGENCY MEDICINE

## 2020-06-26 PROCEDURE — 85025 COMPLETE CBC W/AUTO DIFF WBC: CPT | Performed by: NURSE PRACTITIONER

## 2020-06-26 PROCEDURE — 94003 VENT MGMT INPAT SUBQ DAY: CPT

## 2020-06-26 PROCEDURE — 99024 POSTOP FOLLOW-UP VISIT: CPT | Performed by: THORACIC SURGERY (CARDIOTHORACIC VASCULAR SURGERY)

## 2020-06-26 PROCEDURE — 83735 ASSAY OF MAGNESIUM: CPT | Performed by: NURSE PRACTITIONER

## 2020-06-26 PROCEDURE — C1751 CATH, INF, PER/CENT/MIDLINE: HCPCS

## 2020-06-26 PROCEDURE — 80048 BASIC METABOLIC PNL TOTAL CA: CPT | Performed by: NURSE PRACTITIONER

## 2020-06-26 RX ORDER — CALCIUM GLUCONATE 20 MG/ML
2 INJECTION, SOLUTION INTRAVENOUS ONCE
Status: COMPLETED | OUTPATIENT
Start: 2020-06-26 | End: 2020-06-26

## 2020-06-26 RX ORDER — POTASSIUM CHLORIDE 20MEQ/15ML
40 LIQUID (ML) ORAL ONCE
Status: COMPLETED | OUTPATIENT
Start: 2020-06-26 | End: 2020-06-26

## 2020-06-26 RX ORDER — FUROSEMIDE 10 MG/ML
40 INJECTION INTRAMUSCULAR; INTRAVENOUS ONCE
Status: COMPLETED | OUTPATIENT
Start: 2020-06-26 | End: 2020-06-26

## 2020-06-26 RX ADMIN — MELATONIN 3 MG: at 21:34

## 2020-06-26 RX ADMIN — ENOXAPARIN SODIUM 100 MG: 100 INJECTION SUBCUTANEOUS at 08:52

## 2020-06-26 RX ADMIN — LORAZEPAM 2 MG: 0.5 TABLET ORAL at 00:41

## 2020-06-26 RX ADMIN — ENOXAPARIN SODIUM 100 MG: 100 INJECTION SUBCUTANEOUS at 21:35

## 2020-06-26 RX ADMIN — DEXMEDETOMIDINE 1.2 MCG/KG/HR: 100 INJECTION, SOLUTION, CONCENTRATE INTRAVENOUS at 05:00

## 2020-06-26 RX ADMIN — LEVALBUTEROL HYDROCHLORIDE 1.25 MG: 1.25 SOLUTION, CONCENTRATE RESPIRATORY (INHALATION) at 23:42

## 2020-06-26 RX ADMIN — ISODIUM CHLORIDE 3 ML: 0.03 SOLUTION RESPIRATORY (INHALATION) at 00:10

## 2020-06-26 RX ADMIN — LEVALBUTEROL HYDROCHLORIDE 1.25 MG: 1.25 SOLUTION, CONCENTRATE RESPIRATORY (INHALATION) at 19:22

## 2020-06-26 RX ADMIN — LEVALBUTEROL HYDROCHLORIDE 1.25 MG: 1.25 SOLUTION, CONCENTRATE RESPIRATORY (INHALATION) at 13:21

## 2020-06-26 RX ADMIN — OXYCODONE HYDROCHLORIDE 10 MG: 5 SOLUTION ORAL at 00:43

## 2020-06-26 RX ADMIN — ISODIUM CHLORIDE 3 ML: 0.03 SOLUTION RESPIRATORY (INHALATION) at 07:13

## 2020-06-26 RX ADMIN — LEVALBUTEROL HYDROCHLORIDE 1.25 MG: 1.25 SOLUTION, CONCENTRATE RESPIRATORY (INHALATION) at 07:13

## 2020-06-26 RX ADMIN — QUETIAPINE FUMARATE 50 MG: 25 TABLET ORAL at 08:51

## 2020-06-26 RX ADMIN — DEXMEDETOMIDINE 1 MCG/KG/HR: 100 INJECTION, SOLUTION, CONCENTRATE INTRAVENOUS at 12:31

## 2020-06-26 RX ADMIN — LORAZEPAM 2 MG: 0.5 TABLET ORAL at 21:34

## 2020-06-26 RX ADMIN — Medication 100 MCG/HR: at 20:01

## 2020-06-26 RX ADMIN — ISODIUM CHLORIDE 3 ML: 0.03 SOLUTION RESPIRATORY (INHALATION) at 23:42

## 2020-06-26 RX ADMIN — OXYCODONE HYDROCHLORIDE 10 MG: 5 SOLUTION ORAL at 08:51

## 2020-06-26 RX ADMIN — POLYETHYLENE GLYCOL 3350 17 G: 17 POWDER, FOR SOLUTION ORAL at 08:51

## 2020-06-26 RX ADMIN — LEVALBUTEROL HYDROCHLORIDE 1.25 MG: 1.25 SOLUTION, CONCENTRATE RESPIRATORY (INHALATION) at 00:10

## 2020-06-26 RX ADMIN — Medication 100 MCG/HR: at 07:43

## 2020-06-26 RX ADMIN — CEFEPIME HYDROCHLORIDE 2000 MG: 2 INJECTION, POWDER, FOR SOLUTION INTRAVENOUS at 00:44

## 2020-06-26 RX ADMIN — OXYCODONE HYDROCHLORIDE 10 MG: 5 SOLUTION ORAL at 21:34

## 2020-06-26 RX ADMIN — ACETAMINOPHEN 975 MG: 650 SUSPENSION ORAL at 21:34

## 2020-06-26 RX ADMIN — CEFEPIME HYDROCHLORIDE 2000 MG: 2 INJECTION, POWDER, FOR SOLUTION INTRAVENOUS at 18:53

## 2020-06-26 RX ADMIN — OXYCODONE HYDROCHLORIDE 10 MG: 5 SOLUTION ORAL at 17:45

## 2020-06-26 RX ADMIN — CHLORHEXIDINE GLUCONATE 0.12% ORAL RINSE 15 ML: 1.2 LIQUID ORAL at 08:51

## 2020-06-26 RX ADMIN — LORAZEPAM 2 MG: 0.5 TABLET ORAL at 08:51

## 2020-06-26 RX ADMIN — DEXMEDETOMIDINE 1.4 MCG/KG/HR: 100 INJECTION, SOLUTION, CONCENTRATE INTRAVENOUS at 01:04

## 2020-06-26 RX ADMIN — DEXMEDETOMIDINE 1 MCG/KG/HR: 100 INJECTION, SOLUTION, CONCENTRATE INTRAVENOUS at 20:38

## 2020-06-26 RX ADMIN — KETAMINE HYDROCHLORIDE 0.05 MG/KG/HR: 50 INJECTION INTRAMUSCULAR; INTRAVENOUS at 14:05

## 2020-06-26 RX ADMIN — OXYCODONE HYDROCHLORIDE 10 MG: 5 SOLUTION ORAL at 04:05

## 2020-06-26 RX ADMIN — METRONIDAZOLE 500 MG: 500 TABLET ORAL at 09:46

## 2020-06-26 RX ADMIN — OXYCODONE HYDROCHLORIDE 10 MG: 5 SOLUTION ORAL at 13:55

## 2020-06-26 RX ADMIN — POTASSIUM CHLORIDE 40 MEQ: 1.5 SOLUTION ORAL at 07:43

## 2020-06-26 RX ADMIN — CHLORHEXIDINE GLUCONATE 0.12% ORAL RINSE 15 ML: 1.2 LIQUID ORAL at 21:34

## 2020-06-26 RX ADMIN — Medication 100 MCG/HR: at 12:08

## 2020-06-26 RX ADMIN — ISODIUM CHLORIDE 3 ML: 0.03 SOLUTION RESPIRATORY (INHALATION) at 19:22

## 2020-06-26 RX ADMIN — LORAZEPAM 2 MG: 0.5 TABLET ORAL at 17:45

## 2020-06-26 RX ADMIN — CEFEPIME HYDROCHLORIDE 2000 MG: 2 INJECTION, POWDER, FOR SOLUTION INTRAVENOUS at 08:51

## 2020-06-26 RX ADMIN — DEXMEDETOMIDINE 1.2 MCG/KG/HR: 100 INJECTION, SOLUTION, CONCENTRATE INTRAVENOUS at 08:58

## 2020-06-26 RX ADMIN — ACETAMINOPHEN 975 MG: 650 SUSPENSION ORAL at 05:42

## 2020-06-26 RX ADMIN — ACETAMINOPHEN 975 MG: 650 SUSPENSION ORAL at 13:56

## 2020-06-26 RX ADMIN — FUROSEMIDE 40 MG: 10 INJECTION, SOLUTION INTRAMUSCULAR; INTRAVENOUS at 09:46

## 2020-06-26 RX ADMIN — CALCIUM GLUCONATE 2 G: 20 INJECTION, SOLUTION INTRAVENOUS at 08:51

## 2020-06-26 RX ADMIN — LORAZEPAM 2 MG: 0.5 TABLET ORAL at 05:03

## 2020-06-26 RX ADMIN — METRONIDAZOLE 500 MG: 500 TABLET ORAL at 20:03

## 2020-06-26 RX ADMIN — MIDAZOLAM 0.5 MG/HR: 5 INJECTION INTRAMUSCULAR; INTRAVENOUS at 12:24

## 2020-06-26 RX ADMIN — LORAZEPAM 2 MG: 0.5 TABLET ORAL at 13:56

## 2020-06-26 RX ADMIN — METRONIDAZOLE 500 MG: 500 TABLET ORAL at 01:06

## 2020-06-26 RX ADMIN — SENNOSIDES AND DOCUSATE SODIUM 1 TABLET: 8.6; 5 TABLET ORAL at 17:45

## 2020-06-26 RX ADMIN — ISODIUM CHLORIDE 3 ML: 0.03 SOLUTION RESPIRATORY (INHALATION) at 13:21

## 2020-06-26 RX ADMIN — QUETIAPINE FUMARATE 50 MG: 25 TABLET ORAL at 17:45

## 2020-06-26 RX ADMIN — SENNOSIDES AND DOCUSATE SODIUM 1 TABLET: 8.6; 5 TABLET ORAL at 08:51

## 2020-06-26 NOTE — PROGRESS NOTES
Progress note - Palliative and Supportive Care   Tequila Huertas 71 y o  male [id number]    Patient Active Problem List   Diagnosis    Community acquired pneumonia    Abnormal computed tomography angiography (CTA)    Alcohol abuse    Malignant neoplasm of upper lobe of left lung (HCC)    Tobacco abuse    Acute respiratory failure with hypoxia (HCC)    Subcutaneous emphysema (HCC)    Acute deep vein thrombosis (DVT) of axillary vein of right upper extremity (HCC)    Acute deep vein thrombosis (DVT) of brachial vein of right upper extremity (HCC)    Tracheostomy in place ([address]    Postprocedural pneumothorax    Acute deep vein thrombosis (DVT) of calf muscle vein of left lower extremity ([address] )    Pneumonia        Plan:  1  Symptom management - defer to CCM at this time, no changes to sedation cocktail at this time, as ongoing safety is necessary while indwelling PICC is placed and checked    2  Goals - full medical cares for now, with DNAR 2 limit  - Pt is not competent  Mana Bauer leads family by formal PoA on file  = First alternate HCAgent is son-in-law Leodan Boss     Code Status: DNAR - Level 2   Decisional apparatus:  Patient is not competent on my exam today  If competence is lost, patient's substitute decision maker would default to mana Bauer, RN by [place] Act 169  Advance Directive / Living Will / POLST:  reviwed and informs our plan    Cee Shannon MD  Palliative and Supportive Care  Clinic/Answering Service: [phone]  You can find me on [employer]! Interval history:       Since last visit, pt has been made comfortable, and is still initiating own breaths and otherwise submitting for necessary cares to prolong life  He is not wakeful today, as PICC is being placed  We discussed his case with his daughter, an OR RN at [facility], who is reeling a bit from the acuity and severity of his case    She confirms code status and goals of care today: DNAR 2 with full cares      MEDICATIONS / ALLERGIES:     current meds:   Current Facility-Administered Medications   Medication Dose Route Frequency    acetaminophen (TYLENOL) oral suspension 975 mg  975 mg Oral Q8H [facility]    albuterol inhalation solution 2 5 mg  2 5 mg Nebulization Q4H PRN    cefepime (MAXIPIME) 2,000 mg in dextrose 5 % 50 mL IVPB  2,000 mg Intravenous Q8H    chlorhexidine (PERIDEX) 0 12 % oral rinse 15 mL  15 mL Swish & Spit Q12H [facility]    dexmedetomidine (PRECEDEX) 400 mcg in sodium chloride 0 9 % 100 mL infusion  0 1-1 4 mcg/kg/hr Intravenous Titrated    enoxaparin (LOVENOX) subcutaneous injection 100 mg  1 mg/kg Subcutaneous Q12H [facility]    fentaNYL 1000 mcg in sodium chloride 0 9% 100mL infusion  100 mcg/hr Intravenous Continuous    ketamine 250 mg (STANDARD CONCENTRATION) IV in sodium chloride 0 9% 250 mL  0 05 mg/kg/hr Intravenous Continuous    levalbuterol (XOPENEX) inhalation solution 1 25 mg  1 25 mg Nebulization Q6H    Lidocaine Viscous HCl (XYLOCAINE) 2 % mucosal solution 15 mL  15 mL Swish & Spit 4x Daily PRN    LORazepam (ATIVAN) tablet 2 mg  2 mg Per NG Tube Q4H    melatonin tablet 3 mg  3 mg Oral HS    metroNIDAZOLE (FLAGYL) tablet 500 mg  500 mg Oral Q8H    midazolam (VERSED) 0 5 mg/mL (STANDARD CONCENTRATION) 100 mL infusion  0 5 mg/hr Intravenous Continuous    norepinephrine (LEVOPHED) 4 mg (STANDARD CONCENTRATION) IV in sodium chloride 0 9% 250 mL  1-30 mcg/min Intravenous Titrated    OLANZapine (ZyPREXA) tablet 5 mg  5 mg Per NG Tube Q3H PRN    ondansetron (ZOFRAN) injection 4 mg  4 mg Intravenous Q6H PRN    oxyCODONE (ROXICODONE) oral solution 10 mg  10 mg Oral Q4H    oxyCODONE (ROXICODONE) oral solution 5 mg  5 mg Oral Q2H PRN    polyethylene glycol (MIRALAX) packet 17 g  17 g Oral Daily    QUEtiapine (SEROquel) tablet 50 mg  50 mg Oral BID    senna-docusate sodium (SENOKOT S) 8 6-50 mg per tablet 1 tablet  1 tablet Per NG Tube BID    sodium chloride 0 9 % inhalation solution 3 mL  3 mL Nebulization Q6H       No Known Allergies    OBJECTIVE:    Physical Exam  Physical Exam   Constitutional: No distress  Frail, cachectic   HENT:   Head: Normocephalic and atraumatic  Right Ear: External ear normal    Left Ear: External ear normal    Mouth/Throat: No oropharyngeal exudate (membranes tacky)  Eyes: Right eye exhibits no discharge  Left eye exhibits no discharge  Does not open eyes during encounter  Neck: No tracheal deviation present  Cardiovascular: Normal rate and regular rhythm  Pulmonary/Chest: Effort normal  No stridor  No respiratory distress  Abdominal: Soft  He exhibits no distension  Musculoskeletal: He exhibits no edema or deformity  Neurological:   Not alert, not interactive  Facies appear generally symmetric  Skin: Skin is warm and dry  No rash noted  He is not diaphoretic  No erythema  There is pallor  Psychiatric:   Cannot participate in exam        Lab Results:   I have personally reviewed pertinent labs  , CBC:   Lab Results   Component Value Date    WBC 10 35 (H) 06/26/2020    HGB 9 1 (L) 06/26/2020    HCT 29 3 (L) 06/26/2020     (H) 06/26/2020     06/26/2020    MCH 31 5 06/26/2020    MCHC 31 1 (L) 06/26/2020    RDW 15 0 06/26/2020    MPV 10 9 06/26/2020    NRBC 0 06/26/2020   , CMP:   Lab Results   Component Value Date    SODIUM 141 06/26/2020    K 3 7 06/26/2020     (H) 06/26/2020    CO2 28 06/26/2020    BUN 12 06/26/2020    CREATININE 0 74 06/26/2020    CALCIUM 7 5 (L) 06/26/2020    EGFR 94 06/26/2020   , BMP:  Lab Results   Component Value Date    SODIUM 141 06/26/2020    K 3 7 06/26/2020     (H) 06/26/2020    CO2 28 06/26/2020    BUN 12 06/26/2020    CREATININE 0 74 06/26/2020    GLUC 146 (H) 06/26/2020    CALCIUM 7 5 (L) 06/26/2020    AGAP 4 06/26/2020    EGFR 94 06/26/2020   , PT/PTT:No results found for: PT, PTT   GFR appropriate for all opioids    Cl elevated, suggestive of dehydration  Imaging Studies: subcu emphysema present throughout  EKG, Pathology, and Other Studies: adenoca of DOMI, 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

## 2020-06-26 NOTE — RESPIRATORY THERAPY NOTE
resp care      06/26/20 3406   Respiratory Assessment   Resp Comments pt placed on a 40% TC, pt appears comfortable will attempt a TC wean x 4 hours, will continue to monitor   O2 Device TC   Airway Suctioning/Secretions   Suction Type Tracheal   Suction Device Catheter   Secretion Amount Large   Secretion Color Tan;Pink tinged   Secretion Consistency Thick   Suction Tolerance Tolerated fairly well   Suctioning Adverse Effects None   Additional Assessments   SpO2 94 %

## 2020-06-26 NOTE — PROCEDURES
Insert PICC line  Date/Time: 6/26/2020 11:38 AM  Performed by: Pedro Luis Paez RN  Authorized by: Shira Miller PA-C     Patient location:  Bedside  Other Assisting Provider: Yes (comment) (Lynne Deras Infusion Tech)    Consent:     Consent obtained:  Written (Physician obtained)    Consent given by: POA  Procedural risks discussed: with MD Winn protocol:     Procedure explained and questions answered to patient or proxy's satisfaction: yes      Relevant documents present and verified: yes      Test results available and properly labeled: yes      Radiology Images displayed and confirmed  If images not available, report reviewed: yes      Required blood products, implants, devices, and special equipment available: yes      Site/side marked: yes      Immediately prior to procedure, a time out was called: yes      Patient identity confirmed:  Arm band and hospital-assigned identification number  Pre-procedure details:     Hand hygiene: Hand hygiene performed prior to insertion      Sterile barrier technique: All elements of maximal sterile technique followed      Skin preparation:  ChloraPrep    Skin preparation agent: Skin preparation agent completely dried prior to procedure    Indications:     PICC line indications: medications requiring central line    Anesthesia (see MAR for exact dosages):      Anesthesia method:  Local infiltration    Local anesthetic:  Lidocaine 2% WITH epi (2 ml)  Procedure details:     Location:  Basilic    Vessel type: vein      Laterality:  Left    Site selection rationale:  Multiple DVT's on right arm     Approach: percutaneous technique used      Patient position:  Flat    Procedural supplies:  Triple lumen    Catheter size:  5 Fr    Landmarks identified: yes      Ultrasound guidance: yes      Sterile ultrasound techniques: Sterile gel and sterile probe covers were used      Number of attempts:  1    Successful placement: yes      Vessel of catheter tip end:  Chest Xray needed to confirm placement    Total catheter length (cm):  49    Catheter out on skin (cm):  0    Max flow rate:  999    Arm circumference:  37  Post-procedure details:     Post-procedure:  Dressing applied and securement device placed    Assessment:  Blood return through all ports, free fluid flow and placement verification pending x-ray result    Post-procedure complications: none      Patient tolerance of procedure:   Tolerated well, no immediate complications    Observer: Yes      Observer name:  Hunter Haynes PCA  Comments:      LOT #[id number] 2020-04-30

## 2020-06-26 NOTE — RESPIRATORY THERAPY NOTE
RT Ventilator Management Note  Tequila Huertas 71 y o  male MRN: [id number]  Unit/Bed#: ICU 02 Encounter: [id number]      Daily Screen       6/25/2020 0728 6/26/2020  0714          Patient safety screen outcome[de-identified]  Passed  Passed      Not Ready for Weaning due to[de-identified]    Underline problem not resolved              Physical Exam:   Assessment Type: Assess only  General Appearance: Sleeping  Respiratory Pattern: Assisted  Chest Assessment: Chest expansion symmetrical, Subcutaneous emphysema  Bilateral Breath Sounds: Diminished, Rhonchi, Expiratory wheezes  Suction: Trach  O2 Device: (P) vent      Resp Comments: (P) pt appears comfortable on current settings, will attempt a TC wean this AM, will continue to monitor

## 2020-06-26 NOTE — SOCIAL WORK
CM continues to follow  PT/OT consults pending  Pt vented via trach on PS with weaning to trach collar for 4 hours  Chest tube  Levo and on sedation, Palliative Care consulted

## 2020-06-26 NOTE — UTILIZATION REVIEW
Continued Stay Review    Date: 6-26                         Current Patient Class: inpatient  Current Level of Care: medical surgical     HPI:69 y o  male initially admitted on 6-1-20   PMH malignant neoplasm left upper lung, HTN, alcohol abuse s/p VATS on 6/10 with resulting PTX and subcutaneous emphysema   He was a Rapid Response for respiratory distress and desaturation twice on the floor most recently on 6/20 where he was hypoxic, bradycardic and ultimately cardiac arrest requiring emergent cricothyroidotomy which has been transitioned to tracheostomy  He has had difficulty with sedation and frequently becomes combative  He is on minimal vent settings, tolerated trach collar on 6/24 however failed trach collar trial yesterday    Assessment/Plan:     Chest tube remains in place without 100 cc for 24 hours  Continue chest tube to suctin  Continue trach collar trials  Continue heparin gtt Levophed drip and iv sedation continues  Patient remains intermittently agitated  Goal to decrease sedation without patient removing tubes  Picc line inserted  Patient is not yet appropriate for therapy evaluations  Remains febrile  Continue iv cefepime  For possible aspiration pneumonia  Pertinent Labs/Diagnostic Results:       Results from last 7 days   Lab Units 06/26/20  0512 06/25/20  0613 06/24/20  0516 06/23/20  0432 06/22/20  1434  06/20/20  0647   WBC Thousand/uL 10 35* 9 98 10 42* 13 94* 13 35*   < > 31 57*   HEMOGLOBIN g/dL 9 1* 9 1* 9 7* 9 2* 8 9*   < > 13 9   HEMATOCRIT % 29 3* 29 3* 30 6* 28 7* 28 5*   < > 42 1   PLATELETS Thousands/uL 185 163 139* 132* 123*   < > 295   NEUTROS ABS Thousands/µL 7 42 7 45 8 11* 11 23*  --    < >  --    BANDS PCT %  --   --   --   --   --   --  1    < > = values in this interval not displayed           Results from last 7 days   Lab Units 06/26/20  0512 06/25/20  6204 06/24/20  0516 06/23/20  0432 06/22/20  0444 06/21/20  0544  06/20/20  0534   SODIUM mmol/L 141 143 140 141 140 141   < >  --    POTASSIUM mmol/L 3 7 3 3* 3 9 4 0 3 9 3 2*   < >  --    CHLORIDE mmol/L 109* 111* 110* 112* 111* 109*   < >  --    CO2 mmol/L 28 27 24 24 26 22   < >  --    ANION GAP mmol/L 4 5 6 5 3* 10   < >  --    BUN mg/dL 12 13 16 16 14 10   < >  --    CREATININE mg/dL 0 74 0 81 0 59* 0 65 0 68 0 87   < >  --    EGFR ml/min/1 73sq m 94 91 103 99 97 88   < >  --    CALCIUM mg/dL 7 5* 7 3* 7 5* 7 0* 7 7* 7 6*   < >  --    CALCIUM, IONIZED mmol/L 1 08*  --  1 13  --  1 07* 1 08*  --   --    CALCIUM, IONIZED, ISTAT mmol/L  --   --   --   --   --   --   --  >2 20*   MAGNESIUM mg/dL 2 2  --  2 4 2 5 2 3 1 9   < >  --    PHOSPHORUS mg/dL 3 2  --  2 6 2 7 2 4 2 0*   < >  --     < > = values in this interval not displayed       Results from last 7 days   Lab Units 06/22/20  0444 06/21/20  0544 06/20/20  1319 06/20/20  0647   AST U/L 20 37 109* 116*   ALT U/L 56 92* 148* 172*   ALK PHOS U/L 50 55 76 86   TOTAL PROTEIN g/dL 4 6* 4 8* 5 3* 5 6*   ALBUMIN g/dL 1 9* 2 2* 2 1* 2 4*   TOTAL BILIRUBIN mg/dL 0 26 0 44 0 34 0 36   BILIRUBIN DIRECT mg/dL  --  0 15 0 14  --          Results from last 7 days   Lab Units 06/26/20  0512 06/25/20  2543 06/24/20  0516 06/23/20  0432 06/22/20  0444 06/21/20  0544 06/20/20  1319 06/20/20  0647   GLUCOSE RANDOM mg/dL 146* 114 126 93 105 140 226* 303*             No results found for: BETA-HYDROXYBUTYRATE   Results from last 7 days   Lab Units 06/24/20  0516 06/23/20  0627 06/22/20  0447   PH ART  7 405 7 386 7 432   PCO2 ART mm Hg 36 5 41 8 31 1*   PO2 ART mm Hg 78 1 94 3 117 6   HCO3 ART mmol/L 22 4 24 5 20 3*   BASE EXC ART mmol/L -2 0 -0 5 -3 2   O2 CONTENT ART mL/dL 14 2* 14 1* 14 7*   O2 HGB, ARTERIAL % 94 6 96 1 97 4*   ABG SOURCE  Line, Arterial Line, Arterial Line, Arterial     Results from last 7 days   Lab Units 06/20/20  1343   PH MICHELLE  7 213*   PCO2 MICHELLE mm Hg 47 7   PO2 MICHELLE mm Hg 52 5*   HCO3 MICHELLE mmol/L 18 8*   BASE EXC MICHELLE mmol/L -9 0   O2 CONTENT MICHELLE ml/dL 15 8   O2 HGB, VENOUS % 80 7*     Results from last 7 days   Lab Units 06/20/20  0534 06/20/20  0508   PH, MICHELLE I-STAT  7 097*  --    PCO2, MICHELLE ISTAT mm HG >103 0*  --    PO2, MICHELLE ISTAT mm HG 45 0  --    ISTAT PH ART   --  6 931*   I STAT ART PCO2 mm HG  --  >103 0*   I STAT ART PO2 mm HG  --  61 0*         Results from last 7 days   Lab Units 06/20/20  1759 06/20/20  1319 06/20/20  0928 06/20/20  0647   TROPONIN I ng/mL 2 75* 3 00* 2 86* 0 85*         Results from last 7 days   Lab Units 06/25/20  0613 06/24/20  0516 06/23/20  1757  06/22/20  1434   PROTIME seconds  --   --   --   --  14 0   INR   --   --   --   --  1 12   PTT seconds 152* 66* 73*   < > 27    < > = values in this interval not displayed  Results from last 7 days   Lab Units 06/20/20  1759 06/20/20  0928   PROCALCITONIN ng/ml 5 85* 1 85*     Results from last 7 days   Lab Units 06/21/20  0918 06/21/20  0544 06/21/20  0218 06/20/20  2159 06/20/20  1708   LACTIC ACID mmol/L 1 5 2 3* 3 1* 7 1* 10 6*       Results from last 7 days   Lab Units 06/20/20  0955   CLARITY UA  Clear   COLOR UA  Yellow   SPEC GRAV UA  1 017   PH UA  6 0   GLUCOSE UA mg/dl 100 (1/10%)*   KETONES UA mg/dl Negative   BLOOD UA  Moderate*   PROTEIN UA mg/dl 100 (2+)*   NITRITE UA  Negative   BILIRUBIN UA  Negative   UROBILINOGEN UA E U /dl 0 2   LEUKOCYTES UA  Negative   WBC UA /hpf 2-4*   RBC UA /hpf 10-20*   BACTERIA UA /hpf Moderate*   EPITHELIAL CELLS WET PREP /hpf Occasional       Results from last 7 days   Lab Units 06/20/20  0929   BLOOD CULTURE  No Growth After 5 Days  No Growth After 5 Days                 Vital Signs:     Vitals:    06/26/20 1220 06/26/20 1355 06/26/20 1400 06/26/20 1500   BP:   (!) 165/110 106/64   Pulse:  (!) 114 (!) 110 90   Resp:  16 15 (!) 11   Temp:  (!) 101 1 °F (38 4 °C) (!) 101 1 °F (38 4 °C) (!) 101 5 °F (38 6 °C)   TempSrc:       SpO2: 96% 96% 96% 94%   Weight:       Height:         Medications:   Scheduled Medications:    Medications:  acetaminophen 975 mg Oral Q8H [facility]   cefepime 2,000 mg Intravenous Q8H   chlorhexidine 15 mL Swish & Spit Q12H [facility]   enoxaparin 1 mg/kg Subcutaneous Q12H [facility]   levalbuterol 1 25 mg Nebulization Q6H   LORazepam 2 mg Per NG Tube Q4H   melatonin 3 mg Oral HS   metroNIDAZOLE 500 mg Oral Q8H   oxyCODONE 10 mg Oral Q4H   polyethylene glycol 17 g Oral Daily   QUEtiapine 50 mg Oral BID   senna-docusate sodium 1 tablet Per NG Tube BID   sodium chloride 3 mL Nebulization Q6H     Continuous IV Infusions:    dexmedetomidine 0 1-1 4 mcg/kg/hr Intravenous Titrated   fentaNYL 100 mcg/hr Intravenous Continuous   ketamine 0 05 mg/kg/hr Intravenous Continuous   midazolam 0 5 mg/hr Intravenous Continuous   norepinephrine 1-30 mcg/min Intravenous Titrated     PRN Meds:    albuterol 2 5 mg Nebulization Q4H PRN   Lidocaine Viscous HCl 15 mL Swish & Spit 4x Daily PRN   OLANZapine 5 mg Per NG Tube Q3H PRN   ondansetron 4 mg Intravenous Q6H PRN   oxyCODONE 5 mg Oral Q2H PRN       Discharge Plan: to be determined     Network Utilization Review Department  [email or web address]  ATTENTION: Please call with any questions or concerns to [phone] and carefully listen to the prompts so that you are directed to the right person  All voicemails are confidential   Milena Guadalupe all requests for admission clinical reviews, approved or denied determinations and any other requests to dedicated fax number below belonging to the campus where the patient is receiving treatment   List of dedicated fax numbers for the Facilities:  FACILITY NAME UR FAX NUMBER   ADMISSION DENIALS (Administrative/Medical Necessity) [phone]   [address] [place] [address] (Maternity/NICU/Pediatrics) [phone]   Ruby Flatten [phone]   Michele Oleary [phone]   Tsering Zamarripa [phone]   [address]  [facility] [phone]   Edger Meth Codi Barrios [phone]   [address] [facility] [phone]   [facility] HEART [place] [phone] [address] [phone]

## 2020-06-26 NOTE — RESPIRATORY THERAPY NOTE
RT Ventilator Management Note  Quita Magaña 71 y o  male MRN: [id number]  Unit/Bed#: ICU 02 Encounter: [id number]      Daily Screen       6/24/2020  1131 6/25/2020  0728          Patient safety screen outcome[de-identified]  Passed  Passed      Spont breathing trial % for 30 min:  Yes        Spont breathing trial outcome[de-identified]  Passed        Name of Medical Team Notified[de-identified]  CCS        Preparing to extubate/ Notify Nurse:  No (comment)        Extubation order obtained:  No (comment)        Consider Cuff Test:  No (comment)        Patient extubated:  No                Physical Exam:   Assessment Type: (P) Assess only  General Appearance: (P) Sleeping  Respiratory Pattern: (P) Assisted  Chest Assessment: (P) Chest expansion symmetrical, Subcutaneous emphysema  Bilateral Breath Sounds: (P) Diminished, Rhonchi, Expiratory wheezes  Suction: (P) Trach  O2 Device: (P) vent      Resp Comments: (P) Pt remains on PSV; tolerating well  Continues to get Q6 respiratory treatments via vent  Pt  has a 6 shiley  No new changes at this time  Will continue to monitor and assess per protocol

## 2020-06-26 NOTE — OCCUPATIONAL THERAPY NOTE
OT CANCEL NOTE    Pt chart reviewed  Spoke w/ RN who reported pt is not appropriate to engage in skilled OT services at this time; is less responsive and on sedation  Will hold OT re-evaluation  Will continue to follow pt on caseload and see pt when medically stable and as clinically appropriate      Marlen Mcdonald MS, OTR/L

## 2020-06-26 NOTE — PROGRESS NOTES
Patient's vac removed  5 simple interrupted sutures placed to close left chest wall incision  Clean, dry dressing placed  Change prn      Hyacinth Cerda MD

## 2020-06-26 NOTE — PROGRESS NOTES
Progress Note - Thoracic Surgery   Edward Knowles 71 y o  male MRN: [id number]  Unit/Bed#: ICU 02 Encounter: [id number]    Assessment:  69yo male s/p L VATS upper lobectomy  Post op prolonged airleak with significant subcutaneous emphysema  Also had code event requiring cricothyrodotomy for difficult intubation and now s/p formalization to tracheostomy   from AM - updated I/O pending  PS 5/5  WBC 9 1    Plan:  Continue to perform TC trials, wean support  Keep CT to suction  Wean off low-dose levo (@ 2)  Continue antibiotic course  TF at goal  Heparin gtt    Subjective/Objective   Chief Complaint:     Subjective: Difficulty arousing patient this AM off sedation but exam improved w/ time, responded appropriately  On PS 5/5, tolerated TC yesterday again    Objective:     Blood pressure 99/55, pulse 66, temperature 99 1 °F (37 3 °C), temperature source Oral, resp  rate (!) 10, height 5' 10" (1 778 m), weight 95 2 kg (209 lb 14 1 oz), SpO2 93 %  ,Body mass index is 30 11 kg/m²  Intake/Output Summary (Last 24 hours) at 6/26/2020 2386  Last data filed at 6/26/2020 0601  Gross per 24 hour   Intake 2954 47 ml   Output 1562 ml   Net 1392 47 ml       Invasive Devices     Central Venous Catheter Line            CVC Central Lines 06/20/20 5 days          Drain            Chest Tube 1 Left Pleural 28 Fr  15 days    NG/OG/Enteral Tube Nasogastric 12 Fr Right nares 5 days    Urethral Catheter 16 Fr  5 days          Airway            Surgical Airway Cuffed 3 days    Surgical Airway Shiley Cuffed 3 days                Physical Exam:   Gen: Intubated, withdrawn to pain  Cardio: RRR  Lungs: CTAB   L CT to suction, no AL, serous output  Abd: Soft, non distended, non tender      Lab, Imaging and other studies:  CBC:   Lab Results   Component Value Date    WBC 10 35 (H) 06/26/2020    HGB 9 1 (L) 06/26/2020    HCT 29 3 (L) 06/26/2020     (H) 06/26/2020     06/26/2020    MCH 31 5 06/26/2020    MCHC 31 1 (L) 06/26/2020    RDW 15 0 06/26/2020    MPV 10 9 06/26/2020    NRBC 0 06/26/2020   , CMP:   Lab Results   Component Value Date    SODIUM 141 06/26/2020    K 3 7 06/26/2020     (H) 06/26/2020    CO2 28 06/26/2020    BUN 12 06/26/2020    CREATININE 0 74 06/26/2020    CALCIUM 7 5 (L) 06/26/2020    EGFR 94 06/26/2020   , Coagulation: No results found for: PT, INR, APTT  VTE Pharmacologic Prophylaxis: Heparin  VTE Mechanical Prophylaxis: sequential compression device

## 2020-06-26 NOTE — PLAN OF CARE
Problem: CARDIOVASCULAR - ADULT  Goal: Maintains optimal cardiac output and hemodynamic stability  Description  INTERVENTIONS:  - Monitor I/O, vital signs and rhythm  - Monitor for S/S and trends of decreased cardiac output  - Administer and titrate ordered vasoactive medications to optimize hemodynamic stability  - Assess quality of pulses, skin color and temperature  - Assess for signs of decreased coronary artery perfusion  - Instruct patient to report change in severity of symptoms  Outcome: Progressing  Goal: Absence of cardiac dysrhythmias or at baseline rhythm  Description  INTERVENTIONS:  - Continuous cardiac monitoring, vital signs, obtain 12 lead EKG if ordered  - Administer antiarrhythmic and heart rate control medications as ordered  - Monitor electrolytes and administer replacement therapy as ordered  Outcome: Progressing     Problem: RESPIRATORY - ADULT  Goal: Achieves optimal ventilation and oxygenation  Description  INTERVENTIONS:  - Assess for changes in respiratory status  - Assess for changes in mentation and behavior  - Position to facilitate oxygenation and minimize respiratory effort  - Oxygen administered by appropriate delivery if ordered  - Initiate smoking cessation education as indicated  - Encourage broncho-pulmonary hygiene including cough, deep breathe, Incentive Spirometry  - Assess the need for suctioning and aspirate as needed  - Assess and instruct to report SOB or any respiratory difficulty  - Respiratory Therapy support as indicated  Outcome: Progressing     Problem: SKIN/TISSUE INTEGRITY - ADULT  Goal: Skin integrity remains intact  Description  INTERVENTIONS  - Identify patients at risk for skin breakdown  - Assess and monitor skin integrity  - Assess and monitor nutrition and hydration status  - Monitor labs (i e  albumin)  - Assess for incontinence   - Turn and reposition patient  - Assist with mobility/ambulation  - Relieve pressure over bony prominences  - Avoid friction and shearing  - Provide appropriate hygiene as needed including keeping skin clean and dry  - Evaluate need for skin moisturizer/barrier cream  - Collaborate with interdisciplinary team (i e  Nutrition, Rehabilitation, etc )   - Patient/family teaching  Outcome: Progressing  Goal: Incision(s), wounds(s) or drain site(s) healing without S/S of infection  Description  INTERVENTIONS  - Assess and document risk factors for skin impairment   - Assess and document dressing, incision, wound bed, drain sites and surrounding tissue  - Consider nutrition services referral as needed  - Oral mucous membranes remain intact  - Provide patient/ family education  Outcome: Progressing  Goal: Oral mucous membranes remain intact  Description  INTERVENTIONS  - Assess oral mucosa and hygiene practices  - Implement preventative oral hygiene regimen  - Implement oral medicated treatments as ordered  - Initiate Nutrition services referral as needed  Outcome: Progressing     Problem: MUSCULOSKELETAL - ADULT  Goal: Maintain or return mobility to safest level of function  Description  INTERVENTIONS:  - Assess patient's ability to carry out ADLs; assess patient's baseline for ADL function and identify physical deficits which impact ability to perform ADLs (bathing, care of mouth/teeth, toileting, grooming, dressing, etc )  - Assess/evaluate cause of self-care deficits   - Assess range of motion  - Assess patient's mobility  - Assess patient's need for assistive devices and provide as appropriate  - Encourage maximum independence but intervene and supervise when necessary  - Involve family in performance of ADLs  - Assess for home care needs following discharge   - Consider OT consult to assist with ADL evaluation and planning for discharge  - Provide patient education as appropriate  Outcome: Progressing  Goal: Maintain proper alignment of affected body part  Description  INTERVENTIONS:  - Support, maintain and protect limb and body alignment  - Provide patient/ family with appropriate education  Outcome: Progressing     Problem: Potential for Falls  Goal: Patient will remain free of falls  Description  INTERVENTIONS:  - Assess patient frequently for physical needs  -  Identify cognitive and physical deficits and behaviors that affect risk of falls  -  Marcell fall precautions as indicated by assessment   - Educate patient/family on patient safety including physical limitations  - Instruct patient to call for assistance with activity based on assessment  - Modify environment to reduce risk of injury  - Consider OT/PT consult to assist with strengthening/mobility  Outcome: Progressing     Problem: Prexisting or High Potential for Compromised Skin Integrity  Goal: Skin integrity is maintained or improved  Description  INTERVENTIONS:  - Identify patients at risk for skin breakdown  - Assess and monitor skin integrity  - Assess and monitor nutrition and hydration status  - Monitor labs   - Assess for incontinence   - Turn and reposition patient  - Assist with mobility/ambulation  - Relieve pressure over bony prominences  - Avoid friction and shearing  - Provide appropriate hygiene as needed including keeping skin clean and dry  - Evaluate need for skin moisturizer/barrier cream  - Collaborate with interdisciplinary team   - Patient/family teaching  - Consider wound care consult   Outcome: Progressing     Problem: Nutrition/Hydration-ADULT  Goal: Nutrient/Hydration intake appropriate for improving, restoring or maintaining nutritional needs  Description  Monitor and assess patient's nutrition/hydration status for malnutrition  Collaborate with interdisciplinary team and initiate plan and interventions as ordered  Monitor patient's weight and dietary intake as ordered or per policy  Utilize nutrition screening tool and intervene as necessary  Determine patient's food preferences and provide high-protein, high-caloric foods as appropriate  INTERVENTIONS:  - Monitor oral intake, urinary output, labs, and treatment plans  - Assess nutrition and hydration status and recommend course of action  - Evaluate amount of meals eaten  - Assist patient with eating if necessary   - Allow adequate time for meals  - Recommend/ encourage appropriate diets, oral nutritional supplements, and vitamin/mineral supplements  - Order, calculate, and assess calorie counts as needed  - Recommend, monitor, and adjust tube feedings and TPN/PPN based on assessed needs  - Assess need for intravenous fluids  - Provide specific nutrition/hydration education as appropriate  - Include patient/family/caregiver in decisions related to nutrition  Outcome: Progressing     Problem: SAFETY,RESTRAINT: NV/NON-SELF DESTRUCTIVE BEHAVIOR  Goal: Remains free of harm/injury (restraint for non violent/non self-detsructive behavior)  Description  INTERVENTIONS:  - Instruct patient/family regarding restraint use   - Assess and monitor physiologic and psychological status   - Provide interventions and comfort measures to meet assessed patient needs   - Identify and implement measures to help patient regain control  - Assess readiness for release of restraint   Outcome: Progressing  Goal: Returns to optimal restraint-free functioning  Description  INTERVENTIONS:  - Assess the patient's behavior and symptoms that indicate continued need for restraint  - Identify and implement measures to help patient regain control  - Assess readiness for release of restraint   Outcome: Progressing

## 2020-06-26 NOTE — PHYSICAL THERAPY NOTE
Physical Therapy Cancellation Note    Chart review completed  PT spoke to nsg, nsg reports not appropriate for skilled PT at this time with decreased responsiveness  PT will follow and re-eval as medically appropriate      Carolee Diehl PT

## 2020-06-26 NOTE — RESPIRATORY THERAPY NOTE
RT Ventilator Management Note  Tess Lanza 71 y o  male MRN: [id number]  Unit/Bed#: ICU 02 Encounter: [id number]      Daily Screen       6/25/2020  0728 6/26/2020  0714          Patient safety screen outcome[de-identified]  Passed  Passed      Not Ready for Weaning due to[de-identified]    Underline problem not resolved              Physical Exam:   Assessment Type: (P) Assess only  General Appearance: (P) Sedated  Respiratory Pattern: (P) Assisted  Chest Assessment: (P) Chest expansion symmetrical  Bilateral Breath Sounds: (P) Coarse  O2 Device: Vent      Resp Comments: (P) Pt transfered to P4 via transport vent  Pt placed back on 840 in rm 417  Sats stable  No changes were made  No further plans to wean this evening  Will cont  to monitor overnight

## 2020-06-27 ENCOUNTER — APPOINTMENT (INPATIENT)
Dept: RADIOLOGY | Facility: HOSPITAL | Age: 70
DRG: 003 | End: 2020-06-27
Payer: COMMERCIAL

## 2020-06-27 LAB
ANION GAP SERPL CALCULATED.3IONS-SCNC: 6 MMOL/L (ref 4–13)
BASOPHILS # BLD AUTO: 0.03 THOUSANDS/ΜL (ref 0–0.1)
BASOPHILS NFR BLD AUTO: 0 % (ref 0–1)
BUN SERPL-MCNC: 11 MG/DL (ref 5–25)
CA-I BLD-SCNC: 1 MMOL/L (ref 1.12–1.32)
CALCIUM SERPL-MCNC: 7.5 MG/DL (ref 8.3–10.1)
CHLORIDE SERPL-SCNC: 105 MMOL/L (ref 100–108)
CO2 SERPL-SCNC: 27 MMOL/L (ref 21–32)
CREAT SERPL-MCNC: 0.59 MG/DL (ref 0.6–1.3)
EOSINOPHIL # BLD AUTO: 0.61 THOUSAND/ΜL (ref 0–0.61)
EOSINOPHIL NFR BLD AUTO: 6 % (ref 0–6)
ERYTHROCYTE [DISTWIDTH] IN BLOOD BY AUTOMATED COUNT: 15.2 % (ref 11.6–15.1)
GFR SERPL CREATININE-BSD FRML MDRD: 103 ML/MIN/1.73SQ M
GLUCOSE SERPL-MCNC: 111 MG/DL (ref 65–140)
HCT VFR BLD AUTO: 29.6 % (ref 36.5–49.3)
HGB BLD-MCNC: 9.3 G/DL (ref 12–17)
IMM GRANULOCYTES # BLD AUTO: 0.12 THOUSAND/UL (ref 0–0.2)
IMM GRANULOCYTES NFR BLD AUTO: 1 % (ref 0–2)
LACTATE SERPL-SCNC: 1.4 MMOL/L (ref 0.5–2)
LYMPHOCYTES # BLD AUTO: 0.97 THOUSANDS/ΜL (ref 0.6–4.47)
LYMPHOCYTES NFR BLD AUTO: 10 % (ref 14–44)
MAGNESIUM SERPL-MCNC: 1.9 MG/DL (ref 1.6–2.6)
MCH RBC QN AUTO: 31.2 PG (ref 26.8–34.3)
MCHC RBC AUTO-ENTMCNC: 31.4 G/DL (ref 31.4–37.4)
MCV RBC AUTO: 99 FL (ref 82–98)
MONOCYTES # BLD AUTO: 0.68 THOUSAND/ΜL (ref 0.17–1.22)
MONOCYTES NFR BLD AUTO: 7 % (ref 4–12)
NEUTROPHILS # BLD AUTO: 7.78 THOUSANDS/ΜL (ref 1.85–7.62)
NEUTS SEG NFR BLD AUTO: 76 % (ref 43–75)
NRBC BLD AUTO-RTO: 0 /100 WBCS
PHOSPHATE SERPL-MCNC: 3.4 MG/DL (ref 2.3–4.1)
PLATELET # BLD AUTO: 173 THOUSANDS/UL (ref 149–390)
PMV BLD AUTO: 10.1 FL (ref 8.9–12.7)
POTASSIUM SERPL-SCNC: 4.2 MMOL/L (ref 3.5–5.3)
RBC # BLD AUTO: 2.98 MILLION/UL (ref 3.88–5.62)
SODIUM SERPL-SCNC: 138 MMOL/L (ref 136–145)
WBC # BLD AUTO: 10.19 THOUSAND/UL (ref 4.31–10.16)

## 2020-06-27 PROCEDURE — 94640 AIRWAY INHALATION TREATMENT: CPT

## 2020-06-27 PROCEDURE — 85025 COMPLETE CBC W/AUTO DIFF WBC: CPT | Performed by: PHYSICIAN ASSISTANT

## 2020-06-27 PROCEDURE — 83605 ASSAY OF LACTIC ACID: CPT | Performed by: NURSE PRACTITIONER

## 2020-06-27 PROCEDURE — 94003 VENT MGMT INPAT SUBQ DAY: CPT

## 2020-06-27 PROCEDURE — 83735 ASSAY OF MAGNESIUM: CPT | Performed by: PHYSICIAN ASSISTANT

## 2020-06-27 PROCEDURE — 99291 CRITICAL CARE FIRST HOUR: CPT | Performed by: SURGERY

## 2020-06-27 PROCEDURE — 82330 ASSAY OF CALCIUM: CPT | Performed by: PHYSICIAN ASSISTANT

## 2020-06-27 PROCEDURE — 84100 ASSAY OF PHOSPHORUS: CPT | Performed by: PHYSICIAN ASSISTANT

## 2020-06-27 PROCEDURE — 80048 BASIC METABOLIC PNL TOTAL CA: CPT | Performed by: PHYSICIAN ASSISTANT

## 2020-06-27 PROCEDURE — 94760 N-INVAS EAR/PLS OXIMETRY 1: CPT

## 2020-06-27 PROCEDURE — 99024 POSTOP FOLLOW-UP VISIT: CPT | Performed by: THORACIC SURGERY (CARDIOTHORACIC VASCULAR SURGERY)

## 2020-06-27 RX ORDER — CALCIUM GLUCONATE 20 MG/ML
2 INJECTION, SOLUTION INTRAVENOUS ONCE
Status: COMPLETED | OUTPATIENT
Start: 2020-06-27 | End: 2020-06-27

## 2020-06-27 RX ORDER — FENTANYL CITRATE 50 UG/ML
50 INJECTION, SOLUTION INTRAMUSCULAR; INTRAVENOUS ONCE
Status: COMPLETED | OUTPATIENT
Start: 2020-06-27 | End: 2020-06-27

## 2020-06-27 RX ORDER — MAGNESIUM SULFATE HEPTAHYDRATE 40 MG/ML
2 INJECTION, SOLUTION INTRAVENOUS ONCE
Status: COMPLETED | OUTPATIENT
Start: 2020-06-27 | End: 2020-06-27

## 2020-06-27 RX ORDER — FENTANYL CITRATE 50 UG/ML
50 INJECTION, SOLUTION INTRAMUSCULAR; INTRAVENOUS EVERY 2 HOUR PRN
Status: DISCONTINUED | OUTPATIENT
Start: 2020-06-27 | End: 2020-06-27

## 2020-06-27 RX ORDER — FENTANYL CITRATE 50 UG/ML
75 INJECTION, SOLUTION INTRAMUSCULAR; INTRAVENOUS EVERY 2 HOUR PRN
Status: DISCONTINUED | OUTPATIENT
Start: 2020-06-27 | End: 2020-06-30

## 2020-06-27 RX ADMIN — CHLORHEXIDINE GLUCONATE 0.12% ORAL RINSE 15 ML: 1.2 LIQUID ORAL at 20:39

## 2020-06-27 RX ADMIN — OXYCODONE HYDROCHLORIDE 10 MG: 5 SOLUTION ORAL at 08:15

## 2020-06-27 RX ADMIN — MELATONIN 3 MG: at 21:36

## 2020-06-27 RX ADMIN — QUETIAPINE FUMARATE 50 MG: 25 TABLET ORAL at 17:25

## 2020-06-27 RX ADMIN — LEVALBUTEROL HYDROCHLORIDE 1.25 MG: 1.25 SOLUTION, CONCENTRATE RESPIRATORY (INHALATION) at 13:38

## 2020-06-27 RX ADMIN — DEXMEDETOMIDINE 0.8 MCG/KG/HR: 100 INJECTION, SOLUTION, CONCENTRATE INTRAVENOUS at 01:36

## 2020-06-27 RX ADMIN — ISODIUM CHLORIDE 3 ML: 0.03 SOLUTION RESPIRATORY (INHALATION) at 07:38

## 2020-06-27 RX ADMIN — OXYCODONE HYDROCHLORIDE 10 MG: 5 SOLUTION ORAL at 13:16

## 2020-06-27 RX ADMIN — LORAZEPAM 2 MG: 0.5 TABLET ORAL at 08:16

## 2020-06-27 RX ADMIN — SENNOSIDES AND DOCUSATE SODIUM 1 TABLET: 8.6; 5 TABLET ORAL at 08:17

## 2020-06-27 RX ADMIN — LORAZEPAM 2 MG: 0.5 TABLET ORAL at 13:16

## 2020-06-27 RX ADMIN — DEXMEDETOMIDINE 0.3 MCG/KG/HR: 100 INJECTION, SOLUTION, CONCENTRATE INTRAVENOUS at 20:00

## 2020-06-27 RX ADMIN — METRONIDAZOLE 500 MG: 500 TABLET ORAL at 01:43

## 2020-06-27 RX ADMIN — Medication 100 MCG/HR: at 06:21

## 2020-06-27 RX ADMIN — OXYCODONE HYDROCHLORIDE 10 MG: 5 SOLUTION ORAL at 01:42

## 2020-06-27 RX ADMIN — Medication 100 MCG/HR: at 15:57

## 2020-06-27 RX ADMIN — CHLORHEXIDINE GLUCONATE 0.12% ORAL RINSE 15 ML: 1.2 LIQUID ORAL at 08:15

## 2020-06-27 RX ADMIN — LEVALBUTEROL HYDROCHLORIDE 1.25 MG: 1.25 SOLUTION, CONCENTRATE RESPIRATORY (INHALATION) at 07:38

## 2020-06-27 RX ADMIN — DEXMEDETOMIDINE 0.7 MCG/KG/HR: 100 INJECTION, SOLUTION, CONCENTRATE INTRAVENOUS at 07:48

## 2020-06-27 RX ADMIN — LEVALBUTEROL HYDROCHLORIDE 1.25 MG: 1.25 SOLUTION, CONCENTRATE RESPIRATORY (INHALATION) at 19:19

## 2020-06-27 RX ADMIN — FENTANYL CITRATE 50 MCG: 50 INJECTION, SOLUTION INTRAMUSCULAR; INTRAVENOUS at 17:25

## 2020-06-27 RX ADMIN — ISODIUM CHLORIDE 3 ML: 0.03 SOLUTION RESPIRATORY (INHALATION) at 19:19

## 2020-06-27 RX ADMIN — ENOXAPARIN SODIUM 100 MG: 100 INJECTION SUBCUTANEOUS at 20:44

## 2020-06-27 RX ADMIN — MAGNESIUM SULFATE HEPTAHYDRATE 2 G: 40 INJECTION, SOLUTION INTRAVENOUS at 08:16

## 2020-06-27 RX ADMIN — CALCIUM GLUCONATE 2 G: 20 INJECTION, SOLUTION INTRAVENOUS at 08:16

## 2020-06-27 RX ADMIN — POLYETHYLENE GLYCOL 3350 17 G: 17 POWDER, FOR SOLUTION ORAL at 08:15

## 2020-06-27 RX ADMIN — ACETAMINOPHEN 975 MG: 650 SUSPENSION ORAL at 13:16

## 2020-06-27 RX ADMIN — ACETAMINOPHEN 975 MG: 650 SUSPENSION ORAL at 21:35

## 2020-06-27 RX ADMIN — OXYCODONE HYDROCHLORIDE 10 MG: 5 SOLUTION ORAL at 21:35

## 2020-06-27 RX ADMIN — OXYCODONE HYDROCHLORIDE 10 MG: 5 SOLUTION ORAL at 05:45

## 2020-06-27 RX ADMIN — ENOXAPARIN SODIUM 100 MG: 100 INJECTION SUBCUTANEOUS at 08:23

## 2020-06-27 RX ADMIN — LORAZEPAM 2 MG: 0.5 TABLET ORAL at 21:35

## 2020-06-27 RX ADMIN — LORAZEPAM 2 MG: 0.5 TABLET ORAL at 17:25

## 2020-06-27 RX ADMIN — OXYCODONE HYDROCHLORIDE 10 MG: 5 SOLUTION ORAL at 17:25

## 2020-06-27 RX ADMIN — ISODIUM CHLORIDE 3 ML: 0.03 SOLUTION RESPIRATORY (INHALATION) at 13:38

## 2020-06-27 RX ADMIN — ACETAMINOPHEN 975 MG: 650 SUSPENSION ORAL at 05:44

## 2020-06-27 RX ADMIN — LORAZEPAM 2 MG: 0.5 TABLET ORAL at 05:45

## 2020-06-27 RX ADMIN — LORAZEPAM 2 MG: 0.5 TABLET ORAL at 01:43

## 2020-06-27 RX ADMIN — SENNOSIDES AND DOCUSATE SODIUM 1 TABLET: 8.6; 5 TABLET ORAL at 17:25

## 2020-06-27 RX ADMIN — DEXMEDETOMIDINE 1 MCG/KG/HR: 100 INJECTION, SOLUTION, CONCENTRATE INTRAVENOUS at 06:22

## 2020-06-27 RX ADMIN — QUETIAPINE FUMARATE 50 MG: 25 TABLET ORAL at 08:16

## 2020-06-27 NOTE — PROGRESS NOTES
Daily Progress Note - Critical Care   Paula Winslow 71 y o  male MRN: [id number]  Unit/Bed#: [facility] 417-01 Encounter: [id number]        ----------------------------------------------------------------------------------------  HPI/24hr events: No episodes of agitation overnight, weaning vent as tolerates  ---------------------------------------------------------------------------------------  SUBJECTIVE  intaubted    Review of Systems   Unable to perform ROS: Intubated     Review of systems was unable to be performed secondary to intaubted  ---------------------------------------------------------------------------------------  Assessment and Plan:    Neuro:    Diagnosis: encephalopathy, multifactorial in setting of critical illness, surgery, polypharmacy, delirium  o Plan: appreciate palliative care recommendations and assistance with sedation/analgesia   o Oxy 10 q 4, ativan PO 2 q 4, seroquel 50 BID, tylenol AC TID, melatonin  o precedex 1, fentanyl 100, ketamine 0 05, versed 0 5  o PRN zyprexa - no doses required  o Plan to wean versed off today, decrease fentanyl gtt       CV:    Diagnosis: hypotension   o Plan: suspect related to sedation  o Levo increased yesterday to 8, net - 1 6L monitor volume status   o MAP goal > 65       Pulm:   Diagnosis: acute hypoxic resp failure s/p crycothyroid transition to tracheostomy  o Plan: cont trach collar 4 hours BID and PS for rest  o Cont scheduled nebs per resp protocol    Diagnosis: Left upper lung neoplasm s/p VATs with persistent stable PTX communicating with left chest wall  o Plan: chest wound sealed, VAC remains off and SubQ emphysema improving  o Thoracic surgery managing remaining L chest tube, remains to suction   No airleak       GI:    Diagnosis: GERD  o Plan: cont home protonix   Diagnosis: Bowel Regimen   o Plan: miralax and senna      :    Diagnosis: volume overload  o Plan: appropriate response to 40 IV lasix yesterday with net -1 6L  o Monitor for intravascular depletion, strict I&O  o Cont norwood   o Creat 0 58 BUN 11       F/E/N:    Plan: jevity 1 2 at 70   Mag 1 9 - replete    Ion taylor 1 - replete       Heme/Onc:    Diagnosis: RUE and LLE DVT's   o Plan: HGB 9 3, no evidence of bleeding  o therapeutic lovenox       Endo:    Diagnosis:   o Plan: glucose at goal, 140-180      ID:    Diagnosis: Possible asp PNA  o Plan: leukocytosis stable at 10  o Completed cef/flagyl 7 day course   o Remains off antibiotics now   o Fever curve worsening, TMax 102 - follow-up LA       MSK/Skin:    Diagnosis:   o Plan: local care, offload  o Early mobilization       Disposition: Continue Critical Care   Code Status: Level 2 - DNAR: but accepts endotracheal intubation  ---------------------------------------------------------------------------------------  ICU CORE MEASURES    Prophylaxis   VTE Pharmacologic Prophylaxis: Enoxaparin (Lovenox)  VTE Mechanical Prophylaxis: sequential compression device  Stress Ulcer Prophylaxis: Pantoprazole IV     ABCDE Protocol (if indicated)  Plan to perform spontaneous awakening trial today? Yes  Plan to perform spontaneous breathing trial today? Yes  Obvious barriers to extubation? Yes  CAM-ICU: Positive    Invasive Devices Review  Invasive Devices     Peripherally Inserted Central Catheter Line            PICC Line 29/08/90 Left Basilic less than 1 day          Peripheral Intravenous Line            Peripheral IV 06/26/20 Left Hand less than 1 day          Drain            Chest Tube 1 Left Pleural 28 Fr  16 days    NG/OG/Enteral Tube Nasogastric 12 Fr Right nares 6 days    Urethral Catheter 16 Fr  6 days          Airway            Surgical Airway Cuffed 4 days    Surgical Airway Shiley Cuffed 4 days              Can any invasive devices be discontinued today?  No  ---------------------------------------------------------------------------------------  OBJECTIVE    Vitals   Vitals:    06/27/20 0500 06/27/20 0600 20 0615 20 0630   BP: 123/71 (!) 221/96 (!) 181/94 111/69   BP Location:       Pulse: 102 (!) 118  (!) 108   Resp: 13 19  14   Temp: (!) 101 5 °F (38 6 °C) (!) 101 5 °F (38 6 °C)  (!) 101 5 °F (38 6 °C)   TempSrc:       SpO2: 96% 96%  96%   Weight:  120 kg (263 lb 7 2 oz)     Height:         Temp (24hrs), Av 2 °F (38 4 °C), Min:100 °F (37 8 °C), Max:102 °F (38 9 °C)  Current: Temperature: (!) 101 5 °F (38 6 °C)      Respiratory:  SpO2: SpO2: 96 %  O2 Flow Rate (L/min): 10 L/min    Invasive/non-invasive ventilation settings   Respiratory    Lab Data (Last 4 hours)    None         O2/Vent Data (Last 4 hours)       0305           Vent Mode CPAP/PS Spont       FIO2 (%) (%) 40       PEEP (cmH2O) (cmH2O) 5       Pressure Support (cmH2O) (cmH20) 5       MV (Obs) 11 3       RSBI 12                   Physical Exam   Constitutional: No distress  HENT:   Head: Normocephalic and atraumatic  Eyes: Pupils are equal, round, and reactive to light  Right eye exhibits no discharge  Left eye exhibits no discharge  2 mm   Neck: No tracheal deviation present  Trach secured    Cardiovascular: Normal rate, regular rhythm, normal heart sounds and intact distal pulses  Pulmonary/Chest: Breath sounds normal  No stridor  No respiratory distress  He has no wheezes  He has no rales  Few crackles upper airways anterior, clears with suction    Abdominal: Soft  Bowel sounds are normal  He exhibits no distension  There is no tenderness  There is no guarding  Musculoskeletal: He exhibits edema (generalized +1 edema )  He exhibits no deformity  Neurological: He is alert  Sedated on exam, obtain exam off sedation today    Skin: Skin is warm and dry  He is not diaphoretic           Laboratory and Diagnostics:  Results from last 7 days   Lab Units 20  0429 20  0512 20  0613 20  0516 20  0432 20  1434 20  0444   WBC Thousand/uL 10 19* 10 35* 9 98 10 42* 13 94* 13 35* 15 83*   HEMOGLOBIN g/dL 9 3* 9 1* 9 1* 9 7* 9 2* 8 9* 9 0*   HEMATOCRIT % 29 6* 29 3* 29 3* 30 6* 28 7* 28 5* 27 7*   PLATELETS Thousands/uL 173 185 163 139* 132* 123* 113*   NEUTROS PCT % 76* 72 75 78* 81*  --  86*   MONOS PCT % 7 6 5 6 5  --  4     Results from last 7 days   Lab Units 06/27/20 0429 06/26/20  0512 06/25/20  0613 06/24/20  0516 06/23/20  0432 06/22/20  0444 06/21/20  0544 06/20/20  1319   SODIUM mmol/L 138 141 143 140 141 140 141 141   POTASSIUM mmol/L 4 2 3 7 3 3* 3 9 4 0 3 9 3 2* 3 5   CHLORIDE mmol/L 105 109* 111* 110* 112* 111* 109* 107   CO2 mmol/L 27 28 27 24 24 26 22 20*   ANION GAP mmol/L 6 4 5 6 5 3* 10 14*   BUN mg/dL 11 12 13 16 16 14 10 15   CREATININE mg/dL 0 59* 0 74 0 81 0 59* 0 65 0 68 0 87 1 56*   CALCIUM mg/dL 7 5* 7 5* 7 3* 7 5* 7 0* 7 7* 7 6* 8 5   GLUCOSE RANDOM mg/dL 111 146* 114 126 93 105 140 226*   ALT U/L  --   --   --   --   --  56 92* 148*   AST U/L  --   --   --   --   --  20 37 109*   ALK PHOS U/L  --   --   --   --   --  50 55 76   ALBUMIN g/dL  --   --   --   --   --  1 9* 2 2* 2 1*   TOTAL BILIRUBIN mg/dL  --   --   --   --   --  0 26 0 44 0 34     Results from last 7 days   Lab Units 06/27/20 0429 06/26/20  0512 06/24/20  0516 06/23/20  0432 06/22/20  0444 06/21/20  0544 06/20/20  1319   MAGNESIUM mg/dL 1 9 2 2 2 4 2 5 2 3 1 9 1 9   PHOSPHORUS mg/dL 3 4 3 2 2 6 2 7 2 4 2 0* 4 7*      Results from last 7 days   Lab Units 06/25/20  0613 06/24/20  0516 06/23/20  1757 06/23/20  1155 06/23/20  0432 06/22/20  2152 06/22/20  1434   INR   --   --   --   --   --   --  1 12   PTT seconds 152* 66* 73* 82* 57* 96* 27      Results from last 7 days   Lab Units 06/20/20  1759 06/20/20  1319 06/20/20  0928   TROPONIN I ng/mL 2 75* 3 00* 2 86*     Results from last 7 days   Lab Units 06/21/20  0918 06/21/20  0544 06/21/20  0218 06/20/20 2159 06/20/20  1708 06/20/20  1319 06/20/20  0928   LACTIC ACID mmol/L 1 5 2 3* 3 1* 7 1* 10 6* 9 4* 4 7*     ABG:  Results from last 7 days Lab Units 06/24/20  0516   PH ART  7 405   PCO2 ART mm Hg 36 5   PO2 ART mm Hg 78 1   HCO3 ART mmol/L 22 4   BASE EXC ART mmol/L -2 0   ABG SOURCE  Line, Arterial     VBG:  Results from last 7 days   Lab Units 06/24/20  0516  06/20/20  1343   PH MICHELLE   --   --  7 213*   PCO2 MICHELLE mm Hg  --   --  47 7   PO2 MICHELLE mm Hg  --   --  52 5*   HCO3 MICHELLE mmol/L  --   --  18 8*   BASE EXC MICHELLE mmol/L  --   --  -9 0   ABG SOURCE  Line, Arterial   < >  --     < > = values in this interval not displayed  Results from last 7 days   Lab Units 06/20/20  1759 06/20/20  0928   PROCALCITONIN ng/ml 5 85* 1 85*       Micro  Results from last 7 days   Lab Units 06/20/20  0955 06/20/20  0929   BLOOD CULTURE   --  No Growth After 5 Days  No Growth After 5 Days  MRSA CULTURE ONLY  No Methicillin Resistant Staphlyococcus aureus (MRSA) isolated  --        EKG:   Imaging:  I have personally reviewed pertinent reports  Intake and Output  I/O       06/25 0701 - 06/26 0700 06/26 0701 - 06/27 0700    P  O  0     I V  (mL/kg) 1580 5 (16 6) 928 (7 8)    NG/ 390    IV Piggyback 100 200    Feedings 1024 1561    Total Intake(mL/kg) 2954 5 (31) 3079 (25 9)    Urine (mL/kg/hr) 1462 (0 6) 3975 (1 4)    Emesis/NG output 0 0    Chest Tube 100 800    Total Output 1562 4775    Net +1392 5 -1696              UOP:  ml/hr     Height and Weights   Height: 5' 10" (177 8 cm)  IBW: 73 kg  Body mass index is 37 8 kg/m²    Weight (last 2 days)     Date/Time   Weight    06/27/20 0600   120 (263 45) verified twice    Weight: verified twice at 06/27/20 0600                Nutrition       Diet Orders   (From admission, onward)             Start     Ordered    06/25/20 1244  Diet Enteral/Parenteral; Tube Feeding No Oral Diet; Jevity 1 2 Javier; Continuous; 70  Diet effective now     Question Answer Comment   Diet Type Enteral/Parenteral    Enteral/Parenteral Tube Feeding No Oral Diet    Tube Feeding Formula: Jevity 1 2 Javier    Bolus/Cyclic/Continuous Continuous Tube Feeding Goal Rate (mL/hr): 70    RD to adjust diet per protocol? Yes        06/25/20 1244              TF currently running at  ml/hr with a goal of  ml/hr  Formula:        Active Medications  Scheduled Meds:  Current Facility-Administered Medications:  acetaminophen 975 mg Oral [facility] Margret Brooks DO    albuterol 2 5 mg Nebulization Q4H PRN Johnie Fothergill, PA-C    chlorhexidine 15 mL Swish & Spit Q12H [address] Johnie Fothergill, PA-C    dexmedetomidine 0 1-1 4 mcg/kg/hr Intravenous Titrated KHANG Ruiz Last Rate: 1 mcg/kg/hr (06/27/20 0622)   enoxaparin 1 mg/kg Subcutaneous Q12H [address] Kit Poon MD    fentaNYL 100 mcg/hr Intravenous Continuous Shaun Dao MD Last Rate: 100 mcg/hr (06/27/20 2184)   ketamine 0 05 mg/kg/hr Intravenous Continuous Syed Allred MD Last Rate: 0 05 mg/kg/hr (06/26/20 1405)   levalbuterol 1 25 mg Nebulization Q6H Sofia Seip, MD    Lidocaine Viscous HCl 15 mL Swish & Spit 4x Daily PRN Kit Poon MD    LORazepam 2 mg Per NG Tube Q4H Syed Allred MD    melatonin 3 mg Oral HS KHANG Christensen    midazolam 0 5 mg/hr Intravenous Continuous Syed Allred MD Last Rate: 0 5 mg/hr (06/26/20 1224)   norepinephrine 1-30 mcg/min Intravenous Titrated Magdalena Miraz PA-C    OLANZapine 5 mg Per NG Tube Q3H PRN Tashi Brooks,     ondansetron 4 mg Intravenous Q6H PRN Johnie Fothergill, PA-C    oxyCODONE 10 mg Oral Q4H Syed Allred MD    oxyCODONE 5 mg Oral Q2H PRN Margret Brooks, DO    polyethylene glycol 17 g Oral Daily KHANG Christensen    QUEtiapine 50 mg Oral BID KHANG Ruiz    senna-docusate sodium 1 tablet Per NG Tube BID Johnie Fothergill, PA-C    sodium chloride 3 mL Nebulization Q6H Sofia Seip, MD      Continuous Infusions:    dexmedetomidine 0 1-1 4 mcg/kg/hr Last Rate: 1 mcg/kg/hr (06/27/20 0622)   fentaNYL 100 mcg/hr Last Rate: 100 mcg/hr (06/27/20 0621)   ketamine 0 05 mg/kg/hr Last Rate: 0 05 mg/kg/hr (06/26/20 1405) midazolam 0 5 mg/hr Last Rate: 0 5 mg/hr (06/26/20 1224)   norepinephrine 1-30 mcg/min      PRN Meds:     albuterol 2 5 mg Q4H PRN   Lidocaine Viscous HCl 15 mL 4x Daily PRN   OLANZapine 5 mg Q3H PRN   ondansetron 4 mg Q6H PRN   oxyCODONE 5 mg Q2H PRN       Allergies   No Known Allergies  ---------------------------------------------------------------------------------------  Advance Directive and Living Will: Yes    Power of : Yes  POLST:    ---------------------------------------------------------------------------------------  Care Time Delivered:   Upon my evaluation, this patient had a high probability of imminent or life-threatening deterioration due to AHRF, Hypotension , which required my direct attention, intervention, and personal management  I have personally provided 20 minutes (6:50 to 7:10) of critical care time, exclusive of procedures, teaching, family meetings, and any prior time recorded by providers other than myself  KHANG Saunders      Portions of the record may have been created with voice recognition software  Occasional wrong word or "sound a like" substitutions may have occurred due to the inherent limitations of voice recognition software    Read the chart carefully and recognize, using context, where substitutions have occurred

## 2020-06-27 NOTE — RESPIRATORY THERAPY NOTE
TC      06/27/20 1107   Respiratory Assessment   Assessment Type Assess only   Respiratory Pattern Assisted   Resp Comments pt placed back on the vent at this time, pt's SpO2 dropped to87% and WOB increased   Surgical Airway Shiley Cuffed   Placement Date/Time: 06/22/20 1110   Tube Size: 6 mm  Type: Tracheostomy  Brand: Allie  Style: Cuffed   Status Cuff Inflated   Surgical Airway Cuff Pressure 30 cm H2O   Additional Assessments   SpO2 90 %

## 2020-06-27 NOTE — RESPIRATORY THERAPY NOTE
RT Ventilator Management Note  Lux Womack 71 y o  male MRN: [id number]  Unit/Bed#: [facility] 417-01 Encounter: [id number]      Daily Screen       6/26/2020  0714 6/27/2020  0742          Patient safety screen outcome[de-identified]  Passed  Passed      Not Ready for Weaning due to[de-identified]  Underline problem not resolved                Physical Exam:   Assessment Type: Assess only  General Appearance: Sleeping  Respiratory Pattern: Assisted  Chest Assessment: Chest expansion symmetrical  Bilateral Breath Sounds: Coarse, Diminished      Resp Comments: (P) pt transitioned to 50% TC will continue to monitor

## 2020-06-27 NOTE — RESPIRATORY THERAPY NOTE
RT Ventilator Management Note  Joaquin Claros 71 y o  male MRN: [id number]  Unit/Bed#: [facility] 417-01 Encounter: [id number]      Daily Screen       6/25/2020 0728 6/26/2020  0714          Patient safety screen outcome[de-identified]  Passed  Passed      Not Ready for Weaning due to[de-identified]    Underline problem not resolved              Physical Exam:   Assessment Type: (P) Assess only  General Appearance: (P) Sleeping  Respiratory Pattern: (P) Assisted  Chest Assessment: (P) Chest expansion symmetrical  Bilateral Breath Sounds: (P) Coarse, Diminished  Cough: Strong  Suction: Trach      Resp Comments: (P) Pt resting comfortably on PSV at this time, will continue to monitor per respiratory protocol

## 2020-06-27 NOTE — RESPIRATORY THERAPY NOTE
RT Ventilator Management Note  Jacques Burton 71 y o  male MRN: [id number]  Unit/Bed#: [facility] 417-01 Encounter: [id number]      Daily Screen       6/26/2020  0714 6/27/2020  0742          Patient safety screen outcome[de-identified]  Passed  Passed      Not Ready for Weaning due to[de-identified]  Underline problem not resolved                Physical Exam:   Assessment Type: (P) Assess only  General Appearance: (P) Sleeping  Respiratory Pattern: (P) Assisted  Chest Assessment: (P) Chest expansion symmetrical  Bilateral Breath Sounds: (P) Coarse  Suction: (P) Trach      Resp Comments: (P) Pt placed back on psv settings  HR inreased to 127, RR stable at 18 however pt shrugging shoulders with each breath and visibly looks labored  Drenda Holstein also given at this time  Plan is to cont  on psv overnight and cont  TC wean in the morning

## 2020-06-27 NOTE — PROGRESS NOTES
Progress Note - Thoracic Surgery   Christa Browne 71 y o  male MRN: [id number]  Unit/Bed#: [facility] 417-01 Encounter: [id number]    Assessment:  71yo male s/p L VATS upper lobectomy  Post op prolonged airleak with significant subcutaneous emphysema  Also had code event requiring cricothyrodotomy for difficult intubation and now s/p formalization to tracheostomy  Plan:  Continue to perform TC trials, wean support  Keep CT to suction  Continue antibiotic course  TF at goal  Heparin gtt    Subjective/Objective   Subjective: No acute events    Objective:   Blood pressure (!) 181/94, pulse (!) 118, temperature (!) 101 5 °F (38 6 °C), resp  rate 19, height 5' 10" (1 778 m), weight 95 2 kg (209 lb 14 1 oz), SpO2 96 %  ,Body mass index is 30 11 kg/m²  Intake/Output Summary (Last 24 hours) at 6/27/2020 3043  Last data filed at 6/27/2020 0600  Gross per 24 hour   Intake 3078 99 ml   Output 4775 ml   Net -1696 01 ml       Invasive Devices     Peripherally Inserted Central Catheter Line            PICC Line 63/75/61 Left Basilic less than 1 day          Peripheral Intravenous Line            Peripheral IV 06/26/20 Left Hand less than 1 day          Drain            Chest Tube 1 Left Pleural 28 Fr  16 days    NG/OG/Enteral Tube Nasogastric 12 Fr Right nares 6 days    Urethral Catheter 16 Fr  6 days          Airway            Surgical Airway Cuffed 4 days    Surgical Airway Shiley Cuffed 4 days                Physical Exam:  GEN: Critically ill  HEENT: MMM  CV: RRR  Lung: Vented on PC  Ab: Soft, NT/ND  Extrem: No CCE  Neuro:  A+Ox3        Lab, Imaging and other studies:  CBC:   Lab Results   Component Value Date    WBC 10 19 (H) 06/27/2020    HGB 9 3 (L) 06/27/2020    HCT 29 6 (L) 06/27/2020    MCV 99 (H) 06/27/2020     06/27/2020    MCH 31 2 06/27/2020    MCHC 31 4 06/27/2020    RDW 15 2 (H) 06/27/2020    MPV 10 1 06/27/2020    NRBC 0 06/27/2020   , CMP:   Lab Results   Component Value Date    SODIUM 138 06/27/2020    K 4 2 06/27/2020     06/27/2020    CO2 27 06/27/2020    BUN 11 06/27/2020    CREATININE 0 59 (L) 06/27/2020    CALCIUM 7 5 (L) 06/27/2020    EGFR 103 06/27/2020   , Coagulation: No results found for: PT, INR, APTT  VTE Pharmacologic Prophylaxis: Heparin  VTE Mechanical Prophylaxis: sequential compression device

## 2020-06-28 ENCOUNTER — APPOINTMENT (INPATIENT)
Dept: RADIOLOGY | Facility: HOSPITAL | Age: 70
DRG: 003 | End: 2020-06-28
Payer: COMMERCIAL

## 2020-06-28 LAB
ANION GAP SERPL CALCULATED.3IONS-SCNC: 4 MMOL/L (ref 4–13)
BASE EXCESS BLDA CALC-SCNC: 3.4 MMOL/L
BASOPHILS # BLD AUTO: 0.03 THOUSANDS/ΜL (ref 0–0.1)
BASOPHILS NFR BLD AUTO: 0 % (ref 0–1)
BUN SERPL-MCNC: 12 MG/DL (ref 5–25)
CA-I BLD-SCNC: 1.09 MMOL/L (ref 1.12–1.32)
CALCIUM SERPL-MCNC: 8.3 MG/DL (ref 8.3–10.1)
CHLORIDE SERPL-SCNC: 104 MMOL/L (ref 100–108)
CO2 SERPL-SCNC: 30 MMOL/L (ref 21–32)
CREAT SERPL-MCNC: 0.65 MG/DL (ref 0.6–1.3)
EOSINOPHIL # BLD AUTO: 0.78 THOUSAND/ΜL (ref 0–0.61)
EOSINOPHIL NFR BLD AUTO: 8 % (ref 0–6)
ERYTHROCYTE [DISTWIDTH] IN BLOOD BY AUTOMATED COUNT: 14.9 % (ref 11.6–15.1)
GFR SERPL CREATININE-BSD FRML MDRD: 99 ML/MIN/1.73SQ M
GLUCOSE SERPL-MCNC: 114 MG/DL (ref 65–140)
HCO3 BLDA-SCNC: 27.7 MMOL/L (ref 22–28)
HCT VFR BLD AUTO: 33.9 % (ref 36.5–49.3)
HGB BLD-MCNC: 10.6 G/DL (ref 12–17)
HOROWITZ INDEX BLDA+IHG-RTO: 40 MM[HG]
IMM GRANULOCYTES # BLD AUTO: 0.08 THOUSAND/UL (ref 0–0.2)
IMM GRANULOCYTES NFR BLD AUTO: 1 % (ref 0–2)
LYMPHOCYTES # BLD AUTO: 0.94 THOUSANDS/ΜL (ref 0.6–4.47)
LYMPHOCYTES NFR BLD AUTO: 10 % (ref 14–44)
MAGNESIUM SERPL-MCNC: 2.2 MG/DL (ref 1.6–2.6)
MCH RBC QN AUTO: 30.8 PG (ref 26.8–34.3)
MCHC RBC AUTO-ENTMCNC: 31.3 G/DL (ref 31.4–37.4)
MCV RBC AUTO: 99 FL (ref 82–98)
MONOCYTES # BLD AUTO: 0.81 THOUSAND/ΜL (ref 0.17–1.22)
MONOCYTES NFR BLD AUTO: 9 % (ref 4–12)
NEUTROPHILS # BLD AUTO: 6.66 THOUSANDS/ΜL (ref 1.85–7.62)
NEUTS SEG NFR BLD AUTO: 72 % (ref 43–75)
NRBC BLD AUTO-RTO: 0 /100 WBCS
O2 CT BLDA-SCNC: 16.1 ML/DL (ref 16–23)
OXYHGB MFR BLDA: 95.8 % (ref 94–97)
PCO2 BLDA: 41 MM HG (ref 36–44)
PEEP RESPIRATORY: 5 CM[H2O]
PH BLDA: 7.45 [PH] (ref 7.35–7.45)
PLATELET # BLD AUTO: 218 THOUSANDS/UL (ref 149–390)
PMV BLD AUTO: 10.6 FL (ref 8.9–12.7)
PO2 BLDA: 89 MM HG (ref 75–129)
POTASSIUM SERPL-SCNC: 4.6 MMOL/L (ref 3.5–5.3)
PRESSURE CONTROL: 5
PROCALCITONIN SERPL-MCNC: 0.58 NG/ML
RBC # BLD AUTO: 3.44 MILLION/UL (ref 3.88–5.62)
SODIUM SERPL-SCNC: 138 MMOL/L (ref 136–145)
VENT- AC: AC
WBC # BLD AUTO: 9.3 THOUSAND/UL (ref 4.31–10.16)

## 2020-06-28 PROCEDURE — 74018 RADEX ABDOMEN 1 VIEW: CPT

## 2020-06-28 PROCEDURE — 82805 BLOOD GASES W/O2 SATURATION: CPT | Performed by: NURSE PRACTITIONER

## 2020-06-28 PROCEDURE — 99291 CRITICAL CARE FIRST HOUR: CPT | Performed by: SURGERY

## 2020-06-28 PROCEDURE — 85025 COMPLETE CBC W/AUTO DIFF WBC: CPT | Performed by: NURSE PRACTITIONER

## 2020-06-28 PROCEDURE — 36600 WITHDRAWAL OF ARTERIAL BLOOD: CPT

## 2020-06-28 PROCEDURE — 99024 POSTOP FOLLOW-UP VISIT: CPT | Performed by: THORACIC SURGERY (CARDIOTHORACIC VASCULAR SURGERY)

## 2020-06-28 PROCEDURE — 94003 VENT MGMT INPAT SUBQ DAY: CPT

## 2020-06-28 PROCEDURE — 94640 AIRWAY INHALATION TREATMENT: CPT

## 2020-06-28 PROCEDURE — 71045 X-RAY EXAM CHEST 1 VIEW: CPT

## 2020-06-28 PROCEDURE — 83735 ASSAY OF MAGNESIUM: CPT | Performed by: NURSE PRACTITIONER

## 2020-06-28 PROCEDURE — 84145 PROCALCITONIN (PCT): CPT | Performed by: NURSE PRACTITIONER

## 2020-06-28 PROCEDURE — 80048 BASIC METABOLIC PNL TOTAL CA: CPT | Performed by: NURSE PRACTITIONER

## 2020-06-28 PROCEDURE — 94760 N-INVAS EAR/PLS OXIMETRY 1: CPT

## 2020-06-28 PROCEDURE — 82330 ASSAY OF CALCIUM: CPT | Performed by: NURSE PRACTITIONER

## 2020-06-28 RX ORDER — SODIUM CHLORIDE FOR INHALATION 0.9 %
3 VIAL, NEBULIZER (ML) INHALATION
Status: DISCONTINUED | OUTPATIENT
Start: 2020-06-29 | End: 2020-07-30 | Stop reason: HOSPADM

## 2020-06-28 RX ORDER — ALBUTEROL SULFATE 2.5 MG/3ML
2.5 SOLUTION RESPIRATORY (INHALATION) EVERY 6 HOURS PRN
Status: DISCONTINUED | OUTPATIENT
Start: 2020-06-28 | End: 2020-07-30 | Stop reason: HOSPADM

## 2020-06-28 RX ORDER — LEVALBUTEROL 1.25 MG/.5ML
1.25 SOLUTION, CONCENTRATE RESPIRATORY (INHALATION)
Status: DISCONTINUED | OUTPATIENT
Start: 2020-06-29 | End: 2020-07-30 | Stop reason: HOSPADM

## 2020-06-28 RX ADMIN — Medication 100 MCG/HR: at 02:23

## 2020-06-28 RX ADMIN — NYSTATIN 500000 UNITS: 100000 SUSPENSION ORAL at 22:30

## 2020-06-28 RX ADMIN — CHLORHEXIDINE GLUCONATE 0.12% ORAL RINSE 15 ML: 1.2 LIQUID ORAL at 08:17

## 2020-06-28 RX ADMIN — NYSTATIN 500000 UNITS: 100000 SUSPENSION ORAL at 14:50

## 2020-06-28 RX ADMIN — LORAZEPAM 2 MG: 0.5 TABLET ORAL at 14:50

## 2020-06-28 RX ADMIN — ISODIUM CHLORIDE 3 ML: 0.03 SOLUTION RESPIRATORY (INHALATION) at 07:37

## 2020-06-28 RX ADMIN — LORAZEPAM 2 MG: 0.5 TABLET ORAL at 10:31

## 2020-06-28 RX ADMIN — OXYCODONE HYDROCHLORIDE 10 MG: 5 SOLUTION ORAL at 14:50

## 2020-06-28 RX ADMIN — ENOXAPARIN SODIUM 100 MG: 100 INJECTION SUBCUTANEOUS at 22:00

## 2020-06-28 RX ADMIN — ENOXAPARIN SODIUM 100 MG: 100 INJECTION SUBCUTANEOUS at 08:18

## 2020-06-28 RX ADMIN — ACETAMINOPHEN 975 MG: 650 SUSPENSION ORAL at 14:50

## 2020-06-28 RX ADMIN — LEVALBUTEROL HYDROCHLORIDE 1.25 MG: 1.25 SOLUTION, CONCENTRATE RESPIRATORY (INHALATION) at 19:49

## 2020-06-28 RX ADMIN — LEVALBUTEROL HYDROCHLORIDE 1.25 MG: 1.25 SOLUTION, CONCENTRATE RESPIRATORY (INHALATION) at 02:18

## 2020-06-28 RX ADMIN — ISODIUM CHLORIDE 3 ML: 0.03 SOLUTION RESPIRATORY (INHALATION) at 19:49

## 2020-06-28 RX ADMIN — CALCIUM GLUCONATE 3 G: 98 INJECTION, SOLUTION INTRAVENOUS at 08:17

## 2020-06-28 RX ADMIN — ISODIUM CHLORIDE 3 ML: 0.03 SOLUTION RESPIRATORY (INHALATION) at 02:17

## 2020-06-28 RX ADMIN — QUETIAPINE FUMARATE 50 MG: 25 TABLET ORAL at 08:17

## 2020-06-28 RX ADMIN — SENNOSIDES AND DOCUSATE SODIUM 1 TABLET: 8.6; 5 TABLET ORAL at 18:31

## 2020-06-28 RX ADMIN — LORAZEPAM 2 MG: 0.5 TABLET ORAL at 02:13

## 2020-06-28 RX ADMIN — LORAZEPAM 2 MG: 0.5 TABLET ORAL at 18:30

## 2020-06-28 RX ADMIN — LORAZEPAM 2 MG: 0.5 TABLET ORAL at 22:30

## 2020-06-28 RX ADMIN — Medication 75 MCG/HR: at 13:38

## 2020-06-28 RX ADMIN — POLYETHYLENE GLYCOL 3350 17 G: 17 POWDER, FOR SOLUTION ORAL at 08:17

## 2020-06-28 RX ADMIN — ISODIUM CHLORIDE 3 ML: 0.03 SOLUTION RESPIRATORY (INHALATION) at 13:03

## 2020-06-28 RX ADMIN — OXYCODONE HYDROCHLORIDE 10 MG: 5 SOLUTION ORAL at 06:17

## 2020-06-28 RX ADMIN — ACETAMINOPHEN 975 MG: 650 SUSPENSION ORAL at 06:17

## 2020-06-28 RX ADMIN — LEVALBUTEROL HYDROCHLORIDE 1.25 MG: 1.25 SOLUTION, CONCENTRATE RESPIRATORY (INHALATION) at 07:37

## 2020-06-28 RX ADMIN — MELATONIN 3 MG: at 22:30

## 2020-06-28 RX ADMIN — SENNOSIDES AND DOCUSATE SODIUM 1 TABLET: 8.6; 5 TABLET ORAL at 08:17

## 2020-06-28 RX ADMIN — OXYCODONE HYDROCHLORIDE 10 MG: 5 SOLUTION ORAL at 18:30

## 2020-06-28 RX ADMIN — NYSTATIN 500000 UNITS: 100000 SUSPENSION ORAL at 18:30

## 2020-06-28 RX ADMIN — OXYCODONE HYDROCHLORIDE 10 MG: 5 SOLUTION ORAL at 02:13

## 2020-06-28 RX ADMIN — QUETIAPINE FUMARATE 50 MG: 25 TABLET ORAL at 18:30

## 2020-06-28 RX ADMIN — LEVALBUTEROL HYDROCHLORIDE 1.25 MG: 1.25 SOLUTION, CONCENTRATE RESPIRATORY (INHALATION) at 13:03

## 2020-06-28 RX ADMIN — OXYCODONE HYDROCHLORIDE 10 MG: 5 SOLUTION ORAL at 22:30

## 2020-06-28 RX ADMIN — OXYCODONE HYDROCHLORIDE 10 MG: 5 SOLUTION ORAL at 10:31

## 2020-06-28 RX ADMIN — CHLORHEXIDINE GLUCONATE 0.12% ORAL RINSE 15 ML: 1.2 LIQUID ORAL at 22:00

## 2020-06-28 RX ADMIN — LORAZEPAM 2 MG: 0.5 TABLET ORAL at 06:17

## 2020-06-28 RX ADMIN — ACETAMINOPHEN 975 MG: 650 SUSPENSION ORAL at 22:30

## 2020-06-28 NOTE — PROGRESS NOTES
Progress Note -Thoracic Surgery   Benoit Law 71 y o  male MRN: [id number]  Unit/Bed#: [facility] 417-01 Encounter: [id number]    Assessment:  71yo male s/p L VATS upper lobectomy  Post op prolonged airleak with significant subcutaneous emphysema  Also had code event requiring cricothyrodotomy for difficult intubation and now s/p formalization to tracheostomy  CT 300ml serous, -AL    Plan:  TFs at goal  Wean sedation  Chest tube to   pulm toilet, wean vent with TC  F/u am CXR  Primary per ICU    Subjective/Objective   Chief Complaint:     Subjective: NATALYA  Tmax 102  3  PS vent overnight, TC during the day  Objective:     Blood pressure 144/75, pulse (!) 116, temperature (!) 100 8 °F (38 2 °C), temperature source Bladder, resp  rate 15, height 5' 10" (1 778 m), weight 120 kg (263 lb 7 2 oz), SpO2 98 %  ,Body mass index is 37 8 kg/m²  Intake/Output Summary (Last 24 hours) at 6/28/2020 0532  Last data filed at 6/28/2020 0400  Gross per 24 hour   Intake 2770 48 ml   Output 5205 ml   Net -2434 52 ml       Invasive Devices     Peripherally Inserted Central Catheter Line            PICC Line 04/76/52 Left Basilic 1 day          Peripheral Intravenous Line            Peripheral IV 06/26/20 Left Hand 1 day          Drain            Chest Tube 1 Left Pleural 28 Fr  17 days    NG/OG/Enteral Tube Nasogastric 12 Fr Right nares 7 days    Urethral Catheter 16 Fr  7 days          Airway            Surgical Airway Shiley Cuffed 5 days                Physical Exam: NAD  Atraumatic/normocephalic  Trache in place  Sedated   Normal mood and affect  Normal respiratory effort, PS ventilation  Soft, non tender, ND  Skin warm/dry  Cap refil <2 sec  Ct in place      Lab, Imaging and other studies:  I have personally reviewed pertinent lab results    , CBC:   Lab Results   Component Value Date    WBC 9 30 06/28/2020    HGB 10 6 (L) 06/28/2020    HCT 33 9 (L) 06/28/2020    MCV 99 (H) 06/28/2020     06/28/2020    MCH 30 8 06/28/2020    MCHC 31 3 (L) 06/28/2020    RDW 14 9 06/28/2020    MPV 10 6 06/28/2020    NRBC 0 06/28/2020   , CMP:   Lab Results   Component Value Date    SODIUM 138 06/28/2020    K 4 6 06/28/2020     06/28/2020    CO2 30 06/28/2020    BUN 12 06/28/2020    CREATININE 0 65 06/28/2020    CALCIUM 8 3 06/28/2020    EGFR 99 06/28/2020

## 2020-06-28 NOTE — RESPIRATORY THERAPY NOTE
RT Ventilator Management Note  Shelbi Puckett 71 y o  male MRN: [id number]  Unit/Bed#: [facility] 417-01 Encounter: [id number]      Daily Screen       6/27/2020  0742 6/28/2020  0744          Patient safety screen outcome[de-identified]  Passed  Passed              Physical Exam:   Assessment Type: (P) Assess only  General Appearance: (P) Awake  Respiratory Pattern: (P) Assisted  Chest Assessment: (P) Chest expansion symmetrical  Bilateral Breath Sounds: (P) Diminished, Coarse  L Breath Sounds: Diminished  Suction: (P) Trach      Resp Comments: (P) Pt placed back on psv on vent  Pt tachycardic, sats normal and RR 22  Pt performed approx 6hr wean this morning and 4hr wean this evening  Plan is to cont  on PSV overnight and wean on trach collar in the morning

## 2020-06-28 NOTE — RESPIRATORY THERAPY NOTE
RT Ventilator Management Note  Aleks Bender 71 y o  male MRN: [id number]  Unit/Bed#: [facility] 417-01 Encounter: [id number]      Daily Screen       6/27/2020  0742 6/28/2020  0744          Patient safety screen outcome[de-identified]  Passed  Passed              Physical Exam:          Resp Comments: pt transitioned to 50% TC at this time   will continue to monitor

## 2020-06-28 NOTE — PROGRESS NOTES
Daily Progress Note - Critical Care   Kika Frias 71 y o  male MRN: [id number]  Unit/Bed#: [facility] 417-01 Encounter: [id number]        ----------------------------------------------------------------------------------------  HPI/24hr events: 72 yo M s/p VATs 6/10 for malignant lung neoplasm, complicated by pleural leak/PTX with large amounts subcutaneous air, agitation/delirium with respiratory arrest on the floor and difficult airway requiring cricothyroidotomy and ICU transfer  He had tracheostomy placement and has been weaning on vent, maintaining PS for rest and trach collar for 3-4 hours BID daily this weekend  Agitation/sedation continues to be a limiting barrier and palliative care is assisting with sedation wean and delirium and pain management  His versed and ketamine infusions were weaned off yesterday  He had an episodes of agitation overnight requiring PRN zyprexa and increase in PRN fentanyl as well asa 1 time extra dose of PRN fentanyl yesterday during the day after suctioning  He has a remaining chest tube that was placed to water seal this morning but primary service      ---------------------------------------------------------------------------------------  SUBJECTIVE      Review of Systems   Unable to perform ROS: Intubated     Review of systems was unable to be performed secondary to [id number]  ---------------------------------------------------------------------------------------  Assessment and Plan:    Neuro:   · Diagnosis: encephalopathy, multifactorial in setting of critical illness, surgery, polypharmacy, delirium  ? Plan: appreciate palliative care recommendations and assistance with sedation/analgesia   ? Oxy 10 q 4, ativan PO 2 q 4, seroquel 50 BID, tylenol AC TID, melatonin  ? precedex 1, fentanyl 100   ? PRN zyprexa - no doses given, encourage zyprexa for agitation over fentanyl   ?  Wean fentanyl gtt slowly today, decrease to 75 then if tolerating to 50 this afternoon/evening         CV:   · Diagnosis: hypotension/HTN   ? Plan: suspect related to sedation  ? Off pressors for > 24 hours now, intermittently HTN - consider adding metoprolol today if persists   ? Net -2 7L   ? MAP goal > 65  ? Sinus tachycardia with fevers and agitation - monitor trend          Pulm:  · Diagnosis: acute hypoxic resp failure s/p crycothyroid transition to tracheostomy  ? Plan: cont trach collar 4 hours BID and PS for rest  ? Cont scheduled nebs per resp protocol   ? 7 44/41/89/27/3 4  ? CXR with increased consolidation on left side   · Diagnosis: Left upper lung neoplasm s/p VATs with persistent stable PTX communicating with left chest wall  ? Plan: chest wound sealed, VAC remains off and SubQ emphysema improving  ? Thoracic surgery managing remaining L chest tube, changed to water seal today  No airleak 350 ml serous drainage        GI:   · Diagnosis: GERD  ? Plan: cont home protonix  · Diagnosis: Bowel Regimen   ? Plan: miralax and senna  ? No BM recorded recently - dulcolax today         :   · Diagnosis: volume overload  ? Plan: UOP 5 2L net - 2 7L auto-diuresing   ? Monitor for intravascular depletion, strict I&O  ? Cont norwood   ? Creat 0 65 BUN 12         F/E/N:   · Plan: jevity 1 2 at 70 no FWF  · Ion taylor 1 09 - replete         Heme/Onc:   · Diagnosis: RUE and LLE DVT's   ? Plan: HGB 10 6, no evidence of bleeding  ? therapeutic lovenox         Endo:   · Diagnosis:   ? Plan: glucose at goal, 140-180        ID:   · Diagnosis: Possible asp PNA  ? Plan: leukocytosis resolved, 9 3 today  ? Completed cef/flagyl 7 day course Friday  ? Remains off antibiotics now   ? Fever curve persists, TMax 102 3, unclear etiology, may need to consider re-culture and/or CT imaging   ? LA 1 4 procal 0 58 from 5 85         MSK/Skin:   · Diagnosis:   ? Plan: local care, offload  ?  Early mobilization           Disposition: Continue Critical Care   Code Status: Level 2 - DNAR: but accepts endotracheal intubation  ---------------------------------------------------------------------------------------  ICU CORE MEASURES    Prophylaxis   VTE Pharmacologic Prophylaxis: Enoxaparin (Lovenox)  VTE Mechanical Prophylaxis: sequential compression device  Stress Ulcer Prophylaxis: Prophylaxis Not Indicated     ABCDE Protocol (if indicated)  Plan to perform spontaneous awakening trial today? Yes  Plan to perform spontaneous breathing trial today? Yes  Obvious barriers to extubation? Yes No  CAM-ICU: Positive    Invasive Devices Review  Invasive Devices     Peripherally Inserted Central Catheter Line            PICC Line  Left Basilic 1 day          Peripheral Intravenous Line            Peripheral IV 20 Left Hand 1 day          Drain            Chest Tube 1 Left Pleural 28 Fr  17 days    NG/OG/Enteral Tube Nasogastric 12 Fr Right nares 7 days    Urethral Catheter 16 Fr  7 days          Airway            Surgical Airway Shiley Cuffed 5 days              Can any invasive devices be discontinued today?  No  ---------------------------------------------------------------------------------------  OBJECTIVE    Vitals   Vitals:    20 0608 20 0611 20 0616 20 0617   BP: (!) 52/39 (!) 70/38 163/97 (!) 175/94   BP Location:       Pulse: (!) 110 (!) 114 (!) 118 (!) 116   Resp: 21 (!) 26 (!) 25 (!) 24   Temp:       TempSrc:       SpO2: 98% 98% 99% 99%   Weight:       Height:         Temp (24hrs), Av 6 °F (38 1 °C), Min:99 2 °F (37 3 °C), Max:102 3 °F (39 1 °C)  Current: Temperature: (!) 100 8 °F (38 2 °C)      Respiratory:  SpO2: SpO2: 99 %  O2 Flow Rate (L/min): 10 L/min    Invasive/non-invasive ventilation settings   Respiratory    Lab Data (Last 4 hours)       0458            pH, Arterial       7 448           pCO2, Arterial       41 0           pO2, Arterial       89 0           HCO3, Arterial       27 7           Base Excess, Arterial       3 4                O2/Vent Data        0446   Most Recent         Vent Mode CPAP/PS Spont  CPAP/PS Spont      FIO2 (%) (%) 40  40      PEEP (cmH2O) (cmH2O) 5  5      Pressure Support (cmH2O) (cmH20) 5  5      MV (Obs) 11 4  11 4      RSBI 13  13                  Physical Exam   Constitutional: No distress  HENT:   Head: Normocephalic and atraumatic  Eyes: Pupils are equal, round, and reactive to light  Right eye exhibits no discharge  Left eye exhibits no discharge  Neck: No tracheal deviation present  Cardiovascular: Normal rate, regular rhythm, normal heart sounds and intact distal pulses  Pulmonary/Chest: No stridor  No respiratory distress  He has no wheezes  He has no rales  Cuff leak, few crackles in bases, no retractions or accessory muscle use    Abdominal: Soft  He exhibits no distension  There is no tenderness  There is no guarding  Abdominal hernia soft, reproducible    Musculoskeletal: He exhibits edema (generalized +2 edema )  He exhibits no deformity  Neurological: He is alert  moves all extremities purposefully with stimulation, on sedation    Skin: Skin is warm and dry  He is not diaphoretic           Laboratory and Diagnostics:  Results from last 7 days   Lab Units 06/28/20 0419 06/27/20 0429 06/26/20  0512 06/25/20  7966 06/24/20  0516 06/23/20  0432 06/22/20  1434 06/22/20  0444   WBC Thousand/uL 9 30 10 19* 10 35* 9 98 10 42* 13 94* 13 35* 15 83*   HEMOGLOBIN g/dL 10 6* 9 3* 9 1* 9 1* 9 7* 9 2* 8 9* 9 0*   HEMATOCRIT % 33 9* 29 6* 29 3* 29 3* 30 6* 28 7* 28 5* 27 7*   PLATELETS Thousands/uL 218 173 185 163 139* 132* 123* 113*   NEUTROS PCT % 72 76* 72 75 78* 81*  --  86*   MONOS PCT % 9 7 6 5 6 5  --  4     Results from last 7 days   Lab Units 06/28/20 0419 06/27/20  0429 06/26/20  0512 06/25/20  0613 06/24/20  0516 06/23/20  0432 06/22/20  0444   SODIUM mmol/L 138 138 141 143 140 141 140   POTASSIUM mmol/L 4 6 4 2 3 7 3 3* 3 9 4 0 3 9   CHLORIDE mmol/L 104 105 109* 111* 110* 112* 111*   CO2 mmol/L 30 27 28 27 24 24 26   ANION GAP mmol/L 4 6 4 5 6 5 3*   BUN mg/dL 12 11 12 13 16 16 14   CREATININE mg/dL 0 65 0 59* 0 74 0 81 0 59* 0 65 0 68   CALCIUM mg/dL 8 3 7 5* 7 5* 7 3* 7 5* 7 0* 7 7*   GLUCOSE RANDOM mg/dL 114 111 146* 114 126 93 105   ALT U/L  --   --   --   --   --   --  56   AST U/L  --   --   --   --   --   --  20   ALK PHOS U/L  --   --   --   --   --   --  50   ALBUMIN g/dL  --   --   --   --   --   --  1 9*   TOTAL BILIRUBIN mg/dL  --   --   --   --   --   --  0 26     Results from last 7 days   Lab Units 06/28/20  0419 06/27/20  0429 06/26/20  0512 06/24/20  0516 06/23/20  0432 06/22/20  0444   MAGNESIUM mg/dL 2 2 1 9 2 2 2 4 2 5 2 3   PHOSPHORUS mg/dL  --  3 4 3 2 2 6 2 7 2 4      Results from last 7 days   Lab Units 06/25/20  0613 06/24/20  0516 06/23/20  1757 06/23/20  1155 06/23/20  0432 06/22/20  2152 06/22/20  1434   INR   --   --   --   --   --   --  1 12   PTT seconds 152* 66* 73* 82* 57* 96* 27          Results from last 7 days   Lab Units 06/27/20  0844 06/21/20  0918   LACTIC ACID mmol/L 1 4 1 5     ABG:  Results from last 7 days   Lab Units 06/28/20  0458 06/24/20  0516   PH ART  7 448 7 405   PCO2 ART mm Hg 41 0 36 5   PO2 ART mm Hg 89 0 78 1   HCO3 ART mmol/L 27 7 22 4   BASE EXC ART mmol/L 3 4 -2 0   ABG SOURCE   --  Line, Arterial     VBG:  Results from last 7 days   Lab Units 06/24/20  0516   ABG SOURCE  Line, Arterial     Results from last 7 days   Lab Units 06/28/20  0419   PROCALCITONIN ng/ml 0 58*       Micro        EKG:   Imaging:  I have personally reviewed pertinent reports        Intake and Output  I/O       06/26 0701 - 06/27 0700 06/27 0701 - 06/28 0700    I V  (mL/kg) 994 4 (8 4) 527 9 (4 4)    NG/ 460    IV Piggyback 200 200    Feedings 1561 1680    Total Intake(mL/kg) 3145 4 (26 4) 2867 9 (24 1)    Urine (mL/kg/hr) 3975 (1 4) 5225 (1 8)    Emesis/NG output 0 0    Chest Tube 800 350    Total Output 4742 0245    Net -2218 0 -5757 1              UOP:  ml/hr     Height and Weights   Height: 5' 10" (177 8 cm)  IBW: 73 kg  Body mass index is 37 52 kg/m²  Weight (last 2 days)     Date/Time   Weight    06/28/20 0616       Comment rows:    OBSERV: Dr Lux De La Cruz here aware of BP issues at 06/28/20 0616    06/28/20 0600   119 (261 47)    Comment rows:    OBSERV: decreased precedex at 06/28/20 0600 06/28/20 0230       Comment rows:    OBSERV: just given ativan and oxy so His precedex gtt was increase to  5 at 06/28/20 0230    06/27/20 2100       Comment rows:    OBSERV: SCCR made aware pt still HTN and Tachy with increasing temp at 06/27/20 2100 06/27/20 2000       Comment rows:    OBSERV: SCCR Maya here to see pt made aware pt just placed back on PS 5/5 40 was htn and tachy HR also had increased WOB on ATC  at 06/27/20 2000 06/27/20 0600   120 (263 45) verified twice    Weight: verified twice at 06/27/20 0600                Nutrition       Diet Orders   (From admission, onward)             Start     Ordered    06/25/20 1244  Diet Enteral/Parenteral; Tube Feeding No Oral Diet; Jevity 1 2 Javier; Continuous; 70  Diet effective now     Question Answer Comment   Diet Type Enteral/Parenteral    Enteral/Parenteral Tube Feeding No Oral Diet    Tube Feeding Formula: Jevity 1 2 Javier    Bolus/Cyclic/Continuous Continuous    Tube Feeding Goal Rate (mL/hr): 70    RD to adjust diet per protocol? Yes        06/25/20 1244              TF currently running at  ml/hr with a goal of  ml/hr  Formula:        Active Medications  Scheduled Meds:  Current Facility-Administered Medications:  acetaminophen 975 mg Oral [facility] Margret Brooks DO    albuterol 2 5 mg Nebulization Q4H PRN Iris lForence PA-C    chlorhexidine 15 mL Swish & Spit Q12H [facility] Iris Florence PA-C    dexmedetomidine 0 1-1 4 mcg/kg/hr Intravenous Titrated KHANG Francis Last Rate: 0 2 mcg/kg/hr (06/28/20 0600)   enoxaparin 1 mg/kg Subcutaneous Q12H [facility] Kenia Tabares MD    fentaNYL 100 mcg/hr Intravenous Continuous Olam Service, MD Last Rate: 100 mcg/hr (06/28/20 0223)   fentanyl citrate (PF) 75 mcg Intravenous Q2H PRN Mirian Johnson MD    levalbuterol 1 25 mg Nebulization Q6H Fermín David MD    Lidocaine Viscous HCl 15 mL Swish & Spit 4x Daily PRN Jung Henriquez MD    LORazepam 2 mg Per NG Tube Q4H Heaven Cornell MD    melatonin 3 mg Oral HS KHANG Christensen    OLANZapine 5 mg Per NG Tube Q3H PRN Siria Brooks,     ondansetron 4 mg Intravenous Q6H PRN Laila Kuhn PA-C    oxyCODONE 10 mg Oral Q4H Heaven Cornell MD    oxyCODONE 5 mg Oral Q2H PRN Margret Brooks,     polyethylene glycol 17 g Oral Daily KHANG Christensen    QUEtiapine 50 mg Oral BID KHANG Christensen    senna-docusate sodium 1 tablet Per NG Tube BID Laila Kuhn PA-C    sodium chloride 3 mL Nebulization Q6H Fermín David MD      Continuous Infusions:    dexmedetomidine 0 1-1 4 mcg/kg/hr Last Rate: 0 2 mcg/kg/hr (06/28/20 0600)   fentaNYL 100 mcg/hr Last Rate: 100 mcg/hr (06/28/20 0223)     PRN Meds:     albuterol 2 5 mg Q4H PRN   fentanyl citrate (PF) 75 mcg Q2H PRN   Lidocaine Viscous HCl 15 mL 4x Daily PRN   OLANZapine 5 mg Q3H PRN   ondansetron 4 mg Q6H PRN   oxyCODONE 5 mg Q2H PRN       Allergies   No Known Allergies  ---------------------------------------------------------------------------------------  Advance Directive and Living Will: Yes    Power of : Yes  POLST:    ---------------------------------------------------------------------------------------  Care Time Delivered:   Upon my evaluation, this patient had a high probability of imminent or life-threatening deterioration due to acute respi failure, agitation/delirium with lethargy, fever , which required my direct attention, intervention, and personal management  I have personally provided 15 minutes (6:45 to 7) of critical care time, exclusive of procedures, teaching, family meetings, and any prior time recorded by providers other than myself       Farzana Lebron, CRNP      Portions of the record may have been created with voice recognition software  Occasional wrong word or "sound a like" substitutions may have occurred due to the inherent limitations of voice recognition software    Read the chart carefully and recognize, using context, where substitutions have occurred

## 2020-06-29 ENCOUNTER — APPOINTMENT (INPATIENT)
Dept: RADIOLOGY | Facility: HOSPITAL | Age: 70
DRG: 003 | End: 2020-06-29
Payer: COMMERCIAL

## 2020-06-29 LAB
ANION GAP SERPL CALCULATED.3IONS-SCNC: 7 MMOL/L (ref 4–13)
ATRIAL RATE: 125 BPM
BACTERIA UR QL AUTO: ABNORMAL /HPF
BASOPHILS # BLD AUTO: 0.03 THOUSANDS/ΜL (ref 0–0.1)
BASOPHILS NFR BLD AUTO: 0 % (ref 0–1)
BILIRUB UR QL STRIP: NEGATIVE
BUN SERPL-MCNC: 6 MG/DL (ref 5–25)
CALCIUM SERPL-MCNC: 8.7 MG/DL (ref 8.3–10.1)
CHLORIDE SERPL-SCNC: 102 MMOL/L (ref 100–108)
CLARITY UR: ABNORMAL
CO2 SERPL-SCNC: 31 MMOL/L (ref 21–32)
COLOR UR: YELLOW
CREAT SERPL-MCNC: 0.57 MG/DL (ref 0.6–1.3)
EOSINOPHIL # BLD AUTO: 0.7 THOUSAND/ΜL (ref 0–0.61)
EOSINOPHIL NFR BLD AUTO: 7 % (ref 0–6)
ERYTHROCYTE [DISTWIDTH] IN BLOOD BY AUTOMATED COUNT: 14.8 % (ref 11.6–15.1)
GFR SERPL CREATININE-BSD FRML MDRD: 105 ML/MIN/1.73SQ M
GLUCOSE SERPL-MCNC: 103 MG/DL (ref 65–140)
GLUCOSE UR STRIP-MCNC: NEGATIVE MG/DL
HCT VFR BLD AUTO: 36.5 % (ref 36.5–49.3)
HGB BLD-MCNC: 11.5 G/DL (ref 12–17)
HGB UR QL STRIP.AUTO: ABNORMAL
HYALINE CASTS #/AREA URNS LPF: ABNORMAL /LPF
IMM GRANULOCYTES # BLD AUTO: 0.11 THOUSAND/UL (ref 0–0.2)
IMM GRANULOCYTES NFR BLD AUTO: 1 % (ref 0–2)
KETONES UR STRIP-MCNC: NEGATIVE MG/DL
LEUKOCYTE ESTERASE UR QL STRIP: NEGATIVE
LYMPHOCYTES # BLD AUTO: 1.41 THOUSANDS/ΜL (ref 0.6–4.47)
LYMPHOCYTES NFR BLD AUTO: 15 % (ref 14–44)
MCH RBC QN AUTO: 30.9 PG (ref 26.8–34.3)
MCHC RBC AUTO-ENTMCNC: 31.5 G/DL (ref 31.4–37.4)
MCV RBC AUTO: 98 FL (ref 82–98)
MONOCYTES # BLD AUTO: 0.92 THOUSAND/ΜL (ref 0.17–1.22)
MONOCYTES NFR BLD AUTO: 10 % (ref 4–12)
NEUTROPHILS # BLD AUTO: 6.54 THOUSANDS/ΜL (ref 1.85–7.62)
NEUTS SEG NFR BLD AUTO: 67 % (ref 43–75)
NITRITE UR QL STRIP: NEGATIVE
NON-SQ EPI CELLS URNS QL MICRO: ABNORMAL /HPF
NRBC BLD AUTO-RTO: 0 /100 WBCS
P AXIS: 48 DEGREES
PH UR STRIP.AUTO: 8.5 [PH]
PLATELET # BLD AUTO: 274 THOUSANDS/UL (ref 149–390)
PMV BLD AUTO: 10.9 FL (ref 8.9–12.7)
POTASSIUM SERPL-SCNC: 4.2 MMOL/L (ref 3.5–5.3)
PR INTERVAL: 146 MS
PROCALCITONIN SERPL-MCNC: 0.53 NG/ML
PROT UR STRIP-MCNC: ABNORMAL MG/DL
QRS AXIS: -32 DEGREES
QRSD INTERVAL: 75 MS
QT INTERVAL: 300 MS
QTC INTERVAL: 433 MS
RBC # BLD AUTO: 3.72 MILLION/UL (ref 3.88–5.62)
RBC #/AREA URNS AUTO: ABNORMAL /HPF
SODIUM SERPL-SCNC: 140 MMOL/L (ref 136–145)
SP GR UR STRIP.AUTO: 1.02 (ref 1–1.03)
T WAVE AXIS: 134 DEGREES
UROBILINOGEN UR QL STRIP.AUTO: 0.2 E.U./DL
VENTRICULAR RATE: 125 BPM
WBC # BLD AUTO: 9.71 THOUSAND/UL (ref 4.31–10.16)
WBC #/AREA URNS AUTO: ABNORMAL /HPF

## 2020-06-29 PROCEDURE — C9113 INJ PANTOPRAZOLE SODIUM, VIA: HCPCS | Performed by: STUDENT IN AN ORGANIZED HEALTH CARE EDUCATION/TRAINING PROGRAM

## 2020-06-29 PROCEDURE — 99232 SBSQ HOSP IP/OBS MODERATE 35: CPT | Performed by: INTERNAL MEDICINE

## 2020-06-29 PROCEDURE — 84145 PROCALCITONIN (PCT): CPT | Performed by: NURSE PRACTITIONER

## 2020-06-29 PROCEDURE — 87070 CULTURE OTHR SPECIMN AEROBIC: CPT | Performed by: REGISTERED NURSE

## 2020-06-29 PROCEDURE — 87040 BLOOD CULTURE FOR BACTERIA: CPT | Performed by: REGISTERED NURSE

## 2020-06-29 PROCEDURE — 94640 AIRWAY INHALATION TREATMENT: CPT

## 2020-06-29 PROCEDURE — 99024 POSTOP FOLLOW-UP VISIT: CPT | Performed by: THORACIC SURGERY (CARDIOTHORACIC VASCULAR SURGERY)

## 2020-06-29 PROCEDURE — 81001 URINALYSIS AUTO W/SCOPE: CPT | Performed by: REGISTERED NURSE

## 2020-06-29 PROCEDURE — 74177 CT ABD & PELVIS W/CONTRAST: CPT

## 2020-06-29 PROCEDURE — 93005 ELECTROCARDIOGRAM TRACING: CPT

## 2020-06-29 PROCEDURE — 80048 BASIC METABOLIC PNL TOTAL CA: CPT | Performed by: NURSE PRACTITIONER

## 2020-06-29 PROCEDURE — 99233 SBSQ HOSP IP/OBS HIGH 50: CPT | Performed by: EMERGENCY MEDICINE

## 2020-06-29 PROCEDURE — 71045 X-RAY EXAM CHEST 1 VIEW: CPT

## 2020-06-29 PROCEDURE — 93010 ELECTROCARDIOGRAM REPORT: CPT | Performed by: INTERNAL MEDICINE

## 2020-06-29 PROCEDURE — 94003 VENT MGMT INPAT SUBQ DAY: CPT

## 2020-06-29 PROCEDURE — 85025 COMPLETE CBC W/AUTO DIFF WBC: CPT | Performed by: NURSE PRACTITIONER

## 2020-06-29 PROCEDURE — 87205 SMEAR GRAM STAIN: CPT | Performed by: REGISTERED NURSE

## 2020-06-29 PROCEDURE — 94760 N-INVAS EAR/PLS OXIMETRY 1: CPT

## 2020-06-29 PROCEDURE — 71260 CT THORAX DX C+: CPT

## 2020-06-29 RX ORDER — HYDROMORPHONE HCL/PF 1 MG/ML
0.5 SYRINGE (ML) INJECTION EVERY 4 HOURS
Status: DISCONTINUED | OUTPATIENT
Start: 2020-06-29 | End: 2020-06-30

## 2020-06-29 RX ORDER — MIDAZOLAM HYDROCHLORIDE 2 MG/2ML
2 INJECTION, SOLUTION INTRAMUSCULAR; INTRAVENOUS ONCE
Status: COMPLETED | OUTPATIENT
Start: 2020-06-29 | End: 2020-06-29

## 2020-06-29 RX ORDER — ACETAMINOPHEN 650 MG/1
650 SUPPOSITORY RECTAL EVERY 4 HOURS PRN
Status: DISCONTINUED | OUTPATIENT
Start: 2020-06-29 | End: 2020-07-26

## 2020-06-29 RX ORDER — PANTOPRAZOLE SODIUM 40 MG/1
40 INJECTION, POWDER, FOR SOLUTION INTRAVENOUS
Status: DISCONTINUED | OUTPATIENT
Start: 2020-06-29 | End: 2020-07-03

## 2020-06-29 RX ORDER — OLANZAPINE 10 MG/1
5 INJECTION, POWDER, LYOPHILIZED, FOR SOLUTION INTRAMUSCULAR
Status: DISCONTINUED | OUTPATIENT
Start: 2020-06-29 | End: 2020-06-30

## 2020-06-29 RX ORDER — HALOPERIDOL 5 MG/ML
1 INJECTION INTRAMUSCULAR EVERY 6 HOURS PRN
Status: DISCONTINUED | OUTPATIENT
Start: 2020-06-29 | End: 2020-06-30

## 2020-06-29 RX ORDER — SODIUM CHLORIDE, SODIUM GLUCONATE, SODIUM ACETATE, POTASSIUM CHLORIDE, MAGNESIUM CHLORIDE, SODIUM PHOSPHATE, DIBASIC, AND POTASSIUM PHOSPHATE .53; .5; .37; .037; .03; .012; .00082 G/100ML; G/100ML; G/100ML; G/100ML; G/100ML; G/100ML; G/100ML
1000 INJECTION, SOLUTION INTRAVENOUS ONCE
Status: COMPLETED | OUTPATIENT
Start: 2020-06-29 | End: 2020-06-29

## 2020-06-29 RX ORDER — DIAZEPAM 5 MG/ML
5 INJECTION, SOLUTION INTRAMUSCULAR; INTRAVENOUS 3 TIMES DAILY
Status: DISCONTINUED | OUTPATIENT
Start: 2020-06-29 | End: 2020-06-30

## 2020-06-29 RX ADMIN — FENTANYL CITRATE 75 MCG: 50 INJECTION, SOLUTION INTRAMUSCULAR; INTRAVENOUS at 09:33

## 2020-06-29 RX ADMIN — ISODIUM CHLORIDE 3 ML: 0.03 SOLUTION RESPIRATORY (INHALATION) at 13:00

## 2020-06-29 RX ADMIN — DIAZEPAM 5 MG: 10 INJECTION, SOLUTION INTRAMUSCULAR; INTRAVENOUS at 10:38

## 2020-06-29 RX ADMIN — PANTOPRAZOLE SODIUM 40 MG: 40 INJECTION, POWDER, FOR SOLUTION INTRAVENOUS at 09:37

## 2020-06-29 RX ADMIN — OLANZAPINE 5 MG: 10 INJECTION, POWDER, FOR SOLUTION INTRAMUSCULAR at 21:14

## 2020-06-29 RX ADMIN — CHLORHEXIDINE GLUCONATE 0.12% ORAL RINSE 15 ML: 1.2 LIQUID ORAL at 09:37

## 2020-06-29 RX ADMIN — WATER 2.1 ML: 1 INJECTION INTRAMUSCULAR; INTRAVENOUS; SUBCUTANEOUS at 21:17

## 2020-06-29 RX ADMIN — FENTANYL CITRATE 75 MCG: 50 INJECTION, SOLUTION INTRAMUSCULAR; INTRAVENOUS at 15:49

## 2020-06-29 RX ADMIN — Medication 75 MCG/HR: at 02:00

## 2020-06-29 RX ADMIN — ISODIUM CHLORIDE 3 ML: 0.03 SOLUTION RESPIRATORY (INHALATION) at 07:11

## 2020-06-29 RX ADMIN — ISODIUM CHLORIDE 3 ML: 0.03 SOLUTION RESPIRATORY (INHALATION) at 19:06

## 2020-06-29 RX ADMIN — HYDROMORPHONE HYDROCHLORIDE 0.5 MG: 1 INJECTION, SOLUTION INTRAMUSCULAR; INTRAVENOUS; SUBCUTANEOUS at 18:48

## 2020-06-29 RX ADMIN — LORAZEPAM 2 MG: 0.5 TABLET ORAL at 02:29

## 2020-06-29 RX ADMIN — HYDROMORPHONE HYDROCHLORIDE 0.5 MG: 1 INJECTION, SOLUTION INTRAMUSCULAR; INTRAVENOUS; SUBCUTANEOUS at 11:44

## 2020-06-29 RX ADMIN — HALOPERIDOL LACTATE 1 MG: 5 INJECTION INTRAMUSCULAR at 22:30

## 2020-06-29 RX ADMIN — IOHEXOL 100 ML: 350 INJECTION, SOLUTION INTRAVENOUS at 16:27

## 2020-06-29 RX ADMIN — ACETAMINOPHEN 650 MG: 650 SUPPOSITORY RECTAL at 22:14

## 2020-06-29 RX ADMIN — DIAZEPAM 5 MG: 10 INJECTION, SOLUTION INTRAMUSCULAR; INTRAVENOUS at 20:02

## 2020-06-29 RX ADMIN — Medication 75 MCG/HR: at 15:22

## 2020-06-29 RX ADMIN — FENTANYL CITRATE 75 MCG: 50 INJECTION, SOLUTION INTRAMUSCULAR; INTRAVENOUS at 22:18

## 2020-06-29 RX ADMIN — LEVALBUTEROL HYDROCHLORIDE 1.25 MG: 1.25 SOLUTION, CONCENTRATE RESPIRATORY (INHALATION) at 07:11

## 2020-06-29 RX ADMIN — HYDROMORPHONE HYDROCHLORIDE 0.5 MG: 1 INJECTION, SOLUTION INTRAMUSCULAR; INTRAVENOUS; SUBCUTANEOUS at 22:00

## 2020-06-29 RX ADMIN — ENOXAPARIN SODIUM 100 MG: 100 INJECTION SUBCUTANEOUS at 10:13

## 2020-06-29 RX ADMIN — ALTEPLASE 2 MG: 2.2 INJECTION, POWDER, LYOPHILIZED, FOR SOLUTION INTRAVENOUS at 18:45

## 2020-06-29 RX ADMIN — MIDAZOLAM 1 MG/HR: 5 INJECTION INTRAMUSCULAR; INTRAVENOUS at 23:32

## 2020-06-29 RX ADMIN — ENOXAPARIN SODIUM 100 MG: 100 INJECTION SUBCUTANEOUS at 21:13

## 2020-06-29 RX ADMIN — CHLORHEXIDINE GLUCONATE 0.12% ORAL RINSE 15 ML: 1.2 LIQUID ORAL at 20:06

## 2020-06-29 RX ADMIN — ONDANSETRON 4 MG: 2 INJECTION INTRAMUSCULAR; INTRAVENOUS at 10:06

## 2020-06-29 RX ADMIN — SODIUM CHLORIDE, SODIUM GLUCONATE, SODIUM ACETATE, POTASSIUM CHLORIDE, MAGNESIUM CHLORIDE, SODIUM PHOSPHATE, DIBASIC, AND POTASSIUM PHOSPHATE 1000 ML: .53; .5; .37; .037; .03; .012; .00082 INJECTION, SOLUTION INTRAVENOUS at 14:15

## 2020-06-29 RX ADMIN — HYDROMORPHONE HYDROCHLORIDE 0.5 MG: 1 INJECTION, SOLUTION INTRAMUSCULAR; INTRAVENOUS; SUBCUTANEOUS at 15:04

## 2020-06-29 RX ADMIN — ACETAMINOPHEN 650 MG: 650 SUPPOSITORY RECTAL at 12:08

## 2020-06-29 RX ADMIN — LEVALBUTEROL HYDROCHLORIDE 1.25 MG: 1.25 SOLUTION, CONCENTRATE RESPIRATORY (INHALATION) at 13:00

## 2020-06-29 RX ADMIN — MIDAZOLAM 2 MG: 1 INJECTION INTRAMUSCULAR; INTRAVENOUS at 22:57

## 2020-06-29 RX ADMIN — OXYCODONE HYDROCHLORIDE 10 MG: 5 SOLUTION ORAL at 02:29

## 2020-06-29 RX ADMIN — DIAZEPAM 5 MG: 10 INJECTION, SOLUTION INTRAMUSCULAR; INTRAVENOUS at 16:52

## 2020-06-29 RX ADMIN — LEVALBUTEROL HYDROCHLORIDE 1.25 MG: 1.25 SOLUTION, CONCENTRATE RESPIRATORY (INHALATION) at 19:06

## 2020-06-29 NOTE — RESPIRATORY THERAPY NOTE
RT Ventilator Management Note  Murl Late 71 y o  male MRN: [id number]  Unit/Bed#: [facility] 417-01 Encounter: [id number]      Daily Screen       6/27/2020  0742 6/28/2020  0744          Patient safety screen outcome[de-identified]  Passed  Passed              Physical Exam:   Assessment Type: Assess only  General Appearance: Sleeping  Respiratory Pattern: Assisted  Chest Assessment: Chest expansion symmetrical  Bilateral Breath Sounds: Coarse  Suction: Trach      Resp Comments: pt on PSV tolerating current vent settings, pt will transition to TC wean

## 2020-06-29 NOTE — RESPIRATORY THERAPY NOTE
RT Ventilator Management Note  Marko Cheema 71 y o  male MRN: [id number]  Unit/Bed#: [facility] 417-01 Encounter: [id number]      Daily Screen       6/27/2020  0742 6/28/2020  0744          Patient safety screen outcome[de-identified]  Passed  Passed              Physical Exam:   Assessment Type: (P) Assess only  General Appearance: (P) Awake  Respiratory Pattern: (P) Assisted  Chest Assessment: (P) Chest expansion symmetrical  Bilateral Breath Sounds: (P) Diminished  Cough: (P) Strong  Suction: (P) Trach      Resp Comments: (P) pt transported to and from ct scan via transport vent without complications   pt is placed back on the vent on PSV will ocntinue to monitor

## 2020-06-29 NOTE — PROGRESS NOTES
Progress note - Palliative and Supportive Care   Shree Hoover 71 y o  male [id number]    Assessment:    -   Patient Active Problem List   Diagnosis    Community acquired pneumonia    Abnormal computed tomography angiography (CTA)    Alcohol abuse    Malignant neoplasm of upper lobe of left lung (HCC)    Tobacco abuse    Acute respiratory failure with hypoxia (HCC)    Subcutaneous emphysema (HCC)    Acute deep vein thrombosis (DVT) of axillary vein of right upper extremity (HCC)    Acute deep vein thrombosis (DVT) of brachial vein of right upper extremity (HCC)    Tracheostomy in place ([facility] [address] )    Postprocedural pneumothorax    Acute deep vein thrombosis (DVT) of calf muscle vein of left lower extremity ([address] )    Pneumonia         Plan:  1  Symptom management:    - D/C ATC oxyIR and tylenol   - D/C seroquel (family request- apparently became delirious when used)   - Start ATC IV Dilaudid 0 5 mg q4H with hold parameters   - Start IV Valium 5 mg TID   - Start IV Haldol 1 mg q6H PRN agitation   - Start IM Zyprexa 5 mg HS   - Continue PRN Fentanyl at 75 mcg   - Continue to wean fentanyl infusion    2  Goals of care: disease directed, level 2    3  Psychosocial support: NA      Interval history:       Patient being worked up for possible new infection  He is NPO due to vomiting  Remains agitated       MEDICATIONS / ALLERGIES:     all current active meds have been reviewed and current meds:   Current Facility-Administered Medications   Medication Dose Route Frequency    acetaminophen (TYLENOL) oral suspension 975 mg  975 mg Oral Q8H [address]    albuterol inhalation solution 2 5 mg  2 5 mg Nebulization Q6H PRN    chlorhexidine (PERIDEX) 0 12 % oral rinse 15 mL  15 mL Swish & Spit Q12H [address]    dexmedetomidine (PRECEDEX) 400 mcg in sodium chloride 0 9 % 100 mL infusion  0 1-1 4 mcg/kg/hr Intravenous Titrated    diazepam (VALIUM) injection 5 mg  5 mg Intravenous TID    enoxaparin (LOVENOX) subcutaneous injection 100 mg  1 mg/kg Subcutaneous Q12H [address]    fentaNYL 1000 mcg in sodium chloride 0 9% 100mL infusion  50 mcg/hr Intravenous Continuous    fentanyl citrate (PF) 100 MCG/2ML 75 mcg  75 mcg Intravenous Q2H PRN    HYDROmorphone (DILAUDID) injection 0 5 mg  0 5 mg Intravenous Q4H    levalbuterol (XOPENEX) inhalation solution 1 25 mg  1 25 mg Nebulization TID    Lidocaine Viscous HCl (XYLOCAINE) 2 % mucosal solution 15 mL  15 mL Swish & Spit 4x Daily PRN    melatonin tablet 3 mg  3 mg Oral HS    nystatin (MYCOSTATIN) oral suspension 500,000 Units  500,000 Units Swish & Swallow 4x Daily    OLANZapine (ZyPREXA) IM injection 5 mg  5 mg Intramuscular HS    ondansetron (ZOFRAN) injection 4 mg  4 mg Intravenous Q6H PRN    pantoprazole (PROTONIX) injection 40 mg  40 mg Intravenous Q24H DEAN    polyethylene glycol (MIRALAX) packet 17 g  17 g Oral Daily    senna-docusate sodium (SENOKOT S) 8 6-50 mg per tablet 1 tablet  1 tablet Per NG Tube BID    sodium chloride 0 9 % inhalation solution 3 mL  3 mL Nebulization TID       No Known Allergies    OBJECTIVE:    Physical Exam  Physical Exam   Constitutional: No distress  HENT:   Head: Normocephalic and atraumatic  Right Ear: External ear normal    Left Ear: External ear normal    Nose: Nose normal    Eyes: EOM are normal  Right eye exhibits no discharge  Left eye exhibits no discharge  No scleral icterus  Neck:   Trach in place   Cardiovascular: Normal rate, regular rhythm and intact distal pulses  Pulmonary/Chest: Effort normal and breath sounds normal  No respiratory distress  On ventilator    Abdominal: Soft  Bowel sounds are normal  He exhibits no distension  Musculoskeletal: He exhibits edema  Neurological:   agitated   Skin: Skin is warm and dry  There is pallor  Nursing note and vitals reviewed  Lab Results:   I have personally reviewed pertinent labs  , CBC:   Lab Results   Component Value Date    WBC 9 71 06/29/2020    HGB 11 5 (L) 06/29/2020    HCT 36 5 06/29/2020    MCV 98 06/29/2020     06/29/2020    MCH 30 9 06/29/2020    MCHC 31 5 06/29/2020    RDW 14 8 06/29/2020    MPV 10 9 06/29/2020    NRBC 0 06/29/2020   , CMP:   Lab Results   Component Value Date    SODIUM 140 06/29/2020    K 4 2 06/29/2020     06/29/2020    CO2 31 06/29/2020    BUN 6 06/29/2020    CREATININE 0 57 (L) 06/29/2020    CALCIUM 8 7 06/29/2020    EGFR 105 06/29/2020   , PT/PTT:No results found for: PT, PTT  Imaging Studies: reviewed  EKG, Pathology, and Other Studies: reviewed    Counseling / Coordination of Care    Total floor / unit time spent today 25+ minutes  Greater than 50% of total time was spent with the patient and / or family counseling and / or coordination of care  A description of the counseling / coordination of care: chart review, medication review with changes

## 2020-06-29 NOTE — RESPIRATORY THERAPY NOTE
tc      06/29/20 0951   Respiratory Assessment   Assessment Type Assess only   General Appearance Sedated   Respiratory Pattern Assisted   Chest Assessment Chest expansion symmetrical   Bilateral Breath Sounds Diminished   Resp Comments TC wean is terminated at this time, pt became very agitated, tachypneic and SpO2 dropped to 85-88%  which recorvered to 90%after FiO2 was increased  on the TC, But pt continued agitated and tachypneic  pt is back on the vent on PSV pt appears more confortable now  will reasses for afnother TC wean in the evening   Surgical Airway Shiley Cuffed   Placement Date/Time: 06/22/20 1110   Tube Size: 6 mm  Type: Tracheostomy  Brand: Allie  Style: Cuffed   Status Cuff Inflated   Surgical Airway Cuff Pressure 10 cm H2O   Equipment at bedside BVM; Wall Suction setup; Additional inner cannula;Sterile saline; Obturator; Additional complete same size trach tube   Additional Assessments   SpO2 98 %

## 2020-06-29 NOTE — PROGRESS NOTES
Daily Progress Note - Critical Care   Hui Larson 71 y o  male MRN: [id number]  Unit/Bed#: [facility] 417-01 Encounter: [id number]        ----------------------------------------------------------------------------------------  HPI/24hr events:   Overnight patient persistently febrile  He is s/p abx with neg cultures stopped Sat  Patient has multiple episodes of projectile vomiting, including vomiting out his keofed  NG placed and XR chest/abd shows NG in place  KUB overnight with ileus, lots of gas filled loops  Yesterday patient was on trach collar for 6 hours, 4 hours leading into 8PM  PS 5/5 overnight      ---------------------------------------------------------------------------------------  SUBJECTIVE  Patient is opening eyes to command  He moves spontaneously  He has NG with feculent appearing output  He makes gagging sounds occasionally  He has multiple reducible hernias surrounding prior ex lap scar    Review of Systems  Review of systems was unable to be performed secondary to AMS  ---------------------------------------------------------------------------------------  Assessment and Plan:    Neuro:    Diagnosis: Analgesia  o Plan: fentanyl drip weaned from 100 to 75, continue  ? Oxy 10 q 4, ativan PO 2 q 4, seroquel 50 BID, tylenol AC TID, melatonin  ?  Appreciate palliative recs   Diagnosis: encephalopathy  o Plan: Sedation weaned  o Wean analgesia today  o Delirium precautions  o Trend gases  o optomize labs   Agitation  o PRN zyprexa    CV:     Diagnosis: tachycardia  o Plan: control fever component, consider septic response, weaning pain medication  - If all of this controlled, still tachy, consider metoprolol    Pulm:   Diagnosis: s/p lobectomy, s/p hypoxic arrest, s/p cric with conversion to trach  o Plan: continue wean to trach collar after GI (see below) issue resolved   Diagnosis:   o Plan: BID gases   Left lobe consolidation on today's NG film    Thoracics following  Left upper neoplasm, s/p VATS with left chest wall defect, pneumo with chest tube   Currently chest tube with serous output, currently to water seal   subq emphysema improved    GI:    Diagnosis: projectile vomiting  - Multiple chronic ventral hernias, reducible, no overlying skin change  o Plan: concern for ileus vs SBO  CT ab pelv w/ contrast today   Diagnosis: continue NG decompression, to suction, feculent output    - miralax/senna/, mulitple unmeasured stools overnight    :   o Diagnosis: no acute issues    I/O       06/27 0701 - 06/28 0700 06/28 0701 - 06/29 0700 06/29 0701 - 06/30 0700    I V  (mL/kg) 527 9 (4 4) 226 2 (2 1)     NG/ 320     IV Piggyback 200 100     Feedings 1680 1575     Total Intake(mL/kg) 2867 9 (24 1) 2221 2 (20 6)     Urine (mL/kg/hr) 5225 (1 8) 5875 (2 3)     Emesis/NG output 0 700     Stool  0     Chest Tube 350 270     Total Output 5575 6845     Net -2707 1 -4623 8            Unmeasured Stool Occurrence  7 x     Unmeasured Emesis Occurrence  7 x               F/E/N:    Plan:    Hold TF in setting of GI problem      Heme/Onc:   o Diagnosis: RUE, LLE DVT, distal  - lovenox      Endo:   o No acute issues      ID:   o Diagnosis: fever  - Consider infectious source, GI/pulm  o Scheduled tylenol  o Prior cultures neg at 5 days  o ** reculture today       MSK/Skin:    Diagnosis: incisional care  o Diagnosis: repositioning      Disposition: Continue Critical Care   Code Status: Level 2 - DNAR: but accepts endotracheal intubation  ---------------------------------------------------------------------------------------  ICU CORE MEASURES    Prophylaxis   VTE Pharmacologic Prophylaxis: Enoxaparin (Lovenox)  VTE Mechanical Prophylaxis: sequential compression device  Stress Ulcer Prophylaxis: Pantoprazole IV     ABCDE Protocol (if indicated)  Plan to perform spontaneous awakening trial today? Yes  Plan to perform spontaneous breathing trial today? Yes  Obvious barriers to extubation?  Not applicable  CAM-ICU: Negative    Invasive Devices Review  Invasive Devices     Peripherally Inserted Central Catheter Line            PICC Line  Left Basilic 2 days          Peripheral Intravenous Line            Peripheral IV 20 Left Hand 2 days          Drain            Chest Tube 1 Left Pleural 28 Fr  18 days    Urethral Catheter 16 Fr  8 days    NG/OG/Enteral Tube Nasogastric Right nares less than 1 day          Airway            Surgical Airway Shiley Cuffed 6 days              Can any invasive devices be discontinued today? No  ---------------------------------------------------------------------------------------  OBJECTIVE    Vitals   Vitals:    20 0200 20 0310 20 0400 20 0600   BP: 90/58  138/92 131/80   BP Location:   Left arm    Pulse: 98  (!) 112 (!) 134   Resp: 12  13 (!) 24   Temp: 99 °F (37 2 °C)  100 1 °F (37 8 °C) (!) 100 8 °F (38 2 °C)   TempSrc:   Bladder    SpO2: 100% 99% 97% 96%   Weight:    109 kg (239 lb 3 2 oz)   Height:         Temp (24hrs), Av °F (37 8 °C), Min:99 °F (37 2 °C), Max:101 °F (38 3 °C)  Current: Temperature: (!) 100 8 °F (38 2 °C)      Respiratory:  O2 Flow Rate (L/min): 10 L/min    Invasive/non-invasive ventilation settings   Respiratory    Lab Data (Last 4 hours)    None         O2/Vent Data (Last 4 hours)      310           Vent Mode CPAP/PS Spont       FIO2 (%) (%) 40       PEEP (cmH2O) (cmH2O) 5       Pressure Support (cmH2O) (cmH20) 5       MV (Obs) 12 7       RSBI 36                   Physical Exam   Constitutional: He appears well-developed and well-nourished  No distress  HENT:   Head: Normocephalic and atraumatic  NG with several hundred feculent output   Eyes: Pupils are equal, round, and reactive to light  EOM are normal    Neck:   Trach in place   Cardiovascular: Intact distal pulses     tachycardic   Pulmonary/Chest:   Left chest tube to water seal    Respiratory    Lab Data (Last 4 hours)    None O2/Vent Data (Last 4 hours)      06/29 0713           Vent Mode CPAP/PS Spont       FIO2 (%) (%) 40       PEEP (cmH2O) (cmH2O) 5       Pressure Support (cmH2O) (cmH20) 5       MV (Obs) 12 1                  Abdominal:   Multiple ventral hernias, no overlying skin changes  Reducible    No wincing on abd exam    Prior ex lap   Musculoskeletal: Normal range of motion  Neurological:   Moves all 4 spont    Opens eyes to command   Skin: Skin is warm  Capillary refill takes less than 2 seconds  Nursing note and vitals reviewed          Laboratory and Diagnostics:  Results from last 7 days   Lab Units 06/29/20 0447 06/28/20 0419 06/27/20 0429 06/26/20  0512 06/25/20  0613 06/24/20  0516 06/23/20  0432   WBC Thousand/uL 9 71 9 30 10 19* 10 35* 9 98 10 42* 13 94*   HEMOGLOBIN g/dL 11 5* 10 6* 9 3* 9 1* 9 1* 9 7* 9 2*   HEMATOCRIT % 36 5 33 9* 29 6* 29 3* 29 3* 30 6* 28 7*   PLATELETS Thousands/uL 274 218 173 185 163 139* 132*   NEUTROS PCT % 67 72 76* 72 75 78* 81*   MONOS PCT % 10 9 7 6 5 6 5     Results from last 7 days   Lab Units 06/29/20 0447 06/28/20 0419 06/27/20 0429 06/26/20  0512 06/25/20  0613 06/24/20  0516 06/23/20  0432   SODIUM mmol/L 140 138 138 141 143 140 141   POTASSIUM mmol/L 4 2 4 6 4 2 3 7 3 3* 3 9 4 0   CHLORIDE mmol/L 102 104 105 109* 111* 110* 112*   CO2 mmol/L 31 30 27 28 27 24 24   ANION GAP mmol/L 7 4 6 4 5 6 5   BUN mg/dL 6 12 11 12 13 16 16   CREATININE mg/dL 0 57* 0 65 0 59* 0 74 0 81 0 59* 0 65   CALCIUM mg/dL 8 7 8 3 7 5* 7 5* 7 3* 7 5* 7 0*   GLUCOSE RANDOM mg/dL 103 114 111 146* 114 126 93     Results from last 7 days   Lab Units 06/28/20 0419 06/27/20 0429 06/26/20  0512 06/24/20  0516 06/23/20  0432   MAGNESIUM mg/dL 2 2 1 9 2 2 2 4 2 5   PHOSPHORUS mg/dL  --  3 4 3 2 2 6 2 7      Results from last 7 days   Lab Units 06/25/20  0613 06/24/20  0516 06/23/20  1757 06/23/20  1155 06/23/20  0432 06/22/20  2152 06/22/20  1434   INR   --   --   --   --   --   --  1 12   PTT seconds 152* 66* 73* 82* 57* 96* 27          Results from last 7 days   Lab Units 06/27/20  0844   LACTIC ACID mmol/L 1 4     ABG:  Results from last 7 days   Lab Units 06/28/20  0458 06/24/20  0516   PH ART  7 448 7 405   PCO2 ART mm Hg 41 0 36 5   PO2 ART mm Hg 89 0 78 1   HCO3 ART mmol/L 27 7 22 4   BASE EXC ART mmol/L 3 4 -2 0   ABG SOURCE   --  Line, Arterial     VBG:  Results from last 7 days   Lab Units 06/24/20  0516   ABG SOURCE  Line, Arterial     Results from last 7 days   Lab Units 06/29/20  0447 06/28/20  0419   PROCALCITONIN ng/ml 0 53* 0 58*       Micro        EKG: pending  Imaging:   VAS lower limb venous duplex study, unilateral/limited   Final Result by Selina Moffett MD (06/26 1448)      XR chest PICC line portable   Final Result by Eder Rich MD (06/26 1221)      Left PICC with transverse orientation in the upper SVC  Left upper lobectomy with increased groundglass opacity in the left base, question pneumonia  Improving subcutaneous emphysema  Workstation performed: [id number]         XR chest portable   Final Result by Seamus Pennington MD (06/24 0745)      Persistent small left apical pneumothorax  Extensive subcutaneous emphysema  Persistent bilateral lung infiltrates  Workstation performed: [id number]         VAS upper limb venous duplex scan, unilateral/limited   Final Result by Jaqui Mack MD (06/22 0467)      CTA chest ct abdomen pelvis w contrast   Final Result by Leonela Rutherford MD (06/22 1300)      Extensive subcutaneous emphysema and pneumomediastinum  Moderate to large left pneumothorax despite chest tube  There is a defect in the chest wall extending into the pleural cavity between the anterior left 4th and 5th ribs at the level of the cardiac apex  No evidence of pulmonary embolism  Status post partial pneumonectomy of the left upper lobe with right effusion seen in areas of adjacent consolidation        Gallstones with gallbladder distention  Ascites  Nodular density in the anterior mediastinum is of fluid attenuation slightly larger than prior studies  Critical results were discussed with ISAMAR Saleh in the ICU at 12:45 PM               Workstation performed: [id number]         XR chest portable   Final Result by [facility] (06/22 9046)      Extensive subcutaneous emphysema  No definite pneumothorax  Workstation performed: [id number]         VAS lower limb venous duplex study, complete bilateral   Final Result by Lucila Cash MD (06/20 8349)      XR chest portable   Final Result by Lien Gonzalez MD (06/20 3926)      Endotracheal/tracheostomy tube has retracted slightly and is now seen with its tip at the mitchel  Recommend retraction and reassessment  Stable left apical chest tube  Small apical pneumothorax not excluded  Slightly wider mediastinum, recommend reevaluation  Improved alveolar infiltrates with a persistent consolidation in the right upper lobe  I personally discussed this study with Iron Diallo on 6/20/2020 at 10:19 AM                       Workstation performed: [id number]         XR chest portable   Final Result by Debby Franco DO (06/20 8972)   1  Malpositioned tracheostomy tube entering right mainstem bronchus  Repositioning recommended  2   Slight improved bilateral alveolar infiltrates, most compatible with pulmonary alveolar edema  3   More focal consolidation right upper lobe, likely atelectasis  4   Since consolidation left lower lobe, likely atelectasis, however pneumonia not excluded  5   Stable left-sided pneumothorax, despite large bore left-sided chest tube  No evidence of tension  I personally discussed this study with Barbara Naylor on 6/20/2020 at 7:35 AM                Workstation performed: [id number]         XR chest portable   Final Result by Debby Franco DO (06/20 5823)   1    Limited examination due to subcutaneous emphysema  2   Recurrent left-sided pneumothorax without evidence of tension  3   Evolving infiltrate in the lingula, likely atelectasis or pneumonia  Immediate reading was not called, given the patient has had two subsequent, follow-up chest radiographs, prior to this dictation  Workstation performed: [id number]         XR chest portable   Final Result by Bijal Coronel DO (06/20 9453)   1  No significant change in small left-sided pneumothorax  No evidence of tension  2   Diffuse bilateral alveolar infiltrates, right side greater than left, new from the prior study  Flash pulmonary edema not excluded  I personally discussed this study with [employer] on 6/20/2020 at 7:32 AM                Workstation performed: [id number]         XR chest portable   Final Result by Raj Obrien MD (06/20 4367)      Extensive subcutaneous emphysema similar to 6/17/2020  Left-sided chest tube with persistent questionable tiny left apical pneumothorax  Workstation performed: [id number]         XR chest pa & lateral   Final Result by Angus Peña MD (06/17 1301)      Unchanged subcutaneous emphysema  Questionable trace left apical pneumothorax  Workstation performed: [id number]         XR chest portable   Final Result by Juan Wright DO (06/15 4415)      Once again identified is extensive subcutaneous emphysema throughout the chest and neck  Left chest tube unchanged in position with a small suspected apical pneumothorax  Workstation performed: [id number]         XR chest pa & lateral   Final Result by Vangie Callejas MD (06/14 0900)      Unchanged tiny left apical pneumothorax and pneumomediastinum  Extensive subcutaneous emphysema again present  Workstation performed: [id number]         XR chest portable   Final Result by Miguel John MD (06/13 1131)      Improving pneumomediastinum    Trace left apical pneumothorax  Extensive subcutaneous emphysema again present  Workstation performed: [id number]         XR chest pa & lateral   Final Result by Saturnino Holley MD (06/12 4046)      Left upper lobectomy with left chest tube with tiny left pneumothorax  Extensive new pneumomediastinum  Marked worsening of extensive subcutaneous emphysema, greatest in the left chest wall but also extensive in the lower neck  The study was marked in [facility] for immediate notification  Workstation performed: [id number]         X-ray chest 1 view   Final Result by Leila Landers MD (06/10 6312)      No consolidation or discrete pneumothorax  Workstation performed: [id number]         XR chest pa & lateral    (Results Pending)   XR chest portable    (Results Pending)   XR abdomen 1 view kub    (Results Pending)   XR chest portable    (Results Pending)      I have personally reviewed pertinent reports  Intake and Output  I/O       06/27 0701 - 06/28 0700 06/28 0701 - 06/29 0700    I V  (mL/kg) 527 9 (4 4) 226 2 (2 1)    NG/ 320    IV Piggyback 200 100    Feedings 1680 1575    Total Intake(mL/kg) 2867 9 (24 1) 2221 2 (20 6)    Urine (mL/kg/hr) 5225 (1 8) 5525 (2 1)    Emesis/NG output 0 700    Stool  0    Chest Tube 350 270    Total Output 5575 6495    Net -2707 1 -4273 8          Unmeasured Stool Occurrence  1 x    Unmeasured Emesis Occurrence  7 x          Height and Weights   Height: 5' 10" (177 8 cm)  IBW: 73 kg  Body mass index is 34 32 kg/m²    Weight (last 2 days)     Date/Time   Weight    06/29/20 0600   109 (239 2)    06/28/20 0616       Comment rows:    OBSERV: Dr Otis Toussaint here aware of BP issues at 06/28/20 0616    06/28/20 0600   119 (261 47)    Comment rows:    OBSERV: decreased precedex at 06/28/20 0600    06/28/20 0230       Comment rows:    OBSERV: just given ativan and oxy so His precedex gtt was increase to  5 at 06/28/20 0230    06/27/20 2100       Comment rows:    OBSERV: SCCR made aware pt still HTN and Tachy with increasing temp at 06/27/20 2100 06/27/20 2000       Comment rows:    OBSERV: SCCR Maya here to see pt made aware pt just placed back on PS 5/5 40 was htn and tachy HR also had increased WOB on ATC  at 06/27/20 2000 06/27/20 0600   120 (263 45) verified twice    Weight: verified twice at 06/27/20 0600                Nutrition       Diet Orders   (From admission, onward)             Start     Ordered    06/25/20 1244  Diet Enteral/Parenteral; Tube Feeding No Oral Diet; Jevity 1 2 Javier; Continuous; 70  Diet effective now     Question Answer Comment   Diet Type Enteral/Parenteral    Enteral/Parenteral Tube Feeding No Oral Diet    Tube Feeding Formula: Jevity 1 2 Javier    Bolus/Cyclic/Continuous Continuous    Tube Feeding Goal Rate (mL/hr): 70    RD to adjust diet per protocol?  Yes        06/25/20 1244              TF held    Active Medications  Scheduled Meds:  Current Facility-Administered Medications:  acetaminophen 975 mg Oral Q8H [address] Margret Brooks DO    albuterol 2 5 mg Nebulization Q6H PRN Hillary Navarro MD    chlorhexidine 15 mL Swish & Spit Q12H [address] Maged Castañeda PA-C    dexmedetomidine 0 1-1 4 mcg/kg/hr Intravenous Titrated Linward Flaming, CRNP Last Rate: Stopped (06/28/20 1148)   enoxaparin 1 mg/kg Subcutaneous Q12H [address] Candice Buck MD    fentaNYL 75 mcg/hr Intravenous Continuous Linward Flaming, CRNP Last Rate: 75 mcg/hr (06/29/20 0200)   fentanyl citrate (PF) 75 mcg Intravenous Q2H PRN Reta Valentino MD    levalbuterol 1 25 mg Nebulization TID Hillary Navarro MD    Lidocaine Viscous HCl 15 mL Swish & Spit 4x Daily PRN Candice Buck MD    LORazepam 2 mg Per NG Tube Q4H Vladislav Pollard MD    melatonin 3 mg Oral HS KareyKHANG Cornell    nystatin 500,000 Units Swish & Swallow 4x Daily KareyKHANG Cornell    OLANZapine 5 mg Per NG Tube Q3H PRN Sylvia Brooks DO    ondansetron 4 mg Intravenous Q6H PRN Maged Castañeda PA-C    oxyCODONE 10 mg Oral Q4H Lilly Londono MD    oxyCODONE 5 mg Oral Q2H PRN Margret Brooks DO    pantoprazole 40 mg Intravenous Q24H [address] Chapito Solano MD    polyethylene glycol 17 g Oral Daily KHANG Christensen    QUEtiapine 50 mg Oral BID KHANG Christensen    senna-docusate sodium 1 tablet Per NG Tube BID Rosa Kerr PA-C    sodium chloride 3 mL Nebulization TID Ania Pineda MD      Continuous Infusions:    dexmedetomidine 0 1-1 4 mcg/kg/hr Last Rate: Stopped (06/28/20 1148)   fentaNYL 75 mcg/hr Last Rate: 75 mcg/hr (06/29/20 0200)     PRN Meds:     albuterol 2 5 mg Q6H PRN   fentanyl citrate (PF) 75 mcg Q2H PRN   Lidocaine Viscous HCl 15 mL 4x Daily PRN   OLANZapine 5 mg Q3H PRN   ondansetron 4 mg Q6H PRN   oxyCODONE 5 mg Q2H PRN       Allergies   No Known Allergies  ---------------------------------------------------------------------------------------  Advance Directive and Living Will: Yes    Power of : Yes  POLST:    ---------------------------------------------------------------------------------------    Lisbeth Finch MD      Portions of the record may have been created with voice recognition software  Occasional wrong word or "sound a like" substitutions may have occurred due to the inherent limitations of voice recognition software    Read the chart carefully and recognize, using context, where substitutions have occurred

## 2020-06-29 NOTE — RESPIRATORY THERAPY NOTE
RT Ventilator Management Note  Esequiel Crabtree 71 y o  male MRN: [id number]  Unit/Bed#: [facility] 417-01 Encounter: [id number]      Daily Screen       6/27/2020  0742 6/28/2020  0744          Patient safety screen outcome[de-identified]  Passed  Passed              Physical Exam:   Assessment Type: (P) Assess only  General Appearance: (P) Sleeping  Respiratory Pattern: (P) Assisted  Chest Assessment: (P) Chest expansion symmetrical  Bilateral Breath Sounds: (P) Coarse  Suction: Trach      Resp Comments: (P) Pt resting comfortably on PSV with no changes at this time, will continue to monitor throughout shif per respiratory protocol

## 2020-06-29 NOTE — UTILIZATION REVIEW
Continued Stay Review    Date: 6/28/20                         Current Patient Class: inpatient  Current Level of Care: med surg    HPI:69 y o  male initially admitted on 6/10/20 as inpatient after VATS, upper lobectomy, mediastinal lymph node dissection, mediastinoscopy, and flexible bronch  He had an air leak with subQ emphysema throughout upper torso, neck, face, and L arm  Chest tube was in place to water seal  Needed decompressive incision on 6/15, VAC placed, still hds air leak  CXR showed extensive SQ emphysema  On 6/20, became short of breath with sat in 70's and progressed to bradycardia and code event requiring emergency trach  Moved to ICU with a line and central line, levo/epi gtts for bp maintenance  Cric transitioned to trach with trach collar trials started on 6/24  Assessment/Plan:   6/26: making very slow progress  Active issue is continued agitation w/delirium, while trying to wean sedation and prevent pulling at tubes and lines  ketamine and versed weaned yesterday    Tolerate trach collar for only 6 hrs yesterday  Requires minimal vent settings of PSV 5/5/50%  Chest tube to suction, no air leak  He is volume overloaded, diuresis is in progress  SQ emphysema is greatly improved  Completed antbx for possible aspiration pneumonia  Tube feeds in progress for nutrition  Heparin gtt in place  PICC inserted  6/27: continuing trach trials with 4 hrs trach collar bid with pressure support for rest, chest tube to suction, heparin gtt and tube feeds in progress  Weaning sedation  Wound VAC is off, chest wound is sealed  6/28: chest tube placed to water seal  Sedation weaning in progress  Episode of agitation overnight requiring zyprexa and fentanyl  Trach collar trials continue with pressure support  6/29: opens eyes to name, mental status improving, chest tube to water seal,  Persistently febrile overnight with multiple episodes projectile vomiting and vomiting out of kaofed   NG placed, KUB showing ileus  New infectious workup in progress, cultures sent, urine showing rbc's/wbc's/blood but no bacteria or nitrites        Pertinent Labs/Diagnostic Results:     6/28 PCXR: showing NG tube in place  6/28 KUB abd: showing ileus and multiple gas filled loops (per crit care ntoe)    6/29 PCXR: Surgical changes in the left hemithorax with improving ground glass opacity    No pneumothorax      6/29 CT chest a&p: not yet resulted  6/29 EKG: Sinus tachycardia with frequent Premature ventricular complexes  Left axis deviation    Results from last 7 days   Lab Units 06/29/20  0447 06/28/20  0419 06/27/20  0429 06/26/20  0512 06/25/20  0613   WBC Thousand/uL 9 71 9 30 10 19* 10 35* 9 98   HEMOGLOBIN g/dL 11 5* 10 6* 9 3* 9 1* 9 1*   HEMATOCRIT % 36 5 33 9* 29 6* 29 3* 29 3*   PLATELETS Thousands/uL 274 218 173 185 163   NEUTROS ABS Thousands/µL 6 54 6 66 7 78* 7 42 7 45         Results from last 7 days   Lab Units 06/29/20  0447 06/28/20  0419 06/27/20  0429 06/26/20  0512 06/25/20  0613 06/24/20  0516 06/23/20  0432   SODIUM mmol/L 140 138 138 141 143 140 141   POTASSIUM mmol/L 4 2 4 6 4 2 3 7 3 3* 3 9 4 0   CHLORIDE mmol/L 102 104 105 109* 111* 110* 112*   CO2 mmol/L 31 30 27 28 27 24 24   ANION GAP mmol/L 7 4 6 4 5 6 5   BUN mg/dL 6 12 11 12 13 16 16   CREATININE mg/dL 0 57* 0 65 0 59* 0 74 0 81 0 59* 0 65   EGFR ml/min/1 73sq m 105 99 103 94 91 103 99   CALCIUM mg/dL 8 7 8 3 7 5* 7 5* 7 3* 7 5* 7 0*   CALCIUM, IONIZED mmol/L  --  1 09* 1 00* 1 08*  --  1 13  --    MAGNESIUM mg/dL  --  2 2 1 9 2 2  --  2 4 2 5   PHOSPHORUS mg/dL  --   --  3 4 3 2  --  2 6 2 7             Results from last 7 days   Lab Units 06/29/20  0447 06/28/20  0419 06/27/20  0429 06/26/20  0512 06/25/20  0613 06/24/20  0516 06/23/20  0432   GLUCOSE RANDOM mg/dL 103 114 111 146* 114 126 93     Results from last 7 days   Lab Units 06/28/20  0458 06/24/20  0516 06/23/20  0627   PH ART  7 448 7 405 7 386   PCO2 ART mm Hg 41 0 36 5 41 8 PO2 ART mm Hg 89 0 78 1 94 3   HCO3 ART mmol/L 27 7 22 4 24 5   BASE EXC ART mmol/L 3 4 -2 0 -0 5   O2 CONTENT ART mL/dL 16 1 14 2* 14 1*   O2 HGB, ARTERIAL % 95 8 94 6 96 1   ABG SOURCE   --  Line, Arterial Line, Arterial     Results from last 7 days   Lab Units 06/25/20  0613 06/24/20  0516 06/23/20  1757   PTT seconds 152* 66* 73*         Results from last 7 days   Lab Units 06/29/20  0447 06/28/20  0419   PROCALCITONIN ng/ml 0 53* 0 58*     Results from last 7 days   Lab Units 06/27/20  0844   LACTIC ACID mmol/L 1 4     Results from last 7 days   Lab Units 06/29/20  1021   CLARITY UA  Turbid   COLOR UA  Yellow   SPEC GRAV UA  1 017   PH UA  8 5*   GLUCOSE UA mg/dl Negative   KETONES UA mg/dl Negative   BLOOD UA  Moderate*   PROTEIN UA mg/dl 100 (2+)*   NITRITE UA  Negative   BILIRUBIN UA  Negative   UROBILINOGEN UA E U /dl 0 2   LEUKOCYTES UA  Negative   WBC UA /hpf 2-4*   RBC UA /hpf 30-50*   BACTERIA UA /hpf None Seen   EPITHELIAL CELLS WET PREP /hpf None Seen       Results from last 7 days   Lab Units 06/29/20  1059 06/29/20  1024   BLOOD CULTURE  Received in Microbiology Lab  Culture in Progress  Received in Microbiology Lab  Culture in Progress       Vital Signs:   Date/Time  Temp  Pulse  Resp  BP  MAP (mmHg)  SpO2  FiO2 (%)  O2 Flow Rate (L/min)  O2 Device  Patient Position - Orthostatic VS   06/29/20 1400  100 4 °F (38 °C)  112Abnormal   12  93/60  68  99 %  40    Ventilator     06/29/20 1300  100 8 °F (38 2 °C)Abnormal   116Abnormal   19  75/51Abnormal   61  98 %           06/29/20 1200  101 1 °F (38 4 °C)Abnormal   114Abnormal   20  105/60  75  97 %  40    Ventilator     06/29/20 1119            95 %           06/29/20 1100  101 5 °F (38 6 °C)Abnormal   118Abnormal   21  101/56  80  97 %           06/29/20 1000  101 5 °F (38 6 °C)Abnormal   132Abnormal   22  125/80  101  98 %           06/29/20 0951            98 %           06/29/20 0900  100 8 °F (38 2 °C)Abnormal 126Abnormal   29Abnormal   157/91  129  95 %           06/29/20 0800  100 °F (37 8 °C)  126Abnormal   20      92 %      Ventilator     06/29/20 0713            94 %           06/29/20 0712            95 %           06/29/20 0600  100 8 °F (38 2 °C)Abnormal   134Abnormal   24Abnormal   131/80  101  96 %           06/29/20 0500    116Abnormal   23Abnormal       96 %           06/29/20 0431    106Abnormal   16  145/82  109  97 %           06/29/20 0400  100 1 °F (37 8 °C)  112Abnormal   13  138/92  105  97 %  40    Ventilator   Lying   O2 Device: ps5/5 at 06/29/20 0400   06/29/20 0310            99 %           06/29/20 0200  99 °F (37 2 °C)  98  12  90/58  70  100 %           06/29/20 0000  100 °F (37 8 °C)  116Abnormal   14  105/67  83  99 %  40    Ventilator   Lying   O2 Device: ps 5/5 at 06/29/20 0000   06/28/20 2327            96 %           06/28/20 2230  101 °F (38 3 °C)Abnormal   125Abnormal   20  154/86  115  99 %           06/28/20 2200  101 °F (38 3 °C)Abnormal   118Abnormal   22  165/109Abnormal   130  98 %           06/28/20 2000  100 °F (37 8 °C)  122Abnormal   31Abnormal   137/84  104  96 %  40    Ventilator   Lying   O2 Device: ps 5/5 at 06/28/20 2000 06/28/20 1949            97 %           06/28/20 1800  100 4 °F (38 °C)  118Abnormal   21  161/90  118  96 %  50    Trach mask     06/28/20 1600  100 4 °F (38 °C)  118Abnormal   30Abnormal   164/80  104  97 %  50  10 L/min  Trach mask     06/28/20 1405            98 %           06/28/20 1400  99 °F (37 2 °C)  114Abnormal   20  149/95  109  97 %  50  10 L/min  Trach mask     06/28/20 1200  99 °F (37 2 °C)  100  11Abnormal   81/56Abnormal   61  97 %  50  10 L/min  Trach mask     06/28/20 1153              50  10 L/min  Trach mask     06/28/20 1100              50  10 L/min  Trach mask     06/28/20 1000  99 5 °F (37 5 °C)  118Abnormal   15  137/73 91  99 %  50  10 L/min  Trach mask     06/28/20 0800  100 2 °F (37 9 °C)  116Abnormal   18  153/89  123  97 %  50  10 L/min  Trach mask           Medications:   Scheduled Medications:    Medications:  chlorhexidine 15 mL Swish & Spit Q12H [address]   Diazepam   Started 6/29 5 mg Intravenous TID   enoxaparin 1 mg/kg Subcutaneous Q12H [address]   HYDROmorphone started 6/29 0 5 mg Intravenous Q4H   levalbuterol  Changed from q6h to tid on 6/29 1 25 mg Nebulization TID   melatonin 3 mg Oral HS   nystatin 500,000 Units Swish & Swallow 4x Daily   OLANZapine 5 mg Intramuscular HS   pantoprazole 40 mg Intravenous Q24H DEAN   polyethylene glycol 17 g Oral Daily   senna-docusate sodium 1 tablet Per NG Tube BID   sodium chloride 3 mL Nebulization TID   IV ca gluc x 1 6/26, x 1 6/27, x 1 6/28  IV fentanyl x 1 6/27  IV lasix  x1 6/25,  X 1 6/26    metroNIDAZOLE (FLAGYL) tablet 500 mg   Dose: 500 mg  Freq: Every 8 hours Route: PO  Start: 06/23/20 1000 End: 06/27/20 0143    multi-electrolyte (ISOLYTE-S PH 7 4) bolus 1,000 mL   Dose: 1,000 mL  Freq:  Once Route: IV  Start: 06/29/20 1345 End: 06/29/20 1515    LORazepam (ATIVAN) tablet 2 mg   Dose: 2 mg  Freq: Every 4 hours Route: PER NG TUBE  Start: 06/25/20 0915 End: 06/29/20 1028    cefepime (MAXIPIME) 2,000 mg in dextrose 5 % 50 mL IVPB   Dose: 2,000 mg  Freq: Every 8 hours Route: IV  Last Dose: Stopped (06/27/20 0000)  Start: 06/22/20 1715 End: 06/27/20 0000    oxyCODONE (ROXICODONE) oral solution 10 mg   Dose: 10 mg  Freq: Every 4 hours Route: PO  Start: 06/25/20 1300 End: 06/29/20 1028    Continuous IV Infusions:    dexmedetomidine 0 1-1 4 mcg/kg/hr Intravenous Titrated   fentaNYL 50 mcg/hr Intravenous Continuous   ketamine 250 mg (STANDARD CONCENTRATION) IV in sodium chloride 0 9% 250 mL   Rate: 4 8 mL/hr Dose: 0 05 mg/kg/hr  Weight Dosing Info: 95 2 kg  Freq: Continuous Route: IV  Last Dose: Stopped (06/27/20 1315)  Start: 06/25/20 1145 End: 06/27/20 1356    midazolam (VERSED) 0 5 mg/mL (STANDARD CONCENTRATION) 100 mL infusion   Rate: 1 mL/hr Dose: 0 5 mg/hr  Freq: Continuous Route: IV  Last Dose: Stopped (06/27/20 1526)  Start: 06/24/20 0230 End: 06/27/20 1608    norepinephrine (LEVOPHED) 4 mg (STANDARD CONCENTRATION) IV in sodium chloride 0 9% 250 mL   Rate: 3 8-112 5 mL/hr Dose: 1-30 mcg/min  Freq: Titrated Route: IV  Last Dose: Stopped (06/26/20 1000)  Start: 06/25/20 1045 End: 06/26/20 1243    PRN Meds:    acetaminophen 650 mg Rectal Q4H PRN   albuterol 2 5 mg Nebulization Q6H PRN   fentanyl citrate (PF)  X 2 6/29  50mcg x 5 on 6/25 75 mcg Intravenous Q2H PRN   haloperidol lactate 1 mg Intravenous Q6H PRN   Lidocaine Viscous HCl 15 mL Swish & Spit 4x Daily PRN   Ondansetron  X 1 6/29 4 mg Intravenous Q6H PRN       Discharge Plan: TBD    Network Utilization Review Department  [email or web address]  ATTENTION: Please call with any questions or concerns to [phone] and carefully listen to the prompts so that you are directed to the right person  All voicemails are confidential   Nancy Mendez all requests for admission clinical reviews, approved or denied determinations and any other requests to dedicated fax number below belonging to the campus where the patient is receiving treatment   List of dedicated fax numbers for the Facilities:  [address] DENIALS (Administrative/Medical Necessity) [phone]   [address] (Maternity/NICU/Pediatrics) [phone]   Conda Sas [phone]   Jamas Piggs [phone]   Maricel Dress [phone]   Levell Pleasure [phone]   [address] [facility] [address] [facility] [phone]   [facility]  [phone]   [address] [facility]  [address] [facility] [address] [facility] [phone]

## 2020-06-29 NOTE — PROGRESS NOTES
Progress Note - Thoracic Surgery   Wai Pearson 71 y o  male MRN: [id number]  Unit/Bed#: [facility] 417-01 Encounter: [id number]    Assessment:  71yo male s/p L VATS upper lobectomy  Post op prolonged airleak with significant subcutaneous emphysema  Also had code event requiring cricothyrodotomy for difficult intubation and now s/p formalization to tracheostomy  CT 220ml serous, -AL    Plan:  Hold TFs- KUB with mild ileus, +emesis  Am CXR- possible aspiration  Protonix   Continue to wean sedation  CT to water seal  IS/OOB/ambualte  Primary per ICU    Subjective/Objective   Chief Complaint:     Subjective: Tmax 101  Off precedex, on [email or web address]  TC during the day, PS at night  Early this am projectile vomited large amount of hua stomach contents per nursing, having Q2hr BMs  Abdomen is distended  Nursing reports aspiration  Objective:     Blood pressure 154/86, pulse (!) 125, temperature (!) 101 °F (38 3 °C), resp  rate 20, height 5' 10" (1 778 m), weight 119 kg (261 lb 7 5 oz), SpO2 96 %  ,Body mass index is 37 52 kg/m²  Intake/Output Summary (Last 24 hours) at 6/29/2020 0139  Last data filed at 6/28/2020 2200  Gross per 24 hour   Intake 2333 87 ml   Output 6120 ml   Net -3786 13 ml       Invasive Devices     Peripherally Inserted Central Catheter Line            PICC Line 92/98/86 Left Basilic 2 days          Peripheral Intravenous Line            Peripheral IV 06/26/20 Left Hand 2 days          Drain            Chest Tube 1 Left Pleural 28 Fr   18 days    NG/OG/Enteral Tube Nasogastric 12 Fr Right nares 8 days    Urethral Catheter 16 Fr  8 days          Airway            Surgical Airway Shiley Cuffed 6 days                Physical Exam: NAD  Atraumatic/normocephalic  Awake, sedated  Normal mood and affect  Normal respiratory effort, ventilated PS  Soft, non tender, ND  Skin warm/dry  Cap refil <2 sec  Trache in place  CT in place    Lab, Imaging and other studies:  I have personally reviewed pertinent lab results    , CBC:   Lab Results   Component Value Date    WBC 9 30 06/28/2020    HGB 10 6 (L) 06/28/2020    HCT 33 9 (L) 06/28/2020    MCV 99 (H) 06/28/2020     06/28/2020    MCH 30 8 06/28/2020    MCHC 31 3 (L) 06/28/2020    RDW 14 9 06/28/2020    MPV 10 6 06/28/2020    NRBC 0 06/28/2020   , CMP:   Lab Results   Component Value Date    SODIUM 138 06/28/2020    K 4 6 06/28/2020     06/28/2020    CO2 30 06/28/2020    BUN 12 06/28/2020    CREATININE 0 65 06/28/2020    CALCIUM 8 3 06/28/2020    EGFR 99 06/28/2020     VTE Pharmacologic Prophylaxis: Enoxaparin (Lovenox)  VTE Mechanical Prophylaxis: sequential compression device

## 2020-06-30 LAB
ANION GAP SERPL CALCULATED.3IONS-SCNC: 5 MMOL/L (ref 4–13)
ATRIAL RATE: 89 BPM
BASOPHILS # BLD AUTO: 0.04 THOUSANDS/ΜL (ref 0–0.1)
BASOPHILS NFR BLD AUTO: 0 % (ref 0–1)
BUN SERPL-MCNC: 11 MG/DL (ref 5–25)
CALCIUM SERPL-MCNC: 7.2 MG/DL (ref 8.3–10.1)
CHLORIDE SERPL-SCNC: 103 MMOL/L (ref 100–108)
CO2 SERPL-SCNC: 31 MMOL/L (ref 21–32)
CREAT SERPL-MCNC: 0.6 MG/DL (ref 0.6–1.3)
EOSINOPHIL # BLD AUTO: 0.18 THOUSAND/ΜL (ref 0–0.61)
EOSINOPHIL NFR BLD AUTO: 1 % (ref 0–6)
ERYTHROCYTE [DISTWIDTH] IN BLOOD BY AUTOMATED COUNT: 14.6 % (ref 11.6–15.1)
GFR SERPL CREATININE-BSD FRML MDRD: 103 ML/MIN/1.73SQ M
GLUCOSE SERPL-MCNC: 89 MG/DL (ref 65–140)
HCT VFR BLD AUTO: 25.9 % (ref 36.5–49.3)
HCT VFR BLD AUTO: 29.3 % (ref 36.5–49.3)
HGB BLD-MCNC: 7.9 G/DL (ref 12–17)
HGB BLD-MCNC: 8.1 G/DL (ref 12–17)
HGB BLD-MCNC: 8.9 G/DL (ref 12–17)
HGB BLD-MCNC: 8.9 G/DL (ref 12–17)
IMM GRANULOCYTES # BLD AUTO: 0.09 THOUSAND/UL (ref 0–0.2)
IMM GRANULOCYTES NFR BLD AUTO: 1 % (ref 0–2)
LYMPHOCYTES # BLD AUTO: 1.18 THOUSANDS/ΜL (ref 0.6–4.47)
LYMPHOCYTES NFR BLD AUTO: 8 % (ref 14–44)
MCH RBC QN AUTO: 30.5 PG (ref 26.8–34.3)
MCHC RBC AUTO-ENTMCNC: 30.5 G/DL (ref 31.4–37.4)
MCV RBC AUTO: 100 FL (ref 82–98)
MONOCYTES # BLD AUTO: 1.06 THOUSAND/ΜL (ref 0.17–1.22)
MONOCYTES NFR BLD AUTO: 8 % (ref 4–12)
NEUTROPHILS # BLD AUTO: 11.55 THOUSANDS/ΜL (ref 1.85–7.62)
NEUTS SEG NFR BLD AUTO: 82 % (ref 43–75)
NRBC BLD AUTO-RTO: 0 /100 WBCS
P AXIS: 35 DEGREES
PLATELET # BLD AUTO: 236 THOUSANDS/UL (ref 149–390)
PMV BLD AUTO: 10.3 FL (ref 8.9–12.7)
POTASSIUM SERPL-SCNC: 4.1 MMOL/L (ref 3.5–5.3)
PR INTERVAL: 150 MS
PROCALCITONIN SERPL-MCNC: 0.29 NG/ML
QRS AXIS: -13 DEGREES
QRSD INTERVAL: 79 MS
QT INTERVAL: 354 MS
QTC INTERVAL: 431 MS
RBC # BLD AUTO: 2.59 MILLION/UL (ref 3.88–5.62)
SODIUM SERPL-SCNC: 139 MMOL/L (ref 136–145)
T WAVE AXIS: 84 DEGREES
VENTRICULAR RATE: 89 BPM
WBC # BLD AUTO: 14.1 THOUSAND/UL (ref 4.31–10.16)

## 2020-06-30 PROCEDURE — 84145 PROCALCITONIN (PCT): CPT | Performed by: REGISTERED NURSE

## 2020-06-30 PROCEDURE — 94640 AIRWAY INHALATION TREATMENT: CPT

## 2020-06-30 PROCEDURE — 85025 COMPLETE CBC W/AUTO DIFF WBC: CPT | Performed by: THORACIC SURGERY (CARDIOTHORACIC VASCULAR SURGERY)

## 2020-06-30 PROCEDURE — 99291 CRITICAL CARE FIRST HOUR: CPT | Performed by: EMERGENCY MEDICINE

## 2020-06-30 PROCEDURE — 85018 HEMOGLOBIN: CPT | Performed by: THORACIC SURGERY (CARDIOTHORACIC VASCULAR SURGERY)

## 2020-06-30 PROCEDURE — 80048 BASIC METABOLIC PNL TOTAL CA: CPT | Performed by: THORACIC SURGERY (CARDIOTHORACIC VASCULAR SURGERY)

## 2020-06-30 PROCEDURE — 99231 SBSQ HOSP IP/OBS SF/LOW 25: CPT | Performed by: INTERNAL MEDICINE

## 2020-06-30 PROCEDURE — 85018 HEMOGLOBIN: CPT | Performed by: EMERGENCY MEDICINE

## 2020-06-30 PROCEDURE — 85014 HEMATOCRIT: CPT | Performed by: THORACIC SURGERY (CARDIOTHORACIC VASCULAR SURGERY)

## 2020-06-30 PROCEDURE — 94003 VENT MGMT INPAT SUBQ DAY: CPT

## 2020-06-30 PROCEDURE — 99024 POSTOP FOLLOW-UP VISIT: CPT | Performed by: THORACIC SURGERY (CARDIOTHORACIC VASCULAR SURGERY)

## 2020-06-30 PROCEDURE — 93010 ELECTROCARDIOGRAM REPORT: CPT | Performed by: INTERNAL MEDICINE

## 2020-06-30 PROCEDURE — 94760 N-INVAS EAR/PLS OXIMETRY 1: CPT

## 2020-06-30 PROCEDURE — 93005 ELECTROCARDIOGRAM TRACING: CPT

## 2020-06-30 PROCEDURE — C9113 INJ PANTOPRAZOLE SODIUM, VIA: HCPCS | Performed by: STUDENT IN AN ORGANIZED HEALTH CARE EDUCATION/TRAINING PROGRAM

## 2020-06-30 RX ORDER — MIDAZOLAM HYDROCHLORIDE 2 MG/2ML
4 INJECTION, SOLUTION INTRAMUSCULAR; INTRAVENOUS ONCE
Status: COMPLETED | OUTPATIENT
Start: 2020-06-30 | End: 2020-06-30

## 2020-06-30 RX ORDER — HYDROMORPHONE HCL/PF 1 MG/ML
0.5 SYRINGE (ML) INJECTION
Status: DISCONTINUED | OUTPATIENT
Start: 2020-06-30 | End: 2020-07-08

## 2020-06-30 RX ORDER — FENTANYL CITRATE 50 UG/ML
50 INJECTION, SOLUTION INTRAMUSCULAR; INTRAVENOUS
Status: DISCONTINUED | OUTPATIENT
Start: 2020-06-30 | End: 2020-06-30

## 2020-06-30 RX ORDER — HYDROMORPHONE HCL/PF 1 MG/ML
0.5 SYRINGE (ML) INJECTION EVERY 2 HOUR PRN
Status: DISCONTINUED | OUTPATIENT
Start: 2020-06-30 | End: 2020-06-30

## 2020-06-30 RX ORDER — HYDROMORPHONE HCL/PF 1 MG/ML
1 SYRINGE (ML) INJECTION EVERY 4 HOURS
Status: DISCONTINUED | OUTPATIENT
Start: 2020-06-30 | End: 2020-07-06

## 2020-06-30 RX ORDER — PROPOFOL 10 MG/ML
5-50 INJECTION, EMULSION INTRAVENOUS
Status: DISCONTINUED | OUTPATIENT
Start: 2020-06-30 | End: 2020-07-05

## 2020-06-30 RX ORDER — MIDAZOLAM HYDROCHLORIDE 2 MG/2ML
2 INJECTION, SOLUTION INTRAMUSCULAR; INTRAVENOUS ONCE AS NEEDED
Status: COMPLETED | OUTPATIENT
Start: 2020-06-30 | End: 2020-07-02

## 2020-06-30 RX ORDER — QUETIAPINE FUMARATE 25 MG/1
50 TABLET, FILM COATED ORAL
Status: DISCONTINUED | OUTPATIENT
Start: 2020-06-30 | End: 2020-07-13

## 2020-06-30 RX ORDER — DIAZEPAM 5 MG/ML
10 INJECTION, SOLUTION INTRAMUSCULAR; INTRAVENOUS 3 TIMES DAILY
Status: DISCONTINUED | OUTPATIENT
Start: 2020-06-30 | End: 2020-07-06

## 2020-06-30 RX ORDER — SODIUM CHLORIDE, SODIUM GLUCONATE, SODIUM ACETATE, POTASSIUM CHLORIDE, MAGNESIUM CHLORIDE, SODIUM PHOSPHATE, DIBASIC, AND POTASSIUM PHOSPHATE .53; .5; .37; .037; .03; .012; .00082 G/100ML; G/100ML; G/100ML; G/100ML; G/100ML; G/100ML; G/100ML
75 INJECTION, SOLUTION INTRAVENOUS CONTINUOUS
Status: DISCONTINUED | OUTPATIENT
Start: 2020-06-30 | End: 2020-07-05

## 2020-06-30 RX ORDER — FUROSEMIDE 10 MG/ML
40 INJECTION INTRAMUSCULAR; INTRAVENOUS ONCE
Status: COMPLETED | OUTPATIENT
Start: 2020-06-30 | End: 2020-06-30

## 2020-06-30 RX ORDER — SODIUM CHLORIDE, SODIUM GLUCONATE, SODIUM ACETATE, POTASSIUM CHLORIDE, MAGNESIUM CHLORIDE, SODIUM PHOSPHATE, DIBASIC, AND POTASSIUM PHOSPHATE .53; .5; .37; .037; .03; .012; .00082 G/100ML; G/100ML; G/100ML; G/100ML; G/100ML; G/100ML; G/100ML
500 INJECTION, SOLUTION INTRAVENOUS ONCE
Status: COMPLETED | OUTPATIENT
Start: 2020-06-30 | End: 2020-07-01

## 2020-06-30 RX ORDER — CALCIUM GLUCONATE 20 MG/ML
1 INJECTION, SOLUTION INTRAVENOUS ONCE
Status: COMPLETED | OUTPATIENT
Start: 2020-06-30 | End: 2020-06-30

## 2020-06-30 RX ORDER — SODIUM CHLORIDE, SODIUM GLUCONATE, SODIUM ACETATE, POTASSIUM CHLORIDE, MAGNESIUM CHLORIDE, SODIUM PHOSPHATE, DIBASIC, AND POTASSIUM PHOSPHATE .53; .5; .37; .037; .03; .012; .00082 G/100ML; G/100ML; G/100ML; G/100ML; G/100ML; G/100ML; G/100ML
500 INJECTION, SOLUTION INTRAVENOUS ONCE
Status: COMPLETED | OUTPATIENT
Start: 2020-06-30 | End: 2020-06-30

## 2020-06-30 RX ADMIN — MELATONIN 3 MG: at 21:22

## 2020-06-30 RX ADMIN — PANTOPRAZOLE SODIUM 40 MG: 40 INJECTION, POWDER, FOR SOLUTION INTRAVENOUS at 08:48

## 2020-06-30 RX ADMIN — FENTANYL CITRATE 50 MCG: 50 INJECTION, SOLUTION INTRAMUSCULAR; INTRAVENOUS at 12:41

## 2020-06-30 RX ADMIN — SODIUM CHLORIDE, SODIUM GLUCONATE, SODIUM ACETATE, POTASSIUM CHLORIDE, MAGNESIUM CHLORIDE, SODIUM PHOSPHATE, DIBASIC, AND POTASSIUM PHOSPHATE 500 ML: .53; .5; .37; .037; .03; .012; .00082 INJECTION, SOLUTION INTRAVENOUS at 02:29

## 2020-06-30 RX ADMIN — CALCIUM GLUCONATE 1 G: 20 INJECTION, SOLUTION INTRAVENOUS at 08:42

## 2020-06-30 RX ADMIN — SODIUM CHLORIDE, SODIUM GLUCONATE, SODIUM ACETATE, POTASSIUM CHLORIDE, MAGNESIUM CHLORIDE, SODIUM PHOSPHATE, DIBASIC, AND POTASSIUM PHOSPHATE 100 ML/HR: .53; .5; .37; .037; .03; .012; .00082 INJECTION, SOLUTION INTRAVENOUS at 08:41

## 2020-06-30 RX ADMIN — HYDROMORPHONE HYDROCHLORIDE 0.5 MG: 1 INJECTION, SOLUTION INTRAMUSCULAR; INTRAVENOUS; SUBCUTANEOUS at 12:53

## 2020-06-30 RX ADMIN — NYSTATIN 500000 UNITS: 100000 SUSPENSION ORAL at 21:22

## 2020-06-30 RX ADMIN — ISODIUM CHLORIDE 3 ML: 0.03 SOLUTION RESPIRATORY (INHALATION) at 20:32

## 2020-06-30 RX ADMIN — FENTANYL CITRATE 50 MCG: 50 INJECTION, SOLUTION INTRAMUSCULAR; INTRAVENOUS at 11:43

## 2020-06-30 RX ADMIN — LEVALBUTEROL HYDROCHLORIDE 1.25 MG: 1.25 SOLUTION, CONCENTRATE RESPIRATORY (INHALATION) at 13:29

## 2020-06-30 RX ADMIN — DIAZEPAM 5 MG: 10 INJECTION, SOLUTION INTRAMUSCULAR; INTRAVENOUS at 08:50

## 2020-06-30 RX ADMIN — VALPROATE SODIUM 250 MG: 100 INJECTION, SOLUTION INTRAVENOUS at 21:21

## 2020-06-30 RX ADMIN — VALPROATE SODIUM 250 MG: 100 INJECTION, SOLUTION INTRAVENOUS at 15:42

## 2020-06-30 RX ADMIN — ENOXAPARIN SODIUM 100 MG: 100 INJECTION SUBCUTANEOUS at 08:50

## 2020-06-30 RX ADMIN — SODIUM CHLORIDE, SODIUM GLUCONATE, SODIUM ACETATE, POTASSIUM CHLORIDE, MAGNESIUM CHLORIDE, SODIUM PHOSPHATE, DIBASIC, AND POTASSIUM PHOSPHATE 500 ML: .53; .5; .37; .037; .03; .012; .00082 INJECTION, SOLUTION INTRAVENOUS at 23:22

## 2020-06-30 RX ADMIN — ENOXAPARIN SODIUM 100 MG: 100 INJECTION SUBCUTANEOUS at 21:26

## 2020-06-30 RX ADMIN — DIAZEPAM 10 MG: 10 INJECTION, SOLUTION INTRAMUSCULAR; INTRAVENOUS at 15:29

## 2020-06-30 RX ADMIN — FUROSEMIDE 40 MG: 10 INJECTION, SOLUTION INTRAMUSCULAR; INTRAVENOUS at 15:26

## 2020-06-30 RX ADMIN — CHLORHEXIDINE GLUCONATE 0.12% ORAL RINSE 15 ML: 1.2 LIQUID ORAL at 21:25

## 2020-06-30 RX ADMIN — MIDAZOLAM 4 MG: 1 INJECTION INTRAMUSCULAR; INTRAVENOUS at 13:52

## 2020-06-30 RX ADMIN — LEVALBUTEROL HYDROCHLORIDE 1.25 MG: 1.25 SOLUTION, CONCENTRATE RESPIRATORY (INHALATION) at 20:32

## 2020-06-30 RX ADMIN — HYDROMORPHONE HYDROCHLORIDE 0.5 MG: 1 INJECTION, SOLUTION INTRAMUSCULAR; INTRAVENOUS; SUBCUTANEOUS at 03:45

## 2020-06-30 RX ADMIN — HYDROMORPHONE HYDROCHLORIDE 1 MG: 1 INJECTION, SOLUTION INTRAMUSCULAR; INTRAVENOUS; SUBCUTANEOUS at 19:26

## 2020-06-30 RX ADMIN — CHLORHEXIDINE GLUCONATE 0.12% ORAL RINSE 15 ML: 1.2 LIQUID ORAL at 08:48

## 2020-06-30 RX ADMIN — Medication 75 MCG/HR: at 11:30

## 2020-06-30 RX ADMIN — Medication 75 MCG/HR: at 01:48

## 2020-06-30 RX ADMIN — QUETIAPINE FUMARATE 50 MG: 25 TABLET ORAL at 21:22

## 2020-06-30 RX ADMIN — ISODIUM CHLORIDE 3 ML: 0.03 SOLUTION RESPIRATORY (INHALATION) at 13:29

## 2020-06-30 RX ADMIN — NYSTATIN 500000 UNITS: 100000 SUSPENSION ORAL at 08:48

## 2020-06-30 RX ADMIN — SENNOSIDES AND DOCUSATE SODIUM 1 TABLET: 8.6; 5 TABLET ORAL at 17:30

## 2020-06-30 RX ADMIN — ISODIUM CHLORIDE 3 ML: 0.03 SOLUTION RESPIRATORY (INHALATION) at 07:47

## 2020-06-30 RX ADMIN — PROPOFOL 20 MCG/KG/MIN: 10 INJECTION, EMULSION INTRAVENOUS at 13:49

## 2020-06-30 RX ADMIN — HYDROMORPHONE HYDROCHLORIDE 0.5 MG: 1 INJECTION, SOLUTION INTRAMUSCULAR; INTRAVENOUS; SUBCUTANEOUS at 06:33

## 2020-06-30 RX ADMIN — NYSTATIN 500000 UNITS: 100000 SUSPENSION ORAL at 11:22

## 2020-06-30 RX ADMIN — LEVALBUTEROL HYDROCHLORIDE 1.25 MG: 1.25 SOLUTION, CONCENTRATE RESPIRATORY (INHALATION) at 07:47

## 2020-06-30 RX ADMIN — HYDROMORPHONE HYDROCHLORIDE 0.5 MG: 1 INJECTION, SOLUTION INTRAMUSCULAR; INTRAVENOUS; SUBCUTANEOUS at 17:37

## 2020-06-30 RX ADMIN — Medication 1 MG/HR: at 13:52

## 2020-06-30 RX ADMIN — HYDROMORPHONE HYDROCHLORIDE 0.5 MG: 1 INJECTION, SOLUTION INTRAMUSCULAR; INTRAVENOUS; SUBCUTANEOUS at 10:37

## 2020-06-30 RX ADMIN — NYSTATIN 500000 UNITS: 100000 SUSPENSION ORAL at 17:30

## 2020-06-30 RX ADMIN — SENNOSIDES AND DOCUSATE SODIUM 1 TABLET: 8.6; 5 TABLET ORAL at 08:48

## 2020-06-30 RX ADMIN — SODIUM CHLORIDE, SODIUM GLUCONATE, SODIUM ACETATE, POTASSIUM CHLORIDE, MAGNESIUM CHLORIDE, SODIUM PHOSPHATE, DIBASIC, AND POTASSIUM PHOSPHATE 100 ML/HR: .53; .5; .37; .037; .03; .012; .00082 INJECTION, SOLUTION INTRAVENOUS at 03:38

## 2020-06-30 NOTE — NURSING NOTE
Pt agitated/restless and frequently attempting to get OOB, not following commands  Repo multiple times, attempting to redirect/calm down and given PRN's  See MAR  Will continue to monitor

## 2020-06-30 NOTE — PROGRESS NOTES
Progress Note - Thoracic Surgery   Padmini Zhang 71 y o  male MRN: [id number]  Unit/Bed#: [facility] 417-01 Encounter: [id number]    Assessment:  69yo male s/p L VATS upper lobectomy  Post op prolonged airleak with significant subcutaneous emphysema  Also had code event requiring cricothyrodotomy for difficult intubation and now s/p formalization to tracheostomy  6/29 CTAP: Improving subQ emphysema  Left PTX resolved with decreased lung volume and groundglass changes  Trace BL pleural effusions  -CT 50ml serous, -AL  -NGT 2 3L  -1 9L UOP    Plan:  NPO/NGT  IVF  TFs on hold with high NG output  Wean sedation as tolerated  Wean vent as tolerated  CT to water seal  Primary per ICU    Subjective/Objective   Chief Complaint:     Subjective: NATALYA  Tmax 101 5 11pm  PS 40% 5/5  Abdomen much softer today  Objective:     Blood pressure 147/92, pulse 102, temperature 99 3 °F (37 4 °C), resp  rate 22, height 5' 10" (1 778 m), weight 103 kg (227 lb 8 2 oz), SpO2 96 %  ,Body mass index is 32 65 kg/m²  Intake/Output Summary (Last 24 hours) at 6/30/2020 0542  Last data filed at 6/30/2020 0533  Gross per 24 hour   Intake 1701 56 ml   Output 5464 ml   Net -3762 44 ml       Invasive Devices     Peripherally Inserted Central Catheter Line            PICC Line 72/33/43 Left Basilic 3 days          Drain            Chest Tube 1 Left Pleural 28 Fr  19 days    Urethral Catheter 16 Fr  9 days    NG/OG/Enteral Tube Nasogastric Right nares less than 1 day          Airway            Surgical Airway Shiley Cuffed 7 days                Physical Exam: NAD  Atraumatic/normocephalic  Wakes up, alert on sedation   Normal mood and affect  Normal respiratory effort, PS  Soft, non tender, ND  Skin warm/dry  Cap refil <2 sec  trahe secured in place  CT in place    Lab, Imaging and other studies:  I have personally reviewed pertinent lab results    , CBC:   Lab Results   Component Value Date    WBC 14 10 (H) 06/30/2020    HGB 8 9 (L) 06/30/2020 HCT 29 3 (L) 06/30/2020     (H) 06/30/2020     06/30/2020    MCH 30 5 06/30/2020    MCHC 30 5 (L) 06/30/2020    RDW 14 6 06/30/2020    MPV 10 3 06/30/2020    NRBC 0 06/30/2020   , CMP:   Lab Results   Component Value Date    SODIUM 139 06/30/2020    K 4 1 06/30/2020     06/30/2020    CO2 31 06/30/2020    BUN 11 06/30/2020    CREATININE 0 60 06/30/2020    CALCIUM 7 2 (L) 06/30/2020    EGFR 103 06/30/2020

## 2020-06-30 NOTE — PHYSICAL THERAPY NOTE
Physical Therapy Cancellation Note    Chart reviewed; noted pt is currently sedated/not following commands (per notes) and on the vent (trach); will hold PT/mobilization at this time; will follow as clinical course allows      Marilee Diaz PT

## 2020-06-30 NOTE — OCCUPATIONAL THERAPY NOTE
Occupational Therapy Cancellation        Patient Name: Edward Knowles  Today's Date: 6/30/2020    Chart reviewed  Pt w/ agitation overnight, pt currently sedated/not following commands  OT will hold at this time as see appropriate      Amy Ulloa MS, OTR/L

## 2020-06-30 NOTE — RESTORATIVE TECHNICIAN NOTE
Restorative Specialist Mobility Note                                Range of Motion: Active(attempted active ther ex in the bed but pt slightly agitated at this time and not following along even when assistance was given)

## 2020-06-30 NOTE — PLAN OF CARE
Problem: CARDIOVASCULAR - ADULT  Goal: Maintains optimal cardiac output and hemodynamic stability  Description  INTERVENTIONS:  - Monitor I/O, vital signs and rhythm  - Monitor for S/S and trends of decreased cardiac output  - Administer and titrate ordered vasoactive medications to optimize hemodynamic stability  - Assess quality of pulses, skin color and temperature  - Assess for signs of decreased coronary artery perfusion  - Instruct patient to report change in severity of symptoms  Outcome: Progressing  Goal: Absence of cardiac dysrhythmias or at baseline rhythm  Description  INTERVENTIONS:  - Continuous cardiac monitoring, vital signs, obtain 12 lead EKG if ordered  - Administer antiarrhythmic and heart rate control medications as ordered  - Monitor electrolytes and administer replacement therapy as ordered  Outcome: Progressing     Problem: RESPIRATORY - ADULT  Goal: Achieves optimal ventilation and oxygenation  Description  INTERVENTIONS:  - Assess for changes in respiratory status  - Assess for changes in mentation and behavior  - Position to facilitate oxygenation and minimize respiratory effort  - Oxygen administered by appropriate delivery if ordered  - Initiate smoking cessation education as indicated  - Encourage broncho-pulmonary hygiene including cough, deep breathe, Incentive Spirometry  - Assess the need for suctioning and aspirate as needed  - Assess and instruct to report SOB or any respiratory difficulty  - Respiratory Therapy support as indicated  Outcome: Progressing     Problem: SKIN/TISSUE INTEGRITY - ADULT  Goal: Skin integrity remains intact  Description  INTERVENTIONS  - Identify patients at risk for skin breakdown  - Assess and monitor skin integrity  - Assess and monitor nutrition and hydration status  - Monitor labs (i e  albumin)  - Assess for incontinence   - Turn and reposition patient  - Assist with mobility/ambulation  - Relieve pressure over bony prominences  - Avoid friction and shearing  - Provide appropriate hygiene as needed including keeping skin clean and dry  - Evaluate need for skin moisturizer/barrier cream  - Collaborate with interdisciplinary team (i e  Nutrition, Rehabilitation, etc )   - Patient/family teaching  Outcome: Progressing  Goal: Incision(s), wounds(s) or drain site(s) healing without S/S of infection  Description  INTERVENTIONS  - Assess and document risk factors for skin impairment   - Assess and document dressing, incision, wound bed, drain sites and surrounding tissue  - Consider nutrition services referral as needed  - Oral mucous membranes remain intact  - Provide patient/ family education  Outcome: Progressing  Goal: Oral mucous membranes remain intact  Description  INTERVENTIONS  - Assess oral mucosa and hygiene practices  - Implement preventative oral hygiene regimen  - Implement oral medicated treatments as ordered  - Initiate Nutrition services referral as needed  Outcome: Progressing     Problem: MUSCULOSKELETAL - ADULT  Goal: Maintain or return mobility to safest level of function  Description  INTERVENTIONS:  - Assess patient's ability to carry out ADLs; assess patient's baseline for ADL function and identify physical deficits which impact ability to perform ADLs (bathing, care of mouth/teeth, toileting, grooming, dressing, etc )  - Assess/evaluate cause of self-care deficits   - Assess range of motion  - Assess patient's mobility  - Assess patient's need for assistive devices and provide as appropriate  - Encourage maximum independence but intervene and supervise when necessary  - Involve family in performance of ADLs  - Assess for home care needs following discharge   - Consider OT consult to assist with ADL evaluation and planning for discharge  - Provide patient education as appropriate  Outcome: Progressing  Goal: Maintain proper alignment of affected body part  Description  INTERVENTIONS:  - Support, maintain and protect limb and body alignment  - Provide patient/ family with appropriate education  Outcome: Progressing     Problem: Potential for Falls  Goal: Patient will remain free of falls  Description  INTERVENTIONS:  - Assess patient frequently for physical needs  -  Identify cognitive and physical deficits and behaviors that affect risk of falls    -  Kent fall precautions as indicated by assessment   - Educate patient/family on patient safety including physical limitations  - Instruct patient to call for assistance with activity based on assessment  - Modify environment to reduce risk of injury  - Consider OT/PT consult to assist with strengthening/mobility  Outcome: Progressing     Problem: Prexisting or High Potential for Compromised Skin Integrity  Goal: Skin integrity is maintained or improved  Description  INTERVENTIONS:  - Identify patients at risk for skin breakdown  - Assess and monitor skin integrity  - Assess and monitor nutrition and hydration status  - Monitor labs   - Assess for incontinence   - Turn and reposition patient  - Assist with mobility/ambulation  - Relieve pressure over bony prominences  - Avoid friction and shearing  - Provide appropriate hygiene as needed including keeping skin clean and dry  - Evaluate need for skin moisturizer/barrier cream  - Collaborate with interdisciplinary team   - Patient/family teaching  - Consider wound care consult   Outcome: Progressing     Problem: SAFETY,RESTRAINT: NV/NON-SELF DESTRUCTIVE BEHAVIOR  Goal: Remains free of harm/injury (restraint for non violent/non self-detsructive behavior)  Description  INTERVENTIONS:  - Instruct patient/family regarding restraint use   - Assess and monitor physiologic and psychological status   - Provide interventions and comfort measures to meet assessed patient needs   - Identify and implement measures to help patient regain control  - Assess readiness for release of restraint   Outcome: Progressing     Problem: Knowledge Deficit  Goal: Patient/family/caregiver demonstrates understanding of disease process, treatment plan, medications, and discharge instructions  Description  Complete learning assessment and assess knowledge base  Interventions:  - Provide teaching at level of understanding  - Provide teaching via preferred learning methods  Outcome: Progressing     Problem: Nutrition/Hydration-ADULT  Goal: Nutrient/Hydration intake appropriate for improving, restoring or maintaining nutritional needs  Description  Monitor and assess patient's nutrition/hydration status for malnutrition  Collaborate with interdisciplinary team and initiate plan and interventions as ordered  Monitor patient's weight and dietary intake as ordered or per policy  Utilize nutrition screening tool and intervene as necessary  Determine patient's food preferences and provide high-protein, high-caloric foods as appropriate       INTERVENTIONS:  - Monitor oral intake, urinary output, labs, and treatment plans  - Assess nutrition and hydration status and recommend course of action  - Evaluate amount of meals eaten  - Assist patient with eating if necessary   - Allow adequate time for meals  - Recommend/ encourage appropriate diets, oral nutritional supplements, and vitamin/mineral supplements  - Order, calculate, and assess calorie counts as needed  - Recommend, monitor, and adjust tube feedings and TPN/PPN based on assessed needs  - Assess need for intravenous fluids  - Provide specific nutrition/hydration education as appropriate  - Include patient/family/caregiver in decisions related to nutrition  Outcome: Not Progressing     Problem: SAFETY,RESTRAINT: NV/NON-SELF DESTRUCTIVE BEHAVIOR  Goal: Returns to optimal restraint-free functioning  Description  INTERVENTIONS:  - Assess the patient's behavior and symptoms that indicate continued need for restraint  - Identify and implement measures to help patient regain control  - Assess readiness for release of restraint Outcome: Not Progressing

## 2020-06-30 NOTE — RESPIRATORY THERAPY NOTE
RT Ventilator Management Note  Jami Harris 71 y o  male MRN: [id number]  Unit/Bed#: [facility] 417-01 Encounter: [id number]      Daily Screen       6/27/2020  0742 6/28/2020  0744          Patient safety screen outcome[de-identified]  Passed  Passed              Physical Exam:   Assessment Type: (P) Assess only  General Appearance: (P) Sleeping  Respiratory Pattern: (P) Assisted  Chest Assessment: (P) Chest expansion symmetrical  Bilateral Breath Sounds: (P) Diminished, Coarse  Cough: Strong  Suction: Trach      Resp Comments: (P) Pt resting comfortably on PSV with no changes at this time, will continue to monitor throughout shif per respiratory protocol

## 2020-06-30 NOTE — UTILIZATION REVIEW
Continued Stay Review    Date: 06/30/2020                       s/p L VATS upper lobectomy  Post op prolonged airleak with significant subcutaneous emphysema  Also had code event requiring cricothyrodotomy for difficult intubation and now s/p formalization to tracheostomy  Current Patient Class: Inpatient  Current Level of Care: Med/Surg    HPI:69 y o  male initially admitted on 06/10/2020    Assessment/Plan: NPO  NG Tube  IV flds  Tube feedings on hold due to high NG output (2 3L)  Wean sedation as tolerated  Intubated / vented / weaning  Chest tube to water seal, -AL  6/29 CTAP: Improving subQ emphysema  Left PTX resolved with decreased lung volume and groundglass changes  Trace BL pleural effusions  Pertinent Labs/Diagnostic Results:     Results from last 7 days   Lab Units 06/30/20  1142 06/30/20  0454 06/30/20  0408 06/29/20  0447 06/28/20  0419 06/27/20  0429   WBC Thousand/uL  --   --  14 10* 9 71 9 30 10 19*   HEMOGLOBIN g/dL 8 1* 8 9* 7 9* 11 5* 10 6* 9 3*   HEMATOCRIT %  --  29 3* 25 9* 36 5 33 9* 29 6*   PLATELETS Thousands/uL  --   --  236 274 218 173   NEUTROS ABS Thousands/µL  --   --  11 55* 6 54 6 66 7 78*     Results from last 7 days   Lab Units 06/30/20  0408 06/29/20  0447 06/28/20  0419 06/27/20  0429 06/26/20  0512  06/24/20  0516   SODIUM mmol/L 139 140 138 138 141   < > 140   POTASSIUM mmol/L 4 1 4 2 4 6 4 2 3 7   < > 3 9   CHLORIDE mmol/L 103 102 104 105 109*   < > 110*   CO2 mmol/L 31 31 30 27 28   < > 24   ANION GAP mmol/L 5 7 4 6 4   < > 6   BUN mg/dL 11 6 12 11 12   < > 16   CREATININE mg/dL 0 60 0 57* 0 65 0 59* 0 74   < > 0 59*   EGFR ml/min/1 73sq m 103 105 99 103 94   < > 103   CALCIUM mg/dL 7 2* 8 7 8 3 7 5* 7 5*   < > 7 5*   CALCIUM, IONIZED mmol/L  --   --  1 09* 1 00* 1 08*  --  1 13   MAGNESIUM mg/dL  --   --  2 2 1 9 2 2  --  2 4   PHOSPHORUS mg/dL  --   --   --  3 4 3 2  --  2 6    < > = values in this interval not displayed       Results from last 7 days   Lab Units 06/30/20  0408 06/29/20  0447 06/28/20  0419 06/27/20  0429 06/26/20  0512 06/25/20  0613 06/24/20  0516   GLUCOSE RANDOM mg/dL 89 103 114 111 146* 114 126      Results from last 7 days   Lab Units 06/28/20  0458 06/24/20  0516   PH ART  7 448 7 405   PCO2 ART mm Hg 41 0 36 5   PO2 ART mm Hg 89 0 78 1   HCO3 ART mmol/L 27 7 22 4   BASE EXC ART mmol/L 3 4 -2 0   O2 CONTENT ART mL/dL 16 1 14 2*   O2 HGB, ARTERIAL % 95 8 94 6   ABG SOURCE   --  Line, Arterial     Results from last 7 days   Lab Units 06/25/20  0613 06/24/20  0516 06/23/20  1757   PTT seconds 152* 66* 73*     Results from last 7 days   Lab Units 06/30/20  1142 06/29/20  0447 06/28/20  0419   PROCALCITONIN ng/ml 0 29* 0 53* 0 58*     Results from last 7 days   Lab Units 06/27/20  0844   LACTIC ACID mmol/L 1 4     Results from last 7 days   Lab Units 06/29/20  1021   CLARITY UA  Turbid   COLOR UA  Yellow   SPEC GRAV UA  1 017   PH UA  8 5*   GLUCOSE UA mg/dl Negative   KETONES UA mg/dl Negative   BLOOD UA  Moderate*   PROTEIN UA mg/dl 100 (2+)*   NITRITE UA  Negative   BILIRUBIN UA  Negative   UROBILINOGEN UA E U /dl 0 2   LEUKOCYTES UA  Negative   WBC UA /hpf 2-4*   RBC UA /hpf 30-50*   BACTERIA UA /hpf None Seen   EPITHELIAL CELLS WET PREP /hpf None Seen     Results from last 7 days   Lab Units 06/29/20  1059 06/29/20  1028 06/29/20  1024   BLOOD CULTURE  No Growth at 24 hrs   --  No Growth at 24 hrs     SPUTUM CULTURE   --  2+ Growth of   --    GRAM STAIN RESULT   --  1+ Polys*  2+ Gram positive cocci in pairs and chains*  1+ Gram positive rods*  --      Vital Signs: /59   Pulse 90   Temp 99 3 °F (37 4 °C)   Resp 17   Ht 5' 10" (1 778 m)   Wt 103 kg (227 lb 8 2 oz)   SpO2 98%   BMI 32 65 kg/m²     Medications:   Scheduled Medications:  Medications:  chlorhexidine 15 mL Swish & Spit Q12H [address]   diazepam 10 mg Intravenous TID   enoxaparin 1 mg/kg Subcutaneous Q12H DEAN   HYDROmorphone 1 mg Intravenous Q4H   levalbuterol 1 25 mg Nebulization TID   melatonin 3 mg Oral HS   nystatin 500,000 Units Swish & Swallow 4x Daily   pantoprazole 40 mg Intravenous Q24H DEAN   polyethylene glycol 17 g Oral Daily   QUEtiapine 50 mg Oral HS   senna-docusate sodium 1 tablet Per NG Tube BID   sodium chloride 3 mL Nebulization TID   valproate sodium 250 mg Intravenous Q8H [address]   Continuous IV Infusions:  HYDROmorphone 1 mg/hr Intravenous Continuous   multi-electrolyte 75 mL/hr Intravenous Continuous   propofol 5-50 mcg/kg/min Intravenous Titrated   PRN Meds:  acetaminophen 650 mg Rectal Q4H PRN   albuterol 2 5 mg Nebulization Q6H PRN   HYDROmorphone 0 5 mg Intravenous Q1H PRN   Lidocaine Viscous HCl 15 mL Swish & Spit 4x Daily PRN   ondansetron 4 mg Intravenous Q6H PRN     Discharge Plan: TBD    Network Utilization Review Department  [email or web address]  ATTENTION: Please call with any questions or concerns to [phone] and carefully listen to the prompts so that you are directed to the right person  All voicemails are confidential   [facility] all requests for admission clinical reviews, approved or denied determinations and any other requests to dedicated fax number below belonging to the campus where the patient is receiving treatment   List of dedicated fax numbers for the Facilities:  [address] DENIALS (Administrative/Medical Necessity) [phone]   [address] (Maternity/NICU/Pediatrics) [phone]   Jnona Fines [phone]   [facility] [phone]   [facility] [phone]   [address] [place] [address]  [phone]   [address] [phone]   [facility]  [phone]   [address] [facility]  [address] [address] [address] [facility] [phone]

## 2020-06-30 NOTE — PROGRESS NOTES
Progress note - Palliative and Supportive Care   Hui Larson 71 y o  male [id number]    Assessment:    -   Patient Active Problem List   Diagnosis    Community acquired pneumonia    Abnormal computed tomography angiography (CTA)    Alcohol abuse    Malignant neoplasm of upper lobe of left lung (HCC)    Tobacco abuse    Acute respiratory failure with hypoxia (HCC)    Subcutaneous emphysema (HCC)    Acute deep vein thrombosis (DVT) of axillary vein of right upper extremity (HCC)    Acute deep vein thrombosis (DVT) of brachial vein of right upper extremity (HCC)    Tracheostomy in place ([facility] [address] )    Postprocedural pneumothorax    Acute deep vein thrombosis (DVT) of calf muscle vein of left lower extremity ([facility] [address] )    Pneumonia         Plan:  1  Symptom management:    - D/C antipsychotics per family request   - Increase ATC IV Dilaudid to 1 mg q4H with hold parameters   - Increase IV Valium to 10 mg TID   - Start Depacon 250 mg TID   - Change PRNS to:    Fentanyl 50 mcg q1H PRN for AGITATION    Dilaudid 0 5 mg q2H PRN for BREAKTHROUGH PAIN    2  Goals of care: disease directed, level 2    3  Psychosocial support: NA    D/W SICU team during rounds    Interval history:       Patient continues to be agitated and required restarting the Versed infusion last night  Fentanyl drip still not being weaned  Patient agitated and diaphoretic on exam  Can not re-direct him       MEDICATIONS / ALLERGIES:     all current active meds have been reviewed and current meds:   Current Facility-Administered Medications   Medication Dose Route Frequency    acetaminophen (TYLENOL) rectal suppository 650 mg  650 mg Rectal Q4H PRN    albuterol inhalation solution 2 5 mg  2 5 mg Nebulization Q6H PRN    chlorhexidine (PERIDEX) 0 12 % oral rinse 15 mL  15 mL Swish & Spit Q12H [address]    dexmedetomidine (PRECEDEX) 400 mcg in sodium chloride 0 9 % 100 mL infusion  0 1-1 4 mcg/kg/hr Intravenous Titrated    diazepam (VALIUM) injection 10 mg  10 mg Intravenous TID    enoxaparin (LOVENOX) subcutaneous injection 100 mg  1 mg/kg Subcutaneous Q12H [address]    fentaNYL 1000 mcg in sodium chloride 0 9% 100mL infusion  75 mcg/hr Intravenous Continuous    fentanyl citrate (PF) 100 MCG/2ML 50 mcg  50 mcg Intravenous Q1H PRN    HYDROmorphone (DILAUDID) injection 0 5 mg  0 5 mg Intravenous Q2H PRN    HYDROmorphone (DILAUDID) injection 1 mg  1 mg Intravenous Q4H    levalbuterol (XOPENEX) inhalation solution 1 25 mg  1 25 mg Nebulization TID    Lidocaine Viscous HCl (XYLOCAINE) 2 % mucosal solution 15 mL  15 mL Swish & Spit 4x Daily PRN    melatonin tablet 3 mg  3 mg Oral HS    midazolam (VERSED) 0 5 mg/mL (STANDARD CONCENTRATION) 100 mL infusion  1 mg/hr Intravenous Continuous    multi-electrolyte (PLASMALYTE-A/ISOLYTE-S PH 7 4) IV solution  100 mL/hr Intravenous Continuous    nystatin (MYCOSTATIN) oral suspension 500,000 Units  500,000 Units Swish & Swallow 4x Daily    ondansetron (ZOFRAN) injection 4 mg  4 mg Intravenous Q6H PRN    pantoprazole (PROTONIX) injection 40 mg  40 mg Intravenous Q24H DEAN    polyethylene glycol (MIRALAX) packet 17 g  17 g Oral Daily    senna-docusate sodium (SENOKOT S) 8 6-50 mg per tablet 1 tablet  1 tablet Per NG Tube BID    sodium chloride 0 9 % inhalation solution 3 mL  3 mL Nebulization TID    valproate (DEPACON) 250 mg in sodium chloride 0 9 % 50 mL IVPB  250 mg Intravenous Q8H [address]       No Known Allergies    OBJECTIVE:    Physical Exam  Physical Exam   Constitutional: No distress  HENT:   Head: Normocephalic and atraumatic  Right Ear: External ear normal    Left Ear: External ear normal    Nose: Nose normal    Eyes: EOM are normal  Right eye exhibits no discharge  Left eye exhibits no discharge  No scleral icterus  Neck:   Trach in place   Cardiovascular: Normal rate, regular rhythm and intact distal pulses  Pulmonary/Chest: Effort normal and breath sounds normal  No respiratory distress     On ventilator Abdominal: Soft  Bowel sounds are normal  He exhibits no distension  Musculoskeletal: He exhibits edema  Neurological:   agitated   Skin: Skin is warm and dry  There is pallor  Nursing note and vitals reviewed  Lab Results:   I have personally reviewed pertinent labs  , CBC:   Lab Results   Component Value Date    WBC 14 10 (H) 06/30/2020    HGB 8 9 (L) 06/30/2020    HCT 29 3 (L) 06/30/2020     (H) 06/30/2020     06/30/2020    MCH 30 5 06/30/2020    MCHC 30 5 (L) 06/30/2020    RDW 14 6 06/30/2020    MPV 10 3 06/30/2020    NRBC 0 06/30/2020   , CMP:   Lab Results   Component Value Date    SODIUM 139 06/30/2020    K 4 1 06/30/2020     06/30/2020    CO2 31 06/30/2020    BUN 11 06/30/2020    CREATININE 0 60 06/30/2020    CALCIUM 7 2 (L) 06/30/2020    EGFR 103 06/30/2020   , PT/PTT:No results found for: PT, PTT  Imaging Studies: reviewed  EKG, Pathology, and Other Studies: reviewed    Counseling / Coordination of Care    Total floor / unit time spent today 25+ minutes  Greater than 50% of total time was spent with the patient and / or family counseling and / or coordination of care  A description of the counseling / coordination of care: chart review, medication review with changes
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
## 2020-07-01 LAB
ANION GAP SERPL CALCULATED.3IONS-SCNC: 4 MMOL/L (ref 4–13)
ANION GAP SERPL CALCULATED.3IONS-SCNC: 5 MMOL/L (ref 4–13)
BACTERIA SPT RESP CULT: ABNORMAL
BASE EXCESS BLDA CALC-SCNC: 4.7 MMOL/L
BASOPHILS # BLD AUTO: 0.03 THOUSANDS/ΜL (ref 0–0.1)
BASOPHILS # BLD AUTO: 0.04 THOUSANDS/ΜL (ref 0–0.1)
BASOPHILS NFR BLD AUTO: 0 % (ref 0–1)
BASOPHILS NFR BLD AUTO: 0 % (ref 0–1)
BUN SERPL-MCNC: 11 MG/DL (ref 5–25)
BUN SERPL-MCNC: 12 MG/DL (ref 5–25)
CA-I BLD-SCNC: 1.07 MMOL/L (ref 1.12–1.32)
CALCIUM SERPL-MCNC: 7.7 MG/DL (ref 8.3–10.1)
CALCIUM SERPL-MCNC: 8.2 MG/DL (ref 8.3–10.1)
CHLORIDE SERPL-SCNC: 104 MMOL/L (ref 100–108)
CHLORIDE SERPL-SCNC: 105 MMOL/L (ref 100–108)
CO2 SERPL-SCNC: 31 MMOL/L (ref 21–32)
CO2 SERPL-SCNC: 32 MMOL/L (ref 21–32)
CREAT SERPL-MCNC: 0.62 MG/DL (ref 0.6–1.3)
CREAT SERPL-MCNC: 0.69 MG/DL (ref 0.6–1.3)
EOSINOPHIL # BLD AUTO: 0.48 THOUSAND/ΜL (ref 0–0.61)
EOSINOPHIL # BLD AUTO: 0.52 THOUSAND/ΜL (ref 0–0.61)
EOSINOPHIL NFR BLD AUTO: 5 % (ref 0–6)
EOSINOPHIL NFR BLD AUTO: 6 % (ref 0–6)
ERYTHROCYTE [DISTWIDTH] IN BLOOD BY AUTOMATED COUNT: 14.6 % (ref 11.6–15.1)
ERYTHROCYTE [DISTWIDTH] IN BLOOD BY AUTOMATED COUNT: 14.8 % (ref 11.6–15.1)
GFR SERPL CREATININE-BSD FRML MDRD: 101 ML/MIN/1.73SQ M
GFR SERPL CREATININE-BSD FRML MDRD: 97 ML/MIN/1.73SQ M
GLUCOSE SERPL-MCNC: 80 MG/DL (ref 65–140)
GLUCOSE SERPL-MCNC: 83 MG/DL (ref 65–140)
GRAM STN SPEC: ABNORMAL
HCO3 BLDA-SCNC: 28.2 MMOL/L (ref 22–28)
HCT VFR BLD AUTO: 26 % (ref 36.5–49.3)
HCT VFR BLD AUTO: 28.1 % (ref 36.5–49.3)
HGB BLD-MCNC: 7.9 G/DL (ref 12–17)
HGB BLD-MCNC: 8.6 G/DL (ref 12–17)
IMM GRANULOCYTES # BLD AUTO: 0.05 THOUSAND/UL (ref 0–0.2)
IMM GRANULOCYTES # BLD AUTO: 0.08 THOUSAND/UL (ref 0–0.2)
IMM GRANULOCYTES NFR BLD AUTO: 1 % (ref 0–2)
IMM GRANULOCYTES NFR BLD AUTO: 1 % (ref 0–2)
LACTATE SERPL-SCNC: 0.7 MMOL/L (ref 0.5–2)
LYMPHOCYTES # BLD AUTO: 1.33 THOUSANDS/ΜL (ref 0.6–4.47)
LYMPHOCYTES # BLD AUTO: 1.51 THOUSANDS/ΜL (ref 0.6–4.47)
LYMPHOCYTES NFR BLD AUTO: 14 % (ref 14–44)
LYMPHOCYTES NFR BLD AUTO: 16 % (ref 14–44)
MAGNESIUM SERPL-MCNC: 2.3 MG/DL (ref 1.6–2.6)
MCH RBC QN AUTO: 30.8 PG (ref 26.8–34.3)
MCH RBC QN AUTO: 30.9 PG (ref 26.8–34.3)
MCHC RBC AUTO-ENTMCNC: 30.4 G/DL (ref 31.4–37.4)
MCHC RBC AUTO-ENTMCNC: 30.6 G/DL (ref 31.4–37.4)
MCV RBC AUTO: 101 FL (ref 82–98)
MCV RBC AUTO: 102 FL (ref 82–98)
MONOCYTES # BLD AUTO: 0.71 THOUSAND/ΜL (ref 0.17–1.22)
MONOCYTES # BLD AUTO: 0.72 THOUSAND/ΜL (ref 0.17–1.22)
MONOCYTES NFR BLD AUTO: 8 % (ref 4–12)
MONOCYTES NFR BLD AUTO: 8 % (ref 4–12)
NEUTROPHILS # BLD AUTO: 6.39 THOUSANDS/ΜL (ref 1.85–7.62)
NEUTROPHILS # BLD AUTO: 6.57 THOUSANDS/ΜL (ref 1.85–7.62)
NEUTS SEG NFR BLD AUTO: 69 % (ref 43–75)
NEUTS SEG NFR BLD AUTO: 72 % (ref 43–75)
NRBC BLD AUTO-RTO: 0 /100 WBCS
NRBC BLD AUTO-RTO: 0 /100 WBCS
O2 CT BLDA-SCNC: 10.6 ML/DL (ref 16–23)
OXYHGB MFR BLDA: 94.6 % (ref 94–97)
PCO2 BLDA: 37.2 MM HG (ref 36–44)
PH BLDA: 7.5 [PH] (ref 7.35–7.45)
PHOSPHATE SERPL-MCNC: 3.5 MG/DL (ref 2.3–4.1)
PLATELET # BLD AUTO: 259 THOUSANDS/UL (ref 149–390)
PLATELET # BLD AUTO: 273 THOUSANDS/UL (ref 149–390)
PMV BLD AUTO: 10.3 FL (ref 8.9–12.7)
PMV BLD AUTO: 10.4 FL (ref 8.9–12.7)
PO2 BLDA: 81 MM HG (ref 75–129)
POTASSIUM SERPL-SCNC: 3.6 MMOL/L (ref 3.5–5.3)
POTASSIUM SERPL-SCNC: 3.7 MMOL/L (ref 3.5–5.3)
PROCALCITONIN SERPL-MCNC: 0.18 NG/ML
PS CM H2O: 5
PS VENT FIO2: 40
PS VENT PEEP: 5
RBC # BLD AUTO: 2.56 MILLION/UL (ref 3.88–5.62)
RBC # BLD AUTO: 2.79 MILLION/UL (ref 3.88–5.62)
SODIUM SERPL-SCNC: 140 MMOL/L (ref 136–145)
SODIUM SERPL-SCNC: 141 MMOL/L (ref 136–145)
SPECIMEN SOURCE: ABNORMAL
VENT - PS: ABNORMAL
WBC # BLD AUTO: 9.21 THOUSAND/UL (ref 4.31–10.16)
WBC # BLD AUTO: 9.22 THOUSAND/UL (ref 4.31–10.16)

## 2020-07-01 PROCEDURE — 83605 ASSAY OF LACTIC ACID: CPT | Performed by: STUDENT IN AN ORGANIZED HEALTH CARE EDUCATION/TRAINING PROGRAM

## 2020-07-01 PROCEDURE — 84145 PROCALCITONIN (PCT): CPT | Performed by: REGISTERED NURSE

## 2020-07-01 PROCEDURE — 99232 SBSQ HOSP IP/OBS MODERATE 35: CPT | Performed by: NURSE PRACTITIONER

## 2020-07-01 PROCEDURE — 94003 VENT MGMT INPAT SUBQ DAY: CPT

## 2020-07-01 PROCEDURE — 84100 ASSAY OF PHOSPHORUS: CPT | Performed by: REGISTERED NURSE

## 2020-07-01 PROCEDURE — 99024 POSTOP FOLLOW-UP VISIT: CPT | Performed by: THORACIC SURGERY (CARDIOTHORACIC VASCULAR SURGERY)

## 2020-07-01 PROCEDURE — 36600 WITHDRAWAL OF ARTERIAL BLOOD: CPT

## 2020-07-01 PROCEDURE — 85025 COMPLETE CBC W/AUTO DIFF WBC: CPT | Performed by: REGISTERED NURSE

## 2020-07-01 PROCEDURE — 94760 N-INVAS EAR/PLS OXIMETRY 1: CPT

## 2020-07-01 PROCEDURE — 80048 BASIC METABOLIC PNL TOTAL CA: CPT | Performed by: STUDENT IN AN ORGANIZED HEALTH CARE EDUCATION/TRAINING PROGRAM

## 2020-07-01 PROCEDURE — 94640 AIRWAY INHALATION TREATMENT: CPT

## 2020-07-01 PROCEDURE — 83735 ASSAY OF MAGNESIUM: CPT | Performed by: REGISTERED NURSE

## 2020-07-01 PROCEDURE — 82330 ASSAY OF CALCIUM: CPT | Performed by: REGISTERED NURSE

## 2020-07-01 PROCEDURE — 97110 THERAPEUTIC EXERCISES: CPT

## 2020-07-01 PROCEDURE — 82805 BLOOD GASES W/O2 SATURATION: CPT | Performed by: STUDENT IN AN ORGANIZED HEALTH CARE EDUCATION/TRAINING PROGRAM

## 2020-07-01 PROCEDURE — 80048 BASIC METABOLIC PNL TOTAL CA: CPT | Performed by: REGISTERED NURSE

## 2020-07-01 PROCEDURE — 97530 THERAPEUTIC ACTIVITIES: CPT

## 2020-07-01 PROCEDURE — 85025 COMPLETE CBC W/AUTO DIFF WBC: CPT | Performed by: STUDENT IN AN ORGANIZED HEALTH CARE EDUCATION/TRAINING PROGRAM

## 2020-07-01 PROCEDURE — 99233 SBSQ HOSP IP/OBS HIGH 50: CPT | Performed by: SURGERY

## 2020-07-01 PROCEDURE — C9113 INJ PANTOPRAZOLE SODIUM, VIA: HCPCS | Performed by: STUDENT IN AN ORGANIZED HEALTH CARE EDUCATION/TRAINING PROGRAM

## 2020-07-01 RX ORDER — POTASSIUM CHLORIDE 14.9 MG/ML
20 INJECTION INTRAVENOUS ONCE
Status: COMPLETED | OUTPATIENT
Start: 2020-07-01 | End: 2020-07-01

## 2020-07-01 RX ADMIN — HYDROMORPHONE HYDROCHLORIDE 1 MG: 1 INJECTION, SOLUTION INTRAMUSCULAR; INTRAVENOUS; SUBCUTANEOUS at 14:59

## 2020-07-01 RX ADMIN — NYSTATIN 500000 UNITS: 100000 SUSPENSION ORAL at 12:18

## 2020-07-01 RX ADMIN — HYDROMORPHONE HYDROCHLORIDE 1 MG: 1 INJECTION, SOLUTION INTRAMUSCULAR; INTRAVENOUS; SUBCUTANEOUS at 06:04

## 2020-07-01 RX ADMIN — HYDROMORPHONE HYDROCHLORIDE 1 MG: 1 INJECTION, SOLUTION INTRAMUSCULAR; INTRAVENOUS; SUBCUTANEOUS at 11:00

## 2020-07-01 RX ADMIN — ENOXAPARIN SODIUM 100 MG: 100 INJECTION SUBCUTANEOUS at 08:17

## 2020-07-01 RX ADMIN — ENOXAPARIN SODIUM 100 MG: 100 INJECTION SUBCUTANEOUS at 21:30

## 2020-07-01 RX ADMIN — POLYETHYLENE GLYCOL 3350 17 G: 17 POWDER, FOR SOLUTION ORAL at 08:17

## 2020-07-01 RX ADMIN — SENNOSIDES AND DOCUSATE SODIUM 1 TABLET: 8.6; 5 TABLET ORAL at 17:26

## 2020-07-01 RX ADMIN — QUETIAPINE FUMARATE 50 MG: 25 TABLET ORAL at 21:24

## 2020-07-01 RX ADMIN — CHLORHEXIDINE GLUCONATE 0.12% ORAL RINSE 15 ML: 1.2 LIQUID ORAL at 08:03

## 2020-07-01 RX ADMIN — ISODIUM CHLORIDE 3 ML: 0.03 SOLUTION RESPIRATORY (INHALATION) at 07:17

## 2020-07-01 RX ADMIN — HYDROMORPHONE HYDROCHLORIDE 1 MG: 1 INJECTION, SOLUTION INTRAMUSCULAR; INTRAVENOUS; SUBCUTANEOUS at 19:07

## 2020-07-01 RX ADMIN — SENNOSIDES AND DOCUSATE SODIUM 1 TABLET: 8.6; 5 TABLET ORAL at 08:03

## 2020-07-01 RX ADMIN — ISODIUM CHLORIDE 3 ML: 0.03 SOLUTION RESPIRATORY (INHALATION) at 19:52

## 2020-07-01 RX ADMIN — CHLORHEXIDINE GLUCONATE 0.12% ORAL RINSE 15 ML: 1.2 LIQUID ORAL at 21:24

## 2020-07-01 RX ADMIN — POTASSIUM CHLORIDE 20 MEQ: 14.9 INJECTION, SOLUTION INTRAVENOUS at 07:57

## 2020-07-01 RX ADMIN — VALPROATE SODIUM 250 MG: 100 INJECTION, SOLUTION INTRAVENOUS at 13:03

## 2020-07-01 RX ADMIN — ISODIUM CHLORIDE 3 ML: 0.03 SOLUTION RESPIRATORY (INHALATION) at 13:02

## 2020-07-01 RX ADMIN — VALPROATE SODIUM 250 MG: 100 INJECTION, SOLUTION INTRAVENOUS at 21:37

## 2020-07-01 RX ADMIN — DIAZEPAM 10 MG: 10 INJECTION, SOLUTION INTRAMUSCULAR; INTRAVENOUS at 08:04

## 2020-07-01 RX ADMIN — HYDROMORPHONE HYDROCHLORIDE 1 MG: 1 INJECTION, SOLUTION INTRAMUSCULAR; INTRAVENOUS; SUBCUTANEOUS at 03:35

## 2020-07-01 RX ADMIN — DIAZEPAM 10 MG: 10 INJECTION, SOLUTION INTRAMUSCULAR; INTRAVENOUS at 15:01

## 2020-07-01 RX ADMIN — LEVALBUTEROL HYDROCHLORIDE 1.25 MG: 1.25 SOLUTION, CONCENTRATE RESPIRATORY (INHALATION) at 07:17

## 2020-07-01 RX ADMIN — LEVALBUTEROL HYDROCHLORIDE 1.25 MG: 1.25 SOLUTION, CONCENTRATE RESPIRATORY (INHALATION) at 19:52

## 2020-07-01 RX ADMIN — LEVALBUTEROL HYDROCHLORIDE 1.25 MG: 1.25 SOLUTION, CONCENTRATE RESPIRATORY (INHALATION) at 13:02

## 2020-07-01 RX ADMIN — MELATONIN 3 MG: at 21:24

## 2020-07-01 RX ADMIN — NYSTATIN 500000 UNITS: 100000 SUSPENSION ORAL at 17:26

## 2020-07-01 RX ADMIN — NYSTATIN 500000 UNITS: 100000 SUSPENSION ORAL at 21:24

## 2020-07-01 RX ADMIN — VALPROATE SODIUM 250 MG: 100 INJECTION, SOLUTION INTRAVENOUS at 05:49

## 2020-07-01 RX ADMIN — PANTOPRAZOLE SODIUM 40 MG: 40 INJECTION, POWDER, FOR SOLUTION INTRAVENOUS at 08:04

## 2020-07-01 RX ADMIN — NYSTATIN 500000 UNITS: 100000 SUSPENSION ORAL at 08:03

## 2020-07-01 RX ADMIN — DIAZEPAM 10 MG: 10 INJECTION, SOLUTION INTRAMUSCULAR; INTRAVENOUS at 21:24

## 2020-07-01 NOTE — PROGRESS NOTES
Progress note - Palliative and Supportive Care   Santiago Howard 71 y o  male [id number]    Assessment:    -   Patient Active Problem List   Diagnosis    Community acquired pneumonia    Abnormal computed tomography angiography (CTA)    Alcohol abuse    Malignant neoplasm of upper lobe of left lung (HCC)    Tobacco abuse    Acute respiratory failure with hypoxia (HCC)    Subcutaneous emphysema (HCC)    Acute deep vein thrombosis (DVT) of axillary vein of right upper extremity (HCC)    Acute deep vein thrombosis (DVT) of brachial vein of right upper extremity (HCC)    Tracheostomy in place ([facility] [address] )    Postprocedural pneumothorax    Acute deep vein thrombosis (DVT) of calf muscle vein of left lower extremity ([facility] [address] )    Pneumonia       Plan:  1  Symptom management    -   reduce benzodiazepines per family request    -   Hydromorphone 1 mg /hr IV gtt   -   Hydromorphone 0 5 mg IV Q 1 hour prn     2  Goals   -   Disease focused:  Level 2     Code Status: DNAR - Level 2   Decisional apparatus:  Patient is not competent on my exam today  Daughter has been medical decision maker since admission   Advance Directive / Living Will / POLST:  On file  Interval history:      Patient has had less agitation over the past 24 hours  He responds to stimuli  He has made small improvements in hiss overall status including tolerating trach collar trials during the day  Nursing staff attempted to put him in sitting position in the bed however he did not tolerate it well and became significantly agitated              MEDICATIONS / ALLERGIES:     current meds:   Current Facility-Administered Medications   Medication Dose Route Frequency    acetaminophen (TYLENOL) rectal suppository 650 mg  650 mg Rectal Q4H PRN    albuterol inhalation solution 2 5 mg  2 5 mg Nebulization Q6H PRN    chlorhexidine (PERIDEX) 0 12 % oral rinse 15 mL  15 mL Swish & Spit Q12H [facility]    diazepam (VALIUM) injection 10 mg  10 mg Intravenous TID  enoxaparin (LOVENOX) subcutaneous injection 100 mg  1 mg/kg Subcutaneous Q12H DEAN    HYDROmorphone (DILAUDID) 50 mg in sodium chloride 0 9% 50mL drip  1 mg/hr Intravenous Continuous    HYDROmorphone (DILAUDID) injection 0 5 mg  0 5 mg Intravenous Q1H PRN    HYDROmorphone (DILAUDID) injection 1 mg  1 mg Intravenous Q4H    levalbuterol (XOPENEX) inhalation solution 1 25 mg  1 25 mg Nebulization TID    Lidocaine Viscous HCl (XYLOCAINE) 2 % mucosal solution 15 mL  15 mL Swish & Spit 4x Daily PRN    melatonin tablet 3 mg  3 mg Oral HS    midazolam (VERSED) injection 2 mg  2 mg Intravenous Once PRN    multi-electrolyte (PLASMALYTE-A/ISOLYTE-S PH 7 4) IV solution  75 mL/hr Intravenous Continuous    nystatin (MYCOSTATIN) oral suspension 500,000 Units  500,000 Units Swish & Swallow 4x Daily    ondansetron (ZOFRAN) injection 4 mg  4 mg Intravenous Q6H PRN    pantoprazole (PROTONIX) injection 40 mg  40 mg Intravenous Q24H DEAN    polyethylene glycol (MIRALAX) packet 17 g  17 g Oral Daily    potassium chloride 20 mEq IVPB (premix)  20 mEq Intravenous Once    propofol (DIPRIVAN) 1000 mg in 100 mL infusion (premix)  5-50 mcg/kg/min Intravenous Titrated    QUEtiapine (SEROquel) tablet 50 mg  50 mg Oral HS    senna-docusate sodium (SENOKOT S) 8 6-50 mg per tablet 1 tablet  1 tablet Per NG Tube BID    sodium chloride 0 9 % inhalation solution 3 mL  3 mL Nebulization TID    valproate (DEPACON) 250 mg in sodium chloride 0 9 % 50 mL IVPB  250 mg Intravenous Q8H [address]       No Known Allergies    OBJECTIVE:    Physical Exam  Physical Exam   Constitutional: He appears well-developed  He appears lethargic  Tracheostomy with trach collar   HENT:   Head: Normocephalic and atraumatic  Neck: No JVD present  Cardiovascular: Normal rate  Pulmonary/Chest: He has decreased breath sounds  Abdominal: He exhibits distension  Neurological: He appears lethargic  Skin:   Flushed, diaphoretic   Vitals reviewed        Lab Results:   I have personally reviewed pertinent labs  , CBC:   Lab Results   Component Value Date    WBC 9 22 07/01/2020    HGB 8 6 (L) 07/01/2020    HCT 28 1 (L) 07/01/2020     (H) 07/01/2020     07/01/2020    MCH 30 8 07/01/2020    MCHC 30 6 (L) 07/01/2020    RDW 14 6 07/01/2020    MPV 10 4 07/01/2020    NRBC 0 07/01/2020   , CMP:   Lab Results   Component Value Date    SODIUM 141 07/01/2020    K 3 7 07/01/2020     07/01/2020    CO2 32 07/01/2020    BUN 11 07/01/2020    CREATININE 0 62 07/01/2020    CALCIUM 8 2 (L) 07/01/2020    EGFR 101 07/01/2020     Imaging Studies: CT chest abdomen and pelvis:  Prior subcutaneous emphysema is significantly improved with residual findings as above  Resolution of left pneumothorax with left lung demonstrating decreased volume with prominent interstitial changes and groundglass changes  Infection cannot be excluded  Trace left and small right pleural effusion  Small loculated pleural fluid anteriorly        Counseling / Coordination of Care    Total floor / unit time spent today 20 + minutes  Greater than 50% of total time was spent with the patient and / or family counseling and / or coordination of care  A description of the counseling / coordination of care: review of symptoms, provided encouragement

## 2020-07-01 NOTE — PLAN OF CARE
Problem: PHYSICAL THERAPY ADULT  Goal: Performs mobility at highest level of function for planned discharge setting  See evaluation for individualized goals  Description  Treatment/Interventions: LE strengthening/ROM, Therapeutic exercise, Endurance training, Cognitive reorientation, Bed mobility, Continued evaluation, Spoke to nursing, OT  Equipment Recommended: Other (Comment)(TBD)       See flowsheet documentation for full assessment, interventions and recommendations  Note:   Prognosis: Guarded  Problem List: Decreased strength, Decreased range of motion, Decreased endurance, Impaired balance, Decreased mobility, Decreased cognition, Decreased safety awareness, Obesity  Assessment: Limited bed level re-assessment, mobilization and LE therex performed as outlined above; pt presents s/p tracheostomy during the course and now remains to be on trach collar while in a critical care unit; pt is generally somnolent and disoriented w/ inconsistent ability to follow directions and participate in activities; pt was able to sustain sitting up in bed (long sit) w/ (A)x1 for brief period of time but noted to become somewhat restless requiring rest periods and return to supine position; LE therex also performed in an AAROM mode w/ rest periods provided as needed; pt remained in NAD and appeared to be comfortable at the end of session; will cont to follow pt in PT for bed level activities at this time w/ progression to OOB mobility trials when clinically appropriate (PT to see the pt at that point)  Otherwise, recommend rehab upon D/C when medically cleared; will follow  Barriers to Discharge: (eyesight)     PT Discharge Recommendation: Post-Acute Rehabilitation Services     PT - OK to Discharge: Yes(when stable with home therapies)    See flowsheet documentation for full assessment

## 2020-07-01 NOTE — RESPIRATORY THERAPY NOTE
RT Ventilator Management Note  Louie Fields 71 y o  male MRN: [id number]  Unit/Bed#: [facility] 417-01 Encounter: [id number]      Daily Screen       7/1/2020  0023 7/1/2020  0412          Patient safety screen outcome[de-identified]  Passed  Passed      Spont breathing trial % for 30 min:          RSBI:  21                Physical Exam:   Assessment Type: (P) Assess only  General Appearance: (P) Sleeping  Respiratory Pattern: (P) Assisted, Spontaneous  Chest Assessment: (P) Chest expansion symmetrical  Bilateral Breath Sounds: (P) Diminished, Coarse  O2 Device: (P) vent      Resp Comments: (P) Pt resting comfortable through the night on vent on PSV 5/5  No changes made at this time  Will continue to monitor per respiratory protocol

## 2020-07-01 NOTE — PHYSICAL THERAPY NOTE
PHYSICAL THERAPY NOTE          Patient Name: Elise Noland  Today's Date: 7/1/2020 07/01/20 1030   Pain Assessment   Pain Assessment Tool FLACC   Pain Rating: FLACC (Rest) - Face 0   Pain Rating: FLACC (Rest) - Legs 0   Pain Rating: FLACC (Rest) - Activity 0   Pain Rating: FLACC (Rest) - Cry 0   Pain Rating: FLACC (Rest) - Consolability 0   Score: FLACC (Rest) 0   Pain Rating: FLACC (Activity) - Face 0   Pain Rating: FLACC (Activity) - Legs 0   Pain Rating: FLACC (Activity) - Activity 0   Pain Rating: FLACC (Activity) - Cry 0   Pain Rating: FLACC (Activity) - Consolability 0   Score: FLACC (Activity) 0   Restrictions/Precautions   Other Precautions Cognitive;Multiple lines;Telemetry;O2;Fall Risk;Restraints; Bed Alarm  ((L) CT; trach collar; wrist restraints secured post Tx;  )   General   Chart Reviewed Yes   Additional Pertinent History Noted during the course pt had a rapid responses on 6/20/202: acute resp failure; underwent emergency tracheostomy; on 6/22/2020 pt also underwent tracheostomy revision and bronch; other Dx include: acute DVT (R) UE, postprocedural PTx, ROGER, encephalopathy; pt is cleared for Tx session (bed level at this time; spoke to RN)     Response to Previous Treatment Not applicable   Cognition   Overall Cognitive Status Impaired   Arousal/Participation Responsive;Lethargic   Attention Difficulty attending to directions   Orientation Level   (seems to responds to his name briefly)   Memory Unable to assess   Following Commands Follows one step commands inconsistently  (25-30 % during the session)   Subjective   Subjective Pt is observed resting in bed; arousable but remains somnolent; non-verbal and on the trach collar; occasionally restless when more awake;   Bed Mobility   Supine to Sit 3  Moderate assistance  (to long sit from [facility] elevated position; stand by (A) of 2nd)   Additional items Assist x 1;HOB elevated; Increased time required;Verbal cues  (2 trials)   Sit to Supine 3  Moderate assistance   Additional items Assist x 1;Verbal cues;HOB elevated  (stand by (A) of 2nd; 2 trials)   Additional Comments Pt was repositioned higher in bed w/ (A)x3 prior to mobilization trial;   Transfers   Sit to Stand Unable to assess  (not appropriate at this time)   Balance   Static Sitting Poor  (long sit in bed; 7 sec and 5 sec)   Dynamic Sitting Poor -   Activity Tolerance   Activity Tolerance Patient limited by fatigue;Treatment limited secondary to agitation;Treatment limited secondary to medical complications (Comment)   Nurse Made Aware spoke to [facility]   Exercises   Heelslides Supine;10 reps;AAROM; Bilateral  (hip/knee flex/ext; 2 sets)   Hip Abduction Supine;10 reps;AAROM; Bilateral  (2 sets)   Knee AROM Short Arc Quad Supine;10 reps;AAROM; Bilateral  (2 sets)   Ankle Pumps Supine;10 reps;AAROM; Bilateral  (2 sets)   Equipment Use   Comments SCDs re-applied at the end of session; LE elevated on the pillow; wrist restraints secured and bed alarm on;   Assessment   Prognosis Guarded   Problem List Decreased strength;Decreased range of motion;Decreased endurance; Impaired balance;Decreased mobility; Decreased cognition;Decreased safety awareness; Obesity   Assessment Limited bed level re-assessment, mobilization and LE therex performed as outlined above; pt presents s/p tracheostomy during the course and now remains to be on trach collar while in a critical care unit; pt is generally somnolent and disoriented w/ inconsistent ability to follow directions and participate in activities; pt was able to sustain sitting up in bed (long sit) w/ (A)x1 for brief period of time but noted to become somewhat restless requiring rest periods and return to supine position; LE therex also performed in an AAROM mode w/ rest periods provided as needed; pt remained in NAD and appeared to be comfortable at the end of session; will cont to follow pt in PT for bed level activities at this time w/ progression to OOB mobility trials when clinically appropriate (PT to see the pt at that point)  Otherwise, recommend rehab upon D/C when medically cleared; will follow  Goals   Patient Goals pt did not express   STG Expiration Date 07/15/20  (see LTG below)   LTG Expiration Date 07/15/20   Long Term Goal #1 10-14 days  Pt will achieve min (A) level w/ bed mob in order to facilitate safety with OOB and back to bed transitions in the environment and to decrease burden of care  Pt will sustain at least fair - sitting supported balance on edge of bed x 15 min while engaging into LE therex  Assess OOB transfers/amb as clinically appropriate (PT to see the pt)  Pt will follow at least 75 % of motor directions to facilitate progression w/ mobility skills  PT Treatment Day 6   Plan   Treatment/Interventions LE strengthening/ROM; Therapeutic exercise; Endurance training;Cognitive reorientation; Bed mobility;Continued evaluation;Spoke to nursing;OT   Progress Slow progress, medical status limitations   PT Frequency Other (Comment)  (2-3x/wk)   Recommendation   PT Discharge Recommendation Post-Acute Rehabilitation Services   Equipment Recommended Other (Comment)  (TBD)       Eva Bynum, PT

## 2020-07-01 NOTE — PROGRESS NOTES
Progress Note - Thoracic Surgery   Edward Knowles 71 y o  male MRN: [id number]  Unit/Bed#: [facility] 417-01 Encounter: [id number]    Assessment:  71 M s/p L VATS upper lobectomy, with post-op prolonged air leak, emergency cricothyroidotomy and subsequent tracheostomy revision  L CT - minimal output, no air leak    Plan:  Continue trach collar trials, weaning vent  Continue NGT  Continue chest tube to water seal until off ventilator  Supportive care per ICU    Subjective/Objective     Subjective: No acute events overnight  Agitation improved, now off sedation and breathing comfortably    Objective:    Blood pressure 123/76, pulse 100, temperature 99 7 °F (37 6 °C), resp  rate 15, height 5' 10" (1 778 m), weight 104 kg (229 lb 0 9 oz), SpO2 99 %  ,Body mass index is 32 87 kg/m²        Intake/Output Summary (Last 24 hours) at 7/1/2020 3074  Last data filed at 7/1/2020 0557  Gross per 24 hour   Intake 2373 26 ml   Output 2835 ml   Net -461 74 ml       Invasive Devices     Peripherally Inserted Central Catheter Line            PICC Line 81/07/01 Left Basilic 4 days          Drain            Chest Tube 1 Left Pleural 28 Fr  20 days    Urethral Catheter 16 Fr  10 days    NG/OG/Enteral Tube Nasogastric Right nares 2 days          Airway            Surgical Airway Shiley Cuffed 8 days                Physical Exam:   General: NAD, awake and alert  Eyes: PERRL  ENT: moist mucous membranes  Neck: supple, trach in place  CV: RRR +S1/S2  Chest: breath sounds bilaterally, incisions c/d/i  Chest tube in place, no air leak  Abdomen: soft, NT ND  Extremities: atraumatic, no edema      Results from last 7 days   Lab Units 07/01/20  0441 07/01/20  0047 06/30/20  1947  06/30/20  0454 06/30/20  0408   WBC Thousand/uL 9 22 9 21  --   --   --  14 10*   HEMOGLOBIN g/dL 8 6* 7 9* 8 9*   < > 8 9* 7 9*   HEMATOCRIT % 28 1* 26 0*  --   --  29 3* 25 9*   PLATELETS Thousands/uL 273 259  --   --   --  236    < > = values in this interval not displayed       Results from last 7 days   Lab Units 07/01/20  0441 07/01/20  0047 06/30/20  0408   POTASSIUM mmol/L 3 7 3 6 4 1   CHLORIDE mmol/L 105 104 103   CO2 mmol/L 32 31 31   BUN mg/dL 11 12 11   CREATININE mg/dL 0 62 0 69 0 60   CALCIUM mg/dL 8 2* 7 7* 7 2*     Results from last 7 days   Lab Units 06/25/20  0613   PTT seconds 152*

## 2020-07-01 NOTE — PLAN OF CARE
Problem: CARDIOVASCULAR - ADULT  Goal: Maintains optimal cardiac output and hemodynamic stability  Description  INTERVENTIONS:  - Monitor I/O, vital signs and rhythm  - Monitor for S/S and trends of decreased cardiac output  - Administer and titrate ordered vasoactive medications to optimize hemodynamic stability  - Assess quality of pulses, skin color and temperature  - Assess for signs of decreased coronary artery perfusion  - Instruct patient to report change in severity of symptoms  Outcome: Progressing  Goal: Absence of cardiac dysrhythmias or at baseline rhythm  Description  INTERVENTIONS:  - Continuous cardiac monitoring, vital signs, obtain 12 lead EKG if ordered  - Administer antiarrhythmic and heart rate control medications as ordered  - Monitor electrolytes and administer replacement therapy as ordered  Outcome: Progressing     Problem: RESPIRATORY - ADULT  Goal: Achieves optimal ventilation and oxygenation  Description  INTERVENTIONS:  - Assess for changes in respiratory status  - Assess for changes in mentation and behavior  - Position to facilitate oxygenation and minimize respiratory effort  - Oxygen administered by appropriate delivery if ordered  - Initiate smoking cessation education as indicated  - Encourage broncho-pulmonary hygiene including cough, deep breathe, Incentive Spirometry  - Assess the need for suctioning and aspirate as needed  - Assess and instruct to report SOB or any respiratory difficulty  - Respiratory Therapy support as indicated  Outcome: Progressing     Problem: SKIN/TISSUE INTEGRITY - ADULT  Goal: Skin integrity remains intact  Description  INTERVENTIONS  - Identify patients at risk for skin breakdown  - Assess and monitor skin integrity  - Assess and monitor nutrition and hydration status  - Monitor labs (i e  albumin)  - Assess for incontinence   - Turn and reposition patient  - Assist with mobility/ambulation  - Relieve pressure over bony prominences  - Avoid friction and shearing  - Provide appropriate hygiene as needed including keeping skin clean and dry  - Evaluate need for skin moisturizer/barrier cream  - Collaborate with interdisciplinary team (i e  Nutrition, Rehabilitation, etc )   - Patient/family teaching  Outcome: Progressing  Goal: Incision(s), wounds(s) or drain site(s) healing without S/S of infection  Description  INTERVENTIONS  - Assess and document risk factors for skin impairment   - Assess and document dressing, incision, wound bed, drain sites and surrounding tissue  - Consider nutrition services referral as needed  - Oral mucous membranes remain intact  - Provide patient/ family education  Outcome: Progressing  Goal: Oral mucous membranes remain intact  Description  INTERVENTIONS  - Assess oral mucosa and hygiene practices  - Implement preventative oral hygiene regimen  - Implement oral medicated treatments as ordered  - Initiate Nutrition services referral as needed  Outcome: Progressing     Problem: MUSCULOSKELETAL - ADULT  Goal: Maintain or return mobility to safest level of function  Description  INTERVENTIONS:  - Assess patient's ability to carry out ADLs; assess patient's baseline for ADL function and identify physical deficits which impact ability to perform ADLs (bathing, care of mouth/teeth, toileting, grooming, dressing, etc )  - Assess/evaluate cause of self-care deficits   - Assess range of motion  - Assess patient's mobility  - Assess patient's need for assistive devices and provide as appropriate  - Encourage maximum independence but intervene and supervise when necessary  - Involve family in performance of ADLs  - Assess for home care needs following discharge   - Consider OT consult to assist with ADL evaluation and planning for discharge  - Provide patient education as appropriate  Outcome: Progressing  Goal: Maintain proper alignment of affected body part  Description  INTERVENTIONS:  - Support, maintain and protect limb and body alignment  - Provide patient/ family with appropriate education  Outcome: Progressing     Problem: Potential for Falls  Goal: Patient will remain free of falls  Description  INTERVENTIONS:  - Assess patient frequently for physical needs  -  Identify cognitive and physical deficits and behaviors that affect risk of falls  -  [place] fall precautions as indicated by assessment   - Educate patient/family on patient safety including physical limitations  - Instruct patient to call for assistance with activity based on assessment  - Modify environment to reduce risk of injury  - Consider OT/PT consult to assist with strengthening/mobility  Outcome: Progressing     Problem: Prexisting or High Potential for Compromised Skin Integrity  Goal: Skin integrity is maintained or improved  Description  INTERVENTIONS:  - Identify patients at risk for skin breakdown  - Assess and monitor skin integrity  - Assess and monitor nutrition and hydration status  - Monitor labs   - Assess for incontinence   - Turn and reposition patient  - Assist with mobility/ambulation  - Relieve pressure over bony prominences  - Avoid friction and shearing  - Provide appropriate hygiene as needed including keeping skin clean and dry  - Evaluate need for skin moisturizer/barrier cream  - Collaborate with interdisciplinary team   - Patient/family teaching  - Consider wound care consult   Outcome: Progressing     Problem: Nutrition/Hydration-ADULT  Goal: Nutrient/Hydration intake appropriate for improving, restoring or maintaining nutritional needs  Description  Monitor and assess patient's nutrition/hydration status for malnutrition  Collaborate with interdisciplinary team and initiate plan and interventions as ordered  Monitor patient's weight and dietary intake as ordered or per policy  Utilize nutrition screening tool and intervene as necessary  Determine patient's food preferences and provide high-protein, high-caloric foods as appropriate  INTERVENTIONS:  - Monitor oral intake, urinary output, labs, and treatment plans  - Assess nutrition and hydration status and recommend course of action  - Evaluate amount of meals eaten  - Assist patient with eating if necessary   - Allow adequate time for meals  - Recommend/ encourage appropriate diets, oral nutritional supplements, and vitamin/mineral supplements  - Order, calculate, and assess calorie counts as needed  - Recommend, monitor, and adjust tube feedings and TPN/PPN based on assessed needs  - Assess need for intravenous fluids  - Provide specific nutrition/hydration education as appropriate  - Include patient/family/caregiver in decisions related to nutrition  Outcome: Progressing     Problem: SAFETY,RESTRAINT: NV/NON-SELF DESTRUCTIVE BEHAVIOR  Goal: Remains free of harm/injury (restraint for non violent/non self-detsructive behavior)  Description  INTERVENTIONS:  - Instruct patient/family regarding restraint use   - Assess and monitor physiologic and psychological status   - Provide interventions and comfort measures to meet assessed patient needs   - Identify and implement measures to help patient regain control  - Assess readiness for release of restraint   Outcome: Progressing  Goal: Returns to optimal restraint-free functioning  Description  INTERVENTIONS:  - Assess the patient's behavior and symptoms that indicate continued need for restraint  - Identify and implement measures to help patient regain control  - Assess readiness for release of restraint   Outcome: Progressing     Problem: Knowledge Deficit  Goal: Patient/family/caregiver demonstrates understanding of disease process, treatment plan, medications, and discharge instructions  Description  Complete learning assessment and assess knowledge base    Interventions:  - Provide teaching at level of understanding  - Provide teaching via preferred learning methods  Outcome: Progressing

## 2020-07-01 NOTE — PROGRESS NOTES
Progress Note - Critical Care   Marko Cheema 71 y o  male MRN: [id number]  Unit/Bed#: [facility] 417-01 Encounter: [id number]    Assessment:   Principal Problem:    Malignant neoplasm of upper lobe of left lung (HCC)  Active Problems:    Acute respiratory failure with hypoxia (HCC)    Subcutaneous emphysema (HCC)    Acute deep vein thrombosis (DVT) of axillary vein of right upper extremity (HCC)    Acute deep vein thrombosis (DVT) of brachial vein of right upper extremity (HCC)    Tracheostomy in place [facility])    Postprocedural pneumothorax    Acute deep vein thrombosis (DVT) of calf muscle vein of left lower extremity (HCC)    Pneumonia  Resolved Problems:    Lactic acidosis    [facility]    Plan:   · Neuro:   · Dx: encephalopathy  · Delirium vs hypoxic cerebral injury  · Continue to monitor mental status while off sedation  · Analgesia: dilaudid gtt  PRN tylenol, dilaudid  · Sedation - propofol held  · Delirium ppx: CAM-ICU, sleep hygiene  · seroquel added yesterday  · CV:   · Dx: tachycardia, improved  · Hemodynamic support: none  · MAP goal > 65   · Lung:   · Dx: lung cancer s/p left upper VATS lobectomy  · Chest tube to water seal  · Thoracic surgery following  · Cricothyrotomy in setting of acute hypoxic respiratory failure in setting of significant subcutaneous emphysema s/p the above  · Converted to tracheostomy  · Trach collar, PSV as tolerated    Vent Mode: CPAP/PS Spont  · FiO2 (%):  [40] 40   · Continue Respiratory Protocol and Airway Clearance Protocol  · Continue levalbuterol tid    · GI:   · Dx: vomiting  · Improved  · NG tube to suction  · Continue to monitor  · Stress ulcer ppx: protonix  · Bowel regimen: senna, docusate, miralax   · FEN:   · Fluids: isolyte 75cc  hr  · Electrolytes: trend and replete as needed  · K 3 7  · Nutrition: NPO   · :   · Cr 0 62 this AM  I/O last 24 hours:   In: 2423 3 [I V :2134 9; IV Piggyback:288 3]  Out: 8619 [Urine:2610; Emesis/NG output:200; Chest Tube:25]  · Net fluid balance since admission -411 ccL  · Norwood: present  · Monitor I&O's, BUN/Cr  · ID:   · Dx: low-grade fever, continue to trend  · WBC: 9 2  · Temp: t max 100 4  · Abx: none  · Cultures: sputum + 2+ g+ cocci in pairs and chains, 1+ g+ rods   · Heme:   · Dx: active DVTs  · Continue lovenox  · Dx: anemia, unclear etiology  · Hb: 8 6  · Active bleeding: none  · DVT ppx: lovenox, SCDs   · Endo:   · No acute issues  · Msk/Skin:   · PT/OT  · Turning/repositioning  · Wound care   · Disposition: ICU    ______________________________________________________________________    HPI/24hr events: Episodes of hypotension overnight  Sedation turned off  Patient remained comfortable and hypotension resolved  Lines   PICC, trach, chest tube, norwood    Infusions  Dilaudid  Propofol (off currently)   ______________________________________________________________________  Physical Exam:  Last 24 hours vitals:  HR:   BP: 65//83  RR: 8-25  O2:   Temp: max 100 4    Miles Agitation Sedation Scale (RASS): Alert and calm  Physical Exam   Constitutional: No distress  HENT:   Head: Normocephalic and atraumatic  Trach in place   Eyes: Pupils are equal, round, and reactive to light  Conjunctivae are normal    Neck: Neck supple  No JVD present  No tracheal deviation present  Cardiovascular: Normal rate and regular rhythm  Pulmonary/Chest: No respiratory distress  Chest tube in place   Abdominal: Soft  He exhibits no distension  Musculoskeletal: He exhibits edema  Neurological: He is alert  Oriented to person and place  Moves all 4 extremities on command with good strength   Skin: Skin is warm and dry  Capillary refill takes less than 2 seconds       ______________________________________________________________________  Temperature:   Temp (24hrs), Av 2 °F (37 3 °C), Min:98 2 °F (36 8 °C), Max:100 4 °F (38 °C)    Current Temperature: 100 °F (37 8 °C)    Vitals: 07/01/20 0546 07/01/20 0600 07/01/20 0700 07/01/20 0718   BP:  123/76 134/72    BP Location:       Pulse:  100 (!) 108    Resp:  15 12    Temp:  99 7 °F (37 6 °C) 100 °F (37 8 °C)    TempSrc:       SpO2:  99% 99% 99%   Weight: 104 kg (229 lb 0 9 oz)      Height:         Arterial Line BP: 128/64       Weights:   IBW: 73 kg    Body mass index is 32 87 kg/m²  Weight (last 2 days)     Date/Time   Weight    07/01/20 0546   104 (229 06)    06/30/20 0533   103 (227 52)    06/29/20 0600   109 (239 2)            Height: 5' 10" (177 8 cm)  Hemodynamic Monitoring:  N/A       Ventilator Settings:   Vent Mode: CPAP/PS Spont              FiO2 (%): 40       Lab Results   Component Value Date    PHART 7 498 (H) 07/01/2020    XLR9JDJ 37 2 07/01/2020    PO2ART 81 0 07/01/2020    UEM6XYP 28 2 (H) 07/01/2020    BEART 4 7 07/01/2020    SOURCE Radial, Left 07/01/2020       Intake and Outputs:  I/O       06/29 0701 - 06/30 0700 06/30 0701 - 07/01 0700 07/01 0701 - 07/02 0700    I V  (mL/kg) 1905 6 (18 5) 2134 9 (20 5)     NG/GT 0      IV Piggyback  288  3     Feedings       Total Intake(mL/kg) 1905 6 (18 5) 2423 3 (23 3)     Urine (mL/kg/hr) 2139 (0 9) 2610 (1)     Emesis/NG output 2300 200     Stool       Chest Tube 50 25     Total Output 4489 2835     Net -2583 4 -411 7                  Nutrition:        Diet Orders   (From admission, onward)             Start     Ordered    06/30/20 1112  Diet NPO  Diet effective now     Question Answer Comment   Diet Type NPO    RD to adjust diet per protocol?  Yes        06/30/20 1111                Labs:   Results from last 7 days   Lab Units 07/01/20  0441 07/01/20  0047 06/30/20  1947  06/30/20  0454 06/30/20  0408   WBC Thousand/uL 9 22 9 21  --   --   --  14 10*   HEMOGLOBIN g/dL 8 6* 7 9* 8 9*   < > 8 9* 7 9*   HEMATOCRIT % 28 1* 26 0*  --   --  29 3* 25 9*   PLATELETS Thousands/uL 273 259  --   --   --  236   NEUTROS PCT % 72 69  --   --   --  82*   MONOS PCT % 8 8  --   --   --  8    < > = values in this interval not displayed  Results from last 7 days   Lab Units 07/01/20  0441 07/01/20  0047 06/30/20  0408   POTASSIUM mmol/L 3 7 3 6 4 1   CHLORIDE mmol/L 105 104 103   CO2 mmol/L 32 31 31   BUN mg/dL 11 12 11   CREATININE mg/dL 0 62 0 69 0 60   CALCIUM mg/dL 8 2* 7 7* 7 2*     Results from last 7 days   Lab Units 07/01/20  0441 06/28/20  0419 06/27/20  0429   MAGNESIUM mg/dL 2 3 2 2 1 9     Results from last 7 days   Lab Units 07/01/20  0441 06/27/20  0429 06/26/20  0512   PHOSPHORUS mg/dL 3 5 3 4 3 2      Results from last 7 days   Lab Units 06/25/20  0613   PTT seconds 152*     Results from last 7 days   Lab Units 07/01/20  0047   LACTIC ACID mmol/L 0 7     0   Lab Value Date/Time    TROPONINI 2 75 (H) 06/20/2020 1759    TROPONINI 3 00 (H) 06/20/2020 1319    TROPONINI 2 86 (H) 06/20/2020 0928    TROPONINI 0 85 (H) 06/20/2020 0647    TROPONINI <0 02 03/28/2020 0451    TROPONINI <0 02 03/28/2020 0031    TROPONINI <0 02 03/27/2020 2026     Imaging:  I have personally reviewed pertinent reports  EKG:   Micro:  Blood Culture:   Lab Results   Component Value Date    BLOODCX No Growth at 24 hrs  06/29/2020    BLOODCX No Growth at 24 hrs  06/29/2020    BLOODCX No Growth After 5 Days  06/20/2020    BLOODCX No Growth After 5 Days  06/20/2020    BLOODCX No Growth After 5 Days  03/27/2020    BLOODCX No Growth After 5 Days  03/27/2020     Urine Culture: No results found for: URINECX  Sputum Culture: No components found for: SPUTUMCX  Wound Culure: No results found for: WOUNDCULT    Lab Results   Component Value Date    BLOODCX No Growth at 24 hrs  06/29/2020    BLOODCX No Growth at 24 hrs  06/29/2020    BLOODCX No Growth After 5 Days  06/20/2020    BLOODCX No Growth After 5 Days   06/20/2020    SPUTUMCULTUR 2+ Growth of  06/29/2020     Allergies: No Known Allergies  Medications:   Scheduled Meds:    Current Facility-Administered Medications:  acetaminophen 650 mg Rectal Q4H PRN KHANG Hand albuterol 2 5 mg Nebulization Q6H PRN Daxa Rai MD    chlorhexidine 15 mL Swish & Spit Q12H [facility] Lourdes Jackson    diazepam 10 mg Intravenous TID Margret Brooks DO    enoxaparin 1 mg/kg Subcutaneous Q12H [facility] Tarah Schulz MD    HYDROmorphone 1 mg/hr Intravenous Continuous KHANG Madison Last Rate: 1 mg/hr (07/01/20 6632)   HYDROmorphone 0 5 mg Intravenous Q1H PRN KHANG Madison    HYDROmorphone 1 mg Intravenous Q4H Margret Brooks DO    levalbuterol 1 25 mg Nebulization TID Daxa Rai MD    Lidocaine Viscous HCl 15 mL Swish & Spit 4x Daily PRN Tarah Schulz MD    melatonin 3 mg Oral HS KHANG Christensen    midazolam 2 mg Intravenous Once PRN Nataly Samaniego DO    multi-electrolyte 75 mL/hr Intravenous Continuous ePrry Samuels MD Last Rate: 75 mL/hr (06/30/20 1446)   nystatin 500,000 Units Swish & Swallow 4x Daily KHANG Christensen    ondansetron 4 mg Intravenous Q6H PRN Seth Mak PA-C    pantoprazole 40 mg Intravenous Q24H [facility] Skinny Carver MD    polyethylene glycol 17 g Oral Daily KHANG Christensen    potassium chloride 20 mEq Intravenous Once Nadege Mckeon DO    propofol 5-50 mcg/kg/min Intravenous Titrated KHANG Madison Last Rate: Stopped (06/30/20 2105)   QUEtiapine 50 mg Oral HS Perry Samuels MD    senna-docusate sodium 1 tablet Per NG Tube BID Seth Mak PA-C    sodium chloride 3 mL Nebulization TID Daxa Rai MD    valproate sodium 250 mg Intravenous [facility] Harpreet Brooks DO Last Rate: Stopped (07/01/20 5372)     Continuous Infusions:    HYDROmorphone 1 mg/hr Last Rate: 1 mg/hr (07/01/20 5712)   multi-electrolyte 75 mL/hr Last Rate: 75 mL/hr (06/30/20 6236)   propofol 5-50 mcg/kg/min Last Rate: Stopped (06/30/20 0554)     PRN Meds:    acetaminophen 650 mg Q4H PRN   albuterol 2 5 mg Q6H PRN   HYDROmorphone 0 5 mg Q1H PRN   Lidocaine Viscous HCl 15 mL 4x Daily PRN   midazolam 2 mg Once PRN   ondansetron 4 mg Q6H PRN     VTE Pharmacologic Prophylaxis: Fondaparinux (Arixtra) and Enoxaparin (Lovenox)  VTE Mechanical Prophylaxis: sequential compression device  Invasive lines and devices: Invasive Devices     Peripherally Inserted Central Catheter Line            PICC Line 86/06/63 Left Basilic 4 days          Drain            Chest Tube 1 Left Pleural 28 Fr  20 days    Urethral Catheter 16 Fr  11 days    NG/OG/Enteral Tube Nasogastric Right nares 2 days          Airway            Surgical Airway Shiley Cuffed 8 days                   Code Status: Level 2 - DNAR: but accepts endotracheal intubation    Portions of the record may have been created with voice recognition software  Occasional wrong word or "sound a like" substitutions may have occurred due to the inherent limitations of voice recognition software  Read the chart carefully and recognize, using context, where substitutions have occurred      Aryan Sees, DO

## 2020-07-01 NOTE — RESPIRATORY THERAPY NOTE
RT Ventilator Management Note  Kishor Valdovinos 71 y o  male MRN: [id number]  Unit/Bed#: [facility] 417-01 Encounter: [id number]      Daily Screen       7/1/2020  0023 7/1/2020  0412          Patient safety screen outcome[de-identified]  Passed  Passed      Spont breathing trial % for 30 min:          RSBI:  21                Physical Exam:   Assessment Type: (P) During-treatment  General Appearance: (P) Awake, Alert  Respiratory Pattern: (P) Assisted  Chest Assessment: (P) Chest expansion symmetrical  Bilateral Breath Sounds: (P) Rhonchi  Suction: (P) Trach      Resp Comments: (P) Pt placed back on vent to rest HS  He was able to complete 2 TC weans both approx 5hrs  BS cont  to be coarse with tan secretions  UDN given  Will cont  to monitor overnight

## 2020-07-02 LAB
ANION GAP SERPL CALCULATED.3IONS-SCNC: 5 MMOL/L (ref 4–13)
ANION GAP SERPL CALCULATED.3IONS-SCNC: 6 MMOL/L (ref 4–13)
BASOPHILS # BLD AUTO: 0.03 THOUSANDS/ΜL (ref 0–0.1)
BASOPHILS NFR BLD AUTO: 0 % (ref 0–1)
BUN SERPL-MCNC: 10 MG/DL (ref 5–25)
BUN SERPL-MCNC: 6 MG/DL (ref 5–25)
CA-I BLD-SCNC: 1.05 MMOL/L (ref 1.12–1.32)
CALCIUM SERPL-MCNC: 7.8 MG/DL (ref 8.3–10.1)
CALCIUM SERPL-MCNC: 8.1 MG/DL (ref 8.3–10.1)
CHLORIDE SERPL-SCNC: 103 MMOL/L (ref 100–108)
CHLORIDE SERPL-SCNC: 105 MMOL/L (ref 100–108)
CO2 SERPL-SCNC: 27 MMOL/L (ref 21–32)
CO2 SERPL-SCNC: 30 MMOL/L (ref 21–32)
CREAT SERPL-MCNC: 0.52 MG/DL (ref 0.6–1.3)
CREAT SERPL-MCNC: 0.56 MG/DL (ref 0.6–1.3)
EOSINOPHIL # BLD AUTO: 0.39 THOUSAND/ΜL (ref 0–0.61)
EOSINOPHIL NFR BLD AUTO: 5 % (ref 0–6)
ERYTHROCYTE [DISTWIDTH] IN BLOOD BY AUTOMATED COUNT: 14.5 % (ref 11.6–15.1)
GFR SERPL CREATININE-BSD FRML MDRD: 106 ML/MIN/1.73SQ M
GFR SERPL CREATININE-BSD FRML MDRD: 109 ML/MIN/1.73SQ M
GLUCOSE SERPL-MCNC: 84 MG/DL (ref 65–140)
GLUCOSE SERPL-MCNC: 90 MG/DL (ref 65–140)
HCT VFR BLD AUTO: 27 % (ref 36.5–49.3)
HGB BLD-MCNC: 8.3 G/DL (ref 12–17)
IMM GRANULOCYTES # BLD AUTO: 0.04 THOUSAND/UL (ref 0–0.2)
IMM GRANULOCYTES NFR BLD AUTO: 1 % (ref 0–2)
LYMPHOCYTES # BLD AUTO: 1.22 THOUSANDS/ΜL (ref 0.6–4.47)
LYMPHOCYTES NFR BLD AUTO: 17 % (ref 14–44)
MAGNESIUM SERPL-MCNC: 2.4 MG/DL (ref 1.6–2.6)
MCH RBC QN AUTO: 31 PG (ref 26.8–34.3)
MCHC RBC AUTO-ENTMCNC: 30.7 G/DL (ref 31.4–37.4)
MCV RBC AUTO: 101 FL (ref 82–98)
MONOCYTES # BLD AUTO: 0.57 THOUSAND/ΜL (ref 0.17–1.22)
MONOCYTES NFR BLD AUTO: 8 % (ref 4–12)
NEUTROPHILS # BLD AUTO: 5.04 THOUSANDS/ΜL (ref 1.85–7.62)
NEUTS SEG NFR BLD AUTO: 69 % (ref 43–75)
NRBC BLD AUTO-RTO: 0 /100 WBCS
PHOSPHATE SERPL-MCNC: 3.1 MG/DL (ref 2.3–4.1)
PLATELET # BLD AUTO: 307 THOUSANDS/UL (ref 149–390)
PMV BLD AUTO: 10.7 FL (ref 8.9–12.7)
POTASSIUM SERPL-SCNC: 3.7 MMOL/L (ref 3.5–5.3)
POTASSIUM SERPL-SCNC: 3.9 MMOL/L (ref 3.5–5.3)
RBC # BLD AUTO: 2.68 MILLION/UL (ref 3.88–5.62)
SODIUM SERPL-SCNC: 138 MMOL/L (ref 136–145)
SODIUM SERPL-SCNC: 138 MMOL/L (ref 136–145)
WBC # BLD AUTO: 7.29 THOUSAND/UL (ref 4.31–10.16)

## 2020-07-02 PROCEDURE — 99233 SBSQ HOSP IP/OBS HIGH 50: CPT | Performed by: SURGERY

## 2020-07-02 PROCEDURE — 82330 ASSAY OF CALCIUM: CPT | Performed by: EMERGENCY MEDICINE

## 2020-07-02 PROCEDURE — 94760 N-INVAS EAR/PLS OXIMETRY 1: CPT

## 2020-07-02 PROCEDURE — 94003 VENT MGMT INPAT SUBQ DAY: CPT

## 2020-07-02 PROCEDURE — 99024 POSTOP FOLLOW-UP VISIT: CPT | Performed by: THORACIC SURGERY (CARDIOTHORACIC VASCULAR SURGERY)

## 2020-07-02 PROCEDURE — 83735 ASSAY OF MAGNESIUM: CPT | Performed by: EMERGENCY MEDICINE

## 2020-07-02 PROCEDURE — 85025 COMPLETE CBC W/AUTO DIFF WBC: CPT | Performed by: EMERGENCY MEDICINE

## 2020-07-02 PROCEDURE — C9113 INJ PANTOPRAZOLE SODIUM, VIA: HCPCS | Performed by: STUDENT IN AN ORGANIZED HEALTH CARE EDUCATION/TRAINING PROGRAM

## 2020-07-02 PROCEDURE — 80048 BASIC METABOLIC PNL TOTAL CA: CPT | Performed by: THORACIC SURGERY (CARDIOTHORACIC VASCULAR SURGERY)

## 2020-07-02 PROCEDURE — 94640 AIRWAY INHALATION TREATMENT: CPT | Performed by: SOCIAL WORKER

## 2020-07-02 PROCEDURE — 80048 BASIC METABOLIC PNL TOTAL CA: CPT | Performed by: EMERGENCY MEDICINE

## 2020-07-02 PROCEDURE — 94760 N-INVAS EAR/PLS OXIMETRY 1: CPT | Performed by: SOCIAL WORKER

## 2020-07-02 PROCEDURE — 84100 ASSAY OF PHOSPHORUS: CPT | Performed by: EMERGENCY MEDICINE

## 2020-07-02 PROCEDURE — 94640 AIRWAY INHALATION TREATMENT: CPT

## 2020-07-02 RX ORDER — POTASSIUM CHLORIDE 14.9 MG/ML
20 INJECTION INTRAVENOUS ONCE
Status: COMPLETED | OUTPATIENT
Start: 2020-07-02 | End: 2020-07-02

## 2020-07-02 RX ADMIN — NYSTATIN 500000 UNITS: 100000 SUSPENSION ORAL at 22:57

## 2020-07-02 RX ADMIN — LEVALBUTEROL HYDROCHLORIDE 1.25 MG: 1.25 SOLUTION, CONCENTRATE RESPIRATORY (INHALATION) at 07:55

## 2020-07-02 RX ADMIN — HYDROMORPHONE HYDROCHLORIDE 1 MG: 1 INJECTION, SOLUTION INTRAMUSCULAR; INTRAVENOUS; SUBCUTANEOUS at 03:57

## 2020-07-02 RX ADMIN — MELATONIN 3 MG: at 22:57

## 2020-07-02 RX ADMIN — ENOXAPARIN SODIUM 100 MG: 100 INJECTION SUBCUTANEOUS at 08:02

## 2020-07-02 RX ADMIN — POTASSIUM CHLORIDE 20 MEQ: 14.9 INJECTION, SOLUTION INTRAVENOUS at 07:42

## 2020-07-02 RX ADMIN — ISODIUM CHLORIDE 3 ML: 0.03 SOLUTION RESPIRATORY (INHALATION) at 19:16

## 2020-07-02 RX ADMIN — VALPROATE SODIUM 250 MG: 100 INJECTION, SOLUTION INTRAVENOUS at 23:11

## 2020-07-02 RX ADMIN — DIAZEPAM 10 MG: 10 INJECTION, SOLUTION INTRAMUSCULAR; INTRAVENOUS at 22:57

## 2020-07-02 RX ADMIN — HYDROMORPHONE HYDROCHLORIDE 1 MG: 1 INJECTION, SOLUTION INTRAMUSCULAR; INTRAVENOUS; SUBCUTANEOUS at 19:30

## 2020-07-02 RX ADMIN — PANTOPRAZOLE SODIUM 40 MG: 40 INJECTION, POWDER, FOR SOLUTION INTRAVENOUS at 08:02

## 2020-07-02 RX ADMIN — CHLORHEXIDINE GLUCONATE 0.12% ORAL RINSE 15 ML: 1.2 LIQUID ORAL at 08:02

## 2020-07-02 RX ADMIN — HYDROMORPHONE HYDROCHLORIDE 1 MG: 1 INJECTION, SOLUTION INTRAMUSCULAR; INTRAVENOUS; SUBCUTANEOUS at 22:55

## 2020-07-02 RX ADMIN — NYSTATIN 500000 UNITS: 100000 SUSPENSION ORAL at 08:02

## 2020-07-02 RX ADMIN — ISODIUM CHLORIDE 3 ML: 0.03 SOLUTION RESPIRATORY (INHALATION) at 13:11

## 2020-07-02 RX ADMIN — VALPROATE SODIUM 250 MG: 100 INJECTION, SOLUTION INTRAVENOUS at 05:38

## 2020-07-02 RX ADMIN — MIDAZOLAM 2 MG: 1 INJECTION INTRAMUSCULAR; INTRAVENOUS at 12:13

## 2020-07-02 RX ADMIN — SODIUM CHLORIDE, SODIUM GLUCONATE, SODIUM ACETATE, POTASSIUM CHLORIDE, MAGNESIUM CHLORIDE, SODIUM PHOSPHATE, DIBASIC, AND POTASSIUM PHOSPHATE 75 ML/HR: .53; .5; .37; .037; .03; .012; .00082 INJECTION, SOLUTION INTRAVENOUS at 19:53

## 2020-07-02 RX ADMIN — SENNOSIDES AND DOCUSATE SODIUM 1 TABLET: 8.6; 5 TABLET ORAL at 08:02

## 2020-07-02 RX ADMIN — NYSTATIN 500000 UNITS: 100000 SUSPENSION ORAL at 12:15

## 2020-07-02 RX ADMIN — CHLORHEXIDINE GLUCONATE 0.12% ORAL RINSE 15 ML: 1.2 LIQUID ORAL at 22:57

## 2020-07-02 RX ADMIN — ISODIUM CHLORIDE 3 ML: 0.03 SOLUTION RESPIRATORY (INHALATION) at 07:55

## 2020-07-02 RX ADMIN — POLYETHYLENE GLYCOL 3350 17 G: 17 POWDER, FOR SOLUTION ORAL at 08:02

## 2020-07-02 RX ADMIN — HYDROMORPHONE HYDROCHLORIDE 1 MG: 1 INJECTION, SOLUTION INTRAMUSCULAR; INTRAVENOUS; SUBCUTANEOUS at 06:38

## 2020-07-02 RX ADMIN — HYDROMORPHONE HYDROCHLORIDE 0.5 MG: 1 INJECTION, SOLUTION INTRAMUSCULAR; INTRAVENOUS; SUBCUTANEOUS at 12:52

## 2020-07-02 RX ADMIN — DIAZEPAM 10 MG: 10 INJECTION, SOLUTION INTRAMUSCULAR; INTRAVENOUS at 18:15

## 2020-07-02 RX ADMIN — VALPROATE SODIUM 250 MG: 100 INJECTION, SOLUTION INTRAVENOUS at 14:34

## 2020-07-02 RX ADMIN — ENOXAPARIN SODIUM 100 MG: 100 INJECTION SUBCUTANEOUS at 22:58

## 2020-07-02 RX ADMIN — HYDROMORPHONE HYDROCHLORIDE 1 MG: 1 INJECTION, SOLUTION INTRAMUSCULAR; INTRAVENOUS; SUBCUTANEOUS at 11:34

## 2020-07-02 RX ADMIN — NYSTATIN 500000 UNITS: 100000 SUSPENSION ORAL at 18:20

## 2020-07-02 RX ADMIN — LEVALBUTEROL HYDROCHLORIDE 1.25 MG: 1.25 SOLUTION, CONCENTRATE RESPIRATORY (INHALATION) at 13:11

## 2020-07-02 RX ADMIN — DIAZEPAM 10 MG: 10 INJECTION, SOLUTION INTRAMUSCULAR; INTRAVENOUS at 08:02

## 2020-07-02 RX ADMIN — QUETIAPINE FUMARATE 50 MG: 25 TABLET ORAL at 22:57

## 2020-07-02 RX ADMIN — SODIUM CHLORIDE, SODIUM GLUCONATE, SODIUM ACETATE, POTASSIUM CHLORIDE, MAGNESIUM CHLORIDE, SODIUM PHOSPHATE, DIBASIC, AND POTASSIUM PHOSPHATE 75 ML/HR: .53; .5; .37; .037; .03; .012; .00082 INJECTION, SOLUTION INTRAVENOUS at 05:38

## 2020-07-02 RX ADMIN — LEVALBUTEROL HYDROCHLORIDE 1.25 MG: 1.25 SOLUTION, CONCENTRATE RESPIRATORY (INHALATION) at 19:16

## 2020-07-02 RX ADMIN — HYDROMORPHONE HYDROCHLORIDE 1 MG: 1 INJECTION, SOLUTION INTRAMUSCULAR; INTRAVENOUS; SUBCUTANEOUS at 14:23

## 2020-07-02 NOTE — RESPIRATORY THERAPY NOTE
RT Ventilator Management Note  Hui Larson 71 y o  male MRN: [id number]  Unit/Bed#: [facility] 417-01 Encounter: [id number]      Daily Screen       7/1/2020  0023 7/1/2020  0412          Patient safety screen outcome[de-identified]  Passed  Passed      Spont breathing trial % for 30 min:          RSBI:  21                Physical Exam:   Assessment Type: (P) Assess only  General Appearance: (P) Sleeping  Respiratory Pattern: (P) Assisted, Spontaneous  Chest Assessment: (P) Chest expansion symmetrical  Bilateral Breath Sounds: (P) Diminished, Coarse  Suction: Trach      Resp Comments: (P) Pt resting comfortably on psv with no changes at this time, will continue to monitor per respiratory protocol

## 2020-07-02 NOTE — SOCIAL WORK
Pt  Transferred back to P4 critical care form ICU on 6/26/20  Is now trached and on vent, starting to wean  On 4/1 tolerated 5 hrs x2, however this am only tolerated 30 min  Will try again later  Pt  Is encephalopathic and on 1:1   CM rounded with CC daily, discussed discharge planning, LTAC vs acute rehab but unsure at this time  Plan was to extubate and possible acute rehab  Pt  Is not following simple commands today per Dr Natalie Hewitt note    Still has PIC, left chest tube, norwood, ng for Tube feeds  Temp max 100, pulse 106  Chest tube is to water seal, plan remove in 48 hours  Plan to increase tube feedings  Pt  Lives with his daughter and son-in-law  Pt  Is lateral transfer today to P5 due to bed need  CM on P5 informed

## 2020-07-02 NOTE — RESPIRATORY THERAPY NOTE
resp care      07/02/20 1134   Respiratory Assessment   Resp Comments pt a lateral transfer from P4, pt on a 40% TC, tolerating well, will continue to monitor   O2 Device TC   Airway Suctioning/Secretions   Suction Type Tracheal   Suction Device Catheter   Secretion Amount Moderate   Secretion Color Cloudy; White   Secretion Consistency Thick   Suction Tolerance Tolerated well   Suctioning Adverse Effects None   Additional Assessments   SpO2 99 %

## 2020-07-02 NOTE — PLAN OF CARE
Problem: CARDIOVASCULAR - ADULT  Goal: Maintains optimal cardiac output and hemodynamic stability  Description  INTERVENTIONS:  - Monitor I/O, vital signs and rhythm  - Monitor for S/S and trends of decreased cardiac output  - Administer and titrate ordered vasoactive medications to optimize hemodynamic stability  - Assess quality of pulses, skin color and temperature  - Assess for signs of decreased coronary artery perfusion  - Instruct patient to report change in severity of symptoms  Outcome: Progressing  Goal: Absence of cardiac dysrhythmias or at baseline rhythm  Description  INTERVENTIONS:  - Continuous cardiac monitoring, vital signs, obtain 12 lead EKG if ordered  - Administer antiarrhythmic and heart rate control medications as ordered  - Monitor electrolytes and administer replacement therapy as ordered  Outcome: Progressing     Problem: RESPIRATORY - ADULT  Goal: Achieves optimal ventilation and oxygenation  Description  INTERVENTIONS:  - Assess for changes in respiratory status  - Assess for changes in mentation and behavior  - Position to facilitate oxygenation and minimize respiratory effort  - Oxygen administered by appropriate delivery if ordered  - Initiate smoking cessation education as indicated  - Encourage broncho-pulmonary hygiene including cough, deep breathe, Incentive Spirometry  - Assess the need for suctioning and aspirate as needed  - Assess and instruct to report SOB or any respiratory difficulty  - Respiratory Therapy support as indicated  Outcome: Progressing     Problem: SKIN/TISSUE INTEGRITY - ADULT  Goal: Skin integrity remains intact  Description  INTERVENTIONS  - Identify patients at risk for skin breakdown  - Assess and monitor skin integrity  - Assess and monitor nutrition and hydration status  - Monitor labs (i e  albumin)  - Assess for incontinence   - Turn and reposition patient  - Assist with mobility/ambulation  - Relieve pressure over bony prominences  - Avoid friction and shearing  - Provide appropriate hygiene as needed including keeping skin clean and dry  - Evaluate need for skin moisturizer/barrier cream  - Collaborate with interdisciplinary team (i e  Nutrition, Rehabilitation, etc )   - Patient/family teaching  Outcome: Progressing  Goal: Incision(s), wounds(s) or drain site(s) healing without S/S of infection  Description  INTERVENTIONS  - Assess and document risk factors for skin impairment   - Assess and document dressing, incision, wound bed, drain sites and surrounding tissue  - Consider nutrition services referral as needed  - Oral mucous membranes remain intact  - Provide patient/ family education  Outcome: Progressing  Goal: Oral mucous membranes remain intact  Description  INTERVENTIONS  - Assess oral mucosa and hygiene practices  - Implement preventative oral hygiene regimen  - Implement oral medicated treatments as ordered  - Initiate Nutrition services referral as needed  Outcome: Progressing     Problem: MUSCULOSKELETAL - ADULT  Goal: Maintain or return mobility to safest level of function  Description  INTERVENTIONS:  - Assess patient's ability to carry out ADLs; assess patient's baseline for ADL function and identify physical deficits which impact ability to perform ADLs (bathing, care of mouth/teeth, toileting, grooming, dressing, etc )  - Assess/evaluate cause of self-care deficits   - Assess range of motion  - Assess patient's mobility  - Assess patient's need for assistive devices and provide as appropriate  - Encourage maximum independence but intervene and supervise when necessary  - Involve family in performance of ADLs  - Assess for home care needs following discharge   - Consider OT consult to assist with ADL evaluation and planning for discharge  - Provide patient education as appropriate  Outcome: Progressing  Goal: Maintain proper alignment of affected body part  Description  INTERVENTIONS:  - Support, maintain and protect limb and body alignment  - Provide patient/ family with appropriate education  Outcome: Progressing     Problem: Potential for Falls  Goal: Patient will remain free of falls  Description  INTERVENTIONS:  - Assess patient frequently for physical needs  -  Identify cognitive and physical deficits and behaviors that affect risk of falls  -  Victor fall precautions as indicated by assessment   - Educate patient/family on patient safety including physical limitations  - Instruct patient to call for assistance with activity based on assessment  - Modify environment to reduce risk of injury  - Consider OT/PT consult to assist with strengthening/mobility  Outcome: Progressing     Problem: Prexisting or High Potential for Compromised Skin Integrity  Goal: Skin integrity is maintained or improved  Description  INTERVENTIONS:  - Identify patients at risk for skin breakdown  - Assess and monitor skin integrity  - Assess and monitor nutrition and hydration status  - Monitor labs   - Assess for incontinence   - Turn and reposition patient  - Assist with mobility/ambulation  - Relieve pressure over bony prominences  - Avoid friction and shearing  - Provide appropriate hygiene as needed including keeping skin clean and dry  - Evaluate need for skin moisturizer/barrier cream  - Collaborate with interdisciplinary team   - Patient/family teaching  - Consider wound care consult   Outcome: Progressing     Problem: Nutrition/Hydration-ADULT  Goal: Nutrient/Hydration intake appropriate for improving, restoring or maintaining nutritional needs  Description  Monitor and assess patient's nutrition/hydration status for malnutrition  Collaborate with interdisciplinary team and initiate plan and interventions as ordered  Monitor patient's weight and dietary intake as ordered or per policy  Utilize nutrition screening tool and intervene as necessary  Determine patient's food preferences and provide high-protein, high-caloric foods as appropriate  INTERVENTIONS:  - Monitor oral intake, urinary output, labs, and treatment plans  - Assess nutrition and hydration status and recommend course of action  - Evaluate amount of meals eaten  - Assist patient with eating if necessary   - Allow adequate time for meals  - Recommend/ encourage appropriate diets, oral nutritional supplements, and vitamin/mineral supplements  - Order, calculate, and assess calorie counts as needed  - Recommend, monitor, and adjust tube feedings and TPN/PPN based on assessed needs  - Assess need for intravenous fluids  - Provide specific nutrition/hydration education as appropriate  - Include patient/family/caregiver in decisions related to nutrition  Outcome: Progressing     Problem: SAFETY,RESTRAINT: NV/NON-SELF DESTRUCTIVE BEHAVIOR  Goal: Remains free of harm/injury (restraint for non violent/non self-detsructive behavior)  Description  INTERVENTIONS:  - Instruct patient/family regarding restraint use   - Assess and monitor physiologic and psychological status   - Provide interventions and comfort measures to meet assessed patient needs   - Identify and implement measures to help patient regain control  - Assess readiness for release of restraint   Outcome: Progressing  Goal: Returns to optimal restraint-free functioning  Description  INTERVENTIONS:  - Assess the patient's behavior and symptoms that indicate continued need for restraint  - Identify and implement measures to help patient regain control  - Assess readiness for release of restraint   Outcome: Progressing     Problem: Knowledge Deficit  Goal: Patient/family/caregiver demonstrates understanding of disease process, treatment plan, medications, and discharge instructions  Description  Complete learning assessment and assess knowledge base    Interventions:  - Provide teaching at level of understanding  - Provide teaching via preferred learning methods  Outcome: Progressing

## 2020-07-02 NOTE — RESPIRATORY THERAPY NOTE
resp care      07/02/20 1311   Inhalation Therapy Tx   $ Inhalation Therapy Performed Yes   Pre-Treatment Pulse 98   Breath Sounds Pre-Treatment Bilateral Rhonchi  (clear after sx  )   Breath Sounds Post-Treatment Bilateral Diminished   Resp Comments sat 100% on 40%   02 decreased to 28%

## 2020-07-02 NOTE — OCCUPATIONAL THERAPY NOTE
Occupational Therapy Cancellation        Patient Name: Aleks Bender  Today's Date: 7/2/2020    Chart reviewed  Per thoracic this AM, pt unable to follow simple commands; not appropriate to see for therapy at this time  OT will continue to follow to see as appropriate        Adelita Elizalde MS, OTR/L

## 2020-07-02 NOTE — PROGRESS NOTES
Progress Note - Critical Care   Murl Late 71 y o  male MRN: [id number]  Unit/Bed#: [facility] 417-01 Encounter: [id number]    Assessment:   Principal Problem:    Malignant neoplasm of upper lobe of left lung (HCC)  Active Problems:    Acute respiratory failure with hypoxia (HCC)    Subcutaneous emphysema (HCC)    Acute deep vein thrombosis (DVT) of axillary vein of right upper extremity (HCC)    Acute deep vein thrombosis (DVT) of brachial vein of right upper extremity (HCC)    Tracheostomy in place [facility])    Postprocedural pneumothorax    Acute deep vein thrombosis (DVT) of calf muscle vein of left lower extremity (HCC)    Pneumonia  Resolved Problems:    Lactic acidosis    ROGER (acute kidney injury) ([facility] [address] )    Plan:   · Neuro:   · Dx: encephalopathy, delirium versus hypoxic injury s/p cardiac arrest  · Continues to have intermittent agitation and confusion  · Continue to monitor  · Analgesia: dilaudid  · Delirium ppx: CAM-ICU, sleep hygiene  · seroquel  · CV:   · Dx: tachycardia  · Dx: s/p cardiac arrest earlier in stay, due to hypoxia/hypercarbia likely 2/2 subcu emphysema obstructing airway  · subcu emphysema greatly improved  · Hemodynamic support: none  · MAP goal > 65    · Lung:   · Dx: s/p L upper VATS lobectomy for resection of lung cancer, complicated for subcu emphysema  · Chest tube in place, managed by surgical team  · Dx: s/p cric converted to trach due to cardiac arrest as above  · Continue trach collar as much as tolerated    Vent Mode: CPAP/PS Spont  · FiO2 (%):  [40-50] 40   · Continue Respiratory Protocol and Airway Clearance Protocol  · levalbuterol tid    · GI:   · No active issues  · NG tube in place  · Stress ulcer ppx: protonix  · Bowel regimen: senna, docusate, miralax   · FEN:   · Fluids: isolyte 75cc/hr  · Electrolytes: trend and replete as needed  · Nutrition: tube feeds   · :   · Cr wnl this AM  I/O last 24 hours:   In: 2324 9 [I V :1812 4; NG/GT:130; IV Piggyback:227 5; Feedings:155]  Out: 1540 [Urine:1540]  · Net fluid balance since admission -9 1 L  · Gaytan: Gaytan  · Monitor I&O's, BUN/Cr  · ID:   · WBC: 7 3  · Temp: afebrile  · Abx: none  · Cultures: none new or pending    · Heme:   · Dx: active DVTs  · Continue lovenox  · Active bleeding: none  · DVT ppx: lovenox, SCDs   · Endo:   · Dx: no active issues  · Msk/Skin:   · PT/OT  · Turning/repositioning  · Wound care   · Disposition: ICU    ______________________________________________________________________    HPI/24hr events: No significant overnight events  Lines   Chest tube, Gaytan, NG, trach, PICC    Infusions  dilaudid  ______________________________________________________________________  Physical Exam:   Miles Agitation Sedation Scale (RASS): Alert and calm  Physical Exam   HENT:   Head: Normocephalic and atraumatic  Trach in place   Eyes: Pupils are equal, round, and reactive to light  Conjunctivae are normal    Neck: Neck supple  No JVD present  No tracheal deviation present  Cardiovascular: Normal rate and regular rhythm  Pulmonary/Chest: Effort normal and breath sounds normal    Abdominal: Soft  Musculoskeletal: He exhibits no edema or deformity  Chest tube in place, minimal new drainage   Neurological:   Moves all extremities on command, oriented to person and place   Skin: Skin is warm and dry  Nursing note and vitals reviewed        ______________________________________________________________________  Temperature:   Temp (24hrs), Av 4 °F (37 4 °C), Min:99 °F (37 2 °C), Max:100 °F (37 8 °C)    Current Temperature: 99 7 °F (37 6 °C)    Vitals:    20 0500 20 0542 20 0600 20 0700   BP: 128/59  135/77 127/70   Pulse: 100  98 98   Resp: 16  16 14   Temp: 99 7 °F (37 6 °C)  99 3 °F (37 4 °C) 99 7 °F (37 6 °C)   TempSrc:       SpO2: 99%  100% 100%   Weight:  103 kg (227 lb 1 2 oz)     Height:         Arterial Line BP: 128/64       Weights:   IBW: 73 kg    Body mass index is 32 58 kg/m²  Weight (last 2 days)     Date/Time   Weight    07/02/20 0542   103 (227 07)    07/01/20 0546   104 (229 06)    06/30/20 0533   103 (227 52)            Height: 5' 10" (177 8 cm)  Hemodynamic Monitoring:  N/A       Ventilator Settings:   Vent Mode: CPAP/PS Spont              FiO2 (%): 40       No results found for: PHART, JVE8INU, PO2ART, XQY7VZV, S7CHNTYW, BEART, SOURCE    Intake and Outputs:  I/O       06/30 0701 - 07/01 0700 07/01 0701 - 07/02 0700    I V  (mL/kg) 2134 9 (20 5) 1663 7 (16 2)    NG/GT  130    IV Piggyback 288 3 227 5    Feedings  135    Total Intake(mL/kg) 2423 3 (23 3) 2156 2 (20 9)    Urine (mL/kg/hr) 2610 (1) 1380 (0 6)    Emesis/NG output 200 0    Chest Tube 25 0    Total Output 2835 1380    Net -411 7 +776 2                Nutrition:        Diet Orders   (From admission, onward)             Start     Ordered    07/01/20 1059  Diet Enteral/Parenteral; Tube Feeding No Oral Diet; Jevity 1 2 Javier; Continuous; 10  Diet effective now     Question Answer Comment   Diet Type Enteral/Parenteral    Enteral/Parenteral Tube Feeding No Oral Diet    Tube Feeding Formula: Jevity 1 2 Javier    Bolus/Cyclic/Continuous Continuous    Tube Feeding Goal Rate (mL/hr): 10    RD to adjust diet per protocol?  Yes        07/01/20 1058                Labs:   Results from last 7 days   Lab Units 07/02/20  0509 07/01/20 0441 07/01/20  0047   WBC Thousand/uL 7 29 9 22 9 21   HEMOGLOBIN g/dL 8 3* 8 6* 7 9*   HEMATOCRIT % 27 0* 28 1* 26 0*   PLATELETS Thousands/uL 307 273 259   NEUTROS PCT % 69 72 69   MONOS PCT % 8 8 8      Results from last 7 days   Lab Units 07/02/20  0419 07/01/20  0441 07/01/20  0047   POTASSIUM mmol/L 3 9 3 7 3 6   CHLORIDE mmol/L 103 105 104   CO2 mmol/L 30 32 31   BUN mg/dL 10 11 12   CREATININE mg/dL 0 56* 0 62 0 69   CALCIUM mg/dL 7 8* 8 2* 7 7*     Results from last 7 days   Lab Units 07/02/20  0419 07/01/20  0441 06/28/20  0419   MAGNESIUM mg/dL 2 4 2 3 2 2     Results from last 7 days   Lab Units 07/02/20  0419 07/01/20  0441 06/27/20  0429   PHOSPHORUS mg/dL 3 1 3 5 3 4          Results from last 7 days   Lab Units 07/01/20  0047   LACTIC ACID mmol/L 0 7     0   Lab Value Date/Time    TROPONINI 2 75 (H) 06/20/2020 1759    TROPONINI 3 00 (H) 06/20/2020 1319    TROPONINI 2 86 (H) 06/20/2020 0928    TROPONINI 0 85 (H) 06/20/2020 0647    TROPONINI <0 02 03/28/2020 0451    TROPONINI <0 02 03/28/2020 0031    TROPONINI <0 02 03/27/2020 2026     Imaging:  I have personally reviewed pertinent reports  EKG:   Micro:  Blood Culture:   Lab Results   Component Value Date    BLOODCX No Growth at 48 hrs  06/29/2020    BLOODCX No Growth at 48 hrs  06/29/2020    BLOODCX No Growth After 5 Days  06/20/2020    BLOODCX No Growth After 5 Days  06/20/2020    BLOODCX No Growth After 5 Days  03/27/2020    BLOODCX No Growth After 5 Days  03/27/2020     Urine Culture: No results found for: URINECX  Sputum Culture: No components found for: SPUTUMCX  Wound Culure: No results found for: WOUNDCULT    Lab Results   Component Value Date    BLOODCX No Growth at 48 hrs  06/29/2020    BLOODCX No Growth at 48 hrs  06/29/2020    BLOODCX No Growth After 5 Days  06/20/2020    BLOODCX No Growth After 5 Days   06/20/2020    SPUTUMCULTUR 4+ Growth of  06/29/2020     Allergies: No Known Allergies  Medications:   Scheduled Meds:    Current Facility-Administered Medications:  acetaminophen 650 mg Rectal Q4H PRN KHANG Madison    albuterol 2 5 mg Nebulization Q6H PRN Efraín Granger MD    chlorhexidine 15 mL Swish & Spit Q12H [address] Otilio Cabrera PA-C    diazepam 10 mg Intravenous TID Margret Brooks DO    enoxaparin 1 mg/kg Subcutaneous Q12H [address] Jaden Flanagan MD    HYDROmorphone 1 mg/hr Intravenous Continuous KHANG Madison Last Rate: Stopped (07/01/20 2202)   HYDROmorphone 0 5 mg Intravenous Q1H PRN KHANG Madison    HYDROmorphone 1 mg Intravenous Q4H Margret Brooks DO    levalbuterol 1 25 mg Nebulization TID Niharika Bear MD    Lidocaine Viscous HCl 15 mL Swish & Spit 4x Daily PRN Jade Morgan MD    melatonin 3 mg Oral HS KHANG Christensen    midazolam 2 mg Intravenous Once PRN Jessy Garcia DO    multi-electrolyte 75 mL/hr Intravenous Continuous Concepción Chisholm MD Last Rate: 75 mL/hr (07/02/20 0538)   nystatin 500,000 Units Swish & Swallow 4x Daily KHANG Christensen    ondansetron 4 mg Intravenous Q6H PRN Kerrie Delgadillo PA-C    pantoprazole 40 mg Intravenous Q24H [address] Goldie Bender MD    polyethylene glycol 17 g Oral Daily KHANG Christensen    potassium chloride 20 mEq Intravenous Once Kelli Marte DO    propofol 5-50 mcg/kg/min Intravenous Titrated KHANG Madison Last Rate: Stopped (06/30/20 2105)   QUEtiapine 50 mg Oral HS Concepción Chisholm MD    senna-docusate sodium 1 tablet Per NG Tube BID Kerrie Delgadillo PA-C    sodium chloride 3 mL Nebulization TID Niharika Bear MD    valproate sodium 250 mg Intravenous [facility] Margret Brooks DO Last Rate: 250 mg (07/02/20 0538)     Continuous Infusions:    HYDROmorphone 1 mg/hr Last Rate: Stopped (07/01/20 2202)   multi-electrolyte 75 mL/hr Last Rate: 75 mL/hr (07/02/20 0538)   propofol 5-50 mcg/kg/min Last Rate: Stopped (06/30/20 2105)     PRN Meds:    acetaminophen 650 mg Q4H PRN   albuterol 2 5 mg Q6H PRN   HYDROmorphone 0 5 mg Q1H PRN   Lidocaine Viscous HCl 15 mL 4x Daily PRN   midazolam 2 mg Once PRN   ondansetron 4 mg Q6H PRN     VTE Pharmacologic Prophylaxis: Enoxaparin (Lovenox)  VTE Mechanical Prophylaxis: sequential compression device  Invasive lines and devices:   Invasive Devices     Peripherally Inserted Central Catheter Line            PICC Line 52/68/05 Left Basilic 5 days          Drain            Chest Tube 1 Left Pleural 28 Fr  21 days    Urethral Catheter 16 Fr  12 days    NG/OG/Enteral Tube Nasogastric Right nares 3 days          Airway            Surgical Airway Shiley Cuffed 9 days                   Code Status: Level 2 - DNAR: but accepts endotracheal intubation    Portions of the record may have been created with voice recognition software  Occasional wrong word or "sound a like" substitutions may have occurred due to the inherent limitations of voice recognition software  Read the chart carefully and recognize, using context, where substitutions have occurred      Michael Baires,

## 2020-07-02 NOTE — UTILIZATION REVIEW
Continued Stay Review    Date: 7/2/20                        Current Patient Class: inpatient  Current Level of Care: med surg  HPI:69 y o  male initially admitted on 6/10/20  Assessment/Plan:   71 M s/p L VATS upper lobectomy, with post-op prolonged air leak, emergency cricothyroidotomy and subsequent tracheostomy revision  Continue weaning vent, trach collaring BID  Advance tube feeds, consider post-pyloric feeding tube  Chest tube to water seal, minimal drainage  Trach collared BID yesterday for 4 hours  Chest: breath sounds bilaterally, chest tube in place, no air leak   Incisions c/d/i  Pertinent Labs/Diagnostic Results:   Results from last 7 days   Lab Units 07/02/20  0509 07/01/20  0441 07/01/20  0047 06/30/20  1947 06/30/20  1142 06/30/20  0454 06/30/20  0408   WBC Thousand/uL 7 29 9 22 9 21  --   --   --  14 10*   HEMOGLOBIN g/dL 8 3* 8 6* 7 9* 8 9* 8 1* 8 9* 7 9*   HEMATOCRIT % 27 0* 28 1* 26 0*  --   --  29 3* 25 9*   PLATELETS Thousands/uL 307 273 259  --   --   --  236   NEUTROS ABS Thousands/µL 5 04 6 57 6 39  --   --   --  11 55*     Results from last 7 days   Lab Units 07/02/20  0419 07/01/20  0441 07/01/20  0047 06/30/20  0408 06/29/20  0447 06/28/20  0419 06/27/20  0429 06/26/20  0512   SODIUM mmol/L 138 141 140 139 140 138 138 141   POTASSIUM mmol/L 3 9 3 7 3 6 4 1 4 2 4 6 4 2 3 7   CHLORIDE mmol/L 103 105 104 103 102 104 105 109*   CO2 mmol/L 30 32 31 31 31 30 27 28   ANION GAP mmol/L 5 4 5 5 7 4 6 4   BUN mg/dL 10 11 12 11 6 12 11 12   CREATININE mg/dL 0 56* 0 62 0 69 0 60 0 57* 0 65 0 59* 0 74   EGFR ml/min/1 73sq m 106 101 97 103 105 99 103 94   CALCIUM mg/dL 7 8* 8 2* 7 7* 7 2* 8 7 8 3 7 5* 7 5*   CALCIUM, IONIZED mmol/L 1 05* 1 07*  --   --   --  1 09* 1 00* 1 08*   MAGNESIUM mg/dL 2 4 2 3  --   --   --  2 2 1 9 2 2   PHOSPHORUS mg/dL 3 1 3 5  --   --   --   --  3 4 3 2     Results from last 7 days   Lab Units 07/02/20  0419 07/01/20  0441 07/01/20  0047 06/30/20  0408 06/29/20  0447 06/28/20  0419 06/27/20  0429 06/26/20  0512   GLUCOSE RANDOM mg/dL 84 80 83 89 103 114 111 146*     Results from last 7 days   Lab Units 07/01/20  0139 06/28/20  0458   PH ART  7 498* 7 448   PCO2 ART mm Hg 37 2 41 0   PO2 ART mm Hg 81 0 89 0   HCO3 ART mmol/L 28 2* 27 7   BASE EXC ART mmol/L 4 7 3 4   O2 CONTENT ART mL/dL 10 6* 16 1   O2 HGB, ARTERIAL % 94 6 95 8   ABG SOURCE  Radial, Left  --      Results from last 7 days   Lab Units 07/01/20  0606 06/30/20  1142 06/29/20  0447 06/28/20  0419   PROCALCITONIN ng/ml 0 18 0 29* 0 53* 0 58*     Results from last 7 days   Lab Units 07/01/20  0047 06/27/20  0844   LACTIC ACID mmol/L 0 7 1 4     Results from last 7 days   Lab Units 06/29/20  1021   CLARITY UA  Turbid   COLOR UA  Yellow   SPEC GRAV UA  1 017   PH UA  8 5*   GLUCOSE UA mg/dl Negative   KETONES UA mg/dl Negative   BLOOD UA  Moderate*   PROTEIN UA mg/dl 100 (2+)*   NITRITE UA  Negative   BILIRUBIN UA  Negative   UROBILINOGEN UA E U /dl 0 2   LEUKOCYTES UA  Negative   WBC UA /hpf 2-4*   RBC UA /hpf 30-50*   BACTERIA UA /hpf None Seen   EPITHELIAL CELLS WET PREP /hpf None Seen     Results from last 7 days   Lab Units 06/29/20  1059 06/29/20  1028 06/29/20  1024   BLOOD CULTURE  No Growth at 48 hrs  --  No Growth at 48 hrs     SPUTUM CULTURE   --  4+ Growth of   --    GRAM STAIN RESULT   --  1+ Polys*  2+ Gram positive cocci in pairs and chains*  1+ Gram positive rods*  --      Vital Signs: BP (!) 172/95   Pulse (!) 106   Temp 100 °F (37 8 °C)   Resp 20   Ht 5' 10" (1 778 m)   Wt 103 kg (227 lb 1 2 oz)   SpO2 99%   BMI 32 58 kg/m²      Diet Enteral/Parenteral; Tube Feeding No Oral Diet; Jevity 1 2 Javier; Continuous; 10    Medications:   chlorhexidine 15 mL Swish & Spit Q12H [address]   diazepam 10 mg Intravenous TID   enoxaparin 1 mg/kg Subcutaneous Q12H DEAN   HYDROmorphone 1 mg Intravenous Q4H   levalbuterol 1 25 mg Nebulization TID   melatonin 3 mg Oral HS   nystatin 500,000 Units Swish & Swallow 4x Daily pantoprazole 40 mg Intravenous Q24H DEAN   polyethylene glycol 17 g Oral Daily   QUEtiapine 50 mg Oral HS   senna-docusate sodium 1 tablet Per NG Tube BID   sodium chloride 3 mL Nebulization TID   valproate sodium 250 mg Intravenous Q8H [address]     Continuous IV Infusions:  HYDROmorphone 1 mg/hr Intravenous Continuous   multi-electrolyte 75 mL/hr Intravenous Continuous   propofol 5-50 mcg/kg/min Intravenous Titrated     PRN Meds:  acetaminophen 650 mg Rectal Q4H PRN   albuterol 2 5 mg Nebulization Q6H PRN   HYDROmorphone 0 5 mg Intravenous Q1H PRN   Lidocaine Viscous HCl 15 mL Swish & Spit 4x Daily PRN   midazolam 2 mg Intravenous Once PRN   ondansetron 4 mg Intravenous Q6H PRN     Discharge Plan: tbd  Network Utilization Review Department  [email or web address]  ATTENTION: Please call with any questions or concerns to [phone] and carefully listen to the prompts so that you are directed to the right person  All voicemails are confidential   Barbara Shelton all requests for admission clinical reviews, approved or denied determinations and any other requests to dedicated fax number below belonging to the campus where the patient is receiving treatment   List of dedicated fax numbers for the Facilities:  [address] DENIALS (Administrative/Medical Necessity) [phone]   [address] (Maternity/NICU/Pediatrics) [phone]   Lauren Wisdom [phone]   Eugene Walton [phone]   Inés Inman [phone]   Mendez Arguello [phone]   1205 [facility] [address] [facility] [phone]   [facility]  [phone]   [address] [facility] [address]  [address] [facility] [address] [facility] [phone]

## 2020-07-02 NOTE — PROGRESS NOTES
Progress Note - Thoracic Surgery   Esequiel Crabtree 71 y o  male MRN: [id number]  Unit/Bed#: [facility] 417-01 Encounter: [id number]    Assessment:  71 M s/p L VATS upper lobectomy, with post-op prolonged air leak, emergency cricothyroidotomy and subsequent tracheostomy revision  L CT -  0 output, no air leak    Plan:  Continue weaning vent, trach collaring BID  Advance tube feeds, consider post-pyloric feeding tube  Chest tube to water seal  Appreciate ICU care    Subjective/Objective     Subjective: No acute events overnight  Trach collared BID yesterday for 4 hours  Comfortable this morning off sedation  Objective:    Blood pressure 135/77, pulse 98, temperature 99 3 °F (37 4 °C), resp  rate 16, height 5' 10" (1 778 m), weight 103 kg (227 lb 1 2 oz), SpO2 100 %  ,Body mass index is 32 58 kg/m²        Intake/Output Summary (Last 24 hours) at 7/2/2020 0646  Last data filed at 7/2/2020 0600  Gross per 24 hour   Intake 2324 91 ml   Output 1540 ml   Net 784 91 ml       Invasive Devices     Peripherally Inserted Central Catheter Line            PICC Line 39/34/14 Left Basilic 5 days          Drain            Chest Tube 1 Left Pleural 28 Fr  21 days    Urethral Catheter 16 Fr  11 days    NG/OG/Enteral Tube Nasogastric Right nares 3 days          Airway            Surgical Airway Shiley Cuffed 9 days                Physical Exam:   General: resting comfortably  Eyes: PERRL  ENT: moist mucous membranes  Neck: supple, trach in place  CV: RRR +S1/S2  Chest: breath sounds bilaterally, chest tube in place, no air leak  Incisions c/d/i  Abdomen: soft, NT ND  Extremities: atraumatic      Results from last 7 days   Lab Units 07/02/20  0509 07/01/20  0441 07/01/20  0047   WBC Thousand/uL 7 29 9 22 9 21   HEMOGLOBIN g/dL 8 3* 8 6* 7 9*   HEMATOCRIT % 27 0* 28 1* 26 0*   PLATELETS Thousands/uL 307 273 259     Results from last 7 days   Lab Units 07/02/20  0419 07/01/20  0441 07/01/20  0047   POTASSIUM mmol/L 3 9 3 7 3 6   CHLORIDE mmol/L 103 105 104   CO2 mmol/L 30 32 31   BUN mg/dL 10 11 12   CREATININE mg/dL 0 56* 0 62 0 69   CALCIUM mg/dL 7 8* 8 2* 7 7*

## 2020-07-03 LAB
ALBUMIN SERPL BCP-MCNC: 1.8 G/DL (ref 3.5–5)
ALP SERPL-CCNC: 97 U/L (ref 46–116)
ALT SERPL W P-5'-P-CCNC: 29 U/L (ref 12–78)
ANION GAP SERPL CALCULATED.3IONS-SCNC: 6 MMOL/L (ref 4–13)
AST SERPL W P-5'-P-CCNC: 20 U/L (ref 5–45)
BASOPHILS # BLD AUTO: 0.02 THOUSANDS/ΜL (ref 0–0.1)
BASOPHILS NFR BLD AUTO: 0 % (ref 0–1)
BILIRUB SERPL-MCNC: 0.42 MG/DL (ref 0.2–1)
BUN SERPL-MCNC: 8 MG/DL (ref 5–25)
CALCIUM SERPL-MCNC: 8.4 MG/DL (ref 8.3–10.1)
CHLORIDE SERPL-SCNC: 106 MMOL/L (ref 100–108)
CO2 SERPL-SCNC: 27 MMOL/L (ref 21–32)
CREAT SERPL-MCNC: 0.52 MG/DL (ref 0.6–1.3)
EOSINOPHIL # BLD AUTO: 0.27 THOUSAND/ΜL (ref 0–0.61)
EOSINOPHIL NFR BLD AUTO: 4 % (ref 0–6)
ERYTHROCYTE [DISTWIDTH] IN BLOOD BY AUTOMATED COUNT: 13.9 % (ref 11.6–15.1)
GFR SERPL CREATININE-BSD FRML MDRD: 109 ML/MIN/1.73SQ M
GLUCOSE SERPL-MCNC: 85 MG/DL (ref 65–140)
HCT VFR BLD AUTO: 28 % (ref 36.5–49.3)
HGB BLD-MCNC: 8.9 G/DL (ref 12–17)
IMM GRANULOCYTES # BLD AUTO: 0.03 THOUSAND/UL (ref 0–0.2)
IMM GRANULOCYTES NFR BLD AUTO: 0 % (ref 0–2)
LYMPHOCYTES # BLD AUTO: 1.17 THOUSANDS/ΜL (ref 0.6–4.47)
LYMPHOCYTES NFR BLD AUTO: 16 % (ref 14–44)
MAGNESIUM SERPL-MCNC: 2.2 MG/DL (ref 1.6–2.6)
MCH RBC QN AUTO: 31.4 PG (ref 26.8–34.3)
MCHC RBC AUTO-ENTMCNC: 31.8 G/DL (ref 31.4–37.4)
MCV RBC AUTO: 99 FL (ref 82–98)
MONOCYTES # BLD AUTO: 0.58 THOUSAND/ΜL (ref 0.17–1.22)
MONOCYTES NFR BLD AUTO: 8 % (ref 4–12)
NEUTROPHILS # BLD AUTO: 5.2 THOUSANDS/ΜL (ref 1.85–7.62)
NEUTS SEG NFR BLD AUTO: 72 % (ref 43–75)
NRBC BLD AUTO-RTO: 0 /100 WBCS
PHOSPHATE SERPL-MCNC: 3.3 MG/DL (ref 2.3–4.1)
PLATELET # BLD AUTO: 375 THOUSANDS/UL (ref 149–390)
PMV BLD AUTO: 10.8 FL (ref 8.9–12.7)
POTASSIUM SERPL-SCNC: 3.6 MMOL/L (ref 3.5–5.3)
PROT SERPL-MCNC: 5.6 G/DL (ref 6.4–8.2)
RBC # BLD AUTO: 2.83 MILLION/UL (ref 3.88–5.62)
SODIUM SERPL-SCNC: 139 MMOL/L (ref 136–145)
WBC # BLD AUTO: 7.27 THOUSAND/UL (ref 4.31–10.16)

## 2020-07-03 PROCEDURE — 84100 ASSAY OF PHOSPHORUS: CPT | Performed by: PHYSICIAN ASSISTANT

## 2020-07-03 PROCEDURE — 94003 VENT MGMT INPAT SUBQ DAY: CPT

## 2020-07-03 PROCEDURE — 85025 COMPLETE CBC W/AUTO DIFF WBC: CPT | Performed by: PHYSICIAN ASSISTANT

## 2020-07-03 PROCEDURE — 80053 COMPREHEN METABOLIC PANEL: CPT | Performed by: PHYSICIAN ASSISTANT

## 2020-07-03 PROCEDURE — 83735 ASSAY OF MAGNESIUM: CPT | Performed by: PHYSICIAN ASSISTANT

## 2020-07-03 PROCEDURE — 99233 SBSQ HOSP IP/OBS HIGH 50: CPT | Performed by: SURGERY

## 2020-07-03 PROCEDURE — 94760 N-INVAS EAR/PLS OXIMETRY 1: CPT

## 2020-07-03 PROCEDURE — 94640 AIRWAY INHALATION TREATMENT: CPT

## 2020-07-03 PROCEDURE — 99024 POSTOP FOLLOW-UP VISIT: CPT | Performed by: THORACIC SURGERY (CARDIOTHORACIC VASCULAR SURGERY)

## 2020-07-03 PROCEDURE — C9113 INJ PANTOPRAZOLE SODIUM, VIA: HCPCS | Performed by: PHYSICIAN ASSISTANT

## 2020-07-03 RX ORDER — POTASSIUM CHLORIDE 20 MEQ/1
40 TABLET, EXTENDED RELEASE ORAL ONCE
Status: COMPLETED | OUTPATIENT
Start: 2020-07-03 | End: 2020-07-03

## 2020-07-03 RX ADMIN — NYSTATIN 500000 UNITS: 100000 SUSPENSION ORAL at 17:40

## 2020-07-03 RX ADMIN — HYDROMORPHONE HYDROCHLORIDE 1 MG: 1 INJECTION, SOLUTION INTRAMUSCULAR; INTRAVENOUS; SUBCUTANEOUS at 14:25

## 2020-07-03 RX ADMIN — DIAZEPAM 10 MG: 10 INJECTION, SOLUTION INTRAMUSCULAR; INTRAVENOUS at 21:44

## 2020-07-03 RX ADMIN — NYSTATIN 500000 UNITS: 100000 SUSPENSION ORAL at 11:42

## 2020-07-03 RX ADMIN — HYDROMORPHONE HYDROCHLORIDE 1 MG: 1 INJECTION, SOLUTION INTRAMUSCULAR; INTRAVENOUS; SUBCUTANEOUS at 19:37

## 2020-07-03 RX ADMIN — SENNOSIDES AND DOCUSATE SODIUM 1 TABLET: 8.6; 5 TABLET ORAL at 08:23

## 2020-07-03 RX ADMIN — DIAZEPAM 10 MG: 10 INJECTION, SOLUTION INTRAMUSCULAR; INTRAVENOUS at 16:29

## 2020-07-03 RX ADMIN — DIAZEPAM 10 MG: 10 INJECTION, SOLUTION INTRAMUSCULAR; INTRAVENOUS at 08:24

## 2020-07-03 RX ADMIN — HYDROMORPHONE HYDROCHLORIDE 1 MG: 1 INJECTION, SOLUTION INTRAMUSCULAR; INTRAVENOUS; SUBCUTANEOUS at 10:22

## 2020-07-03 RX ADMIN — ENOXAPARIN SODIUM 100 MG: 100 INJECTION SUBCUTANEOUS at 21:45

## 2020-07-03 RX ADMIN — MELATONIN 3 MG: at 21:44

## 2020-07-03 RX ADMIN — HYDROMORPHONE HYDROCHLORIDE 1 MG: 1 INJECTION, SOLUTION INTRAMUSCULAR; INTRAVENOUS; SUBCUTANEOUS at 07:44

## 2020-07-03 RX ADMIN — VALPROATE SODIUM 250 MG: 100 INJECTION, SOLUTION INTRAVENOUS at 06:43

## 2020-07-03 RX ADMIN — SENNOSIDES AND DOCUSATE SODIUM 1 TABLET: 8.6; 5 TABLET ORAL at 17:40

## 2020-07-03 RX ADMIN — VALPROATE SODIUM 250 MG: 100 INJECTION, SOLUTION INTRAVENOUS at 21:44

## 2020-07-03 RX ADMIN — HYDROMORPHONE HYDROCHLORIDE 1 MG: 1 INJECTION, SOLUTION INTRAMUSCULAR; INTRAVENOUS; SUBCUTANEOUS at 04:01

## 2020-07-03 RX ADMIN — NYSTATIN 500000 UNITS: 100000 SUSPENSION ORAL at 22:00

## 2020-07-03 RX ADMIN — CHLORHEXIDINE GLUCONATE 0.12% ORAL RINSE 15 ML: 1.2 LIQUID ORAL at 21:44

## 2020-07-03 RX ADMIN — LEVALBUTEROL HYDROCHLORIDE 1.25 MG: 1.25 SOLUTION, CONCENTRATE RESPIRATORY (INHALATION) at 07:15

## 2020-07-03 RX ADMIN — POTASSIUM CHLORIDE 40 MEQ: 1500 TABLET, EXTENDED RELEASE ORAL at 10:22

## 2020-07-03 RX ADMIN — QUETIAPINE FUMARATE 50 MG: 25 TABLET ORAL at 21:44

## 2020-07-03 RX ADMIN — LEVALBUTEROL HYDROCHLORIDE 1.25 MG: 1.25 SOLUTION, CONCENTRATE RESPIRATORY (INHALATION) at 13:16

## 2020-07-03 RX ADMIN — PANTOPRAZOLE SODIUM 40 MG: 40 INJECTION, POWDER, FOR SOLUTION INTRAVENOUS at 08:24

## 2020-07-03 RX ADMIN — ISODIUM CHLORIDE 3 ML: 0.03 SOLUTION RESPIRATORY (INHALATION) at 07:15

## 2020-07-03 RX ADMIN — POLYETHYLENE GLYCOL 3350 17 G: 17 POWDER, FOR SOLUTION ORAL at 08:24

## 2020-07-03 RX ADMIN — NYSTATIN 500000 UNITS: 100000 SUSPENSION ORAL at 08:23

## 2020-07-03 RX ADMIN — LEVALBUTEROL HYDROCHLORIDE 1.25 MG: 1.25 SOLUTION, CONCENTRATE RESPIRATORY (INHALATION) at 19:07

## 2020-07-03 RX ADMIN — ISODIUM CHLORIDE 3 ML: 0.03 SOLUTION RESPIRATORY (INHALATION) at 19:07

## 2020-07-03 RX ADMIN — ISODIUM CHLORIDE 3 ML: 0.03 SOLUTION RESPIRATORY (INHALATION) at 13:16

## 2020-07-03 RX ADMIN — ENOXAPARIN SODIUM 100 MG: 100 INJECTION SUBCUTANEOUS at 08:24

## 2020-07-03 RX ADMIN — VALPROATE SODIUM 250 MG: 100 INJECTION, SOLUTION INTRAVENOUS at 13:15

## 2020-07-03 RX ADMIN — SODIUM CHLORIDE, SODIUM GLUCONATE, SODIUM ACETATE, POTASSIUM CHLORIDE, MAGNESIUM CHLORIDE, SODIUM PHOSPHATE, DIBASIC, AND POTASSIUM PHOSPHATE 75 ML/HR: .53; .5; .37; .037; .03; .012; .00082 INJECTION, SOLUTION INTRAVENOUS at 21:52

## 2020-07-03 RX ADMIN — SODIUM CHLORIDE, SODIUM GLUCONATE, SODIUM ACETATE, POTASSIUM CHLORIDE, MAGNESIUM CHLORIDE, SODIUM PHOSPHATE, DIBASIC, AND POTASSIUM PHOSPHATE 75 ML/HR: .53; .5; .37; .037; .03; .012; .00082 INJECTION, SOLUTION INTRAVENOUS at 08:56

## 2020-07-03 RX ADMIN — CHLORHEXIDINE GLUCONATE 0.12% ORAL RINSE 15 ML: 1.2 LIQUID ORAL at 08:23

## 2020-07-03 NOTE — OCCUPATIONAL THERAPY NOTE
Occupational Therapy Cancellation Note    Orders received and chart reviewed  OT attempted to see, however, per nursing staff, Pt currently agitated, physically aggressive, and therefore not appropriate to participate in therapy at this time  Will continue to follow to be seen for OT re-evaluation as appropriate/when medically cleared       Nadege Jennings MS, OTR/L

## 2020-07-03 NOTE — PHYSICAL THERAPY NOTE
Physical Therapy Cancellation Note    Pt chart reviewed  PT attempted to see pt, however, per RN, pt agitated, aggressive, not appropriate to participate in therapy at this time  PT to continue to follow and see once appropriate and able      Orie Gowers, PT, DPT

## 2020-07-03 NOTE — RESPIRATORY THERAPY NOTE
resp care      07/03/20 1631   Respiratory Assessment   Resp Comments tolerating TC well, will attempt to keep off vent x 24 hours   Additional Assessments   SpO2 97 %

## 2020-07-03 NOTE — RESPIRATORY THERAPY NOTE
RT Ventilator Management Note  Kika Frias 71 y o  male MRN: [id number]  Unit/Bed#: [facility] 507-01 Encounter: [id number]      Daily Screen       7/1/2020  0412 7/3/2020  0706          Patient safety screen outcome[de-identified]  Passed  Passed      Spont breathing trial % for 30 min:                  Physical Exam:          Resp Comments: (P) pt appears comfortable on current settings, will place pt on his trach collar wean for the day, will continue to monitor

## 2020-07-03 NOTE — PROGRESS NOTES
Progress Note - Critical Care   Syl Lagos 71 y o  male MRN: [id number]  Unit/Bed#: [facility] 507-01 Encounter: [id number]    Attending Physician: Fran Da Silva MD    ______________________________________________________________________    Chief Complaint: n/a    HPI/24 Hour Events: Pt had several high blood pressure readings overnight (SBP 190s) associated with agitation  Improved with prn and night time medications  ______________________________________________________________________  Assessment and Plan:   Principal Problem:    Malignant neoplasm of upper lobe of left lung (HCC)  Active Problems:    Acute respiratory failure with hypoxia (HCC)    Subcutaneous emphysema (HCC)    Acute deep vein thrombosis (DVT) of axillary vein of right upper extremity (HCC)    Acute deep vein thrombosis (DVT) of brachial vein of right upper extremity (HCC)    Tracheostomy in place [facility])    Postprocedural pneumothorax    Acute deep vein thrombosis (DVT) of calf muscle vein of left lower extremity (HCC)    Pneumonia  Resolved Problems:    Lactic acidosis    ROGER (acute kidney injury) ([facility] [address] )    · Neuro:   ? Dx: encephalopathy, delirium versus hypoxic injury s/p cardiac arrest  § Continues to have intermittent agitation and confusion  § Continue to monitor  ? Analgesia: rectal tylenol 650mg Q4H prn, dilaudid 1mg IV Q4H scheduled, dilaudid 0 5mg IV Q1H prn  ? Anxiety: Valium 10mg TID  ? Delirium ppx: CAM-ICU, sleep hygiene  § seroquel 50 mg QHS  § Melatonin 3mg QHS  § depacon 250mg IV Q8H  · CV:   ? Dx: tachycardia and hypertension likely 2/2 agitation  § Continue to monitor, treat agitation   ? Dx: s/p cardiac arrest 6/26, due to hypoxia/hypercarbia likely secondary to subcu emphysema obstructing airway  § subcu emphysema greatly improved  ? Hemodynamic support: none  ?  MAP goal > 65      · Lung:   ? Dx: s/p L upper VATS lobectomy for resection of lung cancer, complicated for subcu emphysema  § Chest tube in place to water seal, managed by surgical team  ? Dx: s/p cric converted to trach due to cardiac arrest as above  § Continue trach collar as much as tolerated  ·   Vent Mode: CPAP/PS Spont  ? FiO2 (%):  [40-50] 40   ? Continue Respiratory Protocol and Airway Clearance Protocol  ? levalbuterol tid     · GI:   ? No active issues  ? NG tube in place  ? Stress ulcer ppx: protonix  ? Bowel regimen: senna, docusate, miralax   · FEN:   ? Fluids: isolyte 75cc/hr  ? Electrolytes: trend and replete as needed  ? Nutrition: tube feeds 10 cc/hr this morning, Per surgery consider advancing, but if any signs of aspiration consider post pyloric feeding tube  Per nutrition, tube feed goal is 70 cc/hr with 125mL free water flush Q4H  · :   ? No acute issues   ? Gaytan: Gaytan  ? Monitor I&O's, BUN/Cr  · UOP 7 2L in 24 hrs  · Net -5 5L in 24 hrs, +5L since admission  · ID:   ? No acute issues  ? Monitor for leukocytosis, fever, other signs of infection   · Heme:   ? Dx: active DVTs  ? Continue lovenox  ? Monitor for anemia or bleeding  ? DVT ppx: lovenox, SCDs   · Endo:   ? Dx: no active issues  · Msk/Skin:   ? PT/OT  ? Turning/repositioning  ? Wound care     Disposition: critical care     Code Status: Level 2 - DNAR: but accepts endotracheal intubation          ______________________________________________________________________    Physical Exam:   Physical Exam   Constitutional:   restless   HENT:   Head: Normocephalic  Mouth/Throat: Oropharynx is clear and moist    Eyes: Pupils are equal, round, and reactive to light  Conjunctivae are normal    Neck:   Trach in place   Cardiovascular: Regular rhythm and intact distal pulses  Tachycardia present  Pulmonary/Chest: Tachypnea noted  No respiratory distress  He has decreased breath sounds in the left upper field  He has no wheezes  He has no rhonchi  He has no rales  Abdominal: Soft  He exhibits no distension  There is no tenderness     Genitourinary:   Genitourinary Comments: Lucila   Musculoskeletal: Normal range of motion  He exhibits no edema or deformity  Neurological:   Agitated, noncooperative    Skin: Skin is warm and dry         ______________________________________________________________________  Vitals:    20 0255 20 0305 20 0400 20 0500   BP:  135/76 153/77 113/60   BP Location:  Left arm     Pulse:  97     Resp:  22     Temp:  98 7 °F (37 1 °C)     TempSrc:  Axillary     SpO2: 98% 100%     Weight:       Height:           Temperature:   Temp (24hrs), Av 5 °F (37 5 °C), Min:98 5 °F (36 9 °C), Max:100 1 °F (37 8 °C)    Current Temperature: 98 7 °F (37 1 °C)  Weights:   IBW: 73 kg    Body mass index is 32 58 kg/m²  Weight (last 2 days)     Date/Time   Weight    20 0542   103 (227 07)    20 0546   104 (229 06)                 Non-Invasive/Invasive Ventilation Settings:  Respiratory    Lab Data (Last 4 hours)    None         O2/Vent Data (Last 4 hours)       0255           Vent Mode CPAP/PS Spont       FIO2 (%) (%) 40       PEEP (cmH2O) (cmH2O) 5       Pressure Support (cmH2O) (cmH20) 5       MV (Obs) 11 9       RSBI 44                 No results found for: PHART, KRN6FEF, PO2ART, XRY1RRN, F6LLQXGA, BEART, SOURCE  SpO2: SpO2: 100 %  Intake and Outputs:  I/O       701 -  0700 701 -  07    P  O   0    I V  (mL/kg) 1812 4 (17 6) 1348 8 (13 1)    NG/ 60    IV Piggyback 227 5     Feedings 155 202    Total Intake(mL/kg) 2324 9 (22 6) 1610 8 (15 6)    Urine (mL/kg/hr) 1540 (0 6) 7205 (2 9)    Emesis/NG output 0 0    Stool  0    Chest Tube 0 50    Total Output 1540 7255    Net +784 9 -5759 3          Unmeasured Stool Occurrence  1 x        UOP: 301 mL/hour   Nutrition:        Diet Orders   (From admission, onward)             Start     Ordered    20 1059  Diet Enteral/Parenteral; Tube Feeding No Oral Diet; Jevity 1 2 Javier; Continuous; 10  Diet effective now     Question Answer Comment   Diet Type Enteral/Parenteral    Enteral/Parenteral Tube Feeding No Oral Diet    Tube Feeding Formula: Jevity 1 2 Javier    Bolus/Cyclic/Continuous Continuous    Tube Feeding Goal Rate (mL/hr): 10    RD to adjust diet per protocol? Yes        07/01/20 1058              TF currently running at 10 cc/hour with a goal of 72  Formula:Jevity 1 2  Labs:   Results from last 7 days   Lab Units 07/03/20  0438 07/02/20  0509 07/01/20  0441   WBC Thousand/uL 7 27 7 29 9 22   HEMOGLOBIN g/dL 8 9* 8 3* 8 6*   HEMATOCRIT % 28 0* 27 0* 28 1*   PLATELETS Thousands/uL 375 307 273   NEUTROS PCT % 72 69 72   MONOS PCT % 8 8 8     Results from last 7 days   Lab Units 07/03/20  0438 07/02/20  1950/20  0419   POTASSIUM mmol/L 3 6 3 7 3 9   CHLORIDE mmol/L 106 105 103   CO2 mmol/L 27 27 30   BUN mg/dL 8 6 10   CREATININE mg/dL 0 52* 0 52* 0 56*   CALCIUM mg/dL 8 4 8 1* 7 8*   ALK PHOS U/L 97  --   --    ALT U/L 29  --   --    AST U/L 20  --   --      Results from last 7 days   Lab Units 07/03/20  0438 07/02/20  0419 07/01/20  0441   MAGNESIUM mg/dL 2 2 2 4 2 3     Lab Results   Component Value Date    PHOS 3 3 07/03/2020    PHOS 3 1 07/02/2020    PHOS 3 5 07/01/2020          0   Lab Value Date/Time    TROPONINI 2 75 (H) 06/20/2020 1759    TROPONINI 3 00 (H) 06/20/2020 1319    TROPONINI 2 86 (H) 06/20/2020 0928    TROPONINI 0 85 (H) 06/20/2020 0647    TROPONINI <0 02 03/28/2020 0451    TROPONINI <0 02 03/28/2020 0031    TROPONINI <0 02 03/27/2020 2026     Results from last 7 days   Lab Units 07/01/20  0047 06/27/20  0844   LACTIC ACID mmol/L 0 7 1 4     ABG:  Lab Results   Component Value Date    PHART 7 498 (H) 07/01/2020    ZBY9KAJ 37 2 07/01/2020    PO2ART 81 0 07/01/2020    PIL5AFZ 28 2 (H) 07/01/2020    BEART 4 7 07/01/2020    SOURCE Radial, Left 07/01/2020     Imaging:  I have personally reviewed pertinent reports      EKG: n/a  Micro:  Lab Results   Component Value Date    BLOODCX No Growth at 72 hrs  06/29/2020    BLOODCX No Growth at 72 hrs  06/29/2020    BLOODCX No Growth After 5 Days  06/20/2020    BLOODCX No Growth After 5 Days   06/20/2020    SPUTUMCULTUR 4+ Growth of  06/29/2020     Allergies: No Known Allergies  Medications:   Scheduled Meds:    Current Facility-Administered Medications:  acetaminophen 650 mg Rectal Q4H PRN Billy Wen PA-C    albuterol 2 5 mg Nebulization Q6H PRN Billy Wen PA-C    chlorhexidine 15 mL Swish & Spit Q12H [address] Billy Wen PA-C    diazepam 10 mg Intravenous TID Billy Wen PA-C    enoxaparin 1 mg/kg Subcutaneous Q12H [address] Billy Wen PA-C    HYDROmorphone 1 mg/hr Intravenous Continuous Billy Wen PA-C Last Rate: Stopped (07/01/20 2202)   HYDROmorphone 0 5 mg Intravenous Q1H PRN Billy Wen PA-C    HYDROmorphone 1 mg Intravenous Q4H Billy Wen PA-C    levalbuterol 1 25 mg Nebulization TID Billy Wen PA-C    Lidocaine Viscous HCl 15 mL Swish & Spit 4x Daily PRN Billy Wen PA-C    melatonin 3 mg Oral HS Billy Wen PA-C    multi-electrolyte 75 mL/hr Intravenous Continuous Billy Wen PA-C Last Rate: 75 mL/hr (07/02/20 1953)   nystatin 500,000 Units Swish & Swallow 4x Daily Billy Wen PA-C    ondansetron 4 mg Intravenous Q6H PRN Billy Wen PA-C    pantoprazole 40 mg Intravenous Q24H [address] Billy Wen PA-C    polyethylene glycol 17 g Oral Daily Billy Wen PA-C    propofol 5-50 mcg/kg/min Intravenous Titrated Billy Wen PA-C Last Rate: Stopped (06/30/20 2105)   QUEtiapine 50 mg Oral HS Billy Wen PA-C    senna-docusate sodium 1 tablet Per NG Tube BID Billy Wen PA-C    sodium chloride 3 mL Nebulization TID Billy Wen PA-C    valproate sodium 250 mg Intravenous Q8H [address] Billy Goodie, PA-C Last Rate: 250 mg (07/02/20 2311)     Continuous Infusions:    HYDROmorphone 1 mg/hr Last Rate: Stopped (07/01/20 2202)   multi-electrolyte 75 mL/hr Last Rate: 75 mL/hr (07/02/20 1953)   propofol 5-50 mcg/kg/min Last Rate: Stopped (06/30/20 2105) PRN Meds:    acetaminophen 650 mg Q4H PRN   albuterol 2 5 mg Q6H PRN   HYDROmorphone 0 5 mg Q1H PRN   Lidocaine Viscous HCl 15 mL 4x Daily PRN   ondansetron 4 mg Q6H PRN     VTE Pharmacologic Prophylaxis: Enoxaparin (Lovenox)  VTE Mechanical Prophylaxis: sequential compression device  Invasive lines and devices: Invasive Devices     Peripherally Inserted Central Catheter Line            PICC Line 29/29/30 Left Basilic 6 days          Drain            Chest Tube 1 Left Pleural 28 Fr  22 days    Urethral Catheter 16 Fr  12 days    NG/OG/Enteral Tube Nasogastric Right nares 4 days          Airway            Surgical Airway Shiley Cuffed 10 days                     Portions of the record may have been created with voice recognition software  Occasional wrong word or "sound a like" substitutions may have occurred due to the inherent limitations of voice recognition software  Read the chart carefully and recognize, using context, where substitutions have occurred      Satya Crow MD

## 2020-07-04 ENCOUNTER — APPOINTMENT (INPATIENT)
Dept: RADIOLOGY | Facility: HOSPITAL | Age: 70
DRG: 003 | End: 2020-07-04
Payer: COMMERCIAL

## 2020-07-04 LAB
ANION GAP SERPL CALCULATED.3IONS-SCNC: 4 MMOL/L (ref 4–13)
BACTERIA BLD CULT: NORMAL
BACTERIA BLD CULT: NORMAL
BASOPHILS # BLD AUTO: 0.02 THOUSANDS/ΜL (ref 0–0.1)
BASOPHILS NFR BLD AUTO: 0 % (ref 0–1)
BUN SERPL-MCNC: 5 MG/DL (ref 5–25)
CALCIUM SERPL-MCNC: 8.6 MG/DL (ref 8.3–10.1)
CHLORIDE SERPL-SCNC: 107 MMOL/L (ref 100–108)
CO2 SERPL-SCNC: 29 MMOL/L (ref 21–32)
CREAT SERPL-MCNC: 0.49 MG/DL (ref 0.6–1.3)
EOSINOPHIL # BLD AUTO: 0.31 THOUSAND/ΜL (ref 0–0.61)
EOSINOPHIL NFR BLD AUTO: 4 % (ref 0–6)
ERYTHROCYTE [DISTWIDTH] IN BLOOD BY AUTOMATED COUNT: 14 % (ref 11.6–15.1)
GFR SERPL CREATININE-BSD FRML MDRD: 111 ML/MIN/1.73SQ M
GLUCOSE SERPL-MCNC: 95 MG/DL (ref 65–140)
HCT VFR BLD AUTO: 32.2 % (ref 36.5–49.3)
HGB BLD-MCNC: 10.2 G/DL (ref 12–17)
IMM GRANULOCYTES # BLD AUTO: 0.05 THOUSAND/UL (ref 0–0.2)
IMM GRANULOCYTES NFR BLD AUTO: 1 % (ref 0–2)
LYMPHOCYTES # BLD AUTO: 1.34 THOUSANDS/ΜL (ref 0.6–4.47)
LYMPHOCYTES NFR BLD AUTO: 19 % (ref 14–44)
MCH RBC QN AUTO: 30.7 PG (ref 26.8–34.3)
MCHC RBC AUTO-ENTMCNC: 31.7 G/DL (ref 31.4–37.4)
MCV RBC AUTO: 97 FL (ref 82–98)
MONOCYTES # BLD AUTO: 0.64 THOUSAND/ΜL (ref 0.17–1.22)
MONOCYTES NFR BLD AUTO: 9 % (ref 4–12)
NEUTROPHILS # BLD AUTO: 4.78 THOUSANDS/ΜL (ref 1.85–7.62)
NEUTS SEG NFR BLD AUTO: 67 % (ref 43–75)
NRBC BLD AUTO-RTO: 0 /100 WBCS
PLATELET # BLD AUTO: 409 THOUSANDS/UL (ref 149–390)
PMV BLD AUTO: 9.9 FL (ref 8.9–12.7)
POTASSIUM SERPL-SCNC: 3.9 MMOL/L (ref 3.5–5.3)
RBC # BLD AUTO: 3.32 MILLION/UL (ref 3.88–5.62)
SODIUM SERPL-SCNC: 140 MMOL/L (ref 136–145)
WBC # BLD AUTO: 7.14 THOUSAND/UL (ref 4.31–10.16)

## 2020-07-04 PROCEDURE — 99233 SBSQ HOSP IP/OBS HIGH 50: CPT | Performed by: SURGERY

## 2020-07-04 PROCEDURE — 85025 COMPLETE CBC W/AUTO DIFF WBC: CPT | Performed by: PHYSICIAN ASSISTANT

## 2020-07-04 PROCEDURE — 94003 VENT MGMT INPAT SUBQ DAY: CPT

## 2020-07-04 PROCEDURE — 99024 POSTOP FOLLOW-UP VISIT: CPT | Performed by: THORACIC SURGERY (CARDIOTHORACIC VASCULAR SURGERY)

## 2020-07-04 PROCEDURE — 80048 BASIC METABOLIC PNL TOTAL CA: CPT | Performed by: PHYSICIAN ASSISTANT

## 2020-07-04 PROCEDURE — 71045 X-RAY EXAM CHEST 1 VIEW: CPT

## 2020-07-04 PROCEDURE — 94640 AIRWAY INHALATION TREATMENT: CPT

## 2020-07-04 PROCEDURE — 94760 N-INVAS EAR/PLS OXIMETRY 1: CPT

## 2020-07-04 RX ORDER — LABETALOL 20 MG/4 ML (5 MG/ML) INTRAVENOUS SYRINGE
10 EVERY 6 HOURS PRN
Status: DISCONTINUED | OUTPATIENT
Start: 2020-07-04 | End: 2020-07-13

## 2020-07-04 RX ORDER — LABETALOL 20 MG/4 ML (5 MG/ML) INTRAVENOUS SYRINGE
10 EVERY 6 HOURS PRN
Status: DISCONTINUED | OUTPATIENT
Start: 2020-07-04 | End: 2020-07-04

## 2020-07-04 RX ADMIN — HYDROMORPHONE HYDROCHLORIDE 1 MG: 1 INJECTION, SOLUTION INTRAMUSCULAR; INTRAVENOUS; SUBCUTANEOUS at 23:43

## 2020-07-04 RX ADMIN — ALTEPLASE 2 MG: 2.2 INJECTION, POWDER, LYOPHILIZED, FOR SOLUTION INTRAVENOUS at 20:41

## 2020-07-04 RX ADMIN — ENOXAPARIN SODIUM 100 MG: 100 INJECTION SUBCUTANEOUS at 10:41

## 2020-07-04 RX ADMIN — ENOXAPARIN SODIUM 100 MG: 100 INJECTION SUBCUTANEOUS at 21:06

## 2020-07-04 RX ADMIN — DIAZEPAM 10 MG: 10 INJECTION, SOLUTION INTRAMUSCULAR; INTRAVENOUS at 08:44

## 2020-07-04 RX ADMIN — ISODIUM CHLORIDE 3 ML: 0.03 SOLUTION RESPIRATORY (INHALATION) at 19:42

## 2020-07-04 RX ADMIN — LEVALBUTEROL HYDROCHLORIDE 1.25 MG: 1.25 SOLUTION, CONCENTRATE RESPIRATORY (INHALATION) at 19:42

## 2020-07-04 RX ADMIN — VALPROATE SODIUM 250 MG: 100 INJECTION, SOLUTION INTRAVENOUS at 16:11

## 2020-07-04 RX ADMIN — LEVALBUTEROL HYDROCHLORIDE 1.25 MG: 1.25 SOLUTION, CONCENTRATE RESPIRATORY (INHALATION) at 07:47

## 2020-07-04 RX ADMIN — CHLORHEXIDINE GLUCONATE 0.12% ORAL RINSE 15 ML: 1.2 LIQUID ORAL at 08:44

## 2020-07-04 RX ADMIN — MELATONIN 3 MG: at 21:05

## 2020-07-04 RX ADMIN — ISODIUM CHLORIDE 3 ML: 0.03 SOLUTION RESPIRATORY (INHALATION) at 07:47

## 2020-07-04 RX ADMIN — HYDROMORPHONE HYDROCHLORIDE 1 MG: 1 INJECTION, SOLUTION INTRAMUSCULAR; INTRAVENOUS; SUBCUTANEOUS at 02:47

## 2020-07-04 RX ADMIN — HYDROMORPHONE HYDROCHLORIDE 1 MG: 1 INJECTION, SOLUTION INTRAMUSCULAR; INTRAVENOUS; SUBCUTANEOUS at 12:27

## 2020-07-04 RX ADMIN — LABETALOL 20 MG/4 ML (5 MG/ML) INTRAVENOUS SYRINGE 10 MG: at 19:35

## 2020-07-04 RX ADMIN — LEVALBUTEROL HYDROCHLORIDE 1.25 MG: 1.25 SOLUTION, CONCENTRATE RESPIRATORY (INHALATION) at 13:34

## 2020-07-04 RX ADMIN — NYSTATIN 500000 UNITS: 100000 SUSPENSION ORAL at 18:00

## 2020-07-04 RX ADMIN — VALPROATE SODIUM 250 MG: 100 INJECTION, SOLUTION INTRAVENOUS at 06:06

## 2020-07-04 RX ADMIN — VALPROATE SODIUM 250 MG: 100 INJECTION, SOLUTION INTRAVENOUS at 21:05

## 2020-07-04 RX ADMIN — DIAZEPAM 10 MG: 10 INJECTION, SOLUTION INTRAMUSCULAR; INTRAVENOUS at 21:05

## 2020-07-04 RX ADMIN — NYSTATIN 500000 UNITS: 100000 SUSPENSION ORAL at 12:33

## 2020-07-04 RX ADMIN — NYSTATIN 500000 UNITS: 100000 SUSPENSION ORAL at 22:30

## 2020-07-04 RX ADMIN — POLYETHYLENE GLYCOL 3350 17 G: 17 POWDER, FOR SOLUTION ORAL at 08:52

## 2020-07-04 RX ADMIN — HYDROMORPHONE HYDROCHLORIDE 1 MG: 1 INJECTION, SOLUTION INTRAMUSCULAR; INTRAVENOUS; SUBCUTANEOUS at 19:22

## 2020-07-04 RX ADMIN — NYSTATIN 500000 UNITS: 100000 SUSPENSION ORAL at 08:44

## 2020-07-04 RX ADMIN — QUETIAPINE FUMARATE 50 MG: 25 TABLET ORAL at 21:05

## 2020-07-04 RX ADMIN — CHLORHEXIDINE GLUCONATE 0.12% ORAL RINSE 15 ML: 1.2 LIQUID ORAL at 21:05

## 2020-07-04 RX ADMIN — ALTEPLASE 2 MG: 2.2 INJECTION, POWDER, LYOPHILIZED, FOR SOLUTION INTRAVENOUS at 19:43

## 2020-07-04 RX ADMIN — HYDROMORPHONE HYDROCHLORIDE 1 MG: 1 INJECTION, SOLUTION INTRAMUSCULAR; INTRAVENOUS; SUBCUTANEOUS at 06:20

## 2020-07-04 RX ADMIN — SENNOSIDES AND DOCUSATE SODIUM 1 TABLET: 8.6; 5 TABLET ORAL at 08:44

## 2020-07-04 RX ADMIN — ISODIUM CHLORIDE 3 ML: 0.03 SOLUTION RESPIRATORY (INHALATION) at 13:34

## 2020-07-04 RX ADMIN — ALTEPLASE 2 MG: 2.2 INJECTION, POWDER, LYOPHILIZED, FOR SOLUTION INTRAVENOUS at 22:31

## 2020-07-04 NOTE — OCCUPATIONAL THERAPY NOTE
Occupational Therapy Cancellation Note  Pt's chart reviewed  Pt continues to be agitated and is not following commands  OT to attempt re-evaluation as clinically appropriate      Gay Nelson, MOT, OTR/L

## 2020-07-04 NOTE — PROGRESS NOTES
Progress Note - Critical Care   Tequila Huertas 71 y o  male MRN: [id number]  Unit/Bed#: [facility] 507-01 Encounter: [id number]    Assessment:   Principal Problem:    Malignant neoplasm of upper lobe of left lung (HCC)  Active Problems:    Acute respiratory failure with hypoxia (HCC)    Subcutaneous emphysema (HCC)    Acute deep vein thrombosis (DVT) of axillary vein of right upper extremity (HCC)    Acute deep vein thrombosis (DVT) of brachial vein of right upper extremity (HCC)    Tracheostomy in place [facility])    Postprocedural pneumothorax    Acute deep vein thrombosis (DVT) of calf muscle vein of left lower extremity (HCC)    Pneumonia  Resolved Problems:    Lactic acidosis    [facility]    Plan:   · Neuro:   · Dx: encephalopathy, delirium vs hypoxic brain injury status post cardiac arrest  · Continues to have intermittent agitation and confusion  · Continue to monitor, frequent redirection  · Analgesia:  Tylenol, Dilaudid  · Sedation - Valium t i d  · Delirium ppx: CAM-ICU, sleep hygiene  · Continue Seroquel, melatonin, Depacon  · CV:   · Dx:   Tachycardia and hypertension likely secondary to agitation/delirium  · Improving   · Continue to monitor and treat underlying cause  · Dx:  Status post cardiac arrest 6/28 due to hypoxia/hypercarbia likely secondary to subcutaneous emphysema obstructing airway  · Subcutaneous emphysema greatly improved  · Hemodynamic support: none  · MAP goal > 65   · Lung:   · Dx: s/p L upper VATS lobectomy for resection of lung cancer, complicated by subcutaneous emphysema   · Chest tube in place to water seal, maintained by surgical team  · Per surgical note, may be removed by surgical team today  · S/p cricothyrotomy for hypoxic cardiac arrest, converted to formal trach  · Trach collar as much as tolerated  · Continue Respiratory Protocol and Airway Clearance Protocol  · levalbuterol tid    · GI:   · No active issues  · NG tube in place  · Stress ulcer ppx: protonix  · Bowel regimen: senna, docusate, miralax  · FEN:   · Fluids: isolyte 75cc/hr  · Electrolytes: trend and replete as needed  · Nutrition: tube feeds initiated, advance as recommended by surgery  · :   · No acute issues  I/O last 24 hours: In: 3232 3 [I V :2546  3; NG/GT:150; Feedings:536]  Out: 7231 [RTAUK:9908; Chest Tube:100]  · Gaytan: no  · Monitor I&O's, BUN/Cr  · ID:   · No acute issues  · Monitor WBC and fever curve  · Heme:   · Dx: active DVTs  · Continue lovenox  · Monitor for anemia and bleeding  · DVT ppx: lovenox, SCDs   · Endo:   · No active issues  · Msk/Skin:   · PT/OT  · Turning/repositioning  · Wound care   · Disposition: ICU level of care per thoracic surgery    ______________________________________________________________________    HPI/24hr events: No significant overnight events  Lines   PICC, chest tube, NG, Trach    Infusions  Dilaudid, isolyte, propofol   ______________________________________________________________________  Physical Exam:    Miles Agitation Sedation Scale (RASS): Restless  Physical Exam   Constitutional: No distress  HENT:   Head: Normocephalic and atraumatic  Eyes: Conjunctivae are normal    Neck: Neck supple  No JVD present  No tracheal deviation present  Cardiovascular: Normal rate and regular rhythm  Pulmonary/Chest: Effort normal and breath sounds normal    Trach in place  Chest tube in place   Abdominal: Soft  He exhibits no distension  Musculoskeletal: He exhibits no tenderness or deformity  Neurological: He is alert  Moves all extremities on command with good strength   Skin: Skin is warm and dry  Capillary refill takes less than 2 seconds  Nursing note and vitals reviewed      ______________________________________________________________________  Temperature:   Temp (24hrs), Av 6 °F (37 °C), Min:98 2 °F (36 8 °C), Max:99 4 °F (37 4 °C)    Current Temperature: 98 4 °F (36 9 °C)    Vitals:    20 2317 20 0000 20 0100 07/04/20 0235   BP:  142/94 150/89 (!) 176/97   BP Location:    Left arm   Pulse:  102 103 104   Resp:    18   Temp:    98 4 °F (36 9 °C)   TempSrc:    Oral   SpO2: 95%   99%   Weight:       Height:         Arterial Line BP: 128/64       Weights:   IBW: 73 kg    Body mass index is 32 58 kg/m²  Weight (last 2 days)     Date/Time   Weight    07/02/20 0542   103 (227 07)            Height: 5' 10" (177 8 cm)  Hemodynamic Monitoring:  N/A       Ventilator Settings:   Vent Mode: CPAP/PS Spont              FiO2 (%): 28       No results found for: PHART, FRG9DCV, PO2ART, VBH8MWX, C4UDXZXE, BEART, SOURCE    Intake and Outputs:  I/O       07/02 0701 - 07/03 0700 07/03 0701 - 07/04 0700    P  O  0 0    I V  (mL/kg) 1798 8 (17 5) 1348 8 (13 1)    NG/GT 60 150    Feedings 263 320    Total Intake(mL/kg) 2121 8 (20 6) 1818 8 (17 7)    Urine (mL/kg/hr) 7420 (3) 2484 (1)    Emesis/NG output 0 0    Stool 0 0    Chest Tube 100 50    Total Output 7520 2534    Net -5554 3 -723 3          Unmeasured Urine Occurrence  1 x    Unmeasured Stool Occurrence 1 x 2 x          Nutrition:        Diet Orders   (From admission, onward)             Start     Ordered    07/03/20 1005  Diet Enteral/Parenteral; Tube Feeding No Oral Diet; Jevity 1 2 Javier; Continuous; 20  Diet effective now     Question Answer Comment   Diet Type Enteral/Parenteral    Enteral/Parenteral Tube Feeding No Oral Diet    Tube Feeding Formula: Jevity 1 2 Javier    Bolus/Cyclic/Continuous Continuous    Tube Feeding Goal Rate (mL/hr): 20    RD to adjust diet per protocol?  Yes        07/03/20 1005                Labs:   Results from last 7 days   Lab Units 07/03/20  0438 07/02/20  0509 07/01/20  0441   WBC Thousand/uL 7 27 7 29 9 22   HEMOGLOBIN g/dL 8 9* 8 3* 8 6*   HEMATOCRIT % 28 0* 27 0* 28 1*   PLATELETS Thousands/uL 375 307 273   NEUTROS PCT % 72 69 72   MONOS PCT % 8 8 8      Results from last 7 days   Lab Units 07/03/20  0438 07/02/20  1950/20  0419   POTASSIUM mmol/L 3 6 3 7 3 9   CHLORIDE mmol/L 106 105 103   CO2 mmol/L 27 27 30   BUN mg/dL 8 6 10   CREATININE mg/dL 0 52* 0 52* 0 56*   CALCIUM mg/dL 8 4 8 1* 7 8*   ALK PHOS U/L 97  --   --    ALT U/L 29  --   --    AST U/L 20  --   --      Results from last 7 days   Lab Units 07/03/20  0438 07/02/20  0419 07/01/20  0441   MAGNESIUM mg/dL 2 2 2 4 2 3     Results from last 7 days   Lab Units 07/03/20  0438 07/02/20  0419 07/01/20  0441   PHOSPHORUS mg/dL 3 3 3 1 3 5          Results from last 7 days   Lab Units 07/01/20  0047   LACTIC ACID mmol/L 0 7     0   Lab Value Date/Time    TROPONINI 2 75 (H) 06/20/2020 1759    TROPONINI 3 00 (H) 06/20/2020 1319    TROPONINI 2 86 (H) 06/20/2020 0928    TROPONINI 0 85 (H) 06/20/2020 0647    TROPONINI <0 02 03/28/2020 0451    TROPONINI <0 02 03/28/2020 0031    TROPONINI <0 02 03/27/2020 2026     Imaging:  I have personally reviewed pertinent reports  EKG:   Micro:  Blood Culture:   Lab Results   Component Value Date    BLOODCX No Growth After 4 Days  06/29/2020    BLOODCX No Growth After 4 Days  06/29/2020    BLOODCX No Growth After 5 Days  06/20/2020    BLOODCX No Growth After 5 Days  06/20/2020    BLOODCX No Growth After 5 Days  03/27/2020    BLOODCX No Growth After 5 Days  03/27/2020     Urine Culture: No results found for: URINECX  Sputum Culture: No components found for: SPUTUMCX  Wound Culure: No results found for: WOUNDCULT    Lab Results   Component Value Date    BLOODCX No Growth After 4 Days  06/29/2020    BLOODCX No Growth After 4 Days  06/29/2020    BLOODCX No Growth After 5 Days  06/20/2020    BLOODCX No Growth After 5 Days   06/20/2020    SPUTUMCULTUR 4+ Growth of  06/29/2020     Allergies: No Known Allergies  Medications:   Scheduled Meds:    Current Facility-Administered Medications:  acetaminophen 650 mg Rectal Q4H PRN Floy Royalty, PA-C    albuterol 2 5 mg Nebulization Q6H PRN Darrius Stone PA-C    chlorhexidine 15 mL Swish & Spit Q12H [address] Darrius Quirogaty, YESSENIA diazepam 10 mg Intravenous TID Estanislado Samaniego PA-C    enoxaparin 1 mg/kg Subcutaneous Q12H [address] Estanislado Samaniego PA-C    HYDROmorphone 1 mg/hr Intravenous Continuous Estanislado Aden, PA-C Last Rate: Stopped (07/01/20 2202)   HYDROmorphone 0 5 mg Intravenous Q1H PRN Estanislado Aden, PA-C    HYDROmorphone 1 mg Intravenous Q4H Estanislado Lean, PA-C    levalbuterol 1 25 mg Nebulization TID Estanislado Aden, PA-C    Lidocaine Viscous HCl 15 mL Swish & Spit 4x Daily PRN Jaseslado Samaniego PA-C    melatonin 3 mg Oral HS Estcleopatraslado Samaniego, PA-C    multi-electrolyte 75 mL/hr Intravenous Continuous Jaseslado Samaniego, PA-C Last Rate: 75 mL/hr (07/03/20 2152)   nystatin 500,000 Units Swish & Swallow 4x Daily Estcleopatraslado Samaniego PA-C    ondansetron 4 mg Intravenous Q6H PRN Estanislado Aden, PA-C    polyethylene glycol 17 g Oral Daily Estcleopatraslado Aden, PA-C    propofol 5-50 mcg/kg/min Intravenous Titrated Jasesanalilia Samaniego PA-C Last Rate: Stopped (06/30/20 2105)   QUEtiapine 50 mg Oral HS Jaseslado Samaniego PA-C    senna-docusate sodium 1 tablet Per NG Tube BID Jaseslado Samaniego PA-C    sodium chloride 3 mL Nebulization TID Estanislado Aden, PA-C    valproate sodium 250 mg Intravenous Q8H [address] Estanislado Samaniego PA-C Last Rate: 250 mg (07/03/20 2144)     Continuous Infusions:    HYDROmorphone 1 mg/hr Last Rate: Stopped (07/01/20 2202)   multi-electrolyte 75 mL/hr Last Rate: 75 mL/hr (07/03/20 2152)   propofol 5-50 mcg/kg/min Last Rate: Stopped (06/30/20 2105)     PRN Meds:    acetaminophen 650 mg Q4H PRN   albuterol 2 5 mg Q6H PRN   HYDROmorphone 0 5 mg Q1H PRN   Lidocaine Viscous HCl 15 mL 4x Daily PRN   ondansetron 4 mg Q6H PRN     VTE Pharmacologic Prophylaxis: Enoxaparin (Lovenox)  VTE Mechanical Prophylaxis: sequential compression device  Invasive lines and devices:   Invasive Devices     Peripherally Inserted Central Catheter Line            PICC Line 71/40/85 Left Basilic 7 days          Drain            Chest Tube 1 Left Pleural 28 Fr  23 days NG/OG/Enteral Tube Nasogastric Right nares 4 days    External Urinary Catheter Medium less than 1 day          Airway            Surgical Airway Shiley Cuffed 11 days                   Code Status: Level 2 - DNAR: but accepts endotracheal intubation    Portions of the record may have been created with voice recognition software  Occasional wrong word or "sound a like" substitutions may have occurred due to the inherent limitations of voice recognition software  Read the chart carefully and recognize, using context, where substitutions have occurred      Clyde Serrano, DO

## 2020-07-04 NOTE — PROGRESS NOTES
Progress Note - Paula Winslow 71 y o  male MRN: [id number]    Unit/Bed#: [facility] 507-01 Encounter: [id number]      Assessment:  71 M s/p thoracoscopic left upper lobectomy with prolonged air leak, cardiac arrest and emergency cricothyroidotomy s/p trach revision    VSS  Afebrile  WBC: 7 1  Hgb: 10 2  Plan:  Pt has been on trach collar for 48 hours  OK to remove CT today  Advance tube feeds  Continue aspiration precautions  dvt ppx     Subjective: Following commands  Opening eyes  No acute events  Objective:     Vitals: Blood pressure 163/93, pulse 104, temperature 98 4 °F (36 9 °C), temperature source Oral, resp  rate 18, height 5' 10" (1 778 m), weight 103 kg (227 lb 1 2 oz), SpO2 99 %  ,Body mass index is 32 58 kg/m²  Intake/Output Summary (Last 24 hours) at 7/4/2020 0533  Last data filed at 7/4/2020 0400  Gross per 24 hour   Intake 2513 75 ml   Output 3009 ml   Net -495 25 ml       Physical Exam  General: NAD  HEENT: NC/AT  MMM  Cv: RRR     Lungs: normal effort  Ab: Soft, NT/ND  Ex: no CCE  Neuro: AAOx3    Scheduled Meds:  Current Facility-Administered Medications:  acetaminophen 650 mg Rectal Q4H PRN Jose Bruce PA-C    albuterol 2 5 mg Nebulization Q6H PRN Jose Bruce PA-C    chlorhexidine 15 mL Swish & Spit Q12H [address] Jose Bruce PA-C    diazepam 10 mg Intravenous TID Jose Bruce PA-C    enoxaparin 1 mg/kg Subcutaneous Q12H [address] Jose Bruce PA-C    HYDROmorphone 1 mg/hr Intravenous Continuous Jose Bruce PA-C Last Rate: Stopped (07/01/20 2202)   HYDROmorphone 0 5 mg Intravenous Q1H PRN Jose Bruce PA-C    HYDROmorphone 1 mg Intravenous Q4H Jose Bruce PA-C    levalbuterol 1 25 mg Nebulization TID Jose Bruce PA-C    Lidocaine Viscous HCl 15 mL Swish & Spit 4x Daily PRN Jose Bruce PA-C    melatonin 3 mg Oral HS Jose Burce PA-C    multi-electrolyte 75 mL/hr Intravenous Continuous Jose Bruce PA-C Last Rate: 75 mL/hr (07/03/20 0857) nystatin 500,000 Units Swish & Swallow 4x Daily Marietta Davis PA-C    ondansetron 4 mg Intravenous Q6H PRN Marietta Davis PA-C    polyethylene glycol 17 g Oral Daily Marietta Dvais PA-C    propofol 5-50 mcg/kg/min Intravenous Titrated Marietta Davis PA-C Last Rate: Stopped (06/30/20 2105)   QUEtiapine 50 mg Oral HS Marietta Davis PA-C    senna-docusate sodium 1 tablet Per NG Tube BID Marietta Davis PA-C    sodium chloride 3 mL Nebulization TID Marietta Davis PA-C    valproate sodium 250 mg Intravenous Q8H [address] Marietta Davis PA-C Last Rate: 250 mg (07/03/20 2144)     Continuous Infusions:  HYDROmorphone 1 mg/hr Last Rate: Stopped (07/01/20 2202)   multi-electrolyte 75 mL/hr Last Rate: 75 mL/hr (07/03/20 2152)   propofol 5-50 mcg/kg/min Last Rate: Stopped (06/30/20 2105)     PRN Meds:   acetaminophen    albuterol    HYDROmorphone    Lidocaine Viscous HCl    ondansetron      Invasive Devices     Peripherally Inserted Central Catheter Line            PICC Line 32/99/23 Left Basilic 7 days          Drain            Chest Tube 1 Left Pleural 28 Fr  23 days    NG/OG/Enteral Tube Nasogastric Right nares 4 days    External Urinary Catheter Medium less than 1 day          Airway            Surgical Airway Shiley Cuffed 11 days                Lab, Imaging and other studies: I have personally reviewed pertinent reports      VTE Pharmacologic Prophylaxis: Heparin  VTE Mechanical Prophylaxis: sequential compression device

## 2020-07-05 ENCOUNTER — APPOINTMENT (INPATIENT)
Dept: RADIOLOGY | Facility: HOSPITAL | Age: 70
DRG: 003 | End: 2020-07-05
Payer: COMMERCIAL

## 2020-07-05 LAB
ANION GAP SERPL CALCULATED.3IONS-SCNC: 4 MMOL/L (ref 4–13)
ANION GAP SERPL CALCULATED.3IONS-SCNC: 6 MMOL/L (ref 4–13)
BASOPHILS # BLD AUTO: 0.04 THOUSANDS/ΜL (ref 0–0.1)
BASOPHILS NFR BLD AUTO: 1 % (ref 0–1)
BUN SERPL-MCNC: 6 MG/DL (ref 5–25)
BUN SERPL-MCNC: 6 MG/DL (ref 5–25)
CALCIUM SERPL-MCNC: 8.8 MG/DL (ref 8.3–10.1)
CALCIUM SERPL-MCNC: 8.8 MG/DL (ref 8.3–10.1)
CHLORIDE SERPL-SCNC: 108 MMOL/L (ref 100–108)
CHLORIDE SERPL-SCNC: 109 MMOL/L (ref 100–108)
CO2 SERPL-SCNC: 27 MMOL/L (ref 21–32)
CO2 SERPL-SCNC: 28 MMOL/L (ref 21–32)
CREAT SERPL-MCNC: 0.56 MG/DL (ref 0.6–1.3)
CREAT SERPL-MCNC: 0.58 MG/DL (ref 0.6–1.3)
EOSINOPHIL # BLD AUTO: 0.3 THOUSAND/ΜL (ref 0–0.61)
EOSINOPHIL NFR BLD AUTO: 4 % (ref 0–6)
ERYTHROCYTE [DISTWIDTH] IN BLOOD BY AUTOMATED COUNT: 14.1 % (ref 11.6–15.1)
GFR SERPL CREATININE-BSD FRML MDRD: 104 ML/MIN/1.73SQ M
GFR SERPL CREATININE-BSD FRML MDRD: 106 ML/MIN/1.73SQ M
GLUCOSE SERPL-MCNC: 101 MG/DL (ref 65–140)
GLUCOSE SERPL-MCNC: 103 MG/DL (ref 65–140)
HCT VFR BLD AUTO: 31.4 % (ref 36.5–49.3)
HGB BLD-MCNC: 9.9 G/DL (ref 12–17)
IMM GRANULOCYTES # BLD AUTO: 0.09 THOUSAND/UL (ref 0–0.2)
IMM GRANULOCYTES NFR BLD AUTO: 1 % (ref 0–2)
LYMPHOCYTES # BLD AUTO: 1.49 THOUSANDS/ΜL (ref 0.6–4.47)
LYMPHOCYTES NFR BLD AUTO: 20 % (ref 14–44)
MAGNESIUM SERPL-MCNC: 2.2 MG/DL (ref 1.6–2.6)
MCH RBC QN AUTO: 30.8 PG (ref 26.8–34.3)
MCHC RBC AUTO-ENTMCNC: 31.5 G/DL (ref 31.4–37.4)
MCV RBC AUTO: 98 FL (ref 82–98)
MONOCYTES # BLD AUTO: 0.64 THOUSAND/ΜL (ref 0.17–1.22)
MONOCYTES NFR BLD AUTO: 9 % (ref 4–12)
NEUTROPHILS # BLD AUTO: 4.92 THOUSANDS/ΜL (ref 1.85–7.62)
NEUTS SEG NFR BLD AUTO: 65 % (ref 43–75)
NRBC BLD AUTO-RTO: 0 /100 WBCS
PHOSPHATE SERPL-MCNC: 4.5 MG/DL (ref 2.3–4.1)
PLATELET # BLD AUTO: 467 THOUSANDS/UL (ref 149–390)
PMV BLD AUTO: 10.2 FL (ref 8.9–12.7)
POTASSIUM SERPL-SCNC: 3.8 MMOL/L (ref 3.5–5.3)
POTASSIUM SERPL-SCNC: 4.1 MMOL/L (ref 3.5–5.3)
RBC # BLD AUTO: 3.21 MILLION/UL (ref 3.88–5.62)
SODIUM SERPL-SCNC: 141 MMOL/L (ref 136–145)
SODIUM SERPL-SCNC: 141 MMOL/L (ref 136–145)
WBC # BLD AUTO: 7.48 THOUSAND/UL (ref 4.31–10.16)

## 2020-07-05 PROCEDURE — 80048 BASIC METABOLIC PNL TOTAL CA: CPT | Performed by: PHYSICIAN ASSISTANT

## 2020-07-05 PROCEDURE — 84100 ASSAY OF PHOSPHORUS: CPT | Performed by: PHYSICIAN ASSISTANT

## 2020-07-05 PROCEDURE — 99024 POSTOP FOLLOW-UP VISIT: CPT | Performed by: THORACIC SURGERY (CARDIOTHORACIC VASCULAR SURGERY)

## 2020-07-05 PROCEDURE — 94760 N-INVAS EAR/PLS OXIMETRY 1: CPT

## 2020-07-05 PROCEDURE — 94640 AIRWAY INHALATION TREATMENT: CPT

## 2020-07-05 PROCEDURE — 71045 X-RAY EXAM CHEST 1 VIEW: CPT

## 2020-07-05 PROCEDURE — 85025 COMPLETE CBC W/AUTO DIFF WBC: CPT | Performed by: PHYSICIAN ASSISTANT

## 2020-07-05 PROCEDURE — 83735 ASSAY OF MAGNESIUM: CPT | Performed by: PHYSICIAN ASSISTANT

## 2020-07-05 PROCEDURE — 99233 SBSQ HOSP IP/OBS HIGH 50: CPT | Performed by: SURGERY

## 2020-07-05 RX ORDER — MAGNESIUM SULFATE HEPTAHYDRATE 40 MG/ML
2 INJECTION, SOLUTION INTRAVENOUS ONCE
Status: COMPLETED | OUTPATIENT
Start: 2020-07-05 | End: 2020-07-05

## 2020-07-05 RX ORDER — POTASSIUM CHLORIDE 14.9 MG/ML
20 INJECTION INTRAVENOUS ONCE
Status: COMPLETED | OUTPATIENT
Start: 2020-07-05 | End: 2020-07-05

## 2020-07-05 RX ADMIN — LEVALBUTEROL HYDROCHLORIDE 1.25 MG: 1.25 SOLUTION, CONCENTRATE RESPIRATORY (INHALATION) at 19:05

## 2020-07-05 RX ADMIN — NYSTATIN 500000 UNITS: 100000 SUSPENSION ORAL at 21:09

## 2020-07-05 RX ADMIN — QUETIAPINE FUMARATE 50 MG: 25 TABLET ORAL at 21:10

## 2020-07-05 RX ADMIN — NYSTATIN 500000 UNITS: 100000 SUSPENSION ORAL at 11:14

## 2020-07-05 RX ADMIN — LEVALBUTEROL HYDROCHLORIDE 1.25 MG: 1.25 SOLUTION, CONCENTRATE RESPIRATORY (INHALATION) at 07:23

## 2020-07-05 RX ADMIN — VALPROATE SODIUM 250 MG: 100 INJECTION, SOLUTION INTRAVENOUS at 13:08

## 2020-07-05 RX ADMIN — VALPROATE SODIUM 250 MG: 100 INJECTION, SOLUTION INTRAVENOUS at 05:21

## 2020-07-05 RX ADMIN — HYDROMORPHONE HYDROCHLORIDE 1 MG: 1 INJECTION, SOLUTION INTRAMUSCULAR; INTRAVENOUS; SUBCUTANEOUS at 14:44

## 2020-07-05 RX ADMIN — LEVALBUTEROL HYDROCHLORIDE 1.25 MG: 1.25 SOLUTION, CONCENTRATE RESPIRATORY (INHALATION) at 13:25

## 2020-07-05 RX ADMIN — DIAZEPAM 10 MG: 10 INJECTION, SOLUTION INTRAMUSCULAR; INTRAVENOUS at 21:09

## 2020-07-05 RX ADMIN — DIAZEPAM 10 MG: 10 INJECTION, SOLUTION INTRAMUSCULAR; INTRAVENOUS at 16:13

## 2020-07-05 RX ADMIN — ISODIUM CHLORIDE 3 ML: 0.03 SOLUTION RESPIRATORY (INHALATION) at 07:23

## 2020-07-05 RX ADMIN — ENOXAPARIN SODIUM 100 MG: 100 INJECTION SUBCUTANEOUS at 08:04

## 2020-07-05 RX ADMIN — ISODIUM CHLORIDE 3 ML: 0.03 SOLUTION RESPIRATORY (INHALATION) at 13:25

## 2020-07-05 RX ADMIN — NYSTATIN 500000 UNITS: 100000 SUSPENSION ORAL at 08:03

## 2020-07-05 RX ADMIN — HYDROMORPHONE HYDROCHLORIDE 1 MG: 1 INJECTION, SOLUTION INTRAMUSCULAR; INTRAVENOUS; SUBCUTANEOUS at 18:13

## 2020-07-05 RX ADMIN — HYDROMORPHONE HYDROCHLORIDE 1 MG: 1 INJECTION, SOLUTION INTRAMUSCULAR; INTRAVENOUS; SUBCUTANEOUS at 03:25

## 2020-07-05 RX ADMIN — VALPROATE SODIUM 250 MG: 100 INJECTION, SOLUTION INTRAVENOUS at 21:11

## 2020-07-05 RX ADMIN — ENOXAPARIN SODIUM 100 MG: 100 INJECTION SUBCUTANEOUS at 21:10

## 2020-07-05 RX ADMIN — CHLORHEXIDINE GLUCONATE 0.12% ORAL RINSE 15 ML: 1.2 LIQUID ORAL at 08:03

## 2020-07-05 RX ADMIN — DIAZEPAM 10 MG: 10 INJECTION, SOLUTION INTRAMUSCULAR; INTRAVENOUS at 08:03

## 2020-07-05 RX ADMIN — HYDROMORPHONE HYDROCHLORIDE 1 MG: 1 INJECTION, SOLUTION INTRAMUSCULAR; INTRAVENOUS; SUBCUTANEOUS at 11:14

## 2020-07-05 RX ADMIN — NYSTATIN 500000 UNITS: 100000 SUSPENSION ORAL at 18:13

## 2020-07-05 RX ADMIN — ISODIUM CHLORIDE 3 ML: 0.03 SOLUTION RESPIRATORY (INHALATION) at 19:05

## 2020-07-05 RX ADMIN — MELATONIN 3 MG: at 21:09

## 2020-07-05 RX ADMIN — POTASSIUM CHLORIDE 20 MEQ: 14.9 INJECTION, SOLUTION INTRAVENOUS at 07:29

## 2020-07-05 RX ADMIN — MAGNESIUM SULFATE HEPTAHYDRATE 2 G: 40 INJECTION, SOLUTION INTRAVENOUS at 15:04

## 2020-07-05 RX ADMIN — CHLORHEXIDINE GLUCONATE 0.12% ORAL RINSE 15 ML: 1.2 LIQUID ORAL at 21:09

## 2020-07-05 RX ADMIN — HYDROMORPHONE HYDROCHLORIDE 1 MG: 1 INJECTION, SOLUTION INTRAMUSCULAR; INTRAVENOUS; SUBCUTANEOUS at 07:29

## 2020-07-05 NOTE — PROGRESS NOTES
Progress Note - Critical Care   Joaquin Claros 71 y o  male MRN: [id number]  Unit/Bed#: [facility] 516-01 Encounter: [id number]    Assessment:   Principal Problem:    Malignant neoplasm of upper lobe of left lung (HCC)  Active Problems:    Acute respiratory failure with hypoxia (HCC)    Subcutaneous emphysema (HCC)    Acute deep vein thrombosis (DVT) of axillary vein of right upper extremity (HCC)    Acute deep vein thrombosis (DVT) of brachial vein of right upper extremity (HCC)    Tracheostomy in place [facility])    Postprocedural pneumothorax    Acute deep vein thrombosis (DVT) of calf muscle vein of left lower extremity (HCC)    Pneumonia  Resolved Problems:    Lactic acidosis    ROGER (acute kidney injury) ([facility] [address] )    Plan:   · Neuro:   · Dx:  Encephalopathy, delirium versus hypoxic brain injury status post cardiac arrest  · Continues to have intermittent agitation and confusion  · Continue to monitor, frequent redirection  · Continue Seroquel mL  · Analgesia:  Tylenol, Dilaudid  · Sedation - Valium t i d  · Delirium ppx: CAM-ICU, sleep hygiene  · Continue Depacon  · CV:   · Dx:   Tachycardia and hypertension likely secondary to agitation/delirium  · May consider restarting home norvasc or other anti-hypertensive as he is sometimes hypertensive in the absence of pain or obvious agitation  · PRN labetalol added yesterday  · Dx:  Status post cardiac arrest on 06/28 due to hypoxia/hypercarbia likely secondary to subcutaneous emphysema obstructing airway  · Subcutaneous emphysema greatly improved  · Hemodynamic support: none  · MAP goal > 65   · Lung:   · Dx:  Status post left upper VATS lobectomy for resection of lung cancer, complicated by significant subcutaneous emphysema  · Chest tube in place to water seal, maintain by surgical team  · Status post cricothyroidotomy for hypoxic/hypercarbic cardiac arrest, converted to formal trach  · Continue trach collar as tolerated; has been on trach collar consistently for greater than 24 hours  · Continue Respiratory Protocol and Airway Clearance Protocol  · Levalbuterol t i d     · GI:   · No active issues  · NG tube in place  · Stress ulcer ppx:  Protonix  · Bowel regimen:  Senna, docusate, MiraLax  · FEN:   · Fluids:  Isolyte 75 cc/hour  · Electrolytes: trend and replete as needed  · Nutrition:  Tube feeds initiated, advance as recommended by surgery   · :   · No acute issues  I/O last 24 hours: In: 2932 [I V :2400; NG/GT:90; Feedings:442]  Out: 5789 [Urine:5789]  · Gaytan:  No  · Monitor I&O's, BUN/Cr  · ID:   · No active issues  · Monitor temp, WBC curve  · Heme:   · Dx: active DVTs  · Continue lovenox  · DVT ppx: lovenox, SCDs   · Endo:   · No active issues  · Msk/Skin:   · PT/OT  · Turning/repositioning  · Wound care   · Disposition: ICU    ______________________________________________________________________    HPI/24hr events: No significant overnight events  Lines   PICC, chest tube, NG, Gaytan, trach    Infusions  Propofol, isolyte 75cc/hr, dilaudid   ______________________________________________________________________  Physical Exam:    Miles Agitation Sedation Scale (RASS): Drowsy  Physical Exam   Constitutional: No distress  HENT:   Head: Normocephalic and atraumatic  Mouth/Throat: Oropharynx is clear and moist    Eyes: Conjunctivae are normal    Neck: No JVD present  No tracheal deviation present  Cardiovascular: Normal rate and regular rhythm  Pulmonary/Chest: Effort normal and breath sounds normal    Chest tube in place   Abdominal: Soft  He exhibits no distension  There is no tenderness  Musculoskeletal: He exhibits no edema or deformity  Neurological: He is alert  Disoriented    Skin: Skin is warm and dry  Capillary refill takes less than 2 seconds  Nursing note and vitals reviewed      ______________________________________________________________________  Temperature:   Temp (24hrs), Av 4 °F (36 9 °C), Min:97 8 °F (36 6 °C), Max:99 5 °F (37 5 °C)    Current Temperature: 98 2 °F (36 8 °C)    Vitals:    07/05/20 0000 07/05/20 0100 07/05/20 0200 07/05/20 0300   BP: 109/76 119/61 127/73 125/64   BP Location: Left arm Left arm Left arm Left arm   Pulse: 90 92 96 96   Resp: (!) 23 20 22 21   Temp:    98 2 °F (36 8 °C)   TempSrc:    Axillary   SpO2: 98% 95% 97% 97%   Weight:       Height:         Arterial Line BP: 128/64       Weights:   IBW: 73 kg    Body mass index is 32 58 kg/m²  Weight (last 2 days)     None        Height: 5' 10" (177 8 cm)  Hemodynamic Monitoring:  N/A       Ventilator Settings:   Vent Mode: CPAP/PS Spont              FiO2 (%): 28       No results found for: PHART, WNH6DPG, PO2ART, CJT0THN, X1IYDRNG, BEART, SOURCE    Intake and Outputs:  I/O       07/03 0701 - 07/04 0700 07/04 0701 - 07/05 0700    P  O  0     I V  (mL/kg) 1798 8 (17 5) 900 (8 7)    NG/ 30    Feedings 445     Total Intake(mL/kg) 2393 8 (23 2) 930 (9)    Urine (mL/kg/hr) 3384 (1 4) 3550 (2 2)    Emesis/NG output 0     Stool 0 0    Chest Tube 50     Total Output 3434 3550    Net -1040 3 -2620          Unmeasured Urine Occurrence 1 x     Unmeasured Stool Occurrence 4 x 1 x          Nutrition:        Diet Orders   (From admission, onward)             Start     Ordered    07/04/20 1238  Diet Enteral/Parenteral; Tube Feeding No Oral Diet; Jevity 1 2 Javier; Continuous; 30  Diet effective now     Question Answer Comment   Diet Type Enteral/Parenteral    Enteral/Parenteral Tube Feeding No Oral Diet    Tube Feeding Formula: Jevity 1 2 Javier    Bolus/Cyclic/Continuous Continuous    Tube Feeding Goal Rate (mL/hr): 30    RD to adjust diet per protocol?  Yes        07/04/20 1237                Labs:   Results from last 7 days   Lab Units 07/05/20  0448 07/04/20  0451 07/03/20  0438   WBC Thousand/uL 7 48 7 14 7 27   HEMOGLOBIN g/dL 9 9* 10 2* 8 9*   HEMATOCRIT % 31 4* 32 2* 28 0*   PLATELETS Thousands/uL 467* 409* 375   NEUTROS PCT % 65 67 72   MONOS PCT % 9 9 8      Results from last 7 days   Lab Units 07/04/20  0451 07/03/20  0438 07/02/20  1950   POTASSIUM mmol/L 3 9 3 6 3 7   CHLORIDE mmol/L 107 106 105   CO2 mmol/L 29 27 27   BUN mg/dL 5 8 6   CREATININE mg/dL 0 49* 0 52* 0 52*   CALCIUM mg/dL 8 6 8 4 8 1*   ALK PHOS U/L  --  97  --    ALT U/L  --  29  --    AST U/L  --  20  --      Results from last 7 days   Lab Units 07/03/20  0438 07/02/20  0419 07/01/20  0441   MAGNESIUM mg/dL 2 2 2 4 2 3     Results from last 7 days   Lab Units 07/03/20  0438 07/02/20  0419 07/01/20  0441   PHOSPHORUS mg/dL 3 3 3 1 3 5          Results from last 7 days   Lab Units 07/01/20  0047   LACTIC ACID mmol/L 0 7     0   Lab Value Date/Time    TROPONINI 2 75 (H) 06/20/2020 1759    TROPONINI 3 00 (H) 06/20/2020 1319    TROPONINI 2 86 (H) 06/20/2020 0928    TROPONINI 0 85 (H) 06/20/2020 0647    TROPONINI <0 02 03/28/2020 0451    TROPONINI <0 02 03/28/2020 0031    TROPONINI <0 02 03/27/2020 2026     Imaging:  I have personally reviewed pertinent reports  EKG:   Micro:  Blood Culture:   Lab Results   Component Value Date    BLOODCX No Growth After 5 Days  06/29/2020    BLOODCX No Growth After 5 Days  06/29/2020    BLOODCX No Growth After 5 Days  06/20/2020    BLOODCX No Growth After 5 Days  06/20/2020    BLOODCX No Growth After 5 Days  03/27/2020    BLOODCX No Growth After 5 Days  03/27/2020     Urine Culture: No results found for: URINECX  Sputum Culture: No components found for: SPUTUMCX  Wound Culure: No results found for: WOUNDCULT    Lab Results   Component Value Date    BLOODCX No Growth After 5 Days  06/29/2020    BLOODCX No Growth After 5 Days  06/29/2020    BLOODCX No Growth After 5 Days  06/20/2020    BLOODCX No Growth After 5 Days   06/20/2020    SPUTUMCULTUR 4+ Growth of  06/29/2020     Allergies: No Known Allergies  Medications:   Scheduled Meds:    Current Facility-Administered Medications:  acetaminophen 650 mg Rectal Q4H PRN Desi Goodson PA-C    albuterol 2 5 mg Nebulization Q6H PRN Jungjovani Suarez PA-C    chlorhexidine 15 mL Swish & Spit Q12H [facility] Jung Suarez PA-C    diazepam 10 mg Intravenous TID [facility] YESSENIA Suarez    enoxaparin 1 mg/kg Subcutaneous Q12H [facility] Jung YESSENIA Suarez    HYDROmorphone 1 mg/hr Intravenous Continuous Jung Suarez PA-C Last Rate: Stopped (07/01/20 2202)   HYDROmorphone 0 5 mg Intravenous Q1H PRN [facility] ISAMAR Suarez-SONIA    HYDROmorphone 1 mg Intravenous Q4H Jung ErickYESSENIA funk    Labetalol HCl 10 mg Intravenous Q6H PRN [place] Kamlesh,     levalbuterol 1 25 mg Nebulization TID [facility] YESSENIA Suarez    Lidocaine Viscous HCl 15 mL Swish & Spit 4x Daily PRN Jung Suarez PA-C    melatonin 3 mg Oral HS Jung YESSENIA Suarez    multi-electrolyte 75 mL/hr Intravenous Continuous Jung Suarez PA-C Last Rate: 75 mL/hr (07/04/20 0800)   nystatin 500,000 Units Swish & Swallow 4x Daily Jung YESSENIA Suarez    ondansetron 4 mg Intravenous Q6H PRN Jung YESSENIA Suarez    polyethylene glycol 17 g Oral Daily Jungalena Suarez PA-C    propofol 5-50 mcg/kg/min Intravenous Titrated Jung Suarez PA-C Last Rate: Stopped (06/30/20 2105)   QUEtiapine 50 mg Oral HS Jung YESSENIA Suarez    senna-docusate sodium 1 tablet Per NG Tube BID Jung Suarez PA-C    sodium chloride 3 mL Nebulization TID Jung YESSENIA Suarez    valproate sodium 250 mg Intravenous Q8H [facility] Jung Suarez PA-C Last Rate: 250 mg (07/04/20 2105)     Continuous Infusions:    HYDROmorphone 1 mg/hr Last Rate: Stopped (07/01/20 2202)   multi-electrolyte 75 mL/hr Last Rate: 75 mL/hr (07/04/20 0800)   propofol 5-50 mcg/kg/min Last Rate: Stopped (06/30/20 6947)     PRN Meds:    acetaminophen 650 mg Q4H PRN   albuterol 2 5 mg Q6H PRN   HYDROmorphone 0 5 mg Q1H PRN   Labetalol HCl 10 mg Q6H PRN   Lidocaine Viscous HCl 15 mL 4x Daily PRN   ondansetron 4 mg Q6H PRN     VTE Pharmacologic Prophylaxis: Enoxaparin (Lovenox)  VTE Mechanical Prophylaxis: sequential compression device  Invasive lines and devices:   Invasive Devices     Peripherally Inserted Central Catheter Line            PICC Line 22/46/49 Left Basilic 8 days          Drain            Chest Tube 1 Left Pleural 28 Fr  24 days    NG/OG/Enteral Tube Nasogastric Right nares 5 days    External Urinary Catheter Medium 1 day          Airway            Surgical Airway Shiley Cuffed 12 days                   Code Status: Level 2 - DNAR: but accepts endotracheal intubation    Portions of the record may have been created with voice recognition software  Occasional wrong word or "sound a like" substitutions may have occurred due to the inherent limitations of voice recognition software  Read the chart carefully and recognize, using context, where substitutions have occurred      Bill Sneads, DO

## 2020-07-05 NOTE — PROGRESS NOTES
Progress Note - Siria Joseph 71 y o  male MRN: [id number]    Unit/Bed#: [facility] 516-01 Encounter: [id number]      Assessment:  71 M s/p thoracoscopic left upper lobectomy with prolonged air leak, cardiac arrest and emergency cricothyroidotomy s/p trach revision    AVSS  Tolerating TF at 30cc/h  CT only 50 cc output  Plan:  Remain well on trach collar  Advance tube feeds as tolerated  Cont agressive pulmonary physiotherapy  Will consider removing CT today, discuss with attending  Subjective:   Per the nurse still a little agitated on and off  Otherwise no acute events  Objective:     Vitals: Blood pressure 161/83, pulse 92, temperature 98 2 °F (36 8 °C), temperature source Axillary, resp  rate 18, height 5' 10" (1 778 m), weight 103 kg (227 lb 1 2 oz), SpO2 100 %  ,Body mass index is 32 58 kg/m²  Intake/Output Summary (Last 24 hours) at 7/5/2020 0633  Last data filed at 7/5/2020 0400  Gross per 24 hour   Intake 1977 ml   Output 4350 ml   Net -2373 ml       Physical Exam  General: NAD  Cv: RRR  Lungs: normal effort, trach collar in place, trach site CDI  CT no leak    Ab: Soft, NT/ND  Ex: no CCE      Scheduled Meds:    Current Facility-Administered Medications:  acetaminophen 650 mg Rectal Q4H PRN ISAMAR Flores-SONIA    albuterol 2 5 mg Nebulization Q6H PRN ISAMAR Flores-SONIA    chlorhexidine 15 mL Swish & Spit Q12H [address] Jung Suarez PA-C    diazepam 10 mg Intravenous TID ISAMAR Flores-SONIA    enoxaparin 1 mg/kg Subcutaneous Q12H [address] ISAMAR Flores-SONIA    HYDROmorphone 1 mg/hr Intravenous Continuous Jung Suarez PA-C Last Rate: Stopped (07/01/20 2202)   HYDROmorphone 0 5 mg Intravenous Q1H PRN ISAMAR Flores-C    HYDROmorphone 1 mg Intravenous Q4H ISAMAR Flores-SONIA    Labetalol HCl 10 mg Intravenous Q6H PRN [place] Kamlesh, DO    levalbuterol 1 25 mg Nebulization TID Jung Suarez PA-C    Lidocaine Viscous HCl 15 mL Swish & Spit 4x Daily PRN Jung Suarez PA-C melatonin 3 mg Oral HS Desi Goodson PA-C    multi-electrolyte 75 mL/hr Intravenous Continuous Lourdes Rothman Last Rate: 75 mL/hr (07/04/20 0800)   nystatin 500,000 Units Swish & Swallow 4x Daily Desi Goodson PA-C    ondansetron 4 mg Intravenous Q6H PRN Desi Goodson PA-C    polyethylene glycol 17 g Oral Daily Desi Goodson PA-C    potassium chloride 20 mEq Intravenous Once Crystal Lank, DO    propofol 5-50 mcg/kg/min Intravenous Titrated Desi Goodson PA-C Last Rate: Stopped (06/30/20 2105)   QUEtiapine 50 mg Oral HS Desi Goodson PA-C    senna-docusate sodium 1 tablet Per NG Tube BID Desi Goodson PA-C    sodium chloride 3 mL Nebulization TID Desi Goodson PA-C    valproate sodium 250 mg Intravenous Q8H [facility] Desi Goodson PA-C Last Rate: 250 mg (07/05/20 0521)     Continuous Infusions:    HYDROmorphone 1 mg/hr Last Rate: Stopped (07/01/20 2202)   multi-electrolyte 75 mL/hr Last Rate: 75 mL/hr (07/04/20 0800)   propofol 5-50 mcg/kg/min Last Rate: Stopped (06/30/20 2105)     PRN Meds:   acetaminophen    albuterol    HYDROmorphone    Labetalol HCl    Lidocaine Viscous HCl    ondansetron      Invasive Devices     Peripherally Inserted Central Catheter Line            PICC Line 21/68/29 Left Basilic 8 days          Drain            Chest Tube 1 Left Pleural 28 Fr  24 days    NG/OG/Enteral Tube Nasogastric Right nares 6 days    External Urinary Catheter Medium 1 day          Airway            Surgical Airway Shiley Cuffed 12 days                Lab, Imaging and other studies: I have personally reviewed pertinent reports      VTE Pharmacologic Prophylaxis: Heparin  VTE Mechanical Prophylaxis: sequential compression device

## 2020-07-05 NOTE — PLAN OF CARE
Problem: CARDIOVASCULAR - ADULT  Goal: Maintains optimal cardiac output and hemodynamic stability  Description  INTERVENTIONS:  - Monitor I/O, vital signs and rhythm  - Monitor for S/S and trends of decreased cardiac output  - Administer and titrate ordered vasoactive medications to optimize hemodynamic stability  - Assess quality of pulses, skin color and temperature  - Assess for signs of decreased coronary artery perfusion  - Instruct patient to report change in severity of symptoms  Outcome: Progressing  Goal: Absence of cardiac dysrhythmias or at baseline rhythm  Description  INTERVENTIONS:  - Continuous cardiac monitoring, vital signs, obtain 12 lead EKG if ordered  - Administer antiarrhythmic and heart rate control medications as ordered  - Monitor electrolytes and administer replacement therapy as ordered  Outcome: Progressing     Problem: RESPIRATORY - ADULT  Goal: Achieves optimal ventilation and oxygenation  Description  INTERVENTIONS:  - Assess for changes in respiratory status  - Assess for changes in mentation and behavior  - Position to facilitate oxygenation and minimize respiratory effort  - Oxygen administered by appropriate delivery if ordered  - Initiate smoking cessation education as indicated  - Encourage broncho-pulmonary hygiene including cough, deep breathe, Incentive Spirometry  - Assess the need for suctioning and aspirate as needed  - Assess and instruct to report SOB or any respiratory difficulty  - Respiratory Therapy support as indicated  Outcome: Progressing     Problem: SKIN/TISSUE INTEGRITY - ADULT  Goal: Skin integrity remains intact  Description  INTERVENTIONS  - Identify patients at risk for skin breakdown  - Assess and monitor skin integrity  - Assess and monitor nutrition and hydration status  - Monitor labs (i e  albumin)  - Assess for incontinence   - Turn and reposition patient  - Assist with mobility/ambulation  - Relieve pressure over bony prominences  - Avoid friction and shearing  - Provide appropriate hygiene as needed including keeping skin clean and dry  - Evaluate need for skin moisturizer/barrier cream  - Collaborate with interdisciplinary team (i e  Nutrition, Rehabilitation, etc )   - Patient/family teaching  Outcome: Progressing  Goal: Incision(s), wounds(s) or drain site(s) healing without S/S of infection  Description  INTERVENTIONS  - Assess and document risk factors for skin impairment   - Assess and document dressing, incision, wound bed, drain sites and surrounding tissue  - Consider nutrition services referral as needed  - Oral mucous membranes remain intact  - Provide patient/ family education  Outcome: Progressing  Goal: Oral mucous membranes remain intact  Description  INTERVENTIONS  - Assess oral mucosa and hygiene practices  - Implement preventative oral hygiene regimen  - Implement oral medicated treatments as ordered  - Initiate Nutrition services referral as needed  Outcome: Progressing     Problem: MUSCULOSKELETAL - ADULT  Goal: Maintain or return mobility to safest level of function  Description  INTERVENTIONS:  - Assess patient's ability to carry out ADLs; assess patient's baseline for ADL function and identify physical deficits which impact ability to perform ADLs (bathing, care of mouth/teeth, toileting, grooming, dressing, etc )  - Assess/evaluate cause of self-care deficits   - Assess range of motion  - Assess patient's mobility  - Assess patient's need for assistive devices and provide as appropriate  - Encourage maximum independence but intervene and supervise when necessary  - Involve family in performance of ADLs  - Assess for home care needs following discharge   - Consider OT consult to assist with ADL evaluation and planning for discharge  - Provide patient education as appropriate  Outcome: Progressing  Goal: Maintain proper alignment of affected body part  Description  INTERVENTIONS:  - Support, maintain and protect limb and body alignment  - Provide patient/ family with appropriate education  Outcome: Progressing     Problem: Potential for Falls  Goal: Patient will remain free of falls  Description  INTERVENTIONS:  - Assess patient frequently for physical needs  -  Identify cognitive and physical deficits and behaviors that affect risk of falls  -  [place] fall precautions as indicated by assessment   - Educate patient/family on patient safety including physical limitations  - Instruct patient to call for assistance with activity based on assessment  - Modify environment to reduce risk of injury  - Consider OT/PT consult to assist with strengthening/mobility  Outcome: Progressing     Problem: Prexisting or High Potential for Compromised Skin Integrity  Goal: Skin integrity is maintained or improved  Description  INTERVENTIONS:  - Identify patients at risk for skin breakdown  - Assess and monitor skin integrity  - Assess and monitor nutrition and hydration status  - Monitor labs   - Assess for incontinence   - Turn and reposition patient  - Assist with mobility/ambulation  - Relieve pressure over bony prominences  - Avoid friction and shearing  - Provide appropriate hygiene as needed including keeping skin clean and dry  - Evaluate need for skin moisturizer/barrier cream  - Collaborate with interdisciplinary team   - Patient/family teaching  - Consider wound care consult   Outcome: Progressing     Problem: Nutrition/Hydration-ADULT  Goal: Nutrient/Hydration intake appropriate for improving, restoring or maintaining nutritional needs  Description  Monitor and assess patient's nutrition/hydration status for malnutrition  Collaborate with interdisciplinary team and initiate plan and interventions as ordered  Monitor patient's weight and dietary intake as ordered or per policy  Utilize nutrition screening tool and intervene as necessary  Determine patient's food preferences and provide high-protein, high-caloric foods as appropriate  INTERVENTIONS:  - Monitor oral intake, urinary output, labs, and treatment plans  - Assess nutrition and hydration status and recommend course of action  - Evaluate amount of meals eaten  - Assist patient with eating if necessary   - Allow adequate time for meals  - Recommend/ encourage appropriate diets, oral nutritional supplements, and vitamin/mineral supplements  - Order, calculate, and assess calorie counts as needed  - Recommend, monitor, and adjust tube feedings and TPN/PPN based on assessed needs  - Assess need for intravenous fluids  - Provide specific nutrition/hydration education as appropriate  - Include patient/family/caregiver in decisions related to nutrition  Outcome: Progressing     Problem: SAFETY,RESTRAINT: NV/NON-SELF DESTRUCTIVE BEHAVIOR  Goal: Remains free of harm/injury (restraint for non violent/non self-detsructive behavior)  Description  INTERVENTIONS:  - Instruct patient/family regarding restraint use   - Assess and monitor physiologic and psychological status   - Provide interventions and comfort measures to meet assessed patient needs   - Identify and implement measures to help patient regain control  - Assess readiness for release of restraint   Outcome: Progressing  Goal: Returns to optimal restraint-free functioning  Description  INTERVENTIONS:  - Assess the patient's behavior and symptoms that indicate continued need for restraint  - Identify and implement measures to help patient regain control  - Assess readiness for release of restraint   Outcome: Progressing     Problem: Knowledge Deficit  Goal: Patient/family/caregiver demonstrates understanding of disease process, treatment plan, medications, and discharge instructions  Description  Complete learning assessment and assess knowledge base    Interventions:  - Provide teaching at level of understanding  - Provide teaching via preferred learning methods  Outcome: Progressing

## 2020-07-06 ENCOUNTER — APPOINTMENT (INPATIENT)
Dept: RADIOLOGY | Facility: HOSPITAL | Age: 70
DRG: 003 | End: 2020-07-06
Payer: COMMERCIAL

## 2020-07-06 LAB
ANION GAP SERPL CALCULATED.3IONS-SCNC: 6 MMOL/L (ref 4–13)
BASOPHILS # BLD AUTO: 0.04 THOUSANDS/ΜL (ref 0–0.1)
BASOPHILS NFR BLD AUTO: 1 % (ref 0–1)
BUN SERPL-MCNC: 8 MG/DL (ref 5–25)
CALCIUM SERPL-MCNC: 8.3 MG/DL (ref 8.3–10.1)
CHLORIDE SERPL-SCNC: 107 MMOL/L (ref 100–108)
CO2 SERPL-SCNC: 27 MMOL/L (ref 21–32)
CREAT SERPL-MCNC: 0.61 MG/DL (ref 0.6–1.3)
EOSINOPHIL # BLD AUTO: 0.45 THOUSAND/ΜL (ref 0–0.61)
EOSINOPHIL NFR BLD AUTO: 6 % (ref 0–6)
ERYTHROCYTE [DISTWIDTH] IN BLOOD BY AUTOMATED COUNT: 14.5 % (ref 11.6–15.1)
GFR SERPL CREATININE-BSD FRML MDRD: 102 ML/MIN/1.73SQ M
GLUCOSE SERPL-MCNC: 97 MG/DL (ref 65–140)
HCT VFR BLD AUTO: 32.4 % (ref 36.5–49.3)
HGB BLD-MCNC: 10.3 G/DL (ref 12–17)
IMM GRANULOCYTES # BLD AUTO: 0.12 THOUSAND/UL (ref 0–0.2)
IMM GRANULOCYTES NFR BLD AUTO: 2 % (ref 0–2)
LYMPHOCYTES # BLD AUTO: 1.57 THOUSANDS/ΜL (ref 0.6–4.47)
LYMPHOCYTES NFR BLD AUTO: 21 % (ref 14–44)
MCH RBC QN AUTO: 31.2 PG (ref 26.8–34.3)
MCHC RBC AUTO-ENTMCNC: 31.8 G/DL (ref 31.4–37.4)
MCV RBC AUTO: 98 FL (ref 82–98)
MONOCYTES # BLD AUTO: 0.75 THOUSAND/ΜL (ref 0.17–1.22)
MONOCYTES NFR BLD AUTO: 10 % (ref 4–12)
NEUTROPHILS # BLD AUTO: 4.5 THOUSANDS/ΜL (ref 1.85–7.62)
NEUTS SEG NFR BLD AUTO: 60 % (ref 43–75)
NRBC BLD AUTO-RTO: 0 /100 WBCS
PLATELET # BLD AUTO: 506 THOUSANDS/UL (ref 149–390)
PMV BLD AUTO: 10 FL (ref 8.9–12.7)
POTASSIUM SERPL-SCNC: 4 MMOL/L (ref 3.5–5.3)
RBC # BLD AUTO: 3.3 MILLION/UL (ref 3.88–5.62)
SODIUM SERPL-SCNC: 140 MMOL/L (ref 136–145)
WBC # BLD AUTO: 7.43 THOUSAND/UL (ref 4.31–10.16)

## 2020-07-06 PROCEDURE — 99232 SBSQ HOSP IP/OBS MODERATE 35: CPT | Performed by: INTERNAL MEDICINE

## 2020-07-06 PROCEDURE — 94640 AIRWAY INHALATION TREATMENT: CPT

## 2020-07-06 PROCEDURE — 94760 N-INVAS EAR/PLS OXIMETRY 1: CPT

## 2020-07-06 PROCEDURE — 71045 X-RAY EXAM CHEST 1 VIEW: CPT

## 2020-07-06 PROCEDURE — 85025 COMPLETE CBC W/AUTO DIFF WBC: CPT | Performed by: PHYSICIAN ASSISTANT

## 2020-07-06 PROCEDURE — 99024 POSTOP FOLLOW-UP VISIT: CPT | Performed by: THORACIC SURGERY (CARDIOTHORACIC VASCULAR SURGERY)

## 2020-07-06 PROCEDURE — 99291 CRITICAL CARE FIRST HOUR: CPT | Performed by: EMERGENCY MEDICINE

## 2020-07-06 PROCEDURE — 80048 BASIC METABOLIC PNL TOTAL CA: CPT | Performed by: PHYSICIAN ASSISTANT

## 2020-07-06 RX ORDER — GUAIFENESIN 100 MG/5ML
200 SOLUTION ORAL EVERY 4 HOURS PRN
Status: DISCONTINUED | OUTPATIENT
Start: 2020-07-06 | End: 2020-07-30 | Stop reason: HOSPADM

## 2020-07-06 RX ORDER — DIAZEPAM 5 MG/1
5 TABLET ORAL EVERY 8 HOURS SCHEDULED
Status: DISCONTINUED | OUTPATIENT
Start: 2020-07-06 | End: 2020-07-07

## 2020-07-06 RX ORDER — VALPROIC ACID 250 MG/5ML
250 SOLUTION ORAL EVERY 8 HOURS SCHEDULED
Status: DISCONTINUED | OUTPATIENT
Start: 2020-07-06 | End: 2020-07-08

## 2020-07-06 RX ORDER — HYDROMORPHONE HCL/PF 1 MG/ML
0.8 SYRINGE (ML) INJECTION EVERY 4 HOURS
Status: DISCONTINUED | OUTPATIENT
Start: 2020-07-06 | End: 2020-07-07

## 2020-07-06 RX ADMIN — HYDROMORPHONE HYDROCHLORIDE 1 MG: 1 INJECTION, SOLUTION INTRAMUSCULAR; INTRAVENOUS; SUBCUTANEOUS at 06:26

## 2020-07-06 RX ADMIN — ISODIUM CHLORIDE 3 ML: 0.03 SOLUTION RESPIRATORY (INHALATION) at 07:35

## 2020-07-06 RX ADMIN — HYDROMORPHONE HYDROCHLORIDE 0.8 MG: 1 INJECTION, SOLUTION INTRAMUSCULAR; INTRAVENOUS; SUBCUTANEOUS at 18:09

## 2020-07-06 RX ADMIN — ENOXAPARIN SODIUM 100 MG: 100 INJECTION SUBCUTANEOUS at 08:00

## 2020-07-06 RX ADMIN — LEVALBUTEROL HYDROCHLORIDE 1.25 MG: 1.25 SOLUTION, CONCENTRATE RESPIRATORY (INHALATION) at 07:35

## 2020-07-06 RX ADMIN — DIAZEPAM 10 MG: 10 INJECTION, SOLUTION INTRAMUSCULAR; INTRAVENOUS at 08:00

## 2020-07-06 RX ADMIN — CHLORHEXIDINE GLUCONATE 0.12% ORAL RINSE 15 ML: 1.2 LIQUID ORAL at 21:08

## 2020-07-06 RX ADMIN — VALPROATE SODIUM 250 MG: 100 INJECTION, SOLUTION INTRAVENOUS at 05:49

## 2020-07-06 RX ADMIN — LEVALBUTEROL HYDROCHLORIDE 1.25 MG: 1.25 SOLUTION, CONCENTRATE RESPIRATORY (INHALATION) at 13:12

## 2020-07-06 RX ADMIN — NYSTATIN 500000 UNITS: 100000 SUSPENSION ORAL at 08:00

## 2020-07-06 RX ADMIN — HYDROMORPHONE HYDROCHLORIDE 0.8 MG: 1 INJECTION, SOLUTION INTRAMUSCULAR; INTRAVENOUS; SUBCUTANEOUS at 15:17

## 2020-07-06 RX ADMIN — DIAZEPAM 5 MG: 5 TABLET ORAL at 15:16

## 2020-07-06 RX ADMIN — HYDROMORPHONE HYDROCHLORIDE 1 MG: 1 INJECTION, SOLUTION INTRAMUSCULAR; INTRAVENOUS; SUBCUTANEOUS at 00:45

## 2020-07-06 RX ADMIN — MELATONIN 3 MG: at 21:08

## 2020-07-06 RX ADMIN — LEVALBUTEROL HYDROCHLORIDE 1.25 MG: 1.25 SOLUTION, CONCENTRATE RESPIRATORY (INHALATION) at 19:18

## 2020-07-06 RX ADMIN — ISODIUM CHLORIDE 3 ML: 0.03 SOLUTION RESPIRATORY (INHALATION) at 19:18

## 2020-07-06 RX ADMIN — GUAIFENESIN 200 MG: 100 SOLUTION ORAL at 18:09

## 2020-07-06 RX ADMIN — VALPROIC ACID 250 MG: 500 SOLUTION ORAL at 21:09

## 2020-07-06 RX ADMIN — NYSTATIN 500000 UNITS: 100000 SUSPENSION ORAL at 18:09

## 2020-07-06 RX ADMIN — VALPROIC ACID 250 MG: 500 SOLUTION ORAL at 13:23

## 2020-07-06 RX ADMIN — QUETIAPINE FUMARATE 50 MG: 25 TABLET ORAL at 21:08

## 2020-07-06 RX ADMIN — NYSTATIN 500000 UNITS: 100000 SUSPENSION ORAL at 21:25

## 2020-07-06 RX ADMIN — ISODIUM CHLORIDE 3 ML: 0.03 SOLUTION RESPIRATORY (INHALATION) at 13:12

## 2020-07-06 RX ADMIN — CHLORHEXIDINE GLUCONATE 0.12% ORAL RINSE 15 ML: 1.2 LIQUID ORAL at 08:00

## 2020-07-06 RX ADMIN — HYDROMORPHONE HYDROCHLORIDE 1 MG: 1 INJECTION, SOLUTION INTRAMUSCULAR; INTRAVENOUS; SUBCUTANEOUS at 10:03

## 2020-07-06 RX ADMIN — DIAZEPAM 5 MG: 5 TABLET ORAL at 21:08

## 2020-07-06 RX ADMIN — ENOXAPARIN SODIUM 100 MG: 100 INJECTION SUBCUTANEOUS at 21:08

## 2020-07-06 RX ADMIN — NYSTATIN 500000 UNITS: 100000 SUSPENSION ORAL at 12:08

## 2020-07-06 NOTE — PLAN OF CARE
Problem: CARDIOVASCULAR - ADULT  Goal: Maintains optimal cardiac output and hemodynamic stability  Description  INTERVENTIONS:  - Monitor I/O, vital signs and rhythm  - Monitor for S/S and trends of decreased cardiac output  - Administer and titrate ordered vasoactive medications to optimize hemodynamic stability  - Assess quality of pulses, skin color and temperature  - Assess for signs of decreased coronary artery perfusion  - Instruct patient to report change in severity of symptoms  Outcome: Progressing  Goal: Absence of cardiac dysrhythmias or at baseline rhythm  Description  INTERVENTIONS:  - Continuous cardiac monitoring, vital signs, obtain 12 lead EKG if ordered  - Administer antiarrhythmic and heart rate control medications as ordered  - Monitor electrolytes and administer replacement therapy as ordered  Outcome: Progressing     Problem: RESPIRATORY - ADULT  Goal: Achieves optimal ventilation and oxygenation  Description  INTERVENTIONS:  - Assess for changes in respiratory status  - Assess for changes in mentation and behavior  - Position to facilitate oxygenation and minimize respiratory effort  - Oxygen administered by appropriate delivery if ordered  - Initiate smoking cessation education as indicated  - Encourage broncho-pulmonary hygiene including cough, deep breathe, Incentive Spirometry  - Assess the need for suctioning and aspirate as needed  - Assess and instruct to report SOB or any respiratory difficulty  - Respiratory Therapy support as indicated  Outcome: Progressing     Problem: SKIN/TISSUE INTEGRITY - ADULT  Goal: Skin integrity remains intact  Description  INTERVENTIONS  - Identify patients at risk for skin breakdown  - Assess and monitor skin integrity  - Assess and monitor nutrition and hydration status  - Monitor labs (i e  albumin)  - Assess for incontinence   - Turn and reposition patient  - Assist with mobility/ambulation  - Relieve pressure over bony prominences  - Avoid friction and shearing  - Provide appropriate hygiene as needed including keeping skin clean and dry  - Evaluate need for skin moisturizer/barrier cream  - Collaborate with interdisciplinary team (i e  Nutrition, Rehabilitation, etc )   - Patient/family teaching  Outcome: Progressing  Goal: Incision(s), wounds(s) or drain site(s) healing without S/S of infection  Description  INTERVENTIONS  - Assess and document risk factors for skin impairment   - Assess and document dressing, incision, wound bed, drain sites and surrounding tissue  - Consider nutrition services referral as needed  - Oral mucous membranes remain intact  - Provide patient/ family education  Outcome: Progressing  Goal: Oral mucous membranes remain intact  Description  INTERVENTIONS  - Assess oral mucosa and hygiene practices  - Implement preventative oral hygiene regimen  - Implement oral medicated treatments as ordered  - Initiate Nutrition services referral as needed  Outcome: Progressing     Problem: MUSCULOSKELETAL - ADULT  Goal: Maintain or return mobility to safest level of function  Description  INTERVENTIONS:  - Assess patient's ability to carry out ADLs; assess patient's baseline for ADL function and identify physical deficits which impact ability to perform ADLs (bathing, care of mouth/teeth, toileting, grooming, dressing, etc )  - Assess/evaluate cause of self-care deficits   - Assess range of motion  - Assess patient's mobility  - Assess patient's need for assistive devices and provide as appropriate  - Encourage maximum independence but intervene and supervise when necessary  - Involve family in performance of ADLs  - Assess for home care needs following discharge   - Consider OT consult to assist with ADL evaluation and planning for discharge  - Provide patient education as appropriate  Outcome: Progressing  Goal: Maintain proper alignment of affected body part  Description  INTERVENTIONS:  - Support, maintain and protect limb and body alignment  - Provide patient/ family with appropriate education  Outcome: Progressing     Problem: Potential for Falls  Goal: Patient will remain free of falls  Description  INTERVENTIONS:  - Assess patient frequently for physical needs  -  Identify cognitive and physical deficits and behaviors that affect risk of falls  -  [place] fall precautions as indicated by assessment   - Educate patient/family on patient safety including physical limitations  - Instruct patient to call for assistance with activity based on assessment  - Modify environment to reduce risk of injury  - Consider OT/PT consult to assist with strengthening/mobility  Outcome: Progressing     Problem: Prexisting or High Potential for Compromised Skin Integrity  Goal: Skin integrity is maintained or improved  Description  INTERVENTIONS:  - Identify patients at risk for skin breakdown  - Assess and monitor skin integrity  - Assess and monitor nutrition and hydration status  - Monitor labs   - Assess for incontinence   - Turn and reposition patient  - Assist with mobility/ambulation  - Relieve pressure over bony prominences  - Avoid friction and shearing  - Provide appropriate hygiene as needed including keeping skin clean and dry  - Evaluate need for skin moisturizer/barrier cream  - Collaborate with interdisciplinary team   - Patient/family teaching  - Consider wound care consult   Outcome: Progressing     Problem: Nutrition/Hydration-ADULT  Goal: Nutrient/Hydration intake appropriate for improving, restoring or maintaining nutritional needs  Description  Monitor and assess patient's nutrition/hydration status for malnutrition  Collaborate with interdisciplinary team and initiate plan and interventions as ordered  Monitor patient's weight and dietary intake as ordered or per policy  Utilize nutrition screening tool and intervene as necessary  Determine patient's food preferences and provide high-protein, high-caloric foods as appropriate  INTERVENTIONS:  - Monitor oral intake, urinary output, labs, and treatment plans  - Assess nutrition and hydration status and recommend course of action  - Evaluate amount of meals eaten  - Assist patient with eating if necessary   - Allow adequate time for meals  - Recommend/ encourage appropriate diets, oral nutritional supplements, and vitamin/mineral supplements  - Order, calculate, and assess calorie counts as needed  - Recommend, monitor, and adjust tube feedings and TPN/PPN based on assessed needs  - Assess need for intravenous fluids  - Provide specific nutrition/hydration education as appropriate  - Include patient/family/caregiver in decisions related to nutrition  Outcome: Progressing     Problem: SAFETY,RESTRAINT: NV/NON-SELF DESTRUCTIVE BEHAVIOR  Goal: Remains free of harm/injury (restraint for non violent/non self-detsructive behavior)  Description  INTERVENTIONS:  - Instruct patient/family regarding restraint use   - Assess and monitor physiologic and psychological status   - Provide interventions and comfort measures to meet assessed patient needs   - Identify and implement measures to help patient regain control  - Assess readiness for release of restraint   Outcome: Progressing  Goal: Returns to optimal restraint-free functioning  Description  INTERVENTIONS:  - Assess the patient's behavior and symptoms that indicate continued need for restraint  - Identify and implement measures to help patient regain control  - Assess readiness for release of restraint   Outcome: Progressing     Problem: Knowledge Deficit  Goal: Patient/family/caregiver demonstrates understanding of disease process, treatment plan, medications, and discharge instructions  Description  Complete learning assessment and assess knowledge base    Interventions:  - Provide teaching at level of understanding  - Provide teaching via preferred learning methods  Outcome: Progressing

## 2020-07-06 NOTE — UTILIZATION REVIEW
Continued Stay Review    Date: 7/4/20 Saturday  POD #: 24    Current Patient Class:  Inpatient  Current Level of Care: Med Surg    HPI: 71year old male initially admitted inpatient on 6/10/20 after VATS, upper lobectomy, mediastinal lymph node dissection, mediastinoscopy, and flexible bronch  He had an air leak with subQ emphysema throughout upper torso, neck, face, and L arm  Chest tube was in place to water seal  Needed decompressive incision on 6/15, VAC placed, still has air leak  CXR showed extensive SQ emphysema  On 6/20/20 SOB with SpO2 in 70's and progressed to bradycardia and code event requiring emergency trach  Moved to ICU with a line and central line, levo/epi gtts for bp maintenance  Cric transitioned to trach with trach collar trials started on 6/24/20    Current Assessment/Plan:  continues to make slow but steady progress  chest tube in place without an air leak, minimal serous output  plan to take this tube out tomorrow  remains on trach collar for almost 48 hours now and has not required the ventilator  remains agitated  He was able to open his eyes to his name today but did not follow commands for me    tolerating 20 cc of tube feeds per hour via his NG tube      Pertinent Labs/Diagnostic Results:   Lab Units 07/03/20  0438 07/02/20  0509 07/01/20  0441   WBC Thousand/uL 7 27 7 29 9 22   HEMOGLOBIN g/dL 8 9* 8 3* 8 6*   HEMATOCRIT % 28 0* 27 0* 28 1*   PLATELETS Thousands/uL 375 307 273   NEUTROS PCT % 72 69 72   MONOS PCT % 8 8 8      Results from last 7 days   Lab Units 07/03/20  0438 07/02/20  1950/20  0419   POTASSIUM mmol/L 3 6 3 7 3 9   CHLORIDE mmol/L 106 105 103   CO2 mmol/L 27 27 30   BUN mg/dL 8 6 10   CREATININE mg/dL 0 52* 0 52* 0 56*   CALCIUM mg/dL 8 4 8 1* 7 8*   ALK PHOS U/L 97  --   --    ALT U/L 29  --   --    AST U/L 20  --   --              Results from last 7 days   Lab Units 07/03/20  0438 07/02/20  0419 07/01/20  0441   MAGNESIUM mg/dL 2 2 2 4 2 3      Results from last 7 days   Lab Units 07/03/20  0438 07/02/20  0419 07/01/20  0441   PHOSPHORUS mg/dL 3 3 3 1 3 5        Results from last 7 days   Lab Units 07/01/20  0047   LACTIC ACID mmol/L 0 7               Vital Signs:    Blood pressure 163/93, pulse 104, temperature 98 4 °F (36 9 °C), temperature source Oral, resp  rate 18, height 5' 10" (1 778 m), weight 103 kg (227 lb 1 2 oz), SpO2 99 %  ,Body mass index is 32 58 kg/m²       Intake/Output Summary (Last 24 hours) at 7/4/2020 0533:  Gross per 24 hour   Intake 2513 75 ml   Output 3009 ml   Net -495 25 ml     Diet Enteral/Parenteral; Tube Feeding No Oral Diet; Jevity 1 2 Javier; Continuous; 30    Scheduled Medications:  chlorhexidine 15 mL Swish & Spit Q12H [address]   diazepam 5 mg Oral Q8H DEAN   enoxaparin 1 mg/kg Subcutaneous Q12H DEAN   HYDROmorphone 0 8 mg Intravenous Q4H   levalbuterol 1 25 mg Nebulization TID   melatonin 3 mg Oral HS   nystatin 500,000 Units Swish & Swallow 4x Daily   polyethylene glycol 17 g Oral Daily   QUEtiapine 50 mg Oral HS   senna-docusate sodium 1 tablet Per NG Tube BID   sodium chloride 3 mL Nebulization TID   valproic acid 250 mg Per G Tube Q8H [address]      PRN Meds:  acetaminophen 650 mg Rectal Q4H PRN   albuterol 2 5 mg Nebulization Q6H PRN   guaiFENesin 200 mg Oral Q4H PRN   HYDROmorphone 0 5 mg Intravenous Q1H PRN   Labetalol HCl 10 mg Intravenous Q6H PRN SBP > 160 GIVEN X 1   Lidocaine Viscous HCl 15 mL Swish & Spit 4x Daily PRN   ondansetron 4 mg Intravenous Q6H PRN     Discharge Plan: To be determined   Inpatient Case Management following for all discharge needs    Network Utilization Review Department  [email or web address]  ATTENTION: Please call with any questions or concerns to [phone] and carefully listen to the prompts so that you are directed to the right person   All voicemails are confidential   Barbara De Leoner all requests for admission clinical reviews, approved or denied determinations and any other requests to dedicated fax number below belonging to the campus where the patient is receiving treatment   List of dedicated fax numbers for the Facilities:  [address] DENIALS (Administrative/Medical Necessity) [phone]   [address] (Maternity/NICU/Pediatrics) [phone]   Naeem Hewitt [phone]   Mary Sheets [phone]   Daina Juniper [phone]   [facility] [phone]   [facility] [address] [phone]   [facility]  [phone]   [address] [facility]  [address] [facility] [address] [facility] [phone]

## 2020-07-06 NOTE — PROGRESS NOTES
Progress Note - Critical Care   Lux Womack 71 y o  male MRN: [id number]  Unit/Bed#: [facility] 516-01 Encounter: [id number]    Assessment:   Principal Problem:    Malignant neoplasm of upper lobe of left lung (HCC)  Active Problems:    Acute respiratory failure with hypoxia (HCC)    Subcutaneous emphysema (HCC)    Acute deep vein thrombosis (DVT) of axillary vein of right upper extremity (HCC)    Acute deep vein thrombosis (DVT) of brachial vein of right upper extremity (HCC)    Tracheostomy in place [facility])    Postprocedural pneumothorax    Acute deep vein thrombosis (DVT) of calf muscle vein of left lower extremity (HCC)    Pneumonia  Resolved Problems:    Lactic acidosis    ROGER (acute kidney injury) ([facility] [address] )    Plan:   · Neuro:   · Dx:  Encephalopathy, delirium versus hypoxic brain injury status post cardiac arrest as below  · Continues to have intermittent agitation and confusion  · Frequent redirection  · Analgesia:  Tylenol, Dilaudid  · Sedation - Valium t i d   · Delirium ppx: CAM-ICU, sleep hygiene  · Hx of Seroquel, melatonin, Depacon  · CV:   · Dx:   Tachycardia and hypertension likely secondary to agitation versus delirium  · Improving  · Continue to monitor and treat underlying causes  · Dx:  Status post cardiac arrest 6/28 due to hypoxia/hypercarbia likely secondary to subcutaneous emphysema obstructing airway  · Subcutaneous emphysema resolved the continue to monitor in setting of chest tube removal  · Hemodynamic support:  None  · MAP goal > 65   · Lung:   · Dx:  Status post left upper VATS lobectomy for a section of lung cancer, complicated by subcutaneous emphysema  · Chest 2 discontinued 7/5 by thoracic surgery  · Status post cricothyrotomy for hypoxic/hypercarbic cardiac arrest, converted to from trach  · Continue trach collar  Respiratory    Lab Data (Last 4 hours)    None         O2/Vent Data (Last 4 hours)    None            ·    · Continue Respiratory Protocol and Airway Clearance Protocol  · Levalbuterol t i d     · GI:   · Dx:  No active issues  · NG tube in  · Stress ulcer ppx:  Protonix  · Bowel regimen:  Docusate, senna, MiraLax   · FEN:   · Fluids: no maintenance   · Electrolytes: trend and replete as needed  · Nutrition:  Tube feeds initiated, advanced per surgical recommendations   · :   · No acute issues  I/O last 24 hours: In: 3066 [I V :1345; NG/GT:420; IV Piggyback:150; Feedings:1151]  Out: 7341 [Urine:2375; Chest Tube:50]  · Gaytan:  No  · Monitor I&O's, BUN/Cr  · ID:   · No acute issues  · Monitor WBC/temp curve  · Heme:   · Dx: Active DVTs  · Continue Lovenox  · Active bleeding:  None  · DVT ppx: lovenox, SCDs   · Endo:   · No active issues  · Msk/Skin:   · PT/OT  · Turning/repositioning  · Wound care   · Disposition: ICU    ______________________________________________________________________    HPI/24hr events: No significant overnight events  Lines   PICC, NG, trach    Infusions  none  ______________________________________________________________________  Physical Exam:  Miles Agitation Sedation Scale (RASS): Drowsy  Physical Exam   Constitutional: No distress  HENT:   Head: Normocephalic and atraumatic  Eyes: Conjunctivae are normal    Neck: No JVD present  No tracheal deviation present  Cardiovascular: Normal rate and regular rhythm  Pulmonary/Chest: Effort normal and breath sounds normal    Abdominal: Soft  He exhibits no distension  There is no tenderness  Musculoskeletal: He exhibits no deformity  Neurological: He is alert  Disoriented, follows commands    Skin: Skin is warm and dry  Capillary refill takes less than 2 seconds  Nursing note and vitals reviewed      ______________________________________________________________________  Temperature:   Temp (24hrs), Av 3 °F (36 8 °C), Min:97 9 °F (36 6 °C), Max:98 5 °F (36 9 °C)    Current Temperature: 98 3 °F (36 8 °C)    Vitals:    20 0000 20 0101 20 0200 20 0300 BP: 116/70 105/56 104/62 97/71   BP Location: Left arm Left arm Left arm Left arm   Pulse: 100 102 100 96   Resp: (!) 25 21 21 21   Temp:    98 3 °F (36 8 °C)   TempSrc:    Axillary   SpO2: 99% 96% 96% 97%   Weight:       Height:         Arterial Line BP: 128/64       Weights:   IBW: 73 kg    Body mass index is 31 13 kg/m²  Weight (last 2 days)     Date/Time   Weight    07/05/20 0600   98 4 (216 93)            Height: 5' 10" (177 8 cm)  Hemodynamic Monitoring:  N/A       Ventilator Settings:   Vent Mode: CPAP/PS Spont              FiO2 (%): 28       No results found for: PHART, WMN0JFA, PO2ART, FPP6PVE, D0UDHKVB, BEART, SOURCE    Intake and Outputs:  I/O       07/04 0701 - 07/05 0700 07/05 0701 - 07/06 0700    P  O  0 0    I V  (mL/kg) 1960 (19 9) 285 (2 9)    NG/GT 90 200    IV Piggyback  150    Feedings 314 553    Total Intake(mL/kg) 2364 (24) 1188 (12 1)    Urine (mL/kg/hr) 4600 (1 9) 975 (0 6)    Emesis/NG output 0 0    Stool 0     Chest Tube 50     Total Output 4650 975    Net -2286 +213          Unmeasured Urine Occurrence  1 x    Unmeasured Stool Occurrence 3 x           Nutrition:        Diet Orders   (From admission, onward)             Start     Ordered    07/05/20 0847  Diet Enteral/Parenteral; Tube Feeding No Oral Diet; Jevity 1 5; Continuous; 55; Prosource Protein Liquid - Two Packets; 100; Water; Every 4 hours  Diet effective now     Question Answer Comment   Diet Type Enteral/Parenteral    Enteral/Parenteral Tube Feeding No Oral Diet    Tube Feeding Formula: Jevity 1 5    Bolus/Cyclic/Continuous Continuous    Tube Feeding Goal Rate (mL/hr): 55    Prosource Protein Liquid - No Carb Prosource Protein Liquid - Two Packets    Tube Feeding water flush (mL): 100    Water Flush type: Water    Water flush frequency: Every 4 hours    RD to adjust diet per protocol?  Yes        07/05/20 0849                Labs:   Results from last 7 days   Lab Units 07/05/20  0448 07/04/20  0451 07/03/20  0438   WBC Thousand/uL 7 48 7 14 7 27   HEMOGLOBIN g/dL 9 9* 10 2* 8 9*   HEMATOCRIT % 31 4* 32 2* 28 0*   PLATELETS Thousands/uL 467* 409* 375   NEUTROS PCT % 65 67 72   MONOS PCT % 9 9 8      Results from last 7 days   Lab Units 07/05/20  1503 07/05/20  0448 07/04/20  0451 07/03/20  0438   POTASSIUM mmol/L 4 1 3 8 3 9 3 6   CHLORIDE mmol/L 109* 108 107 106   CO2 mmol/L 28 27 29 27   BUN mg/dL 6 6 5 8   CREATININE mg/dL 0 58* 0 56* 0 49* 0 52*   CALCIUM mg/dL 8 8 8 8 8 6 8 4   ALK PHOS U/L  --   --   --  97   ALT U/L  --   --   --  29   AST U/L  --   --   --  20     Results from last 7 days   Lab Units 07/05/20  0448 07/03/20  0438 07/02/20  0419   MAGNESIUM mg/dL 2 2 2 2 2 4     Results from last 7 days   Lab Units 07/05/20  0448 07/03/20  0438 07/02/20  0419   PHOSPHORUS mg/dL 4 5* 3 3 3 1          Results from last 7 days   Lab Units 07/01/20  0047   LACTIC ACID mmol/L 0 7     0   Lab Value Date/Time    TROPONINI 2 75 (H) 06/20/2020 1759    TROPONINI 3 00 (H) 06/20/2020 1319    TROPONINI 2 86 (H) 06/20/2020 0928    TROPONINI 0 85 (H) 06/20/2020 0647    TROPONINI <0 02 03/28/2020 0451    TROPONINI <0 02 03/28/2020 0031    TROPONINI <0 02 03/27/2020 2026     Imaging:  I have personally reviewed pertinent reports  EKG:   Micro:  Blood Culture:   Lab Results   Component Value Date    BLOODCX No Growth After 5 Days  06/29/2020    BLOODCX No Growth After 5 Days  06/29/2020    BLOODCX No Growth After 5 Days  06/20/2020    BLOODCX No Growth After 5 Days  06/20/2020    BLOODCX No Growth After 5 Days  03/27/2020    BLOODCX No Growth After 5 Days  03/27/2020     Urine Culture: No results found for: URINECX  Sputum Culture: No components found for: SPUTUMCX  Wound Culure: No results found for: WOUNDCULT    Lab Results   Component Value Date    BLOODCX No Growth After 5 Days  06/29/2020    BLOODCX No Growth After 5 Days  06/29/2020    BLOODCX No Growth After 5 Days  06/20/2020    BLOODCX No Growth After 5 Days   06/20/2020 SPUTUMCULTUR 4+ Growth of  06/29/2020     Allergies: No Known Allergies  Medications:   Scheduled Meds:    Current Facility-Administered Medications:  acetaminophen 650 mg Rectal Q4H PRN Burno Kennedy PA-C    albuterol 2 5 mg Nebulization Q6H PRN Bruno Kennedy PA-C    chlorhexidine 15 mL Swish & Spit Q12H [facility] Bruno Kennedy PA-C    diazepam 10 mg Intravenous TID Bruno Kennedy PA-C    enoxaparin 1 mg/kg Subcutaneous Q12H [facility] Bruno Kennedy PA-C    HYDROmorphone 0 5 mg Intravenous Q1H PRN Bruno Kennedy PA-C    HYDROmorphone 1 mg Intravenous Q4H Bruno Kennedy PA-C    Labetalol HCl 10 mg Intravenous Q6H PRN Curtis Roberto DO    levalbuterol 1 25 mg Nebulization TID Bruno Kennedy PA-C    Lidocaine Viscous HCl 15 mL Swish & Spit 4x Daily PRN Bruno Kennedy PA-C    melatonin 3 mg Oral HS Bruno Kennedy PA-C    nystatin 500,000 Units Swish & Swallow 4x Daily Bruno Kennedy PA-C    ondansetron 4 mg Intravenous Q6H PRN Bruno Kennedy PA-C    polyethylene glycol 17 g Oral Daily Bruno Kennedy PA-C    QUEtiapine 50 mg Oral HS Bruno Kennedy PA-C    senna-docusate sodium 1 tablet Per NG Tube BID Bruno Kennedy PA-C    sodium chloride 3 mL Nebulization TID Bruno Kennedy PA-C    valproate sodium 250 mg Intravenous Q8H [facility] Bruno Kennedy PA-C Last Rate: 250 mg (07/05/20 2111)     Continuous Infusions:   PRN Meds:    acetaminophen 650 mg Q4H PRN   albuterol 2 5 mg Q6H PRN   HYDROmorphone 0 5 mg Q1H PRN   Labetalol HCl 10 mg Q6H PRN   Lidocaine Viscous HCl 15 mL 4x Daily PRN   ondansetron 4 mg Q6H PRN     VTE Pharmacologic Prophylaxis: Enoxaparin (Lovenox)  VTE Mechanical Prophylaxis: sequential compression device  Invasive lines and devices:   Invasive Devices     Peripherally Inserted Central Catheter Line            PICC Line 81/92/29 Left Basilic 9 days          Drain            NG/OG/Enteral Tube Nasogastric Right nares 6 days    External Urinary Catheter Medium less than 1 day          Airway Surgical Airway Shiley Cuffed 13 days                   Code Status: Level 2 - DNAR: but accepts endotracheal intubation    Portions of the record may have been created with voice recognition software  Occasional wrong word or "sound a like" substitutions may have occurred due to the inherent limitations of voice recognition software  Read the chart carefully and recognize, using context, where substitutions have occurred      Bry Hernandez DO 10/30:  Na 134 <L>   K 3.3 <L>   glu 142 <H>

## 2020-07-06 NOTE — PROGRESS NOTES
Progress note - Palliative and Supportive Care   Shelbi Puckett 71 y o  male [id number]    Assessment:    -   Patient Active Problem List   Diagnosis    Community acquired pneumonia    Abnormal computed tomography angiography (CTA)    Alcohol abuse    Malignant neoplasm of upper lobe of left lung (HCC)    Tobacco abuse    Acute respiratory failure with hypoxia (HCC)    Subcutaneous emphysema (HCC)    Acute deep vein thrombosis (DVT) of axillary vein of right upper extremity (HCC)    Acute deep vein thrombosis (DVT) of brachial vein of right upper extremity (HCC)    Tracheostomy in place ([address]    Postprocedural pneumothorax    Acute deep vein thrombosis (DVT) of calf muscle vein of left lower extremity ([address] )    Pneumonia         Plan:  1  Symptom management:    - Agree with wean down of medications per ICU   - Will also start weaning down opioids    Change ATC IV Dilaudid 1 mg q4H to 0 8 mg    Continue PRN Dilaudid   - Valium titrated down    - Continue Depakote     2  Goals of care: disease directed, level 2    3  Psychosocial support: NA      Interval history:       Patient has been weaned from ventilator  Currently not following commands       MEDICATIONS / ALLERGIES:     all current active meds have been reviewed and current meds:   Current Facility-Administered Medications   Medication Dose Route Frequency    acetaminophen (TYLENOL) rectal suppository 650 mg  650 mg Rectal Q4H PRN    albuterol inhalation solution 2 5 mg  2 5 mg Nebulization Q6H PRN    chlorhexidine (PERIDEX) 0 12 % oral rinse 15 mL  15 mL Swish & Spit Q12H [address]    diazepam (VALIUM) tablet 5 mg  5 mg Oral Q8H [address]    enoxaparin (LOVENOX) subcutaneous injection 100 mg  1 mg/kg Subcutaneous Q12H [address]    HYDROmorphone (DILAUDID) injection 0 5 mg  0 5 mg Intravenous Q1H PRN    HYDROmorphone (DILAUDID) injection 1 mg  1 mg Intravenous Q4H    Labetalol HCl (NORMODYNE) injection 10 mg  10 mg Intravenous Q6H PRN    levalbuterol [facility]) inhalation solution 1 25 mg  1 25 mg Nebulization TID    Lidocaine Viscous HCl (XYLOCAINE) 2 % mucosal solution 15 mL  15 mL Swish & Spit 4x Daily PRN    melatonin tablet 3 mg  3 mg Oral HS    nystatin (MYCOSTATIN) oral suspension 500,000 Units  500,000 Units Swish & Swallow 4x Daily    ondansetron (ZOFRAN) injection 4 mg  4 mg Intravenous Q6H PRN    polyethylene glycol (MIRALAX) packet 17 g  17 g Oral Daily    QUEtiapine (SEROquel) tablet 50 mg  50 mg Oral HS    senna-docusate sodium (SENOKOT S) 8 6-50 mg per tablet 1 tablet  1 tablet Per NG Tube BID    sodium chloride 0 9 % inhalation solution 3 mL  3 mL Nebulization TID    valproic acid (DEPAKENE) 250 MG/5ML oral soln 250 mg  250 mg Per G Tube Q8H [address]       No Known Allergies    OBJECTIVE:    Physical Exam  Physical Exam   Constitutional: No distress  HENT:   Head: Normocephalic and atraumatic  Right Ear: External ear normal    Left Ear: External ear normal    Nose: Nose normal    Eyes: EOM are normal  Right eye exhibits no discharge  Left eye exhibits no discharge  No scleral icterus  Neck:   Trach collar   Cardiovascular: Normal rate, regular rhythm and intact distal pulses  Pulmonary/Chest: Effort normal and breath sounds normal  No respiratory distress  Abdominal: Soft  Bowel sounds are normal  He exhibits no distension  Musculoskeletal: He exhibits no edema  Skin: Skin is warm and dry  There is pallor  Nursing note and vitals reviewed  Lab Results:   I have personally reviewed pertinent labs  , CBC:   Lab Results   Component Value Date    WBC 7 43 07/06/2020    HGB 10 3 (L) 07/06/2020    HCT 32 4 (L) 07/06/2020    MCV 98 07/06/2020     (H) 07/06/2020    MCH 31 2 07/06/2020    MCHC 31 8 07/06/2020    RDW 14 5 07/06/2020    MPV 10 0 07/06/2020    NRBC 0 07/06/2020   , CMP:   Lab Results   Component Value Date    SODIUM 140 07/06/2020    K 4 0 07/06/2020     07/06/2020    CO2 27 07/06/2020    BUN 8 07/06/2020 CREATININE 0 61 07/06/2020    CALCIUM 8 3 07/06/2020    EGFR 102 07/06/2020   , PT/PTT:No results found for: PT, PTT  Imaging Studies: reviewed  EKG, Pathology, and Other Studies: reviewed    Counseling / Coordination of Care    Total floor / unit time spent today 25+ minutes  Greater than 50% of total time was spent with the patient and / or family counseling and / or coordination of care  A description of the counseling / coordination of care: chart review, medication review with changes

## 2020-07-06 NOTE — PROGRESS NOTES
Progress Note - Thoracic Surgery   Kika Frias 71 y o  male MRN: [id number]  Unit/Bed#: [facility] 516-01 Encounter: [id number]    Assessment:  71 M s/p VATS LIVIER lobectomy and prolonged air leak, c/b cardiac arrest, cricothyroidotomy s/p tracheostomy revision    Plan:  Continue trach collar  Tube feeds at goal  Aggressive pulmonary toilet  Monitor neurological status, wean sedation  VAC incisional sutures removed  Continue ICU care    Subjective/Objective     Subjective: No acute events overnight  Tolerating trach collar well, though with significant amount of secretions  Tolerating tube feeds  Still following only simple commands  Objective:    Blood pressure 97/71, pulse 96, temperature 98 3 °F (36 8 °C), temperature source Axillary, resp  rate 21, height 5' 10" (1 778 m), weight 98 4 kg (216 lb 14 9 oz), SpO2 97 %  ,Body mass index is 31 13 kg/m²        Intake/Output Summary (Last 24 hours) at 7/6/2020 0630  Last data filed at 7/6/2020 0200  Gross per 24 hour   Intake 1712 ml   Output 1325 ml   Net 387 ml       Invasive Devices     Peripherally Inserted Central Catheter Line            PICC Line 41/94/79 Left Basilic 9 days          Drain            NG/OG/Enteral Tube Nasogastric Right nares 7 days    External Urinary Catheter Medium less than 1 day          Airway            Surgical Airway Shiley Cuffed 13 days                Physical Exam:   General: NAD, resting comfortably  ENT: moist mucous membranes  Trach in place  Neck: supple  CV: RRR +S1/S2  Chest: breath sounds bilaterally, incisions c/d/i  Abdomen: soft, NT ND  Extremities: atraumatic, no edema      Results from last 7 days   Lab Units 07/06/20  0544 07/05/20  0448 07/04/20  0451   WBC Thousand/uL 7 43 7 48 7 14   HEMOGLOBIN g/dL 10 3* 9 9* 10 2*   HEMATOCRIT % 32 4* 31 4* 32 2*   PLATELETS Thousands/uL 506* 467* 409*     Results from last 7 days   Lab Units 07/05/20  1503 07/05/20  0448 07/04/20  0451   POTASSIUM mmol/L 4 1 3 8 3 9   CHLORIDE mmol/L 109* 108 107   CO2 mmol/L 28 27 29   BUN mg/dL 6 6 5   CREATININE mg/dL 0 58* 0 56* 0 49*   CALCIUM mg/dL 8 8 8 8 8 6

## 2020-07-06 NOTE — UTILIZATION REVIEW
Continued Stay Review     Date: 7/6/20 Monday                    POD # 26     Current Patient Class:  Inpatient  Current Level of Care: Med Surg     HPI: 71year old male initially admitted inpatient on 6/10/20 after VATS, upper lobectomy, mediastinal lymph node dissection, mediastinoscopy, and flexible Adrianna Donning had an air leak with subQ emphysema throughout upper torso, neck, face, and L arm  Chest tube was in place to water seal  Needed decompressive incision on 6/15, VAC placed, still has air leak  CXR showed extensive SQ emphysema  On 6/20/20 SOB with SpO2 in 70's and progressed to bradycardia and code event requiring emergency trach  Moved to ICU with a line and central line, levo/epi gtts for bp maintenance  Cric transitioned to trach with trach collar trials started on 6/24/20     Current Assessment/Plan:  continues to make slow but steady progress  remains on trach collar for the past 3-4 days without the need for a ventilator   Mental status remains poor  Unable to follow commands for me this AM or open his eyes to his name   Chest tube was removed yesterday without incident   Post-pull CXR reviewed and no ptx is present   Tolerating tube feeds at 55 cc/hr  VAC incisional sutures removed  Continue ICU care    Pertinent Labs/Diagnostic Results:     Results from last 7 days   Lab Units 07/06/20  0544 07/05/20  0448 07/04/20  0451 07/03/20  0438 07/02/20  0509   WBC Thousand/uL 7 43 7 48 7 14 7 27 7 29   HEMOGLOBIN g/dL 10 3* 9 9* 10 2* 8 9* 8 3*   HEMATOCRIT % 32 4* 31 4* 32 2* 28 0* 27 0*   PLATELETS Thousands/uL 506* 467* 409* 375 307   NEUTROS ABS Thousands/µL 4 50 4 92 4 78 5 20 5 04     Results from last 7 days   Lab Units 07/06/20  0544 07/05/20  1503 07/05/20  0448 07/04/20  0451 07/03/20  0438  07/02/20  0419 07/01/20  0441   SODIUM mmol/L 140 141 141 140 139   < > 138 141   POTASSIUM mmol/L 4 0 4 1 3 8 3 9 3 6   < > 3 9 3 7   CHLORIDE mmol/L 107 109* 108 107 106   < > 103 105   CO2 mmol/L 27 28 27 29 27   < > 30 32   ANION GAP mmol/L 6 4 6 4 6   < > 5 4   BUN mg/dL 8 6 6 5 8   < > 10 11   CREATININE mg/dL 0 61 0 58* 0 56* 0 49* 0 52*   < > 0 56* 0 62   EGFR ml/min/1 73sq m 102 104 106 111 109   < > 106 101   CALCIUM mg/dL 8 3 8 8 8 8 8 6 8 4   < > 7 8* 8 2*   CALCIUM, IONIZED mmol/L  --   --   --   --   --   --  1 05* 1 07*   MAGNESIUM mg/dL  --   --  2 2  --  2 2  --  2 4 2 3   PHOSPHORUS mg/dL  --   --  4 5*  --  3 3  --  3 1 3 5     Results from last 7 days   Lab Units 07/03/20  0438   AST U/L 20   ALT U/L 29   ALK PHOS U/L 97   TOTAL PROTEIN g/dL 5 6*   ALBUMIN g/dL 1 8*   TOTAL BILIRUBIN mg/dL 0 42     Results from last 7 days   Lab Units 07/06/20  0544 07/05/20  1503 07/05/20  0448 07/04/20  0451 07/03/20  0438 07/02/20  1950/20  0419 07/01/20  0441 07/01/20  0047 06/30/20  0408   GLUCOSE RANDOM mg/dL 97 103 101 95 85 90 84 80 83 89     Results from last 7 days   Lab Units 07/01/20  0139   PH ART  7 498*   PCO2 ART mm Hg 37 2   PO2 ART mm Hg 81 0   HCO3 ART mmol/L 28 2*   BASE EXC ART mmol/L 4 7   O2 CONTENT ART mL/dL 10 6*   O2 HGB, ARTERIAL % 94 6   ABG SOURCE  Radial, Left     Results from last 7 days   Lab Units 07/01/20  0606 06/30/20  1142   PROCALCITONIN ng/ml 0 18 0 29*     Results from last 7 days   Lab Units 07/01/20  0047   LACTIC ACID mmol/L 0 7         Vital Signs:   Blood pressure 97/71, pulse 96, temperature 98 3 °F (36 8 °C), temperature source Axillary, resp  rate 21, height 5' 10" (1 778 m), weight 98 4 kg (216 lb 14 9 oz), SpO2 97 %  ,Body mass index is 31 13 kg/m²       Intake/Output Summary (Last 24 hours) at 7/6/2020 0630:  Gross per 24 hour   Intake 1712 ml   Output 1325 ml   Net 387 ml     Diet Enteral/Parenteral; Tube Feeding No Oral Diet; Jevity 1 5; Continuous; 55; Prosource Protein Liquid - Two Packets; 100;  Water; Every 4 hours     Scheduled Medications:  chlorhexidine 15 mL Swish & Spit Q12H [address]   diazepam 5 mg Oral Q8H DEAN   enoxaparin 1 mg/kg Subcutaneous Q12H [address] HYDROmorphone 0 8 mg Intravenous Q4H   levalbuterol 1 25 mg Nebulization TID   melatonin 3 mg Oral HS   nystatin 500,000 Units Swish & Swallow 4x Daily   polyethylene glycol 17 g Oral Daily   QUEtiapine 50 mg Oral HS   senna-docusate sodium 1 tablet Per NG Tube BID   sodium chloride 3 mL Nebulization TID   valproic acid 250 mg Per G Tube Q8H [address]      PRN Meds:  acetaminophen 650 mg Rectal Q4H PRN   albuterol 2 5 mg Nebulization Q6H PRN   guaiFENesin 200 mg Oral Q4H PRN   HYDROmorphone 0 5 mg Intravenous Q1H PRN   Labetalol HCl 10 mg Intravenous Q6H PRN SBP > 160 GIVEN X 1   Lidocaine Viscous HCl 15 mL Swish & Spit 4x Daily PRN   ondansetron 4 mg Intravenous Q6H PRN     Discharge Plan: To be determined   Inpatient Case Management following for all discharge needs    Network Utilization Review Department  [email or web address]  ATTENTION: Please call with any questions or concerns to [phone] and carefully listen to the prompts so that you are directed to the right person  All voicemails are confidential   Bety Mendoza all requests for admission clinical reviews, approved or denied determinations and any other requests to dedicated fax number below belonging to the campus where the patient is receiving treatment   List of dedicated fax numbers for the Facilities:  [address] DENIALS (Administrative/Medical Necessity) [phone]   [address] [place] [address] (Maternity/NICU/Pediatrics) [phone]   Mario Carolina [phone]   Byron Ashraf [phone]   Lucia Wooten [phone]   Jerrol Balloon [phone]   1205 [facility] 1525 [facility] [phone]   [facility]  [phone]   220[address] [facility] [address]  2401 [facility] 1000 [address] [facility] [phone]

## 2020-07-07 LAB
ANION GAP SERPL CALCULATED.3IONS-SCNC: 4 MMOL/L (ref 4–13)
BASOPHILS # BLD AUTO: 0.06 THOUSANDS/ΜL (ref 0–0.1)
BASOPHILS NFR BLD AUTO: 1 % (ref 0–1)
BUN SERPL-MCNC: 11 MG/DL (ref 5–25)
CALCIUM SERPL-MCNC: 8.6 MG/DL (ref 8.3–10.1)
CHLORIDE SERPL-SCNC: 105 MMOL/L (ref 100–108)
CO2 SERPL-SCNC: 30 MMOL/L (ref 21–32)
CREAT SERPL-MCNC: 0.63 MG/DL (ref 0.6–1.3)
EOSINOPHIL # BLD AUTO: 0.32 THOUSAND/ΜL (ref 0–0.61)
EOSINOPHIL NFR BLD AUTO: 5 % (ref 0–6)
ERYTHROCYTE [DISTWIDTH] IN BLOOD BY AUTOMATED COUNT: 14.6 % (ref 11.6–15.1)
GFR SERPL CREATININE-BSD FRML MDRD: 101 ML/MIN/1.73SQ M
GLUCOSE SERPL-MCNC: 90 MG/DL (ref 65–140)
HCT VFR BLD AUTO: 32.1 % (ref 36.5–49.3)
HGB BLD-MCNC: 9.9 G/DL (ref 12–17)
IMM GRANULOCYTES # BLD AUTO: 0.15 THOUSAND/UL (ref 0–0.2)
IMM GRANULOCYTES NFR BLD AUTO: 2 % (ref 0–2)
LYMPHOCYTES # BLD AUTO: 1.76 THOUSANDS/ΜL (ref 0.6–4.47)
LYMPHOCYTES NFR BLD AUTO: 25 % (ref 14–44)
MAGNESIUM SERPL-MCNC: 2.3 MG/DL (ref 1.6–2.6)
MCH RBC QN AUTO: 30.6 PG (ref 26.8–34.3)
MCHC RBC AUTO-ENTMCNC: 30.8 G/DL (ref 31.4–37.4)
MCV RBC AUTO: 99 FL (ref 82–98)
MONOCYTES # BLD AUTO: 0.89 THOUSAND/ΜL (ref 0.17–1.22)
MONOCYTES NFR BLD AUTO: 13 % (ref 4–12)
NEUTROPHILS # BLD AUTO: 3.77 THOUSANDS/ΜL (ref 1.85–7.62)
NEUTS SEG NFR BLD AUTO: 54 % (ref 43–75)
NRBC BLD AUTO-RTO: 0 /100 WBCS
PHOSPHATE SERPL-MCNC: 5 MG/DL (ref 2.3–4.1)
PLATELET # BLD AUTO: 505 THOUSANDS/UL (ref 149–390)
PMV BLD AUTO: 10.4 FL (ref 8.9–12.7)
POTASSIUM SERPL-SCNC: 4.2 MMOL/L (ref 3.5–5.3)
RBC # BLD AUTO: 3.24 MILLION/UL (ref 3.88–5.62)
SODIUM SERPL-SCNC: 139 MMOL/L (ref 136–145)
WBC # BLD AUTO: 6.95 THOUSAND/UL (ref 4.31–10.16)

## 2020-07-07 PROCEDURE — 80048 BASIC METABOLIC PNL TOTAL CA: CPT | Performed by: PHYSICIAN ASSISTANT

## 2020-07-07 PROCEDURE — 99024 POSTOP FOLLOW-UP VISIT: CPT | Performed by: THORACIC SURGERY (CARDIOTHORACIC VASCULAR SURGERY)

## 2020-07-07 PROCEDURE — 94640 AIRWAY INHALATION TREATMENT: CPT

## 2020-07-07 PROCEDURE — 94760 N-INVAS EAR/PLS OXIMETRY 1: CPT

## 2020-07-07 PROCEDURE — 99232 SBSQ HOSP IP/OBS MODERATE 35: CPT | Performed by: INTERNAL MEDICINE

## 2020-07-07 PROCEDURE — 84100 ASSAY OF PHOSPHORUS: CPT | Performed by: PHYSICIAN ASSISTANT

## 2020-07-07 PROCEDURE — 85025 COMPLETE CBC W/AUTO DIFF WBC: CPT | Performed by: PHYSICIAN ASSISTANT

## 2020-07-07 PROCEDURE — 83735 ASSAY OF MAGNESIUM: CPT | Performed by: PHYSICIAN ASSISTANT

## 2020-07-07 PROCEDURE — 99291 CRITICAL CARE FIRST HOUR: CPT | Performed by: EMERGENCY MEDICINE

## 2020-07-07 RX ORDER — HYDROMORPHONE HCL/PF 1 MG/ML
0.5 SYRINGE (ML) INJECTION EVERY 6 HOURS
Status: DISCONTINUED | OUTPATIENT
Start: 2020-07-07 | End: 2020-07-08

## 2020-07-07 RX ORDER — DIAZEPAM 5 MG/1
2.5 TABLET ORAL EVERY 8 HOURS SCHEDULED
Status: DISCONTINUED | OUTPATIENT
Start: 2020-07-07 | End: 2020-07-08

## 2020-07-07 RX ADMIN — HYDROMORPHONE HYDROCHLORIDE 0.5 MG: 1 INJECTION, SOLUTION INTRAMUSCULAR; INTRAVENOUS; SUBCUTANEOUS at 19:56

## 2020-07-07 RX ADMIN — ENOXAPARIN SODIUM 100 MG: 100 INJECTION SUBCUTANEOUS at 22:00

## 2020-07-07 RX ADMIN — VALPROIC ACID 250 MG: 500 SOLUTION ORAL at 06:33

## 2020-07-07 RX ADMIN — HYDROMORPHONE HYDROCHLORIDE 0.8 MG: 1 INJECTION, SOLUTION INTRAMUSCULAR; INTRAVENOUS; SUBCUTANEOUS at 04:00

## 2020-07-07 RX ADMIN — NYSTATIN 500000 UNITS: 100000 SUSPENSION ORAL at 23:00

## 2020-07-07 RX ADMIN — HYDROMORPHONE HYDROCHLORIDE 0.8 MG: 1 INJECTION, SOLUTION INTRAMUSCULAR; INTRAVENOUS; SUBCUTANEOUS at 06:32

## 2020-07-07 RX ADMIN — SENNOSIDES AND DOCUSATE SODIUM 1 TABLET: 8.6; 5 TABLET ORAL at 09:24

## 2020-07-07 RX ADMIN — HYDROMORPHONE HYDROCHLORIDE 0.8 MG: 1 INJECTION, SOLUTION INTRAMUSCULAR; INTRAVENOUS; SUBCUTANEOUS at 00:00

## 2020-07-07 RX ADMIN — ISODIUM CHLORIDE 3 ML: 0.03 SOLUTION RESPIRATORY (INHALATION) at 13:13

## 2020-07-07 RX ADMIN — VALPROIC ACID 250 MG: 500 SOLUTION ORAL at 14:13

## 2020-07-07 RX ADMIN — LEVALBUTEROL HYDROCHLORIDE 1.25 MG: 1.25 SOLUTION, CONCENTRATE RESPIRATORY (INHALATION) at 13:13

## 2020-07-07 RX ADMIN — CHLORHEXIDINE GLUCONATE 0.12% ORAL RINSE 15 ML: 1.2 LIQUID ORAL at 22:00

## 2020-07-07 RX ADMIN — ENOXAPARIN SODIUM 100 MG: 100 INJECTION SUBCUTANEOUS at 09:24

## 2020-07-07 RX ADMIN — ISODIUM CHLORIDE 3 ML: 0.03 SOLUTION RESPIRATORY (INHALATION) at 07:16

## 2020-07-07 RX ADMIN — LEVALBUTEROL HYDROCHLORIDE 1.25 MG: 1.25 SOLUTION, CONCENTRATE RESPIRATORY (INHALATION) at 07:16

## 2020-07-07 RX ADMIN — SENNOSIDES AND DOCUSATE SODIUM 1 TABLET: 8.6; 5 TABLET ORAL at 18:07

## 2020-07-07 RX ADMIN — LEVALBUTEROL HYDROCHLORIDE 1.25 MG: 1.25 SOLUTION, CONCENTRATE RESPIRATORY (INHALATION) at 19:31

## 2020-07-07 RX ADMIN — HYDROMORPHONE HYDROCHLORIDE 0.8 MG: 1 INJECTION, SOLUTION INTRAMUSCULAR; INTRAVENOUS; SUBCUTANEOUS at 11:59

## 2020-07-07 RX ADMIN — DIAZEPAM 2.5 MG: 5 TABLET ORAL at 14:10

## 2020-07-07 RX ADMIN — CHLORHEXIDINE GLUCONATE 0.12% ORAL RINSE 15 ML: 1.2 LIQUID ORAL at 09:24

## 2020-07-07 RX ADMIN — VALPROIC ACID 250 MG: 500 SOLUTION ORAL at 23:00

## 2020-07-07 RX ADMIN — QUETIAPINE FUMARATE 50 MG: 25 TABLET ORAL at 23:00

## 2020-07-07 RX ADMIN — DIAZEPAM 2.5 MG: 5 TABLET ORAL at 23:04

## 2020-07-07 RX ADMIN — MELATONIN 3 MG: at 23:00

## 2020-07-07 RX ADMIN — NYSTATIN 500000 UNITS: 100000 SUSPENSION ORAL at 09:36

## 2020-07-07 RX ADMIN — DIAZEPAM 5 MG: 5 TABLET ORAL at 06:32

## 2020-07-07 RX ADMIN — ISODIUM CHLORIDE 3 ML: 0.03 SOLUTION RESPIRATORY (INHALATION) at 19:31

## 2020-07-07 NOTE — OCCUPATIONAL THERAPY NOTE
Occupational Therapy Cancellation Note    Orders received and chart reviewed  OT spoke to [employer], per nursing, Pt continues to present w/ decreased alertness, unable to follow simple 1-step commands, and is not appropriate to participate in therapy  Will D/C OT  Please re-consult when Pt is medically appropriate         Zully Garcia MS, OTR/L

## 2020-07-07 NOTE — SOCIAL WORK
Informed by [facility] that the patient may be ready for discharge the end of the week  Called daughter, Austin Zelaya, [phone], who is out of town until Thursday  She expressed concern with potential discharge of patient since he has been unable to participate with therapies and she feels his medications are keeping him too sedated  Will ask Dr Jose Luis Choudhury, [facility], to contact daughter to discuss  Discussed with daughter criteria for LTAC and at this time referral to [address] [place] [address] is only referral to which she consents  [facility] referral was made

## 2020-07-07 NOTE — PHYSICAL THERAPY NOTE
Physical Therapy Cancellation Note    PT chart reviewed  OT spoke with RN who reports that the patient presents with reduced alertness, and is unable to follow simple commands  Pt is not appropriate for PT at this time  Will D/C from PT due to multiple CX  Please re-consult if medically appropriate     Sonali Favorite, Pt, DPT

## 2020-07-07 NOTE — PROGRESS NOTES
Progress Note - Thoracic Surgery   Shannon Collier 71 y o  male MRN: [id number]  Unit/Bed#: [facility] 516-01 Encounter: [id number]    Assessment:  71 M s/p VATS Left upper lobectomy and prolonged air leak, c/b cardiac arrest, cricothyroidotomy s/p tracheostomy revision    Plan:  Wean supplemental O2  Continue tube feeds at goal  Pulmonary toilet  Monitor mental status  Will discuss PEG vs keofed if no improvement in mental status  Continue ICU care    Subjective/Objective     Subjective: No acute events overnight  Objective:    Blood pressure 142/74, pulse 92, temperature 98 3 °F (36 8 °C), temperature source Oral, resp  rate 13, height 5' 10" (1 778 m), weight 94 5 kg (208 lb 5 4 oz), SpO2 100 %  ,Body mass index is 29 89 kg/m²        Intake/Output Summary (Last 24 hours) at 7/7/2020 0641  Last data filed at 7/7/2020 0400  Gross per 24 hour   Intake 1669 ml   Output 1700 ml   Net -31 ml       Invasive Devices     Peripherally Inserted Central Catheter Line            PICC Line 88/35/46 Left Basilic 10 days          Drain            NG/OG/Enteral Tube Nasogastric Right nares 8 days    External Urinary Catheter Medium 1 day          Airway            Surgical Airway Shiley Cuffed 14 days                Physical Exam:   General: NAD, resting comfortably  Eyes: PERRL  ENT: moist mucous membranes  Neck: supple  CV: RRR +S1/S2  Chest: breath sounds bilaterally, incisions c/d/i  Abdomen: soft, NT ND  Extremities: atraumatic, no edema      Results from last 7 days   Lab Units 07/07/20  0454 07/06/20  0544 07/05/20  0448   WBC Thousand/uL 6 95 7 43 7 48   HEMOGLOBIN g/dL 9 9* 10 3* 9 9*   HEMATOCRIT % 32 1* 32 4* 31 4*   PLATELETS Thousands/uL 505* 506* 467*     Results from last 7 days   Lab Units 07/07/20  0454 07/06/20  0544 07/05/20  1503   POTASSIUM mmol/L 4 2 4 0 4 1   CHLORIDE mmol/L 105 107 109*   CO2 mmol/L 30 27 28   BUN mg/dL 11 8 6   CREATININE mg/dL 0 63 0 61 0 58*   CALCIUM mg/dL 8 6 8 3 8 8

## 2020-07-07 NOTE — PROGRESS NOTES
Progress note - Palliative and Supportive Care   Hui Larson 71 y o  male [id number]    Assessment:    -   Patient Active Problem List   Diagnosis    Community acquired pneumonia    Abnormal computed tomography angiography (CTA)    Alcohol abuse    Malignant neoplasm of upper lobe of left lung (HCC)    Tobacco abuse    Acute respiratory failure with hypoxia (HCC)    Subcutaneous emphysema (HCC)    Acute deep vein thrombosis (DVT) of axillary vein of right upper extremity (HCC)    Acute deep vein thrombosis (DVT) of brachial vein of right upper extremity (HCC)    [facility] [address]    Postprocedural pneumothorax    Acute deep vein thrombosis (DVT) of calf muscle vein of left lower extremity ([facility] [address] )    Pneumonia         Plan:  1  Symptom management:    - Continue to aggressively wean opioids    Change ATC IV Dilaudid to 0 5 mg q6H    Continue PRN Dilaudid 0 5 mg q1H   - Valium titrated down - likely transition to PRN tomorrow   - Continue Depakote    - Continue Seroquel   - Continue melatonin    2  Goals of care: disease directed, level 2    3  Psychosocial support: Phone call placed to daughter- MALIK left  Interval history:      Continues to have intermittent agitation  Discussed with team during SICU rounds       MEDICATIONS / ALLERGIES:     all current active meds have been reviewed and current meds:   Current Facility-Administered Medications   Medication Dose Route Frequency    acetaminophen (TYLENOL) rectal suppository 650 mg  650 mg Rectal Q4H PRN    albuterol inhalation solution 2 5 mg  2 5 mg Nebulization Q6H PRN    chlorhexidine (PERIDEX) 0 12 % oral rinse 15 mL  15 mL Swish & Spit Q12H [address]    diazepam (VALIUM) tablet 2 5 mg  2 5 mg Oral Q8H [address]    enoxaparin (LOVENOX) subcutaneous injection 100 mg  1 mg/kg Subcutaneous Q12H [address]    guaiFENesin (ROBITUSSIN) oral solution 200 mg  200 mg Oral Q4H PRN    HYDROmorphone (DILAUDID) injection 0 5 mg  0 5 mg Intravenous Q1H PRN  HYDROmorphone (DILAUDID) injection 0 5 mg  0 5 mg Intravenous Q6H    Labetalol HCl (NORMODYNE) injection 10 mg  10 mg Intravenous Q6H PRN    levalbuterol (XOPENEX) inhalation solution 1 25 mg  1 25 mg Nebulization TID    Lidocaine Viscous HCl (XYLOCAINE) 2 % mucosal solution 15 mL  15 mL Swish & Spit 4x Daily PRN    melatonin tablet 3 mg  3 mg Oral HS    nystatin (MYCOSTATIN) oral suspension 500,000 Units  500,000 Units Swish & Swallow 4x Daily    ondansetron (ZOFRAN) injection 4 mg  4 mg Intravenous Q6H PRN    polyethylene glycol (MIRALAX) packet 17 g  17 g Oral Daily    QUEtiapine (SEROquel) tablet 50 mg  50 mg Oral HS    senna-docusate sodium (SENOKOT S) 8 6-50 mg per tablet 1 tablet  1 tablet Per NG Tube BID    sodium chloride 0 9 % inhalation solution 3 mL  3 mL Nebulization TID    valproic acid (DEPAKENE) 250 MG/5ML oral soln 250 mg  250 mg Per G Tube Q8H [address]       No Known Allergies    OBJECTIVE:    Physical Exam  Physical Exam   Constitutional: No distress  HENT:   Head: Normocephalic and atraumatic  Right Ear: External ear normal    Left Ear: External ear normal    Nose: Nose normal    Eyes: EOM are normal  Right eye exhibits no discharge  Left eye exhibits no discharge  No scleral icterus  Neck:   Tracheostomy in place, on collar   Cardiovascular: Normal rate, regular rhythm and intact distal pulses  Pulmonary/Chest: Effort normal and breath sounds normal  No respiratory distress  Abdominal: Soft  Bowel sounds are normal  He exhibits no distension  Musculoskeletal: He exhibits no edema  Neurological: He is alert  Skin: Skin is warm and dry  There is pallor  Psychiatric:   Continues to have agitation   Nursing note and vitals reviewed  Lab Results:   I have personally reviewed pertinent labs  , CBC:   Lab Results   Component Value Date    WBC 6 95 07/07/2020    HGB 9 9 (L) 07/07/2020    HCT 32 1 (L) 07/07/2020    MCV 99 (H) 07/07/2020     (H) 07/07/2020    MCH 30 6 07/07/2020    MCHC 30 8 (L) 07/07/2020    RDW 14 6 07/07/2020    MPV 10 4 07/07/2020    NRBC 0 07/07/2020   , CMP:   Lab Results   Component Value Date    SODIUM 139 07/07/2020    K 4 2 07/07/2020     07/07/2020    CO2 30 07/07/2020    BUN 11 07/07/2020    CREATININE 0 63 07/07/2020    CALCIUM 8 6 07/07/2020    EGFR 101 07/07/2020   , PT/PTT:No results found for: PT, PTT  Imaging Studies: reviewed  EKG, Pathology, and Other Studies: reviewed    Counseling / Coordination of Care    Total floor / unit time spent today 25+ minutes  Greater than 50% of total time was spent with the patient and / or family counseling and / or coordination of care  A description of the counseling / coordination of care: chart review, medication review with changes

## 2020-07-07 NOTE — PROGRESS NOTES
Progress Note - Critical Care   Tequila Huertas 71 y o  male MRN: [id number]  Unit/Bed#: [facility] 516-01 Encounter: [id number]    Attending Physician: Fadumo Art MD    Chief Complaint:n/a    HPI/24 Hour Events: No acute events overnight      ______________________________________________________________________  Assessment and Plan:   Principal Problem:    Malignant neoplasm of upper lobe of left lung ([facility] [address] )  Active Problems:    Acute respiratory failure with hypoxia (HCC)    Subcutaneous emphysema (HCC)    Acute deep vein thrombosis (DVT) of axillary vein of right upper extremity (HCC)    Acute deep vein thrombosis (DVT) of brachial vein of right upper extremity (HCC)    Tracheostomy in place [facility])    Postprocedural pneumothorax    Acute deep vein thrombosis (DVT) of calf muscle vein of left lower extremity (HCC)    Pneumonia  Resolved Problems:    Lactic acidosis    ROGER (acute kidney injury) ([facility] [address] )    · Neuro:   ? Dx:  Encephalopathy, delirium versus hypoxic brain injury status post cardiac arrest as below  § Continues to have intermittent agitation and confusion  § Frequent redirection  ? Analgesia:  Tylenol 650 mg Q4H prn mild pain, Dilaudid 0 5mg Q1H breakthrough pain  ? Sedation - Valium decreased from 5mg to 2 5 mg Q8H  ? Delirium ppx: CAM-ICU, sleep hygiene  ? Hx of Seroquel 50 mg QHS, melatonin, Depacon 250 mg Q8H    · CV:   ? Dx:  Tachycardia and hypertension likely secondary to agitation versus delirium  § Improving  § Continue to monitor and treat underlying causes  § Labetalol 10mg Q6H prn  ?  Dx:  Status post cardiac arrest 6/28 due to hypoxia/hypercarbia likely secondary to subcutaneous emphysema obstructing airway  § Subcutaneous emphysema resolved the continue to monitor in setting of chest tube removal  ? MAP goal > 65   · Lung:   ? Dx:  Status post left upper VATS lobectomy for a section of lung cancer, complicated by subcutaneous emphysema  § Chest tube discontinued 7/5 by thoracic surgery  § Status post cricothyrotomy for hypoxic/hypercarbic cardiac arrest, converted to from trach  § Continue trach collar   § Continue Respiratory Protocol and Airway Clearance Protocol  § Levalbuterol t i d      · GI:   ? Dx:  No active issues  ? NG tube   ? Stress ulcer ppx:  Protonix  ? Bowel regimen:  Docusate, senna, MiraLax   · FEN:   ? Fluids: no maintenance   ? Electrolytes: trend and replete as needed  ? Nutrition:  Tube feeds at goal 55 mL/hr  · :   ? No acute issues  ? Monitor I&O's, BUN/Cr  ? UOP 2000 mL in 24 hrs  ? Net - 71 mL in 24 hrs   · ID:   ? No acute issues  ? Monitor WBC/temp curve  · Heme:   ? Dx:  Active DVTs  ? Continue Lovenox  ? Dx Anemia  ? Stable, Hbg 9 9 today   ? DVT ppx: lovenox, SCDs   · Endo:   ? No active issues  · Msk/Skin:   ? PT/OT  ? Turning/repositioning  ? Wound care   Disposition: ICU     Code Status: Level 2 - DNAR: but accepts endotracheal intubation    ______________________________________________________________________      Physical Exam:   Physical Exam   Constitutional: He is sleeping  HENT:   Head: Normocephalic and atraumatic  Eyes: Pupils are equal, round, and reactive to light  Conjunctivae are normal    Neck:   Trach    Cardiovascular: Regular rhythm and normal pulses  Tachycardia present  No murmur heard  Pulmonary/Chest: Effort normal  No stridor  No respiratory distress  He has no wheezes  He has no rales  satting 97% on trach collar   Abdominal: Soft  He exhibits no distension  There is no tenderness  Musculoskeletal: He exhibits no deformity     Neurological:   Sleeping comfortably    Skin: Skin is warm and dry         ______________________________________________________________________  Vitals:    20 1000 20 1100 20 1200 20 1313   BP: 166/89 152/86     Pulse: 96 100     Resp: (!) 24 15     Temp:       TempSrc:       SpO2: 96% 99% 99% 98%   Weight:       Height:           Temperature:   Temp (24hrs), Av 4 °F (36 9 °C), Min:97 9 °F (36 6 °C), Max:98 6 °F (37 °C)    Current Temperature: 97 9 °F (36 6 °C)  Weights:   IBW: 73 kg    Body mass index is 29 89 kg/m²  Weight (last 2 days)     Date/Time   Weight    07/07/20 0600   94 5 (208 33)    07/06/20 0600   94 (207 23)    07/05/20 0600   98 4 (216 93)            Hemodynamic Monitoring:  N/A     Non-Invasive/Invasive Ventilation Settings:  Respiratory    Lab Data (Last 4 hours)    None         O2/Vent Data (Last 4 hours)    None              No results found for: PHART, WSN2DAY, PO2ART, HEL3DHO, M7KTVBON, BEART, SOURCE  SpO2: SpO2: 98 %  Intake and Outputs:  I/O       07/05 0701 - 07/06 0700 07/06 0701 - 07/07 0700 07/07 0701 - 07/08 0700    P  O  0 0 0    I V  (mL/kg) 285 (3) 30 (0 3)     NG/ 800 60    IV Piggyback 150      Feedings 1046 1099 225    Total Intake(mL/kg) 1941 (20 6) 1929 (20 4) 285 (3)    Urine (mL/kg/hr) 1600 (0 7) 2000 (0 9) 875 (1 8)    Emesis/NG output 0 0     Stool 0      Chest Tube       Total Output 1600 2000 875    Net +341 -71 -590           Unmeasured Urine Occurrence 1 x      Unmeasured Stool Occurrence 1 x            Nutrition:        Diet Orders   (From admission, onward)             Start     Ordered    07/05/20 0847  Diet Enteral/Parenteral; Tube Feeding No Oral Diet; Jevity 1 5; Continuous; 55; Prosource Protein Liquid - Two Packets; 100; Water; Every 4 hours  Diet effective now     Question Answer Comment   Diet Type Enteral/Parenteral    Enteral/Parenteral Tube Feeding No Oral Diet    Tube Feeding Formula: Jevity 1 5    Bolus/Cyclic/Continuous Continuous    Tube Feeding Goal Rate (mL/hr): 55    Prosource Protein Liquid - No Carb Prosource Protein Liquid - Two Packets    Tube Feeding water flush (mL): 100    Water Flush type: Water    Water flush frequency: Every 4 hours    RD to adjust diet per protocol? Yes        07/05/20 0849              TF currently running at 55mL/hour with a goal of 55mL/hr    Formula:Jevity 1 5  Labs:   Results from last 7 days   Lab Units 07/07/20  0454 07/06/20  0544 07/05/20  0448   WBC Thousand/uL 6 95 7 43 7 48   HEMOGLOBIN g/dL 9 9* 10 3* 9 9*   HEMATOCRIT % 32 1* 32 4* 31 4*   PLATELETS Thousands/uL 505* 506* 467*   NEUTROS PCT % 54 60 65   MONOS PCT % 13* 10 9     Results from last 7 days   Lab Units 07/07/20  0454 07/06/20  0544 07/05/20  1503  07/03/20  0438   POTASSIUM mmol/L 4 2 4 0 4 1   < > 3 6   CHLORIDE mmol/L 105 107 109*   < > 106   CO2 mmol/L 30 27 28   < > 27   BUN mg/dL 11 8 6   < > 8   CREATININE mg/dL 0 63 0 61 0 58*   < > 0 52*   CALCIUM mg/dL 8 6 8 3 8 8   < > 8 4   ALK PHOS U/L  --   --   --   --  97   ALT U/L  --   --   --   --  29   AST U/L  --   --   --   --  20    < > = values in this interval not displayed  Results from last 7 days   Lab Units 07/07/20  0454 07/05/20  0448 07/03/20  0438   MAGNESIUM mg/dL 2 3 2 2 2 2     Lab Results   Component Value Date    PHOS 5 0 (H) 07/07/2020    PHOS 4 5 (H) 07/05/2020    PHOS 3 3 07/03/2020          0   Lab Value Date/Time    TROPONINI 2 75 (H) 06/20/2020 1759    TROPONINI 3 00 (H) 06/20/2020 1319    TROPONINI 2 86 (H) 06/20/2020 0928    TROPONINI 0 85 (H) 06/20/2020 0647    TROPONINI <0 02 03/28/2020 0451    TROPONINI <0 02 03/28/2020 0031    TROPONINI <0 02 03/27/2020 2026     Results from last 7 days   Lab Units 07/01/20  0047   LACTIC ACID mmol/L 0 7     ABG:  Lab Results   Component Value Date    PHART 7 498 (H) 07/01/2020    ZDZ7APL 37 2 07/01/2020    PO2ART 81 0 07/01/2020    XZX2RZE 28 2 (H) 07/01/2020    BEART 4 7 07/01/2020    SOURCE Radial, Left 07/01/2020     Imaging: n/a I have personally reviewed pertinent reports  EKG: n/a  Micro:  Lab Results   Component Value Date    BLOODCX No Growth After 5 Days  06/29/2020    BLOODCX No Growth After 5 Days  06/29/2020    BLOODCX No Growth After 5 Days  06/20/2020    BLOODCX No Growth After 5 Days   06/20/2020    SPUTUMCULTUR 4+ Growth of  06/29/2020     Allergies: No Known Allergies  Medications:   Scheduled Meds:    Current Facility-Administered Medications:  acetaminophen 650 mg Rectal Q4H PRN Joe Guadalupe PA-C   albuterol 2 5 mg Nebulization Q6H PRN Joe Guadalupe PA-C   chlorhexidine 15 mL Swish & Spit Q12H [address] Joe Guadalupe PA-C   diazepam 2 5 mg Oral [facility] Zaira Long MD   enoxaparin 1 mg/kg Subcutaneous Q12H [address] Joe Guadalupe PA-C   guaiFENesin 200 mg Oral Q4H PRN Pauly Nolen PA-C   HYDROmorphone 0 5 mg Intravenous Q1H PRN Joe Guadalupe PA-C   HYDROmorphone 0 5 mg Intravenous Q6H Margret Brooks DO   Labetalol HCl 10 mg Intravenous Q6H PRN Trudi Lan,    levalbuterol 1 25 mg Nebulization TID Joe Guadalupe PA-C   Lidocaine Viscous HCl 15 mL Swish & Spit 4x Daily PRN Joe Guadalupe PA-C   melatonin 3 mg Oral HS Joe Guadalupe PA-C   nystatin 500,000 Units Swish & Swallow 4x Daily Joe Guadalupe PA-C   ondansetron 4 mg Intravenous Q6H PRN Joe Guadalupe PA-C   polyethylene glycol 17 g Oral Daily Joe Guadalupe PA-C   QUEtiapine 50 mg Oral HS Joe Guadalupe PA-C   senna-docusate sodium 1 tablet Per NG Tube BID Joe Guadalupe PA-C   sodium chloride 3 mL Nebulization TID Joe Guadalupe PA-C   valproic acid 250 mg Per G Tube Q8H [address] Marcella Colon PA-C     Continuous Infusions:   PRN Meds:    acetaminophen 650 mg Q4H PRN   albuterol 2 5 mg Q6H PRN   guaiFENesin 200 mg Q4H PRN   HYDROmorphone 0 5 mg Q1H PRN   Labetalol HCl 10 mg Q6H PRN   Lidocaine Viscous HCl 15 mL 4x Daily PRN   ondansetron 4 mg Q6H PRN     VTE Pharmacologic Prophylaxis: Enoxaparin (Lovenox)  VTE Mechanical Prophylaxis: sequential compression device  Invasive lines and devices:   Invasive Devices     Peripherally Inserted Central Catheter Line            PICC Line 63/67/91 Left Basilic 11 days          Drain            NG/OG/Enteral Tube Nasogastric Right nares 8 days    External Urinary Catheter Medium 1 day          Airway            Surgical Airway Shiley Cuffed 15 days Portions of the record may have been created with voice recognition software  Occasional wrong word or "sound a like" substitutions may have occurred due to the inherent limitations of voice recognition software  Read the chart carefully and recognize, using context, where substitutions have occurred      Satya Crow MD

## 2020-07-08 ENCOUNTER — APPOINTMENT (INPATIENT)
Dept: RADIOLOGY | Facility: HOSPITAL | Age: 70
DRG: 003 | End: 2020-07-08
Payer: COMMERCIAL

## 2020-07-08 LAB
ANION GAP SERPL CALCULATED.3IONS-SCNC: 3 MMOL/L (ref 4–13)
ANION GAP SERPL CALCULATED.3IONS-SCNC: 6 MMOL/L (ref 4–13)
BASOPHILS # BLD AUTO: 0.04 THOUSANDS/ΜL (ref 0–0.1)
BASOPHILS NFR BLD AUTO: 1 % (ref 0–1)
BUN SERPL-MCNC: 12 MG/DL (ref 5–25)
BUN SERPL-MCNC: 12 MG/DL (ref 5–25)
CALCIUM SERPL-MCNC: 9.5 MG/DL (ref 8.3–10.1)
CALCIUM SERPL-MCNC: 9.6 MG/DL (ref 8.3–10.1)
CHLORIDE SERPL-SCNC: 108 MMOL/L (ref 100–108)
CHLORIDE SERPL-SCNC: 109 MMOL/L (ref 100–108)
CO2 SERPL-SCNC: 27 MMOL/L (ref 21–32)
CO2 SERPL-SCNC: 31 MMOL/L (ref 21–32)
CREAT SERPL-MCNC: 0.7 MG/DL (ref 0.6–1.3)
CREAT SERPL-MCNC: 0.79 MG/DL (ref 0.6–1.3)
EOSINOPHIL # BLD AUTO: 0.27 THOUSAND/ΜL (ref 0–0.61)
EOSINOPHIL NFR BLD AUTO: 3 % (ref 0–6)
ERYTHROCYTE [DISTWIDTH] IN BLOOD BY AUTOMATED COUNT: 14.4 % (ref 11.6–15.1)
GFR SERPL CREATININE-BSD FRML MDRD: 92 ML/MIN/1.73SQ M
GFR SERPL CREATININE-BSD FRML MDRD: 96 ML/MIN/1.73SQ M
GLUCOSE SERPL-MCNC: 109 MG/DL (ref 65–140)
GLUCOSE SERPL-MCNC: 119 MG/DL (ref 65–140)
HCT VFR BLD AUTO: 33.5 % (ref 36.5–49.3)
HGB BLD-MCNC: 10.4 G/DL (ref 12–17)
IMM GRANULOCYTES # BLD AUTO: 0.13 THOUSAND/UL (ref 0–0.2)
IMM GRANULOCYTES NFR BLD AUTO: 2 % (ref 0–2)
LYMPHOCYTES # BLD AUTO: 1.75 THOUSANDS/ΜL (ref 0.6–4.47)
LYMPHOCYTES NFR BLD AUTO: 21 % (ref 14–44)
MAGNESIUM SERPL-MCNC: 2.3 MG/DL (ref 1.6–2.6)
MAGNESIUM SERPL-MCNC: 2.3 MG/DL (ref 1.6–2.6)
MCH RBC QN AUTO: 30.5 PG (ref 26.8–34.3)
MCHC RBC AUTO-ENTMCNC: 31 G/DL (ref 31.4–37.4)
MCV RBC AUTO: 98 FL (ref 82–98)
MONOCYTES # BLD AUTO: 1.03 THOUSAND/ΜL (ref 0.17–1.22)
MONOCYTES NFR BLD AUTO: 13 % (ref 4–12)
NEUTROPHILS # BLD AUTO: 5.01 THOUSANDS/ΜL (ref 1.85–7.62)
NEUTS SEG NFR BLD AUTO: 60 % (ref 43–75)
NRBC BLD AUTO-RTO: 0 /100 WBCS
PHOSPHATE SERPL-MCNC: 4.9 MG/DL (ref 2.3–4.1)
PLATELET # BLD AUTO: 519 THOUSANDS/UL (ref 149–390)
PMV BLD AUTO: 10 FL (ref 8.9–12.7)
POTASSIUM SERPL-SCNC: 3.7 MMOL/L (ref 3.5–5.3)
POTASSIUM SERPL-SCNC: 4.2 MMOL/L (ref 3.5–5.3)
RBC # BLD AUTO: 3.41 MILLION/UL (ref 3.88–5.62)
SODIUM SERPL-SCNC: 142 MMOL/L (ref 136–145)
SODIUM SERPL-SCNC: 142 MMOL/L (ref 136–145)
WBC # BLD AUTO: 8.23 THOUSAND/UL (ref 4.31–10.16)

## 2020-07-08 PROCEDURE — 83735 ASSAY OF MAGNESIUM: CPT | Performed by: PHYSICIAN ASSISTANT

## 2020-07-08 PROCEDURE — 94669 MECHANICAL CHEST WALL OSCILL: CPT

## 2020-07-08 PROCEDURE — 80048 BASIC METABOLIC PNL TOTAL CA: CPT | Performed by: PHYSICIAN ASSISTANT

## 2020-07-08 PROCEDURE — 71045 X-RAY EXAM CHEST 1 VIEW: CPT

## 2020-07-08 PROCEDURE — 99233 SBSQ HOSP IP/OBS HIGH 50: CPT | Performed by: EMERGENCY MEDICINE

## 2020-07-08 PROCEDURE — 94760 N-INVAS EAR/PLS OXIMETRY 1: CPT

## 2020-07-08 PROCEDURE — 85025 COMPLETE CBC W/AUTO DIFF WBC: CPT | Performed by: PHYSICIAN ASSISTANT

## 2020-07-08 PROCEDURE — 99024 POSTOP FOLLOW-UP VISIT: CPT | Performed by: THORACIC SURGERY (CARDIOTHORACIC VASCULAR SURGERY)

## 2020-07-08 PROCEDURE — 99232 SBSQ HOSP IP/OBS MODERATE 35: CPT | Performed by: INTERNAL MEDICINE

## 2020-07-08 PROCEDURE — 84100 ASSAY OF PHOSPHORUS: CPT | Performed by: PHYSICIAN ASSISTANT

## 2020-07-08 PROCEDURE — 94664 DEMO&/EVAL PT USE INHALER: CPT

## 2020-07-08 PROCEDURE — 94640 AIRWAY INHALATION TREATMENT: CPT

## 2020-07-08 RX ORDER — LANOLIN ALCOHOL/MO/W.PET/CERES
6 CREAM (GRAM) TOPICAL
Status: DISCONTINUED | OUTPATIENT
Start: 2020-07-08 | End: 2020-07-30 | Stop reason: HOSPADM

## 2020-07-08 RX ORDER — DIAZEPAM 5 MG/1
2.5 TABLET ORAL EVERY 8 HOURS PRN
Status: DISCONTINUED | OUTPATIENT
Start: 2020-07-08 | End: 2020-07-09

## 2020-07-08 RX ORDER — HYDROMORPHONE HCL/PF 1 MG/ML
0.5 SYRINGE (ML) INJECTION
Status: DISCONTINUED | OUTPATIENT
Start: 2020-07-08 | End: 2020-07-09

## 2020-07-08 RX ORDER — VALPROIC ACID 250 MG/5ML
250 SOLUTION ORAL EVERY 12 HOURS SCHEDULED
Status: DISCONTINUED | OUTPATIENT
Start: 2020-07-08 | End: 2020-07-09

## 2020-07-08 RX ADMIN — VALPROIC ACID 250 MG: 500 SOLUTION ORAL at 07:00

## 2020-07-08 RX ADMIN — ENOXAPARIN SODIUM 100 MG: 100 INJECTION SUBCUTANEOUS at 22:12

## 2020-07-08 RX ADMIN — NYSTATIN 500000 UNITS: 100000 SUSPENSION ORAL at 08:10

## 2020-07-08 RX ADMIN — LEVALBUTEROL HYDROCHLORIDE 1.25 MG: 1.25 SOLUTION, CONCENTRATE RESPIRATORY (INHALATION) at 14:01

## 2020-07-08 RX ADMIN — LEVALBUTEROL HYDROCHLORIDE 1.25 MG: 1.25 SOLUTION, CONCENTRATE RESPIRATORY (INHALATION) at 08:02

## 2020-07-08 RX ADMIN — NYSTATIN 500000 UNITS: 100000 SUSPENSION ORAL at 17:37

## 2020-07-08 RX ADMIN — SENNOSIDES AND DOCUSATE SODIUM 1 TABLET: 8.6; 5 TABLET ORAL at 17:37

## 2020-07-08 RX ADMIN — QUETIAPINE FUMARATE 50 MG: 25 TABLET ORAL at 22:11

## 2020-07-08 RX ADMIN — VALPROIC ACID 250 MG: 500 SOLUTION ORAL at 22:11

## 2020-07-08 RX ADMIN — ISODIUM CHLORIDE 3 ML: 0.03 SOLUTION RESPIRATORY (INHALATION) at 20:05

## 2020-07-08 RX ADMIN — ISODIUM CHLORIDE 3 ML: 0.03 SOLUTION RESPIRATORY (INHALATION) at 14:01

## 2020-07-08 RX ADMIN — CHLORHEXIDINE GLUCONATE 0.12% ORAL RINSE 15 ML: 1.2 LIQUID ORAL at 08:09

## 2020-07-08 RX ADMIN — LEVALBUTEROL HYDROCHLORIDE 1.25 MG: 1.25 SOLUTION, CONCENTRATE RESPIRATORY (INHALATION) at 20:05

## 2020-07-08 RX ADMIN — HYDROMORPHONE HYDROCHLORIDE 0.5 MG: 1 INJECTION, SOLUTION INTRAMUSCULAR; INTRAVENOUS; SUBCUTANEOUS at 00:42

## 2020-07-08 RX ADMIN — NYSTATIN 500000 UNITS: 100000 SUSPENSION ORAL at 22:18

## 2020-07-08 RX ADMIN — SENNOSIDES AND DOCUSATE SODIUM 1 TABLET: 8.6; 5 TABLET ORAL at 08:10

## 2020-07-08 RX ADMIN — ISODIUM CHLORIDE 3 ML: 0.03 SOLUTION RESPIRATORY (INHALATION) at 08:02

## 2020-07-08 RX ADMIN — HYDROMORPHONE HYDROCHLORIDE 0.5 MG: 1 INJECTION, SOLUTION INTRAMUSCULAR; INTRAVENOUS; SUBCUTANEOUS at 05:58

## 2020-07-08 RX ADMIN — NYSTATIN 500000 UNITS: 100000 SUSPENSION ORAL at 13:11

## 2020-07-08 RX ADMIN — MELATONIN 6 MG: at 22:10

## 2020-07-08 RX ADMIN — CHLORHEXIDINE GLUCONATE 0.12% ORAL RINSE 15 ML: 1.2 LIQUID ORAL at 22:10

## 2020-07-08 RX ADMIN — ENOXAPARIN SODIUM 100 MG: 100 INJECTION SUBCUTANEOUS at 08:10

## 2020-07-08 NOTE — PROGRESS NOTES
Progress note - Palliative and Supportive Care   Shree Hoover 71 y o  male [id number]    Assessment:    -   Patient Active Problem List   Diagnosis    Community acquired pneumonia    Abnormal computed tomography angiography (CTA)    Alcohol abuse    Malignant neoplasm of upper lobe of left lung (HCC)    Tobacco abuse    Acute respiratory failure with hypoxia (HCC)    Subcutaneous emphysema (HCC)    Acute deep vein thrombosis (DVT) of axillary vein of right upper extremity (HCC)    Acute deep vein thrombosis (DVT) of brachial vein of right upper extremity (HCC)    [facility] in place ([facility] [address] )    Postprocedural pneumothorax    Acute deep vein thrombosis (DVT) of calf muscle vein of left lower extremity ([facility] [address] )    Pneumonia         Plan:  1  Symptom management:    - Continue to aggressively wean opioids- D/C ATC opioids    Continue PRN Dilaudid 0 5 mg but change to q3H   - Change Valium to PRN   - Continue Depakote - wean to BID   - Continue Seroquel   - Increase melatonin to 6 mg   - Delirium precautions    2  Goals of care:  PALLIATIVE AND SUPPORTIVE CARE FAMILY CONFERENCE:    Time of Meeting: 10:20 to 10:40    Participants:   Nader Swain- daughter and Martin Mor[place]  Dr Todd Current     Patient Participation: no    Patient Support System: NA     Meeting Location:  Conference room    Advanced Directive of POLST available: yes    A family meeting was held for Mr Satish Boudreaux  This meeting was necessary for determine the appropriate course of treatment  Topics of Discussion:  - reviewed plan for aggressive wean of sedating medications  - discussed concerns of "flipped" circadian rhythm and how to improve with aggressive delirium precautions  - discussion surrounding disposition    Other Content of Meeting:  - time spent addressing all questions/concerns to her satisfaction  - supportive listening provided    PLAN:  - med wean as above  - please call Ms Jyoti Rivero daily and with updates- she is also on [employer] and can easily be reached  - she plans to be at bedside on Friday in the AM    Interval history:      Patient has ongoing mixed delirium with reversal of sleep/wake cycle  Phone meeting with daughter today  MEDICATIONS / ALLERGIES:     all current active meds have been reviewed and current meds:   Current Facility-Administered Medications   Medication Dose Route Frequency    acetaminophen (TYLENOL) rectal suppository 650 mg  650 mg Rectal Q4H PRN    albuterol inhalation solution 2 5 mg  2 5 mg Nebulization Q6H PRN    chlorhexidine (PERIDEX) 0 12 % oral rinse 15 mL  15 mL Swish & Spit Q12H [facility]    diazepam (VALIUM) tablet 2 5 mg  2 5 mg Oral Q8H PRN    enoxaparin (LOVENOX) subcutaneous injection 100 mg  1 mg/kg Subcutaneous Q12H [facility]    guaiFENesin (ROBITUSSIN) oral solution 200 mg  200 mg Oral Q4H PRN    HYDROmorphone (DILAUDID) injection 0 5 mg  0 5 mg Intravenous Q1H PRN    Labetalol HCl (NORMODYNE) injection 10 mg  10 mg Intravenous Q6H PRN    levalbuterol (XOPENEX) inhalation solution 1 25 mg  1 25 mg Nebulization TID    Lidocaine Viscous HCl (XYLOCAINE) 2 % mucosal solution 15 mL  15 mL Swish & Spit 4x Daily PRN    melatonin tablet 6 mg  6 mg Oral HS    nystatin (MYCOSTATIN) oral suspension 500,000 Units  500,000 Units Swish & Swallow 4x Daily    ondansetron (ZOFRAN) injection 4 mg  4 mg Intravenous Q6H PRN    polyethylene glycol (MIRALAX) packet 17 g  17 g Oral Daily    QUEtiapine (SEROquel) tablet 50 mg  50 mg Oral HS    senna-docusate sodium (SENOKOT S) 8 6-50 mg per tablet 1 tablet  1 tablet Per NG Tube BID    sodium chloride 0 9 % inhalation solution 3 mL  3 mL Nebulization TID    valproic acid (DEPAKENE) 250 MG/5ML oral soln 250 mg  250 mg Per G Tube Q8H [facility]       No Known Allergies    OBJECTIVE:    Physical Exam  Physical Exam   Constitutional: No distress  HENT:   Head: Normocephalic and atraumatic     Right Ear: External ear normal    Left Ear: External ear normal    Nose: Nose normal    Eyes: EOM are normal  Right eye exhibits no discharge  Left eye exhibits no discharge  No scleral icterus  Neck:   Trach in place   Cardiovascular: Normal rate, regular rhythm and intact distal pulses  Pulmonary/Chest: Effort normal and breath sounds normal  No respiratory distress  Abdominal: Soft  Bowel sounds are normal  He exhibits no distension  Musculoskeletal: He exhibits no edema  Neurological:   Moving all extremities    Skin: Skin is warm and dry  There is pallor  Psychiatric:   Agitated at night   Nursing note and vitals reviewed  Lab Results:   I have personally reviewed pertinent labs  , CBC:   Lab Results   Component Value Date    WBC 8 23 07/08/2020    HGB 10 4 (L) 07/08/2020    HCT 33 5 (L) 07/08/2020    MCV 98 07/08/2020     (H) 07/08/2020    MCH 30 5 07/08/2020    MCHC 31 0 (L) 07/08/2020    RDW 14 4 07/08/2020    MPV 10 0 07/08/2020    NRBC 0 07/08/2020   , CMP:   Lab Results   Component Value Date    SODIUM 142 07/08/2020    K 3 7 07/08/2020     07/08/2020    CO2 31 07/08/2020    BUN 12 07/08/2020    CREATININE 0 79 07/08/2020    CALCIUM 9 6 07/08/2020    EGFR 92 07/08/2020   , PT/PTT:No results found for: PT, PTT  Imaging Studies: reviewed  EKG, Pathology, and Other Studies: reviewed    Counseling / Coordination of Care    Total floor / unit time spent today 35+ minutes  Greater than 50% of total time was spent with the patient and / or family counseling and / or coordination of care  A description of the counseling / coordination of care: chart review, medication review with changes, family phone conference, supportive listening

## 2020-07-08 NOTE — PLAN OF CARE
Problem: CARDIOVASCULAR - ADULT  Goal: Maintains optimal cardiac output and hemodynamic stability  Description  INTERVENTIONS:  - Monitor I/O, vital signs and rhythm  - Monitor for S/S and trends of decreased cardiac output  - Administer and titrate ordered vasoactive medications to optimize hemodynamic stability  - Assess quality of pulses, skin color and temperature  - Assess for signs of decreased coronary artery perfusion  - Instruct patient to report change in severity of symptoms  Outcome: Progressing  Goal: Absence of cardiac dysrhythmias or at baseline rhythm  Description  INTERVENTIONS:  - Continuous cardiac monitoring, vital signs, obtain 12 lead EKG if ordered  - Administer antiarrhythmic and heart rate control medications as ordered  - Monitor electrolytes and administer replacement therapy as ordered  Outcome: Progressing     Problem: RESPIRATORY - ADULT  Goal: Achieves optimal ventilation and oxygenation  Description  INTERVENTIONS:  - Assess for changes in respiratory status  - Assess for changes in mentation and behavior  - Position to facilitate oxygenation and minimize respiratory effort  - Oxygen administered by appropriate delivery if ordered  - Initiate smoking cessation education as indicated  - Encourage broncho-pulmonary hygiene including cough, deep breathe, Incentive Spirometry  - Assess the need for suctioning and aspirate as needed  - Assess and instruct to report SOB or any respiratory difficulty  - Respiratory Therapy support as indicated  Outcome: Progressing     Problem: SKIN/TISSUE INTEGRITY - ADULT  Goal: Skin integrity remains intact  Description  INTERVENTIONS  - Identify patients at risk for skin breakdown  - Assess and monitor skin integrity  - Assess and monitor nutrition and hydration status  - Monitor labs (i e  albumin)  - Assess for incontinence   - Turn and reposition patient  - Assist with mobility/ambulation  - Relieve pressure over bony prominences  - Avoid friction and shearing  - Provide appropriate hygiene as needed including keeping skin clean and dry  - Evaluate need for skin moisturizer/barrier cream  - Collaborate with interdisciplinary team (i e  Nutrition, Rehabilitation, etc )   - Patient/family teaching  Outcome: Progressing  Goal: Incision(s), wounds(s) or drain site(s) healing without S/S of infection  Description  INTERVENTIONS  - Assess and document risk factors for skin impairment   - Assess and document dressing, incision, wound bed, drain sites and surrounding tissue  - Consider nutrition services referral as needed  - Oral mucous membranes remain intact  - Provide patient/ family education  Outcome: Progressing  Goal: Oral mucous membranes remain intact  Description  INTERVENTIONS  - Assess oral mucosa and hygiene practices  - Implement preventative oral hygiene regimen  - Implement oral medicated treatments as ordered  - Initiate Nutrition services referral as needed  Outcome: Progressing     Problem: MUSCULOSKELETAL - ADULT  Goal: Maintain or return mobility to safest level of function  Description  INTERVENTIONS:  - Assess patient's ability to carry out ADLs; assess patient's baseline for ADL function and identify physical deficits which impact ability to perform ADLs (bathing, care of mouth/teeth, toileting, grooming, dressing, etc )  - Assess/evaluate cause of self-care deficits   - Assess range of motion  - Assess patient's mobility  - Assess patient's need for assistive devices and provide as appropriate  - Encourage maximum independence but intervene and supervise when necessary  - Involve family in performance of ADLs  - Assess for home care needs following discharge   - Consider OT consult to assist with ADL evaluation and planning for discharge  - Provide patient education as appropriate  Outcome: Progressing  Goal: Maintain proper alignment of affected body part  Description  INTERVENTIONS:  - Support, maintain and protect limb and body alignment  - Provide patient/ family with appropriate education  Outcome: Progressing     Problem: Potential for Falls  Goal: Patient will remain free of falls  Description  INTERVENTIONS:  - Assess patient frequently for physical needs  -  Identify cognitive and physical deficits and behaviors that affect risk of falls  -  [place] fall precautions as indicated by assessment   - Educate patient/family on patient safety including physical limitations  - Instruct patient to call for assistance with activity based on assessment  - Modify environment to reduce risk of injury  - Consider OT/PT consult to assist with strengthening/mobility  Outcome: Progressing     Problem: Prexisting or High Potential for Compromised Skin Integrity  Goal: Skin integrity is maintained or improved  Description  INTERVENTIONS:  - Identify patients at risk for skin breakdown  - Assess and monitor skin integrity  - Assess and monitor nutrition and hydration status  - Monitor labs   - Assess for incontinence   - Turn and reposition patient  - Assist with mobility/ambulation  - Relieve pressure over bony prominences  - Avoid friction and shearing  - Provide appropriate hygiene as needed including keeping skin clean and dry  - Evaluate need for skin moisturizer/barrier cream  - Collaborate with interdisciplinary team   - Patient/family teaching  - Consider wound care consult   Outcome: Progressing     Problem: Nutrition/Hydration-ADULT  Goal: Nutrient/Hydration intake appropriate for improving, restoring or maintaining nutritional needs  Description  Monitor and assess patient's nutrition/hydration status for malnutrition  Collaborate with interdisciplinary team and initiate plan and interventions as ordered  Monitor patient's weight and dietary intake as ordered or per policy  Utilize nutrition screening tool and intervene as necessary  Determine patient's food preferences and provide high-protein, high-caloric foods as appropriate  INTERVENTIONS:  - Monitor oral intake, urinary output, labs, and treatment plans  - Assess nutrition and hydration status and recommend course of action  - Evaluate amount of meals eaten  - Assist patient with eating if necessary   - Allow adequate time for meals  - Recommend/ encourage appropriate diets, oral nutritional supplements, and vitamin/mineral supplements  - Order, calculate, and assess calorie counts as needed  - Recommend, monitor, and adjust tube feedings and TPN/PPN based on assessed needs  - Assess need for intravenous fluids  - Provide specific nutrition/hydration education as appropriate  - Include patient/family/caregiver in decisions related to nutrition  Outcome: Progressing     Problem: SAFETY,RESTRAINT: NV/NON-SELF DESTRUCTIVE BEHAVIOR  Goal: Remains free of harm/injury (restraint for non violent/non self-detsructive behavior)  Description  INTERVENTIONS:  - Instruct patient/family regarding restraint use   - Assess and monitor physiologic and psychological status   - Provide interventions and comfort measures to meet assessed patient needs   - Identify and implement measures to help patient regain control  - Assess readiness for release of restraint   Outcome: Progressing  Goal: Returns to optimal restraint-free functioning  Description  INTERVENTIONS:  - Assess the patient's behavior and symptoms that indicate continued need for restraint  - Identify and implement measures to help patient regain control  - Assess readiness for release of restraint   Outcome: Progressing     Problem: Knowledge Deficit  Goal: Patient/family/caregiver demonstrates understanding of disease process, treatment plan, medications, and discharge instructions  Description  Complete learning assessment and assess knowledge base    Interventions:  - Provide teaching at level of understanding  - Provide teaching via preferred learning methods  Outcome: Progressing

## 2020-07-08 NOTE — SOCIAL WORK
LSW joined Dr Andrew Delgado to reach out to patient's daughter to provide support  Please refer to Dr Andrew Delgado note for medical specifics      LSW will attempt to meet daughter at bedside on Friday at 9:30 to provide in-person support

## 2020-07-08 NOTE — PROGRESS NOTES
Progress Note - Critical Care   Jacques Burton 71 y o  male MRN: [id number]  Unit/Bed#: 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 (HCC)  Active Problems:    Acute respiratory failure with hypoxia (HCC)    Subcutaneous emphysema (HCC)    Acute deep vein thrombosis (DVT) of axillary vein of right upper extremity (HCC)    Acute deep vein thrombosis (DVT) of brachial vein of right upper extremity (HCC)    Tracheostomy in place [facility])    Postprocedural pneumothorax    Acute deep vein thrombosis (DVT) of calf muscle vein of left lower extremity (HCC)    Pneumonia  Resolved Problems:    Lactic acidosis    ROGER (acute kidney injury) ([facility] [address] )    · Neuro:   ? Dx:  Encephalopathy, delirium versus hypoxic brain injury status post cardiac arrest as below  § Frequent redirection  § Oversedated yesterday, continue to wean sedation  ? Analgesia:  Tylenol 650 mg Q4H prn mild pain, Dilaudid 0 5mg Q1H breakthrough pain  ? Sedation - Valium decreased from 5mg to 2 5 mg Q8H yesterday  Consider d/c today  ? Delirium ppx: CAM-ICU, sleep hygiene  ? Hx of Seroquel 50 mg QHS, melatonin, Depacon 250 mg Q8H    · CV:   ? Dx:  Tachycardia and hypertension likely secondary to agitation versus delirium  § Improving  § Continue to monitor and treat underlying causes  § Labetalol 10mg Q6H prn  ?  Dx:  Status post cardiac arrest 6/28 due to hypoxia/hypercarbia likely secondary to subcutaneous emphysema obstructing airway  § Subcutaneous emphysema resolved the continue to monitor in setting of chest tube removal  ? MAP goal > 65   · Lung: ? Dx:  Status post left upper VATS lobectomy for a section of lung cancer, complicated by subcutaneous emphysema  § Chest tube discontinued 7/5 by thoracic surgery  § Status post cricothyrotomy for hypoxic/hypercarbic cardiac arrest, converted to from trach  § Continue trach collar   § Continue Respiratory Protocol and Airway Clearance Protocol  § Levalbuterol t i d      · GI:   ? Dx:  No active issues  § NG tube   ? Stress ulcer ppx:  Protonix  ? Bowel regimen:  Docusate, senna, MiraLax   · FEN:   ? Fluids: no maintenance   ? Electrolytes: trend and replete as needed  ? Nutrition:  Tube feeds at goal 55 mL/hr  · :   ? No acute issues  ? Monitor I&O's, BUN/Cr  § UOP 3500mL in 24 hrs  § Net -1597mL in 24 hrs, net +545mL since admission   · ID:   ? No acute issues  ? Monitor WBC/temp curve  · Heme:   ? Dx:  Active DVTs  ? Continue Lovenox  ? Dx Anemia  ? Stable, Hbg 10 4 today   ? Dx: thrombocytosis  ? Platelets 996 today, continue to monitor  ? DVT ppx: lovenox, SCDs   · Endo:   ? No active issues  · Msk/Skin:   ? PT/OT  ? Turning/repositioning  ? Wound care   Disposition: ICU     Code Status: Level 2 - DNAR: but accepts endotracheal intubation    ___________________________________________________________    ______________________________________________________________________    Physical Exam:   Physical Exam   Constitutional: He is sleeping  No distress  HENT:   Head: Normocephalic and atraumatic  NG tube in place   Eyes: Conjunctivae are normal    Neck: No neck rigidity  trach   Cardiovascular: Normal rate, regular rhythm, normal heart sounds and intact distal pulses  Pulmonary/Chest: Effort normal and breath sounds normal  No respiratory distress  He has no wheezes  Clear secretions from trach   Abdominal: Soft  Bowel sounds are normal  He exhibits no distension  There is no tenderness  Musculoskeletal: He exhibits no edema or deformity  Neurological:   Attempted to open eyes to voice   Did not follow commands on my exam this morning, but did make incomprehensible sounds  Appears less sedated than yesterday   Skin: Skin is warm and dry  Nursing note and vitals reviewed  ______________________________________________________________________  Vitals:    20 0400 20 0500 20 0600 20 0700   BP: (!) 82/47 131/68 106/74    BP Location:       Pulse: 104 96 98    Resp: (!) 24 (!) 24 21    Temp: (!) 97 1 °F (36 2 °C)   98 °F (36 7 °C)   TempSrc: Oral   Oral   SpO2: 96% 99% 99%    Weight:       Height:           Temperature:   Temp (24hrs), Av 4 °F (36 9 °C), Min:97 1 °F (36 2 °C), Max:99 6 °F (37 6 °C)    Current Temperature: 98 °F (36 7 °C)  Weights:   IBW: 73 kg    Body mass index is 29 89 kg/m²  Weight (last 2 days)     Date/Time   Weight    20 06   94 5 (208 33)    20 0600   94 (207 23)            Hemodynamic Monitoring:  N/A     Non-Invasive/Invasive Ventilation Settings:  Respiratory    Lab Data (Last 4 hours)    None         O2/Vent Data (Last 4 hours)    None              No results found for: PHART, DPU5ZAJ, PO2ART, NDZ3HOV, F3PITYMW, BEART, SOURCE  SpO2: SpO2: 99 %  Intake and Outputs:  I/O       701 -  07 -  -  07    P  O  0 0     I V  (mL/kg) 30 (0 3)      NG/ 680     IV Piggyback       Feedings 1099 1223     Total Intake(mL/kg) 1929 (20 4) 1903 (20 1)     Urine (mL/kg/hr) 2000 (0 9) 3500 (1 5)     Emesis/NG output 0 0     Stool  0     Total Output 2000 3500     Net -71 -1091            Unmeasured Stool Occurrence  1 x           Nutrition:        Diet Orders   (From admission, onward)             Start     Ordered    20 0847  Diet Enteral/Parenteral; Tube Feeding No Oral Diet; Jevity 1 5; Continuous; 55; Prosource Protein Liquid - Two Packets; 100;  Water; Every 4 hours  Diet effective now     Question Answer Comment   Diet Type Enteral/Parenteral    Enteral/Parenteral Tube Feeding No Oral Diet    Tube Feeding Formula: Jevity 1 5    Bolus/Cyclic/Continuous Continuous    Tube Feeding Goal Rate (mL/hr): 55    Prosource Protein Liquid - No Carb Prosource Protein Liquid - Two Packets    Tube Feeding water flush (mL): 100    Water Flush type: Water    Water flush frequency: Every 4 hours    RD to adjust diet per protocol? Yes        07/05/20 0849              TF currently running at 55 mL/hour with a goal of 55 mL  Formula:Jevity 1 2  Labs:   Results from last 7 days   Lab Units 07/08/20 0527 07/07/20 0454 07/06/20  0544   WBC Thousand/uL 8 23 6 95 7 43   HEMOGLOBIN g/dL 10 4* 9 9* 10 3*   HEMATOCRIT % 33 5* 32 1* 32 4*   PLATELETS Thousands/uL 519* 505* 506*   NEUTROS PCT % 60 54 60   MONOS PCT % 13* 13* 10     Results from last 7 days   Lab Units 07/08/20 0527 07/07/20 0454 07/06/20  0544  07/03/20  0438   POTASSIUM mmol/L 3 7 4 2 4 0   < > 3 6   CHLORIDE mmol/L 108 105 107   < > 106   CO2 mmol/L 31 30 27   < > 27   BUN mg/dL 12 11 8   < > 8   CREATININE mg/dL 0 79 0 63 0 61   < > 0 52*   CALCIUM mg/dL 9 6 8 6 8 3   < > 8 4   ALK PHOS U/L  --   --   --   --  97   ALT U/L  --   --   --   --  29   AST U/L  --   --   --   --  20    < > = values in this interval not displayed       Results from last 7 days   Lab Units 07/08/20 0527 07/07/20 0454 07/05/20  0448   MAGNESIUM mg/dL 2 3 2 3 2 2     Lab Results   Component Value Date    PHOS 4 9 (H) 07/08/2020    PHOS 5 0 (H) 07/07/2020    PHOS 4 5 (H) 07/05/2020          0   Lab Value Date/Time    TROPONINI 2 75 (H) 06/20/2020 1759    TROPONINI 3 00 (H) 06/20/2020 1319    TROPONINI 2 86 (H) 06/20/2020 0928    TROPONINI 0 85 (H) 06/20/2020 0647    TROPONINI <0 02 03/28/2020 0451    TROPONINI <0 02 03/28/2020 0031    TROPONINI <0 02 03/27/2020 2026         ABG:  Lab Results   Component Value Date    PHART 7 498 (H) 07/01/2020    MIS4ZTK 37 2 07/01/2020    PO2ART 81 0 07/01/2020    PZJ5KST 28 2 (H) 07/01/2020    BEART 4 7 07/01/2020    SOURCE Radial, Left 07/01/2020     Imaging: n/a I have personally reviewed pertinent reports  EKG: n/a  Micro:  Lab Results   Component Value Date    BLOODCX No Growth After 5 Days  06/29/2020    BLOODCX No Growth After 5 Days  06/29/2020    BLOODCX No Growth After 5 Days  06/20/2020    BLOODCX No Growth After 5 Days   06/20/2020    SPUTUMCULTUR 4+ Growth of  06/29/2020     Allergies: No Known Allergies  Medications:   Scheduled Meds:    Current Facility-Administered Medications:  acetaminophen 650 mg Rectal Q4H PRN Enoc Zavala PA-C   albuterol 2 5 mg Nebulization Q6H PRN Enoc Zavala PA-C   chlorhexidine 15 mL Swish & Spit Q12H [facility] Enoc Zavala PA-C   diazepam 2 5 mg Oral [facility] Johnny Deras MD   enoxaparin 1 mg/kg Subcutaneous Q12H [facility] Enoc Zavala PA-C   guaiFENesin 200 mg Oral Q4H PRN Chicho Montanez PA-C   HYDROmorphone 0 5 mg Intravenous Q1H PRN Enoc Zavala PA-C   HYDROmorphone 0 5 mg Intravenous Q6H Margret Brooks DO   Labetalol HCl 10 mg Intravenous Q6H PRN Dmitri Jefferson DO   levalbuterol 1 25 mg Nebulization TID Enoc Zavala PA-C   Lidocaine Viscous HCl 15 mL Swish & Spit 4x Daily PRN Enoc Zavala PA-C   melatonin 3 mg Oral HS Enoc Zavala PA-C   nystatin 500,000 Units Swish & Swallow 4x Daily Enoc Zavala PA-C   ondansetron 4 mg Intravenous Q6H PRN ISAMAR Walker-SONIA   polyethylene glycol 17 g Oral Daily Enoc Zavala PA-C   QUEtiapine 50 mg Oral HS Enoc Zavala PA-C   senna-docusate sodium 1 tablet Per NG Tube BID Enoc Zavala PA-C   sodium chloride 3 mL Nebulization TID Enoc Zavala PA-C   valproic acid 250 mg Per G Tube Q8H [facility] Marcella Colon PA-C     Continuous Infusions:   PRN Meds:    acetaminophen 650 mg Q4H PRN   albuterol 2 5 mg Q6H PRN   guaiFENesin 200 mg Q4H PRN   HYDROmorphone 0 5 mg Q1H PRN   Labetalol HCl 10 mg Q6H PRN   Lidocaine Viscous HCl 15 mL 4x Daily PRN   ondansetron 4 mg Q6H PRN     VTE Pharmacologic Prophylaxis: Enoxaparin (Lovenox)  VTE Mechanical Prophylaxis: sequential compression device  Invasive lines and devices: Invasive Devices     Peripherally Inserted Central Catheter Line            PICC Line 21/19/17 Left Basilic 11 days          Drain            NG/OG/Enteral Tube Nasogastric Right nares 9 days    External Urinary Catheter Medium 2 days          Airway            Surgical Airway Shiley Cuffed 15 days                     Portions of the record may have been created with voice recognition software  Occasional wrong word or "sound a like" substitutions may have occurred due to the inherent limitations of voice recognition software  Read the chart carefully and recognize, using context, where substitutions have occurred      Katia Wade MD

## 2020-07-08 NOTE — PROGRESS NOTES
Progress Note - Thoracic Surgery   Christa Browne 71 y o  male MRN: [id number]  Unit/Bed#: [place] 516-01 Encounter: [id number]    Assessment:  71 M s/p VATS Left upper lobectomy and prolonged air leak, c/b cardiac arrest, cricothyroidotomy s/p tracheostomy revision    Plan:  Weaning sedation - currently scheduled valium 2 5 q8, dilaudid 0 5 q6, seroquel 50 HS, monitor mental status  Pulmonary toilet  Wean oxygen  Continue tube feeds at goal  Bowel regimen  Therapeutic lovenox  Appreciate ICU, palliative and case management assistance  Placement pending with feeding tube, otherwise will consider PEG    Subjective/Objective     Subjective: No acute events overnight  Following simple commands  Objective:    Blood pressure (!) 82/47, pulse 104, temperature 98 7 °F (37 1 °C), temperature source Oral, resp  rate (!) 24, height 5' 10" (1 778 m), weight 94 5 kg (208 lb 5 4 oz), SpO2 96 %  ,Body mass index is 29 89 kg/m²        Intake/Output Summary (Last 24 hours) at 7/8/2020 0630  Last data filed at 7/8/2020 0400  Gross per 24 hour   Intake 1805 ml   Output 3500 ml   Net -1695 ml       Invasive Devices     Peripherally Inserted Central Catheter Line            PICC Line 91/07/99 Left Basilic 11 days          Drain            NG/OG/Enteral Tube Nasogastric Right nares 9 days    External Urinary Catheter Medium 2 days          Airway            Surgical Airway Shiley Cuffed 15 days                Physical Exam:   General: no acute distress, arousable  Eyes: opens eyes to voice  ENT: moist mucous membranes  Neck: supple, trach in place  CV: RRR +S1/S2  Chest: breath sounds bilaterally, incisions c/d/i  Abdomen: soft, NT ND  Extremities: atraumatic, no edema      Results from last 7 days   Lab Units 07/08/20  0527 07/07/20  0454 07/06/20  0544   WBC Thousand/uL 8 23 6 95 7 43   HEMOGLOBIN g/dL 10 4* 9 9* 10 3*   HEMATOCRIT % 33 5* 32 1* 32 4*   PLATELETS Thousands/uL 519* 505* 506*     Results from last 7 days   Lab Units 07/08/20  0527 07/07/20  0454 07/06/20  0544   POTASSIUM mmol/L 3 7 4 2 4 0   CHLORIDE mmol/L 108 105 107   CO2 mmol/L 31 30 27   BUN mg/dL 12 11 8   CREATININE mg/dL 0 79 0 63 0 61   CALCIUM mg/dL 9 6 8 6 8 3

## 2020-07-08 NOTE — UTILIZATION REVIEW
Continued Stay Review    Date: 7/4/20                         Current Patient Class: Inpatient Current Level of Care: Med Surg    HPI:69 y o  male initially admitted on 6/10/20 s/p mediastinoscopy, VATS, DOMI lobectomy with prolonged air leak, complicated by subcutaneous emphysema  On 6/20/20 SOB with SpO2 in 70's and progressed to bradycardia and code event requiring emergency trach  Moved to ICU with a line and central line, levo/epi gtts for bp maintenance  Cric transitioned to trach with trach collar trials started on 6/24/20 7/4/20: Trauma: Trach collar as tolerated, patient  now > 24 hours off vent  Chest tube in place to water seal  Continues to have intermittent agitation and confusion  Continue Seroquel, melatonin, Depacon  Increase tube feeds to 30 cc/hr  Thoracic: His chest tube is in place without an air leak, minimal serous output  We will plan to take this tube out tomorrow  He was able to open his eyes to his name today but did not follow commands       Pertinent Labs/Diagnostic Results:       Results from last 7 days   Lab Units 07/07/20  0454 07/06/20  0544 07/05/20  0448 07/04/20  0451 07/03/20  0438   WBC Thousand/uL 6 95 7 43 7 48 7 14 7 27   HEMOGLOBIN g/dL 9 9* 10 3* 9 9* 10 2* 8 9*   HEMATOCRIT % 32 1* 32 4* 31 4* 32 2* 28 0*   PLATELETS Thousands/uL 505* 506* 467* 409* 375   NEUTROS ABS Thousands/µL 3 77 4 50 4 92 4 78 5 20         Results from last 7 days   Lab Units 07/07/20  0454 07/06/20  0544 07/05/20  1503 07/05/20  0448 07/04/20  0451 07/03/20  0438  07/02/20  0419 07/01/20  0441   SODIUM mmol/L 139 140 141 141 140 139   < > 138 141   POTASSIUM mmol/L 4 2 4 0 4 1 3 8 3 9 3 6   < > 3 9 3 7   CHLORIDE mmol/L 105 107 109* 108 107 106   < > 103 105   CO2 mmol/L 30 27 28 27 29 27   < > 30 32   ANION GAP mmol/L 4 6 4 6 4 6   < > 5 4   BUN mg/dL 11 8 6 6 5 8   < > 10 11   CREATININE mg/dL 0 63 0 61 0 58* 0 56* 0 49* 0 52*   < > 0 56* 0 62   EGFR ml/min/1 73sq m 101 102 104 106 111 109 < > 106 101   CALCIUM mg/dL 8 6 8 3 8 8 8 8 8 6 8 4   < > 7 8* 8 2*   CALCIUM, IONIZED mmol/L  --   --   --   --   --   --   --  1 05* 1 07*   MAGNESIUM mg/dL 2 3  --   --  2 2  --  2 2  --  2 4 2 3   PHOSPHORUS mg/dL 5 0*  --   --  4 5*  --  3 3  --  3 1 3 5    < > = values in this interval not displayed       Results from last 7 days   Lab Units 07/03/20  0438   AST U/L 20   ALT U/L 29   ALK PHOS U/L 97   TOTAL PROTEIN g/dL 5 6*   ALBUMIN g/dL 1 8*   TOTAL BILIRUBIN mg/dL 0 42         Results from last 7 days   Lab Units 07/07/20  0454 07/06/20  0544 07/05/20  1503 07/05/20  0448 07/04/20  0451 07/03/20  0438 07/02/20  1950/20  0419 07/01/20  0441 07/01/20  0047   GLUCOSE RANDOM mg/dL 90 97 103 101 95 85 90 84 80 83             No results found for: BETA-HYDROXYBUTYRATE   Results from last 7 days   Lab Units 07/01/20  0139   PH ART  7 498*   PCO2 ART mm Hg 37 2   PO2 ART mm Hg 81 0   HCO3 ART mmol/L 28 2*   BASE EXC ART mmol/L 4 7   O2 CONTENT ART mL/dL 10 6*   O2 HGB, ARTERIAL % 94 6   ABG SOURCE  Radial, Left     Results from last 7 days   Lab Units 07/01/20  0606   PROCALCITONIN ng/ml 0 18     Results from last 7 days   Lab Units 07/01/20  0047   LACTIC ACID mmol/L 0 7       Vital Signs:    Date and Time Temp Pulse SpO2 Resp BP   07/04/20 2300 -- 96 100 % 23 104/59   07/04/20 2300 97 8 °F (36 6 °C) -- -- -- --   07/04/20 2200 -- 102 97 % 14 114/63   07/04/20 2100 -- 94 98 % 12 146/99   07/04/20 2000 -- 90 99 % 12 139/78   07/04/20 1945 -- -- 99 % -- --   07/04/20 1943 98 °F (36 7 °C) -- -- -- 137/76   07/04/20 1933 -- -- -- -- 196/103   07/04/20 1900 -- 108 100 % 24 176/86   07/04/20 1800 -- 106 100 % 25 158/95   07/04/20 1517 -- 108 99 % 22 141/81   07/04/20 1506 99 5 °F (37 5 °C) -- -- -- --   07/04/20 1338 -- -- 96 % -- --   07/04/20 1107 98 7 °F (37 1 °C) 107 98 % 20 162/76   07/04/20 0800 -- 101 -- 20 199/100    07/04/20 0749 -- -- 97 % Trach mask 28% FiO2/5L  -- --   07/04/20 0747 -- -- 97 % -- -- 07/04/20 0700 98 °F (36 7 °C) 97 99 % 18 155/100   07/04/20 0500 -- 104 -- -- 163/93   07/04/20 0400 -- 104 -- -- 175/85   07/04/20 0300 -- 104 -- -- 153/81   07/04/20 0235 98 4 °F (36 9 °C) 104 99 % 18 176/97   07/04/20 0100 -- 103 -- -- 150/89   07/04/20 0000 -- 102 -- -- 142/94     Date and Time Eye Opening Best Verbal Response Best Motor Response [place] Coma Scale Score   07/04/20 2000 4 1 5 10   07/04/20 1600 4 1 4 9   07/04/20 1230 4 1 4 9   07/04/20 1200 4 1 6 11   07/04/20 1000 4 1 6 11   07/04/20 0800 4 1 6 11   07/04/20 0400 4 1 6 11   07/04/20 0000 4 1 6 11       Medications:   Scheduled Medications:      Current Facility-Administered Medications:  acetaminophen 650 mg Rectal Q4H PRN   albuterol 2 5 mg Nebulization Q6H PRN   chlorhexidine 15 mL Swish & Spit Q12H [address]   diazepam 10 mg Intravenous TID   enoxaparin 1 mg/kg Subcutaneous Q12H DEAN   HYDROmorphone 0 5 mg Intravenous Q1H PRN   HYDROmorphone 1 mg Intravenous Q4H   levalbuterol 1 25 mg Nebulization TID   Lidocaine Viscous HCl 15 mL Swish & Spit 4x Daily PRN   melatonin 3 mg Oral HS   nystatin 500,000 Units Swish & Swallow 4x Daily   ondansetron 4 mg Intravenous Q6H PRN   polyethylene glycol 17 g Oral Daily   QUEtiapine 50 mg Oral HS   senna-docusate sodium 1 tablet Per NG Tube BID   sodium chloride 3 mL Nebulization TID   valproate sodium 250 mg Intravenous Q8H [address]      Continuous Infusions:     multi-electrolyte 75 mL/hr Last Rate: 75 mL/hr (07/03/20 2152)      PRN Meds:     acetaminophen 650 mg Q4H PRN   albuterol 2 5 mg Q6H PRN   HYDROmorphone 0 5 mg Q1H PRN   Lidocaine Viscous HCl 15 mL 4x Daily PRN   ondansetron 4 mg Q6H PRN          Discharge Plan: TBD        Network Utilization Review Department  [email or web address]  ATTENTION: Please call with any questions or concerns to [phone] and carefully listen to the prompts so that you are directed to the right person   All voicemails are confidential   Milena deng requests for admission clinical reviews, approved or denied determinations and any other requests to dedicated fax number below belonging to the campus where the patient is receiving treatment   List of dedicated fax numbers for the Facilities:  [address] [place] [address] DENIALS (Administrative/Medical Necessity) [phone]   [address]  [facility] (Maternity/NICU/Pediatrics) [phone]   Ruby Flatten [phone]   Michele Oleary [phone]   Tsering Zamarripa [phone]   Toro Rice [phone]   [address] [place] [address] [phone]   [facility]  [phone]   [address] [facility]  [address] [facility] [address] [facility] [phone]

## 2020-07-08 NOTE — NURSING NOTE
Pt's monitor alarmed, o2 sat dropped to 70% and heart rate dropped to 50's  Trach collar O2 turned up to 80%, ambu bag used to bag patient for approx 1 min, trach suctioned,  O2 sats emmanuel to 100%  Colton Brown with CC SX aware & at bedside  Chest xray ordered  Will wean o2 as tolerated

## 2020-07-08 NOTE — UTILIZATION REVIEW
Continued Stay Review    Date:   7/8/20                        Current Patient Class: inpatient    Current Level of Care: acute    Assessment/Plan: 71 M s/p VATS Left upper lobectomy and prolonged air leak, c/b cardiac arrest, cricothyroidotomy s/p tracheostomy revision  No acute events overnight  Pt intermittently following commands, answered yes/no questions for the nurse  Oversedated yesterday ,Less sedated today   Continue weaning sedation currently scheduled monitor mental status ,continue pulmonary toliet ,wean oxygen   Continue tube feeds at goal  Bowel regimen  , therapeutic lovenox   Placement pending with feeding tube, otherwise will consider PEG  Pertinent Labs/Diagnostic Results:     Results from last 7 days   Lab Units 07/08/20  0527 07/07/20  0454 07/06/20  0544 07/05/20  0448 07/04/20  0451   WBC Thousand/uL 8 23 6 95 7 43 7 48 7 14   HEMOGLOBIN g/dL 10 4* 9 9* 10 3* 9 9* 10 2*   HEMATOCRIT % 33 5* 32 1* 32 4* 31 4* 32 2*   PLATELETS Thousands/uL 519* 505* 506* 467* 409*   NEUTROS ABS Thousands/µL 5 01 3 77 4 50 4 92 4 78     Results from last 7 days   Lab Units 07/08/20  0527 07/07/20  0454 07/06/20  0544 07/05/20  1503 07/05/20  0448  07/03/20  0438  07/02/20  0419   SODIUM mmol/L 142 139 140 141 141   < > 139   < > 138   POTASSIUM mmol/L 3 7 4 2 4 0 4 1 3 8   < > 3 6   < > 3 9   CHLORIDE mmol/L 108 105 107 109* 108   < > 106   < > 103   CO2 mmol/L 31 30 27 28 27   < > 27   < > 30   ANION GAP mmol/L 3* 4 6 4 6   < > 6   < > 5   BUN mg/dL 12 11 8 6 6   < > 8   < > 10   CREATININE mg/dL 0 79 0 63 0 61 0 58* 0 56*   < > 0 52*   < > 0 56*   EGFR ml/min/1 73sq m 92 101 102 104 106   < > 109   < > 106   CALCIUM mg/dL 9 6 8 6 8 3 8 8 8 8   < > 8 4   < > 7 8*   CALCIUM, IONIZED mmol/L  --   --   --   --   --   --   --   --  1 05*   MAGNESIUM mg/dL 2 3 2 3  --   --  2 2  --  2 2  --  2 4   PHOSPHORUS mg/dL 4 9* 5 0*  --   --  4 5*  --  3 3  --  3 1    < > = values in this interval not displayed  Results from last 7 days   Lab Units 07/03/20  0438   AST U/L 20   ALT U/L 29   ALK PHOS U/L 97   TOTAL PROTEIN g/dL 5 6*   ALBUMIN g/dL 1 8*   TOTAL BILIRUBIN mg/dL 0 42     Results from last 7 days   Lab Units 07/08/20  0527 07/07/20  0454 07/06/20  0544 07/05/20  1503 07/05/20  0448 07/04/20  0451 07/03/20  0438 07/02/20  1950/20  0419   GLUCOSE RANDOM mg/dL 109 90 97 103 101 95 85 90 84     Vital Signs:   07/08/20 1200  98 1 °F (36 7 °C)  110Abnormal   23Abnormal   138/66  94  98 %  28  5 L/min  Trach mask  Lying   07/08/20 1100    106Abnormal   26Abnormal   152/73  92  94 %        Lying   07/08/20 1040  98 °F (36 7 °C)                     07/08/20 1000    96  24Abnormal   131/74  94  96 %  28  5 L/min  Trach mask         Med surg  Trach collar oxygen   Respiratory protocol  Medications:   Scheduled Medications:    Medications:  chlorhexidine 15 mL Swish & Spit Q12H [address]   enoxaparin 1 mg/kg Subcutaneous Q12H DEAN   levalbuterol 1 25 mg Nebulization TID   melatonin 6 mg Oral HS   nystatin 500,000 Units Swish & Swallow 4x Daily   polyethylene glycol 17 g Oral Daily   QUEtiapine 50 mg Oral HS   senna-docusate sodium 1 tablet Per NG Tube BID   sodium chloride 3 mL Nebulization TID   valproic acid 250 mg Per G Tube Q12H [address]     Continuous IV Infusions:     PRN Meds:    acetaminophen 650 mg Rectal Q4H PRN   albuterol 2 5 mg Nebulization Q6H PRN   diazepam 2 5 mg Oral Q8H PRN   guaiFENesin 200 mg Oral Q4H PRN   HYDROmorphone 0 5 mg Intravenous Q3H PRN   Labetalol HCl 10 mg Intravenous Q6H PRN   Lidocaine Viscous HCl 15 mL Swish & Spit 4x Daily PRN   ondansetron 4 mg Intravenous Q6H PRN       Discharge Plan: tbd    Network Utilization Review Department  [email or web address]  ATTENTION: Please call with any questions or concerns to [phone] and carefully listen to the prompts so that you are directed to the right person   All voicemails are confidential   Avtar Fry all requests for admission clinical reviews, approved or denied determinations and any other requests to dedicated fax number below belonging to the campus where the patient is receiving treatment   List of dedicated fax numbers for the Facilities:  [address] [place] [address] DENIALS (Administrative/Medical Necessity) [phone]   [address]  (Maternity/NICU/Pediatrics) [phone]   Joshua Starr [phone]   [facility] [phone]   Errol Benz [phone]   [facility] [phone]   [address] [place] [address] [phone]   [facility]  [phone]   [address] [facility]  [address]01 [facility] [address] [facility] [phone]

## 2020-07-09 ENCOUNTER — APPOINTMENT (INPATIENT)
Dept: RADIOLOGY | Facility: HOSPITAL | Age: 70
DRG: 003 | End: 2020-07-09
Payer: COMMERCIAL

## 2020-07-09 PROCEDURE — 94669 MECHANICAL CHEST WALL OSCILL: CPT

## 2020-07-09 PROCEDURE — 94760 N-INVAS EAR/PLS OXIMETRY 1: CPT

## 2020-07-09 PROCEDURE — 99024 POSTOP FOLLOW-UP VISIT: CPT | Performed by: THORACIC SURGERY (CARDIOTHORACIC VASCULAR SURGERY)

## 2020-07-09 PROCEDURE — 99232 SBSQ HOSP IP/OBS MODERATE 35: CPT | Performed by: INTERNAL MEDICINE

## 2020-07-09 PROCEDURE — 94640 AIRWAY INHALATION TREATMENT: CPT

## 2020-07-09 PROCEDURE — 70450 CT HEAD/BRAIN W/O DYE: CPT

## 2020-07-09 PROCEDURE — 99291 CRITICAL CARE FIRST HOUR: CPT | Performed by: EMERGENCY MEDICINE

## 2020-07-09 RX ORDER — VALPROIC ACID 250 MG/5ML
250 SOLUTION ORAL
Status: DISCONTINUED | OUTPATIENT
Start: 2020-07-09 | End: 2020-07-10

## 2020-07-09 RX ORDER — LORAZEPAM 2 MG/ML
INJECTION INTRAMUSCULAR
Status: COMPLETED
Start: 2020-07-09 | End: 2020-07-09

## 2020-07-09 RX ORDER — LORAZEPAM 2 MG/ML
2 INJECTION INTRAMUSCULAR ONCE
Status: COMPLETED | OUTPATIENT
Start: 2020-07-09 | End: 2020-07-09

## 2020-07-09 RX ORDER — HYDROMORPHONE HCL/PF 1 MG/ML
0.2 SYRINGE (ML) INJECTION
Status: DISCONTINUED | OUTPATIENT
Start: 2020-07-09 | End: 2020-07-10

## 2020-07-09 RX ORDER — SODIUM CHLORIDE, SODIUM GLUCONATE, SODIUM ACETATE, POTASSIUM CHLORIDE, MAGNESIUM CHLORIDE, SODIUM PHOSPHATE, DIBASIC, AND POTASSIUM PHOSPHATE .53; .5; .37; .037; .03; .012; .00082 G/100ML; G/100ML; G/100ML; G/100ML; G/100ML; G/100ML; G/100ML
500 INJECTION, SOLUTION INTRAVENOUS ONCE
Status: COMPLETED | OUTPATIENT
Start: 2020-07-09 | End: 2020-07-09

## 2020-07-09 RX ADMIN — VALPROIC ACID 250 MG: 500 SOLUTION ORAL at 08:38

## 2020-07-09 RX ADMIN — CHLORHEXIDINE GLUCONATE 0.12% ORAL RINSE 15 ML: 1.2 LIQUID ORAL at 08:38

## 2020-07-09 RX ADMIN — LORAZEPAM 1 MG: 2 INJECTION INTRAMUSCULAR; INTRAVENOUS at 16:44

## 2020-07-09 RX ADMIN — LEVALBUTEROL HYDROCHLORIDE 1.25 MG: 1.25 SOLUTION, CONCENTRATE RESPIRATORY (INHALATION) at 07:50

## 2020-07-09 RX ADMIN — ISODIUM CHLORIDE 3 ML: 0.03 SOLUTION RESPIRATORY (INHALATION) at 07:50

## 2020-07-09 RX ADMIN — HYDROMORPHONE HYDROCHLORIDE 0.2 MG: 1 INJECTION, SOLUTION INTRAMUSCULAR; INTRAVENOUS; SUBCUTANEOUS at 15:57

## 2020-07-09 RX ADMIN — SODIUM CHLORIDE, SODIUM GLUCONATE, SODIUM ACETATE, POTASSIUM CHLORIDE, MAGNESIUM CHLORIDE, SODIUM PHOSPHATE, DIBASIC, AND POTASSIUM PHOSPHATE 500 ML: .53; .5; .37; .037; .03; .012; .00082 INJECTION, SOLUTION INTRAVENOUS at 04:44

## 2020-07-09 RX ADMIN — LEVALBUTEROL HYDROCHLORIDE 1.25 MG: 1.25 SOLUTION, CONCENTRATE RESPIRATORY (INHALATION) at 13:25

## 2020-07-09 RX ADMIN — MELATONIN 6 MG: at 21:04

## 2020-07-09 RX ADMIN — NYSTATIN 500000 UNITS: 100000 SUSPENSION ORAL at 12:11

## 2020-07-09 RX ADMIN — NYSTATIN 500000 UNITS: 100000 SUSPENSION ORAL at 21:08

## 2020-07-09 RX ADMIN — NYSTATIN 500000 UNITS: 100000 SUSPENSION ORAL at 17:04

## 2020-07-09 RX ADMIN — ISODIUM CHLORIDE 3 ML: 0.03 SOLUTION RESPIRATORY (INHALATION) at 13:25

## 2020-07-09 RX ADMIN — VALPROIC ACID 250 MG: 500 SOLUTION ORAL at 21:08

## 2020-07-09 RX ADMIN — ENOXAPARIN SODIUM 100 MG: 100 INJECTION SUBCUTANEOUS at 20:57

## 2020-07-09 RX ADMIN — CHLORHEXIDINE GLUCONATE 0.12% ORAL RINSE 15 ML: 1.2 LIQUID ORAL at 20:57

## 2020-07-09 RX ADMIN — GUAIFENESIN 200 MG: 100 SOLUTION ORAL at 17:04

## 2020-07-09 RX ADMIN — LORAZEPAM 1 MG: 2 INJECTION INTRAMUSCULAR at 16:44

## 2020-07-09 RX ADMIN — QUETIAPINE FUMARATE 50 MG: 25 TABLET ORAL at 21:04

## 2020-07-09 RX ADMIN — NYSTATIN 500000 UNITS: 100000 SUSPENSION ORAL at 08:38

## 2020-07-09 RX ADMIN — ENOXAPARIN SODIUM 100 MG: 100 INJECTION SUBCUTANEOUS at 08:38

## 2020-07-09 RX ADMIN — LEVALBUTEROL HYDROCHLORIDE 1.25 MG: 1.25 SOLUTION, CONCENTRATE RESPIRATORY (INHALATION) at 19:56

## 2020-07-09 RX ADMIN — ISODIUM CHLORIDE 3 ML: 0.03 SOLUTION RESPIRATORY (INHALATION) at 19:56

## 2020-07-09 NOTE — PROGRESS NOTES
Progress Note - Thoracic Surgery   Akosua Shannon 71 y o  male MRN: [id number]  Unit/Bed#: [facility] 516-01 Encounter: [id number]    Assessment:  71 M s/p VATS Left upper lobectomy and prolonged air leak, c/b cardiac arrest, cricothyroidotomy s/p tracheostomy revision    Plan:  Monitor off sedation  Continue aggressive pulmonary toilet, including chest physiotherapy, nebulizers  Wean oxygen  Continue tube feeds at goal  Continue therapeutic Lovenox  Appreciate supportive care    Subjective/Objective     Subjective: Desaturation yesterday, improved after suctioning  Otherwise no acute events overnight  Remains encephalopathic    Objective:    Blood pressure (!) 166/101, pulse 100, temperature 97 5 °F (36 4 °C), temperature source Oral, resp  rate (!) 23, height 5' 10" (1 778 m), weight 88 5 kg (195 lb 1 7 oz), SpO2 97 %  ,Body mass index is 27 99 kg/m²        Intake/Output Summary (Last 24 hours) at 7/9/2020 0640  Last data filed at 7/9/2020 0545  Gross per 24 hour   Intake 2256 ml   Output 1685 ml   Net 571 ml       Invasive Devices     Peripherally Inserted Central Catheter Line            PICC Line 34/49/72 Left Basilic 12 days          Drain            NG/OG/Enteral Tube Nasogastric Right nares 10 days    External Urinary Catheter Medium 3 days          Airway            Surgical Airway Shiley Cuffed 16 days                Physical Exam:   General: NAD, resting comfortably  Shakes head to questions  Eyes: PERRL  ENT: moist mucous membranes  Neck: supple, trach in place  CV: RRR +S1/S2  Chest: breath sounds bilaterally, incisions c/d/i  Abdomen: soft, NT ND  Extremities: atraumatic, no edema      Results from last 7 days   Lab Units 07/08/20  0527 07/07/20  0454 07/06/20  0544   WBC Thousand/uL 8 23 6 95 7 43   HEMOGLOBIN g/dL 10 4* 9 9* 10 3*   HEMATOCRIT % 33 5* 32 1* 32 4*   PLATELETS Thousands/uL 519* 505* 506*     Results from last 7 days   Lab Units 07/08/20  1549 07/08/20  0527 07/07/20  0454   POTASSIUM mmol/L 4 2 3 7 4 2   CHLORIDE mmol/L 109* 108 105   CO2 mmol/L 27 31 30   BUN mg/dL 12 12 11   CREATININE mg/dL 0 70 0 79 0 63   CALCIUM mg/dL 9 5 9 6 8 6

## 2020-07-09 NOTE — PROGRESS NOTES
Progress note - Palliative and Supportive Care   Lux Womack 71 y o  male [id number]    Assessment:    -   Patient Active Problem List   Diagnosis    Community acquired pneumonia    Abnormal computed tomography angiography (CTA)    Alcohol abuse    Malignant neoplasm of upper lobe of left lung (HCC)    Tobacco abuse    Acute respiratory failure with hypoxia (HCC)    Subcutaneous emphysema (HCC)    Acute deep vein thrombosis (DVT) of axillary vein of right upper extremity (HCC)    Acute deep vein thrombosis (DVT) of brachial vein of right upper extremity (HCC)    Trache[facility] in place ([facility] [address] )    Postprocedural pneumothorax    Acute deep vein thrombosis (DVT) of calf muscle vein of left lower extremity ([address] )    Pneumonia         Plan:  1  Symptom management:    - Continue to aggressively wean opioids-     Decrease IV Dilaudid to 0 2 mg q3H PRN (OME yesterday 46 down from 100)   - D/C Valium    - Decrease Depakote to HS   - Continue Seroquel   - Continue melatonin 6 mg   - Delirium precautions    2  Goals of care: Level 2, no PEG  3  Psychosocial support: Left VM with daughter to update on care plan  Interval history:      Patient has ongoing mixed delirium with reversal of sleep/wake cycle  Appears a little more alert today with attempts to open his eyes to his name       MEDICATIONS / ALLERGIES:     all current active meds have been reviewed and current meds:   Current Facility-Administered Medications   Medication Dose Route Frequency    acetaminophen (TYLENOL) rectal suppository 650 mg  650 mg Rectal Q4H PRN    albuterol inhalation solution 2 5 mg  2 5 mg Nebulization Q6H PRN    chlorhexidine (PERIDEX) 0 12 % oral rinse 15 mL  15 mL Swish & Spit Q12H [facility]    enoxaparin (LOVENOX) subcutaneous injection 100 mg  1 mg/kg Subcutaneous Q12H [facility]    guaiFENesin (ROBITUSSIN) oral solution 200 mg  200 mg Oral Q4H PRN    HYDROmorphone (DILAUDID) injection 0 2 mg  0 2 mg Intravenous Q3H PRN    Labetalol HCl (NORMODYNE) injection 10 mg  10 mg Intravenous Q6H PRN    levalbuterol (XOPENEX) inhalation solution 1 25 mg  1 25 mg Nebulization TID    Lidocaine Viscous HCl (XYLOCAINE) 2 % mucosal solution 15 mL  15 mL Swish & Spit 4x Daily PRN    melatonin tablet 6 mg  6 mg Oral HS    nystatin (MYCOSTATIN) oral suspension 500,000 Units  500,000 Units Swish & Swallow 4x Daily    ondansetron (ZOFRAN) injection 4 mg  4 mg Intravenous Q6H PRN    polyethylene glycol (MIRALAX) packet 17 g  17 g Oral Daily    QUEtiapine (SEROquel) tablet 50 mg  50 mg Oral HS    senna-docusate sodium (SENOKOT S) 8 6-50 mg per tablet 1 tablet  1 tablet Per NG Tube BID    sodium chloride 0 9 % inhalation solution 3 mL  3 mL Nebulization TID    valproic acid (DEPAKENE) 250 MG/5ML oral soln 250 mg  250 mg Per G Tube HS       No Known Allergies    OBJECTIVE:    Physical Exam  Physical Exam   Constitutional: No distress  HENT:   Head: Normocephalic and atraumatic  Right Ear: External ear normal    Left Ear: External ear normal    Nose: Nose normal    Eyes: EOM are normal  Right eye exhibits no discharge  Left eye exhibits no discharge  No scleral icterus  Neck:   Trach in place   Cardiovascular: Normal rate, regular rhythm and intact distal pulses  Pulmonary/Chest: Effort normal and breath sounds normal  No respiratory distress  Abdominal: Soft  Bowel sounds are normal  He exhibits no distension  Musculoskeletal: He exhibits no edema  Neurological:   Moving all extremities, attempted to open eyes to name   Skin: Skin is warm and dry  There is pallor  Nursing note and vitals reviewed  Lab Results:   I have personally reviewed pertinent labs  , CBC:   No results found for: WBC, HGB, HCT, MCV, PLT, ADJUSTEDWBC, MCH, MCHC, RDW, MPV, NRBC, CMP:   Lab Results   Component Value Date    SODIUM 142 07/08/2020    K 4 2 07/08/2020     (H) 07/08/2020    CO2 27 07/08/2020    BUN 12 07/08/2020    CREATININE 0 70 07/08/2020 CALCIUM 9 5 07/08/2020    EGFR 96 07/08/2020   , PT/PTT:No results found for: PT, PTT  Imaging Studies: reviewed  EKG, Pathology, and Other Studies: reviewed    Counseling / Coordination of Care    Total floor / unit time spent today 25+ minutes  Greater than 50% of total time was spent with the patient and / or family counseling and / or coordination of care  A description of the counseling / coordination of care: chart review, medication review with changes, family phone conference, supportive listening

## 2020-07-09 NOTE — PLAN OF CARE
Problem: CARDIOVASCULAR - ADULT  Goal: Maintains optimal cardiac output and hemodynamic stability  Description  INTERVENTIONS:  - Monitor I/O, vital signs and rhythm  - Monitor for S/S and trends of decreased cardiac output  - Administer and titrate ordered vasoactive medications to optimize hemodynamic stability  - Assess quality of pulses, skin color and temperature  - Assess for signs of decreased coronary artery perfusion  - Instruct patient to report change in severity of symptoms  Outcome: Progressing  Goal: Absence of cardiac dysrhythmias or at baseline rhythm  Description  INTERVENTIONS:  - Continuous cardiac monitoring, vital signs, obtain 12 lead EKG if ordered  - Administer antiarrhythmic and heart rate control medications as ordered  - Monitor electrolytes and administer replacement therapy as ordered  Outcome: Progressing     Problem: RESPIRATORY - ADULT  Goal: Achieves optimal ventilation and oxygenation  Description  INTERVENTIONS:  - Assess for changes in respiratory status  - Assess for changes in mentation and behavior  - Position to facilitate oxygenation and minimize respiratory effort  - Oxygen administered by appropriate delivery if ordered  - Initiate smoking cessation education as indicated  - Encourage broncho-pulmonary hygiene including cough, deep breathe, Incentive Spirometry  - Assess the need for suctioning and aspirate as needed  - Assess and instruct to report SOB or any respiratory difficulty  - Respiratory Therapy support as indicated  Outcome: Progressing     Problem: SKIN/TISSUE INTEGRITY - ADULT  Goal: Skin integrity remains intact  Description  INTERVENTIONS  - Identify patients at risk for skin breakdown  - Assess and monitor skin integrity  - Assess and monitor nutrition and hydration status  - Monitor labs (i e  albumin)  - Assess for incontinence   - Turn and reposition patient  - Assist with mobility/ambulation  - Relieve pressure over bony prominences  - Avoid friction and shearing  - Provide appropriate hygiene as needed including keeping skin clean and dry  - Evaluate need for skin moisturizer/barrier cream  - Collaborate with interdisciplinary team (i e  Nutrition, Rehabilitation, etc )   - Patient/family teaching  Outcome: Progressing  Goal: Incision(s), wounds(s) or drain site(s) healing without S/S of infection  Description  INTERVENTIONS  - Assess and document risk factors for skin impairment   - Assess and document dressing, incision, wound bed, drain sites and surrounding tissue  - Consider nutrition services referral as needed  - Oral mucous membranes remain intact  - Provide patient/ family education  Outcome: Progressing  Goal: Oral mucous membranes remain intact  Description  INTERVENTIONS  - Assess oral mucosa and hygiene practices  - Implement preventative oral hygiene regimen  - Implement oral medicated treatments as ordered  - Initiate Nutrition services referral as needed  Outcome: Progressing     Problem: MUSCULOSKELETAL - ADULT  Goal: Maintain or return mobility to safest level of function  Description  INTERVENTIONS:  - Assess patient's ability to carry out ADLs; assess patient's baseline for ADL function and identify physical deficits which impact ability to perform ADLs (bathing, care of mouth/teeth, toileting, grooming, dressing, etc )  - Assess/evaluate cause of self-care deficits   - Assess range of motion  - Assess patient's mobility  - Assess patient's need for assistive devices and provide as appropriate  - Encourage maximum independence but intervene and supervise when necessary  - Involve family in performance of ADLs  - Assess for home care needs following discharge   - Consider OT consult to assist with ADL evaluation and planning for discharge  - Provide patient education as appropriate  Outcome: Progressing  Goal: Maintain proper alignment of affected body part  Description  INTERVENTIONS:  - Support, maintain and protect limb and body alignment  - Provide patient/ family with appropriate education  Outcome: Progressing     Problem: Potential for Falls  Goal: Patient will remain free of falls  Description  INTERVENTIONS:  - Assess patient frequently for physical needs  -  Identify cognitive and physical deficits and behaviors that affect risk of falls  -  [place] fall precautions as indicated by assessment   - Educate patient/family on patient safety including physical limitations  - Instruct patient to call for assistance with activity based on assessment  - Modify environment to reduce risk of injury  - Consider OT/PT consult to assist with strengthening/mobility  Outcome: Progressing     Problem: Prexisting or High Potential for Compromised Skin Integrity  Goal: Skin integrity is maintained or improved  Description  INTERVENTIONS:  - Identify patients at risk for skin breakdown  - Assess and monitor skin integrity  - Assess and monitor nutrition and hydration status  - Monitor labs   - Assess for incontinence   - Turn and reposition patient  - Assist with mobility/ambulation  - Relieve pressure over bony prominences  - Avoid friction and shearing  - Provide appropriate hygiene as needed including keeping skin clean and dry  - Evaluate need for skin moisturizer/barrier cream  - Collaborate with interdisciplinary team   - Patient/family teaching  - Consider wound care consult   Outcome: Progressing     Problem: Nutrition/Hydration-ADULT  Goal: Nutrient/Hydration intake appropriate for improving, restoring or maintaining nutritional needs  Description  Monitor and assess patient's nutrition/hydration status for malnutrition  Collaborate with interdisciplinary team and initiate plan and interventions as ordered  Monitor patient's weight and dietary intake as ordered or per policy  Utilize nutrition screening tool and intervene as necessary  Determine patient's food preferences and provide high-protein, high-caloric foods as appropriate  INTERVENTIONS:  - Monitor oral intake, urinary output, labs, and treatment plans  - Assess nutrition and hydration status and recommend course of action  - Evaluate amount of meals eaten  - Assist patient with eating if necessary   - Allow adequate time for meals  - Recommend/ encourage appropriate diets, oral nutritional supplements, and vitamin/mineral supplements  - Order, calculate, and assess calorie counts as needed  - Recommend, monitor, and adjust tube feedings and TPN/PPN based on assessed needs  - Assess need for intravenous fluids  - Provide specific nutrition/hydration education as appropriate  - Include patient/family/caregiver in decisions related to nutrition  Outcome: Progressing     Problem: SAFETY,RESTRAINT: NV/NON-SELF DESTRUCTIVE BEHAVIOR  Goal: Remains free of harm/injury (restraint for non violent/non self-detsructive behavior)  Description  INTERVENTIONS:  - Instruct patient/family regarding restraint use   - Assess and monitor physiologic and psychological status   - Provide interventions and comfort measures to meet assessed patient needs   - Identify and implement measures to help patient regain control  - Assess readiness for release of restraint   Outcome: Progressing  Goal: Returns to optimal restraint-free functioning  Description  INTERVENTIONS:  - Assess the patient's behavior and symptoms that indicate continued need for restraint  - Identify and implement measures to help patient regain control  - Assess readiness for release of restraint   Outcome: Progressing     Problem: Knowledge Deficit  Goal: Patient/family/caregiver demonstrates understanding of disease process, treatment plan, medications, and discharge instructions  Description  Complete learning assessment and assess knowledge base    Interventions:  - Provide teaching at level of understanding  - Provide teaching via preferred learning methods  Outcome: Progressing

## 2020-07-09 NOTE — PROGRESS NOTES
Daily Progress Note - Critical Care   Santiago Howard 71 y o  male MRN: [id number]  Unit/Bed#: [facility] 516-01 Encounter: [id number]        ----------------------------------------------------------------------------------------  HPI/24hr events:  Yesterday patient had a sudden drop in saturations to the 70s, supplemental O2 was increased and the patient was suction and  Saturations improved  No acute events overnight     ---------------------------------------------------------------------------------------  SUBJECTIVE  Afebrile, good urine output, tolerating trach collar  Review of Systems  Review of systems was unable to be performed secondary to Patient factors  ---------------------------------------------------------------------------------------  Assessment and Plan:    · Neuro:   ? Dx:  Encephalopathy, delirium versus hypoxic brain injury status post cardiac arrest as below  § Frequent redirection  § Wean sedation  ? Analgesia:  Tylenol 650 mg Q4H prn mild pain, Dilaudid 0 5mg Q1H breakthrough pain  ? Sedation - Valium 2 5 mg Q8H yesterday  Consider d/c today  Seroquel 50 mg QHS, melatonin, Depakene 250 mg Q8H  § If neuro exam does not improve after sedation weaned consider CT head  ? Nausea: PRN zofran  ? Delirium ppx: CAM-ICU, sleep hygiene  · CV:   ? Dx:  Tachycardia and hypertension likely secondary to agitation versus delirium  § Improving  § Continue to monitor and treat underlying causes  § Labetalol 10mg Q6H prn  ? Dx:  S/p cardiac arrest 6/28 due to hypoxia/hypercarbia likely secondary to subcutaneous emphysema obstructing airway  § Subcutaneous emphysema resolved the continue to monitor in setting of chest tube removal  ? MAP goal > 65   · Lung:   ?  Dx:  Status post left upper VATS lobectomy for a section of lung cancer, complicated by subcutaneous emphysema  § Chest tube discontinued 7/5 by thoracic surgery  § Status post cricothyrotomy for hypoxic/hypercarbic cardiac arrest, converted to from trach  § Continue trach collar   § Continue Respiratory Protocol and Airway Clearance Protocol  § Albuterol t i d   · GI:   ? Stress ulcer ppx:  None indicated  ? Bowel regimen:  Senakot, MiraLax   § Three bowel movements recorded  · FEN:   ? Fluids:  Free water flushes 100 mL q 4h  ? Electrolytes: trend and replete as needed  ? Nutrition:  Tube feeds at goal 55 mL/hr  · :   ? No acute issues  ? Monitor I&O's, BUN/Cr pending  § UOP 1700 ML/24 HOURS  · ID:   ? No acute issues  ? Monitor WBC/temp curve  · Heme:   ? Dx:  Active DVTs  ? Continue Lovenox  ? Dx Anemia  ? Stable, Hbg  pending, 10 4 yesterday  ? Dx: thrombocytosis  ? Platelets pending, 032 yesterday  ? DVT ppx: lovenox, SCDs   · Endo:   ? No active issues  · Msk/Skin:   ? PT/OT  ? Turning/repositioning  ? Wound care       Disposition: Continue Critical Care   Code Status: Level 2 - DNAR: but accepts endotracheal intubation  ---------------------------------------------------------------------------------------  ICU CORE MEASURES    Prophylaxis   VTE Pharmacologic Prophylaxis: Enoxaparin (Lovenox)  VTE Mechanical Prophylaxis: sequential compression device  Stress Ulcer Prophylaxis: Prophylaxis Not Indicated     ABCDE Protocol (if indicated)  Plan to perform spontaneous awakening trial today? Not applicable  Plan to perform spontaneous breathing trial today? Not applicable  Obvious barriers to extubation? Not applicable  CAM-ICU: Positive    Invasive Devices Review  Invasive Devices     Peripherally Inserted Central Catheter Line            PICC Line 79/42/98 Left Basilic 12 days          Drain            NG/OG/Enteral Tube Nasogastric Right nares 10 days    External Urinary Catheter Medium 3 days          Airway            Surgical Airway Shiley Cuffed 16 days              Can any invasive devices be discontinued today?  No  ---------------------------------------------------------------------------------------  OBJECTIVE    Vitals   Vitals: 20 0300 20 0400 20 0500 20 0600   BP: (!) 104/43 99/51 118/77 (!) 166/101   BP Location:  Left arm     Pulse: 98 96 96 100   Resp: (!) 27 (!) 24 (!) 23 (!) 23   Temp:  97 5 °F (36 4 °C)     TempSrc:  Oral     SpO2: 96% 95% 100% 97%   Weight:    88 5 kg (195 lb 1 7 oz)   Height:         Temp (24hrs), Av 1 °F (36 7 °C), Min:97 5 °F (36 4 °C), Max:99 1 °F (37 3 °C)  Current: Temperature: 97 5 °F (36 4 °C)    Respiratory:  SpO2: SpO2: 97 %, SpO2 Activity: SpO2 Activity: At Rest, SpO2 Device: O2 Device: Trach mask, Capnography:         Invasive/non-invasive ventilation settings   Respiratory    Lab Data (Last 4 hours)    None         O2/Vent Data (Last 4 hours)    None                Physical Exam   Constitutional: He appears well-developed and well-nourished  No distress  HENT:   Head: Normocephalic and atraumatic  NG tube in place   Eyes: Pupils are equal, round, and reactive to light  Conjunctivae and EOM are normal  No scleral icterus  Neck: Normal range of motion  Neck supple  No JVD present  No tracheal deviation present  Tracheostomy in place   Cardiovascular: Normal rate, regular rhythm and intact distal pulses  Pulmonary/Chest: Effort normal  No respiratory distress  He exhibits no tenderness  Incisions CDI   Abdominal: Soft  He exhibits no distension  There is no tenderness  Genitourinary:   Genitourinary Comments: [place]   Musculoskeletal: He exhibits no edema or tenderness  Neurological:   Awakens to voice, not following commands   Skin: Skin is warm and dry  Psychiatric:   Unable to assess   Nursing note and vitals reviewed        Laboratory and Diagnostics:  Results from last 7 days   Lab Units 20  0527 20  0454 20  0544 20  0448 20  0451 20  0438   WBC Thousand/uL 8 23 6 95 7 43 7 48 7 14 7 27   HEMOGLOBIN g/dL 10 4* 9 9* 10 3* 9 9* 10 2* 8 9*   HEMATOCRIT % 33 5* 32 1* 32 4* 31 4* 32 2* 28 0*   PLATELETS Thousands/uL 519* 505* 506* 467* 409* 375   NEUTROS PCT % 60 54 60 65 67 72   MONOS PCT % 13* 13* 10 9 9 8     Results from last 7 days   Lab Units 07/08/20  1549 07/08/20  0527 07/07/20  0454 07/06/20  0544 07/05/20  1503 07/05/20  0448 07/04/20  0451 07/03/20  0438   SODIUM mmol/L 142 142 139 140 141 141 140 139   POTASSIUM mmol/L 4 2 3 7 4 2 4 0 4 1 3 8 3 9 3 6   CHLORIDE mmol/L 109* 108 105 107 109* 108 107 106   CO2 mmol/L 27 31 30 27 28 27 29 27   ANION GAP mmol/L 6 3* 4 6 4 6 4 6   BUN mg/dL 12 12 11 8 6 6 5 8   CREATININE mg/dL 0 70 0 79 0 63 0 61 0 58* 0 56* 0 49* 0 52*   CALCIUM mg/dL 9 5 9 6 8 6 8 3 8 8 8 8 8 6 8 4   GLUCOSE RANDOM mg/dL 119 109 90 97 103 101 95 85   ALT U/L  --   --   --   --   --   --   --  29   AST U/L  --   --   --   --   --   --   --  20   ALK PHOS U/L  --   --   --   --   --   --   --  97   ALBUMIN g/dL  --   --   --   --   --   --   --  1 8*   TOTAL BILIRUBIN mg/dL  --   --   --   --   --   --   --  0 42     Results from last 7 days   Lab Units 07/08/20  1549 07/08/20  0527 07/07/20  0454 07/05/20  0448 07/03/20  0438   MAGNESIUM mg/dL 2 3 2 3 2 3 2 2 2 2   PHOSPHORUS mg/dL  --  4 9* 5 0* 4 5* 3 3                   ABG:    VBG:          Micro        EKG: None  Imaging:   Procedure: Xr Chest Portable    Result Date: 7/8/2020  Narrative: CHEST INDICATION:   desaturation  Left upper lobectomy on 6/10/2020 with prolonged air leak and subcutaneous emphysema  COMPARISON:  Chest radiograph from 7/6/2020 and chest CT from 6/29/2020  EXAM PERFORMED/VIEWS:  XR CHEST PORTABLE FINDINGS:  Left PICC in upper SVC  Tracheostomy well-positioned  NG tube in stomach  Cardiomediastinal silhouette appears unremarkable  Hazy opacification of the left hemithorax, likely due to a pleural effusion  No pneumothorax  Osseous structures appear within normal limits for patient age  Impression: No change from 7/6/2020 with hazy opacity of the left hemithorax likely due to a pleural effusion   Workstation performed: [id number] Procedure: Xr Chest Portable    Result Date: 7/6/2020  Narrative: CHEST INDICATION:   follow up chest tube  COMPARISON:  7/5/2020; CT from 6/29/2020 EXAM PERFORMED/VIEWS:  XR CHEST PORTABLE FINDINGS:  Tracheostomy tube is present  PICC catheter is present with tip in SVC from a left-sided approach  A chest tube is not visible and was removed according to the prior history  Cardiomediastinal silhouette appears unremarkable  There is haziness of the left lung consistent with partial pneumonectomy and surgical clips present  Evidence of persistent atelectasis on the left is again noted with some improvement  Tracheostomy tube is also present  Nasogastric tube is present below the diaphragm  The right lung is clear without infiltrate or pneumothorax  Osseous structures appear within normal limits for patient age  Impression: Better aeration of the right lung is seen  Some improvement in aeration of the left lung with persistent hazy density may be on the basis of some residual effusion and atelectasis  Workstation performed: [id number]       Intake and Output  I/O       07/07 0701 - 07/08 0700 07/08 0701 - 07/09 0700    P  O  0     I V  (mL/kg)  20 (0 2)    NG/ 590    Feedings 1223 846    Total Intake(mL/kg) 1903 (20 1) 1456 (15 4)    Urine (mL/kg/hr) 3500 (1 5) 1685 (0 7)    Emesis/NG output 0 0    Stool 0 0    Total Output 3500 1685    Net -1597 -229          Unmeasured Stool Occurrence 1 x 2 x          Height and Weights   Height: 5' 10" (177 8 cm)  IBW: 73 kg  Body mass index is 27 99 kg/m²  Weight (last 2 days)     Date/Time   Weight    07/09/20 0600   88 5 (195 11)    07/07/20 0600   94 5 (208 33)                Nutrition       Diet Orders   (From admission, onward)             Start     Ordered    07/05/20 0847  Diet Enteral/Parenteral; Tube Feeding No Oral Diet; Jevity 1 5; Continuous; 55; Prosource Protein Liquid - Two Packets; 100;  Water; Every 4 hours  Diet effective now     Question Answer Comment   Diet Type Enteral/Parenteral    Enteral/Parenteral Tube Feeding No Oral Diet    Tube Feeding Formula: Jevity 1 5    Bolus/Cyclic/Continuous Continuous    Tube Feeding Goal Rate (mL/hr): 55    Prosource Protein Liquid - No Carb Prosource Protein Liquid - Two Packets    Tube Feeding water flush (mL): 100    Water Flush type: Water    Water flush frequency: Every 4 hours    RD to adjust diet per protocol?  Yes        07/05/20 0846                Active Medications  Scheduled Meds:    Current Facility-Administered Medications:  acetaminophen 650 mg Rectal Q4H PRN Desi Goodson PA-C   albuterol 2 5 mg Nebulization Q6H PRN Desi Goodson PA-C   chlorhexidine 15 mL Swish & Spit Q12H [address] Desi Goodson PA-C   diazepam 2 5 mg Oral Q8H PRN Margret M Bendas, DO   enoxaparin 1 mg/kg Subcutaneous Q12H [address] Desi Goodson PA-C   guaiFENesin 200 mg Oral Q4H PRN Aisha Arredondo PA-C   HYDROmorphone 0 5 mg Intravenous Q3H PRN Margret M Bendas, DO   Labetalol HCl 10 mg Intravenous Q6H PRN Crystal Lank, DO   levalbuterol 1 25 mg Nebulization TID Desi Goodson PA-C   Lidocaine Viscous HCl 15 mL Swish & Spit 4x Daily PRN Desi Goodson PA-C   melatonin 6 mg Oral HS Margret M Bendas, DO   nystatin 500,000 Units Swish & Swallow 4x Daily Desi Goodson PA-C   ondansetron 4 mg Intravenous Q6H PRN Desi Goodson PA-C   polyethylene glycol 17 g Oral Daily Desi Goodson PA-C   QUEtiapine 50 mg Oral HS Desi Goodson PA-C   senna-docusate sodium 1 tablet Per NG Tube BID Desi Goodson PA-C   sodium chloride 3 mL Nebulization TID Desi Goodson PA-C   valproic acid 250 mg Per G Tube Q12H [address] Margret M Bendas, DO     Continuous Infusions:     PRN Meds:     acetaminophen 650 mg Q4H PRN   albuterol 2 5 mg Q6H PRN   diazepam 2 5 mg Q8H PRN   guaiFENesin 200 mg Q4H PRN   HYDROmorphone 0 5 mg Q3H PRN   Labetalol HCl 10 mg Q6H PRN   Lidocaine Viscous HCl 15 mL 4x Daily PRN   ondansetron 4 mg Q6H PRN       Allergies   No Known Allergies  ---------------------------------------------------------------------------------------  Advance Directive and Living Will: Yes    Power of : Yes  POLST:    ---------------------------------------------------------------------------------------  Care Time Delivered: > 40 min    Khushi Singh MD      Portions of the record may have been created with voice recognition software  Occasional wrong word or "sound a like" substitutions may have occurred due to the inherent limitations of voice recognition software    Read the chart carefully and recognize, using context, where substitutions have occurred

## 2020-07-10 LAB
ALBUMIN SERPL BCP-MCNC: 2.1 G/DL (ref 3.5–5)
ALP SERPL-CCNC: 70 U/L (ref 46–116)
ALT SERPL W P-5'-P-CCNC: 17 U/L (ref 12–78)
ANION GAP SERPL CALCULATED.3IONS-SCNC: 7 MMOL/L (ref 4–13)
AST SERPL W P-5'-P-CCNC: 10 U/L (ref 5–45)
BASOPHILS # BLD AUTO: 0.03 THOUSANDS/ΜL (ref 0–0.1)
BASOPHILS NFR BLD AUTO: 0 % (ref 0–1)
BILIRUB SERPL-MCNC: 0.17 MG/DL (ref 0.2–1)
BUN SERPL-MCNC: 16 MG/DL (ref 5–25)
CALCIUM SERPL-MCNC: 8.1 MG/DL (ref 8.3–10.1)
CHLORIDE SERPL-SCNC: 109 MMOL/L (ref 100–108)
CO2 SERPL-SCNC: 27 MMOL/L (ref 21–32)
CREAT SERPL-MCNC: 0.68 MG/DL (ref 0.6–1.3)
EOSINOPHIL # BLD AUTO: 0.11 THOUSAND/ΜL (ref 0–0.61)
EOSINOPHIL NFR BLD AUTO: 1 % (ref 0–6)
ERYTHROCYTE [DISTWIDTH] IN BLOOD BY AUTOMATED COUNT: 14.4 % (ref 11.6–15.1)
GFR SERPL CREATININE-BSD FRML MDRD: 97 ML/MIN/1.73SQ M
GLUCOSE SERPL-MCNC: 87 MG/DL (ref 65–140)
HCT VFR BLD AUTO: 29 % (ref 36.5–49.3)
HGB BLD-MCNC: 9 G/DL (ref 12–17)
IMM GRANULOCYTES # BLD AUTO: 0.1 THOUSAND/UL (ref 0–0.2)
IMM GRANULOCYTES NFR BLD AUTO: 1 % (ref 0–2)
LYMPHOCYTES # BLD AUTO: 1.86 THOUSANDS/ΜL (ref 0.6–4.47)
LYMPHOCYTES NFR BLD AUTO: 24 % (ref 14–44)
MAGNESIUM SERPL-MCNC: 2.4 MG/DL (ref 1.6–2.6)
MCH RBC QN AUTO: 30.7 PG (ref 26.8–34.3)
MCHC RBC AUTO-ENTMCNC: 31 G/DL (ref 31.4–37.4)
MCV RBC AUTO: 99 FL (ref 82–98)
MONOCYTES # BLD AUTO: 0.97 THOUSAND/ΜL (ref 0.17–1.22)
MONOCYTES NFR BLD AUTO: 13 % (ref 4–12)
NEUTROPHILS # BLD AUTO: 4.67 THOUSANDS/ΜL (ref 1.85–7.62)
NEUTS SEG NFR BLD AUTO: 61 % (ref 43–75)
NRBC BLD AUTO-RTO: 0 /100 WBCS
PHOSPHATE SERPL-MCNC: 4.2 MG/DL (ref 2.3–4.1)
PLATELET # BLD AUTO: 432 THOUSANDS/UL (ref 149–390)
PMV BLD AUTO: 10.2 FL (ref 8.9–12.7)
POTASSIUM SERPL-SCNC: 4.1 MMOL/L (ref 3.5–5.3)
PROT SERPL-MCNC: 5.7 G/DL (ref 6.4–8.2)
RBC # BLD AUTO: 2.93 MILLION/UL (ref 3.88–5.62)
SODIUM SERPL-SCNC: 143 MMOL/L (ref 136–145)
WBC # BLD AUTO: 7.74 THOUSAND/UL (ref 4.31–10.16)

## 2020-07-10 PROCEDURE — 99024 POSTOP FOLLOW-UP VISIT: CPT | Performed by: THORACIC SURGERY (CARDIOTHORACIC VASCULAR SURGERY)

## 2020-07-10 PROCEDURE — 84100 ASSAY OF PHOSPHORUS: CPT | Performed by: EMERGENCY MEDICINE

## 2020-07-10 PROCEDURE — 94669 MECHANICAL CHEST WALL OSCILL: CPT

## 2020-07-10 PROCEDURE — 99231 SBSQ HOSP IP/OBS SF/LOW 25: CPT | Performed by: INTERNAL MEDICINE

## 2020-07-10 PROCEDURE — 83735 ASSAY OF MAGNESIUM: CPT | Performed by: EMERGENCY MEDICINE

## 2020-07-10 PROCEDURE — 99233 SBSQ HOSP IP/OBS HIGH 50: CPT | Performed by: EMERGENCY MEDICINE

## 2020-07-10 PROCEDURE — 80053 COMPREHEN METABOLIC PANEL: CPT | Performed by: EMERGENCY MEDICINE

## 2020-07-10 PROCEDURE — 85025 COMPLETE CBC W/AUTO DIFF WBC: CPT | Performed by: EMERGENCY MEDICINE

## 2020-07-10 PROCEDURE — 94640 AIRWAY INHALATION TREATMENT: CPT

## 2020-07-10 PROCEDURE — 94760 N-INVAS EAR/PLS OXIMETRY 1: CPT

## 2020-07-10 RX ORDER — SODIUM CHLORIDE, SODIUM GLUCONATE, SODIUM ACETATE, POTASSIUM CHLORIDE, MAGNESIUM CHLORIDE, SODIUM PHOSPHATE, DIBASIC, AND POTASSIUM PHOSPHATE .53; .5; .37; .037; .03; .012; .00082 G/100ML; G/100ML; G/100ML; G/100ML; G/100ML; G/100ML; G/100ML
500 INJECTION, SOLUTION INTRAVENOUS ONCE
Status: COMPLETED | OUTPATIENT
Start: 2020-07-10 | End: 2020-07-10

## 2020-07-10 RX ORDER — OXYCODONE HCL 5 MG/5 ML
2.5 SOLUTION, ORAL ORAL EVERY 4 HOURS PRN
Status: DISCONTINUED | OUTPATIENT
Start: 2020-07-10 | End: 2020-07-30 | Stop reason: HOSPADM

## 2020-07-10 RX ORDER — METHYLPHENIDATE HYDROCHLORIDE 10 MG/1
5 TABLET ORAL
Status: DISCONTINUED | OUTPATIENT
Start: 2020-07-10 | End: 2020-07-12

## 2020-07-10 RX ADMIN — NYSTATIN 500000 UNITS: 100000 SUSPENSION ORAL at 11:32

## 2020-07-10 RX ADMIN — NYSTATIN 500000 UNITS: 100000 SUSPENSION ORAL at 21:04

## 2020-07-10 RX ADMIN — QUETIAPINE FUMARATE 50 MG: 25 TABLET ORAL at 21:04

## 2020-07-10 RX ADMIN — SODIUM CHLORIDE, SODIUM GLUCONATE, SODIUM ACETATE, POTASSIUM CHLORIDE, MAGNESIUM CHLORIDE, SODIUM PHOSPHATE, DIBASIC, AND POTASSIUM PHOSPHATE 500 ML: .53; .5; .37; .037; .03; .012; .00082 INJECTION, SOLUTION INTRAVENOUS at 04:24

## 2020-07-10 RX ADMIN — ENOXAPARIN SODIUM 90 MG: 100 INJECTION SUBCUTANEOUS at 21:05

## 2020-07-10 RX ADMIN — ISODIUM CHLORIDE 3 ML: 0.03 SOLUTION RESPIRATORY (INHALATION) at 19:12

## 2020-07-10 RX ADMIN — NYSTATIN 500000 UNITS: 100000 SUSPENSION ORAL at 17:19

## 2020-07-10 RX ADMIN — LEVALBUTEROL HYDROCHLORIDE 1.25 MG: 1.25 SOLUTION, CONCENTRATE RESPIRATORY (INHALATION) at 19:12

## 2020-07-10 RX ADMIN — SENNOSIDES AND DOCUSATE SODIUM 1 TABLET: 8.6; 5 TABLET ORAL at 08:11

## 2020-07-10 RX ADMIN — OXYCODONE HYDROCHLORIDE 2.5 MG: 5 SOLUTION ORAL at 21:04

## 2020-07-10 RX ADMIN — MELATONIN 6 MG: at 21:04

## 2020-07-10 RX ADMIN — LEVALBUTEROL HYDROCHLORIDE 1.25 MG: 1.25 SOLUTION, CONCENTRATE RESPIRATORY (INHALATION) at 13:15

## 2020-07-10 RX ADMIN — CHLORHEXIDINE GLUCONATE 0.12% ORAL RINSE 15 ML: 1.2 LIQUID ORAL at 21:04

## 2020-07-10 RX ADMIN — NYSTATIN 500000 UNITS: 100000 SUSPENSION ORAL at 08:16

## 2020-07-10 RX ADMIN — CHLORHEXIDINE GLUCONATE 0.12% ORAL RINSE 15 ML: 1.2 LIQUID ORAL at 08:11

## 2020-07-10 RX ADMIN — LEVALBUTEROL HYDROCHLORIDE 1.25 MG: 1.25 SOLUTION, CONCENTRATE RESPIRATORY (INHALATION) at 07:52

## 2020-07-10 RX ADMIN — METHYLPHENIDATE HYDROCHLORIDE 5 MG: 10 TABLET ORAL at 11:32

## 2020-07-10 RX ADMIN — SENNOSIDES AND DOCUSATE SODIUM 1 TABLET: 8.6; 5 TABLET ORAL at 17:18

## 2020-07-10 RX ADMIN — ENOXAPARIN SODIUM 100 MG: 100 INJECTION SUBCUTANEOUS at 08:19

## 2020-07-10 RX ADMIN — ISODIUM CHLORIDE 3 ML: 0.03 SOLUTION RESPIRATORY (INHALATION) at 07:52

## 2020-07-10 RX ADMIN — ISODIUM CHLORIDE 3 ML: 0.03 SOLUTION RESPIRATORY (INHALATION) at 13:15

## 2020-07-10 NOTE — UTILIZATION REVIEW
Continued Stay Review    Date: 6/20/2020                    Current Patient Class: IP  Current Level of Care: Critical care    HPI:69 y o  male initially admitted on  6/10/20 s/p mediastinoscopy, VATS, DOMI lobectomy with prolonged air leak, complicated by subcutaneous emphysema  On 6/20/20 SOB with SpO2 in 70's and progressed to bradycardia and code event requiring emergency trach  Moved to ICU with a-line and central line, levo/epi gtts for bp maintenance  Cric transitioned to trach with trach collar trials started on 6/24/20  CT d/c'd by CTSx on 7/5  Assessment/Plan: Pt continues with mixed feature delirium  Continue anxiolysis, pain control, delirium precautions  Palliative consulted and following  Attempt daytime wakefulness and uninterrupted sleep @ hs  Continue to aggressively wean opioids  D/c IV dilaudid and change to very lose oxy IR 2 5 mg prn  D/c Depakote to hs, initiate Ritalin 5 mg BID  Can consider switching trach to a cuffless when he is more awake - once mental status improves, trach wean will likely be expeditious  Currently on O2 @ 5L  Continue albuterol TID   lovenox and SCD's for DVT ppx  Continue off abx and monitor  Tolerating TF at goal via NGT  Monitor I/O's  Daughter does not pt to have peg tube if comes to that  Pertinent Labs/Diagnostic Results:   CT head 7/9 -- No acute intracranial abnormality    Results from last 7 days   Lab Units 07/10/20  0428 07/08/20  0527 07/07/20  0454 07/06/20  0544 07/05/20  0448   WBC Thousand/uL 7 74 8 23 6 95 7 43 7 48   HEMOGLOBIN g/dL 9 0* 10 4* 9 9* 10 3* 9 9*   HEMATOCRIT % 29 0* 33 5* 32 1* 32 4* 31 4*   PLATELETS Thousands/uL 432* 519* 505* 506* 467*   NEUTROS ABS Thousands/µL 4 67 5 01 3 77 4 50 4 92     Results from last 7 days   Lab Units 07/10/20  0428 07/08/20  1549 07/08/20  0527 07/07/20  0454 07/06/20  0544  07/05/20  0448   SODIUM mmol/L 143 142 142 139 140   < > 141   POTASSIUM mmol/L 4 1 4 2 3 7 4 2 4 0   < > 3 8 CHLORIDE mmol/L 109* 109* 108 105 107   < > 108   CO2 mmol/L 27 27 31 30 27   < > 27   ANION GAP mmol/L 7 6 3* 4 6   < > 6   BUN mg/dL 16 12 12 11 8   < > 6   CREATININE mg/dL 0 68 0 70 0 79 0 63 0 61   < > 0 56*   EGFR ml/min/1 73sq m 97 96 92 101 102   < > 106   CALCIUM mg/dL 8 1* 9 5 9 6 8 6 8 3   < > 8 8   MAGNESIUM mg/dL 2 4 2 3 2 3 2 3  --   --  2 2   PHOSPHORUS mg/dL 4 2*  --  4 9* 5 0*  --   --  4 5*    < > = values in this interval not displayed       Results from last 7 days   Lab Units 07/10/20  0428   AST U/L 10   ALT U/L 17   ALK PHOS U/L 70   TOTAL PROTEIN g/dL 5 7*   ALBUMIN g/dL 2 1*   TOTAL BILIRUBIN mg/dL 0 17*     Results from last 7 days   Lab Units 07/10/20  0428 07/08/20  1549 07/08/20  0527 07/07/20  0454 07/06/20  0544 07/05/20  1503 07/05/20  0448 07/04/20  0451   GLUCOSE RANDOM mg/dL 87 119 109 90 97 103 101 95       Vital Signs:  Time  Temp  Pulse  Resp  BP  MAP (mmHg)  SpO2  FiO2 (%)  O2 Flow Rate (L/min)  O2 Device   07/10/20 1618  97 7 °F (36 5 °C)  112Abnormal   27Abnormal   147/81  103  99 %  28    Trach mask   07/10/20 1518    108Abnormal   24Abnormal   142/68  95  98 %      Trach mask   07/10/20 1401    100  25Abnormal   150/81  113  100 %  28  5 L/min  Trach mask   07/10/20 1318    98  26Abnormal   150/81  113  100 %         07/10/20 1315    98        99 %  28  5 L/min  Trach mask   07/10/20 1218  98 3 °F (36 8 °C)  102  25Abnormal   135/75  107  100 %  28  5 L/min  Trach mask   07/10/20 1101    92  18  140/68  85  100 %  28  5 L/min  Trach mask   07/10/20 1003    92  23Abnormal       100 %  28  5 L/min  Trach mask   07/10/20 0900    94  26Abnormal   130/77  91  97 %  28  5 L/min  Trach mask   07/10/20 0845    96    130/77  91           07/10/20 0800    98  28Abnormal       100 %  28  5 L/min  Trach mask   07/10/20 0751    102        100 %  28  5 L/min  Trach mask   07/10/20 0700  98 4 °F (36 9 °C)  98  21  164/75    100 %      Trach mask 07/10/20 0600    96  28Abnormal   135/83  106  99 %         07/10/20 0500    94  18  123/87  109  99 %         07/10/20 0400    94  29Abnormal   115/70  95  100 %         07/10/20 0300  98 1 °F (36 7 °C)  96  16  134/79  105  99 %      Trach mask   07/10/20 0200    96  25Abnormal   90/56  67  98 %         07/10/20 0100    96  27Abnormal   110/69  82  100 %         07/10/20 0000    100  30Abnormal   91/56  73  100 %         07/09/20 2300  97 8 °F (36 6 °C)  104  31Abnormal   84/55Abnormal   69  98 %         07/09/20 2200    110Abnormal   29Abnormal   108/67  88  100 %         07/09/20 2100    110Abnormal   20  109/65  85  100 %         07/09/20 2000    108Abnormal   33Abnormal   162/99  120  99 %         07/09/20 1956            100 %  28  8 L/min  Trach mask   07/09/20 1900  98 3 °F (36 8 °C)  106Abnormal   24Abnormal     89  100 %      Other (comment)    O2 Device: Trach collar at 07/09/20 1900         Medications:   Scheduled Medications:  Medications:  chlorhexidine 15 mL Swish & Spit Q12H [address]   enoxaparin 1 mg/kg Subcutaneous Q12H DEAN   levalbuterol 1 25 mg Nebulization TID   melatonin 6 mg Oral HS   methylphenidate 5 mg Oral BID before breakfast/lunch   nystatin 500,000 Units Swish & Swallow 4x Daily   polyethylene glycol 17 g Oral Daily   QUEtiapine 50 mg Oral HS   senna-docusate sodium 1 tablet Per NG Tube BID   sodium chloride 3 mL Nebulization TID        PRN Meds:  acetaminophen 650 mg Rectal Q4H PRN   albuterol 2 5 mg Nebulization Q6H PRN   guaiFENesin 200 mg Oral Q4H PRN   Labetalol HCl 10 mg Intravenous Q6H PRN   Lidocaine Viscous HCl 15 mL Swish & Spit 4x Daily PRN   ondansetron 4 mg Intravenous Q6H PRN   oxyCODONE 2 5 mg Oral Q4H PRN       Discharge Plan: TBD    Network Utilization Review Department  [email or web address]  ATTENTION: Please call with any questions or concerns to [phone] and carefully listen to the prompts so that you are directed to the right person  All voicemails are confidential   Karolina Simeon all requests for admission clinical reviews, approved or denied determinations and any other requests to dedicated fax number below belonging to the campus where the patient is receiving treatment   List of dedicated fax numbers for the Facilities:  [address] DENIALS (Administrative/Medical Necessity) [phone]   [address] (Maternity/NICU/Pediatrics) [phone]   Christina Beltran [phone]   Mayela Yuan [phone]   Marivel Gillespie [phone]   Lord Hernandez [phone]   [address] [place] [address] [phone]   [facility]  [phone]   [address] [facility]  [address] [facility] [address] [facility] [phone]

## 2020-07-10 NOTE — PROGRESS NOTES
Progress Note - Critical Care   Shannon Collier 71 y o  male MRN: [id number]  Unit/Bed#: [facility] 636-65 Encounter: [id number]    Attending Physician: Asaf Patel MD    ______________________________________________________________________    Chief Complaint: n/a    HPI/24 Hour Events: Pt had decreased urine output overnight, received 1 500 mL isolyte bolus  ______________________________________________________________________  Assessment and Plan:   Principal Problem:    Malignant neoplasm of upper lobe of left lung (HCC)  Active Problems:    Acute respiratory failure with hypoxia (HCC)    Subcutaneous emphysema (HCC)    Acute deep vein thrombosis (DVT) of axillary vein of right upper extremity (HCC)    Acute deep vein thrombosis (DVT) of brachial vein of right upper extremity (HCC)    Tracheostomy in place [facility])    Postprocedural pneumothorax    Acute deep vein thrombosis (DVT) of calf muscle vein of left lower extremity (HCC)    Pneumonia  Resolved Problems:    Lactic acidosis    ROGER (acute kidney injury) ([facility] [address] )      · Neuro:   ? Dx:  Encephalopathy, delirium versus hypoxic brain injury status post cardiac arrest as below  § Frequent redirection  § Wean sedation, appreciate palliative recs  § CT head 7/9 without any acute findings   ? Analgesia:  Tylenol 650 mg Q4H prn mild pain, Dilaudid 0 2mg Q1H breakthrough pain  ? Sedation - Seroquel 50 mg QHS, melatonin, Depakene 250 mg Q8H  ? Nausea: PRN zofran  ? Delirium ppx: CAM-ICU, sleep hygiene    · CV:   ? Dx:  Tachycardia and hypertension likely secondary to agitation versus delirium  § Improving  § Continue to monitor and treat underlying causes  § Labetalol 10mg Q6H prn  ? Dx:  S/p cardiac arrest 6/28 due to hypoxia/hypercarbia likely secondary to subcutaneous emphysema obstructing airway  § Subcutaneous emphysema resolved the continue to monitor in setting of chest tube removal  ? MAP goal > 65   · Lung:   ?  Dx:  Status post left upper VATS lobectomy for a section of lung cancer, complicated by subcutaneous emphysema  § Chest tube discontinued 7/5 by thoracic surgery  § Status post cricothyrotomy for hypoxic/hypercarbic cardiac arrest, converted to from trach  § Continue trach collar   § Continue Respiratory Protocol and Airway Clearance Protocol  § Albuterol t i d   · GI:   ? NG tube  ? Stress ulcer ppx:  None indicated  ? Bowel regimen:  Senakot, MiraLax   § Three bowel movements recorded  · FEN:   ? Fluids:  Free water flushes 100 mL q 4h  ? Electrolytes: trend and replete as needed  ? Nutrition:  Tube feeds at goal 55 mL/hr  · :   ? No acute issues  ? Monitor I&O's, BUN/Cr pending  § UOP 1150 ML/24 HOURS  § Net +800 mL in 24 hrs  · ID:   ? No acute issues  ? Monitor WBC/temp curve  · Heme:   ? Dx:  Active DVTs  ? Continue Lovenox  ? Dx Anemia  ? Stable, Hbg  pending, 10 4 on 7/8  ? Dx: thrombocytosis  ? Platelets pending, 991 on 7/8  ? DVT ppx: lovenox, SCDs   · Endo:   ? No active issues  · Msk/Skin:   ? PT/OT  ? Turning/repositioning  ? Wound care     Disposition: critical care     Code Status: Level 2 - DNAR: but accepts endotracheal intubation      ______________________________________________________________________    Physical Exam:   Physical Exam   Constitutional: He is active  HENT:   Head: Normocephalic and atraumatic  NG tube   Eyes: Conjunctivae and EOM are normal    Neck:   Trach   Cardiovascular: Regular rhythm and normal pulses  Tachycardia present  No murmur heard  Pulmonary/Chest: Effort normal and breath sounds normal  No respiratory distress  Abdominal: Soft  Bowel sounds are normal  He exhibits no distension  There is no tenderness  Genitourinary:   Genitourinary Comments: Condom cath   Musculoskeletal: He exhibits no deformity  Neurological:   Not following commands or participating in exam   Skin: Skin is warm and dry  Nursing note and vitals reviewed  ______________________________________________________________________  Vitals:    07/10/20 0100 07/10/20 0200 07/10/20 0300 07/10/20 0400   BP: 110/69 90/56 134/79 115/70   Pulse: 96 96 96 94   Resp: (!) 27 (!) 25 16 (!) 29   Temp:   98 1 °F (36 7 °C)    TempSrc:   Oral    SpO2: 100% 98% 99% 100%   Weight:       Height:           Temperature:   Temp (24hrs), Av °F (36 7 °C), Min:97 7 °F (36 5 °C), Max:98 3 °F (36 8 °C)    Current Temperature: 98 1 °F (36 7 °C)  Weights:   IBW: 73 kg    Body mass index is 27 99 kg/m²  Weight (last 2 days)     Date/Time   Weight    20 0600   88 5 (195 11)            Hemodynamic Monitoring:  N/A     Non-Invasive/Invasive Ventilation Settings:  Respiratory    Lab Data (Last 4 hours)    None         O2/Vent Data (Last 4 hours)    None              No results found for: PHART, XJK8XYI, PO2ART, NGG6NOH, J8NZIVCS, BEART, SOURCE  SpO2: SpO2: 100 %  Intake and Outputs:  I/O       701 -  07 - 07/10 0700    I V  (mL/kg) 520 (5 9)     NG/ 780    Feedings 1046 1170    Total Intake(mL/kg) 2256 (25 5) 1950 (22)    Urine (mL/kg/hr) 1960 (0 9) 1150 (0 5)    Emesis/NG output 0 0    Stool 0 0    Total Output 1960 1150    Net +296 +800          Unmeasured Urine Occurrence 1 x     Unmeasured Stool Occurrence 3 x 2 x        UOP: 47 mL/hour   Nutrition:        Diet Orders   (From admission, onward)             Start     Ordered    20 0847  Diet Enteral/Parenteral; Tube Feeding No Oral Diet; Jevity 1 5; Continuous; 55; Prosource Protein Liquid - Two Packets; 100;  Water; Every 4 hours  Diet effective now     Question Answer Comment   Diet Type Enteral/Parenteral    Enteral/Parenteral Tube Feeding No Oral Diet    Tube Feeding Formula: Jevity 1 5    Bolus/Cyclic/Continuous Continuous    Tube Feeding Goal Rate (mL/hr): 55    Prosource Protein Liquid - No Carb Prosource Protein Liquid - Two Packets    Tube Feeding water flush (mL): 100    Water Flush type: Water    Water flush frequency: Every 4 hours    RD to adjust diet per protocol? Yes        07/05/20 0849              TF currently running at 55mL/hour with a goal of 55 mL/hr  Formula:Jevity 1 5  Labs:   Results from last 7 days   Lab Units 07/08/20  0527 07/07/20  0454 07/06/20  0544   WBC Thousand/uL 8 23 6 95 7 43   HEMOGLOBIN g/dL 10 4* 9 9* 10 3*   HEMATOCRIT % 33 5* 32 1* 32 4*   PLATELETS Thousands/uL 519* 505* 506*   NEUTROS PCT % 60 54 60   MONOS PCT % 13* 13* 10     Results from last 7 days   Lab Units 07/08/20  1549 07/08/20  0527 07/07/20  0454   POTASSIUM mmol/L 4 2 3 7 4 2   CHLORIDE mmol/L 109* 108 105   CO2 mmol/L 27 31 30   BUN mg/dL 12 12 11   CREATININE mg/dL 0 70 0 79 0 63   CALCIUM mg/dL 9 5 9 6 8 6     Results from last 7 days   Lab Units 07/08/20  1549 07/08/20  0527 07/07/20  0454   MAGNESIUM mg/dL 2 3 2 3 2 3     Lab Results   Component Value Date    PHOS 4 9 (H) 07/08/2020    PHOS 5 0 (H) 07/07/2020    PHOS 4 5 (H) 07/05/2020          0   Lab Value Date/Time    TROPONINI 2 75 (H) 06/20/2020 1759    TROPONINI 3 00 (H) 06/20/2020 1319    TROPONINI 2 86 (H) 06/20/2020 0928    TROPONINI 0 85 (H) 06/20/2020 0647    TROPONINI <0 02 03/28/2020 0451    TROPONINI <0 02 03/28/2020 0031    TROPONINI <0 02 03/27/2020 2026         ABG:  Lab Results   Component Value Date    PHART 7 498 (H) 07/01/2020    YDL7POT 37 2 07/01/2020    PO2ART 81 0 07/01/2020    DDL6GXF 28 2 (H) 07/01/2020    BEART 4 7 07/01/2020    SOURCE Radial, Left 07/01/2020     Imaging:  I have personally reviewed pertinent reports  CT BRAIN - WITHOUT CONTRAST 7/9  No acute intracranial abnormality  EKG: n/a  Micro:  Lab Results   Component Value Date    BLOODCX No Growth After 5 Days  06/29/2020    BLOODCX No Growth After 5 Days  06/29/2020    BLOODCX No Growth After 5 Days  06/20/2020    BLOODCX No Growth After 5 Days   06/20/2020    SPUTUMCULTUR 4+ Growth of  06/29/2020     Allergies: No Known Allergies  Medications:   Scheduled Meds:    Current Facility-Administered Medications:  acetaminophen 650 mg Rectal Q4H PRN Aspen Frame, PA-C    albuterol 2 5 mg Nebulization Q6H PRN Aspen Frame, PA-C    chlorhexidine 15 mL Swish & Spit Q12H [facility] Aspen Frame, PA-C    enoxaparin 1 mg/kg Subcutaneous Q12H [facility] Frame, PA-C    guaiFENesin 200 mg Oral Q4H PRN Chris Pastor, PA-C    HYDROmorphone 0 2 mg Intravenous Q3H PRN Margret M Bendas, DO    Labetalol HCl 10 mg Intravenous Q6H PRN Brayan Siddiqui, DO    levalbuterol 1 25 mg Nebulization TID [facility] Frame, PA-C    Lidocaine Viscous HCl 15 mL Swish & Spit 4x Daily PRN Aspen Frame, PA-C    melatonin 6 mg Oral HS Margret M Bendas, DO    multi-electrolyte 500 mL Intravenous Once Devante Arrington MD Last Rate: 500 mL (07/10/20 0424)   nystatin 500,000 Units Swish & Swallow 4x Daily Aspen Frame, PA-C    ondansetron 4 mg Intravenous Q6H PRN Aspen Frame, PA-C    polyethylene glycol 17 g Oral Daily Aspen Frame, PA-C    QUEtiapine 50 mg Oral HS [facility] Frame, PA-C    senna-docusate sodium 1 tablet Per NG Tube BID Aspen Frame, PA-C    sodium chloride 3 mL Nebulization TID [facility] Frame, PA-C    valproic acid 250 mg Per G Tube HS Margret  Bendas, DO      Continuous Infusions:   PRN Meds:    acetaminophen 650 mg Q4H PRN   albuterol 2 5 mg Q6H PRN   guaiFENesin 200 mg Q4H PRN   HYDROmorphone 0 2 mg Q3H PRN   Labetalol HCl 10 mg Q6H PRN   Lidocaine Viscous HCl 15 mL 4x Daily PRN   ondansetron 4 mg Q6H PRN     VTE Pharmacologic Prophylaxis: Enoxaparin (Lovenox)  VTE Mechanical Prophylaxis: sequential compression device  Invasive lines and devices:   Invasive Devices     Peripherally Inserted Central Catheter Line            PICC Line 78/51/11 Left Basilic 13 days          Drain            NG/OG/Enteral Tube Nasogastric Right nares 10 days    External Urinary Catheter Medium 4 days          Airway            Surgical Airway Shiley Cuffed 17 days                     Portions of the record may have been created with voice recognition software  Occasional wrong word or "sound a like" substitutions may have occurred due to the inherent limitations of voice recognition software  Read the chart carefully and recognize, using context, where substitutions have occurred      Elaina Dominguez MD

## 2020-07-10 NOTE — NURSING NOTE
Patient voided 150cc of urine throughout shift  Bladder scanned for 150cc at this time  CCS resident notified  Will continue to monitor

## 2020-07-10 NOTE — PLAN OF CARE
Problem: CARDIOVASCULAR - ADULT  Goal: Maintains optimal cardiac output and hemodynamic stability  Description  INTERVENTIONS:  - Monitor I/O, vital signs and rhythm  - Monitor for S/S and trends of decreased cardiac output  - Administer and titrate ordered vasoactive medications to optimize hemodynamic stability  - Assess quality of pulses, skin color and temperature  - Assess for signs of decreased coronary artery perfusion  - Instruct patient to report change in severity of symptoms  Outcome: Progressing  Goal: Absence of cardiac dysrhythmias or at baseline rhythm  Description  INTERVENTIONS:  - Continuous cardiac monitoring, vital signs, obtain 12 lead EKG if ordered  - Administer antiarrhythmic and heart rate control medications as ordered  - Monitor electrolytes and administer replacement therapy as ordered  Outcome: Progressing     Problem: RESPIRATORY - ADULT  Goal: Achieves optimal ventilation and oxygenation  Description  INTERVENTIONS:  - Assess for changes in respiratory status  - Assess for changes in mentation and behavior  - Position to facilitate oxygenation and minimize respiratory effort  - Oxygen administered by appropriate delivery if ordered  - Initiate smoking cessation education as indicated  - Encourage broncho-pulmonary hygiene including cough, deep breathe, Incentive Spirometry  - Assess the need for suctioning and aspirate as needed  - Assess and instruct to report SOB or any respiratory difficulty  - Respiratory Therapy support as indicated  Outcome: Progressing     Problem: SKIN/TISSUE INTEGRITY - ADULT  Goal: Skin integrity remains intact  Description  INTERVENTIONS  - Identify patients at risk for skin breakdown  - Assess and monitor skin integrity  - Assess and monitor nutrition and hydration status  - Monitor labs (i e  albumin)  - Assess for incontinence   - Turn and reposition patient  - Assist with mobility/ambulation  - Relieve pressure over bony prominences  - Avoid friction and shearing  - Provide appropriate hygiene as needed including keeping skin clean and dry  - Evaluate need for skin moisturizer/barrier cream  - Collaborate with interdisciplinary team (i e  Nutrition, Rehabilitation, etc )   - Patient/family teaching  Outcome: Progressing  Goal: Incision(s), wounds(s) or drain site(s) healing without S/S of infection  Description  INTERVENTIONS  - Assess and document risk factors for skin impairment   - Assess and document dressing, incision, wound bed, drain sites and surrounding tissue  - Consider nutrition services referral as needed  - Oral mucous membranes remain intact  - Provide patient/ family education  Outcome: Progressing  Goal: Oral mucous membranes remain intact  Description  INTERVENTIONS  - Assess oral mucosa and hygiene practices  - Implement preventative oral hygiene regimen  - Implement oral medicated treatments as ordered  - Initiate Nutrition services referral as needed  Outcome: Progressing     Problem: MUSCULOSKELETAL - ADULT  Goal: Maintain or return mobility to safest level of function  Description  INTERVENTIONS:  - Assess patient's ability to carry out ADLs; assess patient's baseline for ADL function and identify physical deficits which impact ability to perform ADLs (bathing, care of mouth/teeth, toileting, grooming, dressing, etc )  - Assess/evaluate cause of self-care deficits   - Assess range of motion  - Assess patient's mobility  - Assess patient's need for assistive devices and provide as appropriate  - Encourage maximum independence but intervene and supervise when necessary  - Involve family in performance of ADLs  - Assess for home care needs following discharge   - Consider OT consult to assist with ADL evaluation and planning for discharge  - Provide patient education as appropriate  Outcome: Progressing  Goal: Maintain proper alignment of affected body part  Description  INTERVENTIONS:  - Support, maintain and protect limb and body alignment  - Provide patient/ family with appropriate education  Outcome: Progressing     Problem: Potential for Falls  Goal: Patient will remain free of falls  Description  INTERVENTIONS:  - Assess patient frequently for physical needs  -  Identify cognitive and physical deficits and behaviors that affect risk of falls  -  [place] fall precautions as indicated by assessment   - Educate patient/family on patient safety including physical limitations  - Instruct patient to call for assistance with activity based on assessment  - Modify environment to reduce risk of injury  - Consider OT/PT consult to assist with strengthening/mobility  Outcome: Progressing     Problem: Prexisting or High Potential for Compromised Skin Integrity  Goal: Skin integrity is maintained or improved  Description  INTERVENTIONS:  - Identify patients at risk for skin breakdown  - Assess and monitor skin integrity  - Assess and monitor nutrition and hydration status  - Monitor labs   - Assess for incontinence   - Turn and reposition patient  - Assist with mobility/ambulation  - Relieve pressure over bony prominences  - Avoid friction and shearing  - Provide appropriate hygiene as needed including keeping skin clean and dry  - Evaluate need for skin moisturizer/barrier cream  - Collaborate with interdisciplinary team   - Patient/family teaching  - Consider wound care consult   Outcome: Progressing     Problem: Nutrition/Hydration-ADULT  Goal: Nutrient/Hydration intake appropriate for improving, restoring or maintaining nutritional needs  Description  Monitor and assess patient's nutrition/hydration status for malnutrition  Collaborate with interdisciplinary team and initiate plan and interventions as ordered  Monitor patient's weight and dietary intake as ordered or per policy  Utilize nutrition screening tool and intervene as necessary  Determine patient's food preferences and provide high-protein, high-caloric foods as appropriate  INTERVENTIONS:  - Monitor oral intake, urinary output, labs, and treatment plans  - Assess nutrition and hydration status and recommend course of action  - Evaluate amount of meals eaten  - Assist patient with eating if necessary   - Allow adequate time for meals  - Recommend/ encourage appropriate diets, oral nutritional supplements, and vitamin/mineral supplements  - Order, calculate, and assess calorie counts as needed  - Recommend, monitor, and adjust tube feedings and TPN/PPN based on assessed needs  - Assess need for intravenous fluids  - Provide specific nutrition/hydration education as appropriate  - Include patient/family/caregiver in decisions related to nutrition  Outcome: Progressing     Problem: SAFETY,RESTRAINT: NV/NON-SELF DESTRUCTIVE BEHAVIOR  Goal: Remains free of harm/injury (restraint for non violent/non self-detsructive behavior)  Description  INTERVENTIONS:  - Instruct patient/family regarding restraint use   - Assess and monitor physiologic and psychological status   - Provide interventions and comfort measures to meet assessed patient needs   - Identify and implement measures to help patient regain control  - Assess readiness for release of restraint   Outcome: Progressing  Goal: Returns to optimal restraint-free functioning  Description  INTERVENTIONS:  - Assess the patient's behavior and symptoms that indicate continued need for restraint  - Identify and implement measures to help patient regain control  - Assess readiness for release of restraint   Outcome: Progressing     Problem: Knowledge Deficit  Goal: Patient/family/caregiver demonstrates understanding of disease process, treatment plan, medications, and discharge instructions  Description  Complete learning assessment and assess knowledge base    Interventions:  - Provide teaching at level of understanding  - Provide teaching via preferred learning methods  Outcome: Progressing

## 2020-07-10 NOTE — PROGRESS NOTES
Progress note - Palliative and Supportive Care   Fam Ramos 71 y o  male [id number]    Assessment:    -   Patient Active Problem List   Diagnosis    Community acquired pneumonia    Abnormal computed tomography angiography (CTA)    Alcohol abuse    Malignant neoplasm of upper lobe of left lung (HCC)    Tobacco abuse    Acute respiratory failure with hypoxia (HCC)    Subcutaneous emphysema (HCC)    Acute deep vein thrombosis (DVT) of axillary vein of right upper extremity (HCC)    Acute deep vein thrombosis (DVT) of brachial vein of right upper extremity (HCC)    [facility] [address]    Postprocedural pneumothorax    Acute deep vein thrombosis (DVT) of calf muscle vein of left lower extremity ([facility] [address] )    Pneumonia         Plan:  1  Symptom management:    - Continue to aggressively wean opioids-     D/C IV Dilaudid and change to very low dose oxyIR 2 5 mg q4H PRN   - D/C Depakote to HS   - initiate Ritalin 5 mg BID   - Continue Seroquel   - Continue melatonin 6 mg   - Delirium precautions    - Please avoid using one-time dosing of benzo or antipsychotic and attempt re-orientation strategies first    - Lights on during the day and off at night- avoid overnight disruptions as possible    - Please attempt to put bed in more of a chair position if medically stable    - Patient enjoys sci-fi and sitcoms- please have TV on during day with similar    - utilize 1:1 if possible to avoid restraints    - Note: daughter tells me he is [facility] and sometimes struggles with understanding female voices    2  Goals of care: Level 2, no PEG  3  Psychosocial support: Spent time with daughter today providing support and re-assurance  Counseled on nature of delirium and that time is what is needed most  Discussed initiation of stimulant and she agreed to this  Interval history:      Patient has ongoing mixed delirium with reversal of sleep/wake cycle   More alert today- opened eyes and shook his head "no" to pain and "yes" to feeling tired  MEDICATIONS / ALLERGIES:     all current active meds have been reviewed and current meds:   Current Facility-Administered Medications   Medication Dose Route Frequency    acetaminophen (TYLENOL) rectal suppository 650 mg  650 mg Rectal Q4H PRN    albuterol inhalation solution 2 5 mg  2 5 mg Nebulization Q6H PRN    chlorhexidine (PERIDEX) 0 12 % oral rinse 15 mL  15 mL Swish & Spit Q12H [address]    enoxaparin (LOVENOX) subcutaneous injection 90 mg  1 mg/kg Subcutaneous Q12H DEAN    guaiFENesin (ROBITUSSIN) oral solution 200 mg  200 mg Oral Q4H PRN    Labetalol HCl (NORMODYNE) injection 10 mg  10 mg Intravenous Q6H PRN    levalbuterol (XOPENEX) inhalation solution 1 25 mg  1 25 mg Nebulization TID    Lidocaine Viscous HCl (XYLOCAINE) 2 % mucosal solution 15 mL  15 mL Swish & Spit 4x Daily PRN    melatonin tablet 6 mg  6 mg Oral HS    methylphenidate (RITALIN) tablet 5 mg  5 mg Oral BID before breakfast/lunch    nystatin (MYCOSTATIN) oral suspension 500,000 Units  500,000 Units Swish & Swallow 4x Daily    ondansetron (ZOFRAN) injection 4 mg  4 mg Intravenous Q6H PRN    oxyCODONE (ROXICODONE) oral solution 2 5 mg  2 5 mg Oral Q4H PRN    polyethylene glycol (MIRALAX) packet 17 g  17 g Oral Daily    QUEtiapine (SEROquel) tablet 50 mg  50 mg Oral HS    senna-docusate sodium (SENOKOT S) 8 6-50 mg per tablet 1 tablet  1 tablet Per NG Tube BID    sodium chloride 0 9 % inhalation solution 3 mL  3 mL Nebulization TID       No Known Allergies    OBJECTIVE:    Physical Exam  Physical Exam   Constitutional: No distress  HENT:   Head: Normocephalic and atraumatic  Right Ear: External ear normal    Left Ear: External ear normal    Nose: Nose normal    Eyes: EOM are normal  Right eye exhibits no discharge  Left eye exhibits no discharge  No scleral icterus  Neck:   Trach in place   Cardiovascular: Normal rate, regular rhythm and intact distal pulses     Pulmonary/Chest: Effort normal and breath sounds normal  No respiratory distress  Abdominal: Soft  Bowel sounds are normal  He exhibits no distension  Musculoskeletal: He exhibits no edema  Neurological:   Moving all extremities, opens eyes and able to shake head yes/no appropriately   Skin: Skin is warm and dry  There is pallor  Nursing note and vitals reviewed  Lab Results:   I have personally reviewed pertinent labs  , CBC:   Lab Results   Component Value Date    WBC 7 74 07/10/2020    HGB 9 0 (L) 07/10/2020    HCT 29 0 (L) 07/10/2020    MCV 99 (H) 07/10/2020     (H) 07/10/2020    MCH 30 7 07/10/2020    MCHC 31 0 (L) 07/10/2020    RDW 14 4 07/10/2020    MPV 10 2 07/10/2020    NRBC 0 07/10/2020   , CMP:   Lab Results   Component Value Date    SODIUM 143 07/10/2020    K 4 1 07/10/2020     (H) 07/10/2020    CO2 27 07/10/2020    BUN 16 07/10/2020    CREATININE 0 68 07/10/2020    CALCIUM 8 1 (L) 07/10/2020    AST 10 07/10/2020    ALT 17 07/10/2020    ALKPHOS 70 07/10/2020    EGFR 97 07/10/2020   , PT/PTT:No results found for: PT, PTT  Imaging Studies: reviewed  EKG, Pathology, and Other Studies: reviewed    Counseling / Coordination of Care    Total floor / unit time spent today 35+ minutes  Greater than 50% of total time was spent with the patient and / or family counseling and / or coordination of care  A description of the counseling / coordination of care: chart review, medication review with changes, family phone conference, supportive listening

## 2020-07-10 NOTE — SOCIAL WORK
LSW joined Dr Johnnie Hill to discuss patient's current status  Daughter is concerned over "lost time" and "this is his new normal "  Dr Johnnie Hill reviews the current plan  Daughter states that there will be no peg tube placement  Daughter is concerned about patient's quality of life        LSW will continue to follow for support

## 2020-07-10 NOTE — PROGRESS NOTES
Progress Note - Thoracic Surgery   Syl Freeze 71 y o  male MRN: [id number]  Unit/Bed#: [facility] 516-01 Encounter: [id number]    Assessment:  71 M s/p thoracoscopic left upper lobectomy complicated by prolonged air leak, cardiac arrest s/p cricothyroidotomy and tracheostomy revision  - CT head yesterday unremarkable    Plan:  Monitor neurologic status  Tube feeds as tolerated  Chest physiotherapy, pulmonary toilet  Wean oxygen  Therapeutic Lovenox    Subjective/Objective     Subjective: Low urine output overnight, received fluid bolus with improvement  Otherwise no acute events overnight  Objective:    Blood pressure 135/83, pulse 96, temperature 98 1 °F (36 7 °C), temperature source Oral, resp  rate (!) 28, height 5' 10" (1 778 m), weight 89 2 kg (196 lb 10 4 oz), SpO2 99 %  ,Body mass index is 28 22 kg/m²        Intake/Output Summary (Last 24 hours) at 7/10/2020 2611  Last data filed at 7/10/2020 8419  Gross per 24 hour   Intake 2643 ml   Output 1525 ml   Net 1118 ml       Invasive Devices     Peripherally Inserted Central Catheter Line            PICC Line 34/08/07 Left Basilic 13 days          Drain            NG/OG/Enteral Tube Nasogastric Right nares 11 days    External Urinary Catheter Medium 4 days          Airway            Surgical Airway Shiley Cuffed 17 days                Physical Exam:   General: NAD, somewhat arousable  Eyes: PERRL  ENT: moist mucous membranes  Neck: supple, trach in place  CV: RRR +S1/S2  Chest: breath sounds bilaterally, incisions c/d/i  Abdomen: soft, NT ND  Extremities: atraumatic      Results from last 7 days   Lab Units 07/10/20  0428 07/08/20  0527 07/07/20  0454   WBC Thousand/uL 7 74 8 23 6 95   HEMOGLOBIN g/dL 9 0* 10 4* 9 9*   HEMATOCRIT % 29 0* 33 5* 32 1*   PLATELETS Thousands/uL 432* 519* 505*     Results from last 7 days   Lab Units 07/10/20  0428 07/08/20  1549 07/08/20  0527   POTASSIUM mmol/L 4 1 4 2 3 7   CHLORIDE mmol/L 109* 109* 108   CO2 mmol/L 27 27 31 BUN mg/dL 16 12 12   CREATININE mg/dL 0 68 0 70 0 79   CALCIUM mg/dL 8 1* 9 5 9 6

## 2020-07-11 ENCOUNTER — APPOINTMENT (INPATIENT)
Dept: RADIOLOGY | Facility: HOSPITAL | Age: 70
DRG: 003 | End: 2020-07-11
Payer: COMMERCIAL

## 2020-07-11 LAB
ALBUMIN SERPL BCP-MCNC: 2.5 G/DL (ref 3.5–5)
ALP SERPL-CCNC: 87 U/L (ref 46–116)
ALT SERPL W P-5'-P-CCNC: 27 U/L (ref 12–78)
ANION GAP SERPL CALCULATED.3IONS-SCNC: 3 MMOL/L (ref 4–13)
AST SERPL W P-5'-P-CCNC: 16 U/L (ref 5–45)
ATRIAL RATE: 113 BPM
BACTERIA UR QL AUTO: ABNORMAL /HPF
BASOPHILS # BLD AUTO: 0.06 THOUSANDS/ΜL (ref 0–0.1)
BASOPHILS NFR BLD AUTO: 1 % (ref 0–1)
BILIRUB SERPL-MCNC: 0.26 MG/DL (ref 0.2–1)
BILIRUB UR QL STRIP: NEGATIVE
BUN SERPL-MCNC: 17 MG/DL (ref 5–25)
CALCIUM SERPL-MCNC: 9.4 MG/DL (ref 8.3–10.1)
CHLORIDE SERPL-SCNC: 110 MMOL/L (ref 100–108)
CLARITY UR: ABNORMAL
CO2 SERPL-SCNC: 30 MMOL/L (ref 21–32)
COLOR UR: YELLOW
CREAT SERPL-MCNC: 0.78 MG/DL (ref 0.6–1.3)
EOSINOPHIL # BLD AUTO: 0.17 THOUSAND/ΜL (ref 0–0.61)
EOSINOPHIL NFR BLD AUTO: 2 % (ref 0–6)
ERYTHROCYTE [DISTWIDTH] IN BLOOD BY AUTOMATED COUNT: 14.3 % (ref 11.6–15.1)
GFR SERPL CREATININE-BSD FRML MDRD: 92 ML/MIN/1.73SQ M
GLUCOSE SERPL-MCNC: 88 MG/DL (ref 65–140)
GLUCOSE UR STRIP-MCNC: NEGATIVE MG/DL
HCT VFR BLD AUTO: 34.8 % (ref 36.5–49.3)
HGB BLD-MCNC: 10.6 G/DL (ref 12–17)
HGB UR QL STRIP.AUTO: NEGATIVE
HYALINE CASTS #/AREA URNS LPF: ABNORMAL /LPF
IMM GRANULOCYTES # BLD AUTO: 0.09 THOUSAND/UL (ref 0–0.2)
IMM GRANULOCYTES NFR BLD AUTO: 1 % (ref 0–2)
KETONES UR STRIP-MCNC: NEGATIVE MG/DL
LEUKOCYTE ESTERASE UR QL STRIP: ABNORMAL
LYMPHOCYTES # BLD AUTO: 2.23 THOUSANDS/ΜL (ref 0.6–4.47)
LYMPHOCYTES NFR BLD AUTO: 24 % (ref 14–44)
MAGNESIUM SERPL-MCNC: 2.3 MG/DL (ref 1.6–2.6)
MCH RBC QN AUTO: 30.5 PG (ref 26.8–34.3)
MCHC RBC AUTO-ENTMCNC: 30.5 G/DL (ref 31.4–37.4)
MCV RBC AUTO: 100 FL (ref 82–98)
MONOCYTES # BLD AUTO: 1.31 THOUSAND/ΜL (ref 0.17–1.22)
MONOCYTES NFR BLD AUTO: 14 % (ref 4–12)
NEUTROPHILS # BLD AUTO: 5.5 THOUSANDS/ΜL (ref 1.85–7.62)
NEUTS SEG NFR BLD AUTO: 58 % (ref 43–75)
NITRITE UR QL STRIP: POSITIVE
NON-SQ EPI CELLS URNS QL MICRO: ABNORMAL /HPF
NRBC BLD AUTO-RTO: 0 /100 WBCS
P AXIS: 26 DEGREES
PH UR STRIP.AUTO: 8.5 [PH]
PHOSPHATE SERPL-MCNC: 4.1 MG/DL (ref 2.3–4.1)
PLATELET # BLD AUTO: 516 THOUSANDS/UL (ref 149–390)
PMV BLD AUTO: 11 FL (ref 8.9–12.7)
POTASSIUM SERPL-SCNC: 3.8 MMOL/L (ref 3.5–5.3)
PR INTERVAL: 146 MS
PROT SERPL-MCNC: 6.9 G/DL (ref 6.4–8.2)
PROT UR STRIP-MCNC: ABNORMAL MG/DL
QRS AXIS: -27 DEGREES
QRSD INTERVAL: 75 MS
QT INTERVAL: 308 MS
QTC INTERVAL: 423 MS
RBC # BLD AUTO: 3.48 MILLION/UL (ref 3.88–5.62)
RBC #/AREA URNS AUTO: ABNORMAL /HPF
SODIUM SERPL-SCNC: 143 MMOL/L (ref 136–145)
SP GR UR STRIP.AUTO: 1.02 (ref 1–1.03)
T WAVE AXIS: 92 DEGREES
UROBILINOGEN UR QL STRIP.AUTO: 0.2 E.U./DL
VENTRICULAR RATE: 113 BPM
WBC # BLD AUTO: 9.36 THOUSAND/UL (ref 4.31–10.16)
WBC #/AREA URNS AUTO: ABNORMAL /HPF

## 2020-07-11 PROCEDURE — 85025 COMPLETE CBC W/AUTO DIFF WBC: CPT | Performed by: EMERGENCY MEDICINE

## 2020-07-11 PROCEDURE — 99024 POSTOP FOLLOW-UP VISIT: CPT | Performed by: THORACIC SURGERY (CARDIOTHORACIC VASCULAR SURGERY)

## 2020-07-11 PROCEDURE — 94640 AIRWAY INHALATION TREATMENT: CPT

## 2020-07-11 PROCEDURE — 80053 COMPREHEN METABOLIC PANEL: CPT | Performed by: EMERGENCY MEDICINE

## 2020-07-11 PROCEDURE — 94760 N-INVAS EAR/PLS OXIMETRY 1: CPT

## 2020-07-11 PROCEDURE — 94669 MECHANICAL CHEST WALL OSCILL: CPT

## 2020-07-11 PROCEDURE — 83735 ASSAY OF MAGNESIUM: CPT | Performed by: PHYSICIAN ASSISTANT

## 2020-07-11 PROCEDURE — 93010 ELECTROCARDIOGRAM REPORT: CPT | Performed by: INTERNAL MEDICINE

## 2020-07-11 PROCEDURE — 99291 CRITICAL CARE FIRST HOUR: CPT | Performed by: EMERGENCY MEDICINE

## 2020-07-11 PROCEDURE — 81001 URINALYSIS AUTO W/SCOPE: CPT | Performed by: PHYSICIAN ASSISTANT

## 2020-07-11 PROCEDURE — 84100 ASSAY OF PHOSPHORUS: CPT | Performed by: PHYSICIAN ASSISTANT

## 2020-07-11 PROCEDURE — 87040 BLOOD CULTURE FOR BACTERIA: CPT | Performed by: EMERGENCY MEDICINE

## 2020-07-11 PROCEDURE — 93005 ELECTROCARDIOGRAM TRACING: CPT

## 2020-07-11 PROCEDURE — 94668 MNPJ CHEST WALL SBSQ: CPT

## 2020-07-11 PROCEDURE — 70450 CT HEAD/BRAIN W/O DYE: CPT

## 2020-07-11 PROCEDURE — 99231 SBSQ HOSP IP/OBS SF/LOW 25: CPT | Performed by: INTERNAL MEDICINE

## 2020-07-11 RX ORDER — OLANZAPINE 10 MG/1
5 INJECTION, POWDER, LYOPHILIZED, FOR SOLUTION INTRAMUSCULAR ONCE
Status: COMPLETED | OUTPATIENT
Start: 2020-07-11 | End: 2020-07-11

## 2020-07-11 RX ORDER — HALOPERIDOL 5 MG/ML
2 INJECTION INTRAMUSCULAR ONCE
Status: COMPLETED | OUTPATIENT
Start: 2020-07-11 | End: 2020-07-11

## 2020-07-11 RX ORDER — HALOPERIDOL 5 MG/ML
INJECTION INTRAMUSCULAR
Status: COMPLETED
Start: 2020-07-11 | End: 2020-07-11

## 2020-07-11 RX ORDER — OLANZAPINE 10 MG/1
5 INJECTION, POWDER, LYOPHILIZED, FOR SOLUTION INTRAMUSCULAR ONCE
Status: DISCONTINUED | OUTPATIENT
Start: 2020-07-11 | End: 2020-07-11

## 2020-07-11 RX ORDER — POTASSIUM CHLORIDE 14.9 MG/ML
20 INJECTION INTRAVENOUS ONCE
Status: COMPLETED | OUTPATIENT
Start: 2020-07-11 | End: 2020-07-11

## 2020-07-11 RX ADMIN — HALOPERIDOL LACTATE 2 MG: 5 INJECTION INTRAMUSCULAR at 16:33

## 2020-07-11 RX ADMIN — ISODIUM CHLORIDE 3 ML: 0.03 SOLUTION RESPIRATORY (INHALATION) at 19:40

## 2020-07-11 RX ADMIN — WATER 10 ML: 1 INJECTION INTRAMUSCULAR; INTRAVENOUS; SUBCUTANEOUS at 23:05

## 2020-07-11 RX ADMIN — NYSTATIN 500000 UNITS: 100000 SUSPENSION ORAL at 08:54

## 2020-07-11 RX ADMIN — ENOXAPARIN SODIUM 90 MG: 100 INJECTION SUBCUTANEOUS at 09:00

## 2020-07-11 RX ADMIN — LEVALBUTEROL HYDROCHLORIDE 1.25 MG: 1.25 SOLUTION, CONCENTRATE RESPIRATORY (INHALATION) at 08:24

## 2020-07-11 RX ADMIN — POTASSIUM CHLORIDE 20 MEQ: 14.9 INJECTION, SOLUTION INTRAVENOUS at 08:55

## 2020-07-11 RX ADMIN — NYSTATIN 500000 UNITS: 100000 SUSPENSION ORAL at 12:22

## 2020-07-11 RX ADMIN — NYSTATIN 500000 UNITS: 100000 SUSPENSION ORAL at 18:33

## 2020-07-11 RX ADMIN — LEVALBUTEROL HYDROCHLORIDE 1.25 MG: 1.25 SOLUTION, CONCENTRATE RESPIRATORY (INHALATION) at 19:40

## 2020-07-11 RX ADMIN — ISODIUM CHLORIDE 3 ML: 0.03 SOLUTION RESPIRATORY (INHALATION) at 13:38

## 2020-07-11 RX ADMIN — CHLORHEXIDINE GLUCONATE 0.12% ORAL RINSE 15 ML: 1.2 LIQUID ORAL at 08:54

## 2020-07-11 RX ADMIN — POLYETHYLENE GLYCOL 3350 17 G: 17 POWDER, FOR SOLUTION ORAL at 08:54

## 2020-07-11 RX ADMIN — CHLORHEXIDINE GLUCONATE 0.12% ORAL RINSE 15 ML: 1.2 LIQUID ORAL at 21:05

## 2020-07-11 RX ADMIN — METHYLPHENIDATE HYDROCHLORIDE 5 MG: 10 TABLET ORAL at 12:22

## 2020-07-11 RX ADMIN — SENNOSIDES AND DOCUSATE SODIUM 1 TABLET: 8.6; 5 TABLET ORAL at 18:37

## 2020-07-11 RX ADMIN — MELATONIN 6 MG: at 21:05

## 2020-07-11 RX ADMIN — SENNOSIDES AND DOCUSATE SODIUM 1 TABLET: 8.6; 5 TABLET ORAL at 08:54

## 2020-07-11 RX ADMIN — ISODIUM CHLORIDE 3 ML: 0.03 SOLUTION RESPIRATORY (INHALATION) at 08:24

## 2020-07-11 RX ADMIN — OLANZAPINE 5 MG: 10 INJECTION, POWDER, FOR SOLUTION INTRAMUSCULAR at 23:06

## 2020-07-11 RX ADMIN — ENOXAPARIN SODIUM 90 MG: 100 INJECTION SUBCUTANEOUS at 21:06

## 2020-07-11 RX ADMIN — CEFTRIAXONE SODIUM 1000 MG: 10 INJECTION, POWDER, FOR SOLUTION INTRAVENOUS at 10:58

## 2020-07-11 RX ADMIN — LEVALBUTEROL HYDROCHLORIDE 1.25 MG: 1.25 SOLUTION, CONCENTRATE RESPIRATORY (INHALATION) at 13:37

## 2020-07-11 RX ADMIN — QUETIAPINE FUMARATE 50 MG: 25 TABLET ORAL at 21:05

## 2020-07-11 RX ADMIN — HALOPERIDOL 2 MG: 5 INJECTION INTRAMUSCULAR at 16:33

## 2020-07-11 NOTE — PROGRESS NOTES
Progress Note - Thoracic Surgery   Denzel Angelo 71 y o  male MRN: [id number]  Unit/Bed#: [facility] 516-01 Encounter: [id number]    Assessment:  71 M s/p thoracoscopic left upper lobectomy complicated by prolonged air leak, cardiac arrest s/p cricothyroidotomy and tracheostomy revision    Plan:  Monitor neurologic status, continue opioid/sedation wean  Continue tube feeds at goal  Chest physiotherapy, pulmonary toilet  Wean oxygen  Therapeutic lovenox      Subjective/Objective     Subjective: some difficulty sleeping overnight    Objective:    Blood pressure 114/70, pulse 104, temperature 98 2 °F (36 8 °C), temperature source Oral, resp  rate (!) 27, height 5' 10" (1 778 m), weight 89 2 kg (196 lb 10 4 oz), SpO2 100 %  ,Body mass index is 28 22 kg/m²        Intake/Output Summary (Last 24 hours) at 7/11/2020 3304  Last data filed at 7/11/2020 0600  Gross per 24 hour   Intake 1810 ml   Output 1200 ml   Net 610 ml       Invasive Devices     Peripherally Inserted Central Catheter Line            PICC Line 61/02/38 Left Basilic 14 days          Drain            NG/OG/Enteral Tube Nasogastric Right nares 12 days    External Urinary Catheter Medium 5 days          Airway            Surgical Airway Shiley Cuffed 18 days                Physical Exam:   NAD, alert, follows commands intermittently  Normocephalic, atraumatic  MMM, EOMI, PERRLA  Norm resp effort on trach collar  RRR  L chest incisions cdi  Abd soft, NT/ND  Condom cath in place  No calf tenderness or peripheral edema  Motor/sensation intact in distal extremities  CN grossly intact  -rash/lesions        Results from last 7 days   Lab Units 07/11/20  0506 07/10/20  0428 07/08/20  0527   WBC Thousand/uL 9 36 7 74 8 23   HEMOGLOBIN g/dL 10 6* 9 0* 10 4*   HEMATOCRIT % 34 8* 29 0* 33 5*   PLATELETS Thousands/uL 516* 432* 519*     Results from last 7 days   Lab Units 07/11/20  0506 07/10/20  0428 07/08/20  1549   POTASSIUM mmol/L 3 8 4 1 4 2   CHLORIDE mmol/L 110* 109* 109*   CO2 mmol/L 30 27 27   BUN mg/dL 17 16 12   CREATININE mg/dL 0 78 0 68 0 70   CALCIUM mg/dL 9 4 8 1* 9 5

## 2020-07-11 NOTE — PROGRESS NOTES
Pt with increased restlessness/ agitation frequently trying to get out of bed  Unresolved with education, calming methods, emotional support and frequent reorientaion by 1:1 and RN  ECG QT interval reviewed  Made Dr Brayan Siddiqui aware  Administered 2 mg Haldol per order  Pt calmed  VSS will continue to monitor

## 2020-07-11 NOTE — PROGRESS NOTES
Progress note - Palliative and Supportive Care   Benoit Law 71 y o  male [id number]    Assessment:    -   Patient Active Problem List   Diagnosis    Community acquired pneumonia    Abnormal computed tomography angiography (CTA)    Alcohol abuse    Malignant neoplasm of upper lobe of left lung (HCC)    Tobacco abuse    Acute respiratory failure with hypoxia (HCC)    Subcutaneous emphysema (HCC)    Acute deep vein thrombosis (DVT) of axillary vein of right upper extremity (HCC)    Acute deep vein thrombosis (DVT) of brachial vein of right upper extremity (HCC)    [facility] in [facility] ([facility] [address]    Postprocedural pneumothorax    Acute deep vein thrombosis (DVT) of calf muscle vein of left lower extremity ([facility] [address] )    Pneumonia         Plan:  1  Symptom management:    - Continue oxyIR 2 5 mg q6H PRN   - Continue Ritalin 5 mg BID   - Continue Seroquel   - Continue melatonin 6 mg   - Delirium precautions- recommend sitter as his daytime mentation is slowly improving      - Please avoid using one-time dosing of benzo or antipsychotic and attempt re-orientation strategies first    - Lights on during the day and off at night- avoid overnight disruptions as possible    - Please attempt to put bed in more of a chair position if medically stable    - Patient enjoys sci-fi and sitcoms- please have TV on during day with similar    - utilize 1:1 if possible to avoid restraints    - Note: daughter tells me he is [facility] and sometimes struggles with understanding female voices    2  Goals of care: Level 2, no PEG  3  Psychosocial support: NA    D/W SICU and Michelle Walsh RN    Interval history:      Patient slid out of restraints this AM and was put back in regular bed with posey and mitts  Otherwise, his mentation is much better today  Not following commands consistently but denies pain to me and shook head "yes" when asked if he slid out of the bed this morning       MEDICATIONS / ALLERGIES:     all current active meds have been reviewed and current meds:   Current Facility-Administered Medications   Medication Dose Route Frequency    acetaminophen (TYLENOL) rectal suppository 650 mg  650 mg Rectal Q4H PRN    albuterol inhalation solution 2 5 mg  2 5 mg Nebulization Q6H PRN    chlorhexidine (PERIDEX) 0 12 % oral rinse 15 mL  15 mL Swish & Spit Q12H [address]    enoxaparin (LOVENOX) subcutaneous injection 90 mg  1 mg/kg Subcutaneous Q12H DEAN    guaiFENesin (ROBITUSSIN) oral solution 200 mg  200 mg Oral Q4H PRN    Labetalol HCl (NORMODYNE) injection 10 mg  10 mg Intravenous Q6H PRN    levalbuterol (XOPENEX) inhalation solution 1 25 mg  1 25 mg Nebulization TID    Lidocaine Viscous HCl (XYLOCAINE) 2 % mucosal solution 15 mL  15 mL Swish & Spit 4x Daily PRN    melatonin tablet 6 mg  6 mg Oral HS    methylphenidate (RITALIN) tablet 5 mg  5 mg Oral BID before breakfast/lunch    nystatin (MYCOSTATIN) oral suspension 500,000 Units  500,000 Units Swish & Swallow 4x Daily    ondansetron (ZOFRAN) injection 4 mg  4 mg Intravenous Q6H PRN    oxyCODONE (ROXICODONE) oral solution 2 5 mg  2 5 mg Oral Q4H PRN    polyethylene glycol (MIRALAX) packet 17 g  17 g Oral Daily    potassium chloride 20 mEq IVPB (premix)  20 mEq Intravenous Once    QUEtiapine (SEROquel) tablet 50 mg  50 mg Oral HS    senna-docusate sodium (SENOKOT S) 8 6-50 mg per tablet 1 tablet  1 tablet Per NG Tube BID    sodium chloride 0 9 % inhalation solution 3 mL  3 mL Nebulization TID       No Known Allergies    OBJECTIVE:    Physical Exam  Physical Exam   Constitutional: No distress  HENT:   Head: Normocephalic and atraumatic  Right Ear: External ear normal    Left Ear: External ear normal    Nose: Nose normal    Eyes: EOM are normal  Right eye exhibits no discharge  Left eye exhibits no discharge  No scleral icterus  Neck:   Trach in place   Cardiovascular: Normal rate, regular rhythm and intact distal pulses     Pulmonary/Chest: Effort normal and breath sounds normal  No respiratory distress  Abdominal: Soft  Bowel sounds are normal  He exhibits no distension  Musculoskeletal: He exhibits no edema  Neurological:   Moving all extremities, opens eyes and able to shake head yes/no appropriately   Skin: Skin is warm and dry  There is pallor  Nursing note and vitals reviewed  Lab Results:   I have personally reviewed pertinent labs  , CBC:   Lab Results   Component Value Date    WBC 9 36 07/11/2020    HGB 10 6 (L) 07/11/2020    HCT 34 8 (L) 07/11/2020     (H) 07/11/2020     (H) 07/11/2020    MCH 30 5 07/11/2020    MCHC 30 5 (L) 07/11/2020    RDW 14 3 07/11/2020    MPV 11 0 07/11/2020    NRBC 0 07/11/2020   , CMP:   Lab Results   Component Value Date    SODIUM 143 07/11/2020    K 3 8 07/11/2020     (H) 07/11/2020    CO2 30 07/11/2020    BUN 17 07/11/2020    CREATININE 0 78 07/11/2020    CALCIUM 9 4 07/11/2020    AST 16 07/11/2020    ALT 27 07/11/2020    ALKPHOS 87 07/11/2020    EGFR 92 07/11/2020   , PT/PTT:No results found for: PT, PTT  Imaging Studies: reviewed  EKG, Pathology, and Other Studies: reviewed    Counseling / Coordination of Care    Total floor / unit time spent today 35+ minutes  Greater than 50% of total time was spent with the patient and / or family counseling and / or coordination of care  A description of the counseling / coordination of care: chart review, medication review with changes, family phone conference, supportive listening

## 2020-07-11 NOTE — PROGRESS NOTES
During change of shift report, Resident Nikolas Pritchard approached nurses station, we asked the plan for the patient she stated she did not see him in the bed  All nursing staff went into room and found patient on floor  Bed alarm was activated but did not go off  Double checked and light was on the bed for bed alarm  Moved patient to bed and changed him out of Kreg bed to regular critical care bed  Resident remained at bedside  No injuries found on assessment  No Cervical Collar required per Resident  Ordered STAT CT scan  Restraints applied

## 2020-07-11 NOTE — PLAN OF CARE
Problem: CARDIOVASCULAR - ADULT  Goal: Maintains optimal cardiac output and hemodynamic stability  Description  INTERVENTIONS:  - Monitor I/O, vital signs and rhythm  - Monitor for S/S and trends of decreased cardiac output  - Administer and titrate ordered vasoactive medications to optimize hemodynamic stability  - Assess quality of pulses, skin color and temperature  - Assess for signs of decreased coronary artery perfusion  - Instruct patient to report change in severity of symptoms  Outcome: Progressing  Goal: Absence of cardiac dysrhythmias or at baseline rhythm  Description  INTERVENTIONS:  - Continuous cardiac monitoring, vital signs, obtain 12 lead EKG if ordered  - Administer antiarrhythmic and heart rate control medications as ordered  - Monitor electrolytes and administer replacement therapy as ordered  Outcome: Progressing     Problem: RESPIRATORY - ADULT  Goal: Achieves optimal ventilation and oxygenation  Description  INTERVENTIONS:  - Assess for changes in respiratory status  - Assess for changes in mentation and behavior  - Position to facilitate oxygenation and minimize respiratory effort  - Oxygen administered by appropriate delivery if ordered  - Initiate smoking cessation education as indicated  - Encourage broncho-pulmonary hygiene including cough, deep breathe, Incentive Spirometry  - Assess the need for suctioning and aspirate as needed  - Assess and instruct to report SOB or any respiratory difficulty  - Respiratory Therapy support as indicated  Outcome: Progressing     Problem: SKIN/TISSUE INTEGRITY - ADULT  Goal: Skin integrity remains intact  Description  INTERVENTIONS  - Identify patients at risk for skin breakdown  - Assess and monitor skin integrity  - Assess and monitor nutrition and hydration status  - Monitor labs (i e  albumin)  - Assess for incontinence   - Turn and reposition patient  - Assist with mobility/ambulation  - Relieve pressure over bony prominences  - Avoid friction and shearing  - Provide appropriate hygiene as needed including keeping skin clean and dry  - Evaluate need for skin moisturizer/barrier cream  - Collaborate with interdisciplinary team (i e  Nutrition, Rehabilitation, etc )   - Patient/family teaching  Outcome: Progressing  Goal: Incision(s), wounds(s) or drain site(s) healing without S/S of infection  Description  INTERVENTIONS  - Assess and document risk factors for skin impairment   - Assess and document dressing, incision, wound bed, drain sites and surrounding tissue  - Consider nutrition services referral as needed  - Oral mucous membranes remain intact  - Provide patient/ family education  Outcome: Progressing  Goal: Oral mucous membranes remain intact  Description  INTERVENTIONS  - Assess oral mucosa and hygiene practices  - Implement preventative oral hygiene regimen  - Implement oral medicated treatments as ordered  - Initiate Nutrition services referral as needed  Outcome: Progressing     Problem: MUSCULOSKELETAL - ADULT  Goal: Maintain or return mobility to safest level of function  Description  INTERVENTIONS:  - Assess patient's ability to carry out ADLs; assess patient's baseline for ADL function and identify physical deficits which impact ability to perform ADLs (bathing, care of mouth/teeth, toileting, grooming, dressing, etc )  - Assess/evaluate cause of self-care deficits   - Assess range of motion  - Assess patient's mobility  - Assess patient's need for assistive devices and provide as appropriate  - Encourage maximum independence but intervene and supervise when necessary  - Involve family in performance of ADLs  - Assess for home care needs following discharge   - Consider OT consult to assist with ADL evaluation and planning for discharge  - Provide patient education as appropriate  Outcome: Progressing  Goal: Maintain proper alignment of affected body part  Description  INTERVENTIONS:  - Support, maintain and protect limb and body alignment  - Provide patient/ family with appropriate education  Outcome: Progressing     Problem: Potential for Falls  Goal: Patient will remain free of falls  Description  INTERVENTIONS:  - Assess patient frequently for physical needs  -  Identify cognitive and physical deficits and behaviors that affect risk of falls  -  [place] fall precautions as indicated by assessment   - Educate patient/family on patient safety including physical limitations  - Instruct patient to call for assistance with activity based on assessment  - Modify environment to reduce risk of injury  - Consider OT/PT consult to assist with strengthening/mobility  Outcome: Progressing     Problem: Prexisting or High Potential for Compromised Skin Integrity  Goal: Skin integrity is maintained or improved  Description  INTERVENTIONS:  - Identify patients at risk for skin breakdown  - Assess and monitor skin integrity  - Assess and monitor nutrition and hydration status  - Monitor labs   - Assess for incontinence   - Turn and reposition patient  - Assist with mobility/ambulation  - Relieve pressure over bony prominences  - Avoid friction and shearing  - Provide appropriate hygiene as needed including keeping skin clean and dry  - Evaluate need for skin moisturizer/barrier cream  - Collaborate with interdisciplinary team   - Patient/family teaching  - Consider wound care consult   Outcome: Progressing     Problem: Nutrition/Hydration-ADULT  Goal: Nutrient/Hydration intake appropriate for improving, restoring or maintaining nutritional needs  Description  Monitor and assess patient's nutrition/hydration status for malnutrition  Collaborate with interdisciplinary team and initiate plan and interventions as ordered  Monitor patient's weight and dietary intake as ordered or per policy  Utilize nutrition screening tool and intervene as necessary  Determine patient's food preferences and provide high-protein, high-caloric foods as appropriate  INTERVENTIONS:  - Monitor oral intake, urinary output, labs, and treatment plans  - Assess nutrition and hydration status and recommend course of action  - Evaluate amount of meals eaten  - Assist patient with eating if necessary   - Allow adequate time for meals  - Recommend/ encourage appropriate diets, oral nutritional supplements, and vitamin/mineral supplements  - Order, calculate, and assess calorie counts as needed  - Recommend, monitor, and adjust tube feedings and TPN/PPN based on assessed needs  - Assess need for intravenous fluids  - Provide specific nutrition/hydration education as appropriate  - Include patient/family/caregiver in decisions related to nutrition  Outcome: Progressing     Problem: SAFETY,RESTRAINT: NV/NON-SELF DESTRUCTIVE BEHAVIOR  Goal: Remains free of harm/injury (restraint for non violent/non self-detsructive behavior)  Description  INTERVENTIONS:  - Instruct patient/family regarding restraint use   - Assess and monitor physiologic and psychological status   - Provide interventions and comfort measures to meet assessed patient needs   - Identify and implement measures to help patient regain control  - Assess readiness for release of restraint   Outcome: Progressing  Goal: Returns to optimal restraint-free functioning  Description  INTERVENTIONS:  - Assess the patient's behavior and symptoms that indicate continued need for restraint  - Identify and implement measures to help patient regain control  - Assess readiness for release of restraint   Outcome: Progressing     Problem: Knowledge Deficit  Goal: Patient/family/caregiver demonstrates understanding of disease process, treatment plan, medications, and discharge instructions  Description  Complete learning assessment and assess knowledge base    Interventions:  - Provide teaching at level of understanding  - Provide teaching via preferred learning methods  Outcome: Progressing

## 2020-07-11 NOTE — PROGRESS NOTES
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    Plan:   · Neuro:   · Dx:  Encephalopathy, delirium versus hypoxic brain injury status post cardiac arrest  · Continue frequent redirection  · Wean sedation, appreciate palliative recommendations  · 7/9 the any acute finding  · Analgesia:  Tylenol, Dilaudid  · Sedation - Seroquel, melatonin, Depakene  · P r n  Zofran for nausea  · Delirium ppx: CAM-ICU, sleep hygiene  · CV:   · Dx:   Tachycardia and hypertension likely secondary to agitation versus delirium, improving  · Continue to monitor and treat underlying causes  · Labetalol 10 mg of Lasix p r n   · Dx:  Status post cardiac arrest 6/28 due to hypoxia/hypercarbia secondary to septicemia emphysema obstructing airway  · Subcutaneous emphysema, continue to monitor in setting of chest tube removal  · Hemodynamic support:  None  · MAP goal > 65   · Lung:   · Dx: s/p left upper VATS lobectomy for resection of lung cancer, complicated by subcutaneous emphysema  · Chest tube d/c'd 7/5  · S/p cricothyrotomy for hypoxic/hypercarbic cardiac arrest, converted to formal trach  · Continue trach collar  · Continue Respiratory Protocol and Airway Clearance Protocol  · Continue albuterol tid     · GI:   · No active issues  · NG tube in place  · Stress ulcer ppx: none  · Bowel regimen: senakot, miralax  · FEN:   · Fluids: no maintenance; free water flushes with tube feeds  · Electrolytes: trend and replete as needed  · Nutrition: tube feeds at goal   · :   · No active issues  I/O last 24 hours: In: 1810 [NG/GT:780; Feedings:1030]  Out: 1200 [Urine:1200]  · Gaytan: no  · Monitor I&O's, BUN/Cr  · ID:   · No active issues  · Monitor WBC/temp curve  · Heme:   · Dx: active DVTs  · Continue lovenox  · Dx: anemia  · Stable  · Dx: thrombocytosis  · Stable   · DVT ppx: lovenox, SCDs   · Endo:   · No active issues  · Msk/Skin:   · PT/OT  · Turning/repositioning  · Wound care   · Disposition: ICU    ______________________________________________________________________    HPI/24hr events:  Patient slid out of bed to the floor at 7-7:30 AM   Bed alarm did not go off  Patient assisted back into bed by nursing staff  No external signs of trauma noted  Mental status at baseline  Lines   PICC  NG  Trach    Infusions  none   ______________________________________________________________________  Physical Exam:  Miles Agitation Sedation Scale (RASS): Restless  Physical Exam   NAD  Normocaphalic, atraumatic  RRR  CTAB, on trach collar  Abdomen soft, non-tender  Mild pedal edema noted  Cap refill < 2 seconds  No subcutaneous emphysema noted  Patient alert, moves all extremities with good strength spontaneously and on command, nods yes and no appropriately to questions       ______________________________________________________________________  Temperature:   Temp (24hrs), Av 2 °F (36 8 °C), Min:97 7 °F (36 5 °C), Max:98 5 °F (36 9 °C)    Current Temperature: 98 2 °F (36 8 °C)    Vitals:    20 0130 20 0230 20 0430 20 0530   BP: 107/74 115/83 150/87 114/70   BP Location:   Left arm    Pulse: 92 94 92 104   Resp: 15 (!) 24 (!) 30 (!) 27   Temp:   98 2 °F (36 8 °C)    TempSrc:   Oral    SpO2: 100% 99% 100% 100%   Weight:       Height:         Arterial Line BP: 128/64       Weights:   IBW: 73 kg    Body mass index is 28 22 kg/m²  Weight (last 2 days)     Date/Time   Weight    07/10/20 0600   89 2 (196 65)    07/09/20 0600   88 5 (195 11)            Height: 5' 10" (177 8 cm)  Hemodynamic Monitoring:  N/A       Ventilator Settings:   Vent Mode: CPAP/PS Spont              FiO2 (%): 28       No results found for: PHART, NFP6RPG, PO2ART, SME0DFJ, Q8HSQACU, BEART, SOURCE    Intake and Outputs:  I/O       07/09 0701 - 07/10 0700 07/10 0701 - 07/11 0700    I V  (mL/kg) 500 (5 6) 0 (0)    NG/ 680    Feedings 1263 820    Total Intake(mL/kg) 2643 (29 6) 1500 (16 8)    Urine (mL/kg/hr) 1525 (0 7) 900 (0 4)    Emesis/NG output 0 0    Stool 0 0    Total Output 1525 900    Net +1118 +600          Unmeasured Stool Occurrence 2 x 1 x          Nutrition:        Diet Orders   (From admission, onward)             Start     Ordered    07/05/20 0847  Diet Enteral/Parenteral; Tube Feeding No Oral Diet; Jevity 1 5; Continuous; 55; Prosource Protein Liquid - Two Packets; 100; Water; Every 4 hours  Diet effective now     Question Answer Comment   Diet Type Enteral/Parenteral    Enteral/Parenteral Tube Feeding No Oral Diet    Tube Feeding Formula: Jevity 1 5    Bolus/Cyclic/Continuous Continuous    Tube Feeding Goal Rate (mL/hr): 55    Prosource Protein Liquid - No Carb Prosource Protein Liquid - Two Packets    Tube Feeding water flush (mL): 100    Water Flush type: Water    Water flush frequency: Every 4 hours    RD to adjust diet per protocol?  Yes        07/05/20 0849                Labs:   Results from last 7 days   Lab Units 07/11/20  0506 07/10/20  0428 07/08/20  0527   WBC Thousand/uL 9 36 7 74 8 23   HEMOGLOBIN g/dL 10 6* 9 0* 10 4*   HEMATOCRIT % 34 8* 29 0* 33 5*   PLATELETS Thousands/uL 516* 432* 519*   NEUTROS PCT % 58 61 60   MONOS PCT % 14* 13* 13*      Results from last 7 days   Lab Units 07/11/20  0506 07/10/20  0428 07/08/20  1549   POTASSIUM mmol/L 3 8 4 1 4 2 CHLORIDE mmol/L 110* 109* 109*   CO2 mmol/L 30 27 27   BUN mg/dL 17 16 12   CREATININE mg/dL 0 78 0 68 0 70   CALCIUM mg/dL 9 4 8 1* 9 5   ALK PHOS U/L 87 70  --    ALT U/L 27 17  --    AST U/L 16 10  --      Results from last 7 days   Lab Units 07/11/20  0506 07/10/20  0428 07/08/20  1549   MAGNESIUM mg/dL 2 3 2 4 2 3     Results from last 7 days   Lab Units 07/11/20  0506 07/10/20  0428 07/08/20  0527   PHOSPHORUS mg/dL 4 1 4 2* 4 9*              0   Lab Value Date/Time    TROPONINI 2 75 (H) 06/20/2020 1759    TROPONINI 3 00 (H) 06/20/2020 1319    TROPONINI 2 86 (H) 06/20/2020 0928    TROPONINI 0 85 (H) 06/20/2020 0647    TROPONINI <0 02 03/28/2020 0451    TROPONINI <0 02 03/28/2020 0031    TROPONINI <0 02 03/27/2020 2026     Imaging:  I have personally reviewed pertinent reports  EKG:   Micro:  Blood Culture:   Lab Results   Component Value Date    BLOODCX No Growth After 5 Days  06/29/2020    BLOODCX No Growth After 5 Days  06/29/2020    BLOODCX No Growth After 5 Days  06/20/2020    BLOODCX No Growth After 5 Days  06/20/2020    BLOODCX No Growth After 5 Days  03/27/2020    BLOODCX No Growth After 5 Days  03/27/2020     Urine Culture: No results found for: URINECX  Sputum Culture: No components found for: SPUTUMCX  Wound Culure: No results found for: WOUNDCULT    Lab Results   Component Value Date    BLOODCX No Growth After 5 Days  06/29/2020    BLOODCX No Growth After 5 Days  06/29/2020    BLOODCX No Growth After 5 Days  06/20/2020    BLOODCX No Growth After 5 Days   06/20/2020    SPUTUMCULTUR 4+ Growth of  06/29/2020     Allergies: No Known Allergies  Medications:   Scheduled Meds:    Current Facility-Administered Medications:  acetaminophen 650 mg Rectal Q4H PRN Bruno Kennedy PA-C   albuterol 2 5 mg Nebulization Q6H PRN Bruno Kennedy PA-C   chlorhexidine 15 mL Swish & Spit Q12H [address] Bruno Kennedy PA-C   enoxaparin 1 mg/kg Subcutaneous Q12H [address] Bruno Kennedy PA-C   guaiFENesin 200 mg Oral Q4H PRN Janay West, YESSENIA   Labetalol HCl 10 mg Intravenous Q6H PRN Idedustya Guido, DO   levalbuterol 1 25 mg Nebulization TID Tracie Beaman, YESSENIA   Lidocaine Viscous HCl 15 mL Swish & Spit 4x Daily PRN Tracie Beaman, PAMAXIMINO   melatonin 6 mg Oral HS Margret M Bendas, DO   methylphenidate 5 mg Oral BID before breakfast/lunch Margret M Bendas, DO   nystatin 500,000 Units Swish & Swallow 4x Daily Tracie Beaman, PA-SONIA   ondansetron 4 mg Intravenous Q6H PRN Tracie Beaman, PA-SONIA   oxyCODONE 2 5 mg Oral Q4H PRN Margret M Bendas, DO   polyethylene glycol 17 g Oral Daily Tracie Beaman, PA-SONIA   potassium chloride 20 mEq Intravenous Once [place] Lora, DO   QUEtiapine 50 mg Oral HS Tracie Beaman, PA-SONIA   senna-docusate sodium 1 tablet Per NG Tube BID Tracie Beaman, YESSENIA   sodium chloride 3 mL Nebulization TID Tracie Beaman, YESSENIA     Continuous Infusions:   PRN Meds:    acetaminophen 650 mg Q4H PRN   albuterol 2 5 mg Q6H PRN   guaiFENesin 200 mg Q4H PRN   Labetalol HCl 10 mg Q6H PRN   Lidocaine Viscous HCl 15 mL 4x Daily PRN   ondansetron 4 mg Q6H PRN   oxyCODONE 2 5 mg Q4H PRN     VTE Pharmacologic Prophylaxis: Enoxaparin (Lovenox)  VTE Mechanical Prophylaxis: sequential compression device  Invasive lines and devices: Invasive Devices     Peripherally Inserted Central Catheter Line            PICC Line 36/13/51 Left Basilic 14 days          Drain            NG/OG/Enteral Tube Nasogastric Right nares 12 days    External Urinary Catheter Medium 5 days          Airway            Surgical Airway Shiley Cuffed 18 days                   Code Status: Level 2 - DNAR: but accepts endotracheal intubation    Portions of the record may have been created with voice recognition software  Occasional wrong word or "sound a like" substitutions may have occurred due to the inherent limitations of voice recognition software  Read the chart carefully and recognize, using context, where substitutions have occurred      Edviolet Nations, DO

## 2020-07-12 PROBLEM — D72.829 LEUKOCYTOSIS: Status: ACTIVE | Noted: 2020-07-12

## 2020-07-12 PROBLEM — J18.9 PNEUMONIA: Status: RESOLVED | Noted: 2020-06-23 | Resolved: 2020-07-12

## 2020-07-12 PROBLEM — D75.839 THROMBOCYTOSIS: Status: ACTIVE | Noted: 2020-07-12

## 2020-07-12 PROBLEM — T79.7XXA SUBCUTANEOUS EMPHYSEMA (HCC): Status: RESOLVED | Noted: 2020-06-21 | Resolved: 2020-07-12

## 2020-07-12 PROBLEM — D64.9 ANEMIA: Status: ACTIVE | Noted: 2020-07-12

## 2020-07-12 PROBLEM — F05: Status: ACTIVE | Noted: 2020-07-12

## 2020-07-12 PROBLEM — G93.40 ENCEPHALOPATHY: Status: ACTIVE | Noted: 2020-07-12

## 2020-07-12 LAB
ALBUMIN SERPL BCP-MCNC: 2.6 G/DL (ref 3.5–5)
ALP SERPL-CCNC: 97 U/L (ref 46–116)
ALT SERPL W P-5'-P-CCNC: 31 U/L (ref 12–78)
ANION GAP SERPL CALCULATED.3IONS-SCNC: 4 MMOL/L (ref 4–13)
AST SERPL W P-5'-P-CCNC: 23 U/L (ref 5–45)
BASOPHILS # BLD AUTO: 0.05 THOUSANDS/ΜL (ref 0–0.1)
BASOPHILS NFR BLD AUTO: 1 % (ref 0–1)
BILIRUB SERPL-MCNC: 0.2 MG/DL (ref 0.2–1)
BUN SERPL-MCNC: 19 MG/DL (ref 5–25)
CALCIUM SERPL-MCNC: 9.1 MG/DL (ref 8.3–10.1)
CHLORIDE SERPL-SCNC: 109 MMOL/L (ref 100–108)
CO2 SERPL-SCNC: 28 MMOL/L (ref 21–32)
CREAT SERPL-MCNC: 0.82 MG/DL (ref 0.6–1.3)
EOSINOPHIL # BLD AUTO: 0.15 THOUSAND/ΜL (ref 0–0.61)
EOSINOPHIL NFR BLD AUTO: 1 % (ref 0–6)
ERYTHROCYTE [DISTWIDTH] IN BLOOD BY AUTOMATED COUNT: 14.2 % (ref 11.6–15.1)
GFR SERPL CREATININE-BSD FRML MDRD: 90 ML/MIN/1.73SQ M
GLUCOSE SERPL-MCNC: 107 MG/DL (ref 65–140)
HCT VFR BLD AUTO: 34.3 % (ref 36.5–49.3)
HGB BLD-MCNC: 10.6 G/DL (ref 12–17)
IMM GRANULOCYTES # BLD AUTO: 0.11 THOUSAND/UL (ref 0–0.2)
IMM GRANULOCYTES NFR BLD AUTO: 1 % (ref 0–2)
LYMPHOCYTES # BLD AUTO: 2.21 THOUSANDS/ΜL (ref 0.6–4.47)
LYMPHOCYTES NFR BLD AUTO: 21 % (ref 14–44)
MAGNESIUM SERPL-MCNC: 2.4 MG/DL (ref 1.6–2.6)
MCH RBC QN AUTO: 30.4 PG (ref 26.8–34.3)
MCHC RBC AUTO-ENTMCNC: 30.9 G/DL (ref 31.4–37.4)
MCV RBC AUTO: 98 FL (ref 82–98)
MONOCYTES # BLD AUTO: 1.36 THOUSAND/ΜL (ref 0.17–1.22)
MONOCYTES NFR BLD AUTO: 13 % (ref 4–12)
NEUTROPHILS # BLD AUTO: 6.64 THOUSANDS/ΜL (ref 1.85–7.62)
NEUTS SEG NFR BLD AUTO: 63 % (ref 43–75)
NRBC BLD AUTO-RTO: 0 /100 WBCS
PHOSPHATE SERPL-MCNC: 4.9 MG/DL (ref 2.3–4.1)
PLATELET # BLD AUTO: 474 THOUSANDS/UL (ref 149–390)
PMV BLD AUTO: 10.5 FL (ref 8.9–12.7)
POTASSIUM SERPL-SCNC: 4 MMOL/L (ref 3.5–5.3)
PROT SERPL-MCNC: 7 G/DL (ref 6.4–8.2)
RBC # BLD AUTO: 3.49 MILLION/UL (ref 3.88–5.62)
SODIUM SERPL-SCNC: 141 MMOL/L (ref 136–145)
WBC # BLD AUTO: 10.52 THOUSAND/UL (ref 4.31–10.16)

## 2020-07-12 PROCEDURE — 83735 ASSAY OF MAGNESIUM: CPT | Performed by: EMERGENCY MEDICINE

## 2020-07-12 PROCEDURE — 84100 ASSAY OF PHOSPHORUS: CPT | Performed by: EMERGENCY MEDICINE

## 2020-07-12 PROCEDURE — 94640 AIRWAY INHALATION TREATMENT: CPT

## 2020-07-12 PROCEDURE — 92597 ORAL SPEECH DEVICE EVAL: CPT

## 2020-07-12 PROCEDURE — 99231 SBSQ HOSP IP/OBS SF/LOW 25: CPT | Performed by: INTERNAL MEDICINE

## 2020-07-12 PROCEDURE — 94760 N-INVAS EAR/PLS OXIMETRY 1: CPT

## 2020-07-12 PROCEDURE — 85025 COMPLETE CBC W/AUTO DIFF WBC: CPT | Performed by: EMERGENCY MEDICINE

## 2020-07-12 PROCEDURE — 99024 POSTOP FOLLOW-UP VISIT: CPT | Performed by: THORACIC SURGERY (CARDIOTHORACIC VASCULAR SURGERY)

## 2020-07-12 PROCEDURE — L8501 TRACHEOSTOMY SPEAKING VALVE: HCPCS

## 2020-07-12 PROCEDURE — 99291 CRITICAL CARE FIRST HOUR: CPT | Performed by: EMERGENCY MEDICINE

## 2020-07-12 PROCEDURE — 94668 MNPJ CHEST WALL SBSQ: CPT

## 2020-07-12 PROCEDURE — 80053 COMPREHEN METABOLIC PANEL: CPT | Performed by: EMERGENCY MEDICINE

## 2020-07-12 RX ORDER — HALOPERIDOL 5 MG/ML
2 INJECTION INTRAMUSCULAR ONCE
Status: COMPLETED | OUTPATIENT
Start: 2020-07-12 | End: 2020-07-12

## 2020-07-12 RX ORDER — HALOPERIDOL 5 MG/ML
INJECTION INTRAMUSCULAR
Status: COMPLETED
Start: 2020-07-12 | End: 2020-07-12

## 2020-07-12 RX ORDER — OLANZAPINE 10 MG/1
5 INJECTION, POWDER, LYOPHILIZED, FOR SOLUTION INTRAMUSCULAR ONCE
Status: COMPLETED | OUTPATIENT
Start: 2020-07-12 | End: 2020-07-12

## 2020-07-12 RX ORDER — METHYLPHENIDATE HYDROCHLORIDE 10 MG/1
5 TABLET ORAL DAILY
Status: DISCONTINUED | OUTPATIENT
Start: 2020-07-13 | End: 2020-07-13

## 2020-07-12 RX ADMIN — HALOPERIDOL LACTATE 2 MG: 5 INJECTION INTRAMUSCULAR at 12:26

## 2020-07-12 RX ADMIN — LEVALBUTEROL HYDROCHLORIDE 1.25 MG: 1.25 SOLUTION, CONCENTRATE RESPIRATORY (INHALATION) at 07:36

## 2020-07-12 RX ADMIN — ISODIUM CHLORIDE 3 ML: 0.03 SOLUTION RESPIRATORY (INHALATION) at 07:36

## 2020-07-12 RX ADMIN — CHLORHEXIDINE GLUCONATE 0.12% ORAL RINSE 15 ML: 1.2 LIQUID ORAL at 21:24

## 2020-07-12 RX ADMIN — LEVALBUTEROL HYDROCHLORIDE 1.25 MG: 1.25 SOLUTION, CONCENTRATE RESPIRATORY (INHALATION) at 19:35

## 2020-07-12 RX ADMIN — POLYETHYLENE GLYCOL 3350 17 G: 17 POWDER, FOR SOLUTION ORAL at 07:51

## 2020-07-12 RX ADMIN — METHYLPHENIDATE HYDROCHLORIDE 5 MG: 10 TABLET ORAL at 06:05

## 2020-07-12 RX ADMIN — NYSTATIN 500000 UNITS: 100000 SUSPENSION ORAL at 18:44

## 2020-07-12 RX ADMIN — ENOXAPARIN SODIUM 90 MG: 100 INJECTION SUBCUTANEOUS at 08:20

## 2020-07-12 RX ADMIN — OLANZAPINE 5 MG: 10 INJECTION, POWDER, FOR SOLUTION INTRAMUSCULAR at 13:55

## 2020-07-12 RX ADMIN — ISODIUM CHLORIDE 3 ML: 0.03 SOLUTION RESPIRATORY (INHALATION) at 19:35

## 2020-07-12 RX ADMIN — QUETIAPINE FUMARATE 50 MG: 25 TABLET ORAL at 21:24

## 2020-07-12 RX ADMIN — NYSTATIN 500000 UNITS: 100000 SUSPENSION ORAL at 21:24

## 2020-07-12 RX ADMIN — SENNOSIDES AND DOCUSATE SODIUM 1 TABLET: 8.6; 5 TABLET ORAL at 07:45

## 2020-07-12 RX ADMIN — CEFTRIAXONE SODIUM 1000 MG: 10 INJECTION, POWDER, FOR SOLUTION INTRAVENOUS at 11:01

## 2020-07-12 RX ADMIN — HALOPERIDOL 2 MG: 5 INJECTION INTRAMUSCULAR at 08:20

## 2020-07-12 RX ADMIN — MELATONIN 6 MG: at 21:24

## 2020-07-12 RX ADMIN — ACETAMINOPHEN 650 MG: 650 SUPPOSITORY RECTAL at 07:46

## 2020-07-12 RX ADMIN — LEVALBUTEROL HYDROCHLORIDE 1.25 MG: 1.25 SOLUTION, CONCENTRATE RESPIRATORY (INHALATION) at 13:49

## 2020-07-12 RX ADMIN — WATER 2.1 ML: 1 INJECTION INTRAMUSCULAR; INTRAVENOUS; SUBCUTANEOUS at 13:59

## 2020-07-12 RX ADMIN — HALOPERIDOL LACTATE 2 MG: 5 INJECTION INTRAMUSCULAR at 08:20

## 2020-07-12 RX ADMIN — ENOXAPARIN SODIUM 90 MG: 100 INJECTION SUBCUTANEOUS at 21:25

## 2020-07-12 RX ADMIN — SENNOSIDES AND DOCUSATE SODIUM 1 TABLET: 8.6; 5 TABLET ORAL at 18:44

## 2020-07-12 RX ADMIN — ENOXAPARIN SODIUM 90 MG: 100 INJECTION SUBCUTANEOUS at 07:47

## 2020-07-12 RX ADMIN — ISODIUM CHLORIDE 3 ML: 0.03 SOLUTION RESPIRATORY (INHALATION) at 13:50

## 2020-07-12 RX ADMIN — OXYCODONE HYDROCHLORIDE 2.5 MG: 5 SOLUTION ORAL at 07:45

## 2020-07-12 RX ADMIN — NYSTATIN 500000 UNITS: 100000 SUSPENSION ORAL at 07:51

## 2020-07-12 RX ADMIN — OXYCODONE HYDROCHLORIDE 2.5 MG: 5 SOLUTION ORAL at 23:33

## 2020-07-12 RX ADMIN — NYSTATIN 500000 UNITS: 100000 SUSPENSION ORAL at 12:15

## 2020-07-12 RX ADMIN — CHLORHEXIDINE GLUCONATE 0.12% ORAL RINSE 15 ML: 1.2 LIQUID ORAL at 07:44

## 2020-07-12 NOTE — NURSING NOTE
Pt very uncooperative this evening with assessment, did not want to partake in answering orientation questions  Pt also refused to allow PCA to do oral care  This RN stated to patient that his continued compliance with necessary care was pertinent to his progress, pt nodded and mouthed that he understood and allowed PCA to perform short duration of oral care  This RN also informed patient that his continued compliance with care would aid in the removal of restraints and one to one observation allowing him progress back to a normal lifestyle in which he again nodded and mouthed he understood  Pt given night time meds and is resting comfortably at this time  Will continue to orient patient and work on compliance of patient to working with healthcare team towards patient's needs/goals

## 2020-07-12 NOTE — PROGRESS NOTES
Progress Note - Thoracic Surgery   Louie Fields 71 y o  male MRN: [id number]  Unit/Bed#: [facility] 516-01 Encounter: [id number]    Assessment:  71 F s/p thoracoscopic left upper lobectomy complicated by prolonged air leak, cardiac arrest s/p cricothyroidotomy and tracheostomy revision    Plan:  Tube feeds at goal  Maintain sleep wake cycle  Pulmonary toilet  Rocephin  Wean oxygen  Therapeutic lovenox    Subjective/Objective     Subjective: No acute events overnight  More awake this morning  Objective:    Blood pressure 136/94, pulse 98, temperature 98 4 °F (36 9 °C), resp  rate 22, height 5' 10" (1 778 m), weight 89 2 kg (196 lb 10 4 oz), SpO2 100 %  ,Body mass index is 28 22 kg/m²        Intake/Output Summary (Last 24 hours) at 7/12/2020 0715  Last data filed at 7/12/2020 0600  Gross per 24 hour   Intake 1421 ml   Output 535 ml   Net 886 ml       Invasive Devices     Peripherally Inserted Central Catheter Line            PICC Line 41/01/44 Left Basilic 15 days          Drain            NG/OG/Enteral Tube Nasogastric Right nares 13 days    External Urinary Catheter Small less than 1 day          Airway            Surgical Airway Shiley Cuffed 19 days                Physical Exam:   General: NAD, awake and alert  Eyes: PERRL  ENT: moist mucous membranes  Neck: supple, trach in place  CV: RRR +S1/S2  Chest: breath sounds bilaterally, incisions c/d/i  Abdomen: soft, NT ND  Extremities: atraumatic, no edema      Results from last 7 days   Lab Units 07/12/20  0645 07/11/20  0506 07/10/20  0428   WBC Thousand/uL 10 52* 9 36 7 74   HEMOGLOBIN g/dL 10 6* 10 6* 9 0*   HEMATOCRIT % 34 3* 34 8* 29 0*   PLATELETS Thousands/uL 474* 516* 432*     Results from last 7 days   Lab Units 07/12/20  0457 07/11/20  0506 07/10/20  0428   POTASSIUM mmol/L 4 0 3 8 4 1   CHLORIDE mmol/L 109* 110* 109*   CO2 mmol/L 28 30 27   BUN mg/dL 19 17 16   CREATININE mg/dL 0 82 0 78 0 68   CALCIUM mg/dL 9 1 9 4 8 1*

## 2020-07-12 NOTE — SPEECH THERAPY NOTE
Speech Language/Pathology  Speech/Language Pathology  Assessment    Patient Name: Wai Pearson  Today's Date: 7/12/2020     Problem List  Principal Problem:    Malignant neoplasm of upper lobe of left lung ([address] )  Active Problems:    Acute respiratory failure with hypoxia ([address] )    Subcutaneous emphysema (HCC)    Acute deep vein thrombosis (DVT) of axillary vein of right upper extremity (HCC)    Acute deep vein thrombosis (DVT) of brachial vein of right upper extremity (HCC)    Tracheostomy in place [facility])    Postprocedural pneumothorax    Acute deep vein thrombosis (DVT) of calf muscle vein of left lower extremity ([address] )    Pneumonia    Past Medical History  Past Medical History:   Diagnosis Date    Alcohol abuse 3/27/2020    Chest pain     Community acquired pneumonia 3/27/2020    Hypertension     Left upper lobe pulmonary nodule     Malignant neoplasm of upper lobe of left lung ([address] ) 5/7/2020     Past Surgical History  Past Surgical History:   Procedure Laterality Date    COLON SURGERY      CT NEEDLE BIOPSY LUNG  4/29/2020    LA BRONCHOSCOPY,DIAGNOSTIC N/A 6/10/2020    Procedure: BRONCHOSCOPY FLEXIBLE;  Surgeon: Marilu Puri MD;  [facility];  Service: Thoracic    LA MEDIASTINOSCOPY WITH LYMPH NODE BIOPSY/IES N/A 6/10/2020    Procedure: MEDIASTINOSCOPY;  Surgeon: Marilu Puri MD;  [facility];  Service: Thoracic    LA THORACOSCOPY SURG LOBECTOMY Left 6/10/2020    Procedure: THORACOSCOPY VIDEO ASSISTED SURGERY (VATS);   Surgeon: Marilu Puri MD;  [facility];  Service: Thoracic    LA THORACOSCOPY SURG LOBECTOMY Left 6/10/2020    Procedure: UPPER LOBECTOMY OF LUNG; MEDIASTINAL  LYMPH NODE DISSECTION;  Surgeon: Marilu Puri MD;  [facility];  Service: Thoracic    TRACHEOSTOMY N/A 6/22/2020    Procedure: TRACHEOSTOMY REVISION, BRONCHOSCOPY;  Surgeon: Marilu Puri MD;  [facility];  Service: Thoracic    WRIST SURGERY Right     orif    Pt is a 72 y/o m adm 6/10/2020 BRONCHOSCOPY FLEXIBLE (N/A)  MEDIASTINOSCOPY (N/A),THORACOSCOPY VIDEO ASSISTED SURGERY (VATS) (Left), UPPER LOBECTOMY OF LUNG; MEDIASTINAL  LYMPH NODE DISSECTION (Left) for malignant neoplasm of the L UL  A rapid was called on 6/20/20 as the pt became increasingly agitated, hypoxic  Given his subqair a rapid was called & a nonrebreather was placed  The pt was then pale &* cyanotic, unable to respond  Another rapid was called  Intubation was attempted but was unable to be completed  His sats dropped, his HR dropped, and pulses were lost  Compressions were begun and incision was made in the midline neck  The patient was intubated via emergent cricothyroidotomy  ROSC was achieved  He was moved to the unit for further care  A trach was performed 6/22/2020  The pt remained on the vent until weaned ~7/3/2020  He is currently on trach collar  Speaking Valve/Trach eval:    Summary/Recommendations:  Pt seen for PMV/communication trials  Pt able to achieve voicing immediately with finger occlusion & the PMV  Pt w/ audible, loud speech but unintelligible at best as he rushes & does not project  Nsg is going to place the PMV on the pt later in the day for 20-30 mins at a time w/ supervision  Would allow these trials & then consider swallow eval at bedside vs VBS for full recs given the amt of time the pt was on the vent  Goal(s): To follow w/ trach downsize- per MD luca to downsize trach tomorrow to #4  Therapy Prognosis: guarded   Prognosis considerations: pt's cognition     Reason for consult:  Speech/lang eval/ability to use speaking valve  Precautions:  1:1, pt agitated, trying to get oob  Current diet:  NPO w/ ng in place  Premorbid diet[de-identified]  Regular w/ thin   Swallow:  No formal swallow eval done at this time     O2 requirement:  Trach collar  Vitals:  HR:   O2 sats: 100  RR: 14-19 throughout   No significant changes throughout  Follows commands: Attempts to follow simple commands  Cognitive Status:  Pt awake, agitated in bed in restraints  Oral [facility] exam:  Pt w/ full ROM of   Voice/Speech/trach:  Size/Type/placed:  Size      6 cuffed placed on 6/22/2020  Cuff deflated on arrival   Pt suctioned following the initial finger occlusion & prior to the PMV trials  Finger occlusion:  Pt was mouthing words & speaking over the trach upon my arrival   Able to tolerate finger occlusion 3/3 times w/ strong, audible & intelligible voicing for single words  PMV/speaking valve trials:  Pt able to tolerate for ~15 mins when offered x3  Pt w/ adequate voicing but rushes through words     Other speech/language:       Results d/w:  Patient, nurse
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
## 2020-07-12 NOTE — PROGRESS NOTES
Daily Progress Note - Critical Care   Louie Fields 71 y o  male MRN: [id number]  Unit/Bed#: [facility] 516-01 Encounter: [id number]        ----------------------------------------------------------------------------------------  HPI/24hr events:  Patient fell out of bed yesterday  CT head negative for acute injury  Given an extra dose of 5 mg of Zyprexa at 11:00 p m  For agitation     ---------------------------------------------------------------------------------------  SUBJECTIVE  Patient more alert and able to follow some simple commands  Appears comfortable  Review of Systems  Review of systems was unable to be performed secondary to Patient factors  ---------------------------------------------------------------------------------------  Assessment and Plan:    · Neuro:   ? Dx:  Encephalopathy, delirium versus hypoxic brain injury status post cardiac arrest  § Continue frequent redirection  § Wean sedation, appreciate palliative recommendations  § 7/9 CT head without acute findings, chronic age-related cortical atrophy  ? Analgesia:  Tylenol, Dilaudid  ? Sedation: Seroquel, melatonin, Ritalin 5 mg b i d   ? P r n  Zofran for nausea  ? Delirium ppx: CAM-ICU, sleep hygiene    · CV:   ? Dx:  Tachycardia and hypertension likely secondary to agitation, improving  § Hold med Norvasc 5 mg daily  § Continue Labetalol 10 mg of Lasix p r n   ? Dx:  Status post cardiac arrest 6/28 due to hypoxia/hypercarbia secondary to septicemia emphysema obstructing airway  § Subcutaneous emphysema, continue to monitor in setting of chest tube removal  ? Hemodynamic support:  None  ? MAP goal > 65     · Lung:   ? Dx: s/p left upper VATS lobectomy for resection of lung cancer, complicated by subcutaneous emphysema  § Chest tube d/c'd 7/5  § S/p cricothyrotomy for hypoxic/hypercarbic cardiac arrest, converted to formal trach  § Continue trach collar  ? Continue Respiratory Protocol and Airway Clearance Protocol  ?  Continue nebulized saline t i d      · GI:   ? NG tube in place  ? Stress ulcer ppx: none indicated  ? Bowel regimen: senakot, miralax    · FEN:   ? Fluids: no maintenance; free water flushes with tube feeds  ? Electrolytes: trend and replete as needed  ? Nutrition: tube feeds at goal     · :   ? Gaytan: no  ? UOP: 535 mL + 3 occurrences  ? BUN/Cr: 19/0 82    · ID:   ? Leukocytosis:    ? WBC 10 from 9 from 7  ? Afebrile  ? 6/29 sputum culture:  Mixed respiratory francesca 1+ polys, 2+ GPC, 1+ GPR  ? 7/11 blood cultures:  Negative to date  ? Continue Rocephin empirically    · Heme:   ? Dx: anemia, improving  ? Hemoglobin 10 6 from 10 6  ? Dx: thrombocytosis, stable  ? Platelets 582 from 13/38  ? DVT ppx: lovenox, SCDs     · Endo:  No active issues, glucose within normal limits    · Msk/Skin:   ? PT/OT, PMR consulted for rehab/LTACH recs  ? Turning/repositioning  ? Wound care     Disposition: Continue Critical Care   Code Status: Level 2 - DNAR: but accepts endotracheal intubation  ---------------------------------------------------------------------------------------  ICU CORE MEASURES    Prophylaxis   VTE Pharmacologic Prophylaxis: Enoxaparin (Lovenox)  VTE Mechanical Prophylaxis: sequential compression device  Stress Ulcer Prophylaxis: Prophylaxis Not Indicated     ABCDE Protocol (if indicated)  Plan to perform spontaneous awakening trial today? Not applicable  Plan to perform spontaneous breathing trial today? Not applicable  Obvious barriers to extubation? Not applicable  CAM-ICU: Positive    Invasive Devices Review  Invasive Devices     Peripherally Inserted Central Catheter Line            PICC Line 12/85/70 Left Basilic 15 days          Drain            NG/OG/Enteral Tube Nasogastric Right nares 13 days    External Urinary Catheter Small less than 1 day          Airway            Surgical Airway Shiley Cuffed 19 days              Can any invasive devices be discontinued today? No  ---------------------------------------------------------------------------------------  OBJECTIVE    Vitals   Vitals:    20 0340 20 0400 20 0504 20 0615   BP: 118/72 95/69 130/81 136/94   Pulse: 100 98 100 98   Resp: (!) 23 (!) 23 (!) 28 22   Temp:    98 4 °F (36 9 °C)   TempSrc:       SpO2: 99% 98% 99% 100%   Weight:       Height:         Temp (24hrs), Av 4 °F (36 9 °C), Min:98 °F (36 7 °C), Max:99 °F (37 2 °C)  Current: Temperature: 98 4 °F (36 9 °C)    Respiratory:  SpO2: SpO2: 100 %, SpO2 Activity: SpO2 Activity: At Rest, SpO2 Device: O2 Device: Trach mask, Capnography:         Invasive/non-invasive ventilation settings   Respiratory    Lab Data (Last 4 hours)    None         O2/Vent Data (Last 4 hours)    None                Physical Exam   Constitutional: He appears well-developed and well-nourished  No distress  HENT:   Head: Normocephalic and atraumatic  Ng in place with tube feeds at goal   Eyes: Pupils are equal, round, and reactive to light  Conjunctivae and EOM are normal  No scleral icterus  Neck: Normal range of motion  Neck supple  No JVD present  No tracheal deviation present  Tracheostomy in place on trach collar   Cardiovascular: Normal rate, regular rhythm and intact distal pulses  Pulmonary/Chest: Effort normal and breath sounds normal  He exhibits no tenderness  Abdominal: Soft  He exhibits no distension  There is no tenderness  Genitourinary:   Genitourinary Comments: Condom cath   Musculoskeletal: He exhibits no edema or tenderness  Neurological: He is alert  Follows intermittent commands   Skin: Skin is warm and dry  Psychiatric:   Abnormal behavior and poor judgement   Nursing note and vitals reviewed        Laboratory and Diagnostics:  Results from last 7 days   Lab Units 20  0645 20  0506 07/10/20  0428 20  0527 20  0454 20  0544   WBC Thousand/uL 10 52* 9 36 7 74 8 23 6 95 7 43   HEMOGLOBIN g/dL 10 6* 10 6* 9 0* 10 4* 9 9* 10 3*   HEMATOCRIT % 34 3* 34 8* 29 0* 33 5* 32 1* 32 4*   PLATELETS Thousands/uL 474* 516* 432* 519* 505* 506*   NEUTROS PCT % 63 58 61 60 54 60   MONOS PCT % 13* 14* 13* 13* 13* 10     Results from last 7 days   Lab Units 07/12/20  0457 07/11/20  0506 07/10/20  0428 07/08/20  1549 07/08/20  0527 07/07/20  0454 07/06/20  0544   SODIUM mmol/L 141 143 143 142 142 139 140   POTASSIUM mmol/L 4 0 3 8 4 1 4 2 3 7 4 2 4 0   CHLORIDE mmol/L 109* 110* 109* 109* 108 105 107   CO2 mmol/L 28 30 27 27 31 30 27   ANION GAP mmol/L 4 3* 7 6 3* 4 6   BUN mg/dL 19 17 16 12 12 11 8   CREATININE mg/dL 0 82 0 78 0 68 0 70 0 79 0 63 0 61   CALCIUM mg/dL 9 1 9 4 8 1* 9 5 9 6 8 6 8 3   GLUCOSE RANDOM mg/dL 107 88 87 119 109 90 97   ALT U/L 31 27 17  --   --   --   --    AST U/L 23 16 10  --   --   --   --    ALK PHOS U/L 97 87 70  --   --   --   --    ALBUMIN g/dL 2 6* 2 5* 2 1*  --   --   --   --    TOTAL BILIRUBIN mg/dL 0 20 0 26 0 17*  --   --   --   --      Results from last 7 days   Lab Units 07/12/20  0457 07/11/20  0506 07/10/20  0428 07/08/20  1549 07/08/20  0527 07/07/20  0454   MAGNESIUM mg/dL 2 4 2 3 2 4 2 3 2 3 2 3   PHOSPHORUS mg/dL 4 9* 4 1 4 2*  --  4 9* 5 0*                   ABG:    VBG:          Micro  Results from last 7 days   Lab Units 07/11/20  1054   BLOOD CULTURE  Received in Microbiology Lab  Culture in Progress  Received in Microbiology Lab  Culture in Progress  EKG:  None  Imaging:   Procedure: Ct Head Wo Contrast    Result Date: 7/11/2020  Narrative: CT BRAIN - WITHOUT CONTRAST INDICATION:   Head trauma, minor (Age => 65y)  COMPARISON:  7/9/2020  TECHNIQUE:  CT examination of the brain was performed  In addition to axial images, coronal 2D reformatted images were created and submitted for interpretation  Radiation dose length product (DLP) for this visit:  898 73 mGy-cm     This examination, like all CT scans performed in the [place], was performed utilizing techniques to minimize radiation dose exposure, including the use of iterative  reconstruction and automated exposure control  IMAGE QUALITY:  Diagnostic  FINDINGS: PARENCHYMA: Decreased attenuation is noted in periventricular and subcortical white matter demonstrating an appearance that is statistically most likely to represent mild microangiopathic change  No CT signs of acute infarction  No intracranial mass, mass effect or midline shift  No acute parenchymal hemorrhage  VENTRICLES AND EXTRA-AXIAL SPACES:  Normal for the patient's age  VISUALIZED ORBITS AND PARANASAL SINUSES:  No acute abnormality involving the orbits  Mild scattered sinus mucosal thickening is noted  No fluid levels are seen  Nasogastric tube noted  CALVARIUM AND EXTRACRANIAL SOFT TISSUES:  Normal      Impression: No acute intracranial abnormality  Workstation performed: [id number]     Procedure: Ct Head Wo Contrast    Result Date: 7/9/2020  Narrative: CT BRAIN - WITHOUT CONTRAST INDICATION:  Altered mental status  COMPARISON:  None TECHNIQUE:  CT examination of the brain was performed  In addition to axial images, coronal 2D reformatted images were created and submitted for interpretation  Radiation dose length product (DLP) for this visit:  896 02 mGy-cm   This examination, like all CT scans performed in the [facility], was performed utilizing techniques to minimize radiation dose exposure, including the use of iterative  reconstruction and automated exposure control  IMAGE QUALITY:  Diagnostic  FINDINGS: PARENCHYMA:  No intracranial mass, mass effect or midline shift  No CT signs of acute infarction  No acute parenchymal hemorrhage  Age-related cortical atrophy  Periventricular white matter hypodensity which is nonspecific although most compatible with chronic small vessel ischemic disease  Vascular calcifications   VENTRICLES AND EXTRA-AXIAL SPACES:  The ventricles are normal size for degree of atrophy, midline position  No extra-axial fluid collections  Focal ossification of the anterior interhemispheric falx noted  VISUALIZED ORBITS AND PARANASAL SINUSES:  No acute abnormality involving the orbits  Mild scattered sinus mucosal thickening is noted  No fluid levels are seen  NG tube noted  CALVARIUM AND EXTRACRANIAL SOFT TISSUES:  Normal      Impression: No acute intracranial abnormality  Workstation performed: [id number]       Intake and Output  I/O       07/10 0701 - 07/11 0700 07/11 0701 - 07/12 0700 07/12 0701 - 07/13 0700    P  O   0     I V  (mL/kg) 0 (0)      NG/ 420     IV Piggyback  150     Feedings 1030 1021     Total Intake(mL/kg) 1810 (20 3) 1591 (17 8)     Urine (mL/kg/hr) 1200 (0 6) 535 (0 2)     Emesis/NG output 0 0     Stool 0 0     Total Output 1200 535     Net +610 +1056            Unmeasured Urine Occurrence  3 x     Unmeasured Stool Occurrence 1 x 0 x         Height and Weights   Height: 5' 10" (177 8 cm)  IBW: 73 kg  Body mass index is 28 22 kg/m²  Weight (last 2 days)     Date/Time   Weight    07/10/20 0600   89 2 (196 65)                Nutrition       Diet Orders   (From admission, onward)             Start     Ordered    07/05/20 0847  Diet Enteral/Parenteral; Tube Feeding No Oral Diet; Jevity 1 5; Continuous; 55; Prosource Protein Liquid - Two Packets; 100; Water; Every 4 hours  Diet effective now     Question Answer Comment   Diet Type Enteral/Parenteral    Enteral/Parenteral Tube Feeding No Oral Diet    Tube Feeding Formula: Jevity 1 5    Bolus/Cyclic/Continuous Continuous    Tube Feeding Goal Rate (mL/hr): 55    Prosource Protein Liquid - No Carb Prosource Protein Liquid - Two Packets    Tube Feeding water flush (mL): 100    Water Flush type: Water    Water flush frequency: Every 4 hours    RD to adjust diet per protocol?  Yes        07/05/20 0849                  Active Medications  Scheduled Meds:  Current Facility-Administered Medications:  acetaminophen 650 mg Rectal Q4H PRN Otilio Mcgee PA-C    albuterol 2 5 mg Nebulization Q6H PRN Otilio Mcgee PA-C    cefTRIAXone 1,000 mg Intravenous Q24H Tata Dew, DO Last Rate: Stopped (07/11/20 1224)   chlorhexidine 15 mL Swish & Spit Q12H [address] Otilio Mcgee PA-C    enoxaparin 1 mg/kg Subcutaneous Q12H [address] Otilio Mcgee PA-C    guaiFENesin 200 mg Oral Q4H PRN Erick CandyYESSENIA    Labetalol HCl 10 mg Intravenous Q6H PRN Tata Dew, DO    levalbuterol 1 25 mg Nebulization TID Otilio Mcgee PA-C    Lidocaine Viscous HCl 15 mL Swish & Spit 4x Daily PRN Otilio Mcgee PA-C    melatonin 6 mg Oral HS Margretmaxx Brooks,     methylphenidate 5 mg Oral BID before breakfast/lunch Margret Brooks DO    nystatin 500,000 Units Swish & Swallow 4x Daily Otilio Mcgee PA-C    ondansetron 4 mg Intravenous Q6H PRN Otilio Mcgee PA-C    oxyCODONE 2 5 mg Oral Q4H PRN Margret GONSALO Brooks DO    polyethylene glycol 17 g Oral Daily Otilio Mcgee PA-C    QUEtiapine 50 mg Oral HS Otilio Mcgee PA-C    senna-docusate sodium 1 tablet Per NG Tube BID Otilio Mcgee PA-C    sodium chloride 3 mL Nebulization TID Otilio Mcgee PA-C      Continuous Infusions:     PRN Meds:     acetaminophen 650 mg Q4H PRN   albuterol 2 5 mg Q6H PRN   guaiFENesin 200 mg Q4H PRN   Labetalol HCl 10 mg Q6H PRN   Lidocaine Viscous HCl 15 mL 4x Daily PRN   ondansetron 4 mg Q6H PRN   oxyCODONE 2 5 mg Q4H PRN       Allergies   No Known Allergies  ---------------------------------------------------------------------------------------  Advance Directive and Living Will: Yes    Power of : Yes  POLST:    ---------------------------------------------------------------------------------------  Care Time Delivered: > 40 min      Mike Hernandez MD      Portions of the record may have been created with voice recognition software  Occasional wrong word or "sound a like" substitutions may have occurred due to the inherent limitations of voice recognition software    Read the chart carefully and recognize, using context, where substitutions have occurred

## 2020-07-12 NOTE — UTILIZATION REVIEW
Continued Stay Review    Date: 6/20/2020                    Current Patient Class: IP  Current Level of Care: Critical care    HPI:69 y o  male initially admitted on  6/10/20 s/p mediastinoscopy, VATS, DOMI lobectomy with prolonged air leak, complicated by subcutaneous emphysema  On 6/20/20 SOB with SpO2 in 70's and progressed to bradycardia and code event requiring emergency trach  Moved to ICU with a-line and central line, levo/epi gtts for bp maintenance  Cric transitioned to trach with trach collar trials started on 6/24/20  CT d/c'd by CTSx on 7/5  Assessment/Plan: Pt continues with mixed feature delirium  Continue anxiolysis, pain control, delirium precautions  Palliative consulted and following  Attempt daytime wakefulness and uninterrupted sleep @ hs  Continue to aggressively wean opioids  D/c IV dilaudid and change to very lose oxy IR 2 5 mg prn  D/c Depakote to hs, initiate Ritalin 5 mg BID  Can consider switching trach to a cuffless when he is more awake - once mental status improves, trach wean will likely be expeditious  Currently on O2 @ 5L  Continue albuterol TID   lovenox and SCD's for DVT ppx  Continue off abx and monitor  Tolerating TF at goal via NGT  Monitor I/O's  Daughter does not pt to have peg tube if comes to that  Pertinent Labs/Diagnostic Results:   CT head 7/9 -- No acute intracranial abnormality    Results from last 7 days   Lab Units 07/12/20  0645 07/11/20  0506 07/10/20  0428 07/08/20  0527 07/07/20  0454   WBC Thousand/uL 10 52* 9 36 7 74 8 23 6 95   HEMOGLOBIN g/dL 10 6* 10 6* 9 0* 10 4* 9 9*   HEMATOCRIT % 34 3* 34 8* 29 0* 33 5* 32 1*   PLATELETS Thousands/uL 474* 516* 432* 519* 505*   NEUTROS ABS Thousands/µL 6 64 5 50 4 67 5 01 3 77     Results from last 7 days   Lab Units 07/12/20  0457 07/11/20  0506 07/10/20  0428 07/08/20  1549 07/08/20  0527 07/07/20  0454   SODIUM mmol/L 141 143 143 142 142 139   POTASSIUM mmol/L 4 0 3 8 4 1 4 2 3 7 4 2   CHLORIDE mmol/L 109* 110* 109* 109* 108 105   CO2 mmol/L 28 30 27 27 31 30   ANION GAP mmol/L 4 3* 7 6 3* 4   BUN mg/dL 19 17 16 12 12 11   CREATININE mg/dL 0 82 0 78 0 68 0 70 0 79 0 63   EGFR ml/min/1 73sq m 90 92 97 96 92 101   CALCIUM mg/dL 9 1 9 4 8 1* 9 5 9 6 8 6   MAGNESIUM mg/dL 2 4 2 3 2 4 2 3 2 3 2 3   PHOSPHORUS mg/dL 4 9* 4 1 4 2*  --  4 9* 5 0*     Results from last 7 days   Lab Units 07/12/20  0457 07/11/20  0506 07/10/20  0428   AST U/L 23 16 10   ALT U/L 31 27 17   ALK PHOS U/L 97 87 70   TOTAL PROTEIN g/dL 7 0 6 9 5 7*   ALBUMIN g/dL 2 6* 2 5* 2 1*   TOTAL BILIRUBIN mg/dL 0 20 0 26 0 17*     Results from last 7 days   Lab Units 07/12/20  0457 07/11/20  0506 07/10/20  0428 07/08/20  1549 07/08/20  0527 07/07/20  0454 07/06/20  0544 07/05/20  1503   GLUCOSE RANDOM mg/dL 107 88 87 119 109 90 97 103       Vital Signs:  Time  Temp  Pulse  Resp  BP  MAP (mmHg)  SpO2  FiO2 (%)  O2 Flow Rate (L/min)  O2 Device   07/10/20 1618  97 7 °F (36 5 °C)  112Abnormal   27Abnormal   147/81  103  99 %  28    Trach mask   07/10/20 1518    108Abnormal   24Abnormal   142/68  95  98 %      Trach mask   07/10/20 1401    100  25Abnormal   150/81  113  100 %  28  5 L/min  Trach mask   07/10/20 1318    98  26Abnormal   150/81  113  100 %         07/10/20 1315    98        99 %  28  5 L/min  Trach mask   07/10/20 1218  98 3 °F (36 8 °C)  102  25Abnormal   135/75  107  100 %  28  5 L/min  Trach mask   07/10/20 1101    92  18  140/68  85  100 %  28  5 L/min  Trach mask   07/10/20 1003    92  23Abnormal       100 %  28  5 L/min  Trach mask   07/10/20 0900    94  26Abnormal   130/77  91  97 %  28  5 L/min  Trach mask   07/10/20 0845    96    130/77  91           07/10/20 0800    98  28Abnormal       100 %  28  5 L/min  Trach mask   07/10/20 0751    102        100 %  28  5 L/min  Trach mask   07/10/20 0700  98 4 °F (36 9 °C)  98  21  164/75    100 %      Trach mask   07/10/20 0600    96  28Abnormal   135/83  106 99 %         07/10/20 0500    94  18  123/87  109  99 %         07/10/20 0400    94  29Abnormal   115/70  95  100 %         07/10/20 0300  98 1 °F (36 7 °C)  96  16  134/79  105  99 %      Trach mask   07/10/20 0200    96  25Abnormal   90/56  67  98 %         07/10/20 0100    96  27Abnormal   110/69  82  100 %         07/10/20 0000    100  30Abnormal   91/56  73  100 %         07/09/20 2300  97 8 °F (36 6 °C)  104  31Abnormal   84/55Abnormal   69  98 %         07/09/20 2200    110Abnormal   29Abnormal   108/67  88  100 %         07/09/20 2100    110Abnormal   20  109/65  85  100 %         07/09/20 2000    108Abnormal   33Abnormal   162/99  120  99 %         07/09/20 1956            100 %  28  8 L/min  Trach mask   07/09/20 1900  98 3 °F (36 8 °C)  106Abnormal   24Abnormal     89  100 %      Other (comment)    O2 Device: Trach collar at 07/09/20 1900         Medications:   Scheduled Medications:    Medications:  cefTRIAXone 1,000 mg Intravenous Q24H   chlorhexidine 15 mL Swish & Spit Q12H DEAN   enoxaparin 1 mg/kg Subcutaneous Q12H DEAN   haloperidol lactate 2 mg Intravenous Once   levalbuterol 1 25 mg Nebulization TID   melatonin 6 mg Oral HS   [START ON 7/13/2020] methylphenidate 5 mg Oral Daily   nystatin 500,000 Units Swish & Swallow 4x Daily   polyethylene glycol 17 g Oral Daily   QUEtiapine 50 mg Oral HS   senna-docusate sodium 1 tablet Per NG Tube BID   sodium chloride 3 mL Nebulization TID        PRN Meds:    acetaminophen 650 mg Rectal Q4H PRN   albuterol 2 5 mg Nebulization Q6H PRN   guaiFENesin 200 mg Oral Q4H PRN   Labetalol HCl 10 mg Intravenous Q6H PRN   Lidocaine Viscous HCl 15 mL Swish & Spit 4x Daily PRN   ondansetron 4 mg Intravenous Q6H PRN   oxyCODONE 2 5 mg Oral Q4H PRN       Discharge Plan: TBD    Network Utilization Review Department  [email or web address]  ATTENTION: Please call with any questions or concerns to [phone] and carefully listen to the prompts so that you are directed to the right person  All voicemails are confidential   Lashawn Joseph all requests for admission clinical reviews, approved or denied determinations and any other requests to dedicated fax number below belonging to the campus where the patient is receiving treatment   List of dedicated fax numbers for the Facilities:  [address] DENIALS (Administrative/Medical Necessity) [phone]   [address] (Maternity/NICU/Pediatrics) [phone]   Ariella Baptise [phone]   Areta Sell [phone]   Valerie Pitch [phone]   Emily Jest [phone]   1205 [facility] [address] [facility] [phone]   Glenn Armas [phone]   [address] [facility] [address]  2401 [facility] [address] [facility] [phone]

## 2020-07-12 NOTE — PROGRESS NOTES
Progress Note - Critical Care   Esequiel Crabtree 71 y o  male MRN: [id number]  Unit/Bed#: [facility] 516-01 Encounter: [id number]    Assessment:   Principal Problem:    Malignant neoplasm of upper lobe of left lung (HCC)  Active Problems:    Acute respiratory failure with hypoxia ([facility] [address] )    Acute deep vein thrombosis (DVT) of axillary vein of right upper extremity (HCC)    Acute deep vein thrombosis (DVT) of brachial vein of right upper extremity (HCC)    Tracheostomy in place [facility])    Postprocedural pneumothorax    Acute deep vein thrombosis (DVT) of calf muscle vein of left lower extremity (HCC)    Encephalopathy    Hyperactive delirium after surgical procedure    Anemia    Thrombocytosis (HCC)    Leukocytosis  Resolved Problems:    Subcutaneous emphysema (HCC)    Lactic acidosis    ROGER (acute kidney injury) ([facility] [address] )    Pneumonia    Plan:   · Neuro:   · Dx: encephalopathy, delirium vs component of hypoxic brain injury s/p cardiac arrest  · Continue frequent redirection  · Consider continuation of 1:1  · Wean sedation  · Appreciate palliative recommendations  · 7/9 CT head without acute findings  · Analgesia: tylenol, dilaudid  · Sedation - seroquel, melatonin  · Ritalin 5mg bid; consider de-escalation of stimulants given recent agitation  · Delirium ppx: CAM-ICU, sleep hygiene     · CV:   · Dx: tachycardia and hypertension in the setting of encephalopathy  · Hold home norvasc  · Continue labetalol 10mg q6 hours prn  · Dx: s/p cardiac arrest 6/28 due to hypoxia/hypercarbia secondary to subcutaneous emphysema obstructing airway (see below)  · Hemodynamic support: none  · MAP goal > 65   · Lung:   · Dx: s/p left upper VATS lobectomy for resection of lung cancer, complicated by subcutaneous emphysema  · Chest tube d/c'd 7/5  · S/p cricothyrotomy during cardiac arrest (see above), converted to formal trach  · Continue trach collar  · Continue nebulized saline tid  · GI:   · NG tube in place  · Stress ulcer ppx: none  · Bowel regimen: senakot, miralax   · FEN:  · Fluids: no maintenance; free water flushes with tube feeds  · Electrolytes: trend and replete as needed  · Nutrition: tube feeds at goal   · :   · No active issues  I/O last 24 hours: In: 3332 [NG/GT:1590; IV Piggyback:80; Feedings:1662]  Out: 5950 [Urine:1105]  · Gaytan: no  · Monitor I&O's, BUN/Cr  · ID:   · Dx: leukocytosis, improved  · Continue empiric rocephin  · Follow blood cultures from  to final result  · Monitor WBC/temp curve  · Heme:   · Dx: anemia, imrpoving  · Dx: thrombocytosis  · DVT ppx: lovenox, SCDs   · Endo:   · No active issues   · Msk/Skin:   · PT/OT  · Turning/repositioning  · Wound care   · Disposition: ICU, per thoracic surgery    ______________________________________________________________________    HPI/24hr events: No significant overnight events  Lines   PICC  NG  Gaytan  Trach    Infusions  none   ______________________________________________________________________  Physical Exam:    Miles Agitation Sedation Scale (RASS): Restless  Physical Exam   Constitutional: No distress  HENT:   Head: Normocephalic and atraumatic  Mouth/Throat: Oropharynx is clear and moist    Trach in place   Eyes: Conjunctivae are normal    Neck: Neck supple  No JVD present  No tracheal deviation present  Cardiovascular: Normal rate and regular rhythm  Pulmonary/Chest: Effort normal and breath sounds normal    On trach collar   Abdominal: Soft  He exhibits no distension  Musculoskeletal: He exhibits no edema or deformity  Neurological: He is alert  Disoriented; follows commands intermittently   Skin: Skin is warm and dry  Capillary refill takes less than 2 seconds  Psychiatric:   Confused    Nursing note and vitals reviewed      ______________________________________________________________________  Temperature:   Temp (24hrs), Av 1 °F (36 7 °C), Min:97 3 °F (36 3 °C), Max:98 4 °F (36 9 °C)    Current Temperature: 98 2 °F (36 8 °C)    Vitals:    07/12/20 2300 07/13/20 0000 07/13/20 0100 07/13/20 0200   BP: 106/91 133/77 125/80 163/94   BP Location:       Pulse: (!) 114 (!) 112 (!) 106 102   Resp: (!) 30 (!) 27 (!) 24 (!) 23   Temp: 98 2 °F (36 8 °C)      TempSrc: Oral      SpO2: 96% 97% 98% 98%   Weight:       Height:         Arterial Line BP: 128/64       Weights:   IBW: 73 kg    Body mass index is 28 22 kg/m²  Weight (last 2 days)     None        Height: 5' 10" (177 8 cm)  Hemodynamic Monitoring:  N/A       Ventilator Settings:   Vent Mode: CPAP/PS Spont              FiO2 (%): 28       No results found for: PHART, LLW1QWG, PO2ART, CHP8VFE, R0YBJIIM, BEART, SOURCE    Intake and Outputs:  I/O       07/10 0701 - 07/11 0700 07/11 0701 - 07/12 0700 07/12 0701 - 07/13 0700    P  O   0 0    I V  (mL/kg) 0 (0)      NG/ 669 9166    IV Piggyback  150 80    Feedings 1030 1021 607    Total Intake(mL/kg) 1810 (20 3) 1591 (17 8) 1977 (22 2)    Urine (mL/kg/hr) 1200 (0 6) 535 (0 2) 425 (0 4)    Emesis/NG output 0 0 0    Stool 0 0 0    Total Output 1200 535 425    Net +610 +1056 +1552           Unmeasured Urine Occurrence  3 x     Unmeasured Stool Occurrence 1 x 0 x 0 x          Nutrition:        Diet Orders   (From admission, onward)             Start     Ordered    07/05/20 0847  Diet Enteral/Parenteral; Tube Feeding No Oral Diet; Jevity 1 5; Continuous; 55; Prosource Protein Liquid - Two Packets; 100; Water; Every 4 hours  Diet effective now     Question Answer Comment   Diet Type Enteral/Parenteral    Enteral/Parenteral Tube Feeding No Oral Diet    Tube Feeding Formula: Jevity 1 5    Bolus/Cyclic/Continuous Continuous    Tube Feeding Goal Rate (mL/hr): 55    Prosource Protein Liquid - No Carb Prosource Protein Liquid - Two Packets    Tube Feeding water flush (mL): 100    Water Flush type: Water    Water flush frequency: Every 4 hours    RD to adjust diet per protocol?  Yes        07/05/20 0870                Labs:   Results from last 7 days   Lab Units 07/12/20  0645 07/11/20  0506 07/10/20  0428   WBC Thousand/uL 10 52* 9 36 7 74   HEMOGLOBIN g/dL 10 6* 10 6* 9 0*   HEMATOCRIT % 34 3* 34 8* 29 0*   PLATELETS Thousands/uL 474* 516* 432*   NEUTROS PCT % 63 58 61   MONOS PCT % 13* 14* 13*      Results from last 7 days   Lab Units 07/12/20  0457 07/11/20  0506 07/10/20  0428   POTASSIUM mmol/L 4 0 3 8 4 1   CHLORIDE mmol/L 109* 110* 109*   CO2 mmol/L 28 30 27   BUN mg/dL 19 17 16   CREATININE mg/dL 0 82 0 78 0 68   CALCIUM mg/dL 9 1 9 4 8 1*   ALK PHOS U/L 97 87 70   ALT U/L 31 27 17   AST U/L 23 16 10     Results from last 7 days   Lab Units 07/12/20  0457 07/11/20  0506 07/10/20  0428   MAGNESIUM mg/dL 2 4 2 3 2 4     Results from last 7 days   Lab Units 07/12/20  0457 07/11/20  0506 07/10/20  0428   PHOSPHORUS mg/dL 4 9* 4 1 4 2*              0   Lab Value Date/Time    TROPONINI 2 75 (H) 06/20/2020 1759    TROPONINI 3 00 (H) 06/20/2020 1319    TROPONINI 2 86 (H) 06/20/2020 0928    TROPONINI 0 85 (H) 06/20/2020 0647    TROPONINI <0 02 03/28/2020 0451    TROPONINI <0 02 03/28/2020 0031    TROPONINI <0 02 03/27/2020 2026     Imaging:  I have personally reviewed pertinent reports  EKG:   Micro:  Blood Culture:   Lab Results   Component Value Date    BLOODCX No Growth at 24 hrs  07/11/2020    BLOODCX No Growth at 24 hrs  07/11/2020    BLOODCX No Growth After 5 Days  06/29/2020    BLOODCX No Growth After 5 Days  06/29/2020    BLOODCX No Growth After 5 Days  06/20/2020    BLOODCX No Growth After 5 Days  06/20/2020    BLOODCX No Growth After 5 Days  03/27/2020     Urine Culture: No results found for: URINECX  Sputum Culture: No components found for: SPUTUMCX  Wound Culure: No results found for: WOUNDCULT    Lab Results   Component Value Date    BLOODCX No Growth at 24 hrs  07/11/2020    BLOODCX No Growth at 24 hrs  07/11/2020    BLOODCX No Growth After 5 Days   06/29/2020    SPUTUMCULTUR 4+ Growth of  06/29/2020     Allergies: No Known Allergies  Medications:   Scheduled Meds:    Current Facility-Administered Medications:  acetaminophen 650 mg Rectal Q4H PRN Billy Wen PA-C    albuterol 2 5 mg Nebulization Q6H PRN Billy Wen PA-C    cefTRIAXone 1,000 mg Intravenous Q24H Mareg Arm, DO Last Rate: Stopped (07/12/20 1216)   chlorhexidine 15 mL Swish & Spit Q12H [address] Billy Wen PA-C    enoxaparin 1 mg/kg Subcutaneous Q12H [address] Billy Wen PA-C    guaiFENesin 200 mg Oral Q4H PRN Tod Buck PA-C    Labetalol HCl 10 mg Intravenous Q6H PRN Marge Arm, DO    levalbuterol 1 25 mg Nebulization TID Billy Wen PA-C    Lidocaine Viscous HCl 15 mL Swish & Spit 4x Daily PRN Billy Wen PA-C    melatonin 6 mg Oral HS Margret M Bendas, DO    methylphenidate 5 mg Oral Daily Margret M Bendas, DO    nystatin 500,000 Units Swish & Swallow 4x Daily Billy Wen PA-C    ondansetron 4 mg Intravenous Q6H PRN Billy Wen PA-C    oxyCODONE 2 5 mg Oral Q4H PRN Margret M Bendas, DO    polyethylene glycol 17 g Oral Daily Billy Wen PA-C    QUEtiapine 50 mg Oral HS Billy Wen PA-C    senna-docusate sodium 1 tablet Per NG Tube BID Billy Wen PA-C    sodium chloride 3 mL Nebulization TID Billy Wen PA-C      Continuous Infusions:   PRN Meds:    acetaminophen 650 mg Q4H PRN   albuterol 2 5 mg Q6H PRN   guaiFENesin 200 mg Q4H PRN   Labetalol HCl 10 mg Q6H PRN   Lidocaine Viscous HCl 15 mL 4x Daily PRN   ondansetron 4 mg Q6H PRN   oxyCODONE 2 5 mg Q4H PRN     VTE Pharmacologic Prophylaxis: Enoxaparin (Lovenox)  VTE Mechanical Prophylaxis: sequential compression device  Invasive lines and devices:   Invasive Devices     Peripherally Inserted Central Catheter Line            PICC Line 98/29/21 Left Basilic 16 days          Drain            NG/OG/Enteral Tube Nasogastric Right nares 13 days    External Urinary Catheter Small 1 day          Airway            Surgical Airway Shiley Cuffed 20 days                   Code Status: Level 2 - DNAR: but accepts endotracheal intubation    Portions of the record may have been created with voice recognition software  Occasional wrong word or "sound a like" substitutions may have occurred due to the inherent limitations of voice recognition software  Read the chart carefully and recognize, using context, where substitutions have occurred      Christa Park DO

## 2020-07-12 NOTE — PROGRESS NOTES
Progress note - Palliative and Supportive Care   Wai Pearson 71 y o  male [id number]    Assessment:    -   Patient Active Problem List   Diagnosis    Community acquired pneumonia    Abnormal computed tomography angiography (CTA)    Alcohol abuse    Malignant neoplasm of upper lobe of left lung (HCC)    Tobacco abuse    Acute respiratory failure with hypoxia (HCC)    Subcutaneous emphysema (HCC)    Acute deep vein thrombosis (DVT) of axillary vein of right upper extremity (HCC)    Acute deep vein thrombosis (DVT) of brachial vein of right upper extremity (HCC)    Tracheostomy in place ([facility] [address] )    Postprocedural pneumothorax    Acute deep vein thrombosis (DVT) of calf muscle vein of left lower extremity ([facility] [address] )    Pneumonia         Plan:  1  Symptom management:    - Continue oxyIR 2 5 mg q6H PRN   - Decrease Ritalin 5 mg to daily   - Continue Seroquel   - Continue melatonin 6 mg   - Delirium precautions- recommend sitter as his daytime mentation is slowly improving      - Please avoid using one-time dosing of benzo or antipsychotic and attempt re-orientation strategies first    - Lights on during the day and off at night- avoid overnight disruptions as possible    - Please attempt to put bed in more of a chair position if medically stable    - Patient enjoys sci-fi and sitcoms- please have TV on during day with similar    - utilize 1:1 if possible to avoid restraints    - Note: daughter tells me he is [facility] and sometimes struggles with understanding female voices    2  Goals of care: Level 2, no PEG, plan for ST to eval and PMR consultation  3  Psychosocial support: Daughter at bedside and update with SICU team  All questions/concerns addressed  Interval history:      Patient continues to slowly improve  Agitation continues to be an issue but better at being re-oriented  Still requiring 2 PRN doses of antipsychotics since my evaluation yesterday       MEDICATIONS / ALLERGIES:     all current active meds have been reviewed and current meds:   Current Facility-Administered Medications   Medication Dose Route Frequency    acetaminophen (TYLENOL) rectal suppository 650 mg  650 mg Rectal Q4H PRN    albuterol inhalation solution 2 5 mg  2 5 mg Nebulization Q6H PRN    cefTRIAXone (ROCEPHIN) 1,000 mg in dextrose 5 % 50 mL IVPB  1,000 mg Intravenous Q24H    chlorhexidine (PERIDEX) 0 12 % oral rinse 15 mL  15 mL Swish & Spit Q12H [address]    enoxaparin (LOVENOX) subcutaneous injection 90 mg  1 mg/kg Subcutaneous Q12H DEAN    guaiFENesin (ROBITUSSIN) oral solution 200 mg  200 mg Oral Q4H PRN    haloperidol lactate (HALDOL) injection 2 mg  2 mg Intravenous Once    Labetalol HCl (NORMODYNE) injection 10 mg  10 mg Intravenous Q6H PRN    levalbuterol (XOPENEX) inhalation solution 1 25 mg  1 25 mg Nebulization TID    Lidocaine Viscous HCl (XYLOCAINE) 2 % mucosal solution 15 mL  15 mL Swish & Spit 4x Daily PRN    melatonin tablet 6 mg  6 mg Oral HS    methylphenidate (RITALIN) tablet 5 mg  5 mg Oral BID before breakfast/lunch    nystatin (MYCOSTATIN) oral suspension 500,000 Units  500,000 Units Swish & Swallow 4x Daily    ondansetron (ZOFRAN) injection 4 mg  4 mg Intravenous Q6H PRN    oxyCODONE (ROXICODONE) oral solution 2 5 mg  2 5 mg Oral Q4H PRN    polyethylene glycol (MIRALAX) packet 17 g  17 g Oral Daily    QUEtiapine (SEROquel) tablet 50 mg  50 mg Oral HS    senna-docusate sodium (SENOKOT S) 8 6-50 mg per tablet 1 tablet  1 tablet Per NG Tube BID    sodium chloride 0 9 % inhalation solution 3 mL  3 mL Nebulization TID       No Known Allergies    OBJECTIVE:    Physical Exam  Physical Exam   Constitutional: No distress  HENT:   Head: Normocephalic and atraumatic  Right Ear: External ear normal    Left Ear: External ear normal    Nose: Nose normal    Eyes: EOM are normal  Right eye exhibits no discharge  Left eye exhibits no discharge  No scleral icterus     Neck:   Trach in place   Cardiovascular: Normal rate, regular rhythm and intact distal pulses  Pulmonary/Chest: Effort normal and breath sounds normal  No respiratory distress  Abdominal: Soft  Bowel sounds are normal  He exhibits no distension  Musculoskeletal: He exhibits no edema  Neurological:   Slow improvement, awake, easily agitated but redirectable  Skin: Skin is warm and dry  There is pallor  Nursing note and vitals reviewed  Lab Results:   I have personally reviewed pertinent labs  , CBC:   Lab Results   Component Value Date    WBC 10 52 (H) 07/12/2020    HGB 10 6 (L) 07/12/2020    HCT 34 3 (L) 07/12/2020    MCV 98 07/12/2020     (H) 07/12/2020    MCH 30 4 07/12/2020    MCHC 30 9 (L) 07/12/2020    RDW 14 2 07/12/2020    MPV 10 5 07/12/2020    NRBC 0 07/12/2020   , CMP:   Lab Results   Component Value Date    SODIUM 141 07/12/2020    K 4 0 07/12/2020     (H) 07/12/2020    CO2 28 07/12/2020    BUN 19 07/12/2020    CREATININE 0 82 07/12/2020    CALCIUM 9 1 07/12/2020    AST 23 07/12/2020    ALT 31 07/12/2020    ALKPHOS 97 07/12/2020    EGFR 90 07/12/2020   , PT/PTT:No results found for: PT, PTT  Imaging Studies: reviewed  EKG, Pathology, and Other Studies: reviewed    Counseling / Coordination of Care    Total floor / unit time spent today 35+ minutes  Greater than 50% of total time was spent with the patient and / or family counseling and / or coordination of care  A description of the counseling / coordination of care: chart review, medication review with changes, family phone conference, supportive listening

## 2020-07-13 LAB
ANION GAP SERPL CALCULATED.3IONS-SCNC: 4 MMOL/L (ref 4–13)
BUN SERPL-MCNC: 18 MG/DL (ref 5–25)
CALCIUM SERPL-MCNC: 9.2 MG/DL (ref 8.3–10.1)
CHLORIDE SERPL-SCNC: 108 MMOL/L (ref 100–108)
CO2 SERPL-SCNC: 28 MMOL/L (ref 21–32)
CREAT SERPL-MCNC: 0.85 MG/DL (ref 0.6–1.3)
ERYTHROCYTE [DISTWIDTH] IN BLOOD BY AUTOMATED COUNT: 14.1 % (ref 11.6–15.1)
GFR SERPL CREATININE-BSD FRML MDRD: 89 ML/MIN/1.73SQ M
GLUCOSE SERPL-MCNC: 112 MG/DL (ref 65–140)
HCT VFR BLD AUTO: 35.1 % (ref 36.5–49.3)
HGB BLD-MCNC: 11 G/DL (ref 12–17)
MCH RBC QN AUTO: 30.2 PG (ref 26.8–34.3)
MCHC RBC AUTO-ENTMCNC: 31.3 G/DL (ref 31.4–37.4)
MCV RBC AUTO: 96 FL (ref 82–98)
PLATELET # BLD AUTO: 475 THOUSANDS/UL (ref 149–390)
PMV BLD AUTO: 10.4 FL (ref 8.9–12.7)
POTASSIUM SERPL-SCNC: 3.8 MMOL/L (ref 3.5–5.3)
RBC # BLD AUTO: 3.64 MILLION/UL (ref 3.88–5.62)
SODIUM SERPL-SCNC: 140 MMOL/L (ref 136–145)
WBC # BLD AUTO: 10.11 THOUSAND/UL (ref 4.31–10.16)

## 2020-07-13 PROCEDURE — 99232 SBSQ HOSP IP/OBS MODERATE 35: CPT | Performed by: INTERNAL MEDICINE

## 2020-07-13 PROCEDURE — 92507 TX SP LANG VOICE COMM INDIV: CPT

## 2020-07-13 PROCEDURE — 94668 MNPJ CHEST WALL SBSQ: CPT

## 2020-07-13 PROCEDURE — NC001 PR NO CHARGE: Performed by: THORACIC SURGERY (CARDIOTHORACIC VASCULAR SURGERY)

## 2020-07-13 PROCEDURE — 99255 IP/OBS CONSLTJ NEW/EST HI 80: CPT | Performed by: PHYSICAL MEDICINE & REHABILITATION

## 2020-07-13 PROCEDURE — 99024 POSTOP FOLLOW-UP VISIT: CPT | Performed by: THORACIC SURGERY (CARDIOTHORACIC VASCULAR SURGERY)

## 2020-07-13 PROCEDURE — 94640 AIRWAY INHALATION TREATMENT: CPT

## 2020-07-13 PROCEDURE — 80048 BASIC METABOLIC PNL TOTAL CA: CPT | Performed by: ORTHOPAEDIC SURGERY

## 2020-07-13 PROCEDURE — 0B21XFZ CHANGE TRACHEOSTOMY DEVICE IN TRACHEA, EXTERNAL APPROACH: ICD-10-PCS | Performed by: SURGERY

## 2020-07-13 PROCEDURE — 94760 N-INVAS EAR/PLS OXIMETRY 1: CPT

## 2020-07-13 PROCEDURE — 94669 MECHANICAL CHEST WALL OSCILL: CPT

## 2020-07-13 PROCEDURE — 99291 CRITICAL CARE FIRST HOUR: CPT | Performed by: SURGERY

## 2020-07-13 PROCEDURE — 85027 COMPLETE CBC AUTOMATED: CPT | Performed by: ORTHOPAEDIC SURGERY

## 2020-07-13 RX ORDER — HYDROMORPHONE HCL/PF 1 MG/ML
0.5 SYRINGE (ML) INJECTION ONCE
Status: COMPLETED | OUTPATIENT
Start: 2020-07-13 | End: 2020-07-13

## 2020-07-13 RX ORDER — NOREPINEPHRINE BITARTRATE 1 MG/ML
INJECTION, SOLUTION INTRAVENOUS
Status: COMPLETED
Start: 2020-07-13 | End: 2020-07-14

## 2020-07-13 RX ORDER — QUETIAPINE FUMARATE 100 MG/1
100 TABLET, FILM COATED ORAL
Status: DISCONTINUED | OUTPATIENT
Start: 2020-07-13 | End: 2020-07-14

## 2020-07-13 RX ORDER — OLANZAPINE 10 MG/1
5 INJECTION, POWDER, LYOPHILIZED, FOR SOLUTION INTRAMUSCULAR EVERY 6 HOURS PRN
Status: DISCONTINUED | OUTPATIENT
Start: 2020-07-13 | End: 2020-07-30 | Stop reason: HOSPADM

## 2020-07-13 RX ORDER — OLANZAPINE 10 MG/1
5 INJECTION, POWDER, LYOPHILIZED, FOR SOLUTION INTRAMUSCULAR ONCE
Status: COMPLETED | OUTPATIENT
Start: 2020-07-13 | End: 2020-07-13

## 2020-07-13 RX ORDER — SODIUM CHLORIDE, SODIUM GLUCONATE, SODIUM ACETATE, POTASSIUM CHLORIDE, MAGNESIUM CHLORIDE, SODIUM PHOSPHATE, DIBASIC, AND POTASSIUM PHOSPHATE .53; .5; .37; .037; .03; .012; .00082 G/100ML; G/100ML; G/100ML; G/100ML; G/100ML; G/100ML; G/100ML
1000 INJECTION, SOLUTION INTRAVENOUS ONCE
Status: COMPLETED | OUTPATIENT
Start: 2020-07-13 | End: 2020-07-14

## 2020-07-13 RX ORDER — POTASSIUM CHLORIDE 20MEQ/15ML
20 LIQUID (ML) ORAL ONCE
Status: COMPLETED | OUTPATIENT
Start: 2020-07-13 | End: 2020-07-13

## 2020-07-13 RX ADMIN — NYSTATIN 500000 UNITS: 100000 SUSPENSION ORAL at 18:06

## 2020-07-13 RX ADMIN — NYSTATIN 500000 UNITS: 100000 SUSPENSION ORAL at 11:09

## 2020-07-13 RX ADMIN — LEVALBUTEROL HYDROCHLORIDE 1.25 MG: 1.25 SOLUTION, CONCENTRATE RESPIRATORY (INHALATION) at 07:33

## 2020-07-13 RX ADMIN — ENOXAPARIN SODIUM 90 MG: 100 INJECTION SUBCUTANEOUS at 10:45

## 2020-07-13 RX ADMIN — MELATONIN 6 MG: at 22:40

## 2020-07-13 RX ADMIN — ISODIUM CHLORIDE 3 ML: 0.03 SOLUTION RESPIRATORY (INHALATION) at 19:36

## 2020-07-13 RX ADMIN — NYSTATIN 500000 UNITS: 100000 SUSPENSION ORAL at 08:44

## 2020-07-13 RX ADMIN — ISODIUM CHLORIDE 3 ML: 0.03 SOLUTION RESPIRATORY (INHALATION) at 13:18

## 2020-07-13 RX ADMIN — ENOXAPARIN SODIUM 90 MG: 100 INJECTION SUBCUTANEOUS at 21:16

## 2020-07-13 RX ADMIN — NYSTATIN 500000 UNITS: 100000 SUSPENSION ORAL at 21:16

## 2020-07-13 RX ADMIN — CEFTRIAXONE SODIUM 1000 MG: 10 INJECTION, POWDER, FOR SOLUTION INTRAVENOUS at 10:48

## 2020-07-13 RX ADMIN — CHLORHEXIDINE GLUCONATE 0.12% ORAL RINSE 15 ML: 1.2 LIQUID ORAL at 21:07

## 2020-07-13 RX ADMIN — SODIUM CHLORIDE, SODIUM GLUCONATE, SODIUM ACETATE, POTASSIUM CHLORIDE, MAGNESIUM CHLORIDE, SODIUM PHOSPHATE, DIBASIC, AND POTASSIUM PHOSPHATE 1000 ML: .53; .5; .37; .037; .03; .012; .00082 INJECTION, SOLUTION INTRAVENOUS at 23:56

## 2020-07-13 RX ADMIN — CHLORHEXIDINE GLUCONATE 0.12% ORAL RINSE 15 ML: 1.2 LIQUID ORAL at 08:44

## 2020-07-13 RX ADMIN — POTASSIUM CHLORIDE 20 MEQ: 20 SOLUTION ORAL at 18:06

## 2020-07-13 RX ADMIN — OLANZAPINE 5 MG: 10 INJECTION, POWDER, FOR SOLUTION INTRAMUSCULAR at 00:55

## 2020-07-13 RX ADMIN — METOPROLOL TARTRATE 12.5 MG: 25 TABLET ORAL at 11:09

## 2020-07-13 RX ADMIN — METHYLPHENIDATE HYDROCHLORIDE 5 MG: 10 TABLET ORAL at 08:44

## 2020-07-13 RX ADMIN — QUETIAPINE FUMARATE 100 MG: 100 TABLET ORAL at 22:40

## 2020-07-13 RX ADMIN — ISODIUM CHLORIDE 3 ML: 0.03 SOLUTION RESPIRATORY (INHALATION) at 07:33

## 2020-07-13 RX ADMIN — HYDROMORPHONE HYDROCHLORIDE 0.5 MG: 1 INJECTION, SOLUTION INTRAMUSCULAR; INTRAVENOUS; SUBCUTANEOUS at 13:48

## 2020-07-13 RX ADMIN — METOPROLOL TARTRATE 25 MG: 25 TABLET, FILM COATED ORAL at 21:16

## 2020-07-13 RX ADMIN — LEVALBUTEROL HYDROCHLORIDE 1.25 MG: 1.25 SOLUTION, CONCENTRATE RESPIRATORY (INHALATION) at 19:36

## 2020-07-13 RX ADMIN — WATER 10 ML: 1 INJECTION INTRAMUSCULAR; INTRAVENOUS; SUBCUTANEOUS at 00:55

## 2020-07-13 RX ADMIN — POLYETHYLENE GLYCOL 3350 17 G: 17 POWDER, FOR SOLUTION ORAL at 08:44

## 2020-07-13 RX ADMIN — LEVALBUTEROL HYDROCHLORIDE 1.25 MG: 1.25 SOLUTION, CONCENTRATE RESPIRATORY (INHALATION) at 13:18

## 2020-07-13 NOTE — PROGRESS NOTES
Surgery   Quick note    Tracheostomy was exchanged bedside today in ICU  Pt was premedicated with 0 5mg of iv dilaudid  Cuff was deflated and 6 Fr trach was removed and replaced by a 6 Fr uncuffed tube  Pt tolerated the procedure well without complications       Nuha Araujo MD  2:44 PM  7/13/2020

## 2020-07-13 NOTE — PROGRESS NOTES
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 s/p VATS procedure and subsequent cardiac arrest requiring a cryciothyrotomy and resulting in a tracheostomy who has been in the ICU since 7/4/2020  He remains non-verbal and requiring assistance from [facility] with his tracheostomy  He is agitated with soft restraints and intermittently delirious  Plan:  1  Symptom management -    - Continue Tylenol 650 mg rectal q4h prn    - Continue Roxicodone 2 5 mg Q4H prn for severe pain  - Continue reduced dose of Ritalin 5 mg daily   - Continue Seroquel 50 mg qhs,    - Continue Delirium precautions with lights on during day and off at night  Minimize one time doses of antipsychotics and benzo  Please re-orientate prior to giving one-time medications  - Keep bed in upright position during day and encourage use of TV on [employer] and sitcoms with the volume on for him to enjoy  - Try to use 1:1 observation without soft restraints whenever possible     - He is hard of hearing per daughter     2  Goals - No PEG, SALT to continue communication trials after trach change later today  3  Psychosocial support: Daughter was not present but per nursing staff was here earlier today  Code Status: DNAR but accepts ET intubation, no PEG tube - Level 2   Decisional apparatus:  Patient is not competent on my exam today  If competence is lost, patient's substitute decision maker would default to daughter by [place]  Advance Directive / Living Will / POLST:  Advance directive - Daughter: Blossom Ortez  Interval history:       Mr Abigail Lambert was seen and examined at bedside  Overnight patient remained in restraints and was agitated and restless per nursing  He required one prn Zyprexa order last night at 1am for agitation   He was uncomfortable in bed with his head tilted forward, and was repositioned to a seated position with tech's help  MEDICATIONS / ALLERGIES:     all current active meds have been reviewed and current meds:   Current Facility-Administered Medications   Medication Dose Route Frequency    acetaminophen (TYLENOL) rectal suppository 650 mg  650 mg Rectal Q4H PRN    albuterol inhalation solution 2 5 mg  2 5 mg Nebulization Q6H PRN    cefTRIAXone (ROCEPHIN) 1,000 mg in dextrose 5 % 50 mL IVPB  1,000 mg Intravenous Q24H    chlorhexidine (PERIDEX) 0 12 % oral rinse 15 mL  15 mL Swish & Spit Q12H [facility]    enoxaparin (LOVENOX) subcutaneous injection 90 mg  1 mg/kg Subcutaneous Q12H DEAN    guaiFENesin (ROBITUSSIN) oral solution 200 mg  200 mg Oral Q4H PRN    levalbuterol (XOPENEX) inhalation solution 1 25 mg  1 25 mg Nebulization TID    Lidocaine Viscous HCl (XYLOCAINE) 2 % mucosal solution 15 mL  15 mL Swish & Spit 4x Daily PRN    melatonin tablet 6 mg  6 mg Oral HS    metoprolol tartrate (LOPRESSOR) partial tablet 12 5 mg  12 5 mg Per NG Tube Q12H [facility]    nystatin (MYCOSTATIN) oral suspension 500,000 Units  500,000 Units Swish & Swallow 4x Daily    OLANZapine (ZyPREXA) IM injection 5 mg  5 mg Intramuscular Q6H PRN    ondansetron (ZOFRAN) injection 4 mg  4 mg Intravenous Q6H PRN    oxyCODONE (ROXICODONE) oral solution 2 5 mg  2 5 mg Oral Q4H PRN    polyethylene glycol (MIRALAX) packet 17 g  17 g Oral Daily    QUEtiapine (SEROquel) tablet 100 mg  100 mg Oral HS    senna-docusate sodium (SENOKOT S) 8 6-50 mg per tablet 1 tablet  1 tablet Per NG Tube BID    sodium chloride 0 9 % inhalation solution 3 mL  3 mL Nebulization TID       No Known Allergies    OBJECTIVE:    Physical Exam  Physical Exam   Constitutional: He is cooperative  He appears distressed (mildly with the soft restraints)  He is restrained  Trach with secretions, NG tube in place   HENT:   Head: Normocephalic and atraumatic  Eyes: Pupils are equal, round, and reactive to light   EOM are normal  Pulmonary/Chest: Effort normal  No respiratory distress  He has wheezes  Abdominal: Soft  Bowel sounds are normal  He exhibits no distension  Genitourinary:   Genitourinary Comments: Condom cath in place   Musculoskeletal: He exhibits no edema  Neurological: He is alert  Lab Results:   I have personally reviewed pertinent labs  , CBC:   Lab Results   Component Value Date    WBC 10 11 07/13/2020    HGB 11 0 (L) 07/13/2020    HCT 35 1 (L) 07/13/2020    MCV 96 07/13/2020     (H) 07/13/2020    MCH 30 2 07/13/2020    MCHC 31 3 (L) 07/13/2020    RDW 14 1 07/13/2020    MPV 10 4 07/13/2020   , BMP:  Lab Results   Component Value Date    SODIUM 140 07/13/2020    K 3 8 07/13/2020     07/13/2020    CO2 28 07/13/2020    BUN 18 07/13/2020    CREATININE 0 85 07/13/2020    GLUC 112 07/13/2020    CALCIUM 9 2 07/13/2020    AGAP 4 07/13/2020    EGFR 89 07/13/2020     Imaging Studies: reviewed  EKG, Pathology, and Other Studies: reviewed    Counseling / Coordination of Care    Total floor / unit time spent today 30 minutes  Greater than 50% of total time was spent with the patient and / or family counseling and / or coordination of care  A description of the counseling / coordination of care: chart review, patient discussion, medication review       Daniela Ye MD  07/13/20  9:35 AM
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
## 2020-07-13 NOTE — SPEECH THERAPY NOTE
Speech Language/Pathology    Speech/Language Pathology Progress Note    Patient Name: Denzel Angelo  Today's Date: 7/13/2020     Subjective:  Pt mumbling today, difficulty w/ voicing  Moving around the bed still in wrist restraints, posey  Objective:  Pt seen for PMV trials  Trach not changed, plan to change to #6 cuffless later today  Pt O2 100% throughout, no change or decline  Pt w/ audible vocalization but difficulty w/ intelligibility  Pt has increased thick secretions in the trach site- cued to cough but he is unable to do so  Voicing began to sound congested, wet  PMV removed  Assessment:  Pt w/ increased secretions today, less audible vocalizations w/ PMV,  For trach change later, will have to return for additional trials        Plan/Recommendations:  F/u w/ downsized trach for trials & for swallow eval

## 2020-07-13 NOTE — CONSULTS
PHYSICAL MEDICINE AND REHABILITATION CONSULT NOTE  Siria Joseph 71 y o  male MRN: [id number]  Unit/Bed#: [facility] 181-85 Encounter: [id number]    Requested by (Physician/Service): Ken Boykin MD  Reason for Consultation:  Assessment of rehabilitation needs    Assessment:  Rehabilitation Diagnosis:    Hypoxic brain injury vs delirium    Adenocarcinoma s/p VATS DOMI    Recommendations:  Rehabilitation Plan:   Continue PT/OT while on acute care when medically stable   The patient will likely require LTACH vs dedicated brain injury rehabilitation once medically stable  Will continue to follow  Medical Co-morbidities Plan:  · Hypoxic brain injury vs delirium   · Adenocarcinoma of the DOMI s/p VATS  · Air lead s/p decompression  · Encephalopathy  · Dysphagia   · PEA arrest s/p ACLS  · Tachycardia   · Agitation   · DVT  · Acute respiratory failure s/p trach    Thank you for this consultation  Do not hesitate to contact service with further questions  KHANG Mark  PM&R    History of Present Illness:  Siria Joseph is a 71 y o  male with a PMH of adenocarcinoma and HTN who presented [facility] 6/10/20 for VATS for adenocarcinoma of the DOMI  Post operatively he had an air leak and underwent decompression via a small incision to the left chest wall with VAC placement on 6/15/20  On 6/20/20 he went into PEA and underwent ACLS  He required an emergent cricoidotomy which was revised to a trach  He has been in the ICU since 7/4/20 and has been agitated requiring restraints as well as a 1:1  John Paul Rolls is to be downsized  Currently goal of care are level 2, no PEG  PM&R are consulted for rehabilitation recommendations  The patient was seen in the ICU  He was lethargic  He is in a posey and restraints currently  Per 1:1 he has been calmer this morning then previously      Review of Systems: 10 point ROS negative except for what is noted in HPI    Function:  Prior level of function and living situation:  The patient lives with his daughter and son in law in a 2 level home with first floor setup  He was independent prior to admission  Current level of function:  Physical Therapy:  Pending re-evaluation (last seen on 6/19 due to medical instability)  Occupational Therapy: Pending re-evaluation (last seen on 6/19 due to medical instability)  Speech Therapy:  Pending swallow evaluation, currently NPO      Physical Exam:  BP (!) 175/97 (BP Location: Left arm)   Pulse (!) 106   Temp 98 °F (36 7 °C) (Axillary)   Resp (!) 25   Ht 5' 10" (1 778 m)   Wt 88 4 kg (194 lb 14 2 oz)   SpO2 99%   BMI 27 96 kg/m²        Intake/Output Summary (Last 24 hours) at 7/13/2020 1040  Last data filed at 7/13/2020 0800  Gross per 24 hour   Intake 2478 ml   Output 1530 ml   Net 948 ml       Body mass index is 27 96 kg/m²  Physical Exam   Constitutional: He appears lethargic  HENT:   NG tube in place    Cardiovascular: Regular rhythm  Pulmonary/Chest:   Trach in place    Abdominal: He exhibits no distension  Musculoskeletal:   Not following commands, restrained    Neurological: He appears lethargic  Skin: There is pallor        Social History:    Social History     Socioeconomic History    Marital status: Single     Spouse name: None    Number of children: None    Years of education: None    Highest education level: None   Occupational History    None   Social Needs    Financial resource strain: None    Food insecurity:     Worry: None     Inability: None    Transportation needs:     Medical: None     Non-medical: None   Tobacco Use    Smoking status: Former Smoker     Packs/day: 1 00     Years: 55 00     Pack years: 55 00     Types: Cigarettes    Smokeless tobacco: Never Used   Substance and Sexual Activity    Alcohol use: Yes     Frequency: 4 or more times a week     Comment: "we cant tell how much"  per pt daughter     Drug use: Never    Sexual activity: None   Lifestyle    Physical activity:     Days per week: None     Minutes per session: None    Stress: None   Relationships    Social connections:     Talks on phone: None     Gets together: None     Attends [employer] service: None     Active member of club or organization: None     Attends meetings of clubs or organizations: None     Relationship status: None    Intimate partner violence:     Fear of current or ex partner: None     Emotionally abused: None     Physically abused: None     Forced sexual activity: None   Other Topics Concern    None   Social History Narrative    None        Family History:    Family History   Problem Relation Age of Onset    Ovarian cancer Mother     Liver cancer Father          Medications:     Current Facility-Administered Medications:     acetaminophen (TYLENOL) rectal suppository 650 mg, 650 mg, Rectal, Q4H PRN, Anamaria Rossi PA-C, 650 mg at 07/12/20 0746    albuterol inhalation solution 2 5 mg, 2 5 mg, Nebulization, Q6H PRN, Anamaria Rossi PA-C    cefTRIAXone (ROCEPHIN) 1,000 mg in dextrose 5 % 50 mL IVPB, 1,000 mg, Intravenous, Q24H, Sophie Hopper DO, Stopped at 07/12/20 1216    chlorhexidine (PERIDEX) 0 12 % oral rinse 15 mL, 15 mL, Swish & Spit, Q12H [facility], Anamaria Rossi PA-C, 15 mL at 07/13/20 0844    enoxaparin (LOVENOX) subcutaneous injection 90 mg, 1 mg/kg, Subcutaneous, Q12H [facility], Anamaria Rossi PA-C, 90 mg at 07/12/20 2125    guaiFENesin (ROBITUSSIN) oral solution 200 mg, 200 mg, Oral, Q4H PRN, Alcon Sweeney PA-C, 200 mg at 07/09/20 1704    levalbuterol (XOPENEX) inhalation solution 1 25 mg, 1 25 mg, Nebulization, TID, Anamaria Rossi PA-C, 1 25 mg at 07/13/20 0733    Lidocaine Viscous HCl (XYLOCAINE) 2 % mucosal solution 15 mL, 15 mL, Swish & Spit, 4x Daily PRN, Anamaria Rossi PA-C    melatonin tablet 6 mg, 6 mg, Oral, HS, Margret Brooks, DO, 6 mg at 07/12/20 2124    metoprolol tartrate (LOPRESSOR) partial tablet 12 5 mg, 12 5 mg, Per NG Tube, Q12H [address], Huang Dominguez MD    nystatin (MYCOSTATIN) oral suspension 500,000 Units, 500,000 Units, Swish & Swallow, 4x Daily, Enoc Zavala PA-C, 500,000 Units at 07/13/20 0844    OLANZapine (ZyPREXA) IM injection 5 mg, 5 mg, Intramuscular, Q6H PRN, Huang Dominguez MD    ondansetron [facility]) injection 4 mg, 4 mg, Intravenous, Q6H PRN, Enoc Zavala PA-C, 4 mg at 06/29/20 1006    oxyCODONE (ROXICODONE) oral solution 2 5 mg, 2 5 mg, Oral, Q4H PRN, Margret Brooks DO, 2 5 mg at 07/12/20 2333    polyethylene glycol (MIRALAX) packet 17 g, 17 g, Oral, Daily, Enoc Zavala PA-C, 17 g at 07/13/20 0844    QUEtiapine (SEROquel) tablet 100 mg, 100 mg, Oral, HS, Huang Dominguez MD    senna-docusate sodium (SENOKOT S) 8 6-50 mg per tablet 1 tablet, 1 tablet, Per NG Tube, BID, Enoc Zavala PA-C, 1 tablet at 07/12/20 1844    sodium chloride 0 9 % inhalation solution 3 mL, 3 mL, Nebulization, TID, Enoc Zavala PA-C, 3 mL at 07/13/20 3337    Past Medical History:     Past Medical History:   Diagnosis Date    Alcohol abuse 3/27/2020    Chest pain     Community acquired pneumonia 3/27/2020    Hypertension     Left upper lobe pulmonary nodule     Malignant neoplasm of upper lobe of left lung ([facility] [address] ) 5/7/2020        Past Surgical History:     Past Surgical History:   Procedure Laterality Date    COLON SURGERY      CT NEEDLE BIOPSY LUNG  4/29/2020    DE BRONCHOSCOPY,DIAGNOSTIC N/A 6/10/2020    Procedure: BRONCHOSCOPY FLEXIBLE;  Surgeon: Marko Homans, MD;  [facility];  Service: Thoracic    DE MEDIASTINOSCOPY WITH LYMPH NODE BIOPSY/IES N/A 6/10/2020    Procedure: MEDIASTINOSCOPY;  Surgeon: Marko Homans, MD;  [facility];  Service: Thoracic    DE THORACOSCOPY SURG LOBECTOMY Left 6/10/2020    Procedure: THORACOSCOPY VIDEO ASSISTED SURGERY (VATS);   Surgeon: Marko Homans, MD;  [facility];  Service: Thoracic    DE THORACOSCOPY SURG LOBECTOMY Left 6/10/2020 Procedure: UPPER LOBECTOMY OF LUNG; MEDIASTINAL  LYMPH NODE DISSECTION;  Surgeon: Marlen Schroeder MD;  [facility];  Service: Thoracic    TRACHEOSTOMY N/A 6/22/2020    Procedure: TRACHEOSTOMY REVISION, BRONCHOSCOPY;  Surgeon: Marlen Schroeder MD;  [facility];  Service: Thoracic    WRIST SURGERY Right     orif         Allergies:     No Known Allergies        LABORATORY RESULTS:      Lab Results   Component Value Date    HGB 11 0 (L) 07/13/2020    HCT 35 1 (L) 07/13/2020    WBC 10 11 07/13/2020     Lab Results   Component Value Date    BUN 18 07/13/2020    K 3 8 07/13/2020     07/13/2020    GLUCOSE 264 (H) 06/20/2020    CREATININE 0 85 07/13/2020     Lab Results   Component Value Date    PROTIME 14 0 06/22/2020    INR 1 12 06/22/2020        DIAGNOSTIC STUDIES: Reviewed  Xr Chest Portable    Result Date: 6/15/2020  Impression: Once again identified is extensive subcutaneous emphysema throughout the chest and neck  Left chest tube unchanged in position with a small suspected apical pneumothorax  Workstation performed: [id number]     Xr Chest Portable    Result Date: 6/13/2020  Impression: Improving pneumomediastinum  Trace left apical pneumothorax  Extensive subcutaneous emphysema again present  Workstation performed: [id number]     Xr Chest Pa & Lateral    Result Date: 6/14/2020  Impression: Unchanged tiny left apical pneumothorax and pneumomediastinum  Extensive subcutaneous emphysema again present  Workstation performed: [id number]     Xr Chest Pa & Lateral    Result Date: 6/12/2020  Impression: Left upper lobectomy with left chest tube with tiny left pneumothorax  Extensive new pneumomediastinum  Marked worsening of extensive subcutaneous emphysema, greatest in the left chest wall but also extensive in the lower neck  The study was marked in [facility] for immediate notification   Workstation performed: [id number]     X-ray Chest 1 View    Result Date: 6/10/2020  Impression: No consolidation or discrete pneumothorax   Workstation performed: [id number]

## 2020-07-13 NOTE — PROGRESS NOTES
Progress Note - Thoracic Surgery   Quita Magaña 71 y o  male MRN: [id number]  Unit/Bed#: [facility] 853-39 Encounter: [id number]    Assessment:  71 M status post thoracoscopic left upper lobectomy complicated by prolonged air leak, cardiac arrest s/p cricothyroidotomy and tracheostomy revision    Plan: Will discuss trach downsizing  Continue tube feeds at goal, will hold if plan for downsizing  Pulmonary toilet  Continue rochepin  Wean oxygen  Continue therapeutic Lovenox    Subjective/Objective     Subjective: No acute events overnight  Remains intermittently agitated  Much more awake and interactive today  Objective:    Blood pressure (!) 148/107, pulse (!) 108, temperature 98 7 °F (37 1 °C), resp  rate 20, height 5' 10" (1 778 m), weight 88 4 kg (194 lb 14 2 oz), SpO2 99 %  ,Body mass index is 27 96 kg/m²        Intake/Output Summary (Last 24 hours) at 7/13/2020 2358  Last data filed at 7/13/2020 0400  Gross per 24 hour   Intake 2859 ml   Output 905 ml   Net 1954 ml       Invasive Devices     Peripherally Inserted Central Catheter Line            PICC Line 28/93/79 Left Basilic 16 days          Drain            NG/OG/Enteral Tube Nasogastric Right nares 14 days    External Urinary Catheter Small 1 day          Airway            Surgical Airway Shiley Cuffed 20 days                Physical Exam:   General: NAD, awake and alert, follows commands  Eyes: PERRL  ENT: moist mucous membranes, NG in place  Neck: supple, trach in place  CV: RRR +S1/S2  Chest: breath sounds bilaterally, incisions c/d/i  Abdomen: soft, NT ND  Extremities: atraumatic, no edema      Results from last 7 days   Lab Units 07/13/20  0435 07/12/20  0645 07/11/20  0506   WBC Thousand/uL 10 11 10 52* 9 36   HEMOGLOBIN g/dL 11 0* 10 6* 10 6*   HEMATOCRIT % 35 1* 34 3* 34 8*   PLATELETS Thousands/uL 475* 474* 516*     Results from last 7 days   Lab Units 07/13/20  0435 07/12/20  0457 07/11/20  0506   POTASSIUM mmol/L 3 8 4 0 3 8   CHLORIDE mmol/L 108 109* 110*   CO2 mmol/L 28 28 30   BUN mg/dL 18 19 17   CREATININE mg/dL 0 85 0 82 0 78   CALCIUM mg/dL 9 2 9 1 9 4

## 2020-07-14 ENCOUNTER — TELEPHONE (OUTPATIENT)
Dept: CARDIAC SURGERY | Facility: CLINIC | Age: 70
End: 2020-07-14

## 2020-07-14 ENCOUNTER — APPOINTMENT (INPATIENT)
Dept: NON INVASIVE DIAGNOSTICS | Facility: HOSPITAL | Age: 70
DRG: 003 | End: 2020-07-14
Payer: COMMERCIAL

## 2020-07-14 ENCOUNTER — APPOINTMENT (INPATIENT)
Dept: RADIOLOGY | Facility: HOSPITAL | Age: 70
DRG: 003 | End: 2020-07-14
Payer: COMMERCIAL

## 2020-07-14 PROBLEM — D72.829 LEUKOCYTOSIS: Status: RESOLVED | Noted: 2020-07-12 | Resolved: 2020-07-14

## 2020-07-14 PROBLEM — J95.811 POSTPROCEDURAL PNEUMOTHORAX: Status: RESOLVED | Noted: 2020-06-23 | Resolved: 2020-07-14

## 2020-07-14 LAB
AEROSOL MASK ROOM AIR FIO2: 21 %
ANION GAP SERPL CALCULATED.3IONS-SCNC: 6 MMOL/L (ref 4–13)
ARTERIAL PATENCY WRIST A: YES
BASE EXCESS BLDA CALC-SCNC: -2.3 MMOL/L
BASOPHILS # BLD AUTO: 0.04 THOUSANDS/ΜL (ref 0–0.1)
BASOPHILS # BLD AUTO: 0.05 THOUSANDS/ΜL (ref 0–0.1)
BASOPHILS NFR BLD AUTO: 1 % (ref 0–1)
BASOPHILS NFR BLD AUTO: 1 % (ref 0–1)
BUN SERPL-MCNC: 19 MG/DL (ref 5–25)
CALCIUM SERPL-MCNC: 8.5 MG/DL (ref 8.3–10.1)
CHLORIDE SERPL-SCNC: 110 MMOL/L (ref 100–108)
CO2 SERPL-SCNC: 26 MMOL/L (ref 21–32)
CREAT SERPL-MCNC: 0.89 MG/DL (ref 0.6–1.3)
EOSINOPHIL # BLD AUTO: 0.24 THOUSAND/ΜL (ref 0–0.61)
EOSINOPHIL # BLD AUTO: 0.28 THOUSAND/ΜL (ref 0–0.61)
EOSINOPHIL NFR BLD AUTO: 3 % (ref 0–6)
EOSINOPHIL NFR BLD AUTO: 3 % (ref 0–6)
ERYTHROCYTE [DISTWIDTH] IN BLOOD BY AUTOMATED COUNT: 14.2 % (ref 11.6–15.1)
ERYTHROCYTE [DISTWIDTH] IN BLOOD BY AUTOMATED COUNT: 14.4 % (ref 11.6–15.1)
GFR SERPL CREATININE-BSD FRML MDRD: 87 ML/MIN/1.73SQ M
GLUCOSE SERPL-MCNC: 103 MG/DL (ref 65–140)
HCO3 BLDA-SCNC: 21.4 MMOL/L (ref 22–28)
HCT VFR BLD AUTO: 31.4 % (ref 36.5–49.3)
HCT VFR BLD AUTO: 32.2 % (ref 36.5–49.3)
HGB BLD-MCNC: 9.8 G/DL (ref 12–17)
HGB BLD-MCNC: 9.9 G/DL (ref 12–17)
IMM GRANULOCYTES # BLD AUTO: 0.07 THOUSAND/UL (ref 0–0.2)
IMM GRANULOCYTES # BLD AUTO: 0.08 THOUSAND/UL (ref 0–0.2)
IMM GRANULOCYTES NFR BLD AUTO: 1 % (ref 0–2)
IMM GRANULOCYTES NFR BLD AUTO: 1 % (ref 0–2)
LACTATE SERPL-SCNC: 1.1 MMOL/L (ref 0.5–2)
LYMPHOCYTES # BLD AUTO: 1.97 THOUSANDS/ΜL (ref 0.6–4.47)
LYMPHOCYTES # BLD AUTO: 2.13 THOUSANDS/ΜL (ref 0.6–4.47)
LYMPHOCYTES NFR BLD AUTO: 24 % (ref 14–44)
LYMPHOCYTES NFR BLD AUTO: 26 % (ref 14–44)
MCH RBC QN AUTO: 30.5 PG (ref 26.8–34.3)
MCH RBC QN AUTO: 30.7 PG (ref 26.8–34.3)
MCHC RBC AUTO-ENTMCNC: 30.7 G/DL (ref 31.4–37.4)
MCHC RBC AUTO-ENTMCNC: 31.2 G/DL (ref 31.4–37.4)
MCV RBC AUTO: 100 FL (ref 82–98)
MCV RBC AUTO: 98 FL (ref 82–98)
MONOCYTES # BLD AUTO: 1.08 THOUSAND/ΜL (ref 0.17–1.22)
MONOCYTES # BLD AUTO: 1.18 THOUSAND/ΜL (ref 0.17–1.22)
MONOCYTES NFR BLD AUTO: 13 % (ref 4–12)
MONOCYTES NFR BLD AUTO: 14 % (ref 4–12)
NEUTROPHILS # BLD AUTO: 4.53 THOUSANDS/ΜL (ref 1.85–7.62)
NEUTROPHILS # BLD AUTO: 4.83 THOUSANDS/ΜL (ref 1.85–7.62)
NEUTS SEG NFR BLD AUTO: 56 % (ref 43–75)
NEUTS SEG NFR BLD AUTO: 57 % (ref 43–75)
NON VENT TYPE-AEROSOL MASK: ABNORMAL
NRBC BLD AUTO-RTO: 0 /100 WBCS
NRBC BLD AUTO-RTO: 0 /100 WBCS
O2 CT BLDA-SCNC: 14 ML/DL (ref 16–23)
OXYHGB MFR BLDA: 92.4 % (ref 94–97)
PCO2 BLDA: 33 MM HG (ref 36–44)
PH BLDA: 7.43 [PH] (ref 7.35–7.45)
PLATELET # BLD AUTO: 391 THOUSANDS/UL (ref 149–390)
PLATELET # BLD AUTO: 448 THOUSANDS/UL (ref 149–390)
PMV BLD AUTO: 10.2 FL (ref 8.9–12.7)
PMV BLD AUTO: 11 FL (ref 8.9–12.7)
PO2 BLDA: 67.5 MM HG (ref 75–129)
POTASSIUM SERPL-SCNC: 4 MMOL/L (ref 3.5–5.3)
RBC # BLD AUTO: 3.21 MILLION/UL (ref 3.88–5.62)
RBC # BLD AUTO: 3.23 MILLION/UL (ref 3.88–5.62)
SODIUM SERPL-SCNC: 142 MMOL/L (ref 136–145)
SPECIMEN SOURCE: ABNORMAL
WBC # BLD AUTO: 8.14 THOUSAND/UL (ref 4.31–10.16)
WBC # BLD AUTO: 8.34 THOUSAND/UL (ref 4.31–10.16)

## 2020-07-14 PROCEDURE — 99232 SBSQ HOSP IP/OBS MODERATE 35: CPT | Performed by: INTERNAL MEDICINE

## 2020-07-14 PROCEDURE — 71045 X-RAY EXAM CHEST 1 VIEW: CPT

## 2020-07-14 PROCEDURE — 92507 TX SP LANG VOICE COMM INDIV: CPT

## 2020-07-14 PROCEDURE — 99291 CRITICAL CARE FIRST HOUR: CPT | Performed by: SURGERY

## 2020-07-14 PROCEDURE — 94760 N-INVAS EAR/PLS OXIMETRY 1: CPT

## 2020-07-14 PROCEDURE — 94669 MECHANICAL CHEST WALL OSCILL: CPT

## 2020-07-14 PROCEDURE — 36600 WITHDRAWAL OF ARTERIAL BLOOD: CPT

## 2020-07-14 PROCEDURE — 93971 EXTREMITY STUDY: CPT

## 2020-07-14 PROCEDURE — 94640 AIRWAY INHALATION TREATMENT: CPT

## 2020-07-14 PROCEDURE — 99024 POSTOP FOLLOW-UP VISIT: CPT | Performed by: THORACIC SURGERY (CARDIOTHORACIC VASCULAR SURGERY)

## 2020-07-14 PROCEDURE — 92610 EVALUATE SWALLOWING FUNCTION: CPT

## 2020-07-14 PROCEDURE — 85025 COMPLETE CBC W/AUTO DIFF WBC: CPT | Performed by: EMERGENCY MEDICINE

## 2020-07-14 PROCEDURE — 82805 BLOOD GASES W/O2 SATURATION: CPT | Performed by: EMERGENCY MEDICINE

## 2020-07-14 PROCEDURE — 83605 ASSAY OF LACTIC ACID: CPT | Performed by: EMERGENCY MEDICINE

## 2020-07-14 PROCEDURE — 80048 BASIC METABOLIC PNL TOTAL CA: CPT | Performed by: EMERGENCY MEDICINE

## 2020-07-14 RX ORDER — QUETIAPINE FUMARATE 25 MG/1
50 TABLET, FILM COATED ORAL
Status: DISCONTINUED | OUTPATIENT
Start: 2020-07-14 | End: 2020-07-15

## 2020-07-14 RX ORDER — SODIUM CHLORIDE, SODIUM GLUCONATE, SODIUM ACETATE, POTASSIUM CHLORIDE, MAGNESIUM CHLORIDE, SODIUM PHOSPHATE, DIBASIC, AND POTASSIUM PHOSPHATE .53; .5; .37; .037; .03; .012; .00082 G/100ML; G/100ML; G/100ML; G/100ML; G/100ML; G/100ML; G/100ML
1000 INJECTION, SOLUTION INTRAVENOUS ONCE
Status: COMPLETED | OUTPATIENT
Start: 2020-07-14 | End: 2020-07-14

## 2020-07-14 RX ORDER — LIDOCAINE HYDROCHLORIDE 10 MG/ML
INJECTION, SOLUTION EPIDURAL; INFILTRATION; INTRACAUDAL; PERINEURAL
Status: COMPLETED
Start: 2020-07-14 | End: 2020-07-14

## 2020-07-14 RX ADMIN — NOREPINEPHRINE BITARTRATE: 1 INJECTION INTRAVENOUS at 00:52

## 2020-07-14 RX ADMIN — QUETIAPINE FUMARATE 50 MG: 25 TABLET ORAL at 21:45

## 2020-07-14 RX ADMIN — ENOXAPARIN SODIUM 90 MG: 100 INJECTION SUBCUTANEOUS at 21:55

## 2020-07-14 RX ADMIN — LEVALBUTEROL HYDROCHLORIDE 1.25 MG: 1.25 SOLUTION, CONCENTRATE RESPIRATORY (INHALATION) at 19:54

## 2020-07-14 RX ADMIN — LEVALBUTEROL HYDROCHLORIDE 1.25 MG: 1.25 SOLUTION, CONCENTRATE RESPIRATORY (INHALATION) at 07:55

## 2020-07-14 RX ADMIN — METOPROLOL TARTRATE 12.5 MG: 25 TABLET ORAL at 21:46

## 2020-07-14 RX ADMIN — CHLORHEXIDINE GLUCONATE 0.12% ORAL RINSE 15 ML: 1.2 LIQUID ORAL at 21:45

## 2020-07-14 RX ADMIN — ISODIUM CHLORIDE 3 ML: 0.03 SOLUTION RESPIRATORY (INHALATION) at 19:54

## 2020-07-14 RX ADMIN — ISODIUM CHLORIDE 3 ML: 0.03 SOLUTION RESPIRATORY (INHALATION) at 14:16

## 2020-07-14 RX ADMIN — NYSTATIN 500000 UNITS: 100000 SUSPENSION ORAL at 21:58

## 2020-07-14 RX ADMIN — ENOXAPARIN SODIUM 90 MG: 100 INJECTION SUBCUTANEOUS at 08:30

## 2020-07-14 RX ADMIN — LEVALBUTEROL HYDROCHLORIDE 1.25 MG: 1.25 SOLUTION, CONCENTRATE RESPIRATORY (INHALATION) at 14:16

## 2020-07-14 RX ADMIN — SODIUM CHLORIDE, SODIUM GLUCONATE, SODIUM ACETATE, POTASSIUM CHLORIDE, MAGNESIUM CHLORIDE, SODIUM PHOSPHATE, DIBASIC, AND POTASSIUM PHOSPHATE 1000 ML: .53; .5; .37; .037; .03; .012; .00082 INJECTION, SOLUTION INTRAVENOUS at 01:22

## 2020-07-14 RX ADMIN — SODIUM CHLORIDE 4 MCG/MIN: 0.9 INJECTION, SOLUTION INTRAVENOUS at 00:00

## 2020-07-14 RX ADMIN — NYSTATIN 500000 UNITS: 100000 SUSPENSION ORAL at 08:29

## 2020-07-14 RX ADMIN — ISODIUM CHLORIDE 3 ML: 0.03 SOLUTION RESPIRATORY (INHALATION) at 07:55

## 2020-07-14 RX ADMIN — LIDOCAINE HYDROCHLORIDE: 10 INJECTION, SOLUTION EPIDURAL; INFILTRATION; INTRACAUDAL; PERINEURAL at 06:05

## 2020-07-14 RX ADMIN — NYSTATIN 500000 UNITS: 100000 SUSPENSION ORAL at 12:40

## 2020-07-14 RX ADMIN — MELATONIN 6 MG: at 21:45

## 2020-07-14 RX ADMIN — NYSTATIN 500000 UNITS: 100000 SUSPENSION ORAL at 17:41

## 2020-07-14 RX ADMIN — CHLORHEXIDINE GLUCONATE 0.12% ORAL RINSE 15 ML: 1.2 LIQUID ORAL at 08:29

## 2020-07-14 NOTE — PROGRESS NOTES
Progress Note - Critical Care   Fam Ramos 71 y o  male MRN: [id number]  Unit/Bed#: [facility] 516-01 Encounter: [id number]    Assessment:   Principal Problem:    Malignant neoplasm of upper lobe of left lung (HCC)  Active Problems:    Acute respiratory failure with hypoxia ([facility] [address] )    Acute deep vein thrombosis (DVT) of axillary vein of right upper extremity (HCC)    Acute deep vein thrombosis (DVT) of brachial vein of right upper extremity (HCC)    Tracheostomy in place [facility])    Postprocedural pneumothorax    Acute deep vein thrombosis (DVT) of calf muscle vein of left lower extremity (HCC)    Encephalopathy    Hyperactive delirium after surgical procedure    Anemia    Thrombocytosis (HCC)    Leukocytosis  Resolved Problems:    Subcutaneous emphysema (HCC)    Lactic acidosis    ROGER (acute kidney injury) ([facility] )    Pneumonia    Plan:   · Neuro:   · Dx:  Encephalopathy, delirium versus component of hypoxic brain injury status post cardiac arrest  · Continue frequent redirection  · Continue one-to-one observation  · When sedation is  · Appreciate palliative recommendations  · 7/9 CT head without acute finding  · Analgesia:  Tylenol, Dilaudid  · Sedation - Seroquel, melatonin  · Seroquel increased yesterday to 100 mg q h s  · Discontinued Ritalin yesterday due to hyperactivity  · Delirium ppx: CAM-ICU, sleep hygiene  · CV:   · Dx:  Hypotension, unclear etiology  · Improved with fluid boluses, Levophed  · Chest x-ray unchanged from prior  · Labs unremarkable  · Continue to closely monitor  · Other VS remain stable  · Dx:   Tachycardia, improved  · Hemodynamic support:  Levophed  · MAP goal > 65   · Status post cardiac arrest 6/28 due to hypoxia/hypercarbia secondary to subcutaneous emphysema (see below)  · Lung:   · Dx:  Status post left upper VATS lobectomy for resection of lung cancer, complicated by subcutaneous emphysema  · Chest tube discontinued 7/5  · Status post cricothyroidotomy during a cardiac arrest (see above), converted to formal trach  · Continue trach collar  · Nebulized saline   · Continue Respiratory Protocol and Airway Clearance Protocol    · GI:   · Dx:  NG tube in place  · Stress ulcer ppx:  None  · Bowel regimen:  Sennakot, MiraLax   · FEN:   · Fluids: no maintenance, free water flushes with tube feeds  · Electrolytes: trend and replete as needed  · Nutrition: NPO   · :   · Dx:  No active issues  I/O last 24 hours: In: 5737 6 [I V :3045  6; NG/GT:1080; IV Piggyback:50; Feedings:1562]  Out: 0473 [Urine:2205]  · Gaytan:  No  · Monitor I&O's, BUN/Cr  · ID:   · Dx:  Leukocytosis  · Continue empiric ceftriaxone for 3 days  · Blood cultures negative at 48 hours  · Remains afebrile  · Monitor WBC/temp curve  · Heme:   · No active issues  · DVT ppx:  Lovenox, SCDs   · Endo:   · No active issues  · Msk/Skin:   · PT/OT  · Turning/repositioning  · Wound care   · Disposition: ICU    ______________________________________________________________________    HPI/24hr events:  Trach downsized to 6 0 Shiley yesterday  Patient hypotensive overnight after administration of 100 mg Seroquel  Lowest blood pressure recorded high 50s over 30s  Lab/ABG, chest x-ray obtained and largely unremarkable  Physical exam unchanged from prior other than decreased mental status; patient arousable to sternal rub and responsive to pain  Given fluid boluses and started on low-dose Levophed to temporize well fluid running through PICC line  Lines   PICC  NG  Trach     Infusions     ______________________________________________________________________  Physical Exam:    Miles Agitation Sedation Scale (RASS): Restless  Physical Exam   Constitutional: No distress  HENT:   Head: Normocephalic and atraumatic  Mouth/Throat: Oropharynx is clear and moist    Trach in place, on trach collar   Eyes: Conjunctivae are normal    Neck: No JVD present  No tracheal deviation present  Cardiovascular: Normal rate and regular rhythm  Pulmonary/Chest: Effort normal    Trach in place; on trach collar    Abdominal: Soft  He exhibits no distension  There is no tenderness  Musculoskeletal: He exhibits no edema or deformity  Neurological:   Arouses to painful voice    Skin: Skin is warm and dry  Capillary refill takes less than 2 seconds  Nursing note and vitals reviewed  ______________________________________________________________________  Temperature:   Temp (24hrs), Av 8 °F (37 1 °C), Min:97 7 °F (36 5 °C), Max:100 °F (37 8 °C)    Current Temperature: 99 3 °F (37 4 °C)    Vitals:    20 0121 20 0141 20 0200 20 0300   BP: 98/65 120/83 113/78 91/55   BP Location:       Pulse: 90 88 88 90   Resp: 21 18 19 19   Temp:       TempSrc:       SpO2: 96% 96% 95% 96%   Weight:       Height:         Arterial Line BP: 128/64       Weights:   IBW: 73 kg    Body mass index is 27 96 kg/m²  Weight (last 2 days)     Date/Time   Weight    20 0400   88 4 (194 89)            Height: 5' 10" (177 8 cm)  Hemodynamic Monitoring:  N/A       Ventilator Settings:   Vent Mode: CPAP/PS Spont              FiO2 (%): 28       Lab Results   Component Value Date    PHART 7 430 2020    AHL8SQL 33 0 (L) 2020    PO2ART 67 5 (L) 2020    DOW4TJG 21 4 (L) 2020    BEART -2 3 2020    SOURCE Radial, Left 2020       Intake and Outputs:  I/O       701 -  07 -  07    P  O  0     NG/GT 1590 680    IV Piggyback 80 50    Feedings 1273 676    Total Intake(mL/kg) 2943 (33 3) 1406 (15 9)    Urine (mL/kg/hr) 905 (0 4) 1600 (1 1)    Emesis/NG output 0 0    Stool 0 0    Total Output 905 1600    Net + -          Unmeasured Urine Occurrence  1 x    Unmeasured Stool Occurrence 1 x 1 x          Nutrition:        Diet Orders   (From admission, onward)             Start     Ordered    20 0847  Diet Enteral/Parenteral; Tube Feeding No Oral Diet; Jevity 1 5; Continuous; 55; Prosource Protein Liquid - Two Packets; 100; Water; Every 4 hours  Diet effective now     Question Answer Comment   Diet Type Enteral/Parenteral    Enteral/Parenteral Tube Feeding No Oral Diet    Tube Feeding Formula: Jevity 1 5    Bolus/Cyclic/Continuous Continuous    Tube Feeding Goal Rate (mL/hr): 55    Prosource Protein Liquid - No Carb Prosource Protein Liquid - Two Packets    Tube Feeding water flush (mL): 100    Water Flush type: Water    Water flush frequency: Every 4 hours    RD to adjust diet per protocol?  Yes        07/05/20 0849                Labs:   Results from last 7 days   Lab Units 07/14/20  0004 07/13/20  0435 07/12/20  0645 07/11/20  0506   WBC Thousand/uL 8 34 10 11 10 52* 9 36   HEMOGLOBIN g/dL 9 8* 11 0* 10 6* 10 6*   HEMATOCRIT % 31 4* 35 1* 34 3* 34 8*   PLATELETS Thousands/uL 391* 475* 474* 516*   NEUTROS PCT % 57  --  63 58   MONOS PCT % 14*  --  13* 14*      Results from last 7 days   Lab Units 07/14/20  0004 07/13/20  0435 07/12/20  0457 07/11/20  0506 07/10/20  0428   POTASSIUM mmol/L 4 0 3 8 4 0 3 8 4 1   CHLORIDE mmol/L 110* 108 109* 110* 109*   CO2 mmol/L 26 28 28 30 27   BUN mg/dL 19 18 19 17 16   CREATININE mg/dL 0 89 0 85 0 82 0 78 0 68   CALCIUM mg/dL 8 5 9 2 9 1 9 4 8 1*   ALK PHOS U/L  --   --  97 87 70   ALT U/L  --   --  31 27 17   AST U/L  --   --  23 16 10     Results from last 7 days   Lab Units 07/12/20  0457 07/11/20  0506 07/10/20  0428   MAGNESIUM mg/dL 2 4 2 3 2 4     Results from last 7 days   Lab Units 07/12/20  0457 07/11/20  0506 07/10/20  0428   PHOSPHORUS mg/dL 4 9* 4 1 4 2*          Results from last 7 days   Lab Units 07/14/20  0004   LACTIC ACID mmol/L 1 1     0   Lab Value Date/Time    TROPONINI 2 75 (H) 06/20/2020 1759    TROPONINI 3 00 (H) 06/20/2020 1319    TROPONINI 2 86 (H) 06/20/2020 0928    TROPONINI 0 85 (H) 06/20/2020 0647    TROPONINI <0 02 03/28/2020 0451    TROPONINI <0 02 03/28/2020 0031    TROPONINI <0 02 03/27/2020 2026     Imaging:  I have personally reviewed pertinent reports  EKG:   Micro:  Blood Culture:   Lab Results   Component Value Date    BLOODCX No Growth at 48 hrs  07/11/2020    BLOODCX No Growth at 48 hrs  07/11/2020    BLOODCX No Growth After 5 Days  06/29/2020    BLOODCX No Growth After 5 Days  06/29/2020    BLOODCX No Growth After 5 Days  06/20/2020    BLOODCX No Growth After 5 Days  06/20/2020    BLOODCX No Growth After 5 Days  03/27/2020     Urine Culture: No results found for: URINECX  Sputum Culture: No components found for: SPUTUMCX  Wound Culure: No results found for: WOUNDCULT    Lab Results   Component Value Date    BLOODCX No Growth at 48 hrs  07/11/2020    BLOODCX No Growth at 48 hrs  07/11/2020    BLOODCX No Growth After 5 Days   06/29/2020    SPUTUMCULTUR 4+ Growth of  06/29/2020     Allergies: No Known Allergies  Medications:   Scheduled Meds:    Current Facility-Administered Medications:  lidocaine (PF)        acetaminophen 650 mg Rectal Q4H PRN Fifi Figueroa PA-C    albuterol 2 5 mg Nebulization Q6H PRN Fifi Figueroa PA-C    chlorhexidine 15 mL Swish & Spit Q12H [address] Fifi Figueroa PA-C    enoxaparin 1 mg/kg Subcutaneous Q12H [address] Fifi Figueroa PA-C    guaiFENesin 200 mg Oral Q4H PRN Karen Tabor PA-C    levalbuterol 1 25 mg Nebulization TID Fifi Figueroa PA-C    Lidocaine Viscous HCl 15 mL Swish & Spit 4x Daily PRN Fifi Figueroa PA-C    melatonin 6 mg Oral HS Margret Brooks DO    metoprolol tartrate 25 mg Per NG Tube Q12H [address] Meenakshi Delgado PA-C    norepinephrine 1-30 mcg/min Intravenous Titrated Joselito Varghese DO Last Rate: 6 mcg/min (07/14/20 0216)   nystatin 500,000 Units Swish & Swallow 4x Daily Fifi Figueroa PA-C    OLANZapine 5 mg Intramuscular Q6H PRN Jose Luis Jensen MD    ondansetron 4 mg Intravenous Q6H PRN Fifi Figueroa PA-C    oxyCODONE 2 5 mg Oral Q4H PRN Margret Brooks DO    polyethylene glycol 17 g Oral Daily Fifi Figueroa PA-C    QUEtiapine 100 mg Oral HS Jose Luis Jensen MD senna-docusate sodium 1 tablet Per NG Tube BID Enoc Zavala PA-C    sodium chloride 3 mL Nebulization TID Enoc Zavala PA-C      Continuous Infusions:    norepinephrine 1-30 mcg/min Last Rate: 6 mcg/min (07/14/20 0216)     PRN Meds:    acetaminophen 650 mg Q4H PRN   albuterol 2 5 mg Q6H PRN   guaiFENesin 200 mg Q4H PRN   Lidocaine Viscous HCl 15 mL 4x Daily PRN   OLANZapine 5 mg Q6H PRN   ondansetron 4 mg Q6H PRN   oxyCODONE 2 5 mg Q4H PRN     VTE Pharmacologic Prophylaxis: Enoxaparin (Lovenox)  VTE Mechanical Prophylaxis: sequential compression device  Invasive lines and devices: Invasive Devices     Peripherally Inserted Central Catheter Line            PICC Line 77/21/73 Left Basilic 17 days          Drain            NG/OG/Enteral Tube Nasogastric Right nares 14 days    External Urinary Catheter Small less than 1 day          Airway            Surgical Airway less than 1 day                   Code Status: Level 2 - DNAR: but accepts endotracheal intubation    Portions of the record may have been created with voice recognition software  Occasional wrong word or "sound a like" substitutions may have occurred due to the inherent limitations of voice recognition software  Read the chart carefully and recognize, using context, where substitutions have occurred      Juanis Davis DO

## 2020-07-14 NOTE — SPEECH THERAPY NOTE
Speech Language/Pathology  Speech/Language Pathology  Assessment    Patient Name: Shoaib Medina  Today's Date: 7/14/2020     Problem List  Principal Problem:    Malignant neoplasm of upper lobe of left lung ([address] )  Active Problems:    Acute respiratory failure with hypoxia ([address] )    Acute deep vein thrombosis (DVT) of axillary vein of right upper extremity (HCC)    Acute deep vein thrombosis (DVT) of brachial vein of right upper extremity ([address] )    Tracheostomy in place [facility])    Acute deep vein thrombosis (DVT) of calf muscle vein of left lower extremity (HCC)    Encephalopathy    Hyperactive delirium after surgical procedure    Anemia    Thrombocytosis ([facility] [address] )    Past Medical History  Past Medical History:   Diagnosis Date    Alcohol abuse 3/27/2020    Chest pain     Community acquired pneumonia 3/27/2020    Hypertension     Left upper lobe pulmonary nodule     Malignant neoplasm of upper lobe of left lung ([facility] [address] ) 5/7/2020     Past Surgical History  Past Surgical History:   Procedure Laterality Date    COLON SURGERY      CT NEEDLE BIOPSY LUNG  4/29/2020    IL BRONCHOSCOPY,DIAGNOSTIC N/A 6/10/2020    Procedure: BRONCHOSCOPY FLEXIBLE;  Surgeon: Marlen Schroeder MD;  [facility];  Service: Thoracic    IL MEDIASTINOSCOPY WITH LYMPH NODE BIOPSY/IES N/A 6/10/2020    Procedure: MEDIASTINOSCOPY;  Surgeon: Marlen Schroeder MD;  [facility];  Service: Thoracic    IL THORACOSCOPY SURG LOBECTOMY Left 6/10/2020    Procedure: THORACOSCOPY VIDEO ASSISTED SURGERY (VATS);   Surgeon: Marlen Schroeder MD;  [facility];  Service: Thoracic    IL THORACOSCOPY SURG LOBECTOMY Left 6/10/2020    Procedure: UPPER LOBECTOMY OF LUNG; MEDIASTINAL  LYMPH NODE DISSECTION;  Surgeon: Marlen Schroeder MD;  [facility];  Service: Thoracic    TRACHEOSTOMY N/A 6/22/2020    Procedure: TRACHEOSTOMY REVISION, BRONCHOSCOPY;  Surgeon: Marlen Schroeder MD;  [facility];  Service: Thoracic    WRIST SURGERY Right     orif      Bedside Swallow Evaluation:    Summary:  Pt presents w/ s/s aspiration of the chocolate pudding today  Suspect poor airway protection & reduced hyolaryngeal elevation  Pt w/ coughing & noted pudding at the trach site immed following several tsps  Required mult swallows per tsp & noted frequent throat clearing  Would benefit from ongoing use of pmv w/ the #6 cuffless, should do well w/ ongoing use  Will plan to assess w/ VBS possibly tomorrow  Recommendations:  Continue npo for now, continue NG w/ tf  PMV on pt during the day to decrease secretions & increase  Therapy Prognosis: guarded   Prognosis considerations: ?cognition  Frequency: as able   Goal(s):  Pt will tolerate least restrictive diet w/out s/s aspiration or oral/pharyngeal difficulties  Patient's goal: Can I see a mirror? Consider consult w/:  VBS  Reason for consult:  R/o aspiration  Determine safest and least restrictive diet  Pt w/ recent trach change to #6 cuffless  Current diet:  NG w/ tube feed   Premorbid diet[de-identified]  Regular w/ thin at home   Previous VBS:  -  O2 requirement:  Trach collar  Voice/Speech:  See noted, pt w/ PMV in place following trach change   Social:  Home w/ family   Follows commands:     Attempts                      Cognitive Status:  Awake, alert  Cooperative but confused to place at this time  Oral mech exam:  Dentition: partial natrual   Labial strength and ROM: fair, mildly reduced retraction/protrusion  Lingual strength and ROM: reduced protrusion, ?cogn/command following  Mandibular strength and ROM: -  Secretion management: less out of trach today, see progress note  All trials offered w/ PMV in place    Items administered:  Puree-began w/ antonette pudding for colored material to check for aspiration  Oral stage:  Adequate retrieval from tsp, timely transfers w/ o oral residue  Pharyngeal stage:  Pt w/ fairly prompt swallow w/ noted mult swallows per tsp  Throat clearing began w/ the last tsp & the pt has wet, moist coughing  Cued to 2* swallow & removed the PMV- immed pudding tinged secretions were noted  Nsg suctioned for small amt as well but pt was able to cough most out  PMV was replaced & pt was able to keep on until we left the room        Esophageal stage:  No s/s reported    Results d/w:  Pt, nursing

## 2020-07-14 NOTE — PROGRESS NOTES
Progress Note - Thoracic Surgery   Santiago Howard 71 y o  male MRN: [id number]  Unit/Bed#: [facility] 516-01 Encounter: [id number]    Assessment:  71 M status post thoracoscopic left upper lobectomy complicated by prolonged air leak, cardiac arrest s/p cricothyroidotomy, tracheostomy revision, and trach exchange to 6 cuffless    Plan:  Continue tube feeds at goal  Pulmonary toilet  Wean oxygen  Ongoing speech therapy  Continue therapeutic Lovenox  Supportive care per ICU    Subjective/Objective     Subjective: No acute events overnight  Trach exchanged  Objective:    Blood pressure 92/66, pulse 90, temperature 97 8 °F (36 6 °C), resp  rate 18, height 5' 10" (1 778 m), weight 89 2 kg (196 lb 10 4 oz), SpO2 98 %  ,Body mass index is 28 22 kg/m²        Intake/Output Summary (Last 24 hours) at 7/14/2020 0656  Last data filed at 7/14/2020 0641  Gross per 24 hour   Intake 5169 61 ml   Output 2350 ml   Net 2819 61 ml       Invasive Devices     Peripherally Inserted Central Catheter Line            PICC Line 41/66/18 Left Basilic 17 days          Drain            NG/OG/Enteral Tube Nasogastric Right nares 15 days    External Urinary Catheter Small less than 1 day          Airway            Surgical Airway less than 1 day                Physical Exam:   General: NAD, resting comfortably  Eyes: PERRL  ENT: moist mucous membranes  Neck: supple, trach in place  CV: RRR +S1/S2  Chest: respirations non-labored, incisions c/d/i  Abdomen: soft, NT ND  Extremities: atraumatic, no edema      Results from last 7 days   Lab Units 07/14/20 0458 07/14/20  0004 07/13/20  0435   WBC Thousand/uL 8 14 8 34 10 11   HEMOGLOBIN g/dL 9 9* 9 8* 11 0*   HEMATOCRIT % 32 2* 31 4* 35 1*   PLATELETS Thousands/uL 448* 391* 475*     Results from last 7 days   Lab Units 07/14/20  0004 07/13/20 0435 07/12/20 0457   POTASSIUM mmol/L 4 0 3 8 4 0   CHLORIDE mmol/L 110* 108 109*   CO2 mmol/L 26 28 28   BUN mg/dL 19 18 19   CREATININE mg/dL 0 89 0 85 0  82   CALCIUM mg/dL 8 5 9 2 9 1

## 2020-07-14 NOTE — SPEECH THERAPY NOTE
Speech Language/Pathology    Speech/Language Pathology Progress Note    Patient Name: Kika Frias  Today's Date: 7/14/2020     Subjective:  "I quite smoking, right"  Pt is awake, alert, cooperative  Objective:  Pt seen for ongoing PMV trials, communication  Pt had his trach downsized to #6 cuffless yesterday  Noted to have some vocalizations around the trach prior to placing the PMV  Pt w/ immed voicing w/ PMV- sats remain stable in the upper 90s  Pt able to answer simple ?s w/o change in vital signs  Able to tolerate the PMV >20 mins even through a swallow eval     Upon completing the swallow eval the pt was left w/ the PMV in place, nsg aware following suctioning & will remove later  Unless there is an issue w/ oxygen would allow the pMV to remain on pt  Assessment:  Pt able to tolerate PMV today on new cuffless trach  No gross back pressure & no change in Vitals       Plan/Recommendations:  Please offer PMV during the day for 30 mins at a time, if tolerating offer for longer  Will follow

## 2020-07-14 NOTE — PROGRESS NOTES
Progress note - Palliative and Supportive Care   Faisal Hitchcock 71 y o  male [id number]    Assessment:  Faisal Hitchcock is a 71 y o  male with a PMH of L upper lobe neoplasm s/p VATS c/b cardiac arrest requiring cricothyrotomy resulting in a tracheostomy and has required ICU-level care since 07/04/2020  Agitation an issue with episodes of hyper-active and hypo-active [most recently hyper with re-orientation and seroquel/zyprexa]  Plan:  #symptom management    Agitation    - decreased seroquel to 50 mg nightly    - continue IM zyprexa 5 mg Q6H PRN    - discontinued ritalin  Attempt re-orientation strategies prior to use of one-time dosing of benzo or antipsychotic   Patient is hard-of-hearing and struggles with low voices   - lights on during the day and off at night   - avoidance of overnight disruptions as much as possible   - please attempt to place bed in a chair position if medically stable   - please have television on during day    - enjoys sci-fi/sitcoms   - utilize 1:1 if possible, avoid restraints unless necessary for patient safety     Pain    - continue tylenol 650 mg rectal Q4H PRN    - continue aylin 2 5 mg Q4H PRN    #goals of Care   - code status: DNR, level 2   - no PEG     Code Status: DNAR but accepts ETT, no PEG - Level 2   Decisional apparatus:  Patient is not competent on my exam today  If competence is lost, patient's substitute decision maker would default to Duane Felder [daughter] by [place] Act 169     Advance Directive / Living Will / POLST:  Advanced Directive    Interval history:       - trach downsized on 07/13 to 6 0 Shiley       - episode of hypotension overnight after administration of 100 mg seroquel; responded to fluid bolus + pressor [levophed]    MEDICATIONS / ALLERGIES:     current meds:   Current Facility-Administered Medications   Medication Dose Route Frequency    acetaminophen (TYLENOL) rectal suppository 650 mg  650 mg Rectal Q4H PRN    albuterol inhalation solution 2 5 mg  2 5 mg Nebulization Q6H PRN    chlorhexidine (PERIDEX) 0 12 % oral rinse 15 mL  15 mL Swish & Spit Q12H [address]    enoxaparin (LOVENOX) subcutaneous injection 90 mg  1 mg/kg Subcutaneous Q12H [address]    guaiFENesin (ROBITUSSIN) oral solution 200 mg  200 mg Oral Q4H PRN    levalbuterol (XOPENEX) inhalation solution 1 25 mg  1 25 mg Nebulization TID    Lidocaine Viscous HCl (XYLOCAINE) 2 % mucosal solution 15 mL  15 mL Swish & Spit 4x Daily PRN    melatonin tablet 6 mg  6 mg Oral HS    metoprolol tartrate (LOPRESSOR) partial tablet 12 5 mg  12 5 mg Per NG Tube Q12H [address]    nystatin (MYCOSTATIN) oral suspension 500,000 Units  500,000 Units Swish & Swallow 4x Daily    OLANZapine (ZyPREXA) IM injection 5 mg  5 mg Intramuscular Q6H PRN    ondansetron (ZOFRAN) injection 4 mg  4 mg Intravenous Q6H PRN    oxyCODONE (ROXICODONE) oral solution 2 5 mg  2 5 mg Oral Q4H PRN    polyethylene glycol (MIRALAX) packet 17 g  17 g Oral Daily    QUEtiapine (SEROquel) tablet 50 mg  50 mg Oral HS    senna-docusate sodium (SENOKOT S) 8 6-50 mg per tablet 1 tablet  1 tablet Per NG Tube BID    sodium chloride 0 9 % inhalation solution 3 mL  3 mL Nebulization TID       No Known Allergies    OBJECTIVE:    Physical Exam  Physical Exam   Constitutional:   Awake and alert  Lying in bed in NAD  Non-toxic appearing   HENT:   Head: Normocephalic and atraumatic  Neck:   Trach in place   Cardiovascular: Regular rhythm  Tachycardia present  Pulmonary/Chest: No accessory muscle usage  No respiratory distress  Abdominal: There is no rigidity  Musculoskeletal:   Moves all extremities equally, independently   Neurological: GCS eye subscore is 4  GCS verbal subscore is 5  GCS motor subscore is 6     Awake and alert  Speaking in full sentences, conversant  No gaze preference         Lab Results:   CBC:   Lab Results   Component Value Date    WBC 8 14 07/14/2020    HGB 9 9 (L) 07/14/2020    HCT 32 2 (L) 07/14/2020     (H) 07/14/2020     (H) 07/14/2020    MCH 30 7 07/14/2020    MCHC 30 7 (L) 07/14/2020    RDW 14 4 07/14/2020    MPV 11 0 07/14/2020    NRBC 0 07/14/2020   , CMP:   Lab Results   Component Value Date    SODIUM 142 07/14/2020    K 4 0 07/14/2020     (H) 07/14/2020    CO2 26 07/14/2020    BUN 19 07/14/2020    CREATININE 0 89 07/14/2020    CALCIUM 8 5 07/14/2020    EGFR 87 07/14/2020     Imaging Studies: reviewed  EKG, Pathology, and Other Studies: reviewed    Counseling / Coordination of Care    Total floor / unit time spent today 30 minutes  Less than 50% of total time was spent with the patient and / or family counseling and / or coordination of care  A description of the counseling / coordination of care: chart review, medication review with adjustments

## 2020-07-14 NOTE — TELEPHONE ENCOUNTER
I called daughter to confirm FMLA that was received in the office today  Rosalva(pts daughter), is unsure if she still needs this paperwork filled out  She will call HR and give us a call back  I did not charge her the $30 fee until we know for sure

## 2020-07-15 LAB
ALBUMIN SERPL BCP-MCNC: 2.3 G/DL (ref 3.5–5)
ALP SERPL-CCNC: 94 U/L (ref 46–116)
ALT SERPL W P-5'-P-CCNC: 40 U/L (ref 12–78)
ANION GAP SERPL CALCULATED.3IONS-SCNC: 5 MMOL/L (ref 4–13)
AST SERPL W P-5'-P-CCNC: 18 U/L (ref 5–45)
BILIRUB SERPL-MCNC: 0.18 MG/DL (ref 0.2–1)
BUN SERPL-MCNC: 16 MG/DL (ref 5–25)
CALCIUM SERPL-MCNC: 8.6 MG/DL (ref 8.3–10.1)
CHLORIDE SERPL-SCNC: 109 MMOL/L (ref 100–108)
CO2 SERPL-SCNC: 26 MMOL/L (ref 21–32)
CREAT SERPL-MCNC: 0.78 MG/DL (ref 0.6–1.3)
GFR SERPL CREATININE-BSD FRML MDRD: 92 ML/MIN/1.73SQ M
GLUCOSE SERPL-MCNC: 117 MG/DL (ref 65–140)
MAGNESIUM SERPL-MCNC: 2.4 MG/DL (ref 1.6–2.6)
PHOSPHATE SERPL-MCNC: 3.6 MG/DL (ref 2.3–4.1)
POTASSIUM SERPL-SCNC: 3.7 MMOL/L (ref 3.5–5.3)
PROT SERPL-MCNC: 6.6 G/DL (ref 6.4–8.2)
SODIUM SERPL-SCNC: 140 MMOL/L (ref 136–145)

## 2020-07-15 PROCEDURE — 99024 POSTOP FOLLOW-UP VISIT: CPT | Performed by: THORACIC SURGERY (CARDIOTHORACIC VASCULAR SURGERY)

## 2020-07-15 PROCEDURE — 92526 ORAL FUNCTION THERAPY: CPT

## 2020-07-15 PROCEDURE — 80053 COMPREHEN METABOLIC PANEL: CPT | Performed by: EMERGENCY MEDICINE

## 2020-07-15 PROCEDURE — 94760 N-INVAS EAR/PLS OXIMETRY 1: CPT

## 2020-07-15 PROCEDURE — 93971 EXTREMITY STUDY: CPT | Performed by: SURGERY

## 2020-07-15 PROCEDURE — 94640 AIRWAY INHALATION TREATMENT: CPT

## 2020-07-15 PROCEDURE — 84100 ASSAY OF PHOSPHORUS: CPT | Performed by: EMERGENCY MEDICINE

## 2020-07-15 PROCEDURE — 83735 ASSAY OF MAGNESIUM: CPT | Performed by: EMERGENCY MEDICINE

## 2020-07-15 PROCEDURE — 94669 MECHANICAL CHEST WALL OSCILL: CPT

## 2020-07-15 PROCEDURE — 99291 CRITICAL CARE FIRST HOUR: CPT | Performed by: SURGERY

## 2020-07-15 RX ORDER — QUETIAPINE FUMARATE 25 MG/1
75 TABLET, FILM COATED ORAL
Status: DISCONTINUED | OUTPATIENT
Start: 2020-07-15 | End: 2020-07-16

## 2020-07-15 RX ORDER — POTASSIUM CHLORIDE 20MEQ/15ML
40 LIQUID (ML) ORAL ONCE
Status: COMPLETED | OUTPATIENT
Start: 2020-07-15 | End: 2020-07-15

## 2020-07-15 RX ADMIN — POTASSIUM CHLORIDE 40 MEQ: 1.5 SOLUTION ORAL at 09:44

## 2020-07-15 RX ADMIN — METOPROLOL TARTRATE 12.5 MG: 25 TABLET ORAL at 08:10

## 2020-07-15 RX ADMIN — NYSTATIN 500000 UNITS: 100000 SUSPENSION ORAL at 22:10

## 2020-07-15 RX ADMIN — MELATONIN 6 MG: at 22:08

## 2020-07-15 RX ADMIN — METOPROLOL TARTRATE 12.5 MG: 25 TABLET ORAL at 22:08

## 2020-07-15 RX ADMIN — OXYCODONE HYDROCHLORIDE 2.5 MG: 5 SOLUTION ORAL at 07:37

## 2020-07-15 RX ADMIN — LEVALBUTEROL HYDROCHLORIDE 1.25 MG: 1.25 SOLUTION, CONCENTRATE RESPIRATORY (INHALATION) at 19:44

## 2020-07-15 RX ADMIN — CHLORHEXIDINE GLUCONATE 0.12% ORAL RINSE 15 ML: 1.2 LIQUID ORAL at 08:10

## 2020-07-15 RX ADMIN — OLANZAPINE 5 MG: 10 INJECTION, POWDER, FOR SOLUTION INTRAMUSCULAR at 19:36

## 2020-07-15 RX ADMIN — LEVALBUTEROL HYDROCHLORIDE 1.25 MG: 1.25 SOLUTION, CONCENTRATE RESPIRATORY (INHALATION) at 07:46

## 2020-07-15 RX ADMIN — OLANZAPINE 5 MG: 10 INJECTION, POWDER, FOR SOLUTION INTRAMUSCULAR at 10:30

## 2020-07-15 RX ADMIN — NYSTATIN 500000 UNITS: 100000 SUSPENSION ORAL at 08:10

## 2020-07-15 RX ADMIN — ENOXAPARIN SODIUM 90 MG: 100 INJECTION SUBCUTANEOUS at 08:11

## 2020-07-15 RX ADMIN — QUETIAPINE FUMARATE 75 MG: 25 TABLET ORAL at 22:09

## 2020-07-15 RX ADMIN — ISODIUM CHLORIDE 3 ML: 0.03 SOLUTION RESPIRATORY (INHALATION) at 19:44

## 2020-07-15 RX ADMIN — ISODIUM CHLORIDE 3 ML: 0.03 SOLUTION RESPIRATORY (INHALATION) at 07:46

## 2020-07-15 RX ADMIN — ISODIUM CHLORIDE 3 ML: 0.03 SOLUTION RESPIRATORY (INHALATION) at 13:29

## 2020-07-15 RX ADMIN — OXYCODONE HYDROCHLORIDE 2.5 MG: 5 SOLUTION ORAL at 20:19

## 2020-07-15 RX ADMIN — ENOXAPARIN SODIUM 90 MG: 100 INJECTION SUBCUTANEOUS at 22:09

## 2020-07-15 RX ADMIN — NYSTATIN 500000 UNITS: 100000 SUSPENSION ORAL at 17:15

## 2020-07-15 RX ADMIN — LEVALBUTEROL HYDROCHLORIDE 1.25 MG: 1.25 SOLUTION, CONCENTRATE RESPIRATORY (INHALATION) at 13:29

## 2020-07-15 RX ADMIN — CHLORHEXIDINE GLUCONATE 0.12% ORAL RINSE 15 ML: 1.2 LIQUID ORAL at 22:08

## 2020-07-15 NOTE — PROGRESS NOTES
Progress Note - Critical Care   Faisal Hitchcock 71 y o  male MRN: [id number]  Unit/Bed#: [facility] 516-01 Encounter: [id number]    Assessment:   Principal Problem:    Malignant neoplasm of upper lobe of left lung (HCC)  Active Problems:    Acute respiratory failure with hypoxia (HCC)    Acute deep vein thrombosis (DVT) of axillary vein of right upper extremity (HCC)    Acute deep vein thrombosis (DVT) of brachial vein of right upper extremity (HCC)    Tracheostomy in place [facility])    Acute deep vein thrombosis (DVT) of calf muscle vein of left lower extremity (HCC)    Encephalopathy    Hyperactive delirium after surgical procedure    Anemia    Thrombocytosis (HCC)  Resolved Problems:    Subcutaneous emphysema (HCC)    Lactic acidosis    ROGER (acute kidney injury) ([facility] [address] )    Postprocedural pneumothorax    Pneumonia    Leukocytosis    Plan:   · Neuro:   · Dx:  Encephalopathy, delirium versus component of hypoxic brain injury status post cardiac arrest  · Continue frequent redirection and one-to-one observation  · Continue Seroquel  · Appreciate palliative recommendations  · Analgesia:  Tylenol, Dilaudid  · Sedation - Seroquel, melatonin  · Delirium ppx: CAM-ICU, sleep hygiene   · CV:   · Dx:  Hypotension evening of 7/13, resolved as of 7/14 am  · Unclear etiology  · Continue to monitor closely  · Hemodynamic support:  None  · MAP goal > 65   · Status post cardiac arrest 6/28 due to hypoxia/hypercarbia secondary to subcutaneous emphysema (see below)  · Lung:   · Dx:  Status post left upper VATS lobectomy for resection of lung cancer, complicated by subcutaneous emphysema  · Chest tube discontinued 7/5  · Status post cricothyroidotomy during cardiac arrest (see above), converted to formal tracheostomy  · Continue trach collar  · Continue nebulized saline  · GI:   · Continue NG tube  · Stress ulcer ppx:  n/a  · Bowel regimen:  Senokot, MiraLax  · FEN:   · Fluids: no maintenance   · Electrolytes: trend and replete as needed  · Nutrition:  Tube feeds   · :   · Dx:  No active issues  I/O last 24 hours: In: 5553 6 [I V :3123 6; NG/GT:810; Feedings:1620]  Out: 2725 [Urine:2725]  · Gaytan: no  · Monitor I&O's, BUN/Cr  · ID:   · No active issues  · Completed 3 days of ceftriaxone this week  · Monitor WBC/temp curve  · Heme:   · No active issues  · DVT ppx:  Lovenox, SCDs   · Endo:   · No active issues   · Msk/Skin:   · PT/OT  · Turning/repositioning  · Wound care   · Disposition:  ICU per primary team    ______________________________________________________________________    HPI/24hr events: No significant overnight events  Lines   PICC  NG  trach    Infusions  none   ______________________________________________________________________  Physical Exam:    Miles Agitation Sedation Scale (RASS): Restless  Physical Exam   Constitutional: No distress  HENT:   Head: Normocephalic and atraumatic  Mouth/Throat: Oropharynx is clear and moist    Eyes: Conjunctivae are normal    Neck: No JVD present  No tracheal deviation present  Cardiovascular: Normal rate and regular rhythm  Pulmonary/Chest: Effort normal and breath sounds normal    Abdominal: Soft  He exhibits no distension  There is no tenderness  Musculoskeletal: He exhibits no edema or deformity  Neurological: He is alert  Moves all extremities on command    Skin: Skin is warm and dry  Capillary refill takes less than 2 seconds  Nursing note and vitals reviewed      ______________________________________________________________________  Temperature:   Temp (24hrs), Av 1 °F (36 7 °C), Min:97 8 °F (36 6 °C), Max:98 4 °F (36 9 °C)    Current Temperature: 98 1 °F (36 7 °C)    Vitals:    07/15/20 0100 07/15/20 0200 07/15/20 0300 07/15/20 0355   BP:  128/79 119/83 114/78   BP Location:    Left arm   Pulse: 98 96 98 96   Resp: (!) 23 (!) 23 21 21   Temp:    98 1 °F (36 7 °C)   TempSrc:    Oral   SpO2: 100% 99% 99% 98%   Weight:       Height:         Arterial Line BP: 128/64       Weights:   IBW: 73 kg    Body mass index is 28 22 kg/m²  Weight (last 2 days)     Date/Time   Weight    07/14/20 0555   89 2 (196 65)    07/13/20 0400   88 4 (194 89)            Height: 5' 10" (177 8 cm)  Hemodynamic Monitoring:  PAP:         Ventilator Settings:   Vent Mode: CPAP/PS Spont              FiO2 (%): 21       No results found for: PHART, DMK7ZAP, PO2ART, XUV6USO, I4PFZSUE, BEART, SOURCE    Intake and Outputs:  I/O       07/13 0701 - 07/14 0700 07/14 0701 - 07/15 0700    P  O       I V  (mL/kg) 3123 6 (35) 0 (0)    NG/ 250    IV Piggyback 50     Feedings 1116 630    Total Intake(mL/kg) 5169 6 (58) 880 (9 9)    Urine (mL/kg/hr) 2350 (1 1) 1600 (1 1)    Emesis/NG output 0 0    Stool 0 0    Total Output 2350 1600    Net +2819 6 -720          Unmeasured Urine Occurrence 1 x 1 x    Unmeasured Stool Occurrence 2 x 2 x          Nutrition:        Diet Orders   (From admission, onward)             Start     Ordered    07/05/20 0847  Diet Enteral/Parenteral; Tube Feeding No Oral Diet; Jevity 1 5; Continuous; 55; Prosource Protein Liquid - Two Packets; 100; Water; Every 4 hours  Diet effective now     Question Answer Comment   Diet Type Enteral/Parenteral    Enteral/Parenteral Tube Feeding No Oral Diet    Tube Feeding Formula: Jevity 1 5    Bolus/Cyclic/Continuous Continuous    Tube Feeding Goal Rate (mL/hr): 55    Prosource Protein Liquid - No Carb Prosource Protein Liquid - Two Packets    Tube Feeding water flush (mL): 100    Water Flush type: Water    Water flush frequency: Every 4 hours    RD to adjust diet per protocol?  Yes        07/05/20 0849                Labs:   Results from last 7 days   Lab Units 07/14/20  0458 07/14/20  0004 07/13/20  0435 07/12/20  0645   WBC Thousand/uL 8 14 8 34 10 11 10 52*   HEMOGLOBIN g/dL 9 9* 9 8* 11 0* 10 6*   HEMATOCRIT % 32 2* 31 4* 35 1* 34 3*   PLATELETS Thousands/uL 448* 391* 475* 474*   NEUTROS PCT % 56 57  --  63   MONOS PCT % 13* 14*  --  13* Results from last 7 days   Lab Units 07/14/20  0004 07/13/20  0435 07/12/20  0457 07/11/20  0506 07/10/20  0428   POTASSIUM mmol/L 4 0 3 8 4 0 3 8 4 1   CHLORIDE mmol/L 110* 108 109* 110* 109*   CO2 mmol/L 26 28 28 30 27   BUN mg/dL 19 18 19 17 16   CREATININE mg/dL 0 89 0 85 0 82 0 78 0 68   CALCIUM mg/dL 8 5 9 2 9 1 9 4 8 1*   ALK PHOS U/L  --   --  97 87 70   ALT U/L  --   --  31 27 17   AST U/L  --   --  23 16 10     Results from last 7 days   Lab Units 07/12/20  0457 07/11/20  0506 07/10/20  0428   MAGNESIUM mg/dL 2 4 2 3 2 4     Results from last 7 days   Lab Units 07/12/20  0457 07/11/20  0506 07/10/20  0428   PHOSPHORUS mg/dL 4 9* 4 1 4 2*          Results from last 7 days   Lab Units 07/14/20  0004   LACTIC ACID mmol/L 1 1     0   Lab Value Date/Time    TROPONINI 2 75 (H) 06/20/2020 1759    TROPONINI 3 00 (H) 06/20/2020 1319    TROPONINI 2 86 (H) 06/20/2020 0928    TROPONINI 0 85 (H) 06/20/2020 0647    TROPONINI <0 02 03/28/2020 0451    TROPONINI <0 02 03/28/2020 0031    TROPONINI <0 02 03/27/2020 2026     Imaging:  I have personally reviewed pertinent reports  EKG:   Micro:  Blood Culture:   Lab Results   Component Value Date    BLOODCX No Growth at 72 hrs  07/11/2020    BLOODCX No Growth at 72 hrs  07/11/2020    BLOODCX No Growth After 5 Days  06/29/2020    BLOODCX No Growth After 5 Days  06/29/2020    BLOODCX No Growth After 5 Days  06/20/2020    BLOODCX No Growth After 5 Days  06/20/2020    BLOODCX No Growth After 5 Days  03/27/2020     Urine Culture: No results found for: URINECX  Sputum Culture: No components found for: SPUTUMCX  Wound Culure: No results found for: WOUNDCULT    Lab Results   Component Value Date    BLOODCX No Growth at 72 hrs  07/11/2020    BLOODCX No Growth at 72 hrs  07/11/2020    BLOODCX No Growth After 5 Days   06/29/2020    SPUTUMCULTUR 4+ Growth of  06/29/2020     Allergies: No Known Allergies  Medications:   Scheduled Meds:    Current Facility-Administered Medications:  acetaminophen 650 mg Rectal Q4H PRN Joe Guadalupe PA-C   albuterol 2 5 mg Nebulization Q6H PRN Joe Guadalupe PA-C   chlorhexidine 15 mL Swish & Spit Q12H [address] Joe Guadalupe PA-C   enoxaparin 1 mg/kg Subcutaneous Q12H [address] Joe Guadalupe PA-C   guaiFENesin 200 mg Oral Q4H PRN Pauly Nolen PA-C   levalbuterol 1 25 mg Nebulization TID Joe Guadalupe PA-C   Lidocaine Viscous HCl 15 mL Swish & Spit 4x Daily PRN Joe Guadalupe PA-C   melatonin 6 mg Oral HS Margret M Todd, DO   metoprolol tartrate 12 5 mg Per NG Tube Q12H [address] Walt Ramos PA-C   nystatin 500,000 Units Swish & Swallow 4x Daily Joe Guadalupe PA-C   OLANZapine 5 mg Intramuscular Q6H PRN Romulo Banks MD   ondansetron 4 mg Intravenous Q6H PRN Joe Guadalupe PA-C   oxyCODONE 2 5 mg Oral Q4H PRN Margret Brooks,    polyethylene glycol 17 g Oral Daily Joe Guadalupe PA-C   QUEtiapine 50 mg Oral HS Miranda Hewitt MD   senna-docusate sodium 1 tablet Per NG Tube BID Joe Guadalupe PA-C   sodium chloride 3 mL Nebulization TID Joe Guadalupe PA-C     Continuous Infusions:   PRN Meds:    acetaminophen 650 mg Q4H PRN   albuterol 2 5 mg Q6H PRN   guaiFENesin 200 mg Q4H PRN   Lidocaine Viscous HCl 15 mL 4x Daily PRN   OLANZapine 5 mg Q6H PRN   ondansetron 4 mg Q6H PRN   oxyCODONE 2 5 mg Q4H PRN     VTE Pharmacologic Prophylaxis: Enoxaparin (Lovenox)  VTE Mechanical Prophylaxis: sequential compression device  Invasive lines and devices: Invasive Devices     Peripherally Inserted Central Catheter Line            PICC Line 04/05/13 Left Basilic 18 days          Drain            NG/OG/Enteral Tube Nasogastric Right nares 15 days    External Urinary Catheter Medium less than 1 day          Airway            Surgical Airway 1 day                   Code Status: Level 2 - DNAR: but accepts endotracheal intubation    Portions of the record may have been created with voice recognition software    Occasional wrong word or "sound a like" substitutions may have occurred due to the inherent limitations of voice recognition software  Read the chart carefully and recognize, using context, where substitutions have occurred      Mika Agarwal, DO

## 2020-07-15 NOTE — SPEECH THERAPY NOTE
Speech Language/Pathology    Speech/Language Pathology Progress Note    Patient Name: Santiago Howard  Today's Date: 7/15/2020     Subjective:  Pt awake, alert, cooperative  Current Diet:   NPO    Objective:  Pt seen for ongoing dx dysphagia tx  Per nsg, pt has had PMV on for most of today  Voicing minimal, noted harsh, hoarse today  Pt was given 2 tsps of chocolate pudding  Pt w/ limited retrieval today taking less from tsp than previously  Immed transfers & cough following  No change in vitals  PMV was taken off and pt was cued to cough  Noted  Chocolate-coated secretions coughed out from the trach site  Asked for deep suctioning from nsg, some clear and chocolate-covered secretions were suctioned  Assessment:  Pt w/ suspected poor airway protection & reduced hyolaryngeal elevation  Pt aspirating the chocolate pudding  Plan/Recommendations:  VBS next week  Continue po trials with speech  Oral care frequently  PMV on during the day  Trach size ok for now     Will f/u

## 2020-07-15 NOTE — PROGRESS NOTES
Progress Note - Thoracic Surgery   Fam Port 71 y o  male MRN: [id number]  Unit/Bed#: [facility] 516-01 Encounter: [id number]    Assessment:  71 M status post thoracoscopic left upper lobectomy complicated by prolonged air leak, cardiac arrest s/p cricothyroidotomy, tracheostomy revision, and trach exchange to 6 cuffless    Plan:  Continue tube feeds at goal  Continue speech therapy  Eventual transition to diet  Wean oxygen  Pulmonary toilet  Continue Lovenox  Supportive care per ICU    Subjective/Objective     Subjective: No acute events overnight  Much more awake, talkative this morning  Objective:    Blood pressure 123/84, pulse 98, temperature 98 1 °F (36 7 °C), temperature source Oral, resp  rate (!) 24, height 5' 10" (1 778 m), weight 89 4 kg (197 lb 1 5 oz), SpO2 97 %  ,Body mass index is 28 28 kg/m²        Intake/Output Summary (Last 24 hours) at 7/15/2020 0638  Last data filed at 7/15/2020 0601  Gross per 24 hour   Intake 1582 ml   Output 1800 ml   Net -218 ml       Invasive Devices     Peripherally Inserted Central Catheter Line            PICC Line 65/40/33 Left Basilic 18 days          Drain            NG/OG/Enteral Tube Nasogastric Right nares 16 days    External Urinary Catheter Medium less than 1 day          Airway            Surgical Airway 1 day                Physical Exam:   General: NAD, awake and alert  Eyes: PERRL  ENT: moist mucous membranes  Neck: supple  CV: RRR +S1/S2  Chest: respirations non-labored  Abdomen: soft, NT ND  Extremities: atraumatic, no edema      Results from last 7 days   Lab Units 07/14/20  0458 07/14/20  0004 07/13/20  0435   WBC Thousand/uL 8 14 8 34 10 11   HEMOGLOBIN g/dL 9 9* 9 8* 11 0*   HEMATOCRIT % 32 2* 31 4* 35 1*   PLATELETS Thousands/uL 448* 391* 475*     Results from last 7 days   Lab Units 07/15/20  0400 07/14/20  0004 07/13/20  0435   POTASSIUM mmol/L 3 7 4 0 3 8   CHLORIDE mmol/L 109* 110* 108   CO2 mmol/L 26 26 28   BUN mg/dL 16 19 18   CREATININE mg/dL 0 78 0 89 0 85   CALCIUM mg/dL 8 6 8 5 9 2

## 2020-07-16 LAB
BACTERIA BLD CULT: NORMAL
BACTERIA BLD CULT: NORMAL
GLUCOSE SERPL-MCNC: 87 MG/DL (ref 65–140)

## 2020-07-16 PROCEDURE — 92526 ORAL FUNCTION THERAPY: CPT

## 2020-07-16 PROCEDURE — 82948 REAGENT STRIP/BLOOD GLUCOSE: CPT

## 2020-07-16 PROCEDURE — 94640 AIRWAY INHALATION TREATMENT: CPT

## 2020-07-16 PROCEDURE — 99233 SBSQ HOSP IP/OBS HIGH 50: CPT | Performed by: SURGERY

## 2020-07-16 PROCEDURE — 97164 PT RE-EVAL EST PLAN CARE: CPT

## 2020-07-16 PROCEDURE — 94669 MECHANICAL CHEST WALL OSCILL: CPT

## 2020-07-16 PROCEDURE — 99024 POSTOP FOLLOW-UP VISIT: CPT | Performed by: THORACIC SURGERY (CARDIOTHORACIC VASCULAR SURGERY)

## 2020-07-16 PROCEDURE — 94760 N-INVAS EAR/PLS OXIMETRY 1: CPT

## 2020-07-16 PROCEDURE — 97168 OT RE-EVAL EST PLAN CARE: CPT

## 2020-07-16 RX ORDER — TRAZODONE HYDROCHLORIDE 100 MG/1
100 TABLET ORAL
Status: DISCONTINUED | OUTPATIENT
Start: 2020-07-16 | End: 2020-07-17

## 2020-07-16 RX ADMIN — NYSTATIN 500000 UNITS: 100000 SUSPENSION ORAL at 18:14

## 2020-07-16 RX ADMIN — MELATONIN 6 MG: at 20:58

## 2020-07-16 RX ADMIN — CHLORHEXIDINE GLUCONATE 0.12% ORAL RINSE 15 ML: 1.2 LIQUID ORAL at 20:58

## 2020-07-16 RX ADMIN — LIDOCAINE HYDROCHLORIDE 15 ML: 20 SOLUTION ORAL; TOPICAL at 18:12

## 2020-07-16 RX ADMIN — METOPROLOL TARTRATE 12.5 MG: 25 TABLET ORAL at 20:58

## 2020-07-16 RX ADMIN — LEVALBUTEROL HYDROCHLORIDE 1.25 MG: 1.25 SOLUTION, CONCENTRATE RESPIRATORY (INHALATION) at 13:59

## 2020-07-16 RX ADMIN — ISODIUM CHLORIDE 3 ML: 0.03 SOLUTION RESPIRATORY (INHALATION) at 08:34

## 2020-07-16 RX ADMIN — ENOXAPARIN SODIUM 90 MG: 100 INJECTION SUBCUTANEOUS at 08:05

## 2020-07-16 RX ADMIN — NYSTATIN 500000 UNITS: 100000 SUSPENSION ORAL at 08:03

## 2020-07-16 RX ADMIN — TRAZODONE HYDROCHLORIDE 100 MG: 100 TABLET ORAL at 21:00

## 2020-07-16 RX ADMIN — LIDOCAINE HYDROCHLORIDE 15 ML: 20 SOLUTION ORAL; TOPICAL at 18:13

## 2020-07-16 RX ADMIN — ENOXAPARIN SODIUM 90 MG: 100 INJECTION SUBCUTANEOUS at 20:59

## 2020-07-16 RX ADMIN — METOPROLOL TARTRATE 12.5 MG: 25 TABLET ORAL at 08:03

## 2020-07-16 RX ADMIN — LIDOCAINE HYDROCHLORIDE 15 ML: 20 SOLUTION ORAL; TOPICAL at 18:14

## 2020-07-16 RX ADMIN — LEVALBUTEROL HYDROCHLORIDE 1.25 MG: 1.25 SOLUTION, CONCENTRATE RESPIRATORY (INHALATION) at 08:34

## 2020-07-16 RX ADMIN — CHLORHEXIDINE GLUCONATE 0.12% ORAL RINSE 15 ML: 1.2 LIQUID ORAL at 08:03

## 2020-07-16 RX ADMIN — ISODIUM CHLORIDE 3 ML: 0.03 SOLUTION RESPIRATORY (INHALATION) at 13:59

## 2020-07-16 RX ADMIN — NYSTATIN 500000 UNITS: 100000 SUSPENSION ORAL at 21:00

## 2020-07-16 RX ADMIN — ISODIUM CHLORIDE 3 ML: 0.03 SOLUTION RESPIRATORY (INHALATION) at 19:32

## 2020-07-16 RX ADMIN — LEVALBUTEROL HYDROCHLORIDE 1.25 MG: 1.25 SOLUTION, CONCENTRATE RESPIRATORY (INHALATION) at 19:32

## 2020-07-16 NOTE — PLAN OF CARE
Problem: OCCUPATIONAL THERAPY ADULT  Goal: Performs self-care activities at highest level of function for planned discharge setting  See evaluation for individualized goals  Description  Treatment Interventions: ADL retraining, Functional transfer training, UE strengthening/ROM, Endurance training, Cognitive reorientation, Patient/family training, Equipment evaluation/education, Fine motor coordination activities, Compensatory technique education, Energy conservation, Activityengagement, Neuromuscular reeducation, Continued evaluation  Equipment Recommended: Tub seat with back, Bedside commode       See flowsheet documentation for full assessment, interventions and recommendations  Note:   Limitation: Decreased ADL status, Decreased UE ROM, Decreased UE strength, Decreased Safe judgement during ADL, Decreased cognition, Decreased endurance, Decreased sensation, Decreased self-care trans, Decreased high-level ADLs  Prognosis: Fair  Assessment: Pt is a 70 y/o male who presented to [facility] on 06/[facility]2020 w/ malignant neoplasm of upper lobe of left lung  Pt now s/p "VATS with LIVIER lobectomy, mediastinal LND, mediastinoscopy 6/10, Post-op acute hypoxic respiratory failure and associated cardiac arrest requiring emergency cricothyroidotomy 6/20, s/p tracheostomy 6/22"  Pt is "Encephalopathy, delirium versus component of hypoxic brain injury status post cardiac arrest " Pt  has a past medical history of Alcohol abuse (3/27/2020), Chest pain, Community acquired pneumonia (3/27/2020), Hypertension, Left upper lobe pulmonary nodule, and Malignant neoplasm of upper lobe of left lung ([facility] [address] ) (5/7/2020)  Pt seen for OT Re-eval this day w/ active OT orders  See initial Eval for all home setup/PLOF information  Current level of function: Max A for all self-care activities, Total A toileting  Pt requires Max x3 sup>sit, Modx2 STS, and Modx2 sit pivot  Pt unable to perform functional mobility  Impaired cognition   Pt presents w/ limitations in ADL/IADL status, bed mobility, functional mobility, functional transfers, cognition, endurance, and activity tolerance  Following evaluation, Pt will benefit from continued OT treatment during hospital stay to address barriers to occupational performance defined above and to maximize functional IND  OT plan of care 3-5x/week for 10-14days  Post-acute Rehab recommended following D/C        OT Discharge Recommendation: Post-Acute Rehabilitation Services  OT - OK to Discharge: Yes(when medically cleared)

## 2020-07-16 NOTE — SPEECH THERAPY NOTE
Speech Language/Pathology    Speech/Language Pathology Progress Note    Patient Name: Syl Lagos  Today's Date: 7/16/2020     Subjective:  Pt was awake, alert, and cooperative  Voice was more hoarse and gurgly  Vocalizations were more difficult to hear and understand  Current Diet:   NPO     Objective:  Pt seen for ongoing dysphagia therapy  Pt trialed ht by tsp  Pt had audible and wet swallows during first few tsps  The following swallows sounded less audible, and continued to imporve  Pt demonstrated with slowed retrieval, bolus control, and slightly delayed swallow  Pt tolerated ht by tsp, and did not want to trial puree by tsp  Secretions were not clear, but not inherently colored by what was trialed today  No suction was needed, and he coughed on cue to clear secretions via trach site with PMV removed  No changes of vitals noted  Assessment:  Pt's swallow is slightly improving, continue w/ suspicion of aspiration and poor airway protection  Plan/Recommendations:  Continue wearing PMV trials during the day  Trach size still ok for now  Trial po with SLP supervision only  VBS next week? Oral care frequently  Will f/u daily

## 2020-07-16 NOTE — PROGRESS NOTES
Progress Note - Critical Care   Neil Gandara 71 y o  male MRN: [id number]  Unit/Bed#: [facility] 516-01 Encounter: [id number]    Attending Physician: Sj Gallo MD      ______________________________________________________________________  Assessment and Plan:   Principal Problem:    Malignant neoplasm of upper lobe of left lung [facility])  Active Problems:    Acute respiratory failure with hypoxia ([facility] [address] )    Acute deep vein thrombosis (DVT) of axillary vein of right upper extremity ([facility] [address] )    Acute deep vein thrombosis (DVT) of brachial vein of right upper extremity (HCC)    Tracheostomy in place [facility])    Acute deep vein thrombosis (DVT) of calf muscle vein of left lower extremity (HCC)    Encephalopathy    Hyperactive delirium after surgical procedure    Anemia    Thrombocytosis ([facility])  Resolved Problems:    Subcutaneous emphysema (HCC)    Lactic acidosis    ROGER (acute kidney injury) ([facility] [address] )    Postprocedural pneumothorax    Pneumonia    Leukocytosis    Plan:   · Neuro:   ? Dx:  Encephalopathy, delirium versus component of hypoxic brain injury status post cardiac arrest  ? Continue frequent redirection and one-to-one observation  ? Seroquel was increased back to 75mg last night, but pt did not have improvement with sleeping  ? Appreciate palliative recommendations  ? Analgesia:  Tylenol, oxycodone  ? Sedation - Seroquel, melatonin  ? Delirium ppx: CAM-ICU, sleep hygiene   · CV:   ? Dx:  Hypotension, intermittent  ? Unclear etiology  ? Continue to monitor closely  ? Dx: Tachycardia, intermittent  ? lopressor 12 5 mg BID   ? Hemodynamic support:  None  ? MAP goal > 65   ? Status post cardiac arrest 6/28 due to hypoxia/hypercarbia secondary to subcutaneous emphysema (see below)  · Lung:   ? Dx:  Status post left upper VATS lobectomy for resection of lung cancer, complicated by subcutaneous emphysema  ? Chest tube discontinued 7/5  ?  Status post cricothyroidotomy during cardiac arrest (see above), converted to formal tracheostomy  ? Continue trach collar  ? Holding off on downsizing due to secretions   ? Continue nebulized saline  · GI:   ? Dysphagia  ? Continue NG tube  ? SLP following  ? Stress ulcer ppx:  n/a  ? Bowel regimen:  Senokot, MiraLax  · FEN:   ? Fluids: no maintenance   ? Electrolytes: trend and replete as needed  ? Nutrition:  Tube feeds at goal  · :   ? No active issues  ? Gaytan: no  ? Monitor I&O's, BUN/Cr  ? UOP 1900 mL in 24 hrs   ? Net -12 mL in 24 hrs, net +8L since admission   · ID:   ? No active issues  ? Completed 3 days of ceftriaxone this week  ? Monitor WBC/temp curve  · Heme:   ? No active issues  ? DVT ppx:  Lovenox, SCDs   · Endo:   ? No active issues   · Msk/Skin:   ? PT/OT  ? Turning/repositioning  ? Wound care   Disposition:  ICU per primary team, consider transfer out of the ICU if cleared by thoracic     Code Status: Level 2 - DNAR: but accepts endotracheal intubation    ______________________________________________________________________    Chief Complaint: n/a    HPI/24 Hour Events:  Did not sleep well overnight  No acute events        ______________________________________________________________________    Physical Exam:   Physical Exam   Constitutional: No distress  HENT:   Head: Normocephalic  NG tube in place   Eyes: Conjunctivae and EOM are normal    Neck:   Trach with copious secretions   Cardiovascular: Regular rhythm, intact distal pulses and normal pulses  Tachycardia present  Sinus tach with frequent PVCs   Pulmonary/Chest: Effort normal and breath sounds normal  No respiratory distress  Abdominal: Soft  Bowel sounds are normal  He exhibits no distension  There is no tenderness  Musculoskeletal: He exhibits no edema or deformity  Neurological: He is alert  Follows some commands, is redirectable   Skin: Skin is warm and dry  Nursing note and vitals reviewed         ______________________________________________________________________  Vitals:    07/16/20 0400 20 0500 20 0600 20 0703   BP: 106/81 125/88 119/82    Pulse: 98 98 94    Resp: 17 22 16    Temp:    98 °F (36 7 °C)   TempSrc:    Oral   SpO2: 100% 100% 100%    Weight:   84 7 kg (186 lb 11 7 oz)    Height:           Temperature:   Temp (24hrs), Av 4 °F (36 9 °C), Min:98 °F (36 7 °C), Max:98 9 °F (37 2 °C)    Current Temperature: 98 °F (36 7 °C)  Weights:   IBW: 73 kg    Body mass index is 26 79 kg/m²  Weight (last 2 days)     Date/Time   Weight    20 0600   84 7 (186 73)    07/15/20 0548   89 4 (197 09)    20 0555   89 2 (196 65)            Hemodynamic Monitoring:  N/A     Non-Invasive/Invasive Ventilation Settings:  Respiratory    Lab Data (Last 4 hours)    None         O2/Vent Data (Last 4 hours)    None              No results found for: PHART, RNH8PBZ, PO2ART, HIF0TQV, U5IJTLRR, BEART, SOURCE  SpO2: SpO2: 100 %  Intake and Outputs:  I/O        07 - 07/15 0700 07/15 07 -  07 07 -  0700    I V  (mL/kg) 0 (0)      NG/ 635     IV Piggyback       Feedings 1070 1253     Total Intake(mL/kg) 1550 (17 3) 1888 (22 3)     Urine (mL/kg/hr) 1800 (0 8) 1900 (0 9)     Emesis/NG output 0      Stool 0 0     Total Output 1800 1900     Net -250 -12            Unmeasured Urine Occurrence 1 x      Unmeasured Stool Occurrence 2 x 1 x         UOP: 79 mL/hour   Nutrition:        Diet Orders   (From admission, onward)             Start     Ordered    20 0847  Diet Enteral/Parenteral; Tube Feeding No Oral Diet; Jevity 1 5; Continuous; 55; Prosource Protein Liquid - Two Packets; 100;  Water; Every 4 hours  Diet effective now     Question Answer Comment   Diet Type Enteral/Parenteral    Enteral/Parenteral Tube Feeding No Oral Diet    Tube Feeding Formula: Jevity 1 5    Bolus/Cyclic/Continuous Continuous    Tube Feeding Goal Rate (mL/hr): 55    Prosource Protein Liquid - No Carb Prosource Protein Liquid - Two Packets    Tube Feeding water flush (mL): 100 Water Flush type: Water    Water flush frequency: Every 4 hours    RD to adjust diet per protocol? Yes        07/05/20 0849              TF currently running at 55 mL/hour with a goal of 55 mL/hr  Formula:Jevity 1 2  Labs:   Results from last 7 days   Lab Units 07/14/20  0458 07/14/20  0004 07/13/20  0435 07/12/20  0645   WBC Thousand/uL 8 14 8 34 10 11 10 52*   HEMOGLOBIN g/dL 9 9* 9 8* 11 0* 10 6*   HEMATOCRIT % 32 2* 31 4* 35 1* 34 3*   PLATELETS Thousands/uL 448* 391* 475* 474*   NEUTROS PCT % 56 57  --  63   MONOS PCT % 13* 14*  --  13*     Results from last 7 days   Lab Units 07/15/20  0400 07/14/20  0004 07/13/20  0435 07/12/20  0457 07/11/20  0506   POTASSIUM mmol/L 3 7 4 0 3 8 4 0 3 8   CHLORIDE mmol/L 109* 110* 108 109* 110*   CO2 mmol/L 26 26 28 28 30   BUN mg/dL 16 19 18 19 17   CREATININE mg/dL 0 78 0 89 0 85 0 82 0 78   CALCIUM mg/dL 8 6 8 5 9 2 9 1 9 4   ALK PHOS U/L 94  --   --  97 87   ALT U/L 40  --   --  31 27   AST U/L 18  --   --  23 16     Results from last 7 days   Lab Units 07/15/20  0400 07/12/20  0457 07/11/20  0506   MAGNESIUM mg/dL 2 4 2 4 2 3     Lab Results   Component Value Date    PHOS 3 6 07/15/2020    PHOS 4 9 (H) 07/12/2020    PHOS 4 1 07/11/2020          0   Lab Value Date/Time    TROPONINI 2 75 (H) 06/20/2020 1759    TROPONINI 3 00 (H) 06/20/2020 1319    TROPONINI 2 86 (H) 06/20/2020 0928    TROPONINI 0 85 (H) 06/20/2020 0647    TROPONINI <0 02 03/28/2020 0451    TROPONINI <0 02 03/28/2020 0031    TROPONINI <0 02 03/27/2020 2026     Results from last 7 days   Lab Units 07/14/20  0004   LACTIC ACID mmol/L 1 1     ABG:  Lab Results   Component Value Date    PHART 7 430 07/14/2020    UDM9QFW 33 0 (L) 07/14/2020    PO2ART 67 5 (L) 07/14/2020    TCV1FRI 21 4 (L) 07/14/2020    BEART -2 3 07/14/2020    SOURCE Radial, Left 07/14/2020     Imaging: n/a I have personally reviewed pertinent reports  EKG: n/a  Micro:  Lab Results   Component Value Date    BLOODCX No Growth After 4 Days  07/11/2020    BLOODCX No Growth After 4 Days  07/11/2020    BLOODCX No Growth After 5 Days  06/29/2020    SPUTUMCULTUR 4+ Growth of  06/29/2020     Allergies: No Known Allergies  Medications:   Scheduled Meds:    Current Facility-Administered Medications:  acetaminophen 650 mg Rectal Q4H PRN Toshia Samaniego PA-C   albuterol 2 5 mg Nebulization Q6H PRN Toshia Samaniego PA-C   chlorhexidine 15 mL Swish & Spit Q12H [address] Jasesanalilia Samaniego PA-C   enoxaparin 1 mg/kg Subcutaneous Q12H [address] Toshia Samaniego PA-C   guaiFENesin 200 mg Oral Q4H PRN Yasmine Toussaint PA-C   levalbuterol 1 25 mg Nebulization TID Toshia Samaniego PA-C   Lidocaine Viscous HCl 15 mL Swish & Spit 4x Daily PRN Toshia Samaniego PA-C   melatonin 6 mg Oral HS Margretmaxx Brooks DO   metoprolol tartrate 12 5 mg Per NG Tube Q12H [address] Andre Callahan PA-C   nystatin 500,000 Units Swish & Swallow 4x Daily Toshia Samaniego PA-C   OLANZapine 5 mg Intramuscular Q6H PRN Lavon Dominguez MD   ondansetron 4 mg Intravenous Q6H PRN Toshia Samaniego PA-C   oxyCODONE 2 5 mg Oral Q4H PRN Margret Brooks DO   polyethylene glycol 17 g Oral Daily Toshia Samaniego PA-C   QUEtiapine 75 mg Oral HS Andre Callahan PA-C   senna-docusate sodium 1 tablet Per NG Tube BID Toshia Samaniego PA-C   sodium chloride 3 mL Nebulization TID Toshia Samaniego PA-C     Continuous Infusions:   PRN Meds:    acetaminophen 650 mg Q4H PRN   albuterol 2 5 mg Q6H PRN   guaiFENesin 200 mg Q4H PRN   Lidocaine Viscous HCl 15 mL 4x Daily PRN   OLANZapine 5 mg Q6H PRN   ondansetron 4 mg Q6H PRN   oxyCODONE 2 5 mg Q4H PRN     VTE Pharmacologic Prophylaxis: Enoxaparin (Lovenox)  VTE Mechanical Prophylaxis: sequential compression device  Invasive lines and devices:   Invasive Devices     Peripherally Inserted Central Catheter Line            PICC Line 77/45/99 Left Basilic 19 days          Drain            NG/OG/Enteral Tube Nasogastric Right nares 17 days    External Urinary Catheter Medium 1 day          Airway Surgical Airway 2 days                     Portions of the record may have been created with voice recognition software  Occasional wrong word or "sound a like" substitutions may have occurred due to the inherent limitations of voice recognition software  Read the chart carefully and recognize, using context, where substitutions have occurred      Satya Crow MD

## 2020-07-16 NOTE — SOCIAL WORK
Message received from [address] [place] [address] that patient continues to meet LTACH criteria and they are following  Patient will need to be off 1:1 supervision for admission to [facility]  Called daughter, Dannie Wu, [phone], to review above

## 2020-07-16 NOTE — UTILIZATION REVIEW
Continued Stay Review     Date: 7/16/20 Thursday                    POD # 36     Current Patient Class:  Inpatient  Current Level of Care: Med Surg     HPI:   71year old male initially admitted inpatient on 6/10/20 after VATS, upper lobectomy, mediastinal lymph node dissection, mediastinoscopy, and flexible Keara Rough had an air leak with subQ emphysema throughout upper torso, neck, face, and L arm  Chest tube was in place to water seal  Needed decompressive incision on 6/15, VAC placed, still has air leak  CXR showed extensive SQ emphysema  On 6/20/20 SOB with SpO2 in 70's and progressed to bradycardia and code event requiring emergency trach  Moved to ICU with a line and central line, levo/epi gtts for bp maintenance  Cric transitioned to trach with trach collar trials started on 6/24/20 7/5/20 Chest Tube D/C     Current Assessment/Plan:  Did not sleep well overnight  ? Encephalopathy, delirium versus component of hypoxic brain injury status post cardiac arrest  § Continue frequent redirection and one-to-one observation  § Seroquel was increased back to 75mg last night, but pt did not have improvement with sleeping    ? Hypotension, intermittent - Unclear etiology - Continue to monitor closely    ? Status post left upper VATS lobectomy for resection of lung cancer, complicated by subcutaneous emphysema  § Chest tube discontinued 7/5  ? Status post cricothyroidotomy during cardiac arrest converted to formal tracheostomy  § Continue trach collar - Holding off on downsizing due to copious trach secretions     ?  Dysphagia - continue NG tube    Pertinent Labs/Diagnostic Results:     Results from last 7 days   Lab Units 07/14/20  0458 07/14/20  0004 07/13/20  0435 07/12/20  0645 07/11/20  0506   WBC Thousand/uL 8 14 8 34 10 11 10 52* 9 36   HEMOGLOBIN g/dL 9 9* 9 8* 11 0* 10 6* 10 6*   HEMATOCRIT % 32 2* 31 4* 35 1* 34 3* 34 8*   PLATELETS Thousands/uL 448* 391* 475* 474* 516*   NEUTROS ABS Thousands/µL 4 53 4 83  --  6 64 5 50     Results from last 7 days   Lab Units 07/15/20  0400 07/14/20  0004 07/13/20  0435 07/12/20  0457 07/11/20  0506 07/10/20  0428   SODIUM mmol/L 140 142 140 141 143 143   POTASSIUM mmol/L 3 7 4 0 3 8 4 0 3 8 4 1   CHLORIDE mmol/L 109* 110* 108 109* 110* 109*   CO2 mmol/L 26 26 28 28 30 27   ANION GAP mmol/L 5 6 4 4 3* 7   BUN mg/dL 16 19 18 19 17 16   CREATININE mg/dL 0 78 0 89 0 85 0 82 0 78 0 68   EGFR ml/min/1 73sq m 92 87 89 90 92 97   CALCIUM mg/dL 8 6 8 5 9 2 9 1 9 4 8 1*   MAGNESIUM mg/dL 2 4  --   --  2 4 2 3 2 4   PHOSPHORUS mg/dL 3 6  --   --  4 9* 4 1 4 2*     Results from last 7 days   Lab Units 07/15/20  0400 07/12/20  0457 07/11/20  0506 07/10/20  0428   AST U/L 18 23 16 10   ALT U/L 40 31 27 17   ALK PHOS U/L 94 97 87 70   TOTAL PROTEIN g/dL 6 6 7 0 6 9 5 7*   ALBUMIN g/dL 2 3* 2 6* 2 5* 2 1*   TOTAL BILIRUBIN mg/dL 0 18* 0 20 0 26 0 17*     Results from last 7 days   Lab Units 07/15/20  0400 07/14/20  0004 07/13/20  0435 07/12/20  0457 07/11/20  0506 07/10/20  0428   GLUCOSE RANDOM mg/dL 117 103 112 107 88 87     Results from last 7 days   Lab Units 07/14/20  0017   PH ART  7 430   PCO2 ART mm Hg 33 0*   PO2 ART mm Hg 67 5*   HCO3 ART mmol/L 21 4*   BASE EXC ART mmol/L -2 3   O2 CONTENT ART mL/dL 14 0*   O2 HGB, ARTERIAL % 92 4*   ABG SOURCE  Radial, Left     Results from last 7 days   Lab Units 07/14/20  0004   LACTIC ACID mmol/L 1 1     Results from last 7 days   Lab Units 07/11/20  0939   CLARITY UA  Turbid   COLOR UA  Yellow   SPEC GRAV UA  1 019   PH UA  8 5*   GLUCOSE UA mg/dl Negative   KETONES UA mg/dl Negative   BLOOD UA  Negative   PROTEIN UA mg/dl 100 (2+)*   NITRITE UA  Positive*   BILIRUBIN UA  Negative   UROBILINOGEN UA E U /dl 0 2   LEUKOCYTES UA  Large*   WBC UA /hpf 10-20*   RBC UA /hpf 4-10*   BACTERIA UA /hpf Moderate*   EPITHELIAL CELLS WET PREP /hpf None Seen       Results from last 7 days   Lab Units 07/11/20  1054   BLOOD CULTURE  No Growth After 5 Days  No Growth After 5 Days  Vital Signs:   BP: 106/81 125/88 119/82     Pulse: 98 98 94     Resp:      Temp:       98 °F (36 7 °C)   TempSrc:       Oral   SpO2: 100% 100% 100%     Weight:     84 7 kg (186 lb 11 7 oz)     Height:                 Temperature:   Temp (24hrs), Av 4 °F (36 9 °C), Min:98 °F (36 7 °C), Max:98 9 °F (37 2 °C)   Current Temperature: 98 °F (36 7 °C)    Respiratory Data (Last 48 hours)      1932 07/14 1954 07/15 0000 07/15 0355 07/15 0747 07/15 1329 07/15 194 0006  0843     O2 Device Trach mask Trach mask Trach mask Trach mask Trach mask T-Piece Trach mask Trach mask Trach mask    O2 Therapy  Oxygen     Oxygen     Oxygen     Oxygen     Oxygen     Oxygen     O2 Flow Rate (L/min) (L/min)  6   5  5  5      Intake and Outputs:       07 - 07/15 0700 07/15 0701 -  0700     I V  (mL/kg) 0 (0)       NG/ 635     IV Piggyback         Feedings 1070 1253     Total Intake(mL/kg) 1550 (17 3) 1888 (22 3)     Urine (mL/kg/hr) 1800 (0 8) 1900 (0 9)     Emesis/NG output 0       Stool 0 0     Total Output 1800 1900     Net -250 -12               Unmeasured Urine Occurrence 1 x       Unmeasured Stool Occurrence 2 x 1 x      URINE OP: 79 mL/hour     Weights:   IBW: 73 kg    Body mass index is 26 79 kg/m²  Weight (last 2 days)      Date/Time   Weight     20 0600    84 7 (186 73)     07/15/20 0548    89 4 (197 09)     20 0555    89 2 (196 65)     Invasive Devices      Peripherally Inserted Central Catheter Line                     PICC Line  Left Basilic 19 days             Drain               NG/OG/Enteral Tube Nasogastric Right nares 17 days      External Urinary Catheter Medium 1 day             Airway               Surgical Airway 2 days         Diet Enteral/Parenteral; Tube Feeding No Oral Diet; Jevity 1 5; Continuous; 55; Prosource Protein Liquid - Two Packets; 100;  Water; Every 4 hours    Scheduled Medications:  chlorhexidine 15 mL Swish & Spit Q12H [facility]   enoxaparin 1 mg/kg Subcutaneous Q12H [facility]   levalbuterol 1 25 mg Nebulization TID   melatonin 6 mg Oral HS   metoprolol tartrate 12 5 mg Per NG Tube Q12H [facility]   nystatin 500,000 Units Swish & Swallow 4x Daily   polyethylene glycol 17 g Oral Daily   QUEtiapine 75 mg Oral HS   senna-docusate sodium 1 tablet Per NG Tube BID   sodium chloride 3 mL Nebulization TID      PRN Meds:  acetaminophen 650 mg Rectal Q4H PRN   albuterol 2 5 mg Nebulization Q6H PRN   guaiFENesin 200 mg Oral Q4H PRN   Lidocaine Viscous HCl 15 mL Swish & Spit 4x Daily PRN   OLANZapine 5 mg Intramuscular Q6H PRN  GIVEN X 1/ 24 HRS   ondansetron 4 mg Intravenous Q6H PRN   oxyCODONE 2 5 mg Oral Q4H PRN     Discharge Plan: To be determined   Inpatient Case Management following for all discharge needs    Network Utilization Review Department  [email or web address]  ATTENTION: Please call with any questions or concerns to [phone] and carefully listen to the prompts so that you are directed to the right person  All voicemails are confidential   Avtar Fry all requests for admission clinical reviews, approved or denied determinations and any other requests to dedicated fax number below belonging to the campus where the patient is receiving treatment   List of dedicated fax numbers for the Facilities:  [address] DENIALS (Administrative/Medical Necessity) [phone]   [address] (Maternity/NICU/Pediatrics) [phone]   Alyx Gerard [phone]   Abby Hammonds [phone]   Augustine Stahl [phone]   Shivani Canchola [phone]   [address] [facility] 15[address] [facility] [phone]   [facility]  [phone]   [address] [facility]  [address] [facility] [address] [facility] [phone]

## 2020-07-16 NOTE — OCCUPATIONAL THERAPY NOTE
Occupational Therapy RE- Evaluation     Patient Name: Christa Browne  Today's Date: 7/16/2020  Problem List  Principal Problem:    Malignant neoplasm of upper lobe of left lung [facility])  Active Problems:    Acute respiratory failure with hypoxia ([facility] [address] )    Acute deep vein thrombosis (DVT) of axillary vein of right upper extremity (HCC)    Acute deep vein thrombosis (DVT) of brachial vein of right upper extremity (HCC)    Tracheostomy in place [facility])    Acute deep vein thrombosis (DVT) of calf muscle vein of left lower extremity (HCC)    Encephalopathy    Hyperactive delirium after surgical procedure    Anemia    Thrombocytosis ([facility] [address] )       07/16/20 0941   Note Type   Note type Re-eval   Restrictions/Precautions   Weight Bearing Precautions Per Order No   Other Precautions Cognitive; Impulsive;1:1;Bed Alarm; Chair Alarm;Multiple lines;Telemetry;O2;Fall Risk;Pain  (NG tube, trach collar)   Pain Assessment   Pain Assessment Tool FLACC   Pain Rating: FLACC (Rest) - Face 0   Pain Rating: FLACC (Rest) - Legs 0   Pain Rating: FLACC (Rest) - Activity 0   Pain Rating: FLACC (Rest) - Cry 0   Pain Rating: FLACC (Rest) - Consolability 0   Score: FLACC (Rest) 0   Pain Rating: FLACC (Activity) - Face 1   Pain Rating: FLACC (Activity) - Legs 1   Pain Rating: FLACC (Activity) - Activity 1   Pain Rating: FLACC (Activity) - Cry 0   Pain Rating: FLACC (Activity) - Consolability 0   Score: FLACC (Activity) 3   Home Living   Additional Comments Pt unable to report home setup/PLOF 2' cog impairment - see intital OT eval for information  Prior Function   Comments Pt unable to report home setup/PLOF 2' cog impairment - see intital OT eval for information     Lifestyle   Autonomy pta pt reports I in ADLs/IADLs/functional mobility   Reciprocal Relationships supportive dtr and son in law   Service to Others retired    [address] enjoys doing "nothing"   Psychosocial   Psychosocial (WDL) X   Patient Behaviors/Mood Irritable;Pleasant   ADL   Where Assessed Chair   Eating Assistance Unable to assess  (Pt NPO)   Grooming Assistance 2  Maximal Assistance   UB Bathing Assistance 2  Maximal Assistance   LB Bathing Assistance 2  Maximal Assistance   UB Dressing Assistance 2  Maximal Assistance   LB Dressing Assistance 2  Maximal [address]  1  Total Assistance   Bed Mobility   Supine to Sit 2  Maximal assistance   Additional items Assist x 3;HOB elevated; Increased time required;Verbal cues;LE management; Impulsive   Sit to Supine Unable to assess   Additional Comments Pt found supine in bed upon OT arrival  Pt sat EOB requiring varying A (s-ModA)  Pt left OOB in chair w/ chair alarm on and all needs in reach following OT session  1:1 present in room s/p OT session  Transfers   Sit to Stand 3  Moderate assistance   Additional items Assist x 2; Increased time required;Verbal cues; Impulsive   Stand to Sit 3  Moderate assistance   Additional items Assist x 2; Increased time required;Verbal cues   Sit pivot 3  Moderate assistance   Additional items Assist x 2; Increased time required; Impulsive;Verbal cues  ([id number])   Additional Comments Pt performed STS transfers w/RW  Pt presents w/ retropulsive stance and knee buckling during standing  Pt performed sit pivot transfers - requiring simple, one step commands - EOB>drop arm chair HHA; SBA x3rd for line management  Functional Mobility   Additional Comments Unable to assess 2' retropulsive stance, knee buckling, and cog impairment      Balance   Static Sitting Poor +   Dynamic Sitting Poor   Static Standing Poor -   Activity Tolerance   Activity Tolerance Patient limited by fatigue;Patient limited by pain;Treatment limited secondary to medical complications (Comment)  (Cognitive impairment )   Medical Staff Made Aware OT Johnathan Bernard, PT Harshil Jaime, SPT alexandre   Nurse Made Aware RN cleared Pt for OT session   RUE Assessment   RUE Assessment X  (decreased gross strength)   FLO Assessment   LUE Assessment X  (decreased gross strength)   Hand Function   Gross Motor Coordination Functional   Cognition   Overall Cognitive Status Impaired   Arousal/Participation Responsive   Attention Attends with cues to redirect   Orientation Level Oriented to person;Disoriented to time;Disoriented to place; Disoriented to situation   Memory Decreased recall of precautions;Decreased recall of recent events;Decreased short term memory;Decreased long term memory   Following Commands Follows one step commands with increased time or repetition   Comments Pt pleasant to work with therapy today  Pt is able to follow simple, one-step commands with increased time/repetition  Pt appeared distracted/overstimulated by multiple therapists and benefits from least possible environmental stimuli when participating in functional activities  Pt eyes wandering throughout session  Pt observed smiling several times  Pt attempting to pull at lines throughout session  At times impulsive but easily redirected to task  Pt communicates via trach collar w/ PMV in place, however voice is incomprehensible - Pt mostly commuicated w/ head nods, grunts, and facial expressions  Assessment   Limitation Decreased ADL status; Decreased UE ROM; Decreased UE strength;Decreased Safe judgement during ADL;Decreased cognition;Decreased endurance;Decreased sensation;Decreased self-care trans;Decreased high-level ADLs   Prognosis Fair   Assessment Pt is a 70 y/o male who presented to [facility] on 06/10/2020 w/ malignant neoplasm of upper lobe of left lung  Pt now s/p "VATS with LIVIER lobectomy, mediastinal LND, mediastinoscopy 6/10, Post-op acute hypoxic respiratory failure and associated cardiac arrest requiring emergency cricothyroidotomy 6/20, s/p tracheostomy 6/22"   Pt is "Encephalopathy, delirium versus component of hypoxic brain injury status post cardiac arrest " Pt  has a past medical history of Alcohol abuse (3/27/2020), Chest pain, Community acquired pneumonia (3/27/2020), Hypertension, Left upper lobe pulmonary nodule, and Malignant neoplasm of upper lobe of left lung ([facility] [address] ) (5/7/2020)  Pt seen for OT Re-eval this day w/ active OT orders  See initial Eval for all home setup/PLOF information  Current level of function: Max A for all self-care activities, Total A toileting  Pt requires Max x3 sup>sit, Modx2 STS, and Modx2 sit pivot  Pt unable to perform functional mobility  Impaired cognition  Pt presents w/ limitations in ADL/IADL status, bed mobility, functional mobility, functional transfers, cognition, endurance, and activity tolerance  Following evaluation, Pt will benefit from continued OT treatment during hospital stay to address barriers to occupational performance defined above and to maximize functional IND  OT plan of care 3-5x/week for 10-14days  Post-acute Rehab recommended following D/C  Goals   Patient Goals unable to express   LTG Time Frame 10-14   Long Term Goal #1 see goals below   Plan   Treatment Interventions ADL retraining;Functional transfer training;UE strengthening/ROM; Endurance training;Cognitive reorientation;Patient/family training;Equipment evaluation/education; Fine motor coordination activities; Compensatory technique education; Energy conservation; Activityengagement; Neuromuscular reeducation;Continued evaluation   Goal Expiration Date 07/30/20   OT Frequency 3-5x/wk   Recommendation   OT Discharge Recommendation Post-Acute Rehabilitation Services   Equipment Recommended Tub seat with back; Bedside commode   OT - OK to Discharge Yes  (when medically cleared)   Modified Bree Scale   Modified Sully Scale 4     GOALS    1  Pt will perform UB bathing/dressing w/ Min A using adaptive device and DME as needed  2  Pt will perform LB bathing/dressing w/ Mod A using adaptive device and DME as needed  3  Pt will perform toileting w/ Mod A w/ G hygiene/thoroughness using adaptive device and DME as needed       4  Pt will perform simulated IADL task w/ Mod A using adaptive device and DME as needed and G balance/safety  5  Pt will perform functional transfers on/off all surfaces w/ Min A and 100% carryover of safety awareness using appropriate DME as needed w/ G balance  6  Pt will perform functional mobility w/ Min A and 100% carryover for safety and sequencing using appropriate DME as needed w/ G balance during ADL/IADL/leisure tasks  7  Pt will demonstrate understanding of patient/caregiver education and training with G carryover as appropriate without cues to increase safety during functional tasks  8  Pt will be AxOx4 for all tx sessions with no environmental cues to increase safety during functional tasks  9  Pt will demonstrate G carryover of energy conservation techniques to increase endurance during ADL/IADL/leisure tasks     10  Pt will improve sitting tolerance at EOB for at least 10 minutes w/ S and G balance  11  Pt will improve bed mobility to Min A w/ increased time and bed rails as needed w/ G safety for increased participation in ADLS/IADLS  12  Pt will increase UB strength using therapeutic exercise for UE/trunk by 1/2 grade MMT for increased independence/participation in functional tasks  15  Pt will attend to ongoing cognitive assessment w/ G participation to assist w/ safe discharge planning  14  Pt will demonstrate G high level balance during ADL/IADL/leisure activity w/ DME PRN w/ G safety            Brigette Shankar, OTS

## 2020-07-16 NOTE — PLAN OF CARE
Problem: PHYSICAL THERAPY ADULT  Goal: Performs mobility at highest level of function for planned discharge setting  See evaluation for individualized goals  Description  Treatment/Interventions: Functional transfer training, LE strengthening/ROM, Elevations, Therapeutic exercise, Endurance training, Equipment eval/education, Bed mobility, Gait training, Spoke to MD, Spoke to nursing  Equipment Recommended: Walker(at this time; r/o [facility])       See flowsheet documentation for full assessment, interventions and recommendations  Note:   Prognosis: Fair  Problem List: Decreased strength, Decreased range of motion, Decreased endurance, Impaired balance, Decreased mobility, Decreased coordination, Decreased cognition, Decreased safety awareness, Pain  Assessment: Pt seen by PT on 07/16/20 for high complexity PT re-evaluation due to a decline in functional mobility compared to baseline  Pt was admitted to [place] [id number] on 6/10/20 for malignant neoplasm of upper lobe of L lung  Pt s/p "BRONCHOSCOPY FLEXIBLE, MEDIASTINOSCOPY THORACOSCOPY VIDEO ASSISTED SURGERY (VATS) UPPER LOBECTOMY OF LUNG; MEDIASTINAL LYMPH NODE DISSECTION 6/10/20"  Rapid response initiated twice on 6/20/20, CPR and emergency tracheostomy administered  Chart reviewed, PT orders active, pt on continuous 1:1 observation  Pt  has a past medical history of Alcohol abuse (3/27/2020), Chest pain, Community acquired pneumonia (3/27/2020), Hypertension, Left upper lobe pulmonary nodule, and Malignant neoplasm of upper lobe of left lung ([facility] [address] ) (5/7/2020)  Please see initial eval for PLOF and home setup  Pt presents with decreased strength, endurance, and balance, increased pain and cog impairment that contribute to limitations in bed mobility, functional mobility and functional transfers  During the session pt performed bed mobility at a Max-A x3, STS at a Mod-A x2, and sit pivot at a Mod-A x2 with 3rd assist for positioning   Pt required increased VC and TC for hand placement and safety  Required repeated redirection to task, but able to follow one step commands  Pt resisting therapist at points, but not agitated and non-combative  Pt left sitting up in chair with 1:1 present and all needs in reach at end of therapy session  Pt would benefit from skilled PT in order to address impairments and functional limitations  PT to follow pt 3-5x /week, and would recommend rehab pending medical clearance  Barriers to Discharge: Decreased caregiver support, Inaccessible home environment     PT Discharge Recommendation: [address]     PT - OK to Discharge: Yes(once medically cleared  )    See flowsheet documentation for full assessment

## 2020-07-16 NOTE — PHYSICAL THERAPY NOTE
Physical Therapy Re-Evaluation     Patient's Name: Jacques Burton    Admitting Diagnosis  Malignant neoplasm of upper lobe of left lung ([facility] [address] ) [C34 12]    Problem List  Patient Active Problem List   Diagnosis    Community acquired pneumonia    Abnormal computed tomography angiography (CTA)    Alcohol abuse    Malignant neoplasm of upper lobe of left lung ([facility] [address] )    Tobacco abuse    Acute respiratory failure with hypoxia (HCC)    Acute deep vein thrombosis (DVT) of axillary vein of right upper extremity (HCC)    Acute deep vein thrombosis (DVT) of brachial vein of right upper extremity (HCC)    Trache[facility] in place [facility])    Acute deep vein thrombosis (DVT) of calf muscle vein of left lower extremity (HCC)    Encephalopathy    Hyperactive delirium after surgical procedure    Anemia    Thrombocytosis ([facility] [address] )       Past Medical History  Past Medical History:   Diagnosis Date    Alcohol abuse 3/27/2020    Chest pain     Community acquired pneumonia 3/27/2020    Hypertension     Left upper lobe pulmonary nodule     Malignant neoplasm of upper lobe of left lung ([facility] [address] ) 5/7/2020       Past Surgical History  Past Surgical History:   Procedure Laterality Date    COLON SURGERY      CT NEEDLE BIOPSY LUNG  4/29/2020    MN BRONCHOSCOPY,DIAGNOSTIC N/A 6/10/2020    Procedure: BRONCHOSCOPY FLEXIBLE;  Surgeon: Jesus Cano MD;  [facility];  Service: Thoracic    MN MEDIASTINOSCOPY WITH LYMPH NODE BIOPSY/IES N/A 6/10/2020    Procedure: MEDIASTINOSCOPY;  Surgeon: Jesus Cano MD;  [facility];  Service: Thoracic    MN THORACOSCOPY SURG LOBECTOMY Left 6/10/2020    Procedure: THORACOSCOPY VIDEO ASSISTED SURGERY (VATS);   Surgeon: Jesus Cano MD;  [facility];  Service: Thoracic    MN THORACOSCOPY SURG LOBECTOMY Left 6/10/2020    Procedure: UPPER LOBECTOMY OF LUNG; MEDIASTINAL  LYMPH NODE DISSECTION;  Surgeon: Jesus Cano MD;  [facility];  Service: Thoracic    TRACHEOSTOMY N/A 6/22/2020    Procedure: TRACHEOSTOMY REVISION, BRONCHOSCOPY;  Surgeon: Sj Gallo MD;  [facility];  Service: Thoracic    WRIST SURGERY Right     orif        07/16/20 0940   Note Type   Note type Re-Eval   Pain Assessment   Pain Assessment Tool FLACC   Pain Rating: FLACC (Rest) - Face 0   Pain Rating: FLACC (Rest) - Legs 0   Pain Rating: FLACC (Rest) - Activity 0   Pain Rating: FLACC (Rest) - Cry 0   Pain Rating: FLACC (Rest) - Consolability 0   Score: FLACC (Rest) 0   Pain Rating: FLACC (Activity) - Face 0   Pain Rating: FLACC (Activity) - Legs 1   Pain Rating: FLACC (Activity) - Activity 1   Pain Rating: FLACC (Activity) - Cry 0   Pain Rating: FLACC (Activity) - Consolability 0   Score: FLACC (Activity) 2   Home Living   Type of Home House   Home Layout Two level; Able to live on main level with bedroom/bathroom  (1STE)   Bathroom Shower/Tub Walk-in shower   Bathroom Toilet Standard   Additional Comments Home setup obtained from EMR, pt unreliable historian due to cog  Prior Function   Level of [place] Independent with ADLs and functional mobility   Lives With Daughter; Other (Comment)  (and AZAEL)   Receives Help From Family   ADL Assistance Independent   IADLs Independent   Vocational Retired   Comments PLOF obtained from [address] [place] [address], pt unreliable historian due to cog  Restrictions/Precautions   Weight Bearing Precautions Per Order No   Other Precautions Pain; Fall Risk;O2;Telemetry;Multiple lines; Impulsive;1:1;Cognitive; Chair Alarm; Bed Alarm  (NG tube, Trach collar)   General   Family/Caregiver Present No   Cognition   Overall Cognitive Status Impaired   Arousal/Participation Alert   Attention Difficulty attending to directions   Orientation Level Oriented X4   Memory Decreased recall of precautions;Decreased short term memory;Decreased recall of recent events;Decreased long term memory;Decreased recall of biographical information   Following Commands Follows one step commands inconsistently   Comments Pt with decreased insight into deficits and safety precautions, requires cues for safety  Resistive to movement at times but non-combative and non-aggressive  Pt very impulsive and requires cues for pacing  Responded positively to concise/direct instructions  Pt easily distracted, benefits from low activity environment  Pt noted to pull at lines, requires redirection  Strength RLE   RLE Overall Strength 3/5   Strength LLE   LLE Overall Strength 3/5   Coordination   Movements are Fluid and Coordinated 0   Coordination and Movement Description pt resistive at times, impulsive, required cues to redirect   Bed Mobility   Supine to Sit 2  Maximal assistance   Additional items Assist x 3;Bedrails; Increased time required; Impulsive;Verbal cues;LE management   Additional Comments Pt found supine in bed upon PT arrival  Pt sits EOB with varying levels of assist, mod-A progressing to supervision  Pt with noted retropulsion in sitting  Pt left sitting up in chair with 1:1 aide in place ands/p PT session  Transfers   Sit to Stand 3  Moderate assistance   Additional items Assist x 2; Increased time required; Impulsive;Verbal cues   Stand to Sit 3  Moderate assistance   Additional items Assist x 2;Verbal cues; Impulsive; Increased time required   Sit pivot 3  Moderate assistance   Additional items Assist x 2; Increased time required; Impulsive;Verbal cues  (3rd assist for positioning)   Additional Comments Pt required VC and TC for hand placement and safety  STS with RW  Attempted side stepping in stance, pt able to minimally pivot foot  Balance   Static Sitting Poor +   Dynamic Sitting Poor   Static Standing Poor -   Activity Tolerance   Activity Tolerance Patient limited by pain;Treatment limited secondary to medical complications (Comment); Patient limited by fatigue  (cog)   Medical Staff Made Aware PT MICHELINE Le, RN, PCA   Nurse Made Aware RN cleared pt for PT session  Assessment   Prognosis Fair   Problem List Decreased strength;Decreased range of motion;Decreased endurance; Impaired balance;Decreased mobility; Decreased coordination;Decreased cognition;Decreased safety awareness;Pain   Assessment Pt seen by PT on 07/16/20 for high complexity PT re-evaluation due to a decline in functional mobility compared to baseline  Pt was admitted to [facility]  on 6/10/20 for malignant neoplasm of upper lobe of L lung  Pt s/p "BRONCHOSCOPY FLEXIBLE, MEDIASTINOSCOPY THORACOSCOPY VIDEO ASSISTED SURGERY (VATS) UPPER LOBECTOMY OF LUNG; MEDIASTINAL LYMPH NODE DISSECTION 6/10/20"  Rapid response initiated twice on 6/20/20, CPR and emergency tracheostomy administered  Chart reviewed, PT orders active, pt on continuous 1:1 observation  Pt  has a past medical history of Alcohol abuse (3/27/2020), Chest pain, Community acquired pneumonia (3/27/2020), Hypertension, Left upper lobe pulmonary nodule, and Malignant neoplasm of [facility] lobe [facility] lung ([facility] ) (5/7/2020)  Please see initial eval for PLOF and home setup  Pt presents with decreased strength, endurance, and balance, increased pain and cog impairment that contribute to limitations in bed mobility, functional mobility and functional transfers  During the session pt performed bed mobility at a Max-A x3, STS at a Mod-A x2, and sit pivot at a Mod-A x2 with 3rd assist for positioning  Pt required increased VC and TC for hand placement and safety  Required repeated redirection to task, but able to follow one step commands  Pt resisting therapist at points, but not agitated and non-combative  Pt left sitting up in chair with 1:1 present and all needs in reach at end of therapy session  Pt would benefit from skilled PT in order to address impairments and functional limitations  PT to follow pt 3-5x /week, and would recommend rehab pending medical clearance  Barriers to Discharge Decreased caregiver support; Inaccessible home environment   Goals   Patient Goals none stated at this time  [facility] Expiration Date 07/30/20   Short Term Goal #1 1  Pt will be able to complete all aspects of bed mobility at a Min- A level in order to decrease burden on caregivers  2  Pt will be able to complete functional transfers at a Min-A level in order to increase independence  3  Pt will be able to ambulate 20+ ft at a Mod-A level with LRAD in order to promote safe transition back into the home environment  4  Pt will demonstrate an increase in b/l LE strength by one grade as indicated on MMT scale in order to promote functional independence  5  Pt will demonstrate improved balance by increasing at least one grade on the graded balance scale in order to reduce falls risk  Plan   Treatment/Interventions Functional transfer training;LE strengthening/ROM; Therapeutic exercise; Endurance training;Cognitive reorientation;Patient/family training;Equipment eval/education; Bed mobility;Gait training;Spoke to nursing;OT   PT Frequency Other (Comment)  (3-5x /week)   Recommendation   PT Discharge Recommendation Post-Acute Rehabilitation Services   Equipment Recommended Walker   PT - OK to Discharge Yes  (once medically cleared  Simultaneous filing   User may not have seen previous data )   Modified Bree Scale   Modified Brule Scale 4           Rebekah Grimes, SPT

## 2020-07-16 NOTE — PROGRESS NOTES
Progress Note - Paula Winslow 71 y o  male MRN: [id number]    Unit/Bed#: [facility] 516-01 Encounter: [id number]      Assessment:  71 M status post thoracoscopic left upper lobectomy complicated by prolonged air leak, cardiac arrest s/p cricothyroidotomy, tracheostomy revision, and trach exchange to 6 cuffless    VSS  Afebrile  Trach in place, midline  One episode of hypotension at 1AM 85/63 with MAPs 68, resolved without bolus or intervention  Tolerating trach collar  Saturating 100%  Plan:  Continue tube feeds  Continue to work with speech therapy daily  Continue 6Fr trach per speech  Wean oxygen as tolerated  Continue dvt ppx w lovenox    Subjective:   Denied complaints  Following commands  Denied chest pain or shortness of breath  Nursing staff reported agitation and inability to sleep overnight despite melatonin and seroquel  Objective:     Vitals: Blood pressure 119/82, pulse 94, temperature 98 2 °F (36 8 °C), temperature source Oral, resp  rate 16, height 5' 10" (1 778 m), weight 89 4 kg (197 lb 1 5 oz), SpO2 100 %  ,Body mass index is 28 28 kg/m²  Intake/Output Summary (Last 24 hours) at 7/16/2020 0626  Last data filed at 7/16/2020 0530  Gross per 24 hour   Intake 1778 ml   Output 1900 ml   Net -122 ml       Physical Exam  General: NAD  HEENT: NC/AT  MMM  Trach in place  Midline  Cv: RRR     Lungs: normal effort  Ab: Soft, NT/ND  Ex: no CCE  Neuro: AAOx3    Scheduled Meds:  Current Facility-Administered Medications:  acetaminophen 650 mg Rectal Q4H PRN Jose Bruce PA-C   albuterol 2 5 mg Nebulization Q6H PRN Jose Bruce PA-C   chlorhexidine 15 mL Swish & Spit Q12H [address] Jose Bruce PA-C   enoxaparin 1 mg/kg Subcutaneous Q12H [address] Jose Bruce PA-C   guaiFENesin 200 mg Oral Q4H PRN Odessa Abel PA-C   levalbuterol 1 25 mg Nebulization TID Jose Bruce PA-C   Lidocaine Viscous HCl 15 mL Swish & Spit 4x Daily PRN Jose Bruce PA-C   melatonin 6 mg Oral HS Rajni Moses DO   metoprolol tartrate 12 5 mg Per NG Tube Q12H [facility] Miranda Mckeon PA-C   nystatin 500,000 Units Swish & Swallow 4x Daily Otilio Mcgee PA-C   OLANZapine 5 mg Intramuscular Q6H PRN Mike Hernandez MD   ondansetron 4 mg Intravenous Q6H PRN Otilio Mcgee PA-C   oxyCODONE 2 5 mg Oral Q4H PRN Margret Brooks DO   polyethylene glycol 17 g Oral Daily Otilio Mcgee PA-C   QUEtiapine 75 mg Oral HS Miranda Mckeon PA-C   senna-docusate sodium 1 tablet Per NG Tube BID Otilio Mcgee PA-C   sodium chloride 3 mL Nebulization TID Otilio Mcgee PA-C   sterile water         Continuous Infusions:   PRN Meds:   acetaminophen    albuterol    guaiFENesin    Lidocaine Viscous HCl    OLANZapine    ondansetron    oxyCODONE      Invasive Devices     Peripherally Inserted Central Catheter Line            PICC Line 03/24/33 Left Basilic 19 days          Drain            NG/OG/Enteral Tube Nasogastric Right nares 17 days    External Urinary Catheter Medium 1 day          Airway            Surgical Airway 2 days                Lab, Imaging and other studies: I have personally reviewed pertinent reports      VTE Pharmacologic Prophylaxis: Enoxaparin (Lovenox)  VTE Mechanical Prophylaxis: sequential compression device

## 2020-07-16 NOTE — UTILIZATION REVIEW
Continued Stay Review    Date: 07/16/2020                        status post thoracoscopic left upper lobectomy complicated by prolonged air leak, cardiac arrest  (6/10)  s/p cricothyroidotomy, tracheostomy revision, and trach exchange to 6 cuffless (6/22)  Current Patient Class: Inpatient  Current Level of Care: level 1 stepdown    HPI:69 y o  male initially admitted on 06/10/2020    Assessment/Plan: Still confused  staff reported agitation and inability to sleep overnight despite melatonin and seroquel  Continue tube feedings  Continue to work with [facility] [address]  Wean O2 as tolerated  Continue DVTppx lovenox        Pertinent Labs/Diagnostic Results:     Results from last 7 days   Lab Units 07/14/20  0458 07/14/20  0004 07/13/20  0435 07/12/20  0645 07/11/20  0506   WBC Thousand/uL 8 14 8 34 10 11 10 52* 9 36   HEMOGLOBIN g/dL 9 9* 9 8* 11 0* 10 6* 10 6*   HEMATOCRIT % 32 2* 31 4* 35 1* 34 3* 34 8*   PLATELETS Thousands/uL 448* 391* 475* 474* 516*   NEUTROS ABS Thousands/µL 4 53 4 83  --  6 64 5 50     Results from last 7 days   Lab Units 07/15/20  0400 07/14/20  0004 07/13/20  0435 07/12/20  0457 07/11/20  0506 07/10/20  0428   SODIUM mmol/L 140 142 140 141 143 143   POTASSIUM mmol/L 3 7 4 0 3 8 4 0 3 8 4 1   CHLORIDE mmol/L 109* 110* 108 109* 110* 109*   CO2 mmol/L 26 26 28 28 30 27   ANION GAP mmol/L 5 6 4 4 3* 7   BUN mg/dL 16 19 18 19 17 16   CREATININE mg/dL 0 78 0 89 0 85 0 82 0 78 0 68   EGFR ml/min/1 73sq m 92 87 89 90 92 97   CALCIUM mg/dL 8 6 8 5 9 2 9 1 9 4 8 1*   MAGNESIUM mg/dL 2 4  --   --  2 4 2 3 2 4   PHOSPHORUS mg/dL 3 6  --   --  4 9* 4 1 4 2*     Results from last 7 days   Lab Units 07/15/20  0400 07/12/20  0457 07/11/20  0506 07/10/20  0428   AST U/L 18 23 16 10   ALT U/L 40 31 27 17   ALK PHOS U/L 94 97 87 70   TOTAL PROTEIN g/dL 6 6 7 0 6 9 5 7*   ALBUMIN g/dL 2 3* 2 6* 2 5* 2 1*   TOTAL BILIRUBIN mg/dL 0 18* 0 20 0 26 0 17*     Results from last 7 days   Lab Units 07/15/20  0400 07/14/20  0004 07/13/20  0435 07/12/20  0457 07/11/20  0506 07/10/20  0428   GLUCOSE RANDOM mg/dL 117 103 112 107 88 87      Results from last 7 days   Lab Units 07/14/20  0017   PH ART  7 430   PCO2 ART mm Hg 33 0*   PO2 ART mm Hg 67 5*   HCO3 ART mmol/L 21 4*   BASE EXC ART mmol/L -2 3   O2 CONTENT ART mL/dL 14 0*   O2 HGB, ARTERIAL % 92 4*   ABG SOURCE  Radial, Left     Results from last 7 days   Lab Units 07/14/20  0004   LACTIC ACID mmol/L 1 1     Results from last 7 days   Lab Units 07/11/20  0939   CLARITY UA  Turbid   COLOR UA  Yellow   SPEC GRAV UA  1 019   PH UA  8 5*   GLUCOSE UA mg/dl Negative   KETONES UA mg/dl Negative   BLOOD UA  Negative   PROTEIN UA mg/dl 100 (2+)*   NITRITE UA  Positive*   BILIRUBIN UA  Negative   UROBILINOGEN UA E U /dl 0 2   LEUKOCYTES UA  Large*   WBC UA /hpf 10-20*   RBC UA /hpf 4-10*   BACTERIA UA /hpf Moderate*   EPITHELIAL CELLS WET PREP /hpf None Seen     Results from last 7 days   Lab Units 07/11/20  1054   BLOOD CULTURE  No Growth After 5 Days  No Growth After 5 Days       Vital Signs: /85   Pulse (!) 110   Temp 99 8 °F (37 7 °C) (Oral)   Resp 15   Ht 5' 10" (1 778 m)   Wt 84 7 kg (186 lb 11 7 oz)   SpO2 100%   BMI 26 79 kg/m²     Medications:   Scheduled Medications:  Medications:  [MAR Hold] chlorhexidine 15 mL Swish & Spit Q12H [address]   [MAR Hold] enoxaparin 1 mg/kg Subcutaneous Q12H [address]   [MAR Hold] levalbuterol 1 25 mg Nebulization TID   [MAR Hold] melatonin 6 mg Oral HS   [MAR Hold] metoprolol tartrate 12 5 mg Per NG Tube Q12H [address]   [MAR Hold] nystatin 500,000 Units Swish & Swallow 4x Daily   [MAR Hold] polyethylene glycol 17 g Oral Daily   [MAR Hold] QUEtiapine 75 mg Oral HS   [MAR Hold] senna-docusate sodium 1 tablet Per NG Tube BID   [MAR Hold] sodium chloride 3 mL Nebulization TID   Continuous IV Infusions:   PRN Meds:  [MAR Hold] acetaminophen 650 mg Rectal Q4H PRN   [MAR Hold] albuterol 2 5 mg Nebulization Q6H PRN   [MAR Hold] guaiFENesin 200 mg Oral Q4H PRN [MAR Hold] Lidocaine Viscous HCl 15 mL Swish & Spit 4x Daily PRN   [MAR Hold] OLANZapine 5 mg Intramuscular Q6H PRN   [MAR Hold] ondansetron 4 mg Intravenous Q6H PRN   [MAR Hold] oxyCODONE 2 5 mg Oral Q4H PRN     Discharge Plan: TBD    Network Utilization Review Department  [email or web address]  ATTENTION: Please call with any questions or concerns to [phone] and carefully listen to the prompts so that you are directed to the right person  All voicemails are confidential   Grady Arredondo all requests for admission clinical reviews, approved or denied determinations and any other requests to dedicated fax number below belonging to the campus where the patient is receiving treatment   List of dedicated fax numbers for the Facilities:  [address] DENIALS (Administrative/Medical Necessity) [phone]   1000  [facility] (Maternity/NICU/Pediatrics) [phone]   [facility] [phone]   [facility] [phone]   Lev Oliveros [phone]   Marie May [phone]   [address] [phone]   [facility]  [phone]   [address]7 [facility]  2401 [facility] [address] [facility] [phone]

## 2020-07-17 LAB
ANION GAP SERPL CALCULATED.3IONS-SCNC: 7 MMOL/L (ref 4–13)
BASOPHILS # BLD AUTO: 0.06 THOUSANDS/ΜL (ref 0–0.1)
BASOPHILS NFR BLD AUTO: 1 % (ref 0–1)
BUN SERPL-MCNC: 16 MG/DL (ref 5–25)
CALCIUM SERPL-MCNC: 9.1 MG/DL (ref 8.3–10.1)
CHLORIDE SERPL-SCNC: 106 MMOL/L (ref 100–108)
CO2 SERPL-SCNC: 25 MMOL/L (ref 21–32)
CREAT SERPL-MCNC: 0.82 MG/DL (ref 0.6–1.3)
EOSINOPHIL # BLD AUTO: 0.11 THOUSAND/ΜL (ref 0–0.61)
EOSINOPHIL NFR BLD AUTO: 1 % (ref 0–6)
ERYTHROCYTE [DISTWIDTH] IN BLOOD BY AUTOMATED COUNT: 14.6 % (ref 11.6–15.1)
GFR SERPL CREATININE-BSD FRML MDRD: 90 ML/MIN/1.73SQ M
GLUCOSE SERPL-MCNC: 100 MG/DL (ref 65–140)
GLUCOSE SERPL-MCNC: 119 MG/DL (ref 65–140)
GLUCOSE SERPL-MCNC: 119 MG/DL (ref 65–140)
GLUCOSE SERPL-MCNC: 121 MG/DL (ref 65–140)
GLUCOSE SERPL-MCNC: 122 MG/DL (ref 65–140)
HCT VFR BLD AUTO: 32.8 % (ref 36.5–49.3)
HGB BLD-MCNC: 10.3 G/DL (ref 12–17)
IMM GRANULOCYTES # BLD AUTO: 0.14 THOUSAND/UL (ref 0–0.2)
IMM GRANULOCYTES NFR BLD AUTO: 1 % (ref 0–2)
LYMPHOCYTES # BLD AUTO: 2.16 THOUSANDS/ΜL (ref 0.6–4.47)
LYMPHOCYTES NFR BLD AUTO: 16 % (ref 14–44)
MCH RBC QN AUTO: 30.4 PG (ref 26.8–34.3)
MCHC RBC AUTO-ENTMCNC: 31.4 G/DL (ref 31.4–37.4)
MCV RBC AUTO: 97 FL (ref 82–98)
MONOCYTES # BLD AUTO: 1.63 THOUSAND/ΜL (ref 0.17–1.22)
MONOCYTES NFR BLD AUTO: 12 % (ref 4–12)
NEUTROPHILS # BLD AUTO: 9.22 THOUSANDS/ΜL (ref 1.85–7.62)
NEUTS SEG NFR BLD AUTO: 69 % (ref 43–75)
NRBC BLD AUTO-RTO: 0 /100 WBCS
PLATELET # BLD AUTO: 353 THOUSANDS/UL (ref 149–390)
PMV BLD AUTO: 11.1 FL (ref 8.9–12.7)
POTASSIUM SERPL-SCNC: 4 MMOL/L (ref 3.5–5.3)
RBC # BLD AUTO: 3.39 MILLION/UL (ref 3.88–5.62)
SODIUM SERPL-SCNC: 138 MMOL/L (ref 136–145)
WBC # BLD AUTO: 13.32 THOUSAND/UL (ref 4.31–10.16)

## 2020-07-17 PROCEDURE — 94760 N-INVAS EAR/PLS OXIMETRY 1: CPT

## 2020-07-17 PROCEDURE — 99024 POSTOP FOLLOW-UP VISIT: CPT | Performed by: THORACIC SURGERY (CARDIOTHORACIC VASCULAR SURGERY)

## 2020-07-17 PROCEDURE — 94640 AIRWAY INHALATION TREATMENT: CPT

## 2020-07-17 PROCEDURE — 82948 REAGENT STRIP/BLOOD GLUCOSE: CPT

## 2020-07-17 PROCEDURE — 94664 DEMO&/EVAL PT USE INHALER: CPT

## 2020-07-17 PROCEDURE — 85025 COMPLETE CBC W/AUTO DIFF WBC: CPT | Performed by: SURGERY

## 2020-07-17 PROCEDURE — 99222 1ST HOSP IP/OBS MODERATE 55: CPT | Performed by: NURSE PRACTITIONER

## 2020-07-17 PROCEDURE — 80048 BASIC METABOLIC PNL TOTAL CA: CPT | Performed by: SURGERY

## 2020-07-17 RX ORDER — ACETAMINOPHEN 325 MG/1
975 TABLET ORAL EVERY 8 HOURS SCHEDULED
Status: DISCONTINUED | OUTPATIENT
Start: 2020-07-17 | End: 2020-07-18

## 2020-07-17 RX ADMIN — TRAZODONE HYDROCHLORIDE 150 MG: 100 TABLET ORAL at 22:09

## 2020-07-17 RX ADMIN — ENOXAPARIN SODIUM 90 MG: 100 INJECTION SUBCUTANEOUS at 08:23

## 2020-07-17 RX ADMIN — POLYETHYLENE GLYCOL 3350 17 G: 17 POWDER, FOR SOLUTION ORAL at 08:23

## 2020-07-17 RX ADMIN — ACETAMINOPHEN 975 MG: 325 TABLET, FILM COATED ORAL at 17:29

## 2020-07-17 RX ADMIN — LEVALBUTEROL HYDROCHLORIDE 1.25 MG: 1.25 SOLUTION, CONCENTRATE RESPIRATORY (INHALATION) at 13:13

## 2020-07-17 RX ADMIN — ISODIUM CHLORIDE 3 ML: 0.03 SOLUTION RESPIRATORY (INHALATION) at 13:13

## 2020-07-17 RX ADMIN — LEVALBUTEROL HYDROCHLORIDE 1.25 MG: 1.25 SOLUTION, CONCENTRATE RESPIRATORY (INHALATION) at 19:20

## 2020-07-17 RX ADMIN — ACETAMINOPHEN 975 MG: 325 TABLET, FILM COATED ORAL at 22:13

## 2020-07-17 RX ADMIN — ISODIUM CHLORIDE 3 ML: 0.03 SOLUTION RESPIRATORY (INHALATION) at 19:20

## 2020-07-17 RX ADMIN — CHLORHEXIDINE GLUCONATE 0.12% ORAL RINSE 15 ML: 1.2 LIQUID ORAL at 08:22

## 2020-07-17 RX ADMIN — ENOXAPARIN SODIUM 90 MG: 100 INJECTION SUBCUTANEOUS at 22:00

## 2020-07-17 RX ADMIN — CHLORHEXIDINE GLUCONATE 0.12% ORAL RINSE 15 ML: 1.2 LIQUID ORAL at 22:00

## 2020-07-17 RX ADMIN — METOPROLOL TARTRATE 12.5 MG: 25 TABLET ORAL at 08:23

## 2020-07-17 RX ADMIN — NYSTATIN 500000 UNITS: 100000 SUSPENSION ORAL at 22:09

## 2020-07-17 RX ADMIN — SENNOSIDES AND DOCUSATE SODIUM 1 TABLET: 8.6; 5 TABLET ORAL at 08:22

## 2020-07-17 RX ADMIN — LEVALBUTEROL HYDROCHLORIDE 1.25 MG: 1.25 SOLUTION, CONCENTRATE RESPIRATORY (INHALATION) at 07:31

## 2020-07-17 RX ADMIN — SENNOSIDES AND DOCUSATE SODIUM 1 TABLET: 8.6; 5 TABLET ORAL at 17:29

## 2020-07-17 RX ADMIN — ISODIUM CHLORIDE 3 ML: 0.03 SOLUTION RESPIRATORY (INHALATION) at 07:31

## 2020-07-17 RX ADMIN — METOPROLOL TARTRATE 12.5 MG: 25 TABLET ORAL at 22:00

## 2020-07-17 RX ADMIN — MELATONIN 6 MG: at 22:00

## 2020-07-17 NOTE — CONSULTS
Consultation - Geriatrics   Joaquin Claros 71 y o  male MRN: [id number]  Unit/Bed#: [facility] 405-01 Encounter: [id number]      Assessment/Plan  1  Encephalopathy  Alert and oriented to name only  Alert and oriented x 3 at baseline  Encourage daytime activity to help with circadian rhythm  Continue melatonin  Pt with trazadone 150 mg po QHS increased, previously trialled on seroquel, zyprexa, neurotin  Daughter requesting previous medications be discontinued, she states she would prefer pt is not on any medications  Per daughter he was also on ritalin now off  Treat underlying pain   Provide frequent redirection, reorientation, distraction techniques  Avoid deliriogenic medications such as tramadol, benzodiazepines, anticholinergics,  Benadryl  Monitor for constipation and urinary retention  Encourage early and frequent moblization, OOB  Encourage Hydration/ Nutrition    2  Acute pain due to surgery  Scheduled acetaminophen   Consider lidoderm patch to chest     3  Insomnia  Pt normally awakens 2-4 am   Continue melatonin and trazadone  Spoke with daughter she states she does not want her father on medications at all, agreeable to current regime  Recommend daytime cognitive stimulation, pt likes to watch TV, [employer] channel    4  Cognitive impairment  Per daughter some mild baseline  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  Recommend check TSH and vitamin B12 as outpatient    5  Hearing loss  Pt had previous audiology eval  Refused hearing aids  Speak loudly and clearly   Enunciate words    6  Goals of care  Per daughter she does not want her dad to have PEG tube  Goal is for pt to be more awake to participate with speech, PT/OT    7  Home medications  Amlodipine   Per daughter pt stopped taking omeprazole    8   S/p thoracoscopic left upper lobectomy complicated by prolonged air leak, cardiac arrest, s/p cricothyroidotomy, tracheostomy revision and trach exchange  Thoracic surgery following      History of Present Illness Physician Requesting Consult: Fran Da Silva MD  Reason for Consult / Principal Problem: polypharmacy, delirium precautions  Hx and PE limited by: delirium   HPI: Syl Lagos is a 71y o  year old male who presents with left upper lobe adenocarcinoma  He is admitted for cervial mediastinoscopy left VATS and possible open upper lobectemy  Pt has history of smoking  He quit prior to surgery  He underwent surgery on 3/18/5432 complicated by with cardiac arrest on 6/18/2020 requiring cricothyrotomy resulting in tracheostomy  He has had periods of agitation waxing and waning hypoactive/ hyperactive delirium  He has HTN Hx of drinking 1 bottle of whiskey per day  He lives with his daughter, in a separate in house apartment  He recently retired  He still drives  He is independent with IADLs and ADLs  He ambulates independently  He has no history of falls  He wears glasses for reading  He is hard of hearing  No issues with dentition or weight loss  He normally wakes up at 2-4 am  No issues with bowel or bladder  He likes to watch TV, scifie channel and sit coms He would binge drink on the weekends  Daughter reports occasional memory issues  For example "he would walk away from the stove"    Upon exam pt is lying in bed, awake  Oriented to name only  Able to follow commands  1:1 at bedside       Spoke with daughter to obtain HPI      Inpatient consult to Gerontology  Consult performed by: KHANG Gutierrez  Consult ordered by: Maris Montoya MD          Review of Systems   Unable to perform ROS: Mental status change       Historical Information   Past Medical History:   Diagnosis Date    Alcohol abuse 3/27/2020    Chest pain     Community acquired pneumonia 3/27/2020    Hypertension     Left upper lobe pulmonary nodule     Malignant neoplasm of upper lobe of left lung ([facility] [address] ) 5/7/2020     Past Surgical History:   Procedure Laterality Date    COLON SURGERY      CT NEEDLE BIOPSY LUNG 4/29/2020    WV BRONCHOSCOPY,DIAGNOSTIC N/A 6/10/2020    Procedure: BRONCHOSCOPY FLEXIBLE;  Surgeon: Daxa Rai MD;  [facility]: [facility];  Service: Thoracic    WV MEDIASTINOSCOPY WITH LYMPH NODE BIOPSY/IES N/A 6/10/2020    Procedure: MEDIASTINOSCOPY;  Surgeon: Daxa Rai MD;  [facility];  Service: Thoracic    WV THORACOSCOPY SURG LOBECTOMY Left 6/10/2020    Procedure: THORACOSCOPY VIDEO ASSISTED SURGERY (VATS);   Surgeon: Daxa Rai MD;  [facility];  Service: Thoracic    WV THORACOSCOPY SURG LOBECTOMY Left 6/10/2020    Procedure: UPPER LOBECTOMY OF LUNG; MEDIASTINAL  LYMPH NODE DISSECTION;  Surgeon: Daxa Rai MD;  [facility];  Service: Thoracic    TRACHEOSTOMY N/A 6/22/2020    Procedure: TRACHEOSTOMY REVISION, BRONCHOSCOPY;  Surgeon: Daxa Rai MD;  [facility];  Service: Thoracic    WRIST SURGERY Right     orif     Social History   Social History     Substance and Sexual Activity   Alcohol Use Yes    Frequency: 4 or more times a week    Comment: "we cant tell how much"  per pt daughter      Social History     Substance and Sexual Activity   Drug Use Never     Social History     Tobacco Use   Smoking Status Former Smoker    Packs/day: 1 00    Years: 55 00    Pack years: 55 00    Types: Cigarettes   Smokeless Tobacco Never Used         Family History:   Family History   Problem Relation Age of Onset    Ovarian cancer Mother     Liver cancer Father        Meds/Allergies   Current meds:   Current Facility-Administered Medications   Medication Dose Route Frequency    acetaminophen (TYLENOL) rectal suppository 650 mg  650 mg Rectal Q4H PRN    albuterol inhalation solution 2 5 mg  2 5 mg Nebulization Q6H PRN    chlorhexidine (PERIDEX) 0 12 % oral rinse 15 mL  15 mL Swish & Spit Q12H [facility]    enoxaparin (LOVENOX) subcutaneous injection 90 mg  1 mg/kg Subcutaneous Q12H [facility]    guaiFENesin (ROBITUSSIN) oral solution 200 mg  200 mg Oral Q4H PRN    levalbuterol (XOPENEX) inhalation solution 1 25 mg  1 25 mg Nebulization TID    Lidocaine Viscous HCl (XYLOCAINE) 2 % mucosal solution 15 mL  15 mL Swish & Spit 4x Daily PRN    melatonin tablet 6 mg  6 mg Oral HS    metoprolol tartrate (LOPRESSOR) partial tablet 12 5 mg  12 5 mg Per NG Tube Q12H [facility]    nystatin (MYCOSTATIN) oral suspension 500,000 Units  500,000 Units Swish & Swallow 4x Daily    OLANZapine (ZyPREXA) IM injection 5 mg  5 mg Intramuscular Q6H PRN    ondansetron (ZOFRAN) injection 4 mg  4 mg Intravenous Q6H PRN    oxyCODONE (ROXICODONE) oral solution 2 5 mg  2 5 mg Oral Q4H PRN    polyethylene glycol (MIRALAX) packet 17 g  17 g Oral Daily    senna-docusate sodium (SENOKOT S) 8 6-50 mg per tablet 1 tablet  1 tablet Per NG Tube BID    sodium chloride 0 9 % inhalation solution 3 mL  3 mL Nebulization TID    traZODone (DESYREL) tablet 150 mg  150 mg Oral HS      Current PTA meds:  Medications Prior to Admission   Medication    amLODIPine (NORVASC) 5 mg tablet    calcium carbonate (TUMS) 500 mg chewable tablet    pantoprazole (PROTONIX) 20 mg tablet        No Known Allergies    Objective   Vitals: Blood pressure 109/65, pulse (!) 106, temperature 98 °F (36 7 °C), temperature source Oral, resp  rate 18, height 5' 10" (1 778 m), weight 81 6 kg (179 lb 14 3 oz), SpO2 100 %  ,Body mass index is 25 81 kg/m²  Physical Exam   Constitutional: He appears well-developed and well-nourished  No distress  HENT:   Head: Normocephalic  Eyes: Right eye exhibits no discharge  Left eye exhibits no discharge  Neck: Normal range of motion  Cardiovascular: Normal rate and regular rhythm  No murmur heard  Pulmonary/Chest: Effort normal and breath sounds normal  No stridor  No respiratory distress  decreased   Abdominal: Soft  Bowel sounds are normal  He exhibits no distension  There is no tenderness  There is no guarding  Musculoskeletal: Normal range of motion   He exhibits no edema or deformity  Neurological: He is alert  Oriented to name only   Skin: Skin is warm and dry  He is not diaphoretic  Psychiatric:   Cooperative, follows commands   Nursing note and vitals reviewed  Lab Results:   Results from last 7 days   Lab Units 07/17/20  0440   WBC Thousand/uL 13 32*   HEMOGLOBIN g/dL 10 3*   HEMATOCRIT % 32 8*   PLATELETS Thousands/uL 353        Results from last 7 days   Lab Units 07/17/20  0439 07/15/20  0400   POTASSIUM mmol/L 4 0 3 7   CHLORIDE mmol/L 106 109*   CO2 mmol/L 25 26   BUN mg/dL 16 16   CREATININE mg/dL 0 82 0 78   CALCIUM mg/dL 9 1 8 6   ALK PHOS U/L  --  94   ALT U/L  --  40   AST U/L  --  18       Imaging Studies: I have personally reviewed pertinent reports  EKG, Pathology, and Other Studies: I have personally reviewed pertinent reports      VTE Prophylaxis: Sequential compression device (Venodyne)     Code Status: Level 2 - DNAR: but accepts endotracheal intubation

## 2020-07-17 NOTE — QUICK NOTE
Primary team contacted [facility] to discuss insomnia  BZDs, quetiapine, zolpidem are all problematic in this patient, especially as a significant part of the plan was to wean off sedating medications  This has been challenging  [facility] agrees w/ melatonin; continue  There is a room to increase trazodone     Recommended trying 150mg tonight, and 200mg tomorrow night if 150mg ineffective  A Geriatrics consult had been discussed in 06/2020 (earlier in this admission) for polypharmacy, circadian regulation  They may be able to provide additional insight!     Dorie Goins MD  [address] and Support[facility]

## 2020-07-17 NOTE — PROGRESS NOTES
Progress Note - Paula Winslow 71 y o  male MRN: [id number]    Unit/Bed#: [facility] 405-01 Encounter: [id number]      Assessment:  69 M status post thoracoscopic left upper lobectomy complicated by prolonged air leak, cardiac arrest s/p cricothyroidotomy, tracheostomy revision, and trach exchange to 6 cuffless    Vital signs stable  Afebrile  Saturating 90 % on 5 L trach collar at 20% FiO2  Slept very little  Remained normotensive overnight  With leukocytosis this morning WBC 13  Creatinine 0 82  Plan:  Continue TF at goal (54)  Work with passy-aniya valve daily  Work with speech therapy daily  Aggressive physical therapy and pulmonary toilet  Anticipate repeat VBS early next week  Frequent orientation, awake in day  Subjective: Following commands voicing name this AM  Denied any pain  Per nursing pt slept very little overnight  Objective:     Vitals: Blood pressure 98/59, pulse 105, temperature 98 6 °F (37 °C), temperature source Oral, resp  rate 19, height 5' 10" (1 778 m), weight 81 6 kg (179 lb 14 3 oz), SpO2 95 %  ,Body mass index is 25 81 kg/m²  Intake/Output Summary (Last 24 hours) at 7/17/2020 0616  Last data filed at 7/17/2020 0557  Gross per 24 hour   Intake 1717 ml   Output 1200 ml   Net 517 ml       Physical Exam  General: NAD  HEENT: NC/AT  MMM  Trach midline  Cv: RRR     Lungs: normal effort  Ab: Soft, NT/ND  Ex: no CCE  Neuro: AAOx3    Scheduled Meds:  Current Facility-Administered Medications:  acetaminophen 650 mg Rectal Q4H PRN Larry Maher MD   albuterol 2 5 mg Nebulization Q6H PRN Larry Maher MD   chlorhexidine 15 mL Swish & Spit Q12H [address] Larry Maher MD   enoxaparin 1 mg/kg Subcutaneous Q12H [address] Larry Maher MD   guaiFENesin 200 mg Oral Q4H PRN Larry Maher MD   levalbuterol 1 25 mg Nebulization TID Larry Maher MD   Lidocaine Viscous HCl 15 mL Swish & Spit 4x Daily PRN Larry Maher MD   melatonin 6 mg Oral HS Larry Maher MD   metoprolol tartrate 12 5 mg Per NG Tube Q12H [address] Lucrecia Muir MD   nystatin 500,000 Units Swish & Swallow 4x Daily Lucrecia Muir MD   OLANZapine 5 mg Intramuscular Q6H PRN Lucrecia Muir MD   ondansetron 4 mg Intravenous Q6H PRN Lucrecia Muir MD   oxyCODONE 2 5 mg Oral Q4H PRN Lucrecia Muir MD   polyethylene glycol 17 g Oral Daily Lucrecia Muir MD   senna-docusate sodium 1 tablet Per NG Tube BID Lucrecia Muir MD   sodium chloride 3 mL Nebulization TID Lucrecia Muir MD   traZODone 100 mg Oral HS Lucrecia Muir MD     Continuous Infusions:   PRN Meds:   acetaminophen    albuterol    guaiFENesin    Lidocaine Viscous HCl    OLANZapine    ondansetron    oxyCODONE      Invasive Devices     Peripherally Inserted Central Catheter Line            PICC Line 45/56/39 Left Basilic 20 days          Drain            NG/OG/Enteral Tube Nasogastric Right nares 18 days    External Urinary Catheter Medium 2 days          Airway            Surgical Airway 3 days                Lab, Imaging and other studies: I have personally reviewed pertinent reports      VTE Pharmacologic Prophylaxis: Enoxaparin (Lovenox)  VTE Mechanical Prophylaxis: sequential compression device

## 2020-07-18 ENCOUNTER — APPOINTMENT (INPATIENT)
Dept: RADIOLOGY | Facility: HOSPITAL | Age: 70
DRG: 003 | End: 2020-07-18
Payer: COMMERCIAL

## 2020-07-18 LAB
ANION GAP SERPL CALCULATED.3IONS-SCNC: 8 MMOL/L (ref 4–13)
BASOPHILS # BLD AUTO: 0.05 THOUSANDS/ΜL (ref 0–0.1)
BASOPHILS NFR BLD AUTO: 0 % (ref 0–1)
BUN SERPL-MCNC: 18 MG/DL (ref 5–25)
CALCIUM SERPL-MCNC: 9.2 MG/DL (ref 8.3–10.1)
CHLORIDE SERPL-SCNC: 105 MMOL/L (ref 100–108)
CO2 SERPL-SCNC: 25 MMOL/L (ref 21–32)
CREAT SERPL-MCNC: 0.9 MG/DL (ref 0.6–1.3)
EOSINOPHIL # BLD AUTO: 0.04 THOUSAND/ΜL (ref 0–0.61)
EOSINOPHIL NFR BLD AUTO: 0 % (ref 0–6)
ERYTHROCYTE [DISTWIDTH] IN BLOOD BY AUTOMATED COUNT: 14.4 % (ref 11.6–15.1)
GFR SERPL CREATININE-BSD FRML MDRD: 87 ML/MIN/1.73SQ M
GLUCOSE SERPL-MCNC: 106 MG/DL (ref 65–140)
GLUCOSE SERPL-MCNC: 108 MG/DL (ref 65–140)
GLUCOSE SERPL-MCNC: 128 MG/DL (ref 65–140)
GLUCOSE SERPL-MCNC: 141 MG/DL (ref 65–140)
GLUCOSE SERPL-MCNC: 99 MG/DL (ref 65–140)
HCT VFR BLD AUTO: 32.6 % (ref 36.5–49.3)
HGB BLD-MCNC: 10.1 G/DL (ref 12–17)
IMM GRANULOCYTES # BLD AUTO: 0.08 THOUSAND/UL (ref 0–0.2)
IMM GRANULOCYTES NFR BLD AUTO: 1 % (ref 0–2)
LYMPHOCYTES # BLD AUTO: 1.84 THOUSANDS/ΜL (ref 0.6–4.47)
LYMPHOCYTES NFR BLD AUTO: 11 % (ref 14–44)
MCH RBC QN AUTO: 30.3 PG (ref 26.8–34.3)
MCHC RBC AUTO-ENTMCNC: 31 G/DL (ref 31.4–37.4)
MCV RBC AUTO: 98 FL (ref 82–98)
MONOCYTES # BLD AUTO: 1.26 THOUSAND/ΜL (ref 0.17–1.22)
MONOCYTES NFR BLD AUTO: 7 % (ref 4–12)
NEUTROPHILS # BLD AUTO: 14.11 THOUSANDS/ΜL (ref 1.85–7.62)
NEUTS SEG NFR BLD AUTO: 81 % (ref 43–75)
NRBC BLD AUTO-RTO: 0 /100 WBCS
PLATELET # BLD AUTO: 328 THOUSANDS/UL (ref 149–390)
PMV BLD AUTO: 11.2 FL (ref 8.9–12.7)
POTASSIUM SERPL-SCNC: 4.1 MMOL/L (ref 3.5–5.3)
RBC # BLD AUTO: 3.33 MILLION/UL (ref 3.88–5.62)
SODIUM SERPL-SCNC: 138 MMOL/L (ref 136–145)
WBC # BLD AUTO: 17.38 THOUSAND/UL (ref 4.31–10.16)

## 2020-07-18 PROCEDURE — 94664 DEMO&/EVAL PT USE INHALER: CPT

## 2020-07-18 PROCEDURE — 85025 COMPLETE CBC W/AUTO DIFF WBC: CPT | Performed by: SURGERY

## 2020-07-18 PROCEDURE — 97530 THERAPEUTIC ACTIVITIES: CPT

## 2020-07-18 PROCEDURE — 71045 X-RAY EXAM CHEST 1 VIEW: CPT

## 2020-07-18 PROCEDURE — 87040 BLOOD CULTURE FOR BACTERIA: CPT | Performed by: SURGERY

## 2020-07-18 PROCEDURE — 80048 BASIC METABOLIC PNL TOTAL CA: CPT | Performed by: SURGERY

## 2020-07-18 PROCEDURE — 99024 POSTOP FOLLOW-UP VISIT: CPT | Performed by: THORACIC SURGERY (CARDIOTHORACIC VASCULAR SURGERY)

## 2020-07-18 PROCEDURE — 97110 THERAPEUTIC EXERCISES: CPT

## 2020-07-18 PROCEDURE — 94760 N-INVAS EAR/PLS OXIMETRY 1: CPT

## 2020-07-18 PROCEDURE — 92526 ORAL FUNCTION THERAPY: CPT

## 2020-07-18 PROCEDURE — 94640 AIRWAY INHALATION TREATMENT: CPT

## 2020-07-18 PROCEDURE — 82948 REAGENT STRIP/BLOOD GLUCOSE: CPT

## 2020-07-18 RX ORDER — ACETAMINOPHEN 160 MG/5ML
975 SUSPENSION, ORAL (FINAL DOSE FORM) ORAL EVERY 8 HOURS SCHEDULED
Status: DISCONTINUED | OUTPATIENT
Start: 2020-07-18 | End: 2020-07-26

## 2020-07-18 RX ADMIN — ISODIUM CHLORIDE 3 ML: 0.03 SOLUTION RESPIRATORY (INHALATION) at 07:34

## 2020-07-18 RX ADMIN — ENOXAPARIN SODIUM 90 MG: 100 INJECTION SUBCUTANEOUS at 22:16

## 2020-07-18 RX ADMIN — ACETAMINOPHEN 975 MG: 650 SUSPENSION ORAL at 17:00

## 2020-07-18 RX ADMIN — SENNOSIDES AND DOCUSATE SODIUM 1 TABLET: 8.6; 5 TABLET ORAL at 17:07

## 2020-07-18 RX ADMIN — ACETAMINOPHEN 975 MG: 650 SUSPENSION ORAL at 22:13

## 2020-07-18 RX ADMIN — ENOXAPARIN SODIUM 90 MG: 100 INJECTION SUBCUTANEOUS at 09:02

## 2020-07-18 RX ADMIN — ISODIUM CHLORIDE 3 ML: 0.03 SOLUTION RESPIRATORY (INHALATION) at 13:47

## 2020-07-18 RX ADMIN — LEVALBUTEROL HYDROCHLORIDE 1.25 MG: 1.25 SOLUTION, CONCENTRATE RESPIRATORY (INHALATION) at 13:47

## 2020-07-18 RX ADMIN — NYSTATIN 500000 UNITS: 100000 SUSPENSION ORAL at 17:07

## 2020-07-18 RX ADMIN — ACETAMINOPHEN 975 MG: 325 TABLET, FILM COATED ORAL at 05:29

## 2020-07-18 RX ADMIN — PIPERACILLIN AND TAZOBACTAM 3.38 G: 3; .375 INJECTION, POWDER, FOR SOLUTION INTRAVENOUS at 17:04

## 2020-07-18 RX ADMIN — NYSTATIN 500000 UNITS: 100000 SUSPENSION ORAL at 22:39

## 2020-07-18 RX ADMIN — OLANZAPINE 5 MG: 10 INJECTION, POWDER, FOR SOLUTION INTRAMUSCULAR at 19:13

## 2020-07-18 RX ADMIN — CHLORHEXIDINE GLUCONATE 0.12% ORAL RINSE 15 ML: 1.2 LIQUID ORAL at 08:56

## 2020-07-18 RX ADMIN — POLYETHYLENE GLYCOL 3350 17 G: 17 POWDER, FOR SOLUTION ORAL at 08:56

## 2020-07-18 RX ADMIN — PIPERACILLIN AND TAZOBACTAM 3.38 G: 3; .375 INJECTION, POWDER, FOR SOLUTION INTRAVENOUS at 22:44

## 2020-07-18 RX ADMIN — NYSTATIN 500000 UNITS: 100000 SUSPENSION ORAL at 08:56

## 2020-07-18 RX ADMIN — ISODIUM CHLORIDE 3 ML: 0.03 SOLUTION RESPIRATORY (INHALATION) at 20:22

## 2020-07-18 RX ADMIN — MELATONIN 6 MG: at 22:09

## 2020-07-18 RX ADMIN — SENNOSIDES AND DOCUSATE SODIUM 1 TABLET: 8.6; 5 TABLET ORAL at 08:56

## 2020-07-18 RX ADMIN — LEVALBUTEROL HYDROCHLORIDE 1.25 MG: 1.25 SOLUTION, CONCENTRATE RESPIRATORY (INHALATION) at 07:34

## 2020-07-18 RX ADMIN — LEVALBUTEROL HYDROCHLORIDE 1.25 MG: 1.25 SOLUTION, CONCENTRATE RESPIRATORY (INHALATION) at 20:22

## 2020-07-18 RX ADMIN — METOPROLOL TARTRATE 12.5 MG: 25 TABLET ORAL at 22:11

## 2020-07-18 RX ADMIN — CHLORHEXIDINE GLUCONATE 0.12% ORAL RINSE 15 ML: 1.2 LIQUID ORAL at 22:39

## 2020-07-18 RX ADMIN — TRAZODONE HYDROCHLORIDE 150 MG: 100 TABLET ORAL at 22:09

## 2020-07-18 NOTE — PROGRESS NOTES
Progress Note - Benoit Law 71 y o  male MRN: [id number]    Unit/Bed#: [facility] 405-01 Encounter: [id number]      Assessment:  69 M status post thoracoscopic left upper lobectomy complicated by prolonged air leak, cardiac arrest s/p cricothyroidotomy, tracheostomy revision, and trach exchange to 6 cuffless      Plan:  Continue TF at goal (54)  Work with passy-aniya valve daily  Work with speech therapy daily  Aggressive physical therapy and pulmonary toilet  Anticipate repeat VBS early next week  Frequent orientation, awake in day  Subjective:   NATALYA  Suctioned multiple times overnight  Still requiring 1:1, continues to grab at tubes  Objective:     Vitals: Blood pressure 94/58, pulse (!) 109, temperature 97 9 °F (36 6 °C), temperature source Oral, resp  rate 20, height 5' 10" (1 778 m), weight 81 6 kg (179 lb 14 3 oz), SpO2 97 %  ,Body mass index is 25 81 kg/m²  Intake/Output Summary (Last 24 hours) at 7/18/2020 0509  Last data filed at 7/17/2020 2253  Gross per 24 hour   Intake 1060 ml   Output 725 ml   Net 335 ml       Physical Exam  General: NAD  HEENT: NC/AT  MMM  Trach midline  Cv: RRR     Lungs: normal effort  Ab: Soft, NT/ND  Ex: no CCE  Neuro: lethargic, obtunded at baseline    Scheduled Meds:    Current Facility-Administered Medications:  acetaminophen 650 mg Rectal Q4H PRN Marlen Greenfield MD   acetaminophen 975 mg Per NG Tube [facility] [address] Tuesday KHANG Esposito   albuterol 2 5 mg Nebulization Q6H PRN Marlen Greenfield MD   chlorhexidine 15 mL Swish & Spit Q12H [address] Marlen Greenfield MD   enoxaparin 1 mg/kg Subcutaneous Q12H [address] Marlen Greenfield MD   guaiFENesin 200 mg Oral Q4H PRN Marlen Greenfield MD   levalbuterol 1 25 mg Nebulization TID Marlen Greenfield MD   Lidocaine Viscous HCl 15 mL Swish & Spit 4x Daily PRN Marlen Greenfield MD   melatonin 6 mg Oral HS Marlen Greenfield MD   metoprolol tartrate 12 5 mg Per NG Tube Q12H [address] Marlen Greenfield MD   nystatin 500,000 Units Swish & Swallow 4x Daily Cristiane Gastelum, MD   OLANZapine 5 mg Intramuscular Q6H PRN Cristiane Gastelum, MD   ondansetron 4 mg Intravenous Q6H PRN Cristiane Gastelum MD   oxyCODONE 2 5 mg Oral Q4H PRN Cristiane Gastelum, MD   polyethylene glycol 17 g Oral Daily Cristiane Gastelum, MD   senna-docusate sodium 1 tablet Per NG Tube BID Cristiane Gastelum MD   sodium chloride 3 mL Nebulization TID Cristiane Gastelum MD   traZODone 150 mg Oral HS Cristiane Gastelum, MD     Continuous Infusions:   PRN Meds:   acetaminophen    albuterol    guaiFENesin    Lidocaine Viscous HCl    OLANZapine    ondansetron    oxyCODONE      Invasive Devices     Peripherally Inserted Central Catheter Line            PICC Line 84/64/46 Left Basilic 21 days          Drain            NG/OG/Enteral Tube Nasogastric Right nares 18 days    External Urinary Catheter Medium 3 days          Airway            Surgical Airway 4 days                Lab, Imaging and other studies: I have personally reviewed pertinent reports      VTE Pharmacologic Prophylaxis: Enoxaparin (Lovenox)  VTE Mechanical Prophylaxis: sequential compression device

## 2020-07-18 NOTE — PLAN OF CARE
Problem: PHYSICAL THERAPY ADULT  Goal: Performs mobility at highest level of function for planned discharge setting  See evaluation for individualized goals  Description  Treatment/Interventions: Functional transfer training, LE strengthening/ROM, Elevations, Therapeutic exercise, Endurance training, Equipment eval/education, Bed mobility, Gait training, Spoke to MD, Spoke to nursing  Equipment Recommended: Walker(at this time; r/o [facility])       See flowsheet documentation for full assessment, interventions and recommendations  Outcome: Progressing  Note:   Prognosis: Fair  Problem List: Decreased strength, Decreased range of motion, Decreased endurance, Impaired balance, Decreased mobility, Decreased coordination, Decreased cognition, Decreased safety awareness, Pain  Assessment: Pt was found supine in bed to begin session both alert but confused  He was able to perform all bed mobility and xfers this session with Ax2 needed  He was able to sit at the EOB unassisted this session and was able to perform all seated TE as charted  He required both verbal and tactile cuing  Pt was also able to perform 2 sit to stand xfers today with a posterior trunk lean  He was able to stand for about 1 minute each time until he sat himself down  Pt was confused when standing and would lift the walker in the air as well as steer it into the therapist like he wanted to ambulate  Did not ambulate today due to the confusion and unsteadiness when standing  Pt was repositioned supine in bed with all needs met and call bell in reach  Pt would benefit form continued PT in order to promote safe and functional mobility  Barriers to Discharge: Decreased caregiver support, Inaccessible home environment     PT Discharge Recommendation: [address]     PT - OK to Discharge: Yes(when medically cleared)    See flowsheet documentation for full assessment
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
## 2020-07-18 NOTE — SPEECH THERAPY NOTE
Speech Language/Pathology    Speech/Language Pathology Progress Note    Patient Name: Lux Womack  Today's Date: 7/18/2020     Problem List  Principal Problem:    Malignant neoplasm of upper lobe of left lung [facility])  Active Problems:    Acute respiratory failure with hypoxia ([facility] [address] )    Acute deep vein thrombosis (DVT) of axillary vein of right upper extremity (HCC)    Acute deep vein thrombosis (DVT) of brachial vein of right upper extremity ([facility] [address] )    Tracheostomy in place [facility])    Acute deep vein thrombosis (DVT) of calf muscle vein of left lower extremity (HCC)    Encephalopathy    Hyperactive delirium after surgical procedure    Anemia    Thrombocytosis ([facility] [address] )       Past Medical History  Past Medical History:   Diagnosis Date    Alcohol abuse 3/27/2020    Chest pain     Community acquired pneumonia 3/27/2020    Hypertension     Left upper lobe pulmonary nodule     Malignant neoplasm of upper lobe of left lung ([facility] [address] ) 5/7/2020        Past Surgical History  Past Surgical History:   Procedure Laterality Date    COLON SURGERY      CT NEEDLE BIOPSY LUNG  4/29/2020    [place] BRONCHOSCOPY,DIAGNOSTIC N/A 6/10/2020    Procedure: BRONCHOSCOPY FLEXIBLE;  Surgeon: Teo Craig MD;  [facility];  Service: Thoracic    [place] MEDIASTINOSCOPY WITH LYMPH NODE BIOPSY/IES N/A 6/10/2020    Procedure: MEDIASTINOSCOPY;  Surgeon: Teo Craig MD;  [facility];  Service: Thoracic    [place] THORACOSCOPY SURG LOBECTOMY Left 6/10/2020    Procedure: THORACOSCOPY VIDEO ASSISTED SURGERY (VATS);   Surgeon: Teo Craig MD;  [facility];  Service: Thoracic    [place] THORACOSCOPY SURG LOBECTOMY Left 6/10/2020    Procedure: UPPER LOBECTOMY OF LUNG; MEDIASTINAL  LYMPH NODE DISSECTION;  Surgeon: Teo Craig MD;  [facility];  Service: Thoracic    TRACHEOSTOMY N/A 6/22/2020    Procedure: TRACHEOSTOMY REVISION, BRONCHOSCOPY;  Surgeon: Teo Craig MD;  [facility]; Service: Thoracic    WRIST SURGERY Right     orif         Subjective:  Patient seen sitting upright in chair  Patient was alert but restless, irritable and verbally confused  Objective:  Medical chart reviewed for updates  Noted new order from physician to wear PMSV throughout the day except for suctioning  Patient has #6 Shiley cuffless tracheostomy  Patient's vocal quality is hoarse/harsh and pitch is low  Speech intelligibility is poor  Patient frequently attempted to full at NGT and attempted to stand up on several occasions  Patient reaching for SLPs and 1:1 aide's hands-appeared restless behavior  Patient grabbed at [facility]  Patient with 1:1 aide present throughout session  Trialed trace amounts (less than 1/16th of teaspoon) of thermal gustatory stimulus X10  At best, patient initiated delayed a-p transfer and delayed swallow response upon palpation of neck area  +audible swallow  Patient with congested sounding cough X3  Patient with poor attention and not always initiating consistent swallow response  Assessment:  Patient presents with suspected severe oropharyngeal dysphagia including concern impaired airway proterction    Plan/Recommendations:  Continue wearing PMV trials during the day  Trach size still ok for now  Trial po with SLP supervision only  VBS next week pending patient's ability to participate  Oral care frequently  Will f/u daily

## 2020-07-18 NOTE — PHYSICAL THERAPY NOTE
Physical Therapy Progress Note     20 0355   Pain Assessment   Pain Assessment Tool 0-10   Pain Score No Pain   Restrictions/Precautions   Weight Bearing Precautions Per Order No   Other Precautions Cognitive;1:1;Impulsive; Fall Risk;Multiple lines;Telemetry; Visual impairment  (trach collar, NG tube)   General   Chart Reviewed Yes   Family/Caregiver Present No   Cognition   Overall Cognitive Status Impaired   Arousal/Participation Responsive   Attention Attends with cues to redirect   Orientation Level Oriented to person   Memory Decreased recall of precautions;Decreased recall of recent events;Decreased short term memory;Decreased long term memory   Following Commands Follows one step commands with increased time or repetition   Comments Pt was identified by name and , agreeable to treatment  Bed Mobility   Rolling L 3  Moderate assistance   Additional items Assist x 1; Increased time required;Verbal cues   Supine to Sit 3  Moderate assistance   Additional items Assist x 1;HOB elevated; Increased time required;Verbal cues;LE management; Bedrails   Sit to Supine 3  Moderate assistance   Additional items Assist x 1;HOB elevated; Increased time required;Verbal cues;LE management; Bedrails   Transfers   Sit to Stand 3  Moderate assistance   Additional items Assist x 2; Increased time required;Verbal cues   Stand to Sit 4  Minimal assistance   Additional items Assist x 2; Increased time required;Verbal cues   Balance   Static Sitting Poor +   Dynamic Sitting Poor   Static Standing Poor -   Dynamic Standing Fair -   Ambulatory Fair -   Endurance Deficit   Endurance Deficit Yes   Activity Tolerance   Activity Tolerance Patient limited by fatigue;Patient limited by pain;Treatment limited secondary to medical complications (Comment); Other (Comment)  (cog impairment)   Nurse Made Aware yes, ok to see   Exercises   Knee AROM Long Arc Quad Sitting;10 reps;AROM; Bilateral   Ankle Pumps Sitting;20 reps;AROM; Bilateral Marching Sitting;10 reps;AROM; Bilateral   Assessment   Prognosis Fair   Problem List Decreased strength;Decreased range of motion;Decreased endurance; Impaired balance;Decreased mobility; Decreased coordination;Decreased cognition;Decreased safety awareness;Pain   Assessment Pt was found supine in bed to begin session both alert but confused  He was able to perform all bed mobility and xfers this session with Ax2 needed  He was able to sit at the EOB unassisted this session and was able to perform all seated TE as charted  He required both verbal and tactile cuing  Pt was also able to perform 2 sit to stand xfers today with a posterior trunk lean  He was able to stand for about 1 minute each time until he sat himself down  Pt was confused when standing and would lift the walker in the air as well as steer it into the therapist like he wanted to ambulate  Did not ambulate today due to the confusion and unsteadiness when standing  Pt was repositioned supine in bed with all needs met and call bell in reach  Pt would benefit form continued PT in order to promote safe and functional mobility  Barriers to Discharge Decreased caregiver support; Inaccessible home environment   Goals   Patient Goals unable to express   STG Expiration Date 07/30/20   Short Term Goal #1 1  Pt will be able to complete all aspects of bed mobility at a Min- A level in order to decrease burden on caregivers  2  Pt will be able to complete functional transfers at a Min-A level in order to increase independence  3  Pt will be able to ambulate 20+ ft at a Mod-A level with LRAD in order to promote safe transition back into the home environment  4  Pt will demonstrate an increase in b/l LE strength by one grade as indicated on MMT scale in order to promote functional independence  5  Pt will demonstrate improved balance by increasing at least one grade on the graded balance scale in order to reduce falls risk     PT Treatment Day 7   Plan Treatment/Interventions Functional transfer training;LE strengthening/ROM; Therapeutic exercise; Endurance training;Cognitive reorientation;Patient/family training;Equipment eval/education; Bed mobility;Gait training;Spoke to nursing   Progress Slow progress, medical status limitations   PT Frequency Other (Comment)  (4-6x/wk)   Recommendation   PT Discharge Recommendation [facility]   Equipment Recommended Walker   PT - OK to Discharge Yes  (when medically cleared)     Page Pacheco, PTA

## 2020-07-19 ENCOUNTER — APPOINTMENT (INPATIENT)
Dept: RADIOLOGY | Facility: HOSPITAL | Age: 70
DRG: 003 | End: 2020-07-19
Payer: COMMERCIAL

## 2020-07-19 LAB
ANION GAP SERPL CALCULATED.3IONS-SCNC: 4 MMOL/L (ref 4–13)
BASOPHILS # BLD AUTO: 0.04 THOUSANDS/ΜL (ref 0–0.1)
BASOPHILS NFR BLD AUTO: 0 % (ref 0–1)
BUN SERPL-MCNC: 18 MG/DL (ref 5–25)
CALCIUM SERPL-MCNC: 8.6 MG/DL (ref 8.3–10.1)
CHLORIDE SERPL-SCNC: 107 MMOL/L (ref 100–108)
CO2 SERPL-SCNC: 28 MMOL/L (ref 21–32)
CREAT SERPL-MCNC: 0.88 MG/DL (ref 0.6–1.3)
EOSINOPHIL # BLD AUTO: 0.16 THOUSAND/ΜL (ref 0–0.61)
EOSINOPHIL NFR BLD AUTO: 2 % (ref 0–6)
ERYTHROCYTE [DISTWIDTH] IN BLOOD BY AUTOMATED COUNT: 14.6 % (ref 11.6–15.1)
GFR SERPL CREATININE-BSD FRML MDRD: 88 ML/MIN/1.73SQ M
GLUCOSE SERPL-MCNC: 110 MG/DL (ref 65–140)
GLUCOSE SERPL-MCNC: 120 MG/DL (ref 65–140)
GLUCOSE SERPL-MCNC: 133 MG/DL (ref 65–140)
HCT VFR BLD AUTO: 31.3 % (ref 36.5–49.3)
HGB BLD-MCNC: 9.6 G/DL (ref 12–17)
IMM GRANULOCYTES # BLD AUTO: 0.06 THOUSAND/UL (ref 0–0.2)
IMM GRANULOCYTES NFR BLD AUTO: 1 % (ref 0–2)
LYMPHOCYTES # BLD AUTO: 1.23 THOUSANDS/ΜL (ref 0.6–4.47)
LYMPHOCYTES NFR BLD AUTO: 13 % (ref 14–44)
MCH RBC QN AUTO: 30.1 PG (ref 26.8–34.3)
MCHC RBC AUTO-ENTMCNC: 30.7 G/DL (ref 31.4–37.4)
MCV RBC AUTO: 98 FL (ref 82–98)
MONOCYTES # BLD AUTO: 0.82 THOUSAND/ΜL (ref 0.17–1.22)
MONOCYTES NFR BLD AUTO: 8 % (ref 4–12)
NEUTROPHILS # BLD AUTO: 7.55 THOUSANDS/ΜL (ref 1.85–7.62)
NEUTS SEG NFR BLD AUTO: 76 % (ref 43–75)
NRBC BLD AUTO-RTO: 0 /100 WBCS
PLATELET # BLD AUTO: 264 THOUSANDS/UL (ref 149–390)
PMV BLD AUTO: 10.9 FL (ref 8.9–12.7)
POTASSIUM SERPL-SCNC: 3.8 MMOL/L (ref 3.5–5.3)
RBC # BLD AUTO: 3.19 MILLION/UL (ref 3.88–5.62)
SODIUM SERPL-SCNC: 139 MMOL/L (ref 136–145)
WBC # BLD AUTO: 9.86 THOUSAND/UL (ref 4.31–10.16)

## 2020-07-19 PROCEDURE — 94640 AIRWAY INHALATION TREATMENT: CPT

## 2020-07-19 PROCEDURE — 97530 THERAPEUTIC ACTIVITIES: CPT

## 2020-07-19 PROCEDURE — 94664 DEMO&/EVAL PT USE INHALER: CPT

## 2020-07-19 PROCEDURE — 71046 X-RAY EXAM CHEST 2 VIEWS: CPT

## 2020-07-19 PROCEDURE — 92526 ORAL FUNCTION THERAPY: CPT

## 2020-07-19 PROCEDURE — 80048 BASIC METABOLIC PNL TOTAL CA: CPT | Performed by: SURGERY

## 2020-07-19 PROCEDURE — 99024 POSTOP FOLLOW-UP VISIT: CPT | Performed by: THORACIC SURGERY (CARDIOTHORACIC VASCULAR SURGERY)

## 2020-07-19 PROCEDURE — 94760 N-INVAS EAR/PLS OXIMETRY 1: CPT

## 2020-07-19 PROCEDURE — 85025 COMPLETE CBC W/AUTO DIFF WBC: CPT | Performed by: SURGERY

## 2020-07-19 PROCEDURE — 82948 REAGENT STRIP/BLOOD GLUCOSE: CPT

## 2020-07-19 RX ORDER — OLANZAPINE 10 MG/1
10 TABLET, ORALLY DISINTEGRATING ORAL
Status: DISCONTINUED | OUTPATIENT
Start: 2020-07-19 | End: 2020-07-30 | Stop reason: HOSPADM

## 2020-07-19 RX ADMIN — PIPERACILLIN AND TAZOBACTAM 3.38 G: 3; .375 INJECTION, POWDER, FOR SOLUTION INTRAVENOUS at 18:00

## 2020-07-19 RX ADMIN — LEVALBUTEROL HYDROCHLORIDE 1.25 MG: 1.25 SOLUTION, CONCENTRATE RESPIRATORY (INHALATION) at 13:40

## 2020-07-19 RX ADMIN — METOPROLOL TARTRATE 12.5 MG: 25 TABLET ORAL at 20:27

## 2020-07-19 RX ADMIN — PIPERACILLIN AND TAZOBACTAM 3.38 G: 3; .375 INJECTION, POWDER, FOR SOLUTION INTRAVENOUS at 05:08

## 2020-07-19 RX ADMIN — ISODIUM CHLORIDE 3 ML: 0.03 SOLUTION RESPIRATORY (INHALATION) at 19:56

## 2020-07-19 RX ADMIN — PIPERACILLIN AND TAZOBACTAM 3.38 G: 3; .375 INJECTION, POWDER, FOR SOLUTION INTRAVENOUS at 11:58

## 2020-07-19 RX ADMIN — LEVALBUTEROL HYDROCHLORIDE 1.25 MG: 1.25 SOLUTION, CONCENTRATE RESPIRATORY (INHALATION) at 07:38

## 2020-07-19 RX ADMIN — CHLORHEXIDINE GLUCONATE 0.12% ORAL RINSE 15 ML: 1.2 LIQUID ORAL at 11:00

## 2020-07-19 RX ADMIN — ACETAMINOPHEN 975 MG: 650 SUSPENSION ORAL at 13:46

## 2020-07-19 RX ADMIN — ACETAMINOPHEN 975 MG: 650 SUSPENSION ORAL at 05:10

## 2020-07-19 RX ADMIN — ISODIUM CHLORIDE 3 ML: 0.03 SOLUTION RESPIRATORY (INHALATION) at 13:41

## 2020-07-19 RX ADMIN — OLANZAPINE 10 MG: 10 TABLET, ORALLY DISINTEGRATING ORAL at 23:40

## 2020-07-19 RX ADMIN — CHLORHEXIDINE GLUCONATE 0.12% ORAL RINSE 15 ML: 1.2 LIQUID ORAL at 20:28

## 2020-07-19 RX ADMIN — LEVALBUTEROL HYDROCHLORIDE 1.25 MG: 1.25 SOLUTION, CONCENTRATE RESPIRATORY (INHALATION) at 19:56

## 2020-07-19 RX ADMIN — NYSTATIN 500000 UNITS: 100000 SUSPENSION ORAL at 11:00

## 2020-07-19 RX ADMIN — ENOXAPARIN SODIUM 90 MG: 100 INJECTION SUBCUTANEOUS at 09:56

## 2020-07-19 RX ADMIN — NYSTATIN 500000 UNITS: 100000 SUSPENSION ORAL at 20:28

## 2020-07-19 RX ADMIN — MELATONIN 6 MG: at 20:27

## 2020-07-19 RX ADMIN — PIPERACILLIN AND TAZOBACTAM 3.38 G: 3; .375 INJECTION, POWDER, FOR SOLUTION INTRAVENOUS at 23:40

## 2020-07-19 RX ADMIN — ENOXAPARIN SODIUM 90 MG: 100 INJECTION SUBCUTANEOUS at 20:28

## 2020-07-19 RX ADMIN — ACETAMINOPHEN 975 MG: 650 SUSPENSION ORAL at 20:28

## 2020-07-19 RX ADMIN — ISODIUM CHLORIDE 3 ML: 0.03 SOLUTION RESPIRATORY (INHALATION) at 07:38

## 2020-07-19 NOTE — PROGRESS NOTES
Stable overnight  Interactive this morning  Has had some loose stool  Afebrile, vital signs stable  Abdomen is soft  Chest x-ray demonstrates some patchy opacity on the left  White blood cell count was 17 4 yesterday is 9 9 today    Impression:  Lung cancer  Status post left upper lobectomy  Status post tracheostomy  Possible left-sided pneumonia  Loose stool     Recommendation:  Continue aggressive pulmonary toilet  Zosyn started yesterday for increasing opacity on chest x-ray associated with elevated white blood cell count  Hold laxatives  If loose stool continues or he develops abdominal pain I would send AC diff specimen as he has been on multiple antibiotics during this hospitalization  PT/OT/speech pathology/respiratory therapy  Nutrition

## 2020-07-19 NOTE — PLAN OF CARE
Problem: PHYSICAL THERAPY ADULT  Goal: Performs mobility at highest level of function for planned discharge setting  See evaluation for individualized goals  Description  Treatment/Interventions: LE strengthening/ROM, Therapeutic exercise, Endurance training, Cognitive reorientation, Bed mobility, Continued evaluation, Spoke to nursing, OT  Equipment Recommended: Other (Comment)(TBD)       See flowsheet documentation for full assessment, interventions and recommendations  Note:   Prognosis: Fair  Problem List: Decreased strength, Decreased endurance, Impaired balance, Decreased mobility, Decreased coordination, Decreased cognition, Impaired judgement, Decreased safety awareness, Pain  Assessment: Pt seen for session for setup, bed mob, time spent EOB, transfers, minimal gait, repositioning on stretcher  Pt cooperative but confused, in mitt restraints  Needs less assist for bed mob, transfers, able to ambulate a few small steps  appropriate for rehab at [place]  Barriers to Discharge: Decreased caregiver support, Inaccessible home environment     PT Discharge Recommendation: [address]     PT - OK to Discharge: (S) Yes(when stable to rehab)    See flowsheet documentation for full assessment

## 2020-07-19 NOTE — SPEECH THERAPY NOTE
Speech Language/Pathology    Speech/Language Pathology Progress Note    Patient Name: Jim Carroll  Today's Date: 7/19/2020     Problem List  Principal Problem:    Malignant neoplasm of upper lobe of left lung ([address] )  Active Problems:    Acute respiratory failure with hypoxia ([address] )    Acute deep vein thrombosis (DVT) of axillary vein of right upper extremity (HCC)    Acute deep vein thrombosis (DVT) of brachial vein of right upper extremity ([address] )    Tracheostomy in place [facility])    Acute deep vein thrombosis (DVT) of calf muscle vein of left lower extremity (HCC)    Encephalopathy    Hyperactive delirium after surgical procedure    Anemia    Thrombocytosis ([facility] [address] )       Past Medical History  Past Medical History:   Diagnosis Date    Alcohol abuse 3/27/2020    Chest pain     Community acquired pneumonia 3/27/2020    Hypertension     Left upper lobe pulmonary nodule     Malignant neoplasm of upper lobe of left lung ([facility] [address] ) 5/7/2020        Past Surgical History  Past Surgical History:   Procedure Laterality Date    COLON SURGERY      CT NEEDLE BIOPSY LUNG  4/29/2020    [place] BRONCHOSCOPY,DIAGNOSTIC N/A 6/10/2020    Procedure: BRONCHOSCOPY FLEXIBLE;  Surgeon: Kena Hopper MD;  [facility];  Service: Thoracic    [place] MEDIASTINOSCOPY WITH LYMPH NODE BIOPSY/IES N/A 6/10/2020    Procedure: MEDIASTINOSCOPY;  Surgeon: Kena Hopper MD;  [facility];  Service: Thoracic    [place] THORACOSCOPY SURG LOBECTOMY Left 6/10/2020    Procedure: THORACOSCOPY VIDEO ASSISTED SURGERY (VATS);   Surgeon: Kena Hopper MD;  [facility];  Service: Thoracic    [place] THORACOSCOPY SURG LOBECTOMY Left 6/10/2020    Procedure: UPPER LOBECTOMY OF LUNG; MEDIASTINAL  LYMPH NODE DISSECTION;  Surgeon: Kena Hopper MD;  [facility];  Service: Thoracic    TRACHEOSTOMY N/A 6/22/2020    Procedure: TRACHEOSTOMY REVISION, BRONCHOSCOPY;  Surgeon: Kena Hopper MD;  [facility]; Service: Thoracic    WRIST SURGERY Right     orif         Subjective:  Pt was awake & alert but confused w/ mostly gargled speech  Objective:  Pt is on trach collar and tolerating PMV well  Voice is harsh  Pt trialed w/ 1/2 tsp  Chocolate pudding  Oral control and transfer of bolus appears adequate  Swallow initiation time is minimally delayed and swallow is audible  No immediate overt s/s of aspiration observed  PMV removed and Pt cued to cough resulting in expectoration of brown secretions via trach  Assessment:  Pt continues to present w/ signs of aspiration at bedside       Plan/Recommendations:  Keep NPO  Plan for VBS this week

## 2020-07-19 NOTE — PROGRESS NOTES
Progress Note - Jim Carroll 71 y o  male MRN: [id number]    Unit/Bed#: [facility] 405-01 Encounter: [id number]      Assessment:  69 M status post thoracoscopic left upper lobectomy complicated by prolonged air leak, cardiac arrest s/p cricothyroidotomy, tracheostomy revision, and trach exchange to 6 cuffless    Vss  Afebrile  Much more calm this morning  Wbc 17- 9 8  Plan:  Continue tube feeds  Continue 1:1  Continue abx  Continue passy aniya valve throughout day    Subjective:   Denied any chest pain or shortness of breath today    Objective:     Vitals: Blood pressure 91/53, pulse 98, temperature 98 2 °F (36 8 °C), temperature source Axillary, resp  rate 18, height 5' 10" (1 778 m), weight 81 4 kg (179 lb 7 3 oz), SpO2 94 %  ,Body mass index is 25 75 kg/m²  Intake/Output Summary (Last 24 hours) at 7/19/2020 0636  Last data filed at 7/18/2020 1801  Gross per 24 hour   Intake 1060 ml   Output 800 ml   Net 260 ml       Physical Exam  General: NAD  HEENT: NC/AT  MMM  Cv: RRR     Lungs: normal effort  Ab: Soft, NT/ND  Ex: no CCE  Neuro: AAOx3    Scheduled Meds:  Current Facility-Administered Medications:  acetaminophen 975 mg Per NG Tube [facility] [address] Tuesday KHANG Alcaraz    acetaminophen 650 mg Rectal Q4H PRN Lucrecia Muir MD    albuterol 2 5 mg Nebulization Q6H PRN Lucrecia Muir MD    chlorhexidine 15 mL Swish & Spit Q12H [address] Lucrecia Muir MD    enoxaparin 1 mg/kg Subcutaneous Q12H [address] Lucrecia Muir MD    guaiFENesin 200 mg Oral Q4H PRN Lucrecia Muir MD    levalbuterol 1 25 mg Nebulization TID Lucrecia Muir MD    Lidocaine Viscous HCl 15 mL Swish & Spit 4x Daily PRN Lucrecia Muir MD    melatonin 6 mg Oral HS Lucrecia Muir MD    metoprolol tartrate 12 5 mg Per NG Tube Q12H [address] Lucrecia Muir MD    nystatin 500,000 Units Swish & Swallow 4x Daily Lucrecia Muir MD    OLANZapine 5 mg Intramuscular Q6H PRN Lucrecia Muir MD    ondansetron 4 mg Intravenous Q6H PRN Lucrecia Muir MD oxyCODONE 2 5 mg Oral Q4H PRN Sammi Wallace MD    piperacillin-tazobactam 3 375 g Intravenous Q6H Sammi Wallace MD Last Rate: Stopped (07/19/20 0600)   polyethylene glycol 17 g Oral Daily Sammi Wallace MD    senna-docusate sodium 1 tablet Per NG Tube BID Sammi Wallace MD    sodium chloride 3 mL Nebulization TID Sammi Wallace MD    traZODone 150 mg Oral HS Sammi Wallace MD      Continuous Infusions:   PRN Meds: acetaminophen    albuterol    guaiFENesin    Lidocaine Viscous HCl    OLANZapine    ondansetron    oxyCODONE      Invasive Devices     Peripherally Inserted Central Catheter Line            PICC Line 06/47/96 Left Basilic 22 days          Drain            NG/OG/Enteral Tube Nasogastric Right nares 20 days          Airway            Surgical Airway 5 days                Lab, Imaging and other studies: I have personally reviewed pertinent reports      VTE Pharmacologic Prophylaxis: Enoxaparin (Lovenox)  VTE Mechanical Prophylaxis: sequential compression device
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
## 2020-07-19 NOTE — PHYSICAL THERAPY NOTE
Physical Therapy Treatment Note       07/19/20 1020   Pain Assessment   Pain Assessment Tool 0-10   Pain Score No Pain   Restrictions/Precautions   Weight Bearing Precautions Per Order No   Other Precautions Cognitive; Chair Alarm; Bed Alarm;Telemetry;Multiple lines; Fall Risk;Restraints  (trach)   General   Chart Reviewed Yes   Family/Caregiver Present No   Cognition   Overall Cognitive Status Impaired   Arousal/Participation Arousable   Attention Difficulty attending to directions   Orientation Level Oriented to person   Memory Unable to assess   Following Commands Follows one step commands inconsistently   Subjective   Subjective pt awake, confused  follows commands w/ increased time  Bed Mobility   Supine to Sit 3  Moderate assistance   Additional items Assist x 1  (sat EOB x 7 min prior to transfers, 2-3 min p transfers)   Sit to Supine 3  Moderate assistance   Additional items Assist x 2   Additional Comments repositioned on stretcher p session   Transfers   Sit to Stand 3  Moderate assistance   Additional items Assist x 2   Stand to Sit 3  Moderate assistance   Additional items Assist x 2   Ambulation/Elevation   Gait pattern   (slow, ataxia, short step length, forward flexion)   Gait Assistance 3  Moderate assist   Additional items Assist x 2   Assistive Device   (HHA of 2- start w/ RW when appropriate)   Distance 2-3'x1 from bed to stretcher   Balance   Static Sitting Poor +   Dynamic Sitting Poor   Static Standing Poor -   Dynamic Standing Poor -   Ambulatory Poor -   Endurance Deficit   Endurance Deficit Yes   Endurance Deficit Description weakness, fatigue, pain   Activity Tolerance   Activity Tolerance Patient limited by fatigue;Patient limited by pain;Treatment limited secondary to medical complications (Comment)   Nurse Made Aware yes   Assessment   Prognosis Fair   Problem List Decreased strength;Decreased endurance; Impaired balance;Decreased mobility; Decreased coordination;Decreased cognition; Impaired judgement;Decreased safety awareness;Pain   Assessment Pt seen for session for setup, bed mob, time spent EOB, transfers, minimal gait, repositioning on stretcher  Pt cooperative but confused, in mitt restraints  Needs less assist for bed mob, transfers, able to ambulate a few small steps  appropriate for rehab at dc   Goals   Patient Goals unable to state   STG Expiration Date 07/30/20   PT Treatment Day 8   Plan   Treatment/Interventions Functional transfer training;LE strengthening/ROM; Endurance training; Therapeutic exercise;Patient/family training;Gait training;Bed mobility; Equipment eval/education   Progress Slow progress, medical status limitations   PT Frequency Other (Comment)  (3-5x/wk)   Recommendation   PT Discharge Recommendation Post-Acute Rehabilitation Services   Equipment Recommended Walker   PT - OK to Discharge Yes  (when stable to rehab)   Alida Esparza PT, DPT CSRS

## 2020-07-20 ENCOUNTER — APPOINTMENT (INPATIENT)
Dept: RADIOLOGY | Facility: HOSPITAL | Age: 70
DRG: 003 | End: 2020-07-20
Payer: COMMERCIAL

## 2020-07-20 LAB
ALBUMIN SERPL BCP-MCNC: 2 G/DL (ref 3.5–5)
ALP SERPL-CCNC: 96 U/L (ref 46–116)
ALT SERPL W P-5'-P-CCNC: 39 U/L (ref 12–78)
ANION GAP SERPL CALCULATED.3IONS-SCNC: 5 MMOL/L (ref 4–13)
AST SERPL W P-5'-P-CCNC: 13 U/L (ref 5–45)
BASOPHILS # BLD AUTO: 0.05 THOUSANDS/ΜL (ref 0–0.1)
BASOPHILS NFR BLD AUTO: 1 % (ref 0–1)
BILIRUB DIRECT SERPL-MCNC: 0.17 MG/DL (ref 0–0.2)
BILIRUB SERPL-MCNC: 0.16 MG/DL (ref 0.2–1)
BUN SERPL-MCNC: 15 MG/DL (ref 5–25)
CALCIUM SERPL-MCNC: 8.5 MG/DL (ref 8.3–10.1)
CHLORIDE SERPL-SCNC: 110 MMOL/L (ref 100–108)
CO2 SERPL-SCNC: 26 MMOL/L (ref 21–32)
CREAT SERPL-MCNC: 0.74 MG/DL (ref 0.6–1.3)
EOSINOPHIL # BLD AUTO: 0.24 THOUSAND/ΜL (ref 0–0.61)
EOSINOPHIL NFR BLD AUTO: 3 % (ref 0–6)
ERYTHROCYTE [DISTWIDTH] IN BLOOD BY AUTOMATED COUNT: 14.6 % (ref 11.6–15.1)
GFR SERPL CREATININE-BSD FRML MDRD: 94 ML/MIN/1.73SQ M
GLUCOSE SERPL-MCNC: 108 MG/DL (ref 65–140)
GLUCOSE SERPL-MCNC: 111 MG/DL (ref 65–140)
GLUCOSE SERPL-MCNC: 121 MG/DL (ref 65–140)
GLUCOSE SERPL-MCNC: 96 MG/DL (ref 65–140)
GLUCOSE SERPL-MCNC: 97 MG/DL (ref 65–140)
HCT VFR BLD AUTO: 29.4 % (ref 36.5–49.3)
HGB BLD-MCNC: 8.9 G/DL (ref 12–17)
IMM GRANULOCYTES # BLD AUTO: 0.06 THOUSAND/UL (ref 0–0.2)
IMM GRANULOCYTES NFR BLD AUTO: 1 % (ref 0–2)
LYMPHOCYTES # BLD AUTO: 1.52 THOUSANDS/ΜL (ref 0.6–4.47)
LYMPHOCYTES NFR BLD AUTO: 17 % (ref 14–44)
MAGNESIUM SERPL-MCNC: 2.6 MG/DL (ref 1.6–2.6)
MCH RBC QN AUTO: 30.3 PG (ref 26.8–34.3)
MCHC RBC AUTO-ENTMCNC: 30.3 G/DL (ref 31.4–37.4)
MCV RBC AUTO: 100 FL (ref 82–98)
MONOCYTES # BLD AUTO: 0.72 THOUSAND/ΜL (ref 0.17–1.22)
MONOCYTES NFR BLD AUTO: 8 % (ref 4–12)
NEUTROPHILS # BLD AUTO: 6.62 THOUSANDS/ΜL (ref 1.85–7.62)
NEUTS SEG NFR BLD AUTO: 70 % (ref 43–75)
NRBC BLD AUTO-RTO: 0 /100 WBCS
PHOSPHATE SERPL-MCNC: 3.7 MG/DL (ref 2.3–4.1)
PLATELET # BLD AUTO: 285 THOUSANDS/UL (ref 149–390)
PMV BLD AUTO: 10.9 FL (ref 8.9–12.7)
POTASSIUM SERPL-SCNC: 4 MMOL/L (ref 3.5–5.3)
PREALB SERPL-MCNC: 16.3 MG/DL (ref 18–40)
PROT SERPL-MCNC: 6.9 G/DL (ref 6.4–8.2)
RBC # BLD AUTO: 2.94 MILLION/UL (ref 3.88–5.62)
SODIUM SERPL-SCNC: 141 MMOL/L (ref 136–145)
WBC # BLD AUTO: 9.21 THOUSAND/UL (ref 4.31–10.16)

## 2020-07-20 PROCEDURE — 92611 MOTION FLUOROSCOPY/SWALLOW: CPT

## 2020-07-20 PROCEDURE — 99024 POSTOP FOLLOW-UP VISIT: CPT | Performed by: THORACIC SURGERY (CARDIOTHORACIC VASCULAR SURGERY)

## 2020-07-20 PROCEDURE — 85025 COMPLETE CBC W/AUTO DIFF WBC: CPT | Performed by: SURGERY

## 2020-07-20 PROCEDURE — 83735 ASSAY OF MAGNESIUM: CPT | Performed by: SURGERY

## 2020-07-20 PROCEDURE — 82948 REAGENT STRIP/BLOOD GLUCOSE: CPT

## 2020-07-20 PROCEDURE — 71046 X-RAY EXAM CHEST 2 VIEWS: CPT

## 2020-07-20 PROCEDURE — 74230 X-RAY XM SWLNG FUNCJ C+: CPT

## 2020-07-20 PROCEDURE — 99232 SBSQ HOSP IP/OBS MODERATE 35: CPT | Performed by: NURSE PRACTITIONER

## 2020-07-20 PROCEDURE — 80048 BASIC METABOLIC PNL TOTAL CA: CPT | Performed by: SURGERY

## 2020-07-20 PROCEDURE — 71045 X-RAY EXAM CHEST 1 VIEW: CPT

## 2020-07-20 PROCEDURE — 94760 N-INVAS EAR/PLS OXIMETRY 1: CPT

## 2020-07-20 PROCEDURE — 84100 ASSAY OF PHOSPHORUS: CPT | Performed by: SURGERY

## 2020-07-20 PROCEDURE — 84134 ASSAY OF PREALBUMIN: CPT | Performed by: SURGERY

## 2020-07-20 PROCEDURE — 80076 HEPATIC FUNCTION PANEL: CPT | Performed by: SURGERY

## 2020-07-20 PROCEDURE — 94640 AIRWAY INHALATION TREATMENT: CPT

## 2020-07-20 PROCEDURE — 97530 THERAPEUTIC ACTIVITIES: CPT

## 2020-07-20 RX ADMIN — ENOXAPARIN SODIUM 90 MG: 100 INJECTION SUBCUTANEOUS at 10:46

## 2020-07-20 RX ADMIN — NYSTATIN 500000 UNITS: 100000 SUSPENSION ORAL at 09:16

## 2020-07-20 RX ADMIN — LEVALBUTEROL HYDROCHLORIDE 1.25 MG: 1.25 SOLUTION, CONCENTRATE RESPIRATORY (INHALATION) at 19:35

## 2020-07-20 RX ADMIN — PIPERACILLIN AND TAZOBACTAM 3.38 G: 3; .375 INJECTION, POWDER, FOR SOLUTION INTRAVENOUS at 17:05

## 2020-07-20 RX ADMIN — PIPERACILLIN AND TAZOBACTAM 3.38 G: 3; .375 INJECTION, POWDER, FOR SOLUTION INTRAVENOUS at 10:46

## 2020-07-20 RX ADMIN — ACETAMINOPHEN 975 MG: 650 SUSPENSION ORAL at 22:37

## 2020-07-20 RX ADMIN — PIPERACILLIN AND TAZOBACTAM 3.38 G: 3; .375 INJECTION, POWDER, FOR SOLUTION INTRAVENOUS at 22:37

## 2020-07-20 RX ADMIN — ACETAMINOPHEN 975 MG: 650 SUSPENSION ORAL at 05:06

## 2020-07-20 RX ADMIN — MELATONIN 6 MG: at 20:22

## 2020-07-20 RX ADMIN — NYSTATIN 500000 UNITS: 100000 SUSPENSION ORAL at 17:05

## 2020-07-20 RX ADMIN — LEVALBUTEROL HYDROCHLORIDE 1.25 MG: 1.25 SOLUTION, CONCENTRATE RESPIRATORY (INHALATION) at 07:14

## 2020-07-20 RX ADMIN — NYSTATIN 500000 UNITS: 100000 SUSPENSION ORAL at 22:37

## 2020-07-20 RX ADMIN — CHLORHEXIDINE GLUCONATE 0.12% ORAL RINSE 15 ML: 1.2 LIQUID ORAL at 20:22

## 2020-07-20 RX ADMIN — METOPROLOL TARTRATE 12.5 MG: 25 TABLET ORAL at 20:22

## 2020-07-20 RX ADMIN — ACETAMINOPHEN 975 MG: 650 SUSPENSION ORAL at 13:01

## 2020-07-20 RX ADMIN — ISODIUM CHLORIDE 3 ML: 0.03 SOLUTION RESPIRATORY (INHALATION) at 19:35

## 2020-07-20 RX ADMIN — PIPERACILLIN AND TAZOBACTAM 3.38 G: 3; .375 INJECTION, POWDER, FOR SOLUTION INTRAVENOUS at 05:06

## 2020-07-20 RX ADMIN — ISODIUM CHLORIDE 3 ML: 0.03 SOLUTION RESPIRATORY (INHALATION) at 07:14

## 2020-07-20 RX ADMIN — OLANZAPINE 10 MG: 10 TABLET, ORALLY DISINTEGRATING ORAL at 22:38

## 2020-07-20 RX ADMIN — ENOXAPARIN SODIUM 90 MG: 100 INJECTION SUBCUTANEOUS at 20:23

## 2020-07-20 RX ADMIN — OLANZAPINE 5 MG: 10 INJECTION, POWDER, FOR SOLUTION INTRAMUSCULAR at 20:15

## 2020-07-20 RX ADMIN — CHLORHEXIDINE GLUCONATE 0.12% ORAL RINSE 15 ML: 1.2 LIQUID ORAL at 09:16

## 2020-07-20 RX ADMIN — LEVALBUTEROL HYDROCHLORIDE 1.25 MG: 1.25 SOLUTION, CONCENTRATE RESPIRATORY (INHALATION) at 13:25

## 2020-07-20 RX ADMIN — METOPROLOL TARTRATE 12.5 MG: 25 TABLET ORAL at 09:17

## 2020-07-20 RX ADMIN — ISODIUM CHLORIDE 3 ML: 0.03 SOLUTION RESPIRATORY (INHALATION) at 13:29

## 2020-07-20 NOTE — PROGRESS NOTES
Progress Note - Neil Gandara 71 y o  male MRN: [id number]    Unit/Bed#: [facility] 405-01 Encounter: [id number]      Assessment/Plan:  1  Encephalopathy  Alert and oriented x1-2  Alert and oriented x3 at baseline  Encourage daytime activity, pt likes to watch TV at baseline  Continue melatonin  Pt given zyprexa 10 mg po QHS  Pt was previously given neurontin, seroquel, trazadone, daughter did not want patient on medications  Pt slept well, monitor QTc  Monitor for urinary retention and constipation    2  Acute pain due to surgery  Scheduled acetaminophen  Consider lidoderm patch to chest    3  Insomnia  Continue melatonin  trazodone discontinued switched to Zyprexa  Encourage daytime cognitive stimulation, pt likes to watch TV, sci fi channel    4  Cognitive impairment  Per daughter pt some mild baseline 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  Recommend check TSH and vitamin B12 as outpatient    5  Hearing loss  Pt had previous audiology exam  Refused hearing aids  Speak loudly and clearly  Enunciate words    6  Goals of care  Per daughter she does not want her dad to have PEG tube  Goal is form pt to be more awake to participate with speech, PT/OT        Subjective:   Upon exam pt is oob in recliner chair  He is alert and oriented x1-2  Remains on 1:1  Calm and cooperative  Per nursing pt slept well last night till 0700  Pt with increased agitation over weekend  Requiring zyprexa and mitts for agitation    Objective:     Vitals: Blood pressure 137/72, pulse 96, temperature 98 1 °F (36 7 °C), temperature source Oral, resp  rate 18, height 5' 10" (1 778 m), weight 82 6 kg (182 lb 1 6 oz), SpO2 99 %  ,Body mass index is 26 13 kg/m²        Intake/Output Summary (Last 24 hours) at 7/20/2020 1440  Last data filed at 7/20/2020 1327  Gross per 24 hour   Intake 1280 ml   Output 1575 ml   Net -295 ml       Physical Exam:   General : NAD  HEENT : MMM, trach midline, loose moist cough  Heart : Normal rate, no murmur rub or gallop  Lungs : CTA   Abdomen : Soft, NT/ND, BS auscultated in all 4 quads  Ext :  no edema  Skin : Pink, warm, dry, age appropriate turgor and mobility  Neuro : Nonfocal  Psych : Alert and O x 1      Invasive Devices     Peripherally Inserted Central Catheter Line            PICC Line 44/39/43 Left Basilic 24 days          Drain            NG/OG/Enteral Tube Nasogastric Right nares 21 days    Urethral Catheter Latex 16 Fr  less than 1 day          Airway            Surgical Airway 7 days                Lab, Imaging and other studies: I have personally reviewed pertinent reports      VTE Pharmacologic Prophylaxis: Sequential compression device (Venodyne)   VTE Mechanical Prophylaxis: sequential compression device

## 2020-07-20 NOTE — PHYSICAL THERAPY NOTE
PHYSICAL THERAPY NOTE          Patient Name: Jim Carroll  Today's Date: 7/20/2020 07/20/20 0938   Pain Assessment   Pain Assessment Tool Pain Assessment not indicated - pt denies pain   Pain Score No Pain   Restrictions/Precautions   Other Precautions Cognitive;1:1;Multiple lines;Telemetry;O2;Fall Risk  (trach collar; Keofed, )   General   Chart Reviewed Yes   Additional Pertinent History cleared for Tx session (spoke to nsg)   Response to Previous Treatment Patient with no complaints from previous session  Cognition   Overall Cognitive Status Impaired   Arousal/Participation Responsive; Cooperative   Attention Attends with cues to redirect   Orientation Level Oriented to person   Memory Decreased recall of recent events;Decreased recall of precautions   Following Commands Follows one step commands with increased time or repetition   Subjective   Subjective Pt is reclined in the chair; responds to questions; generally disoriented; pleasant and cooperative; min increased latency of verbal and motor responses; agreeable to perform therex and mobilize;   Bed Mobility   Sit to Supine 4  Minimal assistance   Additional items Assist x 1;HOB elevated; Increased time required;Verbal cues  (into a stretcher (leaving for testing))   Transfers   Sit to Stand 3  Moderate assistance   Additional items Assist x 1; Increased time required;Verbal cues  (stand by (A) of 2nd for lines)   Stand to Sit 3  Moderate assistance   Additional items Assist x 1;Verbal cues  (stand by (A) of 2nd for lines)   Ambulation/Elevation   Gait pattern Excessively slow; Short stride; Foward flexed; Inconsistent evan   Gait Assistance 3  Moderate assist   Additional items Assist x 1;Verbal cues; Tactile cues  (stand by (A) of 2 more staff for lines and chair follow)   Assistive Device Rolling walker   Distance 15 ft total distance; limited by fatigue   Stair Management Assistance Not tested  (not appropriate at this time)   Balance   Static Sitting Fair -   Dynamic Sitting Poor +   Static Standing Poor +   Dynamic Standing Poor   Ambulatory Poor   Activity Tolerance   Activity Tolerance Patient limited by fatigue; Other (Comment)  (multiple lines)   Nurse Made Aware spoke to Aris Figueroa RN   Exercises   Hip Abduction Sitting;15 reps;AAROM; Bilateral  (reclined in the chair)   Knee AROM Long Arc Quad Sitting;15 reps;AAROM;AROM; Bilateral   Ankle Pumps Sitting;15 reps;AAROM;AROM; Bilateral  (reclined in the chair)   Marching Sitting;10 reps;AAROM;AROM; Bilateral   Assessment   Prognosis Fair   Problem List Decreased strength;Decreased endurance; Impaired balance;Decreased mobility; Decreased cognition   Assessment Pt demonstrated overall improvement in functional mobility skills progressing w/ amb distance and requiring less (A) as well; stand by (A) of 2 more staff were needed for lines and chair follow; rest periods provided as needed t/o the session and pt appeared to be comfortable at the end of session; overall, cont to recommend rehab upon D/C when medically cleared; will follow  Barriers to Discharge Inaccessible home environment;Decreased caregiver support   Goals   Patient Goals pt did not express   STG Expiration Date 07/30/20   LTG Expiration Date 07/30/20   PT Treatment Day 9   Plan   Treatment/Interventions Functional transfer training;LE strengthening/ROM; Therapeutic exercise; Endurance training;Cognitive reorientation; Bed mobility;Gait training;Spoke to nursing   Progress Progressing toward goals   PT Frequency Other (Comment)  (3-5x/wk)   Recommendation   PT Discharge Recommendation Post-Acute Rehabilitation Services   Equipment Recommended Beto Ahn  (w/ (A))     Angela Cerda, PT

## 2020-07-20 NOTE — PROGRESS NOTES
Progress Note - Benoit Law 71 y o  male MRN: [id number]    Unit/Bed#: [facility] 405-01 Encounter: [id number]      Assessment:  69 M status post thoracoscopic left upper lobectomy complicated by prolonged air leak, cardiac arrest s/p cricothyroidotomy, tracheostomy revision, and trach exchange to 6 cuffless    VSS  Afebrile  Up in chair  Oriented this morning  Calm  Saturating 99% on trach collar  Tolerating tube feeds at 55  cxr with improving pneumonia yesterday  Plan:  Remove mittens  Frequent orientation  Continue 1:1 observation over next 24 hrs  Attempt to discontinue tomorrow  Pt/ot ambulation  Continue tube feeds at goal    Continue abx, zosyn d2  F/u cxr today      Subjective:   Alert awake and oriented this morning  Denied any pain and shortness of breath  Denied ambulating over last few days  Agreeable to physical therapy today  Objective:     Vitals: Blood pressure 113/65, pulse (!) 107, temperature 98 2 °F (36 8 °C), temperature source Oral, resp  rate 13, height 5' 10" (1 778 m), weight 82 6 kg (182 lb 1 6 oz), SpO2 99 %  ,Body mass index is 26 13 kg/m²  Intake/Output Summary (Last 24 hours) at 7/20/2020 0729  Last data filed at 7/20/2020 0550  Gross per 24 hour   Intake 2250 ml   Output 1275 ml   Net 975 ml       Physical Exam  General: NAD  HEENT: NC/AT  MMM  Trach collar in place  Cv: RRR     Lungs: normal effort  Ab: Soft, NT/ND  Ex: no CCE  Neuro: AAOx3    Scheduled Meds:  Current Facility-Administered Medications:  acetaminophen 975 mg Per NG Tube [facility] [address] Tuesday KHANG Esposito    acetaminophen 650 mg Rectal Q4H PRN Marlen Greenfield MD    albuterol 2 5 mg Nebulization Q6H PRN Marlen Greenfield MD    chlorhexidine 15 mL Swish & Spit Q12H [address] Marlen Greenfield MD    enoxaparin 1 mg/kg Subcutaneous Q12H [address] Marlen Greenfield MD    guaiFENesin 200 mg Oral Q4H PRN Marlen Greenfield MD    levalbuterol 1 25 mg Nebulization TID Marlen Greenfield MD    Lidocaine Viscous HCl 15 mL Swish & Spit 4x Daily PRN Flip Mckeon MD    melatonin 6 mg Oral HS Flip Mckeon MD    metoprolol tartrate 12 5 mg Per NG Tube Q12H [address] Flip Mckeon MD    nystatin 500,000 Units Swish & Swallow 4x Daily Flip Mckeon MD    OLANZapine 10 mg Oral HS Ketan Daugherty MD    OLANZapine 5 mg Intramuscular Q6H PRN Flip Mckeon MD    ondansetron 4 mg Intravenous Q6H PRN Flip Mckeon MD    oxyCODONE 2 5 mg Oral Q4H PRN Flip Mckeon MD    piperacillin-tazobactam 3 375 g Intravenous Q6H Flip Mckeon MD Last Rate: 3 375 g (07/20/20 0506)   polyethylene glycol 17 g Oral Daily Flip Mckeon MD    senna-docusate sodium 1 tablet Per NG Tube BID Flip Mckeon MD    sodium chloride 3 mL Nebulization TID Flip Mckeon MD      Continuous Infusions:   PRN Meds: acetaminophen    albuterol    guaiFENesin    Lidocaine Viscous HCl    OLANZapine    ondansetron    oxyCODONE      Invasive Devices     Peripherally Inserted Central Catheter Line            PICC Line 00/15/78 Left Basilic 23 days          Drain            NG/OG/Enteral Tube Nasogastric Right nares 21 days    Urethral Catheter Latex 16 Fr  less than 1 day          Airway            Surgical Airway 6 days                Lab, Imaging and other studies: I have personally reviewed pertinent reports      VTE Pharmacologic Prophylaxis: Enoxaparin (Lovenox)  VTE Mechanical Prophylaxis: sequential compression device

## 2020-07-20 NOTE — RESTORATIVE TECHNICIAN NOTE
Restorative Specialist Mobility Note       Activity: Chair     Assistive Device: Other (Comment)(HHA x 2)

## 2020-07-20 NOTE — PLAN OF CARE
Problem: PHYSICAL THERAPY ADULT  Goal: Performs mobility at highest level of function for planned discharge setting  See evaluation for individualized goals  Description  Treatment/Interventions: Functional transfer training, LE strengthening/ROM, Elevations, Therapeutic exercise, Endurance training, Equipment eval/education, Bed mobility, Gait training, Spoke to MD, Spoke to nursing  Equipment Recommended: Walker(at this time; r/o [facility])       See flowsheet documentation for full assessment, interventions and recommendations  Outcome: Progressing  Note:   Prognosis: Fair  Problem List: Decreased strength, Decreased endurance, Impaired balance, Decreased mobility, Decreased cognition  Assessment: Pt demonstrated overall improvement in functional mobility skills progressing w/ amb distance and requiring less (A) as well; stand by (A) of 2 more staff were needed for lines and chair follow; rest periods provided as needed t/o the session and pt appeared to be comfortable at the end of session; overall, cont to recommend rehab upon D/C when medically cleared; will follow  Barriers to Discharge: Inaccessible home environment, Decreased caregiver support     PT Discharge Recommendation: [id number]     PT - OK to Discharge: (S) Yes(when stable to rehab)    See flowsheet documentation for full assessment

## 2020-07-20 NOTE — PROCEDURES
Video Swallow Study      Patient Name: Tayler Yun  Today's Date: 7/20/2020        Past Medical History  Past Medical History:   Diagnosis Date    Alcohol abuse 3/27/2020    Chest pain     Community acquired pneumonia 3/27/2020    Hypertension     Left upper lobe pulmonary nodule     Malignant neoplasm of upper lobe of left lung ([address] ) 5/7/2020        Past Surgical History  Past Surgical History:   Procedure Laterality Date    COLON SURGERY      CT NEEDLE BIOPSY LUNG  4/29/2020    LA BRONCHOSCOPY,DIAGNOSTIC N/A 6/10/2020    Procedure: BRONCHOSCOPY FLEXIBLE;  Surgeon: Carmela Mcconnell MD;  [facility];  Service: Thoracic    LA MEDIASTINOSCOPY WITH LYMPH NODE BIOPSY/IES N/A 6/10/2020    Procedure: MEDIASTINOSCOPY;  Surgeon: Carmela Mcconnell MD;  [facility];  Service: Thoracic    LA THORACOSCOPY SURG LOBECTOMY Left 6/10/2020    Procedure: THORACOSCOPY VIDEO ASSISTED SURGERY (VATS); Surgeon: Carmela Mcconnell MD;  [facility];  Service: Thoracic    LA THORACOSCOPY SURG LOBECTOMY Left 6/10/2020    Procedure: UPPER LOBECTOMY OF LUNG; MEDIASTINAL  LYMPH NODE DISSECTION;  Surgeon: Carmela Mcconnell MD;  [facility];  Service: Thoracic    TRACHEOSTOMY N/A 6/22/2020    Procedure: TRACHEOSTOMY REVISION, BRONCHOSCOPY;  Surgeon: Carmela Mcconnell MD;  [facility];  Service: Thoracic    WRIST SURGERY Right     orif     Video Barium Swallow Study    Summary:  Images are on PACS for review  Pt presents w/ mild/mod oral, mod pharyngeal dysphagia shiraz by reduced oral processing reduced hyolaryngeal excursion & pharyngeal constriction w/ mod/max vallecular retention noted  +aspiration of nt by straw w/ min response, ongoing trace aspiration w/ all material (overflow of retention in a trickle amt)  Attempted strategies w/ the pt but he has difficulty maintaining positions or fully completing some   Risk for aspiration w/ po but per my assessment has improved since last seen  Recommendations:  NPO -continue ng for now, ? Smaller bore if able  Meds: via tube   Strategies:  Upright position  F/u ST tx: yes   Therapy Prognosis: fair   Prognosis considerations: cognitive status, medical status  Aspiration Precautions    Results reviewed with: pt, nursing  Goals:  Pt will tolerate least restrictive diet w/out s/s aspiration or oral/pharyngeal difficulties  Previous VBS:  -  Current Diet:   NPO w/ NG in place  Premorbid diet:  Regular w/ thin   Dentition:  Partial natural   O2 requirement:  Trach collar  Oral mech:  Strength and ROM: fair     Vocal Quality/Speech:  Improve, increased volume  PMV in place for assessment   Cognitive status:  Awake, alert  Consistencies administered: Barium laden applesauce,  honey thick, nectar thick  Liquids were administered by tsp, cup was attempted x1 and straw  Pt was seated laterally at 90 degrees  Oral stage:    Lip closure: fair  Mastication: NA  Bolus formation:mildly reduced   Bolus control: fair, mild spill prior to the swallow   Transfer: mildly labored   Residue: mild/mod throughout    Pharyngeal stage:    Swallow promptness: mild delay   Spill to valleculae: w/ all material   Spill to pyriforms: overflow w/ the   Epiglottic inversion: poor  Laryngeal excursion: reduced anteriorly & superiorly  Pharyngeal constriction: reduced  Vallecular retention:  Pyriform retention:  PPW coating:mild coating   Osteophytes:-  CP prominence:-  Retropulsion from prominence:-  Transient penetration: with ht, nt   Epiglottic undercoat: with all material   Penetration: noted w/ puree, over time w/ the other liquids   Aspiration: there is ongoing trace aspiration of most material & on a 2* swallow w/ NT after an initial swallow but during the the 2*     Strategies: attempted head down w/ multiple effortful swallows but pt has difficulty maintaining     Response to aspiration: no immed response, delayed throat clear  Pt was cued throughout to clear penetrated material, does not cough to clear on his own  Screening of Esophageal stage:   WNL

## 2020-07-20 NOTE — UTILIZATION REVIEW
Continued Stay Review                     Current Patient Class: IP Current Level of Care: Med/Surg    HPI:69 y o  male initially admitted inpatient on 6/10 s/p thoracoscopic left upper lobectomy complicated by prolonged air leak, cardiac arrest s/p cricothyroidotomy, tracheostomy revision, and trach exchange to 6 cuffless    Date:    7/17/2020  Assessment/Plan:  Following commands voicing name this AM  Denied any pain  Per nursing pt slept very little overnight  Afebrile  Saturating 90 % on 5 L trach collar at 20% FiO2  Slept very little  Remained normotensive overnight  With leukocytosis this morning WBC 13  Creatinine 0 82  Geriatrics consulted for polypharmacy, circadian regulation  Plan: --  Continue TF at goal (54)  Work with passy-aniya valve daily  Work with speech therapy daily  Aggressive physical therapy and pulmonary toilet  Anticipate repeat VBS early next week  Frequent orientation, awake in day         Date:    7/18/2020  Assessment/Plan: Suctioned multiple times overnight  Still requiring 1:1, continues to grab at tubes  Plan:  Continue TF at goal (54)  Work with passy-aniya valve daily  Work with speech therapy daily  Aggressive physical therapy and pulmonary toilet  Anticipate repeat VBS early next week  Frequent orientation, awake in day     PT/OT evals       Pertinent Labs/Diagnostic Results:     Results from last 7 days   Lab Units 07/18/20  0512 07/17/20  0440 07/14/20  0458   WBC Thousand/uL 17 38* 13 32* 8 14   HEMOGLOBIN g/dL 10 1* 10 3* 9 9*   HEMATOCRIT % 32 6* 32 8* 32 2*   PLATELETS Thousands/uL 328 353 448*   NEUTROS ABS Thousands/µL 14 11* 9 22* 4 53     Results from last 7 days   Lab Units 07/18/20  0512 07/17/20  0439 07/15/20  0400   SODIUM mmol/L 138 138 140   POTASSIUM mmol/L 4 1 4 0 3 7   CHLORIDE mmol/L 105 106 109*   CO2 mmol/L 25 25 26   ANION GAP mmol/L 8 7 5   BUN mg/dL 18 16 16   CREATININE mg/dL 0 90 0 82 0 78   EGFR ml/min/1 73sq m 87 90 92   CALCIUM mg/dL 9 2 9 1 8 6   MAGNESIUM mg/dL  --   --  2 4   PHOSPHORUS mg/dL  --   --  3 6     Results from last 7 days   Lab Units 07/15/20  0400   AST U/L 18   ALT U/L 40   ALK PHOS U/L 94   TOTAL PROTEIN g/dL 6 6   ALBUMIN g/dL 2 3*   TOTAL BILIRUBIN mg/dL 0 18*   BILIRUBIN DIRECT mg/dL  --      Results from last 7 days   Lab Units 07/18/20  1747 07/18/20  1211 07/18/20  0615 07/18/20  0004 07/17/20  1754 07/17/20  1215 07/17/20  0516   POC GLUCOSE mg/dl 128 108 106 141* 122 119 119     Results from last 7 days   Lab Units 07/18/20  0512 07/17/20  0439 07/15/20  0400 07/14/20  0004   GLUCOSE RANDOM mg/dL 99 100 117 103     Results from last 7 days   Lab Units 07/14/20  0017   PH ART  7 430   PCO2 ART mm Hg 33 0*   PO2 ART mm Hg 67 5*   HCO3 ART mmol/L 21 4*   BASE EXC ART mmol/L -2 3   O2 CONTENT ART mL/dL 14 0*   O2 HGB, ARTERIAL % 92 4*   ABG SOURCE  Radial, Left     Results from last 7 days   Lab Units 07/14/20  0004   LACTIC ACID mmol/L 1 1     Results from last 7 days   Lab Units 07/18/20  1646   BLOOD CULTURE  No Growth at 24 hrs  No Growth at 24 hrs         Vital Signs:   Date/Time  Temp  Pulse  Resp  BP  MAP (mmHg)  SpO2  FiO2 (%)  O2 Flow Rate (L/min)  O2 Device   07/18/20 2211    99    116/78              07/18/20 2026            97 %  28  5 L/min  Trach mask    07/18/20 1900  98 2 °F (36 8 °C)  100  18  106/54  76  99 %      Trach mask   07/18/20 1600                  Trach mask   07/18/20 1500  97 4 °F (36 3 °C)Abnormal   104  18  131/76  93  99 %      Trach mask   07/18/20 1351    103        99 %         07/18/20 1300            98 %      Trach mask   07/18/20 1200  98 7 °F (37 1 °C)  93  18  127/67    100 %      Trach mask   07/18/20 0800            98 %      Trach mask   07/18/20 0737  98 6 °F (37 °C)  100  18  91/63  72  100 %  28  5 L/min  Trach mask   07/18/20 0300  98 1 °F (36 7 °C)  108Abnormal     98/53  78  98 %               Medications:   Scheduled Medications:  Medications:  acetaminophen 975 mg Per NG Tube Q8H [facility]   chlorhexidine 15 mL Swish & Spit Q12H DEAN   enoxaparin 1 mg/kg Subcutaneous Q12H DEAN   levalbuterol 1 25 mg Nebulization TID   melatonin 6 mg Oral HS   metoprolol tartrate 12 5 mg Per NG Tube Q12H [facility]   nystatin 500,000 Units Swish & Swallow 4x Daily   OLANZapine 10 mg Oral HS   piperacillin-tazobactam 3 375 g Intravenous Q6H   polyethylene glycol 17 g Oral Daily   senna-docusate sodium 1 tablet Per NG Tube BID   sodium chloride 3 mL Nebulization TID     RN Meds:  acetaminophen 650 mg Rectal Q4H PRN   albuterol 2 5 mg Nebulization Q6H PRN   guaiFENesin 200 mg Oral Q4H PRN   Lidocaine Viscous HCl 15 mL Swish & Spit 4x Daily PRN   OLANZapine 5 mg Intramuscular Q6H PRN   ondansetron 4 mg Intravenous Q6H PRN   oxyCODONE 2 5 mg Oral Q4H PRN       Discharge Plan: TBD    Network Utilization Review Department  [email or web address]  ATTENTION: Please call with any questions or concerns to [phone] and carefully listen to the prompts so that you are directed to the right person  All voicemails are confidential   Amy Johnathan all requests for admission clinical reviews, approved or denied determinations and any other requests to dedicated fax number below belonging to the campus where the patient is receiving treatment   List of dedicated fax numbers for the Facilities:  [address] [place] [address] DENIALS (Administrative/Medical Necessity) [phone]   [address] [place] [address] (Maternity/NICU/Pediatrics) [phone]   Gabby Paniagua [phone]   Sharifa Thomason [phone]   Cullen Opitz [phone]   [facility] [address] [facility] [phone]   [facility]  [phone]   [address] [facility] [address]  [address] [facility] [address] [place] [address] [phone]

## 2020-07-20 NOTE — SPEECH THERAPY NOTE
Speech Language/Pathology  VBS ordered as stat, xray taking pt early this morning  Will see in xray at 0930 following xray

## 2020-07-20 NOTE — UTILIZATION REVIEW
Continued Stay Review                         Current Patient Class: IP Current Level of Care: Med/Surg    HPI:69 y o  male initially admitted on 6/10 s/p thoracoscopic left upper lobectomy complicated by prolonged air leak, cardiac arrest s/p cricothyroidotomy, tracheostomy revision, and trach exchange to 6 cuffless    Date: 7/19/2020  Assessment/Plan:  Afebrile  Much more calm this morning  Denied any chest pain or shortness of breath today  Continue tube feeds  Continue 1:1 watch  Hold laxatives, pt continues with loos stools  Send stool for c-diff  Continue passy aniya valve throughout the day  PICC line, NGT  Aggressive pulm toilet, Zosyn started yesterday for increasing opacity on CXR and elevated WBC's  Date: 7/20/2020  Assessment/Plan: pt continues to improve daily  This AM sitting up in a chair, able to converse  SLP this AM demonstrated aspiration but greatly improved from last wk  Still on 1:1 watch but easily redirected  Continue with PT/OT/Speech  Continue tube feels as goal: jevity 1 5 taylor @ 55 ml/hr, prosource protein liquid 2 packets and 100 ml H20 q4h, IV abx  F/u CXR today  O2 @ 5L trach mask      Pertinent Labs/Diagnostic Results:     Results from last 7 days   Lab Units 07/20/20  0519 07/19/20  0504 07/18/20  0512 07/17/20  0440 07/14/20  0458   WBC Thousand/uL 9 21 9 86 17 38* 13 32* 8 14   HEMOGLOBIN g/dL 8 9* 9 6* 10 1* 10 3* 9 9*   HEMATOCRIT % 29 4* 31 3* 32 6* 32 8* 32 2*   PLATELETS Thousands/uL 285 264 328 353 448*   NEUTROS ABS Thousands/µL 6 62 7 55 14 11* 9 22* 4 53     Results from last 7 days   Lab Units 07/20/20  0519 07/19/20  0504 07/18/20  0512 07/17/20  0439 07/15/20  0400   SODIUM mmol/L 141 139 138 138 140   POTASSIUM mmol/L 4 0 3 8 4 1 4 0 3 7   CHLORIDE mmol/L 110* 107 105 106 109*   CO2 mmol/L 26 28 25 25 26   ANION GAP mmol/L 5 4 8 7 5   BUN mg/dL 15 18 18 16 16   CREATININE mg/dL 0 74 0 88 0 90 0 82 0 78   EGFR ml/min/1 73sq m 94 88 87 90 92   CALCIUM mg/dL 8 5 8 6 9 2 9 1 8 6   MAGNESIUM mg/dL 2 6  --   --   --  2 4   PHOSPHORUS mg/dL 3 7  --   --   --  3 6     Results from last 7 days   Lab Units 07/20/20  0519 07/15/20  0400   AST U/L 13 18   ALT U/L 39 40   ALK PHOS U/L 96 94   TOTAL PROTEIN g/dL 6 9 6 6   ALBUMIN g/dL 2 0* 2 3*   TOTAL BILIRUBIN mg/dL 0 16* 0 18*   BILIRUBIN DIRECT mg/dL 0 17  --      Results from last 7 days   Lab Units 07/20/20  1153 07/20/20  0624 07/20/20  0009 07/19/20  0625 07/19/20  0025 07/18/20  1747 07/18/20  1211 07/18/20  0615 07/18/20  0004 07/17/20  1754 07/17/20  1215 07/17/20  0516   POC GLUCOSE mg/dl 121 97 111 133 120 128 108 106 141* 122 119 119     Results from last 7 days   Lab Units 07/20/20  0519 07/19/20  0504 07/18/20  0512 07/17/20  0439 07/15/20  0400 07/14/20  0004   GLUCOSE RANDOM mg/dL 96 110 99 100 117 103         Results from last 7 days   Lab Units 07/18/20  1646   BLOOD CULTURE  No Growth at 24 hrs  No Growth at 24 hrs         Vital Signs:   Time  Temp  Pulse  Resp  BP  MAP (mmHg)  SpO2  FiO2 (%)  O2 Flow Rate (L/min)  O2 Device   07/20/20 1455  98 2 °F (36 8 °C)  92  16  115/69  96  99 %      Trach mask   07/20/20 1329    96        99 %  28  5 L/min  Trach mask   07/20/20 1052  98 1 °F (36 7 °C)  86  18  137/72     98 %      Trach mask   07/20/20 0800                5 L/min  Trach mask   07/20/20 0732  97 5 °F (36 4 °C)  86  18  131/70  90  100 %      Trach mask   07/20/20 0714    107Abnormal         99 %  28  5 L/min  Trach mask   07/19/20 2340  98 2 °F (36 8 °C)  93  13  113/65  87  100 %      Trach mask   07/19/20 2000            100 %         07/19/20 1900  98 6 °F (37 °C)  93  19  113/64  87  100 %  28  5 L/min  Trach mask   07/19/20 1600            100 %      Trach mask   07/19/20 1526  98 2 °F (36 8 °C)  97  19  130/71     99 %    5 L/min  Trach mask   07/19/20 1340    101        99 %         07/19/20 1200            100 %      Trach mask   07/19/20 1100 98 5 °F (36 9 °C)  93  18  100/73  94  100 %      None (Room air)   07/19/20 0946    91    101/73  83           07/19/20 0800            98 %      Trach mask   07/19/20 0738    95        96 %      None (Room air)    07/19/20 0700  98 7 °F (37 1 °C)  93  18  102/66  87  100 %         07/19/20 0025  98 2 °F (36 8 °C)  98  18  91/53  60  94 %      Trach mask         Medications:   Scheduled Medications:  Medications:  acetaminophen 975 mg Per NG Tube Q8H [address]   chlorhexidine 15 mL Swish & Spit Q12H DEAN   enoxaparin 1 mg/kg Subcutaneous Q12H DENA   levalbuterol 1 25 mg Nebulization TID   melatonin 6 mg Oral HS   metoprolol tartrate 12 5 mg Per NG Tube Q12H DEAN   nystatin 500,000 Units Swish & Swallow 4x Daily   OLANZapine 10 mg Oral HS   piperacillin-tazobactam 3 375 g Intravenous Q6H   polyethylene glycol 17 g Oral Daily   senna-docusate sodium 1 tablet Per NG Tube BID   sodium chloride 3 mL Nebulization TID        PRN Meds:  acetaminophen 650 mg Rectal Q4H PRN   albuterol 2 5 mg Nebulization Q6H PRN   guaiFENesin 200 mg Oral Q4H PRN   Lidocaine Viscous HCl 15 mL Swish & Spit 4x Daily PRN   OLANZapine 5 mg Intramuscular Q6H PRN   ondansetron 4 mg Intravenous Q6H PRN   oxyCODONE 2 5 mg Oral Q4H PRN       Discharge Plan: TBD    Network Utilization Review Department  [email or web address]  ATTENTION: Please call with any questions or concerns to [phone] and carefully listen to the prompts so that you are directed to the right person  All voicemails are confidential   Radha Byrne all requests for admission clinical reviews, approved or denied determinations and any other requests to dedicated fax number below belonging to the campus where the patient is receiving treatment   List of dedicated fax numbers for the Facilities:  FACILITY NAME UR FAX NUMBER   ADMISSION DENIALS (Administrative/Medical Necessity) [phone]   [address] (Maternity/NICU/Pediatrics) Adrián [phone]   Nikolai Tyler [phone]   [facility] [phone]   [address] [phone]   [facility]  [phone]   [address] [facility] [address] [facility]  [address] [address] [address] [facility] [phone]

## 2020-07-21 ENCOUNTER — APPOINTMENT (INPATIENT)
Dept: RADIOLOGY | Facility: HOSPITAL | Age: 70
DRG: 003 | End: 2020-07-21
Payer: COMMERCIAL

## 2020-07-21 LAB
ANION GAP SERPL CALCULATED.3IONS-SCNC: 3 MMOL/L (ref 4–13)
BASOPHILS # BLD AUTO: 0.05 THOUSANDS/ΜL (ref 0–0.1)
BASOPHILS NFR BLD AUTO: 1 % (ref 0–1)
BUN SERPL-MCNC: 12 MG/DL (ref 5–25)
CALCIUM SERPL-MCNC: 8.4 MG/DL (ref 8.3–10.1)
CHLORIDE SERPL-SCNC: 107 MMOL/L (ref 100–108)
CO2 SERPL-SCNC: 29 MMOL/L (ref 21–32)
CREAT SERPL-MCNC: 0.74 MG/DL (ref 0.6–1.3)
EOSINOPHIL # BLD AUTO: 0.27 THOUSAND/ΜL (ref 0–0.61)
EOSINOPHIL NFR BLD AUTO: 3 % (ref 0–6)
ERYTHROCYTE [DISTWIDTH] IN BLOOD BY AUTOMATED COUNT: 14.5 % (ref 11.6–15.1)
GFR SERPL CREATININE-BSD FRML MDRD: 94 ML/MIN/1.73SQ M
GLUCOSE SERPL-MCNC: 109 MG/DL (ref 65–140)
GLUCOSE SERPL-MCNC: 119 MG/DL (ref 65–140)
GLUCOSE SERPL-MCNC: 126 MG/DL (ref 65–140)
GLUCOSE SERPL-MCNC: 130 MG/DL (ref 65–140)
GLUCOSE SERPL-MCNC: 87 MG/DL (ref 65–140)
GLUCOSE SERPL-MCNC: 96 MG/DL (ref 65–140)
HCT VFR BLD AUTO: 29.5 % (ref 36.5–49.3)
HGB BLD-MCNC: 9 G/DL (ref 12–17)
IMM GRANULOCYTES # BLD AUTO: 0.07 THOUSAND/UL (ref 0–0.2)
IMM GRANULOCYTES NFR BLD AUTO: 1 % (ref 0–2)
LYMPHOCYTES # BLD AUTO: 1.63 THOUSANDS/ΜL (ref 0.6–4.47)
LYMPHOCYTES NFR BLD AUTO: 19 % (ref 14–44)
MCH RBC QN AUTO: 30.4 PG (ref 26.8–34.3)
MCHC RBC AUTO-ENTMCNC: 30.5 G/DL (ref 31.4–37.4)
MCV RBC AUTO: 100 FL (ref 82–98)
MONOCYTES # BLD AUTO: 0.59 THOUSAND/ΜL (ref 0.17–1.22)
MONOCYTES NFR BLD AUTO: 7 % (ref 4–12)
NEUTROPHILS # BLD AUTO: 6.14 THOUSANDS/ΜL (ref 1.85–7.62)
NEUTS SEG NFR BLD AUTO: 69 % (ref 43–75)
NRBC BLD AUTO-RTO: 0 /100 WBCS
PLATELET # BLD AUTO: 273 THOUSANDS/UL (ref 149–390)
PMV BLD AUTO: 10.5 FL (ref 8.9–12.7)
POTASSIUM SERPL-SCNC: 3.9 MMOL/L (ref 3.5–5.3)
RBC # BLD AUTO: 2.96 MILLION/UL (ref 3.88–5.62)
SODIUM SERPL-SCNC: 139 MMOL/L (ref 136–145)
WBC # BLD AUTO: 8.75 THOUSAND/UL (ref 4.31–10.16)

## 2020-07-21 PROCEDURE — 92526 ORAL FUNCTION THERAPY: CPT

## 2020-07-21 PROCEDURE — 97110 THERAPEUTIC EXERCISES: CPT

## 2020-07-21 PROCEDURE — 97530 THERAPEUTIC ACTIVITIES: CPT

## 2020-07-21 PROCEDURE — 97535 SELF CARE MNGMENT TRAINING: CPT

## 2020-07-21 PROCEDURE — 74018 RADEX ABDOMEN 1 VIEW: CPT

## 2020-07-21 PROCEDURE — 94760 N-INVAS EAR/PLS OXIMETRY 1: CPT

## 2020-07-21 PROCEDURE — 82948 REAGENT STRIP/BLOOD GLUCOSE: CPT

## 2020-07-21 PROCEDURE — 71046 X-RAY EXAM CHEST 2 VIEWS: CPT

## 2020-07-21 PROCEDURE — 85025 COMPLETE CBC W/AUTO DIFF WBC: CPT | Performed by: SURGERY

## 2020-07-21 PROCEDURE — 94640 AIRWAY INHALATION TREATMENT: CPT

## 2020-07-21 PROCEDURE — 99232 SBSQ HOSP IP/OBS MODERATE 35: CPT | Performed by: NURSE PRACTITIONER

## 2020-07-21 PROCEDURE — 99024 POSTOP FOLLOW-UP VISIT: CPT | Performed by: THORACIC SURGERY (CARDIOTHORACIC VASCULAR SURGERY)

## 2020-07-21 PROCEDURE — 71045 X-RAY EXAM CHEST 1 VIEW: CPT

## 2020-07-21 PROCEDURE — 80048 BASIC METABOLIC PNL TOTAL CA: CPT | Performed by: SURGERY

## 2020-07-21 RX ORDER — HALOPERIDOL 5 MG/ML
2 INJECTION INTRAMUSCULAR EVERY 6 HOURS PRN
Status: DISCONTINUED | OUTPATIENT
Start: 2020-07-21 | End: 2020-07-30 | Stop reason: HOSPADM

## 2020-07-21 RX ORDER — OLANZAPINE 10 MG/1
5 INJECTION, POWDER, LYOPHILIZED, FOR SOLUTION INTRAMUSCULAR ONCE
Status: COMPLETED | OUTPATIENT
Start: 2020-07-21 | End: 2020-07-21

## 2020-07-21 RX ORDER — POTASSIUM CHLORIDE 20MEQ/15ML
40 LIQUID (ML) ORAL ONCE
Status: COMPLETED | OUTPATIENT
Start: 2020-07-21 | End: 2020-07-21

## 2020-07-21 RX ORDER — METOPROLOL TARTRATE 5 MG/5ML
2.5 INJECTION INTRAVENOUS EVERY 12 HOURS
Status: DISCONTINUED | OUTPATIENT
Start: 2020-07-21 | End: 2020-07-30 | Stop reason: HOSPADM

## 2020-07-21 RX ORDER — METOPROLOL TARTRATE 5 MG/5ML
5 INJECTION INTRAVENOUS ONCE
Status: DISCONTINUED | OUTPATIENT
Start: 2020-07-21 | End: 2020-07-21

## 2020-07-21 RX ADMIN — LEVALBUTEROL HYDROCHLORIDE 1.25 MG: 1.25 SOLUTION, CONCENTRATE RESPIRATORY (INHALATION) at 07:09

## 2020-07-21 RX ADMIN — METOPROLOL TARTRATE 12.5 MG: 25 TABLET ORAL at 09:46

## 2020-07-21 RX ADMIN — ISODIUM CHLORIDE 3 ML: 0.03 SOLUTION RESPIRATORY (INHALATION) at 07:09

## 2020-07-21 RX ADMIN — PIPERACILLIN AND TAZOBACTAM 3.38 G: 3; .375 INJECTION, POWDER, FOR SOLUTION INTRAVENOUS at 04:58

## 2020-07-21 RX ADMIN — NYSTATIN 500000 UNITS: 100000 SUSPENSION ORAL at 09:58

## 2020-07-21 RX ADMIN — OLANZAPINE 5 MG: 10 INJECTION, POWDER, FOR SOLUTION INTRAMUSCULAR at 17:27

## 2020-07-21 RX ADMIN — ONDANSETRON 4 MG: 2 INJECTION INTRAMUSCULAR; INTRAVENOUS at 15:09

## 2020-07-21 RX ADMIN — PIPERACILLIN AND TAZOBACTAM 3.38 G: 3; .375 INJECTION, POWDER, FOR SOLUTION INTRAVENOUS at 16:39

## 2020-07-21 RX ADMIN — OLANZAPINE 5 MG: 10 INJECTION, POWDER, FOR SOLUTION INTRAMUSCULAR at 17:59

## 2020-07-21 RX ADMIN — LEVALBUTEROL HYDROCHLORIDE 1.25 MG: 1.25 SOLUTION, CONCENTRATE RESPIRATORY (INHALATION) at 13:38

## 2020-07-21 RX ADMIN — PIPERACILLIN AND TAZOBACTAM 3.38 G: 3; .375 INJECTION, POWDER, FOR SOLUTION INTRAVENOUS at 22:07

## 2020-07-21 RX ADMIN — TOPICAL ANESTHETIC 1 SPRAY: 200 SPRAY DENTAL; PERIODONTAL at 17:48

## 2020-07-21 RX ADMIN — ISODIUM CHLORIDE 3 ML: 0.03 SOLUTION RESPIRATORY (INHALATION) at 13:38

## 2020-07-21 RX ADMIN — NYSTATIN 500000 UNITS: 100000 SUSPENSION ORAL at 12:18

## 2020-07-21 RX ADMIN — METOPROLOL TARTRATE 2.5 MG: 5 INJECTION INTRAVENOUS at 22:00

## 2020-07-21 RX ADMIN — POTASSIUM CHLORIDE 40 MEQ: 20 SOLUTION ORAL at 09:45

## 2020-07-21 RX ADMIN — ACETAMINOPHEN 975 MG: 650 SUSPENSION ORAL at 21:58

## 2020-07-21 RX ADMIN — ENOXAPARIN SODIUM 90 MG: 100 INJECTION SUBCUTANEOUS at 22:00

## 2020-07-21 RX ADMIN — ENOXAPARIN SODIUM 90 MG: 100 INJECTION SUBCUTANEOUS at 09:47

## 2020-07-21 RX ADMIN — ISODIUM CHLORIDE 3 ML: 0.03 SOLUTION RESPIRATORY (INHALATION) at 19:28

## 2020-07-21 RX ADMIN — LEVALBUTEROL HYDROCHLORIDE 1.25 MG: 1.25 SOLUTION, CONCENTRATE RESPIRATORY (INHALATION) at 19:28

## 2020-07-21 RX ADMIN — PIPERACILLIN AND TAZOBACTAM 3.38 G: 3; .375 INJECTION, POWDER, FOR SOLUTION INTRAVENOUS at 10:16

## 2020-07-21 RX ADMIN — CHLORHEXIDINE GLUCONATE 0.12% ORAL RINSE 15 ML: 1.2 LIQUID ORAL at 09:58

## 2020-07-21 NOTE — PLAN OF CARE
Problem: PHYSICAL THERAPY ADULT  Goal: Performs mobility at highest level of function for planned discharge setting  See evaluation for individualized goals  Description  Treatment/Interventions: LE strengthening/ROM, Therapeutic exercise, Endurance training, Cognitive reorientation, Bed mobility, Continued evaluation, Spoke to nursing, transfer training, gait training  Equipment Recommended: rw       See flowsheet documentation for full assessment, interventions and recommendations  Outcome: Progressing  Note:   Prognosis: Good  Problem List: Decreased strength, Decreased endurance, Impaired balance, Decreased mobility, Decreased cognition, Decreased safety awareness  Assessment: Pt cont to gradually improve in functional endurance and mobility tolerance ambulating further distances w/ rw and (A)x1; stand by (A) of 2 more staff was needed for lines and chair follow; seated rest period was provided and pt remained in NAD; LE therex was also performed in an AAROM/AROM mode while in the chair w/ rest periods provided as needed; pt appeared to be comfortable at the end of session; overall, cont to recommend rehab upon D/C when medically cleared; will follow  Barriers to Discharge: Inaccessible home environment, Decreased caregiver support     PT Discharge Recommendation: [address]     PT - OK to Discharge: (S) Yes(when stable to rehab)    See flowsheet documentation for full assessment

## 2020-07-21 NOTE — PHYSICAL THERAPY NOTE
PHYSICAL THERAPY NOTE          Patient Name: Kishor Valdovinos  Today's Date: 7/21/2020 07/21/20 1110   Pain Assessment   Pain Assessment Tool Pain Assessment not indicated - pt denies pain   Pain Score No Pain   Restrictions/Precautions   Other Precautions Cognitive;Multiple lines;Telemetry;O2;Fall Risk; Chair Alarm  (chair alarm activated at the end of session; Keofed)   General   Chart Reviewed Yes   Additional Pertinent History cleared for Tx session (spoke to nsg)   Response to Previous Treatment Patient with no complaints from previous session  Cognition   Overall Cognitive Status Impaired   Arousal/Participation Alert; Cooperative   Attention Attends with cues to redirect   Orientation Level Oriented to person;Oriented to situation   Memory Decreased recall of recent events;Decreased recall of precautions   Following Commands Follows one step commands without difficulty   Subjective   Subjective Pt is in the chair; agreeable to mobilize;   Transfers   Sit to Stand 4  Minimal assistance  (3 trials)   Additional items Assist x 1; Increased time required;Verbal cues   Stand to Sit 4  Minimal assistance  (3 trials)   Additional items Assist x 1;Verbal cues   Ambulation/Elevation   Gait pattern Excessively slow; Short stride;Narrow GEO; Inconsistent evan   Gait Assistance 3  Moderate assist  (incl rw management)   Additional items Assist x 1;Verbal cues; Tactile cues  (stand by (A) of 2 more staff for lines and chair follow)   Assistive Device Rolling walker   Distance 2 x 200 ft w/ extended seated rest period in between   Stair Management Assistance Not tested  (not appropriate at this time)   Balance   Static Sitting Fair   Dynamic Sitting Fair -   Static Standing Poor +   Ambulatory Poor   Activity Tolerance   Activity Tolerance Patient limited by fatigue  (O2 sat in the 90s % post bouts of amb)   Nurse Made Aware spoke to Bari Howard RN   Exercises   Knee AROM Long Arc Quad Sitting;15 reps;AROM; Bilateral  (2 sets)   Ankle Pumps Sitting;15 reps;AROM; Bilateral  (2 sets)   Marching Sitting;10 reps;AROM; Bilateral;AAROM  (2 sets)   Equipment Use   Comments Pt was reclined in the chair w/ LE elevated at the end of session; chair alarm on; call bell w/in reach   Assessment   Prognosis Good   Problem List Decreased strength;Decreased endurance; Impaired balance;Decreased mobility; Decreased cognition;Decreased safety awareness   Assessment Pt cont to gradually improve in functional endurance and mobility tolerance ambulating further distances w/ rw and (A)x1; stand by (A) of 2 more staff was needed for lines and chair follow; seated rest period was provided and pt remained in NAD; LE therex was also performed in an AAROM/AROM mode while in the chair w/ rest periods provided as needed; pt appeared to be comfortable at the end of session; overall, cont to recommend rehab upon D/C when medically cleared; will follow  Barriers to Discharge Inaccessible home environment;Decreased caregiver support   Goals   Patient Goals to get better   LTG Expiration Date 07/30/20   PT Treatment Day 10   Plan   Treatment/Interventions Functional transfer training;LE strengthening/ROM; Therapeutic exercise; Endurance training;Cognitive reorientation; Bed mobility;Gait training;Spoke to nursing;Spoke to case management;OT   Progress Progressing toward goals   PT Frequency Other (Comment)  (3-5x/wk)   Recommendation   PT Discharge Recommendation Post-Acute Rehabilitation Services   [facility] [place]

## 2020-07-21 NOTE — SOCIAL WORK
CM spoke with daughter Makenna Lopez, discussed that feeding status is still being evaluated  Needs repeat VBS as he had some coughing when testing thin liquids yesterday  [address] is still following  Discussed if not approved may need to make other choices, acute vs subacute  Pt's 1:1 was d/c'd today, pt's room is next to nurses station  Behaving well thus far  Still on IV antibiotic  Daughter thanked me for update and we will continue to be in contact for further discharge planning

## 2020-07-21 NOTE — PROGRESS NOTES
Progress Note - Hui Larson 71 y o  male MRN: [id number]    Unit/Bed#: [facility] 405-01 Encounter: [id number]      Assessment:  69 M status post thoracoscopic left upper lobectomy complicated by prolonged air leak, cardiac arrest s/p cricothyroidotomy, tracheostomy revision, and trach exchange to 6 cuffless    VSS  Afebrile  Doing well this morning up in chair  CASABLANCA negative  Alert awake oriented to person, time  Plan:  Continue tube feeds  Continue abx, zosyn day 3  Discontinue 1:1  F/u AM CXR, left lower lobe pneumonia  Intense physical therapy today  F/u case mgt for LTAC placement     Subjective:   No complaints  Up in chair watching TV  Alert and oriented  Denied any chest pain or shortness of breath  Objective:     Vitals: Blood pressure 114/67, pulse 87, temperature 98 1 °F (36 7 °C), temperature source Oral, resp  rate 18, height 5' 10" (1 778 m), weight 86 5 kg (190 lb 11 2 oz), SpO2 99 %  ,Body mass index is 27 36 kg/m²  Intake/Output Summary (Last 24 hours) at 7/21/2020 0718  Last data filed at 7/21/2020 0600  Gross per 24 hour   Intake 1103 67 ml   Output 1600 ml   Net -496 33 ml       Physical Exam  General: NAD  HEENT: NC/AT  MMM  Cv: RRR     Lungs: normal effort  Ab: Soft, NT/ND  Ex: no CCE  Neuro: AAOx2    Scheduled Meds:  Current Facility-Administered Medications:  acetaminophen 975 mg Per NG Tube [facility] HOME Tuesday Loel Esters, CRNP    acetaminophen 650 mg Rectal Q4H PRN Flip Mckeon MD    albuterol 2 5 mg Nebulization Q6H PRN Flip Mckeon MD    chlorhexidine 15 mL Swish & Spit Q12H [facility] Flip Mckeon MD    enoxaparin 1 mg/kg Subcutaneous Q12H [facility] Flip Mckeon MD    guaiFENesin 200 mg Oral Q4H PRN Flip Mckeon MD    levalbuterol 1 25 mg Nebulization TID Flip Mckeon MD    Lidocaine Viscous HCl 15 mL Swish & Spit 4x Daily PRN Flip Mckeon MD    melatonin 6 mg Oral HS Flip Mckeon MD    metoprolol tartrate 12 5 mg Per NG Tube Q12H [facility] Rip Fergusson, MD    nystatin 500,000 Units Swish & Swallow 4x Daily Opal Rao MD    OLANZapine 10 mg Oral HS Viv Benjamin MD    OLANZapine 5 mg Intramuscular Q6H PRN Opal Rao MD    ondansetron 4 mg Intravenous Q6H PRN Opal Rao MD    oxyCODONE 2 5 mg Oral Q4H PRN Opal Rao MD    piperacillin-tazobactam 3 375 g Intravenous Q6H Opal Rao MD Last Rate: Stopped (07/21/20 0600)   polyethylene glycol 17 g Oral Daily Opal Rao MD    senna-docusate sodium 1 tablet Per NG Tube BID Opal Rao MD    sodium chloride 3 mL Nebulization TID Opal Rao MD      Continuous Infusions:   PRN Meds: acetaminophen    albuterol    guaiFENesin    Lidocaine Viscous HCl    OLANZapine    ondansetron    oxyCODONE      Invasive Devices     Peripherally Inserted Central Catheter Line            PICC Line 72/72/98 Left Basilic 24 days          Drain            NG/OG/Enteral Tube Nasogastric Right nares 22 days    Urethral Catheter Latex 16 Fr  1 day          Airway            Surgical Airway 7 days                Lab, Imaging and other studies: I have personally reviewed pertinent reports      VTE Pharmacologic Prophylaxis: Enoxaparin (Lovenox)  VTE Mechanical Prophylaxis: sequential compression device

## 2020-07-21 NOTE — RESTORATIVE TECHNICIAN NOTE
Restorative Specialist Mobility Note       Activity: Ambulate in jason(to the stretcher)     Assistive Device: Front wheel walker

## 2020-07-21 NOTE — PROGRESS NOTES
Progress Note - Padmini Zhang 71 y o  male MRN: [id number]    Unit/Bed#: [facility] 405-01 Encounter: [id number]      Assessment/Plan:  1  Encephalopathy  Improved  Alert and oriented x2-3  Encourage daytime activity, pt likes to watch TV at baseline  Continue melatonin and zyprexa  Pt slept well  Monitor for urinary retention and constipation    2  Acute pain due to surgery  Scheduled acetaminophen  Prn oxycodone    3  Insomnia  Continue melatonin  trazodone discontinued switched to zyprexa  Encourage daytime stimulation, pt likes to watch [employer]     4  Cognitive impairment  Per daughter pt with some 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  Recommend check TSH and vitamin B12 at outpatient    5  Hearing loss  Pt had previous audiology exam  Refused hearing aids  Speak loudly and clearly   Enunciate words    6  Goals of care  Per daughter she does not want her dad to have a PEG tube  Goal is for pt to be more awake to participate with speech, PT/OT      Subjective:   Upon exam pt is oob in recliner chair  Alert and oriented x3, knows year forget month and day  His voice is stronger and clearer today  Per 1:1 pt doing well  Spoke with nursing who reports that pt had episode of agitation last evening, trying to pull out NGT, given IM zyprexa  Pt calm and cooperative at present  Objective:     Vitals: Blood pressure 136/81, pulse 89, temperature 98 4 °F (36 9 °C), temperature source Oral, resp  rate 18, height 5' 10" (1 778 m), weight 86 5 kg (190 lb 11 2 oz), SpO2 99 %  ,Body mass index is 27 36 kg/m²  Intake/Output Summary (Last 24 hours) at 7/21/2020 1237  Last data filed at 7/21/2020 1200  Gross per 24 hour   Intake 1603 67 ml   Output 1600 ml   Net 3 67 ml       Physical Exam:   General : NAD  HEENT : MMM, trach midline  Heart : Normal rate, no murmur rub or gallop  Lungs : CTA no wheezes, rales or rhonchi  Abdomen : Soft, NT/ND, BS auscultated in all 4 quads     Ext :  no edema  Skin : Pink, warm, dry, age appropriate turgor and mobility  Neuro : Nonfocal  Psych : Alert and O x 3      Invasive Devices     Peripherally Inserted Central Catheter Line            PICC Line 66/95/44 Left Basilic 25 days          Drain            NG/OG/Enteral Tube Nasogastric Right nares 22 days    Urethral Catheter Latex 16 Fr  1 day          Airway            Surgical Airway 7 days                Lab, Imaging and other studies: I have personally reviewed pertinent reports      VTE Pharmacologic Prophylaxis: Sequential compression device (Venodyne)   VTE Mechanical Prophylaxis: sequential compression device

## 2020-07-21 NOTE — QUICK NOTE
Called to evaluate patient after he pulled NGT out about 4-5 inches      Patient resting comfortably when seen s/p zyprexa qhs w/ 1:1    Plan:   Mitts ordered- use per nursing evaluation  CXR to confirm placement  Advance NGT amount it was pulled out  Resume feeds

## 2020-07-21 NOTE — SPEECH THERAPY NOTE
Speech Language/Pathology    Speech/Language Pathology Progress Note    Patient Name: Lux Womack  Today's Date: 7/21/2020    Subjective:  Pt was a little agitated, and refused a lot  Current Diet:   NPO  Pt pulled out NG,  Shadi-Feed ordered     Objective:  Pt seen for ongoing dysphagia tx  Pt trialed ht and puree  Pt refused ht after 1 tsp  Pt asked for pancakes, and then pudding  Pt would refuse after a few tsps of puree, and then allow us to trial it again after a minute of encouragement  Pt demonstrated s/s of aspiration: throat clearing, coughing, and a wet vocal quality during puree  Unable to determine for ht due to the little amount he took  Pt said sometimes he feels that he has to cough  After trials, the PMV was taken off and pt was cued to cough  The secretions that came out onto a napkin were slightly brown-colored from the pudding  Pt w/ fairly strong voicing but still mild/mod harshness    Assessment:  Pt with ongoing suspected aspiration  The s/s of aspiration include throat clearing, coughing, a wet vocal quality, and the slightly tinged secretions from coughing with his PMV removed  Limited trials due to pt refusal      Plan/Recommendations:  Continue to use PMV  Continue NPO  Continue po trials with speech only    Will f/u

## 2020-07-21 NOTE — PROGRESS NOTES
Pastoral Care Progress Note    2020  Patient: Louie Fields : 1950  Admission Date & Time: 6/10/2020 8508  MRN: [id number] [facility]: [id number]                     Chaplaincy Interventions Utilized:   Empowerment: Encouraged self-care    Exploration: Explored emotional needs & resources    Relationship Building: Cultivated a relationship of care and support    Chaplaincy Outcomes Achieved:  Expressed gratitude, Identified meaningful connections and Improved communication             20 1100   Clinical Encounter Type   Visited With Patient   Routine Visit Introduction

## 2020-07-21 NOTE — PLAN OF CARE
Problem: OCCUPATIONAL THERAPY ADULT  Goal: Performs self-care activities at highest level of function for planned discharge setting  See evaluation for individualized goals  Description  Treatment Interventions: ADL retraining, Functional transfer training, UE strengthening/ROM, Endurance training, Cognitive reorientation, Patient/family training, Equipment evaluation/education, Fine motor coordination activities, Compensatory technique education, Energy conservation, Activityengagement, Neuromuscular reeducation, Continued evaluation  Equipment Recommended: Tub seat with back, Bedside commode       See flowsheet documentation for full assessment, interventions and recommendations  Outcome: Progressing  Note:   Limitation: Decreased ADL status, Decreased UE ROM, Decreased UE strength, Decreased Safe judgement during ADL, Decreased cognition, Decreased endurance, Decreased sensation, Decreased self-care trans, Decreased high-level ADLs  Prognosis: Fair  Assessment: Patient participated in Skilled OT session this date with interventions consisting of ADL re training with the use of correct body mechanics, deep breathing technique and  therapeutic activities to: increase activity tolerance   Patient agreeable to OT treatment session, upon arrival patient was found seated OOB to Chair  In comparison to previous session, patient with improvements in functional mobiltiy  Patient requiring frequent rest periods and ocassional safety reminders  Patient continues to be functioning below baseline level, occupational performance remains limited secondary to factors listed above and increased risk for falls and injury  From OT standpoint, recommendation at time of d/c would be Short Term Rehab  Patient to benefit from continued Occupational Therapy treatment while in the hospital to address deficits as defined above and maximize level of functional independence with ADLs and functional mobility        OT Discharge Recommendation: Post-Acute Rehabilitation Services  OT - OK to Discharge: Yes(When medically appropriate)

## 2020-07-21 NOTE — PROGRESS NOTES
Per Rin Pires from thoracic surgery, okay to restart tube feeds after advancing NG tube 3 inches to reach original placement  NG tube flushed and pulled back as multiple small tube feeding clots were blocking the passageway   Once clots removed, tube feedings restarted around 2330 pm

## 2020-07-21 NOTE — RESTORATIVE TECHNICIAN NOTE
Restorative Specialist Mobility Note       Activity: Commode     Assistive Device: Front wheel walker

## 2020-07-21 NOTE — QUICK NOTE
Keofed tube placement    Pt has failed swallow evals and is to remain NPO per speech  Pulled out his NG tube earlier today  Continues to require nutrition support for which a Keofed tube was placed  Initial attempt made after application of hurricaine spray but pt got violent   Zyprexa 10 mg IV given and repeat attempt was made  Tube advanced to 30 cm and pCXR obtained     Subsequently advanced to 60 cm and confirmed on repeat  pCXR   C/w 1:1 monitoring

## 2020-07-21 NOTE — PROGRESS NOTES
Mitts were placed on patient around 9 pm  Pt became more aggravated after placing them on and started to bit the mitts and tried to bite Charolett Lied, PCA  Patient was verbally redirected, assured of his safety, and explained the reason for the use of the mitts  Pt also told that if he cooperates then the MITTS would come off  The mitts were taken off within 5 minutes and patient was complying not to pull any lines

## 2020-07-21 NOTE — OCCUPATIONAL THERAPY NOTE
OccupationalTherapy Progress Note     Patient Name: Faisal Hitchcock  Today's Date: 7/21/2020  Problem List  Principal Problem:    Malignant neoplasm of upper lobe of left lung [facility])  Active Problems:    Acute respiratory failure with hypoxia ([facility] [address] )    Acute deep vein thrombosis (DVT) of axillary vein of right upper extremity (HCC)    Acute deep vein thrombosis (DVT) of brachial vein of right upper extremity (HCC)    Tracheostomy in place [facility])    Acute deep vein thrombosis (DVT) of calf muscle vein of left lower extremity (HCC)    Encephalopathy    Hyperactive delirium after surgical procedure    Anemia    Thrombocytosis ([facility] [address] )          07/21/20 1059   Restrictions/Precautions   Other Precautions Cognitive; Chair Alarm;Multiple lines;Telemetry;O2;Fall Risk;Pain  (trach, keofed)   General   Response to Previous Treatment Patient with no complaints from previous session   Lifestyle   Autonomy pta pt reports I in ADLs/IADLs/functional mobility   Reciprocal Relationships supportive dtr and son in law   Service to Others retired    [address] enjoys doing "nothing"   Pain Assessment   Pain Assessment Tool 0-10   Pain Score No Pain   ADL   Where Assessed Chair   LB Dressing Assistance 2  Maximal Assistance   LB Dressing Deficit Setup;Supervision/safety; Don/doff R sock; Don/doff L sock   Transfers   Sit to Stand 4  Minimal assistance   Additional items Assist x 1; Increased time required;Verbal cues   Stand to Sit 4  Minimal assistance   Additional items Assist x 1; Increased time required;Verbal cues   Functional Mobility   Functional Mobility 3  Moderate assistance  (A of 2 more for safety, line management and chair follow)   Additional Comments Pt demonstrated household mobility with 1 seated rest break and 1 LOB  Additional items Rolling walker   Cognition   Overall Cognitive Status Impaired   Arousal/Participation Responsive; Cooperative   Attention Attends with cues to redirect   Orientation Level Oriented to person;Oriented to place;Oriented to time;Disoriented to situation   Memory Decreased recall of precautions;Decreased recall of recent events   Following Commands Follows one step commands with increased time or repetition   Comments Pt is pleasant and cooperative  Pt requires VC for safety and correct technique  Activity Tolerance   Activity Tolerance Patient limited by fatigue   Medical Staff Made Aware RN confirmed okay to see pt   Assessment   Assessment Patient participated in Skilled OT session this date with interventions consisting of ADL re training with the use of correct body mechanics, deep breathing technique and  therapeutic activities to: increase activity tolerance   Patient agreeable to OT treatment session, upon arrival patient was found seated OOB to Chair  In comparison to previous session, patient with improvements in functional mobiltiy  Patient requiring frequent rest periods and ocassional safety reminders  Patient continues to be functioning below baseline level, occupational performance remains limited secondary to factors listed above and increased risk for falls and injury  From OT standpoint, recommendation at time of d/c would be Short Term Rehab  Patient to benefit from continued Occupational Therapy treatment while in the hospital to address deficits as defined above and maximize level of functional independence with ADLs and functional mobility  Plan   Treatment Interventions ADL retraining;Functional transfer training; Endurance training;Patient/family training   Goal Expiration Date 07/30/20   OT Treatment Day 2   OT Frequency 3-5x/wk   Recommendation   OT Discharge Recommendation Post-Acute Rehabilitation Services   OT - OK to Discharge Yes  (When medically appropriate)   Modified Bree Scale   Modified Bree Scale 4     ALYSIA Bhat, OTR/L

## 2020-07-21 NOTE — PROGRESS NOTES
Patient in room with Chevy Tubbs, continual observation PCA  Around 2015 patient was agitated, trying to get out of bed, and pulling at [place]/NG tube despite redirection  I was called to the bedside and 5 mg IM Zyprexa administered  Around 2050 Mitchell Singleton called me to the bedside stating that the patient's NG tube was pulled out a few inches  Upon assessment, patient's NG tube was about 3-4 inches out from its previous placement  Tube feedings wer placed on told  NG tube tape reapplied to keep it from sliding out more  Mily Lowry from blue surgery notified  Chest x-ray and non-violent MITTS ordered

## 2020-07-22 ENCOUNTER — APPOINTMENT (INPATIENT)
Dept: RADIOLOGY | Facility: HOSPITAL | Age: 70
DRG: 003 | End: 2020-07-22
Payer: COMMERCIAL

## 2020-07-22 LAB
ANION GAP SERPL CALCULATED.3IONS-SCNC: 5 MMOL/L (ref 4–13)
BASOPHILS # BLD AUTO: 0.06 THOUSANDS/ΜL (ref 0–0.1)
BASOPHILS NFR BLD AUTO: 1 % (ref 0–1)
BUN SERPL-MCNC: 11 MG/DL (ref 5–25)
CALCIUM SERPL-MCNC: 8.7 MG/DL (ref 8.3–10.1)
CHLORIDE SERPL-SCNC: 109 MMOL/L (ref 100–108)
CO2 SERPL-SCNC: 27 MMOL/L (ref 21–32)
CREAT SERPL-MCNC: 0.86 MG/DL (ref 0.6–1.3)
EOSINOPHIL # BLD AUTO: 0.3 THOUSAND/ΜL (ref 0–0.61)
EOSINOPHIL NFR BLD AUTO: 4 % (ref 0–6)
ERYTHROCYTE [DISTWIDTH] IN BLOOD BY AUTOMATED COUNT: 14.5 % (ref 11.6–15.1)
GFR SERPL CREATININE-BSD FRML MDRD: 88 ML/MIN/1.73SQ M
GLUCOSE SERPL-MCNC: 101 MG/DL (ref 65–140)
GLUCOSE SERPL-MCNC: 108 MG/DL (ref 65–140)
GLUCOSE SERPL-MCNC: 119 MG/DL (ref 65–140)
GLUCOSE SERPL-MCNC: 96 MG/DL (ref 65–140)
HCT VFR BLD AUTO: 29.9 % (ref 36.5–49.3)
HGB BLD-MCNC: 9.1 G/DL (ref 12–17)
IMM GRANULOCYTES # BLD AUTO: 0.1 THOUSAND/UL (ref 0–0.2)
IMM GRANULOCYTES NFR BLD AUTO: 1 % (ref 0–2)
LYMPHOCYTES # BLD AUTO: 1.71 THOUSANDS/ΜL (ref 0.6–4.47)
LYMPHOCYTES NFR BLD AUTO: 22 % (ref 14–44)
MCH RBC QN AUTO: 30.2 PG (ref 26.8–34.3)
MCHC RBC AUTO-ENTMCNC: 30.4 G/DL (ref 31.4–37.4)
MCV RBC AUTO: 99 FL (ref 82–98)
MONOCYTES # BLD AUTO: 0.56 THOUSAND/ΜL (ref 0.17–1.22)
MONOCYTES NFR BLD AUTO: 7 % (ref 4–12)
NEUTROPHILS # BLD AUTO: 5.23 THOUSANDS/ΜL (ref 1.85–7.62)
NEUTS SEG NFR BLD AUTO: 65 % (ref 43–75)
NRBC BLD AUTO-RTO: 0 /100 WBCS
PLATELET # BLD AUTO: 276 THOUSANDS/UL (ref 149–390)
PMV BLD AUTO: 10.2 FL (ref 8.9–12.7)
POTASSIUM SERPL-SCNC: 4.6 MMOL/L (ref 3.5–5.3)
RBC # BLD AUTO: 3.01 MILLION/UL (ref 3.88–5.62)
SODIUM SERPL-SCNC: 141 MMOL/L (ref 136–145)
WBC # BLD AUTO: 7.96 THOUSAND/UL (ref 4.31–10.16)

## 2020-07-22 PROCEDURE — 97116 GAIT TRAINING THERAPY: CPT

## 2020-07-22 PROCEDURE — 85025 COMPLETE CBC W/AUTO DIFF WBC: CPT | Performed by: SURGERY

## 2020-07-22 PROCEDURE — 99024 POSTOP FOLLOW-UP VISIT: CPT | Performed by: THORACIC SURGERY (CARDIOTHORACIC VASCULAR SURGERY)

## 2020-07-22 PROCEDURE — 94760 N-INVAS EAR/PLS OXIMETRY 1: CPT

## 2020-07-22 PROCEDURE — 92526 ORAL FUNCTION THERAPY: CPT

## 2020-07-22 PROCEDURE — 80048 BASIC METABOLIC PNL TOTAL CA: CPT | Performed by: SURGERY

## 2020-07-22 PROCEDURE — 71046 X-RAY EXAM CHEST 2 VIEWS: CPT

## 2020-07-22 PROCEDURE — 82948 REAGENT STRIP/BLOOD GLUCOSE: CPT

## 2020-07-22 PROCEDURE — 94640 AIRWAY INHALATION TREATMENT: CPT

## 2020-07-22 RX ADMIN — OLANZAPINE 10 MG: 10 TABLET, ORALLY DISINTEGRATING ORAL at 21:15

## 2020-07-22 RX ADMIN — ENOXAPARIN SODIUM 90 MG: 100 INJECTION SUBCUTANEOUS at 09:58

## 2020-07-22 RX ADMIN — METOPROLOL TARTRATE 2.5 MG: 5 INJECTION INTRAVENOUS at 21:14

## 2020-07-22 RX ADMIN — ACETAMINOPHEN 975 MG: 650 SUSPENSION ORAL at 21:20

## 2020-07-22 RX ADMIN — CHLORHEXIDINE GLUCONATE 0.12% ORAL RINSE 15 ML: 1.2 LIQUID ORAL at 09:58

## 2020-07-22 RX ADMIN — LEVALBUTEROL HYDROCHLORIDE 1.25 MG: 1.25 SOLUTION, CONCENTRATE RESPIRATORY (INHALATION) at 20:00

## 2020-07-22 RX ADMIN — ISODIUM CHLORIDE 3 ML: 0.03 SOLUTION RESPIRATORY (INHALATION) at 13:17

## 2020-07-22 RX ADMIN — PIPERACILLIN AND TAZOBACTAM 3.38 G: 3; .375 INJECTION, POWDER, FOR SOLUTION INTRAVENOUS at 17:16

## 2020-07-22 RX ADMIN — LEVALBUTEROL HYDROCHLORIDE 1.25 MG: 1.25 SOLUTION, CONCENTRATE RESPIRATORY (INHALATION) at 07:21

## 2020-07-22 RX ADMIN — LEVALBUTEROL HYDROCHLORIDE 1.25 MG: 1.25 SOLUTION, CONCENTRATE RESPIRATORY (INHALATION) at 13:17

## 2020-07-22 RX ADMIN — NYSTATIN 500000 UNITS: 100000 SUSPENSION ORAL at 12:28

## 2020-07-22 RX ADMIN — METOPROLOL TARTRATE 2.5 MG: 5 INJECTION INTRAVENOUS at 09:58

## 2020-07-22 RX ADMIN — NYSTATIN 500000 UNITS: 100000 SUSPENSION ORAL at 09:58

## 2020-07-22 RX ADMIN — PIPERACILLIN AND TAZOBACTAM 3.38 G: 3; .375 INJECTION, POWDER, FOR SOLUTION INTRAVENOUS at 12:28

## 2020-07-22 RX ADMIN — NYSTATIN 500000 UNITS: 100000 SUSPENSION ORAL at 21:14

## 2020-07-22 RX ADMIN — ACETAMINOPHEN 975 MG: 650 SUSPENSION ORAL at 17:16

## 2020-07-22 RX ADMIN — NYSTATIN 500000 UNITS: 100000 SUSPENSION ORAL at 17:16

## 2020-07-22 RX ADMIN — ISODIUM CHLORIDE 3 ML: 0.03 SOLUTION RESPIRATORY (INHALATION) at 07:21

## 2020-07-22 RX ADMIN — CHLORHEXIDINE GLUCONATE 0.12% ORAL RINSE 15 ML: 1.2 LIQUID ORAL at 21:14

## 2020-07-22 RX ADMIN — ISODIUM CHLORIDE 3 ML: 0.03 SOLUTION RESPIRATORY (INHALATION) at 20:00

## 2020-07-22 RX ADMIN — PIPERACILLIN AND TAZOBACTAM 3.38 G: 3; .375 INJECTION, POWDER, FOR SOLUTION INTRAVENOUS at 05:11

## 2020-07-22 RX ADMIN — ENOXAPARIN SODIUM 90 MG: 100 INJECTION SUBCUTANEOUS at 21:15

## 2020-07-22 NOTE — PHYSICAL THERAPY NOTE
PHYSICAL THERAPY Treatment NOTE  Treatment Time: 16:30 - 16:52  Patient Name: Benoit Law  Today's Date: 7/22/2020 07/22/20 1630   Pain Assessment   Pain Assessment Tool 0-10   Pain Score No Pain   Restrictions/Precautions   Weight Bearing Precautions Per Order No   Braces or Orthoses Other (Comment)  (none)   Other Precautions 1:1;Cognitive; Chair Alarm;Telemetry;O2;Fall Risk;Pain  (keofed, trach mask)   General   Chart Reviewed Yes   Additional Pertinent History Pt is a 70 yo M following upper left lung lobectomy due to malignant neoplasm   Family/Caregiver Present No   Cognition   Overall Cognitive Status Impaired   Arousal/Participation Cooperative   Attention Attends with cues to redirect   Orientation Level Oriented to person;Disoriented to place; Disoriented to time   Memory Decreased recall of precautions;Decreased recall of recent events   Following Commands Follows one step commands with increased time or repetition   Comments Pt identified with first name  Disoriented to place and time  Pt requires repetition of cueing   Subjective   Subjective Pt agreeable to PT session   Transfers   Sit to Stand 4  Minimal assistance   Additional items Assist x 1; Increased time required;Armrests; Verbal cues  (to push up from the chair)   Stand to Sit 4  Minimal assistance   Additional items Assist x 1; Increased time required;Verbal cues;Armrests  (to step back towards chair and reach to control descent)   Additional Comments pt was seated OOB in recliner with 1:1  at start and end of PT session   Ambulation/Elevation   Gait pattern Improper Weight shift;Narrow GEO; Forward Flexion;Decreased foot clearance; Inconsistent evan; Short stride; Ataxia; Step to;Excessively slow   Gait Assistance 4  Minimal assist   Additional items Assist x 1;Verbal cues  (VC to increase GEO and to stay within the RW)   Assistive Device Rolling walker Distance Pt is a 72 yo M following upper left lung lobectomy due to malignant neoplasm  Pt was identified with full name  Pt was agreeable to PT session today  Pt demonstrated improved functional mobility and activity tolerance during today's treatment as shown by ability to perform gait training x350' with MinAx1 with RW and SBA x2 for line management and chair follow  Pt required multiple standing rest breaks throughout gait training to readjust lines, fix gait mechanics, and redirect pt  Pt required cueing for safe transfer technique and proper gait mechanics and was able to follow commands with increased repetition of cueing  Pt would continue to benefit from IP PT to progress their functional mobility, prevent secondary complications associated with hospital stay, and to aid in safe discharge to reduce the risk of readmission  D/C recommendation is to post-acute rehab  Balance   Static Sitting Fair   Dynamic Sitting Fair   Static Standing Poor +   Dynamic Standing Poor +   Ambulatory Poor +   Endurance Deficit   Endurance Deficit Yes   Endurance Deficit Description fatigue and postural degradation with gait training   Activity Tolerance   Activity Tolerance Patient limited by fatigue   Medical Staff Made Aware Spoke to [facility], PCA   Nurse Made Aware Spoke to Shane ramirez RN   Assessment   Prognosis Good   Problem List Decreased strength;Decreased endurance; Impaired balance;Decreased mobility; Decreased coordination;Decreased safety awareness;Decreased cognition; Impaired judgement   Assessment Pt is a 72 yo M following upper left lung lobectomy due to malignant neoplasm  Pt was identified with full name  Pt was agreeable to PT session today  Pt demonstrated improved functional mobility and activity tolerance during today's treatment as shown by ability to perform gait training x350' with MinAx1 with RW and SBA x2 for line management and chair follow   Pt required cueing for safe transfer technique and proper gait mechanics and was able to follow commands with increased repetition of cueing  Pt would continue to benefit from IP PT to progress their functional mobility, prevent secondary complications associated with hospital stay, and to aid in safe discharge to reduce the risk of readmission  D/C recommendation is to post-acute rehab  Goals   Patient Goals to go for a walk   STG Expiration Date 07/30/20   PT Treatment Day 11   Plan   Treatment/Interventions Functional transfer training;LE strengthening/ROM; Therapeutic exercise; Endurance training;Cognitive reorientation; Bed mobility;Gait training;Spoke to nursing;OT   Progress Progressing toward goals   PT Frequency Other (Comment)  (3-5x/wk)   Recommendation   PT Discharge Recommendation Post-Acute Rehabilitation Services   Equipment Recommended Walker   PT - OK to Discharge Yes   Additional Comments to rehab when medically appropriate   Ken Ruiz,SPT 7/22/2020

## 2020-07-22 NOTE — PROGRESS NOTES
Progress Note - Benoit Law 71 y o  male MRN: [id number]    Unit/Bed#: [facility] 405-01 Encounter: [id number]      Assessment:  69 M status post thoracoscopic left upper lobectomy complicated by prolonged air leak, cardiac arrest s/p cricothyroidotomy, tracheostomy revision, and trach exchange to 6 cuffless    Afebrile  VSS  Calm and cooperative this morning  Trach collar @ 5L 28%  Saturating well  Tolerating TF at 54  Did not require restraints overnight, however haldol given 1x for agitation  Wbc: 8 7- 7 today    Plan:  Continue 1:1 observation  Continue tube feeds via keofed  Continue passy aniya valve throughout day  continue PT/ OT  Continue zosyn day 4  F/u cxr pa/ lateral     Subjective:   Denied remembering being agitated yesterday  Denied any chest pain or shortness of breath  Pt sitting up in chair and following commands, oriented, and demonstrated understanding he can push call button if nurse needed  Objective:     Vitals: Blood pressure 107/74, pulse 86, temperature 98 6 °F (37 °C), temperature source Oral, resp  rate 18, height 5' 10" (1 778 m), weight 84 kg (185 lb 3 oz), SpO2 95 %  ,Body mass index is 26 57 kg/m²  Intake/Output Summary (Last 24 hours) at 7/22/2020 0745  Last data filed at 7/22/2020 0353  Gross per 24 hour   Intake 840 ml   Output 1300 ml   Net -460 ml     Physical Exam  General: NAD  HEENT: NC/AT  MMM  Trach in place  Midline  Cv: RRR     Lungs: normal effort  Ab: Soft, NT/ND  Ex: no CCE  Neuro: AAOx3    Scheduled Meds:  Current Facility-Administered Medications:  acetaminophen 975 mg Per NG Tube [facility] [address] Tuesday KHANG Esposito    acetaminophen 650 mg Rectal Q4H PRN Marlen Greenfield MD    albuterol 2 5 mg Nebulization Q6H PRN Marlen Greenfield MD    chlorhexidine 15 mL Swish & Spit Q12H [address] Marlen Greenfield MD    enoxaparin 1 mg/kg Subcutaneous Q12H [address] Marlen Greenfield MD    guaiFENesin 200 mg Oral Q4H PRN Marlen Greenfield MD    haloperidol lactate 2 mg Intramuscular Q6H PRN Anai Stroud, DO    levalbuterol 1 25 mg Nebulization TID Dave Valenzuela MD    Lidocaine Viscous HCl 15 mL Swish & Spit 4x Daily PRN Dave Valenzuela MD    melatonin 6 mg Oral HS Dave Valenzuela MD    metoprolol 2 5 mg Intravenous Q12H Illnavin Stroud, DO    nystatin 500,000 Units Swish & Swallow 4x Daily Dave Valenzuela MD    OLANZapine 10 mg Oral HS Lucila Lakhani MD    OLANZapine 5 mg Intramuscular Q6H PRN Dave Valenzuela MD    ondansetron 4 mg Intravenous Q6H PRN Dave Valenzuela MD    oxyCODONE 2 5 mg Oral Q4H PRN Dave Valenzuela MD    piperacillin-tazobactam 3 375 g Intravenous Q6H Dave Valenzuela MD Last Rate: 3 375 g (07/22/20 0511)   polyethylene glycol 17 g Oral Daily Dave Valenzuela MD    senna-docusate sodium 1 tablet Per NG Tube BID Dave Valenzuela MD    sodium chloride 3 mL Nebulization TID Dave Valenzuela MD      Continuous Infusions:   PRN Meds: acetaminophen    albuterol    guaiFENesin    haloperidol lactate    Lidocaine Viscous HCl    OLANZapine    ondansetron    oxyCODONE      Invasive Devices     Peripherally Inserted Central Catheter Line            PICC Line 50/17/41 Left Basilic 25 days          Drain            Urethral Catheter Latex 16 Fr  2 days    NG/OG/Enteral Tube Other (Comment) Right nares less than 1 day          Airway            Surgical Airway 8 days                Lab, Imaging and other studies: I have personally reviewed pertinent reports      VTE Pharmacologic Prophylaxis: Enoxaparin (Lovenox)  VTE Mechanical Prophylaxis: sequential compression device

## 2020-07-22 NOTE — SOCIAL WORK
69yo male is POD#1 left VATS/ LULobectomy  He is alert and oriented, oob in chair, independent ADLS, retired, drives  He resides in [place] with his daughter Blossom Ortez and her   Her phone is [phone]  They live in a 2 story home with 0 JONAH  Pt  Uses no DME, no hx HHC, does have hx of [facility] rehab after accident  His PCP is Dr Rosanne Sun and he uses [facility] in [place]  He would like d/c meds sent there  Pt  Has no HHC needs at this time  Daughter will transport home when ready  CM reviewed d/c planning process including the following: identifying help at home, patient preference for d/c planning needs, Discharge Lounge, [facility] Meds to Bed program, availability of treatment team to discuss questions or concerns patient and/or family may have regarding understanding medications and recognizing signs and symptoms once discharged  CM also encouraged patient to follow up with all recommended appointments after discharge  Patient advised of importance for patient and family to participate in managing patients medical well being  Patient/caregiver received discharge checklist  Content reviewed  Patient/caregiver encouraged to participate in discharge plan of care prior to discharge home  Yes.  Please call [facility] and cancel Rx that I filled yesterday. If it was sent, tell pt to take 2 tabs instead of the one. I'm sorry about the mix-up.

## 2020-07-22 NOTE — SPEECH THERAPY NOTE
Speech Language/Pathology    Speech/Language Pathology Progress Note    Patient Name: Shannon Collier  Today's Date: 7/22/2020     Subjective:  "I'm ok, been better"  Current Diet: npo w/ damon feed just replaced  Objective:  Pt seen for ongoing dysphagia tx  PMV in place, pt more coherent today  Pt educated on need to cooperate & participate after refusing trials  Pt took 3 oz pudding, 2 oz nt by straw & 3 sips thin by straw  Immed transfers today w/ puree, mult swallows per each tsp  No overt immed coughing or clearing w/ the puree  Noted wet voicing w/ last sip nt & w/ all trials of thin  Coughing w/ thin as well  No material noted at trach site w/ removal of PMV  Assessment:  Pt w/ improved intake, less overt s/s aspiration  May be appropriate for a repeat vbs      Plan/Recommendations:  Oral care often  NPO  Consider repeat VBS

## 2020-07-22 NOTE — QUICK NOTE
Was called bedside because patient was agigtated and kept trying to pull out keofed even after zyprexa 10mg IV was given  1:1 monitor was there; however   Patient still attempted to pull it out      Soft restraints ordered, Haldol 2mg was added

## 2020-07-22 NOTE — PLAN OF CARE
Problem: PHYSICAL THERAPY ADULT  Goal: Performs mobility at highest level of function for planned discharge setting  See evaluation for individualized goals  Description  Treatment/Interventions: Functional transfer training, LE strengthening/ROM, Elevations, Therapeutic exercise, Endurance training, Equipment eval/education, Bed mobility, Gait training, Spoke to MD, Spoke to nursing  Equipment Recommended: Walker(at this time; r/o [facility])       See flowsheet documentation for full assessment, interventions and recommendations  Outcome: Progressing  Note:   Prognosis: Good  Problem List: Decreased strength, Decreased endurance, Impaired balance, Decreased mobility, Decreased coordination, Decreased safety awareness, Decreased cognition, Impaired judgement  Assessment: Pt is a 70 yo M following upper left lung lobectomy due to malignant neoplasm  Pt was identified with full name  Pt was agreeable to PT session today  Pt demonstrated improved functional mobility and activity tolerance during today's treatment as shown by ability to perform gait training x350' with MinAx1 with RW and SBA x2 for line management and chair follow  Pt required cueing for safe transfer technique and proper gait mechanics and was able to follow commands with increased repetition of cueing  Pt would continue to benefit from IP PT to progress their functional mobility, prevent secondary complications associated with hospital stay, and to aid in safe discharge to reduce the risk of readmission  D/C recommendation is to post-acute rehab  Barriers to Discharge: Inaccessible home environment, Decreased caregiver support     PT Discharge Recommendation: [id number]     PT - OK to Discharge: Yes    See flowsheet documentation for full assessment

## 2020-07-23 ENCOUNTER — APPOINTMENT (INPATIENT)
Dept: RADIOLOGY | Facility: HOSPITAL | Age: 70
DRG: 003 | End: 2020-07-23
Payer: COMMERCIAL

## 2020-07-23 LAB
BACTERIA BLD CULT: NORMAL
BACTERIA BLD CULT: NORMAL
GLUCOSE SERPL-MCNC: 106 MG/DL (ref 65–140)
GLUCOSE SERPL-MCNC: 112 MG/DL (ref 65–140)
GLUCOSE SERPL-MCNC: 84 MG/DL (ref 65–140)
GLUCOSE SERPL-MCNC: 95 MG/DL (ref 65–140)

## 2020-07-23 PROCEDURE — 99024 POSTOP FOLLOW-UP VISIT: CPT | Performed by: THORACIC SURGERY (CARDIOTHORACIC VASCULAR SURGERY)

## 2020-07-23 PROCEDURE — 92526 ORAL FUNCTION THERAPY: CPT

## 2020-07-23 PROCEDURE — 92611 MOTION FLUOROSCOPY/SWALLOW: CPT

## 2020-07-23 PROCEDURE — 74230 X-RAY XM SWLNG FUNCJ C+: CPT

## 2020-07-23 PROCEDURE — 82948 REAGENT STRIP/BLOOD GLUCOSE: CPT

## 2020-07-23 PROCEDURE — 99232 SBSQ HOSP IP/OBS MODERATE 35: CPT | Performed by: NURSE PRACTITIONER

## 2020-07-23 PROCEDURE — 94760 N-INVAS EAR/PLS OXIMETRY 1: CPT

## 2020-07-23 PROCEDURE — 94640 AIRWAY INHALATION TREATMENT: CPT

## 2020-07-23 RX ADMIN — NYSTATIN 500000 UNITS: 100000 SUSPENSION ORAL at 18:26

## 2020-07-23 RX ADMIN — ENOXAPARIN SODIUM 90 MG: 100 INJECTION SUBCUTANEOUS at 10:36

## 2020-07-23 RX ADMIN — NYSTATIN 500000 UNITS: 100000 SUSPENSION ORAL at 10:36

## 2020-07-23 RX ADMIN — PIPERACILLIN AND TAZOBACTAM 3.38 G: 3; .375 INJECTION, POWDER, FOR SOLUTION INTRAVENOUS at 06:00

## 2020-07-23 RX ADMIN — ISODIUM CHLORIDE 3 ML: 0.03 SOLUTION RESPIRATORY (INHALATION) at 07:21

## 2020-07-23 RX ADMIN — LEVALBUTEROL HYDROCHLORIDE 1.25 MG: 1.25 SOLUTION, CONCENTRATE RESPIRATORY (INHALATION) at 07:21

## 2020-07-23 RX ADMIN — MELATONIN 6 MG: at 21:46

## 2020-07-23 RX ADMIN — LEVALBUTEROL HYDROCHLORIDE 1.25 MG: 1.25 SOLUTION, CONCENTRATE RESPIRATORY (INHALATION) at 14:25

## 2020-07-23 RX ADMIN — NYSTATIN 500000 UNITS: 100000 SUSPENSION ORAL at 21:55

## 2020-07-23 RX ADMIN — PIPERACILLIN AND TAZOBACTAM 3.38 G: 3; .375 INJECTION, POWDER, FOR SOLUTION INTRAVENOUS at 00:00

## 2020-07-23 RX ADMIN — CHLORHEXIDINE GLUCONATE 0.12% ORAL RINSE 15 ML: 1.2 LIQUID ORAL at 21:55

## 2020-07-23 RX ADMIN — METOPROLOL TARTRATE 2.5 MG: 5 INJECTION INTRAVENOUS at 21:58

## 2020-07-23 RX ADMIN — CHLORHEXIDINE GLUCONATE 0.12% ORAL RINSE 15 ML: 1.2 LIQUID ORAL at 10:49

## 2020-07-23 RX ADMIN — LEVALBUTEROL HYDROCHLORIDE 1.25 MG: 1.25 SOLUTION, CONCENTRATE RESPIRATORY (INHALATION) at 19:28

## 2020-07-23 RX ADMIN — OLANZAPINE 10 MG: 10 TABLET, ORALLY DISINTEGRATING ORAL at 21:47

## 2020-07-23 RX ADMIN — ACETAMINOPHEN 975 MG: 650 SUSPENSION ORAL at 21:48

## 2020-07-23 RX ADMIN — NYSTATIN 500000 UNITS: 100000 SUSPENSION ORAL at 13:00

## 2020-07-23 RX ADMIN — ENOXAPARIN SODIUM 90 MG: 100 INJECTION SUBCUTANEOUS at 21:59

## 2020-07-23 RX ADMIN — METOPROLOL TARTRATE 2.5 MG: 5 INJECTION INTRAVENOUS at 10:36

## 2020-07-23 RX ADMIN — ISODIUM CHLORIDE 3 ML: 0.03 SOLUTION RESPIRATORY (INHALATION) at 14:25

## 2020-07-23 RX ADMIN — ISODIUM CHLORIDE 3 ML: 0.03 SOLUTION RESPIRATORY (INHALATION) at 19:28

## 2020-07-23 NOTE — PROGRESS NOTES
Patient became aggressive at shift change  Restraints were ordered  Restraints were not applied due to being able to deescalate patient  Will continue to monitor

## 2020-07-23 NOTE — PROGRESS NOTES
Progress Note - Thoracic Surgery   Lux Womack 71 y o  male MRN: [id number]  Unit/Bed#: [facility] 405-01 Encounter: [id number]    Assessment:  Patient is a 71 y o  male who presented with DOMI mass, now status post Bronchoscopy, mediastinoscopy, VATS LIVIER lobectomy on 5/82 complicated by subcutaneous emphysema s/p Bedside incision/VAC L chest wall on 6/15, acute hypoxic respiratory failure s/p bedside cricothyroidotomy at bedside on 6/20 and subsequent tracheostomy revision on 06/22  Mirtha Silva was exchanged through the 6 Uzbek noncuffed on 07/13 Keofed placed on 07/21  Less agitated overnight, took zyprexa and has been sleeping all night  Plan:  Maintain 6 cuffless trach-on trach mask 6L  NPO/Keofed  Jevity 1 2 @ goal: 55  Maintain PICC  Gaytan  Continue working with speech  Continue 1;1 if pt remains agitated    Subjective/Objective     Subjective:   Events as noted above  Objective:    Blood pressure 119/71, pulse 97, temperature 98 °F (36 7 °C), temperature source Oral, resp  rate 22, height 5' 10" (1 778 m), weight 84 kg (185 lb 3 oz), SpO2 99 %  ,Body mass index is 26 57 kg/m²  Intake/Output Summary (Last 24 hours) at 7/23/2020 0413  Last data filed at 7/23/2020 0000  Gross per 24 hour   Intake 1910 ml   Output 1500 ml   Net 410 ml       Invasive Devices     Peripherally Inserted Central Catheter Line            PICC Line 22/04/55 Left Basilic 26 days          Drain            Urethral Catheter Latex 16 Fr  3 days    NG/OG/Enteral Tube Other (Comment) Right nares 1 day          Airway            Surgical Airway 9 days                Physical Exam:   Gen:  Well-developed, well-nourished male in NAD  CV: RRR  Lungs: Normal respiratory effort with trach in place on trach misty  Incisions well healed  Abd: soft, nontender, nondistended  Neuro:  AxO x3      Results from last 7 days   Lab Units 07/22/20  0510 07/21/20  0504 07/20/20  0519   WBC Thousand/uL 7 96 8 75 9 21   HEMOGLOBIN g/dL 9 1* 9 0* 8 9* HEMATOCRIT % 29 9* 29 5* 29 4*   PLATELETS Thousands/uL 276 273 285     Results from last 7 days   Lab Units 07/22/20  0510 07/21/20  0504 07/20/20  0519   POTASSIUM mmol/L 4 6 3 9 4 0   CHLORIDE mmol/L 109* 107 110*   CO2 mmol/L 27 29 26   BUN mg/dL 11 12 15   CREATININE mg/dL 0 86 0 74 0 74   CALCIUM mg/dL 8 7 8 4 8 5

## 2020-07-23 NOTE — PROCEDURES
Video Swallow Study      Patient Name: Joaquin Claros  Today's Date: 7/23/2020        Past Medical History  Past Medical History:   Diagnosis Date    Alcohol abuse 3/27/2020    Chest pain     Community acquired pneumonia 3/27/2020    Hypertension     Left upper lobe pulmonary nodule     Malignant neoplasm of upper lobe of left lung ([address] ) 5/7/2020        Past Surgical History  Past Surgical History:   Procedure Laterality Date    COLON SURGERY      CT NEEDLE BIOPSY LUNG  4/29/2020    OH BRONCHOSCOPY,DIAGNOSTIC N/A 6/10/2020    Procedure: BRONCHOSCOPY FLEXIBLE;  Surgeon: Sb Daniels MD;  [facility];  Service: Thoracic    OH MEDIASTINOSCOPY WITH LYMPH NODE BIOPSY/IES N/A 6/10/2020    Procedure: MEDIASTINOSCOPY;  Surgeon: Sb Daniels MD;  [facility];  Service: Thoracic    OH THORACOSCOPY SURG LOBECTOMY Left 6/10/2020    Procedure: THORACOSCOPY VIDEO ASSISTED SURGERY (VATS); Surgeon: Sb Daniels MD;  [facility];  Service: Thoracic    OH THORACOSCOPY SURG LOBECTOMY Left 6/10/2020    Procedure: UPPER LOBECTOMY OF LUNG; MEDIASTINAL  LYMPH NODE DISSECTION;  Surgeon: Sb Daniels MD;  [facility];  Service: Thoracic    TRACHEOSTOMY N/A 6/22/2020    Procedure: TRACHEOSTOMY REVISION, BRONCHOSCOPY;  Surgeon: Sb Daniels MD;  [facility];  Service: Thoracic    WRIST SURGERY Right     orif     Video Barium Swallow Study    Summary:  Images are on PACS for review  Pt presents w/ mild oral-pharyngeal dysphagia characterized by mild difficulty w/ bolus control and mild oral cavity residue (sandi base of tongue), posterior loss to the vallecular space, minimal vallecular retention, and trace amount of PPW coating  Pt is able to clear w/ secondary swallows and is able to follow commands to follow strategies  No aspiration was noted on today's study     Recommendations:   Will trial first meal w/ SLP today  Diet: Regular  Liquids: Thins  Meds: As tolerated  Strategies: Chin down, secondary swallows, effortful swallow, alternate bites and sips  Upright position  F/u ST tx: Yes  Therapy Prognosis: Fair  Prognosis considerations: Surgery and cognitive status  Intermittent Supervision    Aspiration Precautions     Results reviewed with: pt, nursing  Aspiration precautions posted  If a dedicated assessment of the esophagus is desired, consider esophagram/barium swallow or EGD  Patient's goal: Eat pizza    Goals:  Pt will tolerate least restrictive diet w/out s/s aspiration or oral/pharyngeal difficulties  Previous VBS:  Previous VBS on 7/20/2020  "Pt presents w/ mild/mod oral, mod pharyngeal dysphagia shiraz by reduced oral processing reduced hyolaryngeal excursion & pharyngeal constriction w/ mod/max vallecular retention noted  +aspiration of nt by straw w/ min response, ongoing trace aspiration w/ all material (overflow of retention in a trickle amt)  Attempted strategies w/ the pt but he has difficulty maintaining positions or fully completing some  Risk for aspiration w/ po but per my assessment has improved since last seen "    Current Diet:  NPO w/ keofed    Premorbid diet:  Regular w/ thin    Dentition:  Partial natural    O2 requirement:  Trach collar    Oral mech:  Strength and ROM: Fair    Vocal Quality/Speech:  Pt wears a PMV  Improved, there is an increase in volume and intelligibility  Cognitive status:  Awake, alert  Consistencies administered: Barium laden applesauce, banana, soft solid, hard solid, honey thick, nectar thick, thin liquids, 13mm barium pill  Liquids were administered by tsp, cup and straw  Pt was seated laterally at 90 degrees  Oral stage:  Mild  Lip closure: Fair  Mastication: Effortful  Bolus formation: Fair  Bolus control: Slightly impacted - minimal posterior loss on solids  Transfer: Fair  Residue: Mild, base of tongue w/ ht, nt, and thins  Requires cues to swallow & clear  Pharyngeal stage:  Mild  Swallow promptness: Fair  Spill to valleculae: Minimal - moderate  Pt is able to clear w/ secondary swallows  Spill to pyriforms: None  Epiglottic inversion: Fair  Laryngeal excursion: Fair  Pharyngeal constriction: Fair  Vallecular retention: Minimal  Pt is able to clear w/ secondary swallows  Pyriform retention: None  PPW coating: Trace w/ nt by straw  Osteophytes: None  CP prominence: None  Retropulsion from prominence: None  Transient penetration:  None  Epiglottic undercoat: None  Penetration: None  Aspiration: None  Strategies: 2 swallows per bite/sip, alternated bite w sips  Hard swallows, chin down  Screening of Esophageal stage:   WNL

## 2020-07-23 NOTE — SPEECH THERAPY NOTE
Speech Language/Pathology    Speech/Language Pathology Progress Note    Patient Name: Tayler Yun  Today's Date: 7/23/2020       Subjective:  "it's ok, I'm getting full"  Current diet: npo, Regular w/ thin trials at this time  Objective:  Pt requested pizza, brownie  Pt able to self feed  Needed mod/max cues to take small bites-fair manipulation & timely transfers  No overt oral residue  Pt cued for 2* swallow after each bite/sip  Pt took small single straw sips soda- mult swallows w/o overt wet voicing or coughing  Noted intermittent coughing but not immed after bites/sips  Pt took 1/2 of the trials then stated he was not hungry any more  Assessment:  Pt able to self feed but needs cues to go slow & use mult swallows per bite/sips       Plan/Recommendations:  Ok for regular w/ thin  Cue pt to use 2* swallow after each bite/sip  Alternate bites w/ sips

## 2020-07-23 NOTE — PROGRESS NOTES
Progress Note - Edward Knowles 71 y o  male MRN: [id number]    Unit/Bed#: [facility] 405-01 Encounter: [id number]      Assessment/Plan:  1  Encephalopathy  Improved  Alert and oriented x 2-3  Encourage daytime activity, pt likes to watch TV at baseline  Monitor for urinary retention, constipation      2  Acute pain due to surgery  Scheduled acetaminophen  Prn oxycodone    3  Insomnia  Continue melatonin  trazodone discontinued switched to Zyprexa  Encourage day time activity  TV, conversations    4  Cognitive impairment  Per daughter pt with some 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  Recommend check TSH and vitamin B12 as outpatient    5  Hearing loss  Pt had previous audiology exam  Refused hearing aids  Speak loudly and clearly   enunciate words    6  Goals of care  Per daughter PEG tube is not an option  Continue speech therapy  PT/OT        Subjective:   Upon exam pt is oob in recliner chair  He is alert and oriented x4  His voice is strong and clear  He is alert and talkative  He states the TV guide paper is not accurate and the channels are different than listed  Per nursing calm and cooperative, occasional impulsiveness  Remains on 1:1  Per nursing and speech pt has best day yet  Objective:     Vitals: Blood pressure 111/77, pulse 89, temperature 98 °F (36 7 °C), temperature source Oral, resp  rate 20, height 5' 10" (1 778 m), weight 84 1 kg (185 lb 6 5 oz), SpO2 99 %  ,Body mass index is 26 6 kg/m²  Intake/Output Summary (Last 24 hours) at 7/23/2020 1411  Last data filed at 7/23/2020 1343  Gross per 24 hour   Intake 1730 ml   Output 1225 ml   Net 505 ml       Physical Exam:   General : NAD  HEENT : MMM   Heart : Normal rate, no murmur rub or gallop  Lungs : CTA no wheezes, rales or rhonchi  Abdomen : Soft, NT/ND, BS auscultated in all 4 quads     Ext :  no edema  Skin : Pink, warm, dry, age appropriate turgor and mobility  Neuro : Nonfocal  Psych : Alert and O x 3       Invasive Devices Peripherally Inserted Central Catheter Line            PICC Line 91/45/80 Left Basilic 27 days          Drain            Urethral Catheter Latex 16 Fr  3 days    NG/OG/Enteral Tube Other (Comment) Right nares 1 day          Airway            Surgical Airway 9 days                Lab, Imaging and other studies: I have personally reviewed pertinent reports      VTE Pharmacologic Prophylaxis: Sequential compression device (Venodyne)   VTE Mechanical Prophylaxis: sequential compression device

## 2020-07-23 NOTE — PHYSICAL THERAPY NOTE
Physical Therapy Cancellation Note    Chart reviewed; pt is currently off the floor for VBS; will follow as clinical course allows      Ken Culp PT

## 2020-07-24 LAB
ANION GAP SERPL CALCULATED.3IONS-SCNC: 6 MMOL/L (ref 4–13)
BASOPHILS # BLD AUTO: 0.04 THOUSANDS/ΜL (ref 0–0.1)
BASOPHILS NFR BLD AUTO: 1 % (ref 0–1)
BUN SERPL-MCNC: 17 MG/DL (ref 5–25)
CALCIUM SERPL-MCNC: 8.5 MG/DL (ref 8.3–10.1)
CHLORIDE SERPL-SCNC: 109 MMOL/L (ref 100–108)
CO2 SERPL-SCNC: 27 MMOL/L (ref 21–32)
CREAT SERPL-MCNC: 0.79 MG/DL (ref 0.6–1.3)
EOSINOPHIL # BLD AUTO: 0.31 THOUSAND/ΜL (ref 0–0.61)
EOSINOPHIL NFR BLD AUTO: 4 % (ref 0–6)
ERYTHROCYTE [DISTWIDTH] IN BLOOD BY AUTOMATED COUNT: 14.8 % (ref 11.6–15.1)
GFR SERPL CREATININE-BSD FRML MDRD: 92 ML/MIN/1.73SQ M
GLUCOSE SERPL-MCNC: 122 MG/DL (ref 65–140)
GLUCOSE SERPL-MCNC: 125 MG/DL (ref 65–140)
GLUCOSE SERPL-MCNC: 127 MG/DL (ref 65–140)
GLUCOSE SERPL-MCNC: 167 MG/DL (ref 65–140)
GLUCOSE SERPL-MCNC: 98 MG/DL (ref 65–140)
HCT VFR BLD AUTO: 29.5 % (ref 36.5–49.3)
HGB BLD-MCNC: 8.9 G/DL (ref 12–17)
IMM GRANULOCYTES # BLD AUTO: 0.14 THOUSAND/UL (ref 0–0.2)
IMM GRANULOCYTES NFR BLD AUTO: 2 % (ref 0–2)
LYMPHOCYTES # BLD AUTO: 2.33 THOUSANDS/ΜL (ref 0.6–4.47)
LYMPHOCYTES NFR BLD AUTO: 28 % (ref 14–44)
MCH RBC QN AUTO: 30.3 PG (ref 26.8–34.3)
MCHC RBC AUTO-ENTMCNC: 30.2 G/DL (ref 31.4–37.4)
MCV RBC AUTO: 100 FL (ref 82–98)
MONOCYTES # BLD AUTO: 0.69 THOUSAND/ΜL (ref 0.17–1.22)
MONOCYTES NFR BLD AUTO: 8 % (ref 4–12)
NEUTROPHILS # BLD AUTO: 4.78 THOUSANDS/ΜL (ref 1.85–7.62)
NEUTS SEG NFR BLD AUTO: 57 % (ref 43–75)
NRBC BLD AUTO-RTO: 0 /100 WBCS
PLATELET # BLD AUTO: 261 THOUSANDS/UL (ref 149–390)
PMV BLD AUTO: 10.4 FL (ref 8.9–12.7)
POTASSIUM SERPL-SCNC: 3.9 MMOL/L (ref 3.5–5.3)
RBC # BLD AUTO: 2.94 MILLION/UL (ref 3.88–5.62)
SODIUM SERPL-SCNC: 142 MMOL/L (ref 136–145)
WBC # BLD AUTO: 8.29 THOUSAND/UL (ref 4.31–10.16)

## 2020-07-24 PROCEDURE — 85025 COMPLETE CBC W/AUTO DIFF WBC: CPT | Performed by: SURGERY

## 2020-07-24 PROCEDURE — 94760 N-INVAS EAR/PLS OXIMETRY 1: CPT

## 2020-07-24 PROCEDURE — 99232 SBSQ HOSP IP/OBS MODERATE 35: CPT | Performed by: NURSE PRACTITIONER

## 2020-07-24 PROCEDURE — 97530 THERAPEUTIC ACTIVITIES: CPT

## 2020-07-24 PROCEDURE — 82948 REAGENT STRIP/BLOOD GLUCOSE: CPT

## 2020-07-24 PROCEDURE — 80048 BASIC METABOLIC PNL TOTAL CA: CPT | Performed by: SURGERY

## 2020-07-24 PROCEDURE — 99024 POSTOP FOLLOW-UP VISIT: CPT | Performed by: THORACIC SURGERY (CARDIOTHORACIC VASCULAR SURGERY)

## 2020-07-24 PROCEDURE — 92526 ORAL FUNCTION THERAPY: CPT

## 2020-07-24 PROCEDURE — 94640 AIRWAY INHALATION TREATMENT: CPT

## 2020-07-24 RX ORDER — TAMSULOSIN HYDROCHLORIDE 0.4 MG/1
0.4 CAPSULE ORAL
Status: DISCONTINUED | OUTPATIENT
Start: 2020-07-24 | End: 2020-07-30 | Stop reason: HOSPADM

## 2020-07-24 RX ADMIN — LEVALBUTEROL HYDROCHLORIDE 1.25 MG: 1.25 SOLUTION, CONCENTRATE RESPIRATORY (INHALATION) at 13:25

## 2020-07-24 RX ADMIN — SENNOSIDES AND DOCUSATE SODIUM 1 TABLET: 8.6; 5 TABLET ORAL at 10:24

## 2020-07-24 RX ADMIN — SENNOSIDES AND DOCUSATE SODIUM 1 TABLET: 8.6; 5 TABLET ORAL at 17:40

## 2020-07-24 RX ADMIN — NYSTATIN 500000 UNITS: 100000 SUSPENSION ORAL at 10:22

## 2020-07-24 RX ADMIN — TAMSULOSIN HYDROCHLORIDE 0.4 MG: 0.4 CAPSULE ORAL at 17:40

## 2020-07-24 RX ADMIN — MELATONIN 6 MG: at 21:17

## 2020-07-24 RX ADMIN — ENOXAPARIN SODIUM 90 MG: 100 INJECTION SUBCUTANEOUS at 10:23

## 2020-07-24 RX ADMIN — ACETAMINOPHEN 975 MG: 650 SUSPENSION ORAL at 21:17

## 2020-07-24 RX ADMIN — CHLORHEXIDINE GLUCONATE 0.12% ORAL RINSE 15 ML: 1.2 LIQUID ORAL at 10:23

## 2020-07-24 RX ADMIN — NYSTATIN 500000 UNITS: 100000 SUSPENSION ORAL at 17:40

## 2020-07-24 RX ADMIN — LEVALBUTEROL HYDROCHLORIDE 1.25 MG: 1.25 SOLUTION, CONCENTRATE RESPIRATORY (INHALATION) at 19:32

## 2020-07-24 RX ADMIN — ISODIUM CHLORIDE 3 ML: 0.03 SOLUTION RESPIRATORY (INHALATION) at 07:33

## 2020-07-24 RX ADMIN — ISODIUM CHLORIDE 3 ML: 0.03 SOLUTION RESPIRATORY (INHALATION) at 19:32

## 2020-07-24 RX ADMIN — METOPROLOL TARTRATE 2.5 MG: 5 INJECTION INTRAVENOUS at 21:18

## 2020-07-24 RX ADMIN — LEVALBUTEROL HYDROCHLORIDE 1.25 MG: 1.25 SOLUTION, CONCENTRATE RESPIRATORY (INHALATION) at 07:33

## 2020-07-24 RX ADMIN — ENOXAPARIN SODIUM 90 MG: 100 INJECTION SUBCUTANEOUS at 21:19

## 2020-07-24 RX ADMIN — ACETAMINOPHEN 975 MG: 650 SUSPENSION ORAL at 05:19

## 2020-07-24 RX ADMIN — OLANZAPINE 10 MG: 10 TABLET, ORALLY DISINTEGRATING ORAL at 21:19

## 2020-07-24 RX ADMIN — ISODIUM CHLORIDE 3 ML: 0.03 SOLUTION RESPIRATORY (INHALATION) at 13:26

## 2020-07-24 RX ADMIN — POLYETHYLENE GLYCOL 3350 17 G: 17 POWDER, FOR SOLUTION ORAL at 10:22

## 2020-07-24 RX ADMIN — CHLORHEXIDINE GLUCONATE 0.12% ORAL RINSE 15 ML: 1.2 LIQUID ORAL at 21:17

## 2020-07-24 RX ADMIN — NYSTATIN 500000 UNITS: 100000 SUSPENSION ORAL at 21:17

## 2020-07-24 RX ADMIN — METOPROLOL TARTRATE 2.5 MG: 5 INJECTION INTRAVENOUS at 10:23

## 2020-07-24 NOTE — PROGRESS NOTES
Progress Note - Neil Dye 71 y o  male MRN: [id number]    Unit/Bed#: [facility] 405-01 Encounter: [id number]      Assessment:  69 M status post thoracoscopic left upper lobectomy complicated by prolonged air leak, cardiac arrest s/p cricothyroidotomy, tracheostomy revision, and trach exchange to 6 cuffless    Afebrile  VSS  Saturating 90s on trach collar  keofed in place  Nurse reported one episode of confusion overnight but pt was easily redirectable  Plan:  Regular diet  Continue tube feeds  Discontinue 1:1 observation  Follow up case mgt, dispo planning  Continue lovenox dvt ppx    Subjective:   Calm and cooperative the morning  Nurse reported one episode of confusion overnight, but pt was easily redirectable  Pt tolerated regular diet yesterday w/o issues  Denied any chest pain or shortness of breath this morning  Objective:     Vitals: Blood pressure 105/65, pulse 95, temperature 98 4 °F (36 9 °C), temperature source Oral, resp  rate 20, height 5' 10" (1 778 m), weight 84 1 kg (185 lb 6 5 oz), SpO2 99 %  ,Body mass index is 26 6 kg/m²  Intake/Output Summary (Last 24 hours) at 7/24/2020 0535  Last data filed at 7/24/2020 0000  Gross per 24 hour   Intake 2453 ml   Output 2425 ml   Net 28 ml       Physical Exam  General: NAD  HEENT: NC/AT  MMM  Cv: RRR     Lungs: normal effort  Ab: Soft, NT/ND  Ex: no CCE  Neuro: AAOx3    Scheduled Meds:    Current Facility-Administered Medications:  acetaminophen 975 mg Per NG Tube [facility] Tuesday Corie Sever, CRNP   acetaminophen 650 mg Rectal Q4H PRN Ines Pitts MD   albuterol 2 5 mg Nebulization Q6H PRN Ines Pitts MD   chlorhexidine 15 mL Swish & Spit Q12H [facility] Ines Pitts MD   enoxaparin 1 mg/kg Subcutaneous Q12H [facility] Ines Pitts MD   guaiFENesin 200 mg Oral Q4H PRN Ines Pitts MD   haloperidol lactate 2 mg Intramuscular Q6H PRN Melinda Gupta DO   levalbuterol 1 25 mg Nebulization TID Ines Pitts MD   Lidocaine Viscous HCl 15 mL Swish & Spit 4x Daily PRN Scarlet Counter, MD   melatonin 6 mg Oral HS Scarlet Counter, MD   metoprolol 2 5 mg Intravenous Q12H Ania Stroud DO   nystatin 500,000 Units Swish & Swallow 4x Daily Scarlet Counter, MD   OLANZapine 10 mg Oral HS Carol Schultz MD   OLANZapine 5 mg Intramuscular Q6H PRN Scarlet Counter, MD   ondansetron 4 mg Intravenous Q6H PRN Scarlet Counter, MD   oxyCODONE 2 5 mg Oral Q4H PRN Scarlet Counter, MD   polyethylene glycol 17 g Oral Daily Scarlet Counter, MD   senna-docusate sodium 1 tablet Per NG Tube BID Scarlet Counter, MD   sodium chloride 3 mL Nebulization TID Scarlet Counter, MD     Continuous Infusions:   PRN Meds: acetaminophen    albuterol    guaiFENesin    haloperidol lactate    Lidocaine Viscous HCl    OLANZapine    ondansetron    oxyCODONE      Invasive Devices     Peripherally Inserted Central Catheter Line            PICC Line 81/43/85 Left Basilic 27 days          Drain            Urethral Catheter Latex 16 Fr  4 days    NG/OG/Enteral Tube Other (Comment) Right nares 2 days          Airway            Surgical Airway 10 days                Lab, Imaging and other studies: I have personally reviewed pertinent reports      VTE Pharmacologic Prophylaxis: Sequential compression device (Venodyne)   VTE Mechanical Prophylaxis: sequential compression device

## 2020-07-24 NOTE — PHYSICAL THERAPY NOTE
PHYSICAL THERAPY NOTE          Patient Name: Joaquin Claros  Today's Date: 7/24/2020 07/24/20 1126   Pain Assessment   Pain Assessment Tool Pain Assessment not indicated - pt denies pain   Pain Score No Pain   Restrictions/Precautions   Other Precautions 1:1;Cognitive; Chair Alarm;Multiple lines;Telemetry;O2;Fall Risk;Impulsive;Aspiration  (keofed; trach collar; chair alarm activated at the end of Tx)   General   Chart Reviewed Yes   Additional Pertinent History cleared for Tx session (spoke to nsg)   Response to Previous Treatment Patient with no complaints from previous session  Cognition   Overall Cognitive Status Impaired   Arousal/Participation Alert; Cooperative   Attention Attends with cues to redirect   Orientation Level Oriented to person;Oriented to situation   Memory Decreased recall of recent events;Decreased recall of precautions   Following Commands Follows one step commands without difficulty   Subjective   Subjective Pt is in the chair; agreeable to mobilize;   Transfers   Sit to Stand 5  Supervision  (2 trials)   Additional items Assist x 1; Impulsive;Verbal cues   Stand to Sit 5  Supervision  (2 trials)   Additional items Assist x 1;Verbal cues   Ambulation/Elevation   Gait pattern Short stride; Inconsistent evan   Gait Assistance 4  Minimal assist   Additional items Assist x 1;Verbal cues; Tactile cues  (stand by (A) of 2 more staff for lines and chair follow)   Assistive Device Rolling walker   Distance 2 x 200 ft w/ seated rest period in between   Stair Management Assistance Not tested   Balance   Static Sitting Fair +   Dynamic Sitting Fair   Static Standing Fair -   Ambulatory Poor +   Activity Tolerance   Activity Tolerance Patient tolerated treatment well  (O2 sat in the 90s %)   Nurse Made Aware spoke to [facility]   Exercises   Knee AROM Long Arc Quad Sitting;20 reps;AROM; Bilateral   Ankle Pumps Sitting;20 reps;AROM; Bilateral   Marching Sitting;10 reps;AROM; Bilateral   Equipment Use   Comments Call bell placed w/in reach at the end of session; chair alarm on; 1:1 obs staff present   Assessment   Prognosis Good   Problem List Decreased strength;Decreased endurance; Impaired balance;Decreased mobility; Decreased cognition;Decreased safety awareness   Assessment Pt cont to gradually improve in functional endurance and mobility skills progressing to (S) level w/ transfers although still requiring instructions for safety due to impulsivity; min (A) is still required for amb w/ rw to assure safety; seated rest period provided b/in bouts of amb; LE therex performed in an AROM mode; pt remained in NAD and appeared to be comfortable at the end of session; overall, cont to recommend rehab upon D/C when medically cleared; will follow  Barriers to Discharge Inaccessible home environment;Decreased caregiver support   Goals   Patient Goals to get better   LTG Expiration Date 07/30/20   PT Treatment Day 12   Plan   Treatment/Interventions Functional transfer training;LE strengthening/ROM; Elevations; Therapeutic exercise; Endurance training;Cognitive reorientation; Bed mobility;Gait training;Spoke to nursing;Spoke to case management;OT   Progress Progressing toward goals   PT Frequency Other (Comment)  (3-5x/wk)   Recommendation   PT Discharge Recommendation Post-Acute Rehabilitation Services   Equipment Recommended Walker   PT - OK to Discharge Yes  (to rehab)       Sharon Lopez, PT

## 2020-07-24 NOTE — OCCUPATIONAL THERAPY NOTE
OccupationalTherapy Progress Note     Patient Name: Elise Noland  Today's Date: 7/24/2020  Problem List  Principal Problem:    Malignant neoplasm of upper lobe of left lung [facility])  Active Problems:    Acute respiratory failure with hypoxia ([facility] [address] )    Acute deep vein thrombosis (DVT) of axillary vein of right upper extremity (HCC)    Acute deep vein thrombosis (DVT) of brachial vein of right upper extremity (HCC)    Tracheostomy in place [facility])    Acute deep vein thrombosis (DVT) of calf muscle vein of left lower extremity (HCC)    Encephalopathy    Hyperactive delirium after surgical procedure    Anemia    Thrombocytosis ([facility] [address] )            07/24/20 1119   Restrictions/Precautions   Other Precautions 1:1;Cognitive; Chair Alarm;Multiple lines;Telemetry;O2;Fall Risk;Pain  (trach)   General   Response to Previous Treatment Patient with no complaints from previous session   Lifestyle   Autonomy pta pt reports I in ADLs/IADLs/functional mobility   Reciprocal Relationships supportive dtr and son in law   Service to Others retired    [address] enjoys doing "nothing"   Pain Assessment   Pain Assessment Tool 0-10   Pain Score No Pain   Transfers   Sit to Stand 5  Supervision   Additional items Assist x 1; Increased time required;Verbal cues  (A of 2nd person for safety and line management )   Stand to Sit 5  Supervision   Additional items Assist x 1; Increased time required;Verbal cues   Functional Mobility   Functional Mobility 4  Minimal assistance  (A of 1-2 others for safety, line management and chair follow)   Additional Comments Pt demonstrated long household mobility with 1 seated rest break  Additional items Rolling walker   Cognition   Overall Cognitive Status Impaired   Arousal/Participation Alert; Cooperative   Attention Attends with cues to redirect   Orientation Level Oriented X4   Memory Decreased recall of precautions   Following Commands Follows one step commands without difficulty Comments Pt is pleasant and cooperative this session  Activity Tolerance   Activity Tolerance Patient limited by fatigue   Medical Staff Made Aware RN confirmed okay to see pt   Assessment   Assessment Patient participated in Skilled OT session this date with interventions consisting of Energy Conservation techniques, deep breathing technique and  therapeutic activities to: increase activity tolerance   Patient agreeable to OT treatment session, upon arrival patient was found seated OOB to Chair  In comparison to previous session, patient with improvements in functional transfers  Patient requiring frequent rest periods and ocassional safety reminders  Patient continues to be functioning below baseline level, occupational performance remains limited secondary to factors listed above and increased risk for falls and injury  From OT standpoint, recommendation at time of d/c would be Short Term Rehab  Patient to benefit from continued Occupational Therapy treatment while in the hospital to address deficits as defined above and maximize level of functional independence with ADLs and functional mobility  Plan   Treatment Interventions Functional transfer training; Endurance training;Patient/family training  (Pt denied need to complete any ADLS this session)   Goal Expiration Date 07/30/20   OT Treatment Day 3   OT Frequency 3-5x/wk   Recommendation   OT Discharge Recommendation Post-Acute Rehabilitation Services   OT - OK to Discharge Yes  (When medically appropriate )   Modified [place] Scale   Modified [place] Scale 4     ALYSIA Bhat, OTR/L

## 2020-07-24 NOTE — SPEECH THERAPY NOTE
Speech Language/Pathology    Speech/Language Pathology Progress Note    Patient Name: Joaquin Claros  Today's Date: 7/24/2020     Subjective:  "Oh I didn't know I had to wear that"  Pt OOB in chair, eating lunch  PMV not in place  Placed for meal   Current Diet: regular w/ thin, tf continues to run via keofeed  Objective:  Pt self feeding ham/cheese sandwich  Noted mild amt of secretions from the trach, suctioned then PMV placed  Pt unable to recall strategy to swallow x2- needed cues throughout but rarely completes  Pt w/ adequate oral processing, timely transfers & fairly prompt swallow  No immed coughing w/ any material but did have some delayed at end of meal   Pt states he felt as if it "wouldn't go down" like there was a rock there  Stated he might be full  Noted tf running along w/ po  Assessment:  Pt needs cues to slow down, wear PMV all day & swallow x2  May benefit from either nocturnal feeds vs no tf given the amt he is eating       Plan/Recommendations:  Continue diet  Consider reducing or eliminating the tf     Encourage slow po intake

## 2020-07-24 NOTE — NUTRITION
06/25/20 1027   Recommendations/Interventions   Nutrition Recommendations Tube Feeding Recommendation provided  (Suggest increasing TF of Jevity 1 2 to 70ml/hr  Will provide 2016kcal, 93gPRO, 66gFAT, 285gCHO, 1361ml free water   Replete electrolytes, monitor weight )
06/29/20 1251   Recommendations/Interventions   Nutrition Recommendations Other (specify)  (Change diet to NPO while TF held  If unable to resume trickle TF x48 hrs, consider need for TPN   RD to provide recs if warranted )
07/01/20 1537   Recommendations/Interventions   Summary Patient remains NPO, trickle tube feeds initiated this am  Per chart review post pyloric feeding tube preferred  Right and left lower extremity +1 edema noted, generalized non-pitting edema noted  Nursing skin care plan reviewed  Interventions EN advance to goal   Nutrition Recommendations Adjust EN/PN;Lab - consider order (specify); Other (specify)  (Consider post-pyloric feeding tube  Suggest slowly advance tube feeds 5 ml every 4 hours to a goal rate of 70 ml/hr with 125 ml free water flush every 4 hours (2016 kcal, 93 grams protein, 2105 ml tv)   Monitor electrolytes  )
07/06/20 6937   Recommendations/Interventions   Nutrition Recommendations Other (specify)  (Continue jevity 1 5 @ 55ml/hr however decrease prosource to only 1 pack per day  continue water flushes  Jevity 1 5@ 55ml/hr + 1 prosource daily will provide 2040kcal, 99g pro  Monitor phosphorus   Continue to monitor weight)
07/14/20 1452   Recommendations/Interventions   Nutrition Recommendations Other (specify)  (due to elevated phosphorus, rec switching TF formula to Nepro @10ml/hr with goal rate of 50ml/hr to provide 1200ml, 2160kcal, 97g pro, 876ml free water, 193g cho, 1 5mg cho/kg/min  adjust water flushes as needed   Monitor weight and electrolytes)
07/24/20 3074   Recommendations/Interventions   Summary Pt with very good PO intake today, completing 100% of meals  Consider stopping EN to promote appetite/continued adequate PO intake  If intake remains >75% x 24hs consider d/c keofed   If unable to maintain >75% meal completions, can switch to cyclic TF
no fever and no chills.

## 2020-07-24 NOTE — PLAN OF CARE
Problem: OCCUPATIONAL THERAPY ADULT  Goal: Performs self-care activities at highest level of function for planned discharge setting  See evaluation for individualized goals  Description  Treatment Interventions: ADL retraining, Functional transfer training, UE strengthening/ROM, Endurance training, Cognitive reorientation, Patient/family training, Equipment evaluation/education, Fine motor coordination activities, Compensatory technique education, Energy conservation, Activityengagement, Neuromuscular reeducation, Continued evaluation  Equipment Recommended: Tub seat with back, Bedside commode       See flowsheet documentation for full assessment, interventions and recommendations  Outcome: Progressing  Note:   Limitation: Decreased ADL status, Decreased UE ROM, Decreased UE strength, Decreased Safe judgement during ADL, Decreased cognition, Decreased endurance, Decreased sensation, Decreased self-care trans, Decreased high-level ADLs  Prognosis: Fair  Assessment: Patient participated in Skilled OT session this date with interventions consisting of Energy Conservation techniques, deep breathing technique and  therapeutic activities to: increase activity tolerance   Patient agreeable to OT treatment session, upon arrival patient was found seated OOB to Chair  In comparison to previous session, patient with improvements in functional transfers  Patient requiring frequent rest periods and ocassional safety reminders  Patient continues to be functioning below baseline level, occupational performance remains limited secondary to factors listed above and increased risk for falls and injury  From OT standpoint, recommendation at time of d/c would be Short Term Rehab  Patient to benefit from continued Occupational Therapy treatment while in the hospital to address deficits as defined above and maximize level of functional independence with ADLs and functional mobility        OT Discharge Recommendation: Post-Acute Rehabilitation Services  OT - OK to Discharge: Yes(When medically appropriate )

## 2020-07-24 NOTE — UTILIZATION REVIEW
Continued Stay Review    Date: 7/24/20                          Current Patient Class: inpatient    Current Level of Care: acute    HPI:69 y o  male initially admitted on  6/10/20 surgery  Patient is a 71 y o  male who presented with DOMI mass, now status post Bronchoscopy, mediastinoscopy, VATS LIVIER lobectomy on 0/23 complicated by subcutaneous emphysema s/p Bedside incision/VAC L chest wall on 6/15, acute hypoxic respiratory failure s/p bedside cricothyroidotomy at bedside on 6/20 and subsequent tracheostomy revision on 06/22  Tonie Orellana was exchanged through the 6 [place] noncuffed on 07/13 Keofed placed on 07/21      Assessment/Plan: VSS  Saturating 90s on trach collar  keofed in place  Nurse reported one episode of confusion overnight but pt was easily redirectable  Maintain 6 cuffless trach-on trach mask 6L  NPO/Keofed  Jevity 1 2 @ goal: 55  Maintain PICC  Gaytan  Continue working with speech  discontinue 1;1 if pt remains agitated    SPEECH PATHOLOGY  Will trial first meal w/ SLP today  Pt able to self feed but needs cues to go slow & use mult swallows per bite/sips  Plan/Recommendations:  Ok for regular w/ thin  Cue pt to use 2* swallow after each bite/sip  Alternate bites w/ sips         Pertinent Labs/Diagnostic Results:   7/23 video Barium swallow l study Pt presents w/ mild oral-pharyngeal dysphagia characterized by mild difficulty w/ bolus control and mild oral cavity residue (sandi base of tongue), posterior loss to the vallecular space, minimal vallecular retention, and trace amount of PPW coating       7/22 cxr Postoperative change within the left hemithorax with scarring   Slight worsening of pulmonary opacities especially within the left lung base and superimposed infection not excluded  Small left basilar effusion  Feeding tube now present within the fundus of the stomach    Results from last 7 days   Lab Units 07/24/20  0518 07/22/20  0510 07/21/20  0504 07/20/20  0519 07/19/20  0504   WBC Thousand/uL 8  29 7 96 8 75 9 21 9 86   HEMOGLOBIN g/dL 8 9* 9 1* 9 0* 8 9* 9 6*   HEMATOCRIT % 29 5* 29 9* 29 5* 29 4* 31 3*   PLATELETS Thousands/uL 261 276 273 285 264   NEUTROS ABS Thousands/µL 4 78 5 23 6 14 6 62 7 55     Results from last 7 days   Lab Units 07/24/20  0518 07/22/20  0510 07/21/20  0504 07/20/20  0519 07/19/20  0504   SODIUM mmol/L 142 141 139 141 139   POTASSIUM mmol/L 3 9 4 6 3 9 4 0 3 8   CHLORIDE mmol/L 109* 109* 107 110* 107   CO2 mmol/L 27 27 29 26 28   ANION GAP mmol/L 6 5 3* 5 4   BUN mg/dL 17 11 12 15 18   CREATININE mg/dL 0 79 0 86 0 74 0 74 0 88   EGFR ml/min/1 73sq m 92 88 94 94 88   CALCIUM mg/dL 8 5 8 7 8 4 8 5 8 6   MAGNESIUM mg/dL  --   --   --  2 6  --    PHOSPHORUS mg/dL  --   --   --  3 7  --      Results from last 7 days   Lab Units 07/20/20  0519   AST U/L 13   ALT U/L 39   ALK PHOS U/L 96   TOTAL PROTEIN g/dL 6 9   ALBUMIN g/dL 2 0*   TOTAL BILIRUBIN mg/dL 0 16*   BILIRUBIN DIRECT mg/dL 0 17     Results from last 7 days   Lab Units 07/24/20  0559 07/24/20  0003 07/23/20  1907 07/23/20  1130 07/23/20  0604 07/23/20  0005 07/22/20  1741 07/22/20  1155 07/22/20  0544 07/21/20  2348 07/21/20  1810 07/21/20  1142   POC GLUCOSE mg/dl 122 125 84 106 95 112 101 108 119 96 87 130     Results from last 7 days   Lab Units 07/24/20  0518 07/22/20  0510 07/21/20  0504 07/20/20  0519 07/19/20  0504 07/18/20  0512   GLUCOSE RANDOM mg/dL 98 96 109 96 110 99     Results from last 7 days   Lab Units 07/18/20  1646   BLOOD CULTURE  No Growth After 5 Days  No Growth After 5 Days         Vital Signs:   24/20 1101  97 9 °F (36 6 °C)  90  20  137/78    100 %      Trach mask  Sitting     07/24/20 0734            99 %  28    Trach mask       07/24/20 0712  97 9 °F (36 6 °C)  97  20  113/55     99 %    5 L/min  Trach mask  Lying     BP: Map 81 at 07/24/20 0712       Step down level 1   CONTINUAL OBSERVATION  ASPIRATION PRECAUTIONS  Medications:   Scheduled Medications:    Medications:  acetaminophen 975 mg Per NG Tube Q8H [address]   chlorhexidine 15 mL Swish & Spit Q12H DEAN   enoxaparin 1 mg/kg Subcutaneous Q12H DEAN   levalbuterol 1 25 mg Nebulization TID   melatonin 6 mg Oral HS   metoprolol 2 5 mg Intravenous Q12H   nystatin 500,000 Units Swish & Swallow 4x Daily   OLANZapine 10 mg Oral HS   polyethylene glycol 17 g Oral Daily   senna-docusate sodium 1 tablet Per NG Tube BID   sodium chloride 3 mL Nebulization TID   tamsulosin 0 4 mg Oral Daily With Dinner     Continuous IV Infusions:     PRN Meds:    acetaminophen 650 mg Rectal Q4H PRN   albuterol 2 5 mg Nebulization Q6H PRN   guaiFENesin 200 mg Oral Q4H PRN   haloperidol lactate 2 mg Intramuscular Q6H PRN   Lidocaine Viscous HCl 15 mL Swish & Spit 4x Daily PRN   OLANZapine 5 mg Intramuscular Q6H PRN   ondansetron 4 mg Intravenous Q6H PRN   oxyCODONE 2 5 mg Oral Q4H PRN       Discharge Plan: tbd    Network Utilization Review Department  [email or web address]  ATTENTION: Please call with any questions or concerns to [phone] and carefully listen to the prompts so that you are directed to the right person  All voicemails are confidential   Rashmi Steward all requests for admission clinical reviews, approved or denied determinations and any other requests to dedicated fax number below belonging to the campus where the patient is receiving treatment   List of dedicated fax numbers for the Facilities:  FACILITY NAME UR FAX NUMBER   ADMISSION DENIALS (Administrative/Medical Necessity) [phone]   [address] [place] [address] (Maternity/NICU/Pediatrics) [phone]   Breezy Rivas [phone]   Hetal Ochoa [phone]   [facility] [place] [phone]   [facility] [address] [phone] David Friday [address] [phone]

## 2020-07-24 NOTE — PLAN OF CARE
Problem: PHYSICAL THERAPY ADULT  Goal: Performs mobility at highest level of function for planned discharge setting  See evaluation for individualized goals  Description  Treatment/Interventions: LE strengthening/ROM, Therapeutic exercise, Endurance training, Cognitive reorientation, Bed mobility, Continued evaluation, Spoke to nursing, transfer training, gait training  Equipment Recommended: rw       See flowsheet documentation for full assessment, interventions and recommendations  Outcome: Progressing  Note:   Prognosis: Good  Problem List: Decreased strength, Decreased endurance, Impaired balance, Decreased mobility, Decreased cognition, Decreased safety awareness  Assessment: Pt cont to gradually improve in functional endurance and mobility skills progressing to (S) level w/ transfers although still requiring instructions for safety due to impulsivity; min (A) is still required for amb w/ rw to assure safety; seated rest period provided b/in bouts of amb; LE therex performed in an AROM mode; pt remained in NAD and appeared to be comfortable at the end of session; overall, cont to recommend rehab upon D/C when medically cleared; will follow  Barriers to Discharge: Inaccessible home environment, Decreased caregiver support     PT Discharge Recommendation: [address]     PT - OK to Discharge: Yes(to rehab)    See flowsheet documentation for full assessment

## 2020-07-24 NOTE — PROGRESS NOTES
Progress Note - Kishor Valdovinos 71 y o  male MRN: [id number]    Unit/Bed#: [facility] 405-01 Encounter: [id number]      Assessment/Plan:  1  Encephalopathy  Improving  Alert and oriented x2  Encourage cognitive stimulation, pt given activity book, Level 2, 1:1 in room workiing with patient to complete word search  Monitor for urinary retention, constipation  maintain circadian rhythm  Continue acetaminophen scheduled for pain    2  Acute pain due to surgery  Geriatric pain protocol  Continue scheduled acetaminophen    3  Insomonia  Continue melatonin and Neurontin  Doing well with Zyprexa 10 mg po QHS  Encourage daytime activity    4  Cognitive impairment  Per daughter pt with some 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  recommend check TSH and vitamin B12  Follow up as outpatient    5  Hearing loss  Speak loudly and clearly   Enunciate words        Subjective:   Upon exam pt is oob in recliner chair  He is alert and oriented x2  He states the month as January  When prompted that the current month is the same as his birthday, pt unable to recall  Pt asked to name months that start with the letter "J"   He states "June"  When told he is getting closer, he states "July 26"  When asked how old he is going to be on his birthday he states "[de-identified]"  He is unable to recall his age after several prompts  He states he had Ham and cheese for lunch  Remains on 1:1 for impulsiveness, noted to have episode of confusion last evening, redirectable    Objective:     Vitals: Blood pressure 137/78, pulse 90, temperature 97 9 °F (36 6 °C), temperature source Oral, resp  rate 20, height 5' 10" (1 778 m), weight 84 5 kg (186 lb 4 6 oz), SpO2 98 %  ,Body mass index is 26 73 kg/m²        Intake/Output Summary (Last 24 hours) at 7/24/2020 1338  Last data filed at 7/24/2020 1101  Gross per 24 hour   Intake 2212 ml   Output 1400 ml   Net 812 ml       Physical Exam:   General : NAD  HEENT : MMM  Heart : Normal rate, no murmur rub or gallop  Lungs : CTA no wheezes, rales or rhonchi  Abdomen : Soft, NT/ND, BS auscultated in all 4 quads  Ext :  no edema  Skin : Pink, warm, dry, age appropriate turgor and mobility  Neuro : Nonfocal  Psych : Alert and O x 2      Invasive Devices     Peripherally Inserted Central Catheter Line            PICC Line 73/06/72 Left Basilic 28 days          Drain            Urethral Catheter Latex 16 Fr  4 days    NG/OG/Enteral Tube Other (Comment) Right nares 2 days          Airway            Surgical Airway 10 days                Lab, Imaging and other studies: I have personally reviewed pertinent reports      VTE Pharmacologic Prophylaxis: Sequential compression device (Venodyne)   VTE Mechanical Prophylaxis: sequential compression device

## 2020-07-25 LAB
GLUCOSE SERPL-MCNC: 107 MG/DL (ref 65–140)
GLUCOSE SERPL-MCNC: 113 MG/DL (ref 65–140)
GLUCOSE SERPL-MCNC: 119 MG/DL (ref 65–140)
GLUCOSE SERPL-MCNC: 93 MG/DL (ref 65–140)
GLUCOSE SERPL-MCNC: 99 MG/DL (ref 65–140)

## 2020-07-25 PROCEDURE — 94760 N-INVAS EAR/PLS OXIMETRY 1: CPT

## 2020-07-25 PROCEDURE — 94640 AIRWAY INHALATION TREATMENT: CPT

## 2020-07-25 PROCEDURE — 99024 POSTOP FOLLOW-UP VISIT: CPT | Performed by: THORACIC SURGERY (CARDIOTHORACIC VASCULAR SURGERY)

## 2020-07-25 PROCEDURE — 82948 REAGENT STRIP/BLOOD GLUCOSE: CPT

## 2020-07-25 RX ORDER — CALCIUM CARBONATE 200(500)MG
500 TABLET,CHEWABLE ORAL 3 TIMES DAILY PRN
Status: DISCONTINUED | OUTPATIENT
Start: 2020-07-25 | End: 2020-07-30 | Stop reason: HOSPADM

## 2020-07-25 RX ADMIN — NYSTATIN 500000 UNITS: 100000 SUSPENSION ORAL at 09:11

## 2020-07-25 RX ADMIN — OLANZAPINE 10 MG: 10 TABLET, ORALLY DISINTEGRATING ORAL at 21:51

## 2020-07-25 RX ADMIN — TAMSULOSIN HYDROCHLORIDE 0.4 MG: 0.4 CAPSULE ORAL at 17:36

## 2020-07-25 RX ADMIN — ONDANSETRON 4 MG: 2 INJECTION INTRAMUSCULAR; INTRAVENOUS at 17:36

## 2020-07-25 RX ADMIN — CALCIUM CARBONATE (ANTACID) CHEW TAB 500 MG 500 MG: 500 CHEW TAB at 00:26

## 2020-07-25 RX ADMIN — ACETAMINOPHEN 975 MG: 650 SUSPENSION ORAL at 21:51

## 2020-07-25 RX ADMIN — ENOXAPARIN SODIUM 90 MG: 100 INJECTION SUBCUTANEOUS at 21:50

## 2020-07-25 RX ADMIN — NYSTATIN 500000 UNITS: 100000 SUSPENSION ORAL at 17:36

## 2020-07-25 RX ADMIN — METOPROLOL TARTRATE 2.5 MG: 5 INJECTION INTRAVENOUS at 21:51

## 2020-07-25 RX ADMIN — LEVALBUTEROL HYDROCHLORIDE 1.25 MG: 1.25 SOLUTION, CONCENTRATE RESPIRATORY (INHALATION) at 19:32

## 2020-07-25 RX ADMIN — OXYCODONE HYDROCHLORIDE 2.5 MG: 5 SOLUTION ORAL at 00:07

## 2020-07-25 RX ADMIN — NYSTATIN 500000 UNITS: 100000 SUSPENSION ORAL at 21:51

## 2020-07-25 RX ADMIN — CHLORHEXIDINE GLUCONATE 0.12% ORAL RINSE 15 ML: 1.2 LIQUID ORAL at 21:51

## 2020-07-25 RX ADMIN — LEVALBUTEROL HYDROCHLORIDE 1.25 MG: 1.25 SOLUTION, CONCENTRATE RESPIRATORY (INHALATION) at 13:43

## 2020-07-25 RX ADMIN — ENOXAPARIN SODIUM 90 MG: 100 INJECTION SUBCUTANEOUS at 09:13

## 2020-07-25 RX ADMIN — ACETAMINOPHEN 975 MG: 650 SUSPENSION ORAL at 05:54

## 2020-07-25 RX ADMIN — ISODIUM CHLORIDE 3 ML: 0.03 SOLUTION RESPIRATORY (INHALATION) at 07:33

## 2020-07-25 RX ADMIN — LEVALBUTEROL HYDROCHLORIDE 1.25 MG: 1.25 SOLUTION, CONCENTRATE RESPIRATORY (INHALATION) at 07:33

## 2020-07-25 RX ADMIN — CALCIUM CARBONATE (ANTACID) CHEW TAB 500 MG 500 MG: 500 CHEW TAB at 17:36

## 2020-07-25 RX ADMIN — MELATONIN 6 MG: at 21:51

## 2020-07-25 RX ADMIN — ISODIUM CHLORIDE 3 ML: 0.03 SOLUTION RESPIRATORY (INHALATION) at 13:43

## 2020-07-25 RX ADMIN — METOPROLOL TARTRATE 2.5 MG: 5 INJECTION INTRAVENOUS at 09:12

## 2020-07-25 RX ADMIN — CHLORHEXIDINE GLUCONATE 0.12% ORAL RINSE 15 ML: 1.2 LIQUID ORAL at 09:11

## 2020-07-25 RX ADMIN — ISODIUM CHLORIDE 3 ML: 0.03 SOLUTION RESPIRATORY (INHALATION) at 19:32

## 2020-07-25 NOTE — PROGRESS NOTES
Progress Note - Thoracic Surgery   Murl Late 71 y o  male MRN: [id number]  Unit/Bed#: [facility] 405-01 Encounter: [id number]    Assessment:  71 M s/p VATS DOMI, complicated by prolonged air leak, cardiac arrest, s/p cric and subsequent trach    Plan:  Diet as tolerated  Discontinue keofed today vs tomorrow  Pulmonary toilet  PT/OT  Therapeutic Lovenox  Discharge planning    Subjective/Objective     Subjective: No acute events overnight  Eating well  Moving bowels  Tolerating tube feeds  Objective:    Blood pressure 107/80, pulse 94, temperature 99 1 °F (37 3 °C), temperature source Oral, resp  rate 20, height 5' 10" (1 778 m), weight 85 2 kg (187 lb 13 3 oz), SpO2 100 %  ,Body mass index is 26 95 kg/m²        Intake/Output Summary (Last 24 hours) at 7/25/2020 0731  Last data filed at 7/25/2020 0600  Gross per 24 hour   Intake 2249 ml   Output 1750 ml   Net 499 ml       Invasive Devices     Peripherally Inserted Central Catheter Line            PICC Line 48/77/33 Left Basilic 28 days          Drain            Urethral Catheter Latex 16 Fr  5 days    NG/OG/Enteral Tube Other (Comment) Right nares 3 days          Airway            Surgical Airway 11 days                Physical Exam:   General: NAD, awake and alert  Eyes: PERRL  ENT: moist mucous membranes  Neck: supple  CV: RRR +S1/S2  Chest: respirations non-labored  Abdomen: soft, NT ND  Extremities: atraumatic, no edema      Results from last 7 days   Lab Units 07/24/20  0518 07/22/20  0510 07/21/20  0504   WBC Thousand/uL 8 29 7 96 8 75   HEMOGLOBIN g/dL 8 9* 9 1* 9 0*   HEMATOCRIT % 29 5* 29 9* 29 5*   PLATELETS Thousands/uL 261 276 273     Results from last 7 days   Lab Units 07/24/20  0518 07/22/20  0510 07/21/20  0504   POTASSIUM mmol/L 3 9 4 6 3 9   CHLORIDE mmol/L 109* 109* 107   CO2 mmol/L 27 27 29   BUN mg/dL 17 11 12   CREATININE mg/dL 0 79 0 86 0 74   CALCIUM mg/dL 8 5 8 7 8 4

## 2020-07-25 NOTE — RESTORATIVE TECHNICIAN NOTE
Restorative Specialist Mobility Note       Activity: Ambulate in jason     Assistive Device: Front wheel walker        Repositioned: Sitting, Up in chair

## 2020-07-26 LAB
GLUCOSE SERPL-MCNC: 102 MG/DL (ref 65–140)
GLUCOSE SERPL-MCNC: 98 MG/DL (ref 65–140)
GLUCOSE SERPL-MCNC: 99 MG/DL (ref 65–140)

## 2020-07-26 PROCEDURE — 82948 REAGENT STRIP/BLOOD GLUCOSE: CPT

## 2020-07-26 PROCEDURE — 94760 N-INVAS EAR/PLS OXIMETRY 1: CPT

## 2020-07-26 PROCEDURE — 94640 AIRWAY INHALATION TREATMENT: CPT

## 2020-07-26 PROCEDURE — 99024 POSTOP FOLLOW-UP VISIT: CPT | Performed by: THORACIC SURGERY (CARDIOTHORACIC VASCULAR SURGERY)

## 2020-07-26 RX ORDER — ACETAMINOPHEN 325 MG/1
975 TABLET ORAL EVERY 8 HOURS SCHEDULED
Status: DISCONTINUED | OUTPATIENT
Start: 2020-07-26 | End: 2020-07-30 | Stop reason: HOSPADM

## 2020-07-26 RX ADMIN — METOPROLOL TARTRATE 2.5 MG: 5 INJECTION INTRAVENOUS at 08:59

## 2020-07-26 RX ADMIN — METOPROLOL TARTRATE 2.5 MG: 5 INJECTION INTRAVENOUS at 21:15

## 2020-07-26 RX ADMIN — ISODIUM CHLORIDE 3 ML: 0.03 SOLUTION RESPIRATORY (INHALATION) at 07:10

## 2020-07-26 RX ADMIN — ISODIUM CHLORIDE 3 ML: 0.03 SOLUTION RESPIRATORY (INHALATION) at 13:36

## 2020-07-26 RX ADMIN — SENNOSIDES AND DOCUSATE SODIUM 1 TABLET: 8.6; 5 TABLET ORAL at 08:58

## 2020-07-26 RX ADMIN — ACETAMINOPHEN 975 MG: 650 SUSPENSION ORAL at 05:00

## 2020-07-26 RX ADMIN — ISODIUM CHLORIDE 3 ML: 0.03 SOLUTION RESPIRATORY (INHALATION) at 19:17

## 2020-07-26 RX ADMIN — NYSTATIN 500000 UNITS: 100000 SUSPENSION ORAL at 17:43

## 2020-07-26 RX ADMIN — TAMSULOSIN HYDROCHLORIDE 0.4 MG: 0.4 CAPSULE ORAL at 17:43

## 2020-07-26 RX ADMIN — LEVALBUTEROL HYDROCHLORIDE 1.25 MG: 1.25 SOLUTION, CONCENTRATE RESPIRATORY (INHALATION) at 19:17

## 2020-07-26 RX ADMIN — NYSTATIN 500000 UNITS: 100000 SUSPENSION ORAL at 12:45

## 2020-07-26 RX ADMIN — CHLORHEXIDINE GLUCONATE 0.12% ORAL RINSE 15 ML: 1.2 LIQUID ORAL at 08:57

## 2020-07-26 RX ADMIN — ENOXAPARIN SODIUM 90 MG: 100 INJECTION SUBCUTANEOUS at 21:12

## 2020-07-26 RX ADMIN — LEVALBUTEROL HYDROCHLORIDE 1.25 MG: 1.25 SOLUTION, CONCENTRATE RESPIRATORY (INHALATION) at 07:10

## 2020-07-26 RX ADMIN — LEVALBUTEROL HYDROCHLORIDE 1.25 MG: 1.25 SOLUTION, CONCENTRATE RESPIRATORY (INHALATION) at 13:36

## 2020-07-26 RX ADMIN — ACETAMINOPHEN 975 MG: 325 TABLET, FILM COATED ORAL at 22:45

## 2020-07-26 RX ADMIN — POLYETHYLENE GLYCOL 3350 17 G: 17 POWDER, FOR SOLUTION ORAL at 08:58

## 2020-07-26 RX ADMIN — NYSTATIN 500000 UNITS: 100000 SUSPENSION ORAL at 08:57

## 2020-07-26 RX ADMIN — ENOXAPARIN SODIUM 90 MG: 100 INJECTION SUBCUTANEOUS at 08:59

## 2020-07-26 NOTE — PROGRESS NOTES
Progress Note - General Surgery   Louie Fields 79 y o  male MRN: [id number]  Unit/Bed#: [facility] 405-01 Encounter: [id number]    Assessment:  71 M s/p VATS DOMI, complicated by prolonged air leak, cardiac arrest, s/p cric and subsequent trach    Patient had good PO intake yesterday    Plan:  -Can remove NG tube this AM  -cont diet as tolerated  -pt/ot  -OOB and IS encouraged  -discharge planning   -possible trach decannulation next week  Subjective/Objective     Subjective: NATALYA overnight  Eating well yesterday, only complaint is that "he may be eating too fast"  Denies nausea vomiting, abd pain, or subjective fevers/ chills  Objective: Physical Exam   Constitutional: He is oriented to person, place, and time  He appears well-developed  No distress  HENT:   Head: Normocephalic and atraumatic  Donzella Blitz in place   Eyes: No scleral icterus  Cardiovascular: Normal rate and regular rhythm  Pulmonary/Chest: Effort normal and breath sounds normal    Abdominal: Soft  He exhibits no distension  There is no tenderness  There is no rebound and no guarding  Neurological: He is alert and oriented to person, place, and time  Skin: Skin is warm  Capillary refill takes less than 2 seconds  Nursing note and vitals reviewed  Blood pressure 106/68, pulse 88, temperature 98 5 °F (36 9 °C), temperature source Oral, resp  rate 20, height 5' 10" (1 778 m), weight 85 2 kg (187 lb 13 3 oz), SpO2 97 %  ,Body mass index is 26 95 kg/m²        Intake/Output Summary (Last 24 hours) at 7/26/2020 9512  Last data filed at 7/25/2020 2000  Gross per 24 hour   Intake 2581 ml   Output 2550 ml   Net 31 ml       Invasive Devices     Peripherally Inserted Central Catheter Line            PICC Line 83/72/77 Left Basilic 29 days          Drain            Urethral Catheter Latex 16 Fr  6 days    NG/OG/Enteral Tube Other (Comment) Right nares 4 days          Airway            Surgical Airway 12 days                    Lab, Imaging and other studies:I have personally reviewed pertinent lab results      VTE Pharmacologic Prophylaxis: Enoxaparin (Lovenox)  VTE Mechanical Prophylaxis: sequential compression device

## 2020-07-27 LAB
ANION GAP SERPL CALCULATED.3IONS-SCNC: 5 MMOL/L (ref 4–13)
BASOPHILS # BLD AUTO: 0.05 THOUSANDS/ΜL (ref 0–0.1)
BASOPHILS NFR BLD AUTO: 1 % (ref 0–1)
BUN SERPL-MCNC: 14 MG/DL (ref 5–25)
CALCIUM SERPL-MCNC: 9.7 MG/DL (ref 8.3–10.1)
CHLORIDE SERPL-SCNC: 108 MMOL/L (ref 100–108)
CO2 SERPL-SCNC: 26 MMOL/L (ref 21–32)
CREAT SERPL-MCNC: 0.86 MG/DL (ref 0.6–1.3)
EOSINOPHIL # BLD AUTO: 0.35 THOUSAND/ΜL (ref 0–0.61)
EOSINOPHIL NFR BLD AUTO: 4 % (ref 0–6)
ERYTHROCYTE [DISTWIDTH] IN BLOOD BY AUTOMATED COUNT: 15.6 % (ref 11.6–15.1)
GFR SERPL CREATININE-BSD FRML MDRD: 88 ML/MIN/1.73SQ M
GLUCOSE SERPL-MCNC: 118 MG/DL (ref 65–140)
GLUCOSE SERPL-MCNC: 119 MG/DL (ref 65–140)
GLUCOSE SERPL-MCNC: 81 MG/DL (ref 65–140)
GLUCOSE SERPL-MCNC: 85 MG/DL (ref 65–140)
GLUCOSE SERPL-MCNC: 85 MG/DL (ref 65–140)
HCT VFR BLD AUTO: 32.3 % (ref 36.5–49.3)
HGB BLD-MCNC: 9.8 G/DL (ref 12–17)
IMM GRANULOCYTES # BLD AUTO: 0.05 THOUSAND/UL (ref 0–0.2)
IMM GRANULOCYTES NFR BLD AUTO: 1 % (ref 0–2)
LYMPHOCYTES # BLD AUTO: 1.98 THOUSANDS/ΜL (ref 0.6–4.47)
LYMPHOCYTES NFR BLD AUTO: 24 % (ref 14–44)
MCH RBC QN AUTO: 30.3 PG (ref 26.8–34.3)
MCHC RBC AUTO-ENTMCNC: 30.3 G/DL (ref 31.4–37.4)
MCV RBC AUTO: 100 FL (ref 82–98)
MONOCYTES # BLD AUTO: 0.55 THOUSAND/ΜL (ref 0.17–1.22)
MONOCYTES NFR BLD AUTO: 7 % (ref 4–12)
NEUTROPHILS # BLD AUTO: 5.31 THOUSANDS/ΜL (ref 1.85–7.62)
NEUTS SEG NFR BLD AUTO: 63 % (ref 43–75)
NRBC BLD AUTO-RTO: 0 /100 WBCS
PLATELET # BLD AUTO: 301 THOUSANDS/UL (ref 149–390)
PMV BLD AUTO: 10.2 FL (ref 8.9–12.7)
POTASSIUM SERPL-SCNC: 4.3 MMOL/L (ref 3.5–5.3)
RBC # BLD AUTO: 3.23 MILLION/UL (ref 3.88–5.62)
SODIUM SERPL-SCNC: 139 MMOL/L (ref 136–145)
WBC # BLD AUTO: 8.29 THOUSAND/UL (ref 4.31–10.16)

## 2020-07-27 PROCEDURE — 94640 AIRWAY INHALATION TREATMENT: CPT

## 2020-07-27 PROCEDURE — 85025 COMPLETE CBC W/AUTO DIFF WBC: CPT | Performed by: STUDENT IN AN ORGANIZED HEALTH CARE EDUCATION/TRAINING PROGRAM

## 2020-07-27 PROCEDURE — 82948 REAGENT STRIP/BLOOD GLUCOSE: CPT

## 2020-07-27 PROCEDURE — 99024 POSTOP FOLLOW-UP VISIT: CPT | Performed by: THORACIC SURGERY (CARDIOTHORACIC VASCULAR SURGERY)

## 2020-07-27 PROCEDURE — 80048 BASIC METABOLIC PNL TOTAL CA: CPT | Performed by: STUDENT IN AN ORGANIZED HEALTH CARE EDUCATION/TRAINING PROGRAM

## 2020-07-27 PROCEDURE — 92526 ORAL FUNCTION THERAPY: CPT

## 2020-07-27 PROCEDURE — 97116 GAIT TRAINING THERAPY: CPT

## 2020-07-27 PROCEDURE — 97535 SELF CARE MNGMENT TRAINING: CPT

## 2020-07-27 PROCEDURE — 94760 N-INVAS EAR/PLS OXIMETRY 1: CPT

## 2020-07-27 RX ADMIN — ISODIUM CHLORIDE 3 ML: 0.03 SOLUTION RESPIRATORY (INHALATION) at 13:46

## 2020-07-27 RX ADMIN — ENOXAPARIN SODIUM 90 MG: 100 INJECTION SUBCUTANEOUS at 21:25

## 2020-07-27 RX ADMIN — MELATONIN 6 MG: at 21:24

## 2020-07-27 RX ADMIN — CHLORHEXIDINE GLUCONATE 0.12% ORAL RINSE 15 ML: 1.2 LIQUID ORAL at 21:31

## 2020-07-27 RX ADMIN — ENOXAPARIN SODIUM 90 MG: 100 INJECTION SUBCUTANEOUS at 09:44

## 2020-07-27 RX ADMIN — LEVALBUTEROL HYDROCHLORIDE 1.25 MG: 1.25 SOLUTION, CONCENTRATE RESPIRATORY (INHALATION) at 13:47

## 2020-07-27 RX ADMIN — SENNOSIDES AND DOCUSATE SODIUM 1 TABLET: 8.6; 5 TABLET ORAL at 09:44

## 2020-07-27 RX ADMIN — TAMSULOSIN HYDROCHLORIDE 0.4 MG: 0.4 CAPSULE ORAL at 16:52

## 2020-07-27 RX ADMIN — NYSTATIN 500000 UNITS: 100000 SUSPENSION ORAL at 21:25

## 2020-07-27 RX ADMIN — ACETAMINOPHEN 975 MG: 325 TABLET, FILM COATED ORAL at 14:31

## 2020-07-27 RX ADMIN — CALCIUM CARBONATE (ANTACID) CHEW TAB 500 MG 500 MG: 500 CHEW TAB at 06:14

## 2020-07-27 RX ADMIN — CHLORHEXIDINE GLUCONATE 0.12% ORAL RINSE 15 ML: 1.2 LIQUID ORAL at 09:53

## 2020-07-27 RX ADMIN — METOPROLOL TARTRATE 2.5 MG: 5 INJECTION INTRAVENOUS at 09:42

## 2020-07-27 RX ADMIN — ISODIUM CHLORIDE 3 ML: 0.03 SOLUTION RESPIRATORY (INHALATION) at 19:46

## 2020-07-27 RX ADMIN — NYSTATIN 500000 UNITS: 100000 SUSPENSION ORAL at 09:44

## 2020-07-27 RX ADMIN — ACETAMINOPHEN 975 MG: 325 TABLET, FILM COATED ORAL at 21:24

## 2020-07-27 RX ADMIN — POLYETHYLENE GLYCOL 3350 17 G: 17 POWDER, FOR SOLUTION ORAL at 09:44

## 2020-07-27 RX ADMIN — LEVALBUTEROL HYDROCHLORIDE 1.25 MG: 1.25 SOLUTION, CONCENTRATE RESPIRATORY (INHALATION) at 07:58

## 2020-07-27 RX ADMIN — ISODIUM CHLORIDE 3 ML: 0.03 SOLUTION RESPIRATORY (INHALATION) at 07:58

## 2020-07-27 RX ADMIN — OLANZAPINE 10 MG: 10 TABLET, ORALLY DISINTEGRATING ORAL at 21:25

## 2020-07-27 RX ADMIN — LEVALBUTEROL HYDROCHLORIDE 1.25 MG: 1.25 SOLUTION, CONCENTRATE RESPIRATORY (INHALATION) at 19:46

## 2020-07-27 RX ADMIN — METOPROLOL TARTRATE 2.5 MG: 5 INJECTION INTRAVENOUS at 21:24

## 2020-07-27 RX ADMIN — SENNOSIDES AND DOCUSATE SODIUM 1 TABLET: 8.6; 5 TABLET ORAL at 17:30

## 2020-07-27 RX ADMIN — ACETAMINOPHEN 975 MG: 325 TABLET, FILM COATED ORAL at 06:08

## 2020-07-27 NOTE — OCCUPATIONAL THERAPY NOTE
OccupationalTherapy Progress Note     Patient Name: Elise Noland  Today's Date: 7/27/2020  Problem List  Principal Problem:    Malignant neoplasm of upper lobe of left lung [facility])  Active Problems:    Acute respiratory failure with hypoxia ([facility] [address] )    Acute deep vein thrombosis (DVT) of axillary vein of right upper extremity (HCC)    Acute deep vein thrombosis (DVT) of brachial vein of right upper extremity (HCC)    Tracheostomy in place [facility])    Acute deep vein thrombosis (DVT) of calf muscle vein of left lower extremity (HCC)    Encephalopathy    Hyperactive delirium after surgical procedure    Anemia    Thrombocytosis ([facility] [address] )          07/27/20 1058   Restrictions/Precautions   Weight Bearing Precautions Per Order No   Braces or Orthoses   (denies)   Other Precautions Chair Alarm;Multiple lines;O2;Telemetry; Fall Risk  (trach)   General   Response to Previous Treatment Patient with no complaints from previous session   Lifestyle   Autonomy pta pt reports I in ADLs/IADLs/functional mobility   Reciprocal Relationships supportive dtr and son in law   Service to Others retired    Jan 139 enjoys doing "nothing"   Pain Assessment   Pain Assessment Tool 0-10   Pain Score No Pain   ADL   Where Assessed Chair   Grooming Assistance 5  Supervision/Setup   Grooming Deficit Setup;Supervision/safety; Increased time to complete   UB Bathing Comments Pt declining bathing this session, reporting he just completed this  LB Bathing Comments Pt declining need this session, reporting he just completed this  UB Dressing Assistance 4  Minimal Assistance   UB Dressing Deficit Setup;Supervision/safety; Thread RUE; Thread LUE;Pull around back   LB Dressing Assistance 4  Minimal Assistance   LB Dressing Deficit Setup;Supervision/safety; Don/doff R sock; Don/doff L sock; Increased time to complete   Toileting Comments Pt declines need at this time     Cognition   Overall Cognitive Status Impaired Arousal/Participation Alert; Cooperative   Attention Within functional limits   Orientation Level Oriented to person;Oriented to place  (requires environmental cues for time)   Memory Decreased recall of precautions   Following Commands Follows one step commands without difficulty   Comments Pt is pleasant and cooperative this session  Activity Tolerance   Activity Tolerance Patient tolerated treatment well   Medical Staff Made Aware RN confirmed okay to see pt   Assessment   Assessment Patient participated in Skilled OT session this date with interventions consisting of ADL re training with the use of correct body mechanics, deep breathing technique and  therapeutic activities to: increase activity tolerance   Patient agreeable to OT treatment session, upon arrival patient was found seated OOB to Chair  In comparison to previous session, patient with improvements in activity tolerance  Patient requiring ocassional safety reminders  Patient continues to be functioning below baseline level, occupational performance remains limited secondary to factors listed above and increased risk for falls and injury  From OT standpoint, recommendation at time of d/c would be Short Term Rehab  Patient to benefit from continued Occupational Therapy treatment while in the hospital to address deficits as defined above and maximize level of functional independence with ADLs and functional mobility      Plan   Treatment Interventions ADL retraining;Patient/family training;Cognitive reorientation   Goal Expiration Date 08/06/20  (goals extended +10 days)   OT Treatment Day 4   OT Frequency 3-5x/wk   Recommendation   OT Discharge Recommendation Post-Acute Rehabilitation Services   OT - OK to Discharge Yes  (When medically appropriate )   Modified [place] Scale   Modified [place] Scale 4     ALYSIA Bhat, OTR/L

## 2020-07-27 NOTE — UTILIZATION REVIEW
Continued Stay Review    Date: 7-25/26-20                       Current Patient Class: inpatient  Current Level of Care: med surg     HPI:70 y o  male initially admitted on 6-10 for malignant neoplasm of upper lobe of left lung  S/p Vats and emergent tracheostomy  Assessment/Plan:   7-25  Continue diet as tolerated  If he is able to eat a significant portion of meals today will remove ng tube  7-26  Encourage incentive spirometry and ambulation  Possible decannulation of trach next week       7-26  Ngt discontinued  Dc norwood at midnight  Due to void          Pertinent Labs/Diagnostic Results:       Results from last 7 days   Lab Units 07/27/20  0609 07/24/20  0518 07/22/20  0510 07/21/20  0504   WBC Thousand/uL 8 29 8 29 7 96 8 75   HEMOGLOBIN g/dL 9 8* 8 9* 9 1* 9 0*   HEMATOCRIT % 32 3* 29 5* 29 9* 29 5*   PLATELETS Thousands/uL 301 261 276 273   NEUTROS ABS Thousands/µL 5 31 4 78 5 23 6 14         Results from last 7 days   Lab Units 07/27/20  0609 07/24/20  0518 07/22/20  0510 07/21/20  0504   SODIUM mmol/L 139 142 141 139   POTASSIUM mmol/L 4 3 3 9 4 6 3 9   CHLORIDE mmol/L 108 109* 109* 107   CO2 mmol/L 26 27 27 29   ANION GAP mmol/L 5 6 5 3*   BUN mg/dL 14 17 11 12   CREATININE mg/dL 0 86 0 79 0 86 0 74   EGFR ml/min/1 73sq m 88 92 88 94   CALCIUM mg/dL 9 7 8 5 8 7 8 4         Results from last 7 days   Lab Units 07/27/20  1225 07/27/20  0607 07/27/20  0047 07/26/20 1802 07/26/20  1206 07/26/20  0546 07/25/20  2349 07/25/20  1808 07/25/20  1228 07/25/20  0547 07/24/20  2348 07/24/20  1744   POC GLUCOSE mg/dl 118 85 81 99 102 98 113 107 93 99 119 167*     Results from last 7 days   Lab Units 07/27/20  0609 07/24/20  0518 07/22/20  0510 07/21/20  0504   GLUCOSE RANDOM mg/dL 85 98 96 109       Vital Signs:     Date/Time  Temp  Pulse  Resp  BP  MAP (mmHg)  SpO2  FiO2 (%)  O2 Flow Rate (L/min)  O2 Device  Patient Position - Orthostatic VS   BP: Map 79 at 07/27/20 0732   07/26/20 2329  97 7 °F (36 5 °C)  94  18  126/76     99 %    5 L/min  Trach mask  Lying   BP: Map 88 at 07/26/20 2329   07/26/20 1924  98 5 °F (36 9 °C)  98  19  111/88     97 %    5 L/min  Trach mask  Sitting   BP: Map 100 at 07/26/20 1924   07/26/20 1900            98 %  28  5 L/min  Trach mask     07/26/20 1600              28         07/26/20 1527  98 °F (36 7 °C)  92  16  130/86     98 %    5 L/min  Trach mask  Sitting   BP: Map 103 at 07/26/20 1527   07/26/20 1336            97 %           07/26/20 1200            99 %  28  5 L/min  Trach mask     07/26/20 1048  98 3 °F (36 8 °C)  95  18  109/78     97 %      Trach mask  Sitting   BP: Map 88 at 07/26/20 1048   07/26/20 0800            97 %  28  5 L/min  Trach mask     07/26/20 0710            98 %  28    Trach mask     07/26/20 0701  98 1 °F (36 7 °C)  89  18  108/67     98 %      Trach mask  Lying   BP: Map 79 at 07/26/20 0701   07/26/20 0400              28         07/26/20 0301  98 5 °F (36 9 °C)  95  20  96/68     96 %      Trach mask  Lying   BP: 78 at 07/26/20 0301   07/26/20 0000              28         07/25/20 2307  98 5 °F (36 9 °C)  88  20  106/68     97 %      Trach mask  Lying   BP: 81 at 07/25/20 2307   07/25/20 2000            96 %  28  5 L/min  Trach mask     07/25/20 1920  98 °F (36 7 °C)  97  20  143/78     98 %  28  5 L/min  Trach mask  Lying   BP: Map 106 at 07/25/20 1920   07/25/20 1600              28         07/25/20 1500  98 °F (36 7 °C)  96  18  131/72  92  98 %      Trach mask  Sitting   07/25/20 1345            98 %           07/25/20 1200              28         07/25/20 1128    99  20  115/77    99 %                           Medications:   Scheduled Medications:    Medications:  acetaminophen 975 mg Oral Q8H [facility]   chlorhexidine 15 mL Swish & Spit Q12H DEAN   enoxaparin 1 mg/kg Subcutaneous Q12H DEAN   levalbuterol 1 25 mg Nebulization TID melatonin 6 mg Oral HS   metoprolol 2 5 mg Intravenous Q12H   nystatin 500,000 Units Swish & Swallow 4x Daily   OLANZapine 10 mg Oral HS   polyethylene glycol 17 g Oral Daily   senna-docusate sodium 1 tablet Per NG Tube BID   sodium chloride 3 mL Nebulization TID   tamsulosin 0 4 mg Oral Daily With Dinner     Continuous IV Infusions:     PRN Meds:    albuterol 2 5 mg Nebulization Q6H PRN   calcium carbonate 500 mg Oral TID PRN   guaiFENesin 200 mg Oral Q4H PRN   haloperidol lactate 2 mg Intramuscular Q6H PRN   Lidocaine Viscous HCl 15 mL Swish & Spit 4x Daily PRN   OLANZapine 5 mg Intramuscular Q6H PRN   ondansetron 4 mg Intravenous Q6H PRN   oxyCODONE 2 5 mg Oral Q4H PRN       Discharge Plan: to be deertermined     Network Utilization Review Department  [email or web address]  ATTENTION: Please call with any questions or concerns to [phone] and carefully listen to the prompts so that you are directed to the right person  All voicemails are confidential   Farrel Bodily all requests for admission clinical reviews, approved or denied determinations and any other requests to dedicated fax number below belonging to the campus where the patient is receiving treatment   List of dedicated fax numbers for the Facilities:  [address] DENIALS (Administrative/Medical Necessity) [phone]   [address] (Maternity/NICU/Pediatrics) [phone]   Delaney Hendricks [phone]   Abiola Rodriguez [phone]   Sariah Hooks [phone]   Cassandra Brennan [phone]   1205 [facility] [address] [place] [address] [phone]   [facility]  [phone]   2205 [facility]  [address] [facility] [address] [facility] [phone]

## 2020-07-27 NOTE — PLAN OF CARE
Problem: PHYSICAL THERAPY ADULT  Goal: Performs mobility at highest level of function for planned discharge setting  See evaluation for individualized goals  Description  Treatment/Interventions: LE strengthening/ROM, Therapeutic exercise, Endurance training, Cognitive reorientation, Bed mobility, Continued evaluation, Spoke to nursing, transfer training, gait training  Equipment Recommended: rw       See flowsheet documentation for full assessment, interventions and recommendations  Note:   Prognosis: Good  Problem List: Decreased strength, Decreased endurance, Impaired balance, Decreased mobility, Pain  Assessment: Pt seen for session for setup, transfers, gait training w/ rest time, repositioning  Pt cooperative, some mild continued cog concerns  Note improved mobility tolerance, standing balance  Note increased LOB and need for rest as pt fatigues  continue to recommend rehab at d/c  Barriers to Discharge: Inaccessible home environment, Decreased caregiver support     PT Discharge Recommendation: [address]     PT - OK to Discharge: (S) Yes(when stable to rehab)    See flowsheet documentation for full assessment

## 2020-07-27 NOTE — QUICK NOTE
Progress Note - Palliative & Supportive Care     Patient continues to improve and moving towards discharge  Please notify [facility] with any further questions/concerns  We will offer outpatient follow up for after d/c from rehab for ongoing support       Media Poster, DO  Palliative and Supportive Care  [phone]

## 2020-07-27 NOTE — SPEECH THERAPY NOTE
Speech Language/Pathology    Speech/Language Pathology Progress Note    Patient Name: Shelbi Puckett  Today's Date: 7/27/2020     Problem List  Principal Problem:    Malignant neoplasm of upper lobe of left lung [facility])  Active Problems:    Acute respiratory failure with hypoxia ([facility] [address] )    Acute deep vein thrombosis (DVT) of axillary vein of right upper extremity (HCC)    Acute deep vein thrombosis (DVT) of brachial vein of right upper extremity ([facility] [address] )    Tracheostomy in place [facility])    Acute deep vein thrombosis (DVT) of calf muscle vein of left lower extremity (HCC)    Encephalopathy    Hyperactive delirium after surgical procedure    Anemia    Thrombocytosis ([facility] [address] )       Past Medical History  Past Medical History:   Diagnosis Date    Alcohol abuse 3/27/2020    Chest pain     Community acquired pneumonia 3/27/2020    Hypertension     Left upper lobe pulmonary nodule     Malignant neoplasm of upper lobe of left lung ([facility] [address] ) 5/7/2020        Past Surgical History  Past Surgical History:   Procedure Laterality Date    COLON SURGERY      CT NEEDLE BIOPSY LUNG  4/29/2020    TX BRONCHOSCOPY,DIAGNOSTIC N/A 6/10/2020    Procedure: BRONCHOSCOPY FLEXIBLE;  Surgeon: Marko Homans, MD;  [facility];  Service: Thoracic    TX MEDIASTINOSCOPY WITH LYMPH NODE BIOPSY/IES N/A 6/10/2020    Procedure: MEDIASTINOSCOPY;  Surgeon: Marko Homans, MD;  [facility];  Service: Thoracic    TX THORACOSCOPY SURG LOBECTOMY Left 6/10/2020    Procedure: THORACOSCOPY VIDEO ASSISTED SURGERY (VATS);   Surgeon: Marko Homans, MD;  [facility];  Service: Thoracic    TX THORACOSCOPY SURG LOBECTOMY Left 6/10/2020    Procedure: UPPER LOBECTOMY OF LUNG; MEDIASTINAL  LYMPH NODE DISSECTION;  Surgeon: Marko Homans, MD;  [facility];  Service: Thoracic    TRACHEOSTOMY N/A 6/22/2020    Procedure: TRACHEOSTOMY REVISION, BRONCHOSCOPY;  Surgeon: Marko Homans, MD;  [facility]; Service: Thoracic    WRIST SURGERY Right     orif         Subjective:  Pt seen for dysphagia tx  Pt sitting upright in bed  Pt cooperative, somewhat [place]  Pt had his speaking valve on  Objective:  Reviewed chart and spoke with RN  She had just come on shift, but received in report that pt was eating well and taking meds without difficulty  Pt reports he feels he is doing well with the regular diet, but that sometimes sweets make him itchy-he was unable to elaborate or describe  He was able to recall the strategy of alternating solids and liquids, but unable to remember that he is supposed to swallow twice per bite/sip  Pt was seen with a snack of toast with butter, sp crackers, and soda by straw  He took appropriate sized bites, with mastication, bolus formation and transfer appearing prompt  Hyolaryngeal elevation was observed and appeared prompt  With the thin liquid, oral and pharyngeal swallows also appeared prompt  Pt coughed/cleared his throat after the first sip of soda, but there were no further s/s of aspiration with multiple other sips  Pt independently took small sips, but needed occasional cues to swallow twice and alternate solids and liquids  Discussed with the patient the importance of wearing the speaking valve while eating, and removing it while sleeping  Assessment:  Pt appears to be tolerating a regular diet and thin liquids  Cough x1 after first sip of thin liquid, but no other s/s of aspiration with the snack today  Pt needs occasional cues to follow strategies  Plan/Recommendations:  Continue regular diet and thin liquids  Wear speaking valve during meals  Make sure to remove while sleeping  Aspiration precautions  Alternate solids and liquids, two swallows per bite/sip, eat slowly, small bites/sips  Continue close supervision at meals  No further ST recommended at this time  Discharge from [address] [place] [address] Dr Soares if needed

## 2020-07-27 NOTE — PROGRESS NOTES
Progress Note -    Jim Carroll 79 y o  male MRN: [id number]  Unit/Bed#: [facility] 405-01 Encounter: [id number]    Assessment/Plan:  79 y o  male: s/p VATS left upper lobectomy (6/10) with post-operative course complicated by need for emergent tracheostomy  -overall, he feels much better; currently off 1:1  -NG discontinued; tolerating PO intake satisfactorily: 1 6 L   -norwood discontinued at midnight; voided this morning just before rounds   -sats in the high 90s on 5L/min, 6  trach collar  PLAN:  -follow up case management for disposition planning  -to continue PO intake as tolerated  -continue PT/IS  Subjective:   -no new complaints   -his vital signs have been stable on 5L/min via Trach   -tolerating regular diet  Objective:     Vitals: Temp:  [97 7 °F (36 5 °C)-98 5 °F (36 9 °C)] 97 7 °F (36 5 °C)  HR:  [89-98] 94  Resp:  [16-19] 18  BP: (108-130)/(67-88) 126/76  FiO2 (%):  [28] 28  Body mass index is 27 06 kg/m²  I/O       07/25 0701 - 07/26 0700 07/26 0701 - 07/27 0700    P  O  1002 1560    NG/     Feedings 715     Total Intake(mL/kg) 2252 (26 3) 1560 (18 2)    Urine (mL/kg/hr) 2450 (1 2) 2675 (1 3)    Stool 0     Total Output 2450 2675    Net -198 -1115          Unmeasured Stool Occurrence 1 x           Physical Exam:  GEN: NAD  HEENT: MMM  CV: RRR  Lung: Normal effort  Ab: Soft, NT/ND  Extrem: No CCE  Neuro: A+Ox3    Lab, Imaging and other studies: I have personally reviewed pertinent reports      VTE Pharmacologic Prophylaxis: Enoxaparin (Lovenox)  VTE Mechanical Prophylaxis: sequential compression device

## 2020-07-27 NOTE — PHYSICAL THERAPY NOTE
Physical Therapy Treatment Note       07/27/20 1150   Pain Assessment   Pain Assessment Tool 0-10   Pain Score No Pain   Restrictions/Precautions   Weight Bearing Precautions Per Order No   Other Precautions Multiple lines; Fall Risk;O2;Telemetry   General   Chart Reviewed Yes   Family/Caregiver Present No   Cognition   Overall Cognitive Status Impaired   Arousal/Participation Responsive   Attention Attends with cues to redirect   Orientation Level Oriented X4   Memory Unable to assess   Following Commands Follows one step commands without difficulty   Subjective   Subjective states he feels OK  some weakness, fatigue,  willing to mobilize   Transfers   Sit to Stand 5  Supervision   Additional items Assist x 1   Stand to Sit 5  Supervision   Additional items Assist x 1   Ambulation/Elevation   Gait pattern   (slow, lateral sway, wide GEO)   Gait Assistance 4  Minimal assist   Additional items Assist x 1   Assistive Device Rolling walker   Distance 130'x1, 80'x2, 70'x1 w/ 30sec-1 min standing rests  time spent for setup, repositioning  Balance   Static Sitting Fair +   Dynamic Sitting Fair   Static Standing Fair -   Dynamic Standing Fair -   Ambulatory Fair -   Endurance Deficit   Endurance Deficit Yes   Endurance Deficit Description weakness, fatigue   Activity Tolerance   Activity Tolerance Patient tolerated treatment well;Patient limited by fatigue;Treatment limited secondary to medical complications (Comment)   Nurse Made Aware yes   Assessment   Prognosis Good   Problem List Decreased strength;Decreased endurance; Impaired balance;Decreased mobility;Pain   Assessment Pt seen for session for setup, transfers, gait training w/ rest time, repositioning  Pt cooperative, some mild continued cog concerns  Note improved mobility tolerance, standing balance  Note increased LOB and need for rest as pt fatigues    continue to recommend rehab at d/c   Goals   Patient Goals to feel better   STG Expiration Date 07/30/20   PT Treatment Day 13   Plan   Treatment/Interventions Functional transfer training;LE strengthening/ROM; Therapeutic exercise; Endurance training;Elevations; Patient/family training;Equipment eval/education;Gait training;Bed mobility   Progress Progressing toward goals   PT Frequency Other (Comment)  (3-5x/wk)   Recommendation   PT Discharge Recommendation Post-Acute Rehabilitation Services   Equipment Recommended Walker   PT - OK to Discharge Yes  (when stable to rehab)   Radha Quintero PT, DPT CSRS

## 2020-07-27 NOTE — PLAN OF CARE
Problem: OCCUPATIONAL THERAPY ADULT  Goal: Performs self-care activities at highest level of function for planned discharge setting  See evaluation for individualized goals  Description  Treatment Interventions: ADL retraining, Functional transfer training, UE strengthening/ROM, Endurance training, Cognitive reorientation, Patient/family training, Equipment evaluation/education, Fine motor coordination activities, Compensatory technique education, Energy conservation, Activityengagement, Neuromuscular reeducation, Continued evaluation  Equipment Recommended: Tub seat with back, Bedside commode       See flowsheet documentation for full assessment, interventions and recommendations  Outcome: Progressing  Note:   Limitation: Decreased ADL status, Decreased UE ROM, Decreased UE strength, Decreased Safe judgement during ADL, Decreased cognition, Decreased endurance, Decreased sensation, Decreased self-care trans, Decreased high-level ADLs  Prognosis: Fair  Assessment: Patient participated in Skilled OT session this date with interventions consisting of ADL re training with the use of correct body mechanics, deep breathing technique and  therapeutic activities to: increase activity tolerance   Patient agreeable to OT treatment session, upon arrival patient was found seated OOB to Chair  In comparison to previous session, patient with improvements in activity tolerance  Patient requiring ocassional safety reminders  Patient continues to be functioning below baseline level, occupational performance remains limited secondary to factors listed above and increased risk for falls and injury  From OT standpoint, recommendation at time of d/c would be Short Term Rehab  Patient to benefit from continued Occupational Therapy treatment while in the hospital to address deficits as defined above and maximize level of functional independence with ADLs and functional mobility        OT Discharge Recommendation: Post-Acute Rehabilitation Services  OT - OK to Discharge: Yes(When medically appropriate )

## 2020-07-27 NOTE — SOCIAL WORK
Pt  Has been off 1:1 for over 24 hours and he is ready for [facility]  Requested updated PT/OT notes  [facility] informed  Per [facility] no longer meets criteria  Call placed to daughter and she chose [facility] and [facility]  Referral sent and requested PM&R

## 2020-07-28 LAB
ANION GAP SERPL CALCULATED.3IONS-SCNC: 7 MMOL/L (ref 4–13)
BASOPHILS # BLD AUTO: 0.07 THOUSANDS/ΜL (ref 0–0.1)
BASOPHILS NFR BLD AUTO: 1 % (ref 0–1)
BUN SERPL-MCNC: 14 MG/DL (ref 5–25)
CALCIUM SERPL-MCNC: 8.6 MG/DL (ref 8.3–10.1)
CHLORIDE SERPL-SCNC: 108 MMOL/L (ref 100–108)
CO2 SERPL-SCNC: 26 MMOL/L (ref 21–32)
CREAT SERPL-MCNC: 0.81 MG/DL (ref 0.6–1.3)
EOSINOPHIL # BLD AUTO: 0.29 THOUSAND/ΜL (ref 0–0.61)
EOSINOPHIL NFR BLD AUTO: 4 % (ref 0–6)
ERYTHROCYTE [DISTWIDTH] IN BLOOD BY AUTOMATED COUNT: 15.7 % (ref 11.6–15.1)
GFR SERPL CREATININE-BSD FRML MDRD: 90 ML/MIN/1.73SQ M
GLUCOSE SERPL-MCNC: 114 MG/DL (ref 65–140)
GLUCOSE SERPL-MCNC: 117 MG/DL (ref 65–140)
GLUCOSE SERPL-MCNC: 81 MG/DL (ref 65–140)
GLUCOSE SERPL-MCNC: 86 MG/DL (ref 65–140)
GLUCOSE SERPL-MCNC: 92 MG/DL (ref 65–140)
HCT VFR BLD AUTO: 30.5 % (ref 36.5–49.3)
HGB BLD-MCNC: 9.5 G/DL (ref 12–17)
IMM GRANULOCYTES # BLD AUTO: 0.06 THOUSAND/UL (ref 0–0.2)
IMM GRANULOCYTES NFR BLD AUTO: 1 % (ref 0–2)
LYMPHOCYTES # BLD AUTO: 2.02 THOUSANDS/ΜL (ref 0.6–4.47)
LYMPHOCYTES NFR BLD AUTO: 29 % (ref 14–44)
MCH RBC QN AUTO: 30.8 PG (ref 26.8–34.3)
MCHC RBC AUTO-ENTMCNC: 31.1 G/DL (ref 31.4–37.4)
MCV RBC AUTO: 99 FL (ref 82–98)
MONOCYTES # BLD AUTO: 0.47 THOUSAND/ΜL (ref 0.17–1.22)
MONOCYTES NFR BLD AUTO: 7 % (ref 4–12)
NEUTROPHILS # BLD AUTO: 4.08 THOUSANDS/ΜL (ref 1.85–7.62)
NEUTS SEG NFR BLD AUTO: 58 % (ref 43–75)
NRBC BLD AUTO-RTO: 0 /100 WBCS
PLATELET # BLD AUTO: 288 THOUSANDS/UL (ref 149–390)
PMV BLD AUTO: 10 FL (ref 8.9–12.7)
POTASSIUM SERPL-SCNC: 4 MMOL/L (ref 3.5–5.3)
RBC # BLD AUTO: 3.08 MILLION/UL (ref 3.88–5.62)
SODIUM SERPL-SCNC: 141 MMOL/L (ref 136–145)
WBC # BLD AUTO: 6.99 THOUSAND/UL (ref 4.31–10.16)

## 2020-07-28 PROCEDURE — 97535 SELF CARE MNGMENT TRAINING: CPT

## 2020-07-28 PROCEDURE — 97116 GAIT TRAINING THERAPY: CPT

## 2020-07-28 PROCEDURE — 99024 POSTOP FOLLOW-UP VISIT: CPT | Performed by: THORACIC SURGERY (CARDIOTHORACIC VASCULAR SURGERY)

## 2020-07-28 PROCEDURE — 82948 REAGENT STRIP/BLOOD GLUCOSE: CPT

## 2020-07-28 PROCEDURE — 85025 COMPLETE CBC W/AUTO DIFF WBC: CPT | Performed by: SURGERY

## 2020-07-28 PROCEDURE — 94760 N-INVAS EAR/PLS OXIMETRY 1: CPT

## 2020-07-28 PROCEDURE — 97530 THERAPEUTIC ACTIVITIES: CPT

## 2020-07-28 PROCEDURE — 80048 BASIC METABOLIC PNL TOTAL CA: CPT | Performed by: SURGERY

## 2020-07-28 PROCEDURE — 94640 AIRWAY INHALATION TREATMENT: CPT

## 2020-07-28 PROCEDURE — 99255 IP/OBS CONSLTJ NEW/EST HI 80: CPT | Performed by: PHYSICAL MEDICINE & REHABILITATION

## 2020-07-28 RX ADMIN — ENOXAPARIN SODIUM 90 MG: 100 INJECTION SUBCUTANEOUS at 08:43

## 2020-07-28 RX ADMIN — LEVALBUTEROL HYDROCHLORIDE 1.25 MG: 1.25 SOLUTION, CONCENTRATE RESPIRATORY (INHALATION) at 13:33

## 2020-07-28 RX ADMIN — NYSTATIN 500000 UNITS: 100000 SUSPENSION ORAL at 13:47

## 2020-07-28 RX ADMIN — CHLORHEXIDINE GLUCONATE 0.12% ORAL RINSE 15 ML: 1.2 LIQUID ORAL at 21:57

## 2020-07-28 RX ADMIN — ISODIUM CHLORIDE 3 ML: 0.03 SOLUTION RESPIRATORY (INHALATION) at 19:53

## 2020-07-28 RX ADMIN — MELATONIN 6 MG: at 21:57

## 2020-07-28 RX ADMIN — NYSTATIN 500000 UNITS: 100000 SUSPENSION ORAL at 17:22

## 2020-07-28 RX ADMIN — ACETAMINOPHEN 975 MG: 325 TABLET, FILM COATED ORAL at 13:47

## 2020-07-28 RX ADMIN — TAMSULOSIN HYDROCHLORIDE 0.4 MG: 0.4 CAPSULE ORAL at 16:30

## 2020-07-28 RX ADMIN — NYSTATIN 500000 UNITS: 100000 SUSPENSION ORAL at 21:57

## 2020-07-28 RX ADMIN — METOPROLOL TARTRATE 2.5 MG: 5 INJECTION INTRAVENOUS at 21:57

## 2020-07-28 RX ADMIN — ACETAMINOPHEN 975 MG: 325 TABLET, FILM COATED ORAL at 21:57

## 2020-07-28 RX ADMIN — ISODIUM CHLORIDE 3 ML: 0.03 SOLUTION RESPIRATORY (INHALATION) at 07:27

## 2020-07-28 RX ADMIN — LEVALBUTEROL HYDROCHLORIDE 1.25 MG: 1.25 SOLUTION, CONCENTRATE RESPIRATORY (INHALATION) at 07:27

## 2020-07-28 RX ADMIN — SENNOSIDES AND DOCUSATE SODIUM 1 TABLET: 8.6; 5 TABLET ORAL at 08:43

## 2020-07-28 RX ADMIN — LEVALBUTEROL HYDROCHLORIDE 1.25 MG: 1.25 SOLUTION, CONCENTRATE RESPIRATORY (INHALATION) at 19:53

## 2020-07-28 RX ADMIN — NYSTATIN 500000 UNITS: 100000 SUSPENSION ORAL at 08:43

## 2020-07-28 RX ADMIN — ISODIUM CHLORIDE 3 ML: 0.03 SOLUTION RESPIRATORY (INHALATION) at 13:33

## 2020-07-28 RX ADMIN — CHLORHEXIDINE GLUCONATE 0.12% ORAL RINSE 15 ML: 1.2 LIQUID ORAL at 08:43

## 2020-07-28 RX ADMIN — ENOXAPARIN SODIUM 90 MG: 100 INJECTION SUBCUTANEOUS at 21:58

## 2020-07-28 RX ADMIN — METOPROLOL TARTRATE 2.5 MG: 5 INJECTION INTRAVENOUS at 08:43

## 2020-07-28 RX ADMIN — OLANZAPINE 10 MG: 10 TABLET, ORALLY DISINTEGRATING ORAL at 21:45

## 2020-07-28 NOTE — CONSULTS
PHYSICAL MEDICINE AND REHABILITATION CONSULT NOTE  Quita Magaña 79 y o  male MRN: [id number]  Unit/Bed#: [facility] 405-01 Encounter: [id number]    Requested by (Physician/Service): Niharika Bear MD  Reason for Consultation:  Assessment of rehabilitation needs    Assessment:  Rehabilitation Diagnosis:    Critical illness myopathy     Recommendations:  Rehabilitation Plan:   Continue PT/OT while on acute care   The patient is a candidate for acute inpatient rehabilitation once medically stable however is not sure he want to go to a more aggressive program         Medical Co-morbidities Plan:  · Hypoxic brain injury vs delirium   · Adenocarcinoma of the DOMI s/p VATS  · Air lead s/p decompression  · Encephalopathy  · Dysphagia now tolerating an oral diet  · PEA arrest s/p ACLS  · Tachycardia   · Agitation   · DVT  · Acute respiratory failure s/p trach  · DVT ppx:  lovenox and SCD    Thank you for this consultation  Do not hesitate to contact service with further questions  KHANG Chopra  PM&R    History of Present Illness:  ohn Karis Schwab is a 71 y o  male with a PMH of adenocarcinoma, ETOH abuse and HTN who presented to the [facility] 6/10/20 for VATS for adenocarcinoma of the DOMI  Post operatively he had an air leak and underwent decompression via a small incision to the left chest wall with VAC placement on 6/15/20  On 6/20/20 he went into PEA and underwent ACLS  He required an emergent cricoidotomy which was revised to a trach  He was agitated requiring 1:1 and restraints while in the ICU  He was transferred out of ICU on 7/16/20  He was able to progress to an oral diet  He will be started on a cap trial for his trach with potential decannulation this week  Currently hs is on 5 liters via trach collar  The patient was initially recommended for [facility], [facility] but due to improvements was deemed no longer appropriate by     PM&R are consulted for rehabilitation recommendations  The patient was seen in his room  He denies any pain, states he is feeling like he won't get much better with rehab  He initially states that he did not want a more intensive rehab program   He would like to speak to his daughter before making a decision  Review of Systems: 10 point ROS negative except for what is noted in HPI    Function:  Prior level of function and living situation:  He lives with his daughter in a separate house apartment  He was independent previously  Current level of function:  Physical Therapy:  Supervision for transfers, minimal assist for ambulation   Occupational Therapy:  Supervision for grooming, minimal assist for UB dressing and LB dressing  Speech Therapy:  Regular diet with thin liquies      Physical Exam:  /84 (BP Location: Left arm)   Pulse 95   Temp 98 5 °F (36 9 °C) (Oral)   Resp 18   Ht 5' 10" (1 778 m)   Wt 83 7 kg (184 lb 8 4 oz)   SpO2 95%   BMI 26 48 kg/m²        Intake/Output Summary (Last 24 hours) at 7/28/2020 1011  Last data filed at 7/28/2020 0728  Gross per 24 hour   Intake 1220 ml   Output 700 ml   Net 520 ml       Body mass index is 26 48 kg/m²  Physical Exam   Constitutional: He is oriented to person, place, and time  He appears well-developed and well-nourished  HENT:   Head: Normocephalic and atraumatic  Trude Harley in place with speaking valve   Eyes: EOM are normal    Cardiovascular:   Tachycardic with HR of 112   Pulmonary/Chest: Effort normal    trach   Abdominal: He exhibits no distension  Musculoskeletal:   Bilateral HF 4/5 otherwise bilateral LE 5/5 thoughout  Bilateral UE 4/5 throughout   Neurological: He is alert and oriented to person, place, and time  Skin: Skin is dry  Psychiatric: His affect is blunt          Social History:    Social History     Socioeconomic History    Marital status: Single     Spouse name: None    Number of children: None    Years of education: None    Highest education level: None   Occupational History    None   Social Needs    Financial resource strain: None    Food insecurity:     Worry: None     Inability: None    Transportation needs:     Medical: None     Non-medical: None   Tobacco Use    Smoking status: Former Smoker     Packs/day: 1 00     Years: 55 00     Pack years: 55 00     Types: Cigarettes    Smokeless tobacco: Never Used   Substance and Sexual Activity    Alcohol use: Yes     Frequency: 4 or more times a week     Comment: "we cant tell how much"  per pt daughter     Drug use: Never    Sexual activity: None   Lifestyle    Physical activity:     Days per week: None     Minutes per session: None    Stress: None   Relationships    Social connections:     Talks on phone: None     Gets together: None     Attends Sikh service: None     Active member of club or organization: None     Attends meetings of clubs or organizations: None     Relationship status: None    Intimate partner violence:     Fear of current or ex partner: None     Emotionally abused: None     Physically abused: None     Forced sexual activity: None   Other Topics Concern    None   Social History Narrative    None        Family History:    Family History   Problem Relation Age of Onset    Ovarian cancer Mother     Liver cancer Father          Medications:     Current Facility-Administered Medications:     acetaminophen (TYLENOL) tablet 975 mg, 975 mg, Oral, Q8H [address], Albino French MD, 975 mg at 07/27/20 2124    albuterol inhalation solution 2 5 mg, 2 5 mg, Nebulization, Q6H PRN, Nereida Gomez MD    calcium carbonate (TUMS) chewable tablet 500 mg, 500 mg, Oral, TID PRN, Albino French MD, 500 mg at 07/27/20 0614    chlorhexidine (PERIDEX) 0 12 % oral rinse 15 mL, 15 mL, Swish & Spit, Q12H [address] Nereida Benz MD, 15 mL at 07/28/20 0843    enoxaparin (LOVENOX) subcutaneous injection 90 mg, 1 mg/kg, Subcutaneous, Q12H [address] Nereida Benz MD, 90 mg at 07/28/20 0843    guaiFENesin (ROBITUSSIN) oral solution 200 mg, 200 mg, Oral, Q4H PRN, Marlen Greenfield MD, 200 mg at 07/09/20 1704    haloperidol lactate (HALDOL) injection 2 mg, 2 mg, Intramuscular, Q6H PRN, Joanne Cox DO    levalbuterol [facility]) inhalation solution 1 25 mg, 1 25 mg, Nebulization, TID, Marlen Greenfield MD, 1 25 mg at 07/28/20 0727    Lidocaine Viscous HCl (XYLOCAINE) 2 % mucosal solution 15 mL, 15 mL, Swish & Spit, 4x Daily PRN, Marlen Greenfield MD, 15 mL at 07/16/20 1814    melatonin tablet 6 mg, 6 mg, Oral, HS, Marlen Greenfield MD, 6 mg at 07/27/20 2124    metoprolol (LOPRESSOR) injection 2 5 mg, 2 5 mg, Intravenous, Q12H, Juana Stroud DO, 2 5 mg at 07/28/20 0843    nystatin (MYCOSTATIN) oral suspension 500,000 Units, 500,000 Units, Swish & Swallow, 4x Daily, Marlen Greenfield MD, 500,000 Units at 07/28/20 0843    OLANZapine (ZyPREXA ZYDIS) dispersible tablet 10 mg, 10 mg, Oral, HS, Ten Bernabe MD, 10 mg at 07/27/20 2125    OLANZapine (ZyPREXA) IM injection 5 mg, 5 mg, Intramuscular, Q6H PRN, Marlen Greenfield MD, 5 mg at 07/21/20 1727    ondansetron [facility] injection 4 mg, 4 mg, Intravenous, Q6H PRN, Marlen Greenfield MD, 4 mg at 07/25/20 1736    oxyCODONE (ROXICODONE) oral solution 2 5 mg, 2 5 mg, Oral, Q4H PRN, Marlen Greenfield MD, 2 5 mg at 07/25/20 0007    polyethylene glycol (MIRALAX) packet 17 g, 17 g, Oral, Daily, Marlen Greenfield MD, 17 g at 07/27/20 0944    senna-docusate sodium (SENOKOT S) 8 6-50 mg per tablet 1 tablet, 1 tablet, Per NG Tube, BID, Marlen Greenfield MD, 1 tablet at 07/28/20 2019    sodium chloride 0 9 % inhalation solution 3 mL, 3 mL, Nebulization, TID, Marlen Greenfield MD, 3 mL at 07/28/20 0727    tamsulosin (FLOMAX) capsule 0 4 mg, 0 4 mg, Oral, Daily With Brian Lord MD, 0 4 mg at 07/27/20 1652    Past Medical History:     Past Medical History:   Diagnosis Date    Alcohol abuse 3/27/2020    Chest pain     Community acquired pneumonia 3/27/2020    Hypertension  Left upper lobe pulmonary nodule     Malignant neoplasm of upper lobe of left lung ([address] ) 5/7/2020        Past Surgical History:     Past Surgical History:   Procedure Laterality Date    COLON SURGERY      CT NEEDLE BIOPSY LUNG  4/29/2020    KS BRONCHOSCOPY,DIAGNOSTIC N/A 6/10/2020    Procedure: BRONCHOSCOPY FLEXIBLE;  Surgeon: Fran Da Silva MD;  [facility];  Service: Thoracic    KS MEDIASTINOSCOPY WITH LYMPH NODE BIOPSY/IES N/A 6/10/2020    Procedure: MEDIASTINOSCOPY;  Surgeon: Fran Da Silva MD;  [facility];  Service: Thoracic    KS THORACOSCOPY SURG LOBECTOMY Left 6/10/2020    Procedure: THORACOSCOPY VIDEO ASSISTED SURGERY (VATS); Surgeon: Fran Da Silva MD;  [facility];  Service: Thoracic    KS THORACOSCOPY SURG LOBECTOMY Left 6/10/2020    Procedure: UPPER LOBECTOMY OF LUNG; MEDIASTINAL  LYMPH NODE DISSECTION;  Surgeon: Fran Da Silva MD;  [facility];  Service: Thoracic    TRACHEOSTOMY N/A 6/22/2020    Procedure: TRACHEOSTOMY REVISION, BRONCHOSCOPY;  Surgeon: Fran Da Silva MD;  [facility];  Service: Thoracic    WRIST SURGERY Right     orif         Allergies:     No Known Allergies        LABORATORY RESULTS:      Lab Results   Component Value Date    HGB 9 5 (L) 07/28/2020    HCT 30 5 (L) 07/28/2020    WBC 6 99 07/28/2020     Lab Results   Component Value Date    BUN 14 07/28/2020    K 4 0 07/28/2020     07/28/2020    GLUCOSE 264 (H) 06/20/2020    CREATININE 0 81 07/28/2020     Lab Results   Component Value Date    PROTIME 14 0 06/22/2020    INR 1 12 06/22/2020        DIAGNOSTIC STUDIES: Reviewed  Xr Chest Portable    Result Date: 6/15/2020  Impression: Once again identified is extensive subcutaneous emphysema throughout the chest and neck  Left chest tube unchanged in position with a small suspected apical pneumothorax   Workstation performed: [id number]     Xr Chest Portable    Result Date: 6/13/2020  Impression: Improving pneumomediastinum  Trace left apical pneumothorax  Extensive subcutaneous emphysema again present  Workstation performed: [id number]     Xr Chest Pa & Lateral    Result Date: 6/14/2020  Impression: Unchanged tiny left apical pneumothorax and pneumomediastinum  Extensive subcutaneous emphysema again present  Workstation performed: [id number]     Xr Chest Pa & Lateral    Result Date: 6/12/2020  Impression: Left upper lobectomy with left chest tube with tiny left pneumothorax  Extensive new pneumomediastinum  Marked worsening of extensive subcutaneous emphysema, greatest in the left chest wall but also extensive in the lower neck  The study was marked in [facility] for immediate notification  Workstation performed: [id number]     X-ray Chest 1 View    Result Date: 6/10/2020  Impression: No consolidation or discrete pneumothorax   Workstation performed: [id number]

## 2020-07-28 NOTE — QUICK NOTE
Interval note - Thoracic Surgery    Inner cannula removed and trach capped at bedside with 6 0 trach plug  Patient tolerated well  May replace inner cannula if any respiratory distress  Plan for trach decannulation tomorrow if continues to do well      Trudi Cardenas r4

## 2020-07-28 NOTE — UTILIZATION REVIEW
Continued Stay Review    Date: 7/24 & 7/28                      Current Patient Class: inpatient    Current Level of Care: acute    HPI:70 y o  male initially admitted on  6/10/20 surgery  Patient is a 71 y o  male who presented with DOMI mass, now status post Bronchoscopy, mediastinoscopy, VATS LIVIER lobectomy on 7/62 complicated by subcutaneous emphysema s/p Bedside incision/VAC L chest wall on 6/15, acute hypoxic respiratory failure s/p bedside cricothyroidotomy at bedside on 6/20 and subsequent tracheostomy revision on 06/22  Dix Shore was exchanged through the 6 Lebanese noncuffed on 07/13 Keofed placed on 07/21      Assessment/Plan: VSS  Saturating 90s on trach collar  keofed in place  Nurse reported one episode of confusion overnight but pt was easily redirectable  Maintain 6 cuffless trach-on trach mask 6L  NPO/Keofed  Jevity 1 2 @ goal: 55  Maintain PICC  Gaytan  Continue working with speech  discontinue 1;1 if pt remains agitated    SPEECH PATHOLOGY  Will trial first meal w/ SLP today  Pt able to self feed but needs cues to go slow & use mult swallows per bite/sips  Plan/Recommendations:  Ok for regular w/ thin  Cue pt to use 2* swallow after each bite/sip  Alternate bites w/ sips    ---------------------------------------------------------------------------------------------------------------------------------------  7/28 Progress notes;  Plan to cap trach today and see how pt tolerates it  Saturating 90% on 5L trach collar  PM&R consultation for rehab placement, potentially at [facility]  If tolerates cap trial on trach, will plan for possible decannulation this week    Tentative d/c this week      -----------------------------------------------------------------------------------------------------------------------------------    Pertinent Labs/Diagnostic Results:   7/23 video Barium swallow l study Pt presents w/ mild oral-pharyngeal dysphagia characterized by mild difficulty w/ bolus control and mild oral cavity residue (sandi base of tongue), posterior loss to the vallecular space, minimal vallecular retention, and trace amount of PPW coating       7/22 cxr Postoperative change within the left hemithorax with scarring   Slight worsening of pulmonary opacities especially within the left lung base and superimposed infection not excluded  Small left basilar effusion  Feeding tube now present within the fundus of the stomach        Results from last 7 days   Lab Units 07/28/20  0446 07/27/20  0609 07/24/20  0518 07/22/20  0510   WBC Thousand/uL 6 99 8 29 8 29 7 96   HEMOGLOBIN g/dL 9 5* 9 8* 8 9* 9 1*   HEMATOCRIT % 30 5* 32 3* 29 5* 29 9*   PLATELETS Thousands/uL 288 301 261 276   NEUTROS ABS Thousands/µL 4 08 5 31 4 78 5 23     Results from last 7 days   Lab Units 07/28/20  0446 07/27/20  0609 07/24/20  0518 07/22/20  0510   SODIUM mmol/L 141 139 142 141   POTASSIUM mmol/L 4 0 4 3 3 9 4 6   CHLORIDE mmol/L 108 108 109* 109*   CO2 mmol/L 26 26 27 27   ANION GAP mmol/L 7 5 6 5   BUN mg/dL 14 14 17 11   CREATININE mg/dL 0 81 0 86 0 79 0 86   EGFR ml/min/1 73sq m 90 88 92 88   CALCIUM mg/dL 8 6 9 7 8 5 8 7         Results from last 7 days   Lab Units 07/28/20  1205 07/28/20  0559 07/27/20  2354 07/27/20  1743 07/27/20  1225 07/27/20  0607 07/27/20  0047 07/26/20  1802 07/26/20  1206 07/26/20  0546 07/25/20  2349 07/25/20  1808   POC GLUCOSE mg/dl 117 86 92 119 118 85 81 99 102 98 113 107     Results from last 7 days   Lab Units 07/28/20  0446 07/27/20  0609 07/24/20  0518 07/22/20  0510   GLUCOSE RANDOM mg/dL 81 85 98 96           Vital Signs:   24/20 1101  97 9 °F (36 6 °C)  90  20  137/78    100 %      Trach mask  Sitting     07/24/20 0734            99 %  28    Trach mask       07/24/20 0712  97 9 °F (36 6 °C)  97  20  113/55     99 %    5 L/min  Trach mask  Lying     BP: Map 81 at 07/24/20 0712     07/28/20 0728            95 %           07/28/20 0643  98 5 °F (36 9 °C)  95  18  128/84    93 %     None (Room air)  Lying   07/28/20 0347  97 9 °F (36 6 °C)  85  18  121/73  89  98 %      Trach mask       Step down level 1   CONTINUAL OBSERVATION  ASPIRATION PRECAUTIONS    Medications:   Scheduled Medications:    Medications:  acetaminophen 975 mg Oral Q8H [address]   chlorhexidine 15 mL Swish & Spit Q12H DEAN   enoxaparin 1 mg/kg Subcutaneous Q12H DEAN   levalbuterol 1 25 mg Nebulization TID   melatonin 6 mg Oral HS   metoprolol 2 5 mg Intravenous Q12H   nystatin 500,000 Units Swish & Swallow 4x Daily   OLANZapine 10 mg Oral HS   polyethylene glycol 17 g Oral Daily   senna-docusate sodium 1 tablet Per NG Tube BID   sodium chloride 3 mL Nebulization TID   tamsulosin 0 4 mg Oral Daily With Dinner     Continuous IV Infusions: None     PRN Meds:  albuterol 2 5 mg Nebulization Q6H PRN   calcium carbonate 500 mg Oral TID PRN   guaiFENesin 200 mg Oral Q4H PRN   haloperidol lactate 2 mg Intramuscular Q6H PRN   Lidocaine Viscous HCl 15 mL Swish & Spit 4x Daily PRN   OLANZapine 5 mg Intramuscular Q6H PRN   ondansetron 4 mg Intravenous Q6H PRN   oxyCODONE 2 5 mg Oral Q4H PRN       Discharge Plan: tbd    Network Utilization Review Department  [email or web address]  ATTENTION: Please call with any questions or concerns to [phone] and carefully listen to the prompts so that you are directed to the right person  All voicemails are confidential   Barbara Skipper all requests for admission clinical reviews, approved or denied determinations and any other requests to dedicated fax number below belonging to the campus where the patient is receiving treatment   List of dedicated fax numbers for the Facilities:  FACILITY NAME UR FAX NUMBER   ADMISSION DENIALS (Administrative/Medical Necessity) [phone]   [address]  (Maternity/NICU/Pediatrics) [phone]   Lauren Wisdom [address] [facility] [address] [phone]   [facility]  [phone]   [facility]  [phone]   [address] [place] [address] [phone]

## 2020-07-28 NOTE — PROGRESS NOTES
Progress Note - Hui Larson 79 y o  male MRN: [id number]    Unit/Bed#: [facility] 405-01 Encounter: [id number]      Assessment:  Patient is a 27-year-old male status post VATS left upper lobectomy 6/10, with prolonged postoperative course complicated with need for emergent tracheostomy  Vital signs stable  Afebrile  Saturating 90% on 5 L trach collar  Alert awake oriented  , cooperative  Plan:  Regular diet  Ambulate  PT OT  Follow-up case management for inpatient acute rehab placement  Anticipate discharge 7/28 versus 7/29    Subjective:   Feeling well  Denied any complaints  Denied any fever, chills, chest pain, shortness of breath today  Objective:     Vitals: Blood pressure 97/61, pulse 87, temperature 98 2 °F (36 8 °C), temperature source Oral, resp  rate 18, height 5' 10" (1 778 m), weight 84 4 kg (186 lb 1 1 oz), SpO2 98 %  ,Body mass index is 26 7 kg/m²  Intake/Output Summary (Last 24 hours) at 7/28/2020 0304  Last data filed at 7/27/2020 2100  Gross per 24 hour   Intake 1040 ml   Output 1075 ml   Net -35 ml     Physical Exam  General: NAD  HEENT: NC/AT  MMM  Trach midline  Cv: RRR     Lungs: normal effort  Ab: Soft, NT/ND  Ex: no CCE  Neuro: AAOx3    Scheduled Meds:  Current Facility-Administered Medications:  acetaminophen 975 mg Oral [facility] Tico Candelario MD   albuterol 2 5 mg Nebulization Q6H PRN Flip Mckeon MD   calcium carbonate 500 mg Oral TID PRN Tico Candelario MD   chlorhexidine 15 mL Swish & Spit Q12H [facility] Flip Mckeon MD   enoxaparin 1 mg/kg Subcutaneous Q12H [facility] Flip Mckeon MD   guaiFENesin 200 mg Oral Q4H PRN Flip Mckeon MD   haloperidol lactate 2 mg Intramuscular Q6H PRN Randee Stroud DO   levalbuterol 1 25 mg Nebulization TID Flip Mckeon MD   Lidocaine Viscous HCl 15 mL Swish & Spit 4x Daily PRN Flip Mckeon MD   melatonin 6 mg Oral HS Flip Mckeon MD   metoprolol 2 5 mg Intravenous Q12H Tavares Stroud,    nystatin 500,000 Units Swish & Swallow 4x Daily Moose Leblanc MD   OLANZapine 10 mg Oral HS Gianni Bermudez MD   OLANZapine 5 mg Intramuscular Q6H PRN Moose Leblanc MD   ondansetron 4 mg Intravenous Q6H PRN Moose Leblanc MD   oxyCODONE 2 5 mg Oral Q4H PRN Moose Leblanc MD   polyethylene glycol 17 g Oral Daily Moose Leblanc MD   senna-docusate sodium 1 tablet Per NG Tube BID Moose Leblanc MD   sodium chloride 3 mL Nebulization TID Moose Leblanc MD   tamsulosin 0 4 mg Oral Daily With Morena Guillory MD     Continuous Infusions:   PRN Meds: albuterol    calcium carbonate    guaiFENesin    haloperidol lactate    Lidocaine Viscous HCl    OLANZapine    ondansetron    oxyCODONE      Invasive Devices     Peripherally Inserted Central Catheter Line            PICC Line 11/57/56 Left Basilic 31 days          Airway            Surgical Airway 14 days                Lab, Imaging and other studies: I have personally reviewed pertinent reports      VTE Pharmacologic Prophylaxis: Enoxaparin (Lovenox)  VTE Mechanical Prophylaxis: sequential compression device

## 2020-07-28 NOTE — PHYSICAL THERAPY NOTE
PT TREATMENT       07/28/20 1441   Pain Assessment   Pain Assessment Tool 0-10   Pain Score No Pain   Restrictions/Precautions   Other Precautions Impulsive;Cognitive; Chair Alarm;Telemetry;Multiple lines  (DASH; CHAIR ALARM ACTIVE POST PT TREAT )   General   Chart Reviewed Yes   Response to Previous Treatment Patient with no complaints from previous session  Family/Caregiver Present No   Cognition   Overall Cognitive Status Impaired   Arousal/Participation Alert   Attention Attends with cues to redirect   Following Commands Follows multistep commands with increased time or repetition   Comments PATIENT PRESENTING WITH REDUCED SAFETY AWARENESS (IE  TRYING TO STAND TO DON UNDERWEAR; GETTING TANGLE IN LINES FROM DASH); BECOMES AGITATED/FRUSTERATED EASILY (EXPRESSES DESIRE TO GO HOME); VERY IMPULSIVE (MOBILIZING WHEN THERAPIST REQUESTING FOR PATIENT TO WAIT)    Subjective   Subjective "I JUST WANT TO GO HOME, SLEEP FOR 2 DAYS, THEN GO TO REHAB"   Bed Mobility   Supine to Sit 5  Supervision   Additional items HOB elevated   Transfers   Sit to Stand 4  Minimal assistance   Additional items Assist x 1; Impulsive;Verbal cues   Stand to Sit 4  Minimal assistance   Additional items Assist x 1; Impulsive;Verbal cues   Ambulation/Elevation   Gait pattern   (REDUCED GAIT SPEED; + LATERAL SWAY )   Gait Assistance 4  Minimal assist   Additional items Assist x 1  (2ND PERSON TO MANAGE DASH )   Assistive Device Rolling walker   Distance 5 FEET (STANDING REST BREAK) + 5 FEET (SEATED REST BREAK) + 80 FEET X 2    Balance   Static Sitting Good   Dynamic Sitting Fair +   Static Standing Fair   Dynamic Standing Fair -   Ambulatory Fair -   Endurance Deficit   Endurance Deficit Yes   Endurance Deficit Description REPORTED FATIGUE; O2 SATS BETWEEN 91%- 96% ON RA    Activity Tolerance   Activity Tolerance Patient tolerated treatment well   Medical Staff Made Aware PREMA LIM CM 1 REACH Health    Nurse Made Aware JAREK TO SEE PER TRI Daugherty Assessment   Prognosis Good   Problem List Decreased strength;Decreased endurance; Impaired balance;Decreased mobility; Decreased cognition; Impaired judgement;Decreased safety awareness   Assessment PT INITIATED TREATMENT SESSION IN ORDER TO ASSIST PATIENT IN ACHIEVING GOALS TO IMPROVE ACTIVITY TOLERANCE, BALANCE, AND AMBULATION  PATIENT PRESENTING WITH REDUCED SAFETY AWARENESS, + IMPULSIVITY, AND MILD AGITATION/FRUSTERATION REQUESTING TO GO HOME  HE REQUIRED FREQUENT RE-DIRECTION AND VC FOR IMPROVED SAFETY  DURING GAIT TRAINING, PATIENT AMBULATED 5 FEET (STANDING REST BREAK) + 5 FEET WITHIN ROOM REQUIRING MIN-AX1 DUE TO UNSAFE USE OF RW (REFUSING TO UTILIZE, RUNNING INTO IT WITH TRUNK, REPORTING "HOW AM I EVER GOING TO KNOW IF I CAN WALK IF I HAVE THIS THING")  PROVIDED EDUCATION ON PURPOSE OF RW TO IMPROVE BALANCE/ACTIVITY TOLERANCE AND PATIENT UTILIZED TO PERFORM COMMUNITY LEVEL MOBILITY IN HALLWAY  HE AMBULATED 80 FEET AND WAS RECOMMENDED FOR STANDING REST BREAK DUE TO DECREASE IN O2 SAT (96% DOWN TO 91%) HOWEVER PATIENT REFUSING AND CONTINUED TO MOBILIZE REMAINING 80 FEET  GAIT SPEED IS REDUCED WITH INCREASE LATERAL SWAY REQUIRING OCCASIONAL CORRECTION  PATIENT ALSO ENGAGED IN STATIC AND DYNAMIC STANDING BALANCE ACTIVITIES REQUIRING CONTACT GUARD ASSISTANCE  MAINTAINED STANDING X 8 MINUTE WITHOUT REPORTED FATIGUE  PT D/C RECOMMENDATION FOR REHAB REMAINS APPROPRIATE  PATIENT WILL BENEFIT FROM CONTINUED SKILLED PT THIS ADMISSION TO ACHIEVE MAXIMAL FUNCTION AND SAFETY  Goals   Patient Goals "TO GO HOME, SLEEP FOR 2 DAYS, THEN GO TO REHAB"   STG Expiration Date 07/30/20   LTG Expiration Date 07/30/20   Long Term Goal #2 14   Plan   Treatment/Interventions Functional transfer training;LE strengthening/ROM; Elevations; Therapeutic exercise; Endurance training;Patient/family training;Equipment eval/education; Bed mobility;Gait training;Spoke to nursing;OT;Spoke to case management   Progress Progressing toward goals   PT Frequency Other (Comment)  (3-5X/WK)   Recommendation   PT Discharge Recommendation Post-Acute Rehabilitation Services   Equipment Recommended Walker  (RW)   PT - OK to Discharge Yes  (TO REHAB WHEN MED JAREK )     Michelle Portillo PT, DPT

## 2020-07-28 NOTE — SOCIAL WORK
Pt seen by PM&R today and is appropriate for acute rehab  CM spoke with daughter Nader Swain who called pt and he is agreeable to proceding to acute rehab  Pt with encephalopathy but has been cooperative  [facility] ARC submitted to [facility] for authorization  CM following

## 2020-07-28 NOTE — PLAN OF CARE
Problem: PHYSICAL THERAPY ADULT  Goal: Performs mobility at highest level of function for planned discharge setting  See evaluation for individualized goals  Description  Treatment/Interventions: LE strengthening/ROM, Therapeutic exercise, Endurance training, Cognitive reorientation, Bed mobility, Continued evaluation, Spoke to nursing, transfer training, gait training  Equipment Recommended: rw       See flowsheet documentation for full assessment, interventions and recommendations  Outcome: Progressing  Note:   Prognosis: Good  Problem List: Decreased strength, Decreased endurance, Impaired balance, Decreased mobility, Decreased cognition, Impaired judgement, Decreased safety awareness  Assessment: PT INITIATED TREATMENT SESSION IN ORDER TO ASSIST PATIENT IN ACHIEVING GOALS TO IMPROVE ACTIVITY TOLERANCE, BALANCE, AND AMBULATION  PATIENT PRESENTING WITH REDUCED SAFETY AWARENESS, + IMPULSIVITY, AND MILD AGITATION/FRUSTERATION REQUESTING TO GO HOME  HE REQUIRED FREQUENT RE-DIRECTION AND VC FOR IMPROVED SAFETY  DURING GAIT TRAINING, PATIENT AMBULATED 5 FEET (STANDING REST BREAK) + 5 FEET WITHIN ROOM REQUIRING MIN-AX1 DUE TO UNSAFE USE OF RW (REFUSING TO UTILIZE, RUNNING INTO IT WITH TRUNK, REPORTING "HOW AM I EVER GOING TO KNOW IF I CAN WALK IF I HAVE THIS THING")  PROVIDED EDUCATION ON PURPOSE OF RW TO IMPROVE BALANCE/ACTIVITY TOLERANCE AND PATIENT UTILIZED TO PERFORM COMMUNITY LEVEL MOBILITY IN HALLWAY  HE AMBULATED 80 FEET AND WAS RECOMMENDED FOR STANDING REST BREAK DUE TO DECREASE IN O2 SAT (96% DOWN TO 91%) HOWEVER PATIENT REFUSING AND CONTINUED TO MOBILIZE REMAINING 80 FEET  GAIT SPEED IS REDUCED WITH INCREASE LATERAL SWAY REQUIRING OCCASIONAL CORRECTION  PATIENT ALSO ENGAGED IN STATIC AND DYNAMIC STANDING BALANCE ACTIVITIES REQUIRING CONTACT GUARD ASSISTANCE  MAINTAINED STANDING X 8 MINUTE WITHOUT REPORTED FATIGUE  PT D/C RECOMMENDATION FOR REHAB REMAINS APPROPRIATE   PATIENT WILL BENEFIT FROM CONTINUED SKILLED PT THIS ADMISSION TO ACHIEVE MAXIMAL FUNCTION AND SAFETY  Barriers to Discharge: Inaccessible home environment, Decreased caregiver support     PT Discharge Recommendation: [address]     PT - OK to Discharge: Yes(TO REHAB WHEN MED JAREK )    See flowsheet documentation for full assessment

## 2020-07-28 NOTE — PLAN OF CARE
Problem: OCCUPATIONAL THERAPY ADULT  Goal: Performs self-care activities at highest level of function for planned discharge setting  See evaluation for individualized goals  Description  Treatment Interventions: ADL retraining, Functional transfer training, UE strengthening/ROM, Endurance training, Cognitive reorientation, Patient/family training, Equipment evaluation/education, Fine motor coordination activities, Compensatory technique education, Energy conservation, Activityengagement, Neuromuscular reeducation, Continued evaluation  Equipment Recommended: Tub seat with back, Bedside commode       See flowsheet documentation for full assessment, interventions and recommendations  Outcome: Progressing  Note:   Limitation: Decreased ADL status, Decreased UE ROM, Decreased UE strength, Decreased Safe judgement during ADL, Decreased cognition, Decreased endurance, Decreased sensation, Decreased self-care trans, Decreased high-level ADLs  Prognosis: Fair  Assessment: Pt seen for OT tx session focusing on grooming, UB bathing, LB dressing, and transfers  Pt found supine in bed, agreeable to session  Pt in chair at end of session w/ chair alarm active  Pt required Min A for UB bathing and LB dressing  Pt impulsive and requiring frequent VC for safety t/o session  Pt w/ improvements in activity tolerance and balance  Pt limited 2* impulsivity, decreased safety awareness, and decreased insight into deficits  Continue to recommend STR  OT will continue to follow to see as able  OT Discharge Recommendation: Post-Acute Rehabilitation Services  OT - OK to Discharge:  Yes

## 2020-07-28 NOTE — OCCUPATIONAL THERAPY NOTE
OccupationalTherapy Progress Note     Patient Name: Shoaib Medina  Today's Date: 7/28/2020  Problem List  Principal Problem:    Malignant neoplasm of upper lobe of left lung [facility])  Active Problems:    Acute respiratory failure with hypoxia ([facility] [place] [address] )    Acute deep vein thrombosis (DVT) of axillary vein of right upper extremity (HCC)    Acute deep vein thrombosis (DVT) of brachial vein of right upper extremity (HCC)    Tracheostomy in place [facility])    Acute deep vein thrombosis (DVT) of calf muscle vein of left lower extremity (HCC)    Encephalopathy    Hyperactive delirium after surgical procedure    Anemia    Thrombocytosis ([facility] [place] [address] )          07/28/20 1440   Restrictions/Precautions   Weight Bearing Precautions Per Order No   Other Precautions Chair Alarm;Cognitive; Bed Alarm;Multiple lines;Telemetry; Fall Risk  (chair alarm active at end of session)   General   Response to Previous Treatment Patient with no complaints from previous session   Lifestyle   Autonomy pta pt reports I in ADLs/IADLs/functional mobility   Reciprocal Relationships supportive dtr and son in law   Service to Others retired    [address] enjoys doing "nothing"   Pain Assessment   Pain Assessment Tool Pain Assessment not indicated - pt denies pain   Pain Score No Pain   ADL   Where Assessed Chair   Grooming Assistance 5  Supervision/Setup   Grooming Deficit Wash/dry hands   UB Bathing Assistance 4  Minimal Assistance   UB Bathing Deficit Impulsive;Verbal cueing;Supervision/safety   UB Bathing Comments standing at sink washing head   LB Dressing Assistance 4  Minimal Assistance   LB Dressing Deficit Thread RLE into underwear; Thread LLE into underwear;Steadying; Impulsive;Verbal cueing   LB Dressing Comments VC fo safety   Bed Mobility   Supine to Sit 5  Supervision   Additional items Verbal cues; Impulsive   Transfers   Sit to Stand 4  Minimal assistance   Additional items Impulsive;Verbal cues   Stand to Sit 4  Minimal assistance   Additional items Impulsive;Verbal cues   Functional Mobility   Functional Mobility 4  Minimal assistance   Additional Comments a x 2 for line management and    Additional items Rolling walker   Cognition   Overall Cognitive Status Impaired   Arousal/Participation Arousable   Attention Attends with cues to redirect   Orientation Level Oriented X4   Memory Decreased recall of precautions;Decreased recall of recent events   Following Commands Follows one step commands with increased time or repetition   Comments pt impulsive, requiring VC for safety, observed trying to leave walker and push sideways several times despite cueing   Activity Tolerance   Activity Tolerance Patient tolerated treatment well   Medical Staff Made Aware okay to see per RN Da Wheeler, PT Terese   Assessment   Assessment Pt seen for OT tx session focusing on grooming, UB bathing, LB dressing, and transfers  Pt found supine in bed, agreeable to session  Pt in chair at end of session w/ chair alarm active  Pt required Min A for UB bathing and LB dressing  Pt impulsive and requiring frequent VC for safety t/o session  Pt w/ improvements in activity tolerance and balance  Pt limited 2* impulsivity, decreased safety awareness, and decreased insight into deficits  Continue to recommend STR  OT will continue to follow to see as able  Plan   Treatment Interventions ADL retraining;Functional transfer training; Endurance training;Cognitive reorientation;Patient/family training   Goal Expiration Date 08/06/20   OT Treatment Day 5   OT Frequency 3-5x/wk   Recommendation   OT Discharge Recommendation Post-Acute Rehabilitation Services   OT - OK to Discharge Yes   Modified Bree Scale   Modified Sheldon Scale 4     Adrienne Taveras MS, OTR/L

## 2020-07-29 LAB
GLUCOSE SERPL-MCNC: 97 MG/DL (ref 65–140)
SARS-COV-2 RNA RESP QL NAA+PROBE: NEGATIVE

## 2020-07-29 PROCEDURE — 94640 AIRWAY INHALATION TREATMENT: CPT

## 2020-07-29 PROCEDURE — 82948 REAGENT STRIP/BLOOD GLUCOSE: CPT

## 2020-07-29 PROCEDURE — U0003 INFECTIOUS AGENT DETECTION BY NUCLEIC ACID (DNA OR RNA); SEVERE ACUTE RESPIRATORY SYNDROME CORONAVIRUS 2 (SARS-COV-2) (CORONAVIRUS DISEASE [COVID-19]), AMPLIFIED PROBE TECHNIQUE, MAKING USE OF HIGH THROUGHPUT TECHNOLOGIES AS DESCRIBED BY CMS-2020-01-R: HCPCS | Performed by: PHYSICIAN ASSISTANT

## 2020-07-29 PROCEDURE — 94760 N-INVAS EAR/PLS OXIMETRY 1: CPT

## 2020-07-29 PROCEDURE — 99024 POSTOP FOLLOW-UP VISIT: CPT | Performed by: THORACIC SURGERY (CARDIOTHORACIC VASCULAR SURGERY)

## 2020-07-29 PROCEDURE — 97116 GAIT TRAINING THERAPY: CPT

## 2020-07-29 RX ADMIN — OLANZAPINE 10 MG: 10 TABLET, ORALLY DISINTEGRATING ORAL at 21:04

## 2020-07-29 RX ADMIN — LEVALBUTEROL HYDROCHLORIDE 1.25 MG: 1.25 SOLUTION, CONCENTRATE RESPIRATORY (INHALATION) at 07:32

## 2020-07-29 RX ADMIN — MELATONIN 6 MG: at 21:05

## 2020-07-29 RX ADMIN — METOPROLOL TARTRATE 2.5 MG: 5 INJECTION INTRAVENOUS at 21:05

## 2020-07-29 RX ADMIN — ISODIUM CHLORIDE 3 ML: 0.03 SOLUTION RESPIRATORY (INHALATION) at 13:42

## 2020-07-29 RX ADMIN — CHLORHEXIDINE GLUCONATE 0.12% ORAL RINSE 15 ML: 1.2 LIQUID ORAL at 10:00

## 2020-07-29 RX ADMIN — NYSTATIN 500000 UNITS: 100000 SUSPENSION ORAL at 12:30

## 2020-07-29 RX ADMIN — ISODIUM CHLORIDE 3 ML: 0.03 SOLUTION RESPIRATORY (INHALATION) at 19:15

## 2020-07-29 RX ADMIN — ENOXAPARIN SODIUM 90 MG: 100 INJECTION SUBCUTANEOUS at 10:00

## 2020-07-29 RX ADMIN — NYSTATIN 500000 UNITS: 100000 SUSPENSION ORAL at 10:00

## 2020-07-29 RX ADMIN — NYSTATIN 500000 UNITS: 100000 SUSPENSION ORAL at 18:37

## 2020-07-29 RX ADMIN — TAMSULOSIN HYDROCHLORIDE 0.4 MG: 0.4 CAPSULE ORAL at 15:55

## 2020-07-29 RX ADMIN — LEVALBUTEROL HYDROCHLORIDE 1.25 MG: 1.25 SOLUTION, CONCENTRATE RESPIRATORY (INHALATION) at 19:15

## 2020-07-29 RX ADMIN — NYSTATIN 500000 UNITS: 100000 SUSPENSION ORAL at 21:05

## 2020-07-29 RX ADMIN — ACETAMINOPHEN 975 MG: 325 TABLET, FILM COATED ORAL at 05:17

## 2020-07-29 RX ADMIN — CALCIUM CARBONATE (ANTACID) CHEW TAB 500 MG 500 MG: 500 CHEW TAB at 19:10

## 2020-07-29 RX ADMIN — METOPROLOL TARTRATE 2.5 MG: 5 INJECTION INTRAVENOUS at 10:00

## 2020-07-29 RX ADMIN — ISODIUM CHLORIDE 3 ML: 0.03 SOLUTION RESPIRATORY (INHALATION) at 07:32

## 2020-07-29 RX ADMIN — ACETAMINOPHEN 975 MG: 325 TABLET, FILM COATED ORAL at 21:04

## 2020-07-29 RX ADMIN — LEVALBUTEROL HYDROCHLORIDE 1.25 MG: 1.25 SOLUTION, CONCENTRATE RESPIRATORY (INHALATION) at 13:42

## 2020-07-29 RX ADMIN — ENOXAPARIN SODIUM 90 MG: 100 INJECTION SUBCUTANEOUS at 21:05

## 2020-07-29 NOTE — RESTORATIVE TECHNICIAN NOTE
Restorative Specialist Mobility Note       Activity: Ambulate in jason     Assistive Device: Front wheel walker

## 2020-07-29 NOTE — QUICK NOTE
Surgery  Quick    Tracheostomy was decanulated this afternoon  Adaptic was placed over ostomy and covered with tegaderm  Pt tolerated the procedure well without complications       Faviola Rivers MD  7/29/2020  4:16PM

## 2020-07-29 NOTE — PLAN OF CARE
Problem: PHYSICAL THERAPY ADULT  Goal: Performs mobility at highest level of function for planned discharge setting  See evaluation for individualized goals  Description  Treatment/Interventions: Functional transfer training, LE strengthening/ROM, Elevations, Therapeutic exercise, Endurance training, Equipment eval/education, Bed mobility, Gait training, Spoke to MD, Spoke to nursing  Equipment Recommended: Walker(at this time; r/o [facility])       See flowsheet documentation for full assessment, interventions and recommendations  Note:   Prognosis: Good  Problem List: Decreased strength, Decreased endurance, Impaired balance, Decreased mobility, Decreased coordination, Decreased cognition, Impaired judgement, Decreased safety awareness  Assessment: Pt seen for session for setup, bed mob, transfers, gait training w/ rest time, repositioning  Pt cooperative, remains impulsive   significant LOB noted w/ turns, and several small LOB also noted  continue w/ RW for now, and continue to recommend rehab at d/c  Barriers to Discharge: Inaccessible home environment, Decreased caregiver support     PT Discharge Recommendation: [address]     PT - OK to Discharge: (S) Yes(when stable to rehab)    See flowsheet documentation for full assessment

## 2020-07-29 NOTE — SOCIAL WORK
Pt  Has been approved by insurance for [facility] ARC-B  Covid test being done now, will go to [facility] tomorrow  Daughter informed

## 2020-07-29 NOTE — PROGRESS NOTES
Progress Note - Quita Magaña 79 y o  male MRN: [id number]    Unit/Bed#: [facility] 405-01 Encounter: [id number]      Assessment:  Patient is a 70-year-old male status post VATS left upper lobectomy 6/10, with prolonged postoperative course complicated with need for emergent tracheostomy  S/p removal of trach inner canula yesterday     VSS  Afebrile  Saturating 96% on room air  Plan:  Regular diet  Ambulate   Trach decannulation today  F/u CM ARC placement  Subjective:   Feels well no complaints  Denied fever, chills, chest pain, shortness of breath, nausea, vomiting, or abdominal pain this morning  Objective:     Vitals: Blood pressure 117/77, pulse 105, temperature 98 °F (36 7 °C), temperature source Oral, resp  rate 18, height 5' 10" (1 778 m), weight 83 7 kg (184 lb 8 4 oz), SpO2 96 %  ,Body mass index is 26 48 kg/m²  Intake/Output Summary (Last 24 hours) at 7/29/2020 0656  Last data filed at 7/28/2020 2306  Gross per 24 hour   Intake 744 ml   Output 1125 ml   Net -381 ml       Physical Exam  General: NAD  HEENT: NC/AT  MMM  Cv: RRR     Lungs: normal effort  Ab: Soft, NT/ND  Ex: no CCE  Neuro: AAOx3    Scheduled Meds:  Current Facility-Administered Medications:  acetaminophen 975 mg Oral [facility] Rachel Hodgson MD   albuterol 2 5 mg Nebulization Q6H PRN Mine Alvarenga MD   calcium carbonate 500 mg Oral TID PRN Rachel Hodgson MD   chlorhexidine 15 mL Swish & Spit Q12H [facility] Mine Alvarenga MD   enoxaparin 1 mg/kg Subcutaneous Q12H [facility] Mine Alvarenga MD   guaiFENesin 200 mg Oral Q4H PRN Mine Alvarenga MD   haloperidol lactate 2 mg Intramuscular Q6H PRN Marietta Stroud DO   levalbuterol 1 25 mg Nebulization TID Mine Alvarenga MD   Lidocaine Viscous HCl 15 mL Swish & Spit 4x Daily PRN Mine Alvarenga MD   melatonin 6 mg Oral HS Mine Alvarenga MD   metoprolol 2 5 mg Intravenous Q12H Marietta Stroud DO   nystatin 500,000 Units Swish & Swallow 4x Daily Mine Alvarenga MD   OLANZapine 10 mg Oral HS Ailyn Gongora MD   OLANZapine 5 mg Intramuscular Q6H PRN Ade Jimenez MD   ondansetron 4 mg Intravenous Q6H PRN Ade Jimenez MD   oxyCODONE 2 5 mg Oral Q4H PRN Ade Jimenez MD   polyethylene glycol 17 g Oral Daily Ade Jimenez MD   senna-docusate sodium 1 tablet Per NG Tube BID Ade Jimenez MD   sodium chloride 3 mL Nebulization TID Ade Jimenez MD   tamsulosin 0 4 mg Oral Daily With Nguyen Ventura MD     Continuous Infusions:   PRN Meds: albuterol    calcium carbonate    guaiFENesin    haloperidol lactate    Lidocaine Viscous HCl    OLANZapine    ondansetron    oxyCODONE      Invasive Devices     Peripherally Inserted Central Catheter Line            PICC Line 63/16/41 Left Basilic 32 days          Airway            Surgical Airway 15 days                Lab, Imaging and other studies: I have personally reviewed pertinent reports      VTE Pharmacologic Prophylaxis: Enoxaparin (Lovenox)  VTE Mechanical Prophylaxis: sequential compression device

## 2020-07-30 ENCOUNTER — HOSPITAL ENCOUNTER (INPATIENT)
Facility: HOSPITAL | Age: 70
LOS: 13 days | Discharge: HOME | DRG: 092 | End: 2020-08-12
Attending: PHYSICAL MEDICINE & REHABILITATION | Admitting: PHYSICAL MEDICINE & REHABILITATION
Payer: MEDICARE

## 2020-07-30 VITALS
WEIGHT: 183.86 LBS | BODY MASS INDEX: 26.32 KG/M2 | RESPIRATION RATE: 18 BRPM | OXYGEN SATURATION: 98 % | HEIGHT: 70 IN | SYSTOLIC BLOOD PRESSURE: 105 MMHG | HEART RATE: 108 BPM | DIASTOLIC BLOOD PRESSURE: 68 MMHG | TEMPERATURE: 97.9 F

## 2020-07-30 DIAGNOSIS — Z74.09 IMPAIRED MOBILITY AND ACTIVITIES OF DAILY LIVING: ICD-10-CM

## 2020-07-30 DIAGNOSIS — R41.89 IMPAIRED COGNITION: ICD-10-CM

## 2020-07-30 DIAGNOSIS — J96.01 ACUTE RESPIRATORY FAILURE WITH HYPOXIA (HCC): Primary | ICD-10-CM

## 2020-07-30 DIAGNOSIS — R00.0 SINUS TACHYCARDIA: ICD-10-CM

## 2020-07-30 DIAGNOSIS — R52 PAIN: ICD-10-CM

## 2020-07-30 DIAGNOSIS — Z78.9 IMPAIRED MOBILITY AND ACTIVITIES OF DAILY LIVING: ICD-10-CM

## 2020-07-30 DIAGNOSIS — G93.40 ENCEPHALOPATHY: ICD-10-CM

## 2020-07-30 DIAGNOSIS — F10.10 ALCOHOL ABUSE: ICD-10-CM

## 2020-07-30 DIAGNOSIS — C34.12 MALIGNANT NEOPLASM OF UPPER LOBE OF LEFT LUNG (HCC): ICD-10-CM

## 2020-07-30 DIAGNOSIS — R53.81 DEBILITY: ICD-10-CM

## 2020-07-30 DIAGNOSIS — I10 HTN (HYPERTENSION): ICD-10-CM

## 2020-07-30 DIAGNOSIS — S06.9X9A TBI (TRAUMATIC BRAIN INJURY) (HCC): ICD-10-CM

## 2020-07-30 DIAGNOSIS — I82.A11 ACUTE DEEP VEIN THROMBOSIS (DVT) OF AXILLARY VEIN OF RIGHT UPPER EXTREMITY (HCC): ICD-10-CM

## 2020-07-30 LAB
GLUCOSE SERPL-MCNC: 105 MG/DL (ref 65–140)
GLUCOSE SERPL-MCNC: 91 MG/DL (ref 65–140)
GLUCOSE SERPL-MCNC: 92 MG/DL (ref 65–140)

## 2020-07-30 PROCEDURE — 99255 IP/OBS CONSLTJ NEW/EST HI 80: CPT | Performed by: PHYSICAL MEDICINE & REHABILITATION

## 2020-07-30 PROCEDURE — 94760 N-INVAS EAR/PLS OXIMETRY 1: CPT

## 2020-07-30 PROCEDURE — 94640 AIRWAY INHALATION TREATMENT: CPT

## 2020-07-30 PROCEDURE — 99024 POSTOP FOLLOW-UP VISIT: CPT | Performed by: THORACIC SURGERY (CARDIOTHORACIC VASCULAR SURGERY)

## 2020-07-30 PROCEDURE — NC001 PR NO CHARGE

## 2020-07-30 PROCEDURE — 82948 REAGENT STRIP/BLOOD GLUCOSE: CPT

## 2020-07-30 RX ORDER — HALOPERIDOL 5 MG/ML
2 INJECTION INTRAMUSCULAR EVERY 6 HOURS PRN
Status: DISCONTINUED | OUTPATIENT
Start: 2020-07-30 | End: 2020-07-30

## 2020-07-30 RX ORDER — LEVALBUTEROL 1.25 MG/.5ML
1.25 SOLUTION, CONCENTRATE RESPIRATORY (INHALATION)
Status: DISCONTINUED | OUTPATIENT
Start: 2020-07-30 | End: 2020-07-30

## 2020-07-30 RX ORDER — OXYCODONE HYDROCHLORIDE 5 MG/1
5 TABLET ORAL EVERY 4 HOURS PRN
Qty: 30 TABLET | Refills: 0
Start: 2020-07-30 | End: 2020-08-12 | Stop reason: HOSPADM

## 2020-07-30 RX ORDER — OLANZAPINE 10 MG/1
5 INJECTION, POWDER, LYOPHILIZED, FOR SOLUTION INTRAMUSCULAR EVERY 6 HOURS PRN
Status: CANCELLED | OUTPATIENT
Start: 2020-07-30

## 2020-07-30 RX ORDER — CALCIUM CARBONATE 200(500)MG
500 TABLET,CHEWABLE ORAL 3 TIMES DAILY PRN
Status: DISCONTINUED | OUTPATIENT
Start: 2020-07-30 | End: 2020-08-12 | Stop reason: HOSPADM

## 2020-07-30 RX ORDER — ALBUTEROL SULFATE 2.5 MG/3ML
2.5 SOLUTION RESPIRATORY (INHALATION) EVERY 6 HOURS PRN
Status: DISCONTINUED | OUTPATIENT
Start: 2020-07-30 | End: 2020-08-08

## 2020-07-30 RX ORDER — OLANZAPINE 10 MG/1
5 INJECTION, POWDER, LYOPHILIZED, FOR SOLUTION INTRAMUSCULAR EVERY 6 HOURS PRN
Status: DISCONTINUED | OUTPATIENT
Start: 2020-07-30 | End: 2020-07-30

## 2020-07-30 RX ORDER — METOPROLOL TARTRATE 5 MG/5ML
2.5 INJECTION INTRAVENOUS EVERY 12 HOURS
Status: CANCELLED | OUTPATIENT
Start: 2020-07-30

## 2020-07-30 RX ORDER — OLANZAPINE 10 MG/1
10 TABLET, ORALLY DISINTEGRATING ORAL
Status: CANCELLED | OUTPATIENT
Start: 2020-07-30

## 2020-07-30 RX ORDER — ACETAMINOPHEN 325 MG/1
975 TABLET ORAL EVERY 8 HOURS SCHEDULED
Status: DISCONTINUED | OUTPATIENT
Start: 2020-07-30 | End: 2020-08-03

## 2020-07-30 RX ORDER — CHLORHEXIDINE GLUCONATE 0.12 MG/ML
15 RINSE ORAL EVERY 12 HOURS SCHEDULED
Status: DISCONTINUED | OUTPATIENT
Start: 2020-07-30 | End: 2020-08-12 | Stop reason: HOSPADM

## 2020-07-30 RX ORDER — LIDOCAINE HYDROCHLORIDE 20 MG/ML
15 SOLUTION OROPHARYNGEAL 4 TIMES DAILY PRN
Status: CANCELLED | OUTPATIENT
Start: 2020-07-30

## 2020-07-30 RX ORDER — TAMSULOSIN HYDROCHLORIDE 0.4 MG/1
0.4 CAPSULE ORAL
Qty: 30 CAPSULE | Refills: 0 | Status: SHIPPED | OUTPATIENT
Start: 2020-07-30 | End: 2020-08-12 | Stop reason: HOSPADM

## 2020-07-30 RX ORDER — DOCUSATE SODIUM 100 MG/1
100 CAPSULE, LIQUID FILLED ORAL 2 TIMES DAILY
Qty: 20 CAPSULE | Refills: 0
Start: 2020-07-30 | End: 2020-08-12 | Stop reason: HOSPADM

## 2020-07-30 RX ORDER — ALBUTEROL SULFATE 2.5 MG/3ML
2.5 SOLUTION RESPIRATORY (INHALATION) EVERY 6 HOURS PRN
Status: CANCELLED | OUTPATIENT
Start: 2020-07-30

## 2020-07-30 RX ORDER — GUAIFENESIN 100 MG/5ML
200 SOLUTION ORAL EVERY 4 HOURS PRN
Status: CANCELLED | OUTPATIENT
Start: 2020-07-30

## 2020-07-30 RX ORDER — ONDANSETRON 4 MG/1
4 TABLET, ORALLY DISINTEGRATING ORAL EVERY 6 HOURS PRN
Status: DISCONTINUED | OUTPATIENT
Start: 2020-07-30 | End: 2020-08-12 | Stop reason: HOSPADM

## 2020-07-30 RX ORDER — POLYETHYLENE GLYCOL 3350 17 G/17G
17 POWDER, FOR SOLUTION ORAL DAILY
Status: DISCONTINUED | OUTPATIENT
Start: 2020-07-31 | End: 2020-08-12 | Stop reason: HOSPADM

## 2020-07-30 RX ORDER — POLYETHYLENE GLYCOL 3350 17 G/17G
17 POWDER, FOR SOLUTION ORAL DAILY
Status: CANCELLED | OUTPATIENT
Start: 2020-07-31

## 2020-07-30 RX ORDER — METOPROLOL TARTRATE 5 MG/5ML
2.5 INJECTION INTRAVENOUS EVERY 12 HOURS
Status: DISCONTINUED | OUTPATIENT
Start: 2020-07-30 | End: 2020-07-30

## 2020-07-30 RX ORDER — OLANZAPINE 10 MG/1
10 TABLET, ORALLY DISINTEGRATING ORAL
Qty: 30 TABLET | Refills: 0
Start: 2020-07-30 | End: 2020-08-12 | Stop reason: HOSPADM

## 2020-07-30 RX ORDER — CHLORHEXIDINE GLUCONATE 0.12 MG/ML
15 RINSE ORAL EVERY 12 HOURS SCHEDULED
Status: CANCELLED | OUTPATIENT
Start: 2020-07-30

## 2020-07-30 RX ORDER — CALCIUM CARBONATE 200(500)MG
500 TABLET,CHEWABLE ORAL 3 TIMES DAILY PRN
Status: CANCELLED | OUTPATIENT
Start: 2020-07-30

## 2020-07-30 RX ORDER — ACETAMINOPHEN 325 MG/1
975 TABLET ORAL EVERY 8 HOURS SCHEDULED
Status: CANCELLED | OUTPATIENT
Start: 2020-07-30

## 2020-07-30 RX ORDER — SODIUM CHLORIDE FOR INHALATION 0.9 %
3 VIAL, NEBULIZER (ML) INHALATION
Status: DISCONTINUED | OUTPATIENT
Start: 2020-07-30 | End: 2020-07-30

## 2020-07-30 RX ORDER — LANOLIN ALCOHOL/MO/W.PET/CERES
6 CREAM (GRAM) TOPICAL
Status: DISCONTINUED | OUTPATIENT
Start: 2020-07-30 | End: 2020-08-12 | Stop reason: HOSPADM

## 2020-07-30 RX ORDER — OXYCODONE HCL 5 MG/5 ML
2.5 SOLUTION, ORAL ORAL EVERY 4 HOURS PRN
Status: CANCELLED | OUTPATIENT
Start: 2020-07-30

## 2020-07-30 RX ORDER — ONDANSETRON 2 MG/ML
4 INJECTION INTRAMUSCULAR; INTRAVENOUS EVERY 6 HOURS PRN
Status: CANCELLED | OUTPATIENT
Start: 2020-07-30

## 2020-07-30 RX ORDER — OLANZAPINE 10 MG/1
10 TABLET, ORALLY DISINTEGRATING ORAL
Status: DISCONTINUED | OUTPATIENT
Start: 2020-07-30 | End: 2020-07-31

## 2020-07-30 RX ORDER — LANOLIN ALCOHOL/MO/W.PET/CERES
6 CREAM (GRAM) TOPICAL
Status: CANCELLED | OUTPATIENT
Start: 2020-07-30

## 2020-07-30 RX ORDER — TAMSULOSIN HYDROCHLORIDE 0.4 MG/1
0.4 CAPSULE ORAL
Status: CANCELLED | OUTPATIENT
Start: 2020-07-30

## 2020-07-30 RX ORDER — LEVALBUTEROL 1.25 MG/.5ML
1.25 SOLUTION, CONCENTRATE RESPIRATORY (INHALATION)
Status: DISCONTINUED | OUTPATIENT
Start: 2020-07-30 | End: 2020-08-03

## 2020-07-30 RX ORDER — HALOPERIDOL 5 MG/ML
2 INJECTION INTRAMUSCULAR EVERY 6 HOURS PRN
Status: CANCELLED | OUTPATIENT
Start: 2020-07-30

## 2020-07-30 RX ORDER — ACETAMINOPHEN 325 MG/1
650 TABLET ORAL EVERY 6 HOURS
Qty: 30 TABLET | Refills: 0
Start: 2020-07-30 | End: 2020-08-12 | Stop reason: HOSPADM

## 2020-07-30 RX ORDER — GUAIFENESIN 100 MG/5ML
200 SOLUTION ORAL EVERY 4 HOURS PRN
Status: DISCONTINUED | OUTPATIENT
Start: 2020-07-30 | End: 2020-08-12 | Stop reason: HOSPADM

## 2020-07-30 RX ORDER — AMOXICILLIN 250 MG
1 CAPSULE ORAL 2 TIMES DAILY
Status: DISCONTINUED | OUTPATIENT
Start: 2020-07-30 | End: 2020-08-12 | Stop reason: HOSPADM

## 2020-07-30 RX ORDER — TAMSULOSIN HYDROCHLORIDE 0.4 MG/1
0.4 CAPSULE ORAL
Status: DISCONTINUED | OUTPATIENT
Start: 2020-07-30 | End: 2020-08-07

## 2020-07-30 RX ORDER — SODIUM CHLORIDE FOR INHALATION 0.9 %
3 VIAL, NEBULIZER (ML) INHALATION
Status: DISCONTINUED | OUTPATIENT
Start: 2020-07-30 | End: 2020-08-03

## 2020-07-30 RX ORDER — OXYCODONE HCL 5 MG/5 ML
2.5 SOLUTION, ORAL ORAL EVERY 4 HOURS PRN
Status: DISCONTINUED | OUTPATIENT
Start: 2020-07-30 | End: 2020-08-12 | Stop reason: HOSPADM

## 2020-07-30 RX ORDER — LIDOCAINE HYDROCHLORIDE 20 MG/ML
15 SOLUTION OROPHARYNGEAL 4 TIMES DAILY PRN
Status: DISCONTINUED | OUTPATIENT
Start: 2020-07-30 | End: 2020-08-12 | Stop reason: HOSPADM

## 2020-07-30 RX ORDER — SODIUM CHLORIDE FOR INHALATION 0.9 %
3 VIAL, NEBULIZER (ML) INHALATION
Status: CANCELLED | OUTPATIENT
Start: 2020-07-30

## 2020-07-30 RX ORDER — LANOLIN ALCOHOL/MO/W.PET/CERES
6 CREAM (GRAM) TOPICAL
Qty: 30 TABLET | Refills: 0
Start: 2020-07-30 | End: 2020-08-12 | Stop reason: HOSPADM

## 2020-07-30 RX ORDER — LEVALBUTEROL 1.25 MG/.5ML
1.25 SOLUTION, CONCENTRATE RESPIRATORY (INHALATION)
Status: CANCELLED | OUTPATIENT
Start: 2020-07-30

## 2020-07-30 RX ORDER — AMOXICILLIN 250 MG
1 CAPSULE ORAL 2 TIMES DAILY
Status: CANCELLED | OUTPATIENT
Start: 2020-07-30

## 2020-07-30 RX ADMIN — LEVALBUTEROL HYDROCHLORIDE 1.25 MG: 1.25 SOLUTION, CONCENTRATE RESPIRATORY (INHALATION) at 07:37

## 2020-07-30 RX ADMIN — OLANZAPINE 10 MG: 10 TABLET, ORALLY DISINTEGRATING ORAL at 22:13

## 2020-07-30 RX ADMIN — ISODIUM CHLORIDE 3 ML: 0.03 SOLUTION RESPIRATORY (INHALATION) at 07:37

## 2020-07-30 RX ADMIN — ISODIUM CHLORIDE 3 ML: 0.03 SOLUTION RESPIRATORY (INHALATION) at 13:40

## 2020-07-30 RX ADMIN — NYSTATIN 500000 UNITS: 100000 SUSPENSION ORAL at 18:26

## 2020-07-30 RX ADMIN — ACETAMINOPHEN 975 MG: 325 TABLET, FILM COATED ORAL at 22:12

## 2020-07-30 RX ADMIN — SENNOSIDES AND DOCUSATE SODIUM 1 TABLET: 8.6; 5 TABLET ORAL at 18:26

## 2020-07-30 RX ADMIN — ENOXAPARIN SODIUM 90 MG: 100 INJECTION SUBCUTANEOUS at 22:13

## 2020-07-30 RX ADMIN — ACETAMINOPHEN 975 MG: 325 TABLET, FILM COATED ORAL at 14:28

## 2020-07-30 RX ADMIN — LEVALBUTEROL HYDROCHLORIDE 1.25 MG: 1.25 SOLUTION, CONCENTRATE RESPIRATORY (INHALATION) at 19:35

## 2020-07-30 RX ADMIN — NYSTATIN 500000 UNITS: 100000 SUSPENSION ORAL at 14:33

## 2020-07-30 RX ADMIN — MELATONIN 6 MG: at 22:12

## 2020-07-30 RX ADMIN — LEVALBUTEROL HYDROCHLORIDE 1.25 MG: 1.25 SOLUTION, CONCENTRATE RESPIRATORY (INHALATION) at 13:40

## 2020-07-30 RX ADMIN — METOPROLOL TARTRATE 12.5 MG: 25 TABLET ORAL at 22:12

## 2020-07-30 RX ADMIN — ISODIUM CHLORIDE 3 ML: 0.03 SOLUTION RESPIRATORY (INHALATION) at 19:35

## 2020-07-30 RX ADMIN — ENOXAPARIN SODIUM 90 MG: 100 INJECTION SUBCUTANEOUS at 08:42

## 2020-07-30 RX ADMIN — NYSTATIN 500000 UNITS: 100000 SUSPENSION ORAL at 08:40

## 2020-07-30 RX ADMIN — METOPROLOL TARTRATE 2.5 MG: 5 INJECTION INTRAVENOUS at 08:41

## 2020-07-30 RX ADMIN — TAMSULOSIN HYDROCHLORIDE 0.4 MG: 0.4 CAPSULE ORAL at 18:26

## 2020-07-30 RX ADMIN — CHLORHEXIDINE GLUCONATE 0.12% ORAL RINSE 15 ML: 1.2 LIQUID ORAL at 08:40

## 2020-07-30 NOTE — CONSULTS
Internal Medicine  Consultation Note    Patient: Hui Larson  Age/sex: 79 y o  male  Medical Record #: [id number]    Assessment/Plan    DOMI adenocarcinoma; s/p bronchoscopy, mediastinoscopy, VATS, left upper lobectomy and mediastinal lymph node dissection 6/10/20  · Back to Thoracic surgery as OP    PEA/respiratory arrest; emergent cricothyroidotomy 6/20; tracheostomy 6/22; decannulation 7/29/20  · Stoma covered = will watch  · He is speaking well  · Continue Xopenex nebs    hx nicotine abuse with 1ppd x 50 yrs  · quit prior to surgery  · continue nebs    Sinus tachycardia  · was on Lopressor 2 5 mg IV q12hrs on admit to [facility] and changed now to 12 5mg q12hrs  · rates are mildy elevated only  · will watch    ABLA  · Stable  · will watch and transfuse prn hemoglobin <8 0    Dysphagia  · resolved and on regular diet but will watch    DVT LLE and RUE  · currently is on therapeutic Lovenox   · will need conversion to NOAC    Urine retention  · Continue Flomax  · Lucila was d/cd 7/27/20    TBI 2010  · Has memory issues from this incident but unk details since he cannot remember anything about it    ETOH abuse  · Unknown amount/details      Subjective/HPI      Angi Flores is a 79year old patient with a history of nicotine abuse, DOMI adenocarcinoma, ETOH abuse TBI 2010 who underwent a bronchoscopy, mediastinoscopy, VATS, left upper lobectomy and mediastinal lymph node dissection on 6/10/20  Post-operatively, he developed severe SQ emphysema from an air leak  He had a rapid response with airway compromise/PEA arrest on 6/20/20 requiring an emergent cricothyroidotomy  He developed shock and required pressor support  He had a trach revision 6/22/20  He was downsized on 6/22/20 and decannulated on 7/29/20  He is currently on room air  Dysphagia has resolved and he is on a regular diet  He developed confusion during his stay and had been on a 1:1  He is still taking Zyprexa at HS    He is currently on therapeutic Lovenox for a DVT right brachial/axillary and left gastrocnemius  Last venous Doppler showed resolution of the LLE DVT  He had urine retention and a norwood  He failed his initial void trial but norwood was successfully d/cd on 7/27/20  ROS:   A 10 point ROS was performed; negative except as noted above       Social History:    Substance Use History:   Social History     Substance and Sexual Activity   Alcohol Use Not Currently    Frequency: 4 or more times a week    Comment: "we cant tell how much"  per pt daughter      Social History     Tobacco Use   Smoking Status Former Smoker    Packs/day: 1 00    Years: 55 00    Pack years: 55 00    Types: Cigarettes   Smokeless Tobacco Never Used     Social History     Substance and Sexual Activity   Drug Use Not Currently       Family History:    Family History   Problem Relation Age of Onset    Ovarian cancer Mother     Liver cancer Father          Review of Scheduled Meds:    Current Facility-Administered Medications:  acetaminophen 975 mg Oral [facility] Angela Adams MD   albuterol 2 5 mg Nebulization Q6H PRN Angela Adams MD   calcium carbonate 500 mg Oral TID PRN Angela Adams MD   chlorhexidine 15 mL Swish & Spit Q12H [facility] Angela Adams MD   enoxaparin 1 mg/kg Subcutaneous Q12H [facility] Angela Adams MD   guaiFENesin 200 mg Oral Q4H PRN Angela Adams MD   levalbuterol 1 25 mg Nebulization Q12H Anegla Adams MD   Lidocaine Viscous HCl 15 mL Swish & Spit 4x Daily PRN Angela Adams MD   melatonin 6 mg Oral HS Angela Adams MD   metoprolol tartrate 12 5 mg Oral Q12H [facility] Angela Adams MD   nystatin 500,000 Units Swish & Swallow 4x Daily Angela Adams MD   OLANZapine 10 mg Oral HS Angela Adams MD   ondansetron 4 mg Oral Q6H PRN Angela Adams MD   oxyCODONE 2 5 mg Oral Q4H PRN Angela Adams MD   [START ON 7/31/2020] polyethylene glycol 17 g Oral Daily Angela Adams MD   senna-docusate sodium 1 tablet Per NG Tube BID Angela Adams MD   sodium chloride 3 mL Nebulization Q12H Catia Molina MD   tamsulosin 0 4 mg Oral Daily With Porter Shipley MD       Labs:     Results from last 7 days   Lab Units 20  0446 20  0609   WBC Thousand/uL 6 99 8 29   HEMOGLOBIN g/dL 9 5* 9 8*   HEMATOCRIT % 30 5* 32 3*   PLATELETS Thousands/uL 288 301     Results from last 7 days   Lab Units 20  0446 20  0609   SODIUM mmol/L 141 139   POTASSIUM mmol/L 4 0 4 3   CHLORIDE mmol/L 108 108   CO2 mmol/L 26 26   BUN mg/dL 14 14   CREATININE mg/dL 0 81 0 86   CALCIUM mg/dL 8 6 9 7                Results from last 7 days   Lab Units 20  1025 20  0531 20  2354   POC GLUCOSE mg/dl 91 92 105       Lab Results   Component Value Date    BLOODCX No Growth After 5 Days  2020    BLOODCX No Growth After 5 Days  2020    BLOODCX No Growth After 5 Days  2020    BLOODCX No Growth After 5 Days  2020    SPUTUMCULTUR 4+ Growth of  2020       Input and Output Summary (last 24 hours):     No intake or output data in the 24 hours ending 20 1502    Imaging:     No orders to display       *Labs /Radiology studies reviewed  *Medications reviewed and reconciled as needed  *Please refer to order section for additional ordered labs studies  *Case discussed with primary attending during morning huddle case rounds      Vitals:   Temp (24hrs), Av 9 °F (37 2 °C), Min:97 9 °F (36 6 °C), Max:100 °F (37 8 °C)    Temp:  [97 9 °F (36 6 °C)-100 °F (37 8 °C)] 98 6 °F (37 °C)  HR:  [103-118] 103  Resp:  [18-22] 18  BP: ()/(56-72) 107/72  SpO2:  [95 %-98 %] 97 %  Body mass index is 28 43 kg/m²       Physical Exam:   General Appearance: no distress, conversive  HEENT: PERRLA, conjuctiva normal; oropharynx clear; mucous membranes moist   Neck:  Supple, normal ROM, no JVD  Lungs: CTA, normal respiratory effort, no retractions, expiratory effort normal  CV: regular rate and rhythm; no rubs/murmurs/gallops, PMI normal   ABD: soft; ND/NT; +BS  EXT: no edema  Skin: normal turgor, normal texture, no rashes  Psych: affect flat  Neuro: AA; slow responses; CHU 5/5          Invasive Devices     None                    Code Status: Level 2 - DNAR: but accepts endotracheal intubation  Current Length of Stay: 0 day(s)    Total floor / unit time spent today 1 hour with more than 50% spent counseling/coordinating care  Counseling includes discussion with patient re: progress  and discussion with patient of his/her current medical state/information  Coordination of patient's care was performed in conjunction with primary service  Time invested included review of patient's labs, vitals, and management of their comorbidities with continued monitoring  In addition, this patient was discussed with medical team including physician and advanced extenders  The care of the patient was extensively discussed and appropriate treatment plan was formulated unique for this patient  ** Please Note: Fluency Direct voice to text software may have been used in the creation of this document   Audio transcription errors may occur**

## 2020-07-30 NOTE — ASSESSMENT & PLAN NOTE
S/p left upper lobe lobectomy with Dr Archie Tran on 8/00  Complicated by need for emergent tracheostomy, now decannulated  Monitor trach site for signs of infection   Now on albuterol nebulizer treatments p r n  for wheezing or shortness of breath  No longer requiring oxygen    Stable saturations at rest and with activity

## 2020-07-30 NOTE — PROGRESS NOTES
Progress Note - Fam Ramos 79 y o  male MRN: [id number]    Unit/Bed#: [facility] 405-01 Encounter: [id number]      Assessment:  Patient is a 15-year-old male status post VATS left upper lobectomy 6/10, with prolonged postoperative course complicated with need for emergent tracheostomy  Vss  Afebrile  Tracheostomy site dressing c/d/i  Saturating 95% on room air  Plan:  D/c to rehab today  Continue to place pressure over tracheostomy site with hand while speaking, to facilitate closure    Subjective:   Feels great  No complaints  Wants to go to rehab  Denied fever, chills, chest pain, shortness of breath, nausea, vomiting, or abdominal pain this morning  Objective:     Vitals: Blood pressure 105/68, pulse (!) 108, temperature 97 9 °F (36 6 °C), resp  rate 18, height 5' 10" (1 778 m), weight 83 4 kg (183 lb 13 8 oz), SpO2 97 %  ,Body mass index is 26 38 kg/m²  Intake/Output Summary (Last 24 hours) at 7/30/2020 0737  Last data filed at 7/30/2020 0529  Gross per 24 hour   Intake 560 ml   Output 800 ml   Net -240 ml       Physical Exam  General: NAD  HEENT: NC/AT  MMM  Cv: RRR     Lungs: normal effort  Ab: Soft, NT/ND  Ex: no CCE  Neuro: AAOx3    Scheduled Meds:  Current Facility-Administered Medications:  acetaminophen 975 mg Oral [facility] Fannie Alamo MD   albuterol 2 5 mg Nebulization Q6H PRN Violet Johnson MD   calcium carbonate 500 mg Oral TID PRN Fannie Alamo MD   chlorhexidine 15 mL Swish & Spit Q12H [address] Violet Johnson MD   enoxaparin 1 mg/kg Subcutaneous Q12H [address] Violet Johnson MD   guaiFENesin 200 mg Oral Q4H PRN Violet Johnson MD   haloperidol lactate 2 mg Intramuscular Q6H PRN Satish Stroud DO   levalbuterol 1 25 mg Nebulization TID Violet Johnson MD   Lidocaine Viscous HCl 15 mL Swish & Spit 4x Daily PRN Violet Johnson MD   melatonin 6 mg Oral HS Violet Johnson MD   metoprolol 2 5 mg Intravenous Q12H Satish Stroud,    nystatin 500,000 Units Swish & Swallow 4x Daily Cristobal Gonzalez Herve Sullivan MD   OLANZapine 10 mg Oral HS Oneal Noland MD   OLANZapine 5 mg Intramuscular Q6H PRN [place] Herb, MD   ondansetron 4 mg Intravenous Q6H PRN Angel Herb, MD   oxyCODONE 2 5 mg Oral Q4H PRN Angel Herb, MD   polyethylene glycol 17 g Oral Daily [place] Herb, MD   senna-docusate sodium 1 tablet Per NG Tube BID Angel Herb, MD   sodium chloride 3 mL Nebulization TID [place] Herb, MD   tamsulosin 0 4 mg Oral Daily With Andrew Sinha MD     Continuous Infusions:   PRN Meds: albuterol    calcium carbonate    guaiFENesin    haloperidol lactate    Lidocaine Viscous HCl    OLANZapine    ondansetron    oxyCODONE      Invasive Devices     Peripherally Inserted Central Catheter Line            PICC Line 67/94/43 Left Basilic 33 days                Lab, Imaging and other studies: I have personally reviewed pertinent reports      VTE Pharmacologic Prophylaxis: Enoxaparin (Lovenox)  VTE Mechanical Prophylaxis: sequential compression device

## 2020-07-30 NOTE — DISCHARGE SUMMARY
Discharge Summary - Shree Hoover [address] y o  male MRN: [id number]    Unit/Bed#: [facility] 405-01 Encounter: [id number]    Admission Date:   Admission Orders (From admission, onward)     Ordered        06/10/20 1332  Inpatient Admission  Once                     Admitting Diagnosis: Malignant neoplasm of upper lobe of left lung (HCC) [C34 12]    HPI:  Patient is a 75-year-old male past medical history hypertension, left upper lobe lung cancer, who presented to the outpatient thoracic surgery clinic for workup of this left upper lobe adenocarcinoma  Patient was scheduled for left upper lobe lobectomy  Procedures Performed:   Orders Placed This Encounter   Procedures   [address]    Intubation       Summary of Hospital Course:  Patient was admitted to the thoracic surgery service on 06/10/2020 for left upper lobe lobectomy by Nan Cartwright and Renee  On 06/10 bronchoscopy, mediastinoscopy, video-assisted thorascopic surgery and left upper lobe lobectomy were performed without complications, patient was taken to PACU in stable condition, then transferred to the medical surgery [place] where he was recovering and on postop day 2 patient had developed worsening subcutaneous emphysema extending throughout the upper torso bilateral neck and face down left arm  From postop day 2 through postop day 7 patient had prolonged air leak and worsening extensive subcutaneous emphysema  Wound VAC was placed over the left chest wall to decrease swelling  On 06/20 patient underwent rapid response secondary to desaturation and bradycardia, decision was made to intubate him  There were difficulties to intubate from above, so an emergent tracheostomy was performed by surgical critical care team, patient was transferred to the intensive care unit    On 06/22 patient underwent tracheostomy revision, and patient was slowly weaned off the ventilator, and by since 07/13 patient underwent bedside tracheostomy exchange for a [facility] noncuffed tube  Patient was continually malnourished, and on 07/21 care of fat was placed  Patient was eventually started on a diet after being cleared by speech pathology, on 07/23 he underwent a video barium swallow with out any evidence of aspiration  and on 07/29 tracheostomy was decannulated  Patient was working with Physical therapy and Occupational therapy, and on 07/30 he was discharged to [facility] acute rehab facility  On the day discharge patient was tolerating a diet, ambulating, without any pain, his tracheostomy site was clean dry and intact  Patient was deemed medically stable for discharge on 07/30/2020  Significant Findings, Care, Treatment and Services Provided: none    Complications: none    Discharge Diagnosis: same    Resolved Problems  Date Reviewed: 7/30/2020          Resolved    Subcutaneous emphysema ([facility] [address] ) 7/12/2020     Resolved by  Anna Salinas PA-C    Lactic acidosis 6/22/2020     Resolved by  Iris Florence PA-C    ROGER (acute kidney injury) ([facility] [address] ) 6/22/2020     Resolved by  Iris Florence PA-C    Postprocedural pneumothorax 7/14/2020     Resolved by  Negro Cerrato PA-C    Pneumonia 7/12/2020     Resolved by  Anna Salinas PA-C    Leukocytosis 7/14/2020     Resolved by  Negro Cerrato PA-C          Condition at Discharge: good         Discharge instructions/Information to patient and family:   See after visit summary for information provided to patient and family  Provisions for Follow-Up Care:  See after visit summary for information related to follow-up care and any pertinent home health orders  PCP: Maylin Johnson MD    Disposition: See After Visit Summary for discharge disposition information  Planned Readmission: No      Discharge Statement   I spent 30 minutes discharging the patient  This time was spent on the day of discharge  I had direct contact with the patient on the day of discharge   Additional documentation is required if more than 30 minutes were spent on discharge  Discharge Medications:  See after visit summary for reconciled discharge medications provided to patient and family

## 2020-07-30 NOTE — ASSESSMENT & PLAN NOTE
With DVT lower extremity on 6/20 ultrasound, right upper extremity DVT on 6/22 ultrasound  Repeat ultrasounds with resolution   On treatment dose lovenox 1mg/kg q12h, anticipate 3-6 month duration, follow up with PCP outpatient   Check Eliquis pricing

## 2020-07-30 NOTE — PROGRESS NOTES
PHYSICAL MEDICINE AND REHABILITATION   PREADMISSION ASSESSMENT       Projected [facility] and [facility] Diagnoses:  Impairment of mobility, safety, Activities of Daily Living (ADLs), and cognitive/communication skills due to Neurologic Conditions:  03 8  Neuromuscular Disorders  Etiologic: Critical Illness Myopathy   Date of Onset: 6/10/2020   Date of surgery: 6/10/2020 VATS DOMI lobectomy, 6/15/2020 VAC to left chest wall  6/20/2020 PEA arrest bedside cricothyroidotomy  6/22/2020 tracheostomy  PATIENT INFORMATION  Name: Christa Browne Phone #: [phone]  Address: [address] [place] [address]  YOB: 1950 Age: 79 y o  SS# [id number]  Marital Status: Single  Ethnicity:    Employment Status: retired  Extended Emergency Contact Information  Primary Emergency Contact: [address] [place] [address], [address] [place] [address] Phone: [phone]  Relation: Daughter  Advance Directive: Level 2 - DNR; but accepts endotracheal intubation    INSURANCE/COVERAGE:     Primary Payor: Leslie King / Plan: [employer] / Product Type: PPO Commercial /   Secondary Payer:Medicare    Payer Contact: Terese Payer Contact:   Contact Phone: [phone] Contact Phone:     Authorization #: [id number]  Coverage Dates: please call when patient is admitted to [address] [place] [address] Avenue: call on admission   MEDICARE #: [id number]  Medicare Days: full   Medical Record #: [id number]    REFERRAL SOURCE:   Referring provider: Efraín Granger MD  Referring facility: [address] [place] [address]   Room: [place] [address]/[place] [address]  PCP: Vy Serrano MD PCP phone number: [phone]    MEDICAL INFORMATION  HPI:   Christa Browne is a 79 y o  male who presented for left VATS for adenocarcinoma of the DOMI on Ranjana 10   Procedures performed on 6/10/2020:  BRONCHOSCOPY FLEXIBLE (N/A)   MEDIASTINOSCOPY (N/A)  THORACOSCOPY VIDEO ASSISTED SURGERY (VATS) (Left)  UPPER LOBECTOMY OF LUNG; MEDIASTINAL  LYMPH NODE DISSECTION (Left)     Post-operative course was complicated with following chain of events  On 6/12 worsening crepitus/subQ emphysema in upper torso/neck/face, concerns for airleak, worsened over the next few days, underwent decompression via small incision to L chest wall w/ VAC placement on 6/15 which provided only minimal-moderate results as per notes  On 6/20, RRT was called for SOB and hypoxia requiring NRB  Went into PEA and underwent ACLS  Unfortunately, he was a difficult airway and had to undergo emergent cricoidotomy   He had ROSC after a few minutes  Cric was formalized to tracheostomy on 6/22/20  He has had periods of agitation waxing and waning hypoactive/ hyperactive delirium  He was transferred out of ICU on 7/16/20  He was able to progress to an oral diet  He will be decannulation this today       He has HTN Hx of drinking 1 bottle of whiskey per day       He lives with his daughter in a separate in-house apartment  He recently retired & still drives  He is independent with IADLs and ADLs  He ambulates independently  He has no history of falls  He wears glasses for reading  He is hard of hearing  No issues with dentition or weight loss  He normally wakes up at 2-4 am  No issues with bowel or bladder  He likes to watch TV, scifie channel and sit coms He would binge drink on the weekends  Daughter reports occasional memory issues  For example "he would walk away from the stove"  The patient was initially recommended for [facility] but due to improvements was deemed no longer appropriate by [facility]  PM&R was consulted for rehabilitation recommendations, the patient is a candidate for acute inpatient rehabilitation once medically stable  Past Medical History:   Past Surgical History:    Allergies:     Past Medical History:   Diagnosis Date    Alcohol abuse 3/27/2020    Chest pain     Community acquired pneumonia 3/27/2020    Hypertension     Left upper lobe pulmonary nodule     Malignant neoplasm of upper lobe of left lung ([facility] [address] ) 5/7/2020    Past Surgical History:   Procedure Laterality Date    COLON SURGERY      CT NEEDLE BIOPSY LUNG  4/29/2020    NE BRONCHOSCOPY,DIAGNOSTIC N/A 6/10/2020    Procedure: BRONCHOSCOPY FLEXIBLE;  Surgeon: Arlene Crow MD;  Location[facility];  Service: Thoracic    NE MEDIASTINOSCOPY WITH LYMPH NODE BIOPSY/IES N/A 6/10/2020    Procedure: MEDIASTINOSCOPY;  Surgeon: Arlene Crow MD;  [facility];  Service: Thoracic    NE THORACOSCOPY SURG LOBECTOMY Left 6/10/2020    Procedure: THORACOSCOPY VIDEO ASSISTED SURGERY (VATS);   Surgeon: Arlene Crow MD;  [facility];  Service: Thoracic    NE THORACOSCOPY SURG LOBECTOMY Left 6/10/2020    Procedure: UPPER LOBECTOMY OF LUNG; MEDIASTINAL  LYMPH NODE DISSECTION;  Surgeon: Arlene Crow MD;  [facility];  Service: Thoracic    TRACHEOSTOMY N/A 6/22/2020    Procedure: TRACHEOSTOMY REVISION, BRONCHOSCOPY;  Surgeon: Arlene Crow MD;  [facility];  Service: Thoracic    WRIST SURGERY Right     orif     No Known Allergies      Comorbidities/Surgeries in the last 100 days:   · Hypoxic brain injury vs delirium   · Adenocarcinoma of the DOMI s/p VATS  · Air lead s/p decompression  · Encephalopathy  · Dysphagia now tolerating an oral diet  · PEA arrest s/p ACLS  · Tachycardia   · Agitation   · DVT  · Acute respiratory failure s/p trach  · DVT ppx:  lovenox and SCD    CURRENT VITAL SIGNS:   Temp:  [97 9 °F (36 6 °C)-100 °F (37 8 °C)] 97 9 °F (36 6 °C)  HR:  [104-123] 108  Resp:  [18-22] 18  BP: ()/(56-85) 105/68   Intake/Output Summary (Last 24 hours) at 7/30/2020 0902  Last data filed at 7/30/2020 0529  Gross per 24 hour   Intake 560 ml   Output 800 ml   Net -240 ml        LABORATORY RESULTS:      Lab Results   Component Value Date    HGB 9 5 (L) 07/28/2020    HCT 30 5 (L) 07/28/2020    WBC 6 99 07/28/2020     Lab Results   Component Value Date    BUN 14 07/28/2020    K 4 0 07/28/2020     07/28/2020    GLUCOSE 264 (H) 06/20/2020    CREATININE 0 81 07/28/2020     Lab Results   Component Value Date    PROTIME 14 0 06/22/2020    INR 1 12 06/22/2020        DIAGNOSTIC STUDIES:  Xr Chest Portable    Result Date: 6/15/2020  Impression: Once again identified is extensive subcutaneous emphysema throughout the chest and neck  Left chest tube unchanged in position with a small suspected apical pneumothorax  Workstation performed: [id number]     Xr Chest Portable    Result Date: 6/13/2020  Impression: Improving pneumomediastinum  Trace left apical pneumothorax  Extensive subcutaneous emphysema again present  Workstation performed: [id number]     Xr Chest Pa & Lateral    Result Date: 6/14/2020  Impression: Unchanged tiny left apical pneumothorax and pneumomediastinum  Extensive subcutaneous emphysema again present  Workstation performed: [id number]     Xr Chest Pa & Lateral    Result Date: 6/12/2020  Impression: Left upper lobectomy with left chest tube with tiny left pneumothorax  Extensive new pneumomediastinum  Marked worsening of extensive subcutaneous emphysema, greatest in the left chest wall but also extensive in the lower neck  The study was marked in [facility] for immediate notification  Workstation performed: [id number]     X-ray Chest 1 View    Result Date: 6/10/2020  Impression: No consolidation or discrete pneumothorax  Workstation performed: [id number]       PRECAUTIONS/SPECIAL NEEDS:   Tobacco:   Social History     Tobacco Use   Smoking Status Former Smoker    Packs/day: 1 00    Years: 55 00    Pack years: 55 00    Types: Cigarettes   Smokeless Tobacco Never Used   , Alcohol:    Social History     Substance and Sexual Activity   Alcohol Use Yes    Frequency: 4 or more times a week    Comment: "we cant tell how much"  per pt daughter     Anticoagulation:  Lovenox q12hrs, Safety Concerns regarding agitation,safety awareness  Requires O2: monitoring, currently on room air at 98%   Patient with tracheostomy to be decannulated  , IV: Type: triple lumen catheter in left basilic capped , Aspiration Risk/Precautions and Dietary Restrictions: Alternate solids and liquids, two swallows per bite/sip, eat slowly, small bites/sips  Continue close supervision at meals        MEDICATIONS:     Current Facility-Administered Medications:     acetaminophen (TYLENOL) tablet 975 mg, 975 mg, Oral, Q8H [address], Wilver Pereira MD, 975 mg at 07/29/20 2104    albuterol inhalation solution 2 5 mg, 2 5 mg, Nebulization, Q6H PRN, Myrna Lehman MD    calcium carbonate (TUMS) chewable tablet 500 mg, 500 mg, Oral, TID PRN, Wilver Pereira MD, 500 mg at 07/29/20 1910    chlorhexidine (PERIDEX) 0 12 % oral rinse 15 mL, 15 mL, [place], Q12H [address], Myrna Lehman MD, 15 mL at 07/30/20 0840    enoxaparin (LOVENOX) subcutaneous injection 90 mg, 1 mg/kg, Subcutaneous, Q12H [address], Myrna Lehman MD, 90 mg at 07/30/20 0842    guaiFENesin (ROBITUSSIN) oral solution 200 mg, 200 mg, Oral, Q4H PRN, Myrna Lehman MD, 200 mg at 07/09/20 1704    haloperidol lactate (HALDOL) injection 2 mg, 2 mg, Intramuscular, Q6H PRN, Melina Marie DO    [facility]) inhalation solution 1 25 mg, 1 25 mg, Nebulization, TID, Myrna Lehman MD, 1 25 mg at 07/30/20 0737    Lidocaine Viscous HCl (XYLOCAINE) 2 % mucosal solution 15 mL, 15 mL, Swish & Spit, 4x Daily PRN, Myrna Lehman MD, 15 mL at 07/16/20 1814    melatonin tablet 6 mg, 6 mg, Oral, HS, Myrna Lehman MD, 6 mg at 07/29/20 2105    metoprolol (LOPRESSOR) injection 2 5 mg, 2 5 mg, Intravenous, Q12H, Abiola Hilario Stroud DO, 2 5 mg at 07/30/20 0841    nystatin (MYCOSTATIN) oral suspension 500,000 Units, 500,000 Units, Swish & Swallow, 4x Daily, Myrna Lehman MD, 500,000 Units at 07/30/20 0840    OLANZapine (ZyPREXA ZYDIS) dispersible tablet 10 mg, 10 mg, Oral, HS, Maria G Braxton MD, 10 mg at 07/29/20 2104    OLANZapine (ZyPREXA) IM injection 5 mg, 5 mg, Intramuscular, Q6H PRN, Bibiana Lee Jere John MD, 5 mg at 07/21/20 1727    ondansetron [facility]) injection 4 mg, 4 mg, Intravenous, Q6H PRN, Ade Jimenez MD, 4 mg at 07/25/20 1736    oxyCODONE (ROXICODONE) oral solution 2 5 mg, 2 5 mg, Oral, Q4H PRN, Ade Jimenez MD, 2 5 mg at 07/25/20 0007    polyethylene glycol (MIRALAX) packet 17 g, 17 g, Oral, Daily, Ade Jimenez MD, 17 g at 07/27/20 0944    senna-docusate sodium (SENOKOT S) 8 6-50 mg per tablet 1 tablet, 1 tablet, Per NG Tube, BID, Ade Jimenez MD, 1 tablet at 07/28/20 0843    sodium chloride 0 9 % inhalation solution 3 mL, 3 mL, Nebulization, TID, Ade Jimenez MD, 3 mL at 07/30/20 0737    tamsulosin (FLOMAX) capsule 0 4 mg, 0 4 mg, Oral, Daily With Alex Whalen MD, 0 4 mg at 07/29/20 1555    SKIN INTEGRITY:   Buttocks & B/L heels bruising  Left chest tube site clean, dry and intact  Mediastinoscopy incision clean, dry and intact  Trach site clean and intact  PRIOR LEVEL OF FUNCTION:    Padmini Zhang is single and lives with his daughter and son-in-law in a separate house/apartment with no steps to enter  He required no DME prior to admission  He was independent with his ADL's and drove a car  Self Care: Independent    FALLS IN THE LAST 6 MONTHS: 0    HOME ENVIRONMENT:  The living area: can live on one level  There are zero steps to enter the home  The patient will not have 24 hour supervision/physical assistance available upon discharge  PREVIOUS DME:  Equipment in home (previous DME): None    FUNCTIONAL STATUS:  Physical Therapy     07/29/20 1035   Pain Assessment   Pain Assessment Tool 0-10   Pain Score No Pain   Restrictions/Precautions   Weight Bearing Precautions Per Order No   Other Precautions Cognitive; Impulsive;Multiple lines; Fall Risk  (trach)   General   Chart Reviewed Yes   Family/Caregiver Present No   Cognition   Overall Cognitive Status Impaired   Arousal/Participation Responsive   Attention Attends with cues to redirect Orientation Level Oriented X4   Memory Unable to assess   Following Commands Follows one step commands without difficulty   Subjective   Subjective states he feels OK   willing to mobilize  Bed Mobility   Supine to Sit 5  Supervision   Transfers   Sit to Stand    (CGA)   Additional items Assist x 1   Stand to Sit 5  Supervision   Additional items Assist x 1   Ambulation/Elevation   Gait pattern    (slow, lateral LOB)   Gait Assistance 4  Minimal assist   Additional items Assist x 1   Assistive Device    (no device today)   Distance 130'x2- multiple lateral LOB requiring CGA/min A, one larger one with turning, requiring mod A to correct   Balance   Static Sitting Good   Dynamic Sitting Fair +   Static Standing Fair   Dynamic Standing Fair -   Ambulatory Poor +   Endurance Deficit   Endurance Deficit Yes   Endurance Deficit Description fatigue, weakness, cog/impulsivity   Activity Tolerance   Activity Tolerance Patient limited by fatigue;Treatment limited secondary to medical complications (Comment)   Nurse Made Aware yes   Assessment   Prognosis Good   Problem List Decreased strength;Decreased endurance; Impaired balance;Decreased mobility; Decreased coordination;Decreased cognition; Impaired judgement;Decreased safety awareness      Occupational Therapy         07/28/20 1440   Restrictions/Precautions   Weight Bearing Precautions Per Order No   Other Precautions Chair Alarm;Cognitive; Bed Alarm;Multiple lines;Telemetry; Fall Risk  (chair alarm active at end of session)   General   Response to Previous Treatment Patient with no complaints from previous session   Lifestyle   Autonomy pta pt reports I in ADLs/IADLs/functional mobility   Reciprocal Relationships supportive dtr and son in law   Service to Others retired    [address] enjoys doing "nothing"   Pain Assessment   Pain Assessment Tool Pain Assessment not indicated - pt denies pain   Pain Score No Pain   ADL   Where Assessed Chair Grooming Assistance 5  Supervision/Setup   Grooming Deficit Wash/dry hands   UB Bathing Assistance 4  Minimal Assistance   UB Bathing Deficit Impulsive;Verbal cueing;Supervision/safety   UB Bathing Comments standing at sink washing head   LB Dressing Assistance 4  Minimal Assistance   LB Dressing Deficit Thread RLE into underwear; Thread LLE into underwear;Steadying; Impulsive;Verbal cueing   LB Dressing Comments VC fo safety   Bed Mobility   Supine to Sit 5  Supervision   Additional items Verbal cues; Impulsive   Transfers   Sit to Stand 4  Minimal assistance   Additional items Impulsive;Verbal cues   Stand to Sit 4  Minimal assistance   Additional items Impulsive;Verbal cues   Functional Mobility   Functional Mobility 4  Minimal assistance   Additional Comments a x 2 for line management and    Additional items Rolling walker   Cognition   Overall Cognitive Status Impaired   Arousal/Participation Arousable   Attention Attends with cues to redirect   Orientation Level Oriented X4   Memory Decreased recall of precautions;Decreased recall of recent events   Following Commands Follows one step commands with increased time or repetition   Comments pt impulsive, requiring VC for safety, observed trying to leave walker and push sideways several times despite cueing   Activity Tolerance   Activity Tolerance Patient tolerated treatment well   Medical Staff Made Aware okay to see per RN Yas Mendez, PT Terese      Speech Therapy    Subjective:  Pt seen for dysphagia tx  Pt sitting upright in bed  Pt cooperative, somewhat [place]  Pt had his speaking valve on       Objective:  Reviewed chart and spoke with RN  She had just come on shift, but received in report that pt was eating well and taking meds without difficulty  Pt reports he feels he is doing well with the regular diet, but that sometimes sweets make him itchy-he was unable to elaborate or describe   He was able to recall the strategy of alternating solids and liquids, but unable to remember that he is supposed to swallow twice per bite/sip  Pt was seen with a snack of toast with butter, sp crackers, and soda by straw  He took appropriate sized bites, with mastication, bolus formation and transfer appearing prompt  Hyolaryngeal elevation was observed and appeared prompt  With the thin liquid, oral and pharyngeal swallows also appeared prompt  Pt coughed/cleared his throat after the first sip of soda, but there were no further s/s of aspiration with multiple other sips  Pt independently took small sips, but needed occasional cues to swallow twice and alternate solids and liquids  Discussed with the patient the importance of wearing the speaking valve while eating, and removing it while sleeping       Assessment:  Pt appears to be tolerating a regular diet and thin liquids  Cough x1 after first sip of thin liquid, but no other s/s of aspiration with the snack today  Pt needs occasional cues to follow strategies       Plan/Recommendations:  Continue regular diet and thin liquids  Wear speaking valve during meals  Make sure to remove while sleeping  Aspiration precautions  Alternate solids and liquids, two swallows per bite/sip, eat slowly, small bites/sips  Continue close supervision at meals  No further ST recommended at this time  Discharge from [address] [place] [address] Dr Soares if needed  CURRENT GAP IN FUNCTION  Prior to admission pt was independent with mobility/ambulation, transfers, ADL's, IADL's  Prior level of function and living situation:  He lives with his daughter in a separate house apartment    He was independent previously   Kari Cummings  Current level of function:  Physical Therapy:  Supervision for transfers, minimal assist for ambulation   Occupational Therapy:  Supervision for grooming, minimal assist for UB dressing and LB dressing  Speech Therapy:  Regular diet with thin liquids    Estimated length of stay: 7 to 10 days    Anticipated Post-Discharge Disposition/Treatment  Disposition: Return to previous home/apartment  Outpatient Services: PT/OT/ST     BARRIERS TO DISCHARGE  Fatigue, decreased strength, decreased ROM, decreased endurance, impaired balance, decreased mobility, decreased ADL status, decreased self-care trans, decreased high-level ADLs, cognitive impairment  INTERVENTIONS FOR DISCHARGE  Adaptive equipment, Patient/Family/Caregiver Education, [employer], Home Evaluation, Support Group, Arrange DME needs, Therapy exercises, Center of balance support  and Energy conservation education  Functional transfer training;LE strengthening/ROM; Elevations; Therapeutic exercise; Endurance training;Patient/family training;Equipment eval/education; Bed mobility;Gait training    REQUIRED THERAPY:  Patient will require PT, OT and ST 60 minutes each per day, five days per week to achieve rehab goals  REQUIRED FUNCTIONAL AND MEDICAL MANAGEMENT FOR INPATIENT REHABILITATION:  Skin:  Patient will need close monitoring of his skin integrity due to decreased mobility and wound management  He will also require close monitoring of all incisions for any s/s of infection  , Pain Management: Overall pain is moderately controlled, Deep Vein Thrombosis (DVT) Prophylaxis: Lovenox  Medical management of co-morbidities, PT and OT interventions, any necessary labs, consults, imaging studies and medication changes/additions/discontinuations needed to manage patient's case while on ARC    RECOMMENDED LEVEL OF CARE:     Jim Carroll is a 79 y o  male who presented for left VATS for adenocarcinoma of the DOMI on Ranj[address] 10  Pt had procedures performed on 0/05/4031 with complicated post-op course as previously noted  Pt lives with his daughter in a separate in house/apartment  The patient was initially recommended for [facility] but due to improvements was deemed no longer appropriate by [facility]    PM&R was consulted for rehabilitation recommendations, deemed appropriate for ARU and is now medically stable for transfer  The pt will benefit from an ARU admission with 24/7 nursing care to monitor his skin integrity as he currently has limited mobility and some areas of breakdown  The nursing team will record his caloric intake and monitor that he is tolerating his current diet as ordered without showing any signs of aspiration  Recommendation is for pt to have close supervision during meal intake  The pt's output levels will also be monitored to ensure that the pt's kidney function is adequate and elimination is occurring on a regular basis  Vital signs will be taken routinely with particular attention made to the pt's blood oxygenation levels to ensure that he is not desaturating during activities  The nursing team will also routinely assess the pt's cognitive status during routine care to evaluate his ability to problem solve and follow directions  Should the pt have a change in medical status, the nursing staff will promptly contact the ARU MD and treat per his orders  With close medical monitoring of the pt during his ARU admission, the ARU MD will be able to expedite the ordering of any additional lab tests, imaging studies or consults to manage the pt's case  The MD will also consult with the therapists to determine that the pt is effectively participating in the intense therapy sessions offered during his admission  The pt will have 3 hours of therapy per day including physical, occupational and speech therapy  The therapists will assist the pt in increasing his ROM, strength, endurance, and ability to complete all ADLs  They will provide safety education during his therapy sessions and make any recommendations for adaptive equipment in order to improve his functional  [place]  The ARU interdisciplinary team will meet weekly to discuss the pt's overall medical status and progress toward stated goals    They will also address any additional goals that are identified during his admission and add them to his plan of care  In conclusion, the pt will benefit from the overall ARU interdisciplinary team approach so that he has close medical oversight while improving all functional abilities which will allow him to make a timely and smooth transition back to his previous environment with the assistance of his family

## 2020-07-30 NOTE — H&P
PHYSICAL MEDICINE AND REHABILITATION H&P/ADMISSION NOTE  Denzel Angelo 79 y o  male MRN: [id number]  Unit/Bed#: -01 Encounter: [id number]     Rehab Diagnosis: Neurologic Conditions:  03 8  Neuromuscular Disorders    History of Present Illness:   Denzel Angelo is a 79 y o  male with left upper lobe lung cancer, who was admitted on 6/10/20 for left upper lobe lobectomy by Dr Sabine Young and Benson Cranker  Postop complicated by subcutaneous emphysema, prolonged air leak  A wound vac was placed over left chest wall  He had rapid response on 6/20 for hypoxia and bradycardia, requiring intubation  An emergent tracheostomy was performed by surgical critical team  He had tracheostomy revision on 6/22  He was slowly weaned off ventilator  He was decannulated on 7/29  He is tolerating regular diet at this time  He was discharged to [facility] on 07/30/20  Subjective: endorses some diarrhea yesterday  Denies abdominal pain  Denies SOB, chest pain, cough, nausea/vomiting  Review of Systems: A 10-point review of systems was performed  Negative except as listed above  Restrictions include:  Aspiration, Fall precautions      Functional History - Prior to Admission:    Independent     Functional History - Currently:     Physical Therapy       07/29/20 1035   Pain Assessment   Pain Assessment Tool 0-10   Pain Score No Pain   Restrictions/Precautions   Weight Bearing Precautions Per Order No   Other Precautions Cognitive; Impulsive;Multiple lines; Fall Risk  (trach)   General   Chart Reviewed Yes   Family/Caregiver Present No   Cognition   Overall Cognitive Status Impaired   Arousal/Participation Responsive   Attention Attends with cues to redirect   Orientation Level Oriented X4   Memory Unable to assess   Following Commands Follows one step commands without difficulty   Subjective   Subjective states he feels OK   willing to mobilize      Bed Mobility   Supine to Sit 5  Supervision   Transfers   Sit to Stand    (CGA)   Additional items Assist x 1   Stand to Sit 5  Supervision   Additional items Assist x 1   Ambulation/Elevation   Gait pattern    (slow, lateral LOB)   Gait Assistance 4  Minimal assist   Additional items Assist x 1   Assistive Device    (no device today)   Distance 130'x2- multiple lateral LOB requiring CGA/min A, one larger one with turning, requiring mod A to correct   Balance   Static Sitting Good   Dynamic Sitting Fair +   Static Standing Fair   Dynamic Standing Fair -   Ambulatory Poor +   Endurance Deficit   Endurance Deficit Yes   Endurance Deficit Description fatigue, weakness, cog/impulsivity   Activity Tolerance   Activity Tolerance Patient limited by fatigue;Treatment limited secondary to medical complications (Comment)   Nurse Made Aware yes   Assessment   Prognosis Good   Problem List Decreased strength;Decreased endurance; Impaired balance;Decreased mobility; Decreased coordination;Decreased cognition; Impaired judgement;Decreased safety awareness        Occupational Therapy          07/28/20 1440   Restrictions/Precautions   Weight Bearing Precautions Per Order No   Other Precautions Chair Alarm;Cognitive; Bed Alarm;Multiple lines;Telemetry; Fall Risk  (chair alarm active at end of session)   General   Response to Previous Treatment Patient with no complaints from previous session   Lifestyle   Autonomy pta pt reports I in ADLs/IADLs/functional mobility   Reciprocal Relationships supportive dtr and son in law   Service to Others retired    Jan 139 enjoys doing "nothing"   Pain Assessment   Pain Assessment Tool Pain Assessment not indicated - pt denies pain   Pain Score No Pain   ADL   Where Assessed Chair   Grooming Assistance 5  Supervision/Setup   Grooming Deficit Wash/dry hands   UB Bathing Assistance 4  Minimal Assistance   UB Bathing Deficit Impulsive;Verbal cueing;Supervision/safety   UB Bathing Comments standing at sink washing head   LB Dressing Assistance [address]   LB Dressing Deficit Thread RLE into underwear; Thread LLE into underwear;Steadying; Impulsive;Verbal cueing   LB Dressing Comments VC fo safety   Bed Mobility   Supine to Sit 5  Supervision   Additional items Verbal cues; Impulsive   Transfers   Sit to Stand 4  Minimal assistance   Additional items Impulsive;Verbal cues   Stand to Sit 4  Minimal assistance   Additional items Impulsive;Verbal cues   Functional Mobility   Functional Mobility 4  Minimal assistance   Additional Comments a x 2 for line management and    Additional items Rolling walker   Cognition   Overall Cognitive Status Impaired   Arousal/Participation Arousable   Attention Attends with cues to redirect   Orientation Level Oriented X4   Memory Decreased recall of precautions;Decreased recall of recent events   Following Commands Follows one step commands with increased time or repetition   Comments pt impulsive, requiring VC for safety, observed trying to leave walker and push sideways several times despite cueing   Activity Tolerance   Activity Tolerance Patient tolerated treatment well   Medical Staff Made Aware okay to see per RN Marcie Brian, PT Terese        Speech Therapy     Subjective:  Pt seen for dysphagia tx  Pt sitting upright in bed  Pt cooperative, somewhat Huslia  Pt had his speaking valve on       Objective:  Reviewed chart and spoke with RN  She had just come on shift, but received in report that pt was eating well and taking meds without difficulty  Pt reports he feels he is doing well with the regular diet, but that sometimes sweets make him itchy-he was unable to elaborate or describe  He was able to recall the strategy of alternating solids and liquids, but unable to remember that he is supposed to swallow twice per bite/sip  Pt was seen with a snack of toast with butter, sp crackers, and soda by straw  He took appropriate sized bites, with mastication, bolus formation and transfer appearing prompt   Hyolaryngeal elevation was observed and appeared prompt  With the thin liquid, oral and pharyngeal swallows also appeared prompt  Pt coughed/cleared his throat after the first sip of soda, but there were no further s/s of aspiration with multiple other sips  Pt independently took small sips, but needed occasional cues to swallow twice and alternate solids and liquids      Discussed with the patient the importance of wearing the speaking valve while eating, and removing it while sleeping       Assessment:  Pt appears to be tolerating a regular diet and thin liquids  Cough x1 after first sip of thin liquid, but no other s/s of aspiration with the snack today  Pt needs occasional cues to follow strategies       Plan/Recommendations:  Continue regular diet and thin liquids  Wear speaking valve during meals  Make sure to remove while sleeping  Aspiration precautions  Alternate solids and liquids, two swallows per bite/sip, eat slowly, small bites/sips  Continue close supervision at meals  No further ST recommended at this time  Discharge from [address] [place] [address] Dr Soares if needed              Past Medical History:   Diagnosis Date    Alcohol abuse 3/27/2020    Chest pain     Community acquired pneumonia 3/27/2020    Hypertension     Left upper lobe pulmonary nodule     Malignant neoplasm of upper lobe of left lung ([address] ) 5/7/2020     Past Surgical History:   Procedure Laterality Date    COLON SURGERY      CT NEEDLE BIOPSY LUNG  4/29/2020    ND BRONCHOSCOPY,DIAGNOSTIC N/A 6/10/2020    Procedure: BRONCHOSCOPY FLEXIBLE;  Surgeon: Sj Gallo MD;  [facility];  Service: Thoracic    ND MEDIASTINOSCOPY WITH LYMPH NODE BIOPSY/IES N/A 6/10/2020    Procedure: MEDIASTINOSCOPY;  Surgeon: Sj Gallo MD;  [facility];  Service: Thoracic    ND THORACOSCOPY SURG LOBECTOMY Left 6/10/2020    Procedure: THORACOSCOPY VIDEO ASSISTED SURGERY (VATS);   Surgeon: Sj Gallo MD;  [facility];  Service: Thoracic    KS THORACOSCOPY SURG LOBECTOMY Left 6/10/2020    Procedure: UPPER LOBECTOMY OF LUNG; MEDIASTINAL  LYMPH NODE DISSECTION;  Surgeon: Teo Craig MD;  [facility];  Service: Thoracic    TRACHEOSTOMY N/A 6/22/2020    Procedure: TRACHEOSTOMY REVISION, BRONCHOSCOPY;  Surgeon: Teo Craig MD;  [facility];  Service: Thoracic    WRIST SURGERY Right     orif     Family History   Problem Relation Age of Onset    Ovarian cancer Mother     Liver cancer Father      Social History     Socioeconomic History    Marital status: Single     Spouse name: None    Number of children: None    Years of education: None    Highest education level: None   Occupational History    None   Social Needs    Financial resource strain: None    Food insecurity:     Worry: None     Inability: None    Transportation needs:     Medical: None     Non-medical: None   Tobacco Use    Smoking status: Former Smoker     Packs/day: 1 00     Years: 55 00     Pack years: 55 00     Types: Cigarettes    Smokeless tobacco: Never Used   Substance and Sexual Activity    Alcohol use: Not Currently     Frequency: 4 or more times a week     Comment: "we cant tell how much"  per pt daughter     Drug use: Not Currently    Sexual activity: None   Lifestyle    Physical activity:     Days per week: None     Minutes per session: None    Stress: None   Relationships    Social connections:     Talks on phone: None     Gets together: None     Attends Judaism service: None     Active member of club or organization: None     Attends meetings of clubs or organizations: None     Relationship status: None    Intimate partner violence:     Fear of current or ex partner: None     Emotionally abused: None     Physically abused: None     Forced sexual activity: None   Other Topics Concern    None   Social History Narrative    None       Current Facility-Administered Medications:  acetaminophen 975 mg Oral Q8H [address] Julisa Alba MD   albuterol 2 5 mg Nebulization Q6H PRN Julisa Alba MD   calcium carbonate 500 mg Oral TID PRN Julisa Alba MD   chlorhexidine 15 mL Swish & Spit Q12H [address] Julisa Alba MD   enoxaparin 1 mg/kg Subcutaneous Q12H [address] Julisa Alba MD   guaiFENesin 200 mg Oral Q4H PRN Julisa Alba MD   levalbuterol 1 25 mg Nebulization Q12H Julisa Alba MD   Lidocaine Viscous HCl 15 mL Swish & Spit 4x Daily PRN Julisa Alba, MD   melatonin 6 mg Oral HS Julisa Alba MD   metoprolol 2 5 mg Intravenous Q12H Julisa Alba MD   nystatin 500,000 Units Swish & Swallow 4x Daily Julisa Alba MD   OLANZapine 10 mg Oral HS Julisa Alba MD   ondansetron 4 mg Oral Q6H PRN Julisa Alba MD   oxyCODONE 2 5 mg Oral Q4H PRN Julisa Alba MD   [START ON 7/31/2020] polyethylene glycol 17 g Oral Daily Julisa Alba MD   senna-docusate sodium 1 tablet Per NG Tube BID Julisa Alba MD   sodium chloride 3 mL Nebulization Q12H Julisa Alba MD   tamsulosin 0 4 mg Oral Daily With Juliette Banks MD        No Known Allergies    Drug regimen reviewed, all potential adverse effects identified and addressed    Home environment:   1st floor set up, 0 JONAH  Limited supervision on discharge     Physical Exam:  Temp:  [97 9 °F (36 6 °C)-100 °F (37 8 °C)] 98 6 °F (37 °C)  HR:  [103-123] 103  Resp:  [18-22] 18  BP: ()/(56-85) 107/72  SpO2:  [95 %-98 %] 97 %    GEN: NAD, sitting comfortably in bed  Head: NCAT, no gross lesions  Eyes: PERRL, EOMI  Throat: clear, no thrush, MMM  Pulm: CTAB, no rales/wheezes  CV: tachycardic, normal s1/s2  Abd: soft, NTND  Ext: no pedal edema bilaterally, distal extremities warm and well perfused  Psych: normal affect, no agitation  Skin: no observable rashes; left upper chest healed incision; trach site covered with occlusive dressing  Neuro: alert, oriented to person and place, not oriented to day/month but oriented to year; follows commands        MMT:   Right  Left  Site  Right  Left  Site    5 5 S Ab: Shoulder Abductors  5- 5- HF: Hip Flexors    5 5 EF: Elbow Flexors    KF: Knee Flexors    5 5 EE: Elbow Extensors  5 5 KE: Knee Extensors    5 5 WE: Wrist Extensors  5 5 DR: Dorsi Flexors    5 5 FF: Finger Flexors  5 5 PF: Plantar Flexors    5 5 HI: Hand Intrinsics  5 5 EHL: Extensor Hallucis Longus       Laboratory:    Results from last 7 days   Lab Units 07/28/20  0446 07/27/20  0609 07/24/20  0518   HEMOGLOBIN g/dL 9 5* 9 8* 8 9*   HEMATOCRIT % 30 5* 32 3* 29 5*   WBC Thousand/uL 6 99 8 29 8 29     Results from last 7 days   Lab Units 07/28/20 0446 07/27/20  0609 07/24/20  0518   BUN mg/dL 14 14 17   POTASSIUM mmol/L 4 0 4 3 3 9   CHLORIDE mmol/L 108 108 109*   CREATININE mg/dL 0 81 0 86 0 79            Wt Readings from Last 1 Encounters:   07/30/20 84 8 kg (186 lb 15 2 oz)     Estimated body mass index is 28 43 kg/m² as calculated from the following:    Height as of this encounter: 5' 8" (1 727 m)  Weight as of this encounter: 84 8 kg (186 lb 15 2 oz)      Imaging: reviewed      Plan:   Acute deep vein thrombosis (DVT) of axillary vein of right upper extremity (HCC)  Assessment & Plan  Found on DVT u/s on 6/22/20  On treatment dose lovenox 1mg/kg q12h    * Malignant neoplasm of upper lobe of left lung ([address] )  Assessment & Plan  S/p left upper lobe lobectomy with Dr Tita Yanez on 1/35  Complicated by need for emergent tracheostomy, now decannulated  Monitor trach site for signs of infection   Xopenex TID    DVT ppx  - SCDs  - on lovenox 90mg BID for RUE DVT    Bowel  - miralax daily  - senna/colace daily    Bladder  - monitor for urinary retention - check PVRs  - on flomax     Pain  - on tylenol  - oxycodone PRN  - wean pain meds as tolerated     Tachycardia   - was on metoprolol 2 5mg IV q12h, will convert to 12 5mg PO q12h  - monitor BP and HR    Insomnia  - melatonin QHS  - on zyprexa QHS for agitation, wean as tolerated           Rehabilitation Prognosis: good     Tolerance for three hours of therapy a day: good Family/Patient Goals:  Patient/family's goals: Return to previous home/apartment  Patient will receive PT, OT and ST 60 minutes each per day, five days per week to achieve rehab goals  Mobility Goals: Mod I    Activities of Daily Living (ADLs) Goals: Mod I      Discharge Planning:  Rehabilitation and discharge goals discussed with the patient and/or family  Case Managment and Social Work to review patient/family resources and to coordinate Discharge Planning  Estimated length of stay: 7 to 10 days    Patient and Family Education and Training:  Rehabilitation and discharge goals discussed with the patient and/or family  Patient/family education/training needs to be discussed in weekly team meeting  Other equipment:  rw vs cane, tub/shower bench    Medical Necessity Criteria for [facility] Admission: hypoxic respiratory failure, wound care, dysphagia, HTN   In addition, the preadmission screen, post-admission physical evaluation, overall plan of care and admissions order demonstrate a reasonable expectation that the following criteria were met at the time of admission to the [facility]  1  The patient requires active and ongoing therapeutic intervention of multiple therapy disciplines (physical therapy, occupational therapy, speech-language pathology, or prosthetics/orthotics), one of which is physical or occupational therapy  2  Patient requires an intensive rehabilitation therapy program, as defined in Chapter 1, section 110 2 2 of the CMS Medicare Policy Manual  This intensive rehabilitation therapy program will consist of at least 3 hours of therapy per day at least 5 days per week or at least 15 hours of intensive rehabilitation therapy within a 7 consecutive day period, beginning with the date of admission to the [facility]      3  The patient is reasonably expected to actively participate in, and benefit significantly from, the intensive rehabilitation therapy program as defined in Chapter 1, section 110 2 2 of the CMS Medicare Policy Manual at this time of admission to the [facility]  He can reasonably be expected to make measurable improvement (that will be of practical value to improve the patients functional capacity or adaptation to impairments) as a result of the rehabilitation treatment, as defined in section 110 3, and such improvement can be expected to be made within the prescribed period of time  As noted in the CMS Medicare Policy Manual, the patient need not be expected to achieve complete independence in the domain of self-care nor be expected to return to his or her prior level of functioning in order to meet this standard  4  The patient must require physician supervision by a rehabilitation physician  As such, a rehabilitation physician will conduct face-to-face visits with the patient at least 3 days per week throughout the patients stay in the [facility] to assess the patient both medically and functionally, as well as to modify the course of treatment as needed to maximize the patients capacity to benefit from the rehabilitation process  5  The patient requires an intensive and coordinated interdisciplinary approach to providing rehabilitation, as defined in Chapter 1, section 110 2 5 of the CMS Medicare Policy Manual  This will be achieved through periodic team conferences, conducted at least once in a 7-day period, and comprising of an interdisciplinary team of medical professionals consisting of: a rehabilitation physician, registered nurse,  and/or , and a licensed/certified therapist from each therapy discipline involved in treating the patient  Changes Since Pre-admission Assessment: None -This patient's participation in rehab continues to be reasonable, necessary and appropriate      CMS Required Post-Admission Physician Evaluation Elements  History and Physical, including medical history, functional history and active comorbidities as in above text      Post-Admission Physician Evaluation:  The patient has the potential to make improvement and is in need of physical, occupational, and/or therapy services  The patient may also need nutritional services  Given the patient's complex medical condition and risk of further medical complications, rehabilitative services cannot be safely provided at a lower level of care, such as a skilled nursing facility  I have reviewed the patient's functional and medical status at the time of the preadmission screening and they are the same as on the day of this admission  I acknowledge that I have personally performed a full physical examination on this patient within 24 hours of admission  The patient and/or family demonstrated understanding the rehabilitation program and the discharge process after we discussed them      Agree in entirety: yes  Minor adaptions: none    Major changes: none     Roosevelt Goodson MD [facility]  Physical Medicine and Rehabilitation    ** Please Note: Fluency Direct voice to text software may have been used in the creation of this document   **

## 2020-07-30 NOTE — RESPIRATORY THERAPY NOTE
RT Protocol Note  Hui Larson 79 y o  male MRN: [id number]  Unit/Bed#: -01 Encounter: [id number]    Assessment    Active Problems:    Malignant neoplasm of upper lobe of left lung (HCC)    Acute deep vein thrombosis (DVT) of axillary vein of right upper extremity (HCC)    Acute deep vein thrombosis (DVT) of brachial vein of right upper extremity ([address] )      Home Pulmonary Medications:  none       Past Medical History:   Diagnosis Date    Alcohol abuse 3/27/2020    Chest pain     Community acquired pneumonia 3/27/2020    Hypertension     Left upper lobe pulmonary nodule     Malignant ne[address] of upper lobe of left [address] [address] ) 5/7/2020     Social History     Socioeconomic History    Marital status: Single     Spouse name: None    Number of children: None    Years of education: None    Highest education level: None   Occupational History    None   Social Needs    Financial resource strain: None    Food insecurity:     Worry: None     Inability: None    Transportation needs:     Medical: None     Non-medical: None   Tobacco Use    Smoking status: Former Smoker     Packs/day: 1 00     Years: 55 00     Pack years: 55 00     Types: Cigarettes    Smokeless tobacco: Never Used   Substance and Sexual Activity    Alcohol use: Not Currently     Frequency: 4 or more times a week     Comment: "we cant tell how much"  per pt daughter     Drug use: Not Currently    Sexual activity: None   Lifestyle    Physical activity:     Days per week: None     Minutes per session: None    Stress: None   Relationships    Social connections:     Talks on phone: None     Gets together: None     Attends Jehovah's witness service: None     Active member of club or organization: None     Attends meetings of clubs or organizations: None     Relationship status: None    Intimate partner violence:     Fear of current or ex partner: None     Emotionally abused: None     Physically abused: None     Forced sexual activity: None   Other Topics Concern    None   Social History Narrative    None       Subjective         Objective    Physical Exam:   Assessment Type: Pre-treatment  General Appearance: Alert, Awake  Respiratory Pattern: Normal  Chest Assessment: Chest expansion symmetrical    Vitals:  Blood pressure 107/72, pulse 103, temperature 98 6 °F (37 °C), temperature source Oral, resp  rate 18, height 5' 8" (1 727 m), weight 84 8 kg (186 lb 15 2 oz), SpO2 97 %  Imaging and other studies: I have personally reviewed pertinent reports  Plan    Respiratory Plan: No distress/Pulmonary history        Resp Comments: (P) Pt transfered to [facility] from  for rehab  Pts TID UDN therapy cont  Pt found to have trach removed and a dressing applied BS are decreased but clear  Pt denies sob at this time  Cough is stong and non-productive  UDN therapy will be changed to BID and prn to help facilitate therapy schedule in the afternoon  Pt aware he can call at anytime for UDN prn

## 2020-07-30 NOTE — PROGRESS NOTES
[employer] I Pad  Phone  3 pairs of glasses  Phone   I Pad   Wallet with 7 dollars  Shirt   Pants  Underwear  All sent to [facility]

## 2020-07-31 ENCOUNTER — APPOINTMENT (INPATIENT)
Dept: RADIOLOGY | Facility: HOSPITAL | Age: 70
DRG: 092 | End: 2020-07-31
Payer: MEDICARE

## 2020-07-31 LAB
ANION GAP SERPL CALCULATED.3IONS-SCNC: 6 MMOL/L (ref 4–13)
BACTERIA UR QL AUTO: ABNORMAL /HPF
BASOPHILS # BLD AUTO: 0.04 THOUSANDS/ΜL (ref 0–0.1)
BASOPHILS NFR BLD AUTO: 0 % (ref 0–1)
BILIRUB UR QL STRIP: ABNORMAL
BUN SERPL-MCNC: 10 MG/DL (ref 5–25)
CALCIUM SERPL-MCNC: 9.4 MG/DL (ref 8.3–10.1)
CHLORIDE SERPL-SCNC: 108 MMOL/L (ref 100–108)
CLARITY UR: ABNORMAL
CO2 SERPL-SCNC: 24 MMOL/L (ref 21–32)
COLOR UR: ABNORMAL
CREAT SERPL-MCNC: 0.89 MG/DL (ref 0.6–1.3)
EOSINOPHIL # BLD AUTO: 0.15 THOUSAND/ΜL (ref 0–0.61)
EOSINOPHIL NFR BLD AUTO: 1 % (ref 0–6)
ERYTHROCYTE [DISTWIDTH] IN BLOOD BY AUTOMATED COUNT: 15.9 % (ref 11.6–15.1)
GFR SERPL CREATININE-BSD FRML MDRD: 87 ML/MIN/1.73SQ M
GLUCOSE SERPL-MCNC: 102 MG/DL (ref 65–140)
GLUCOSE SERPL-MCNC: 132 MG/DL (ref 65–140)
GLUCOSE SERPL-MCNC: 88 MG/DL (ref 65–140)
GLUCOSE UR STRIP-MCNC: NEGATIVE MG/DL
HCT VFR BLD AUTO: 30.8 % (ref 36.5–49.3)
HGB BLD-MCNC: 9.4 G/DL (ref 12–17)
HGB UR QL STRIP.AUTO: ABNORMAL
HYALINE CASTS #/AREA URNS LPF: ABNORMAL /LPF
IMM GRANULOCYTES # BLD AUTO: 0.05 THOUSAND/UL (ref 0–0.2)
IMM GRANULOCYTES NFR BLD AUTO: 0 % (ref 0–2)
KETONES UR STRIP-MCNC: ABNORMAL MG/DL
LACTATE SERPL-SCNC: 1.2 MMOL/L (ref 0.5–2)
LEUKOCYTE ESTERASE UR QL STRIP: ABNORMAL
LYMPHOCYTES # BLD AUTO: 1.45 THOUSANDS/ΜL (ref 0.6–4.47)
LYMPHOCYTES NFR BLD AUTO: 11 % (ref 14–44)
MCH RBC QN AUTO: 30.1 PG (ref 26.8–34.3)
MCHC RBC AUTO-ENTMCNC: 30.5 G/DL (ref 31.4–37.4)
MCV RBC AUTO: 99 FL (ref 82–98)
MONOCYTES # BLD AUTO: 0.91 THOUSAND/ΜL (ref 0.17–1.22)
MONOCYTES NFR BLD AUTO: 7 % (ref 4–12)
NEUTROPHILS # BLD AUTO: 10.17 THOUSANDS/ΜL (ref 1.85–7.62)
NEUTS SEG NFR BLD AUTO: 81 % (ref 43–75)
NITRITE UR QL STRIP: POSITIVE
NON-SQ EPI CELLS URNS QL MICRO: ABNORMAL /HPF
NRBC BLD AUTO-RTO: 0 /100 WBCS
PH UR STRIP.AUTO: 6 [PH]
PLATELET # BLD AUTO: 306 THOUSANDS/UL (ref 149–390)
PMV BLD AUTO: 10.2 FL (ref 8.9–12.7)
POTASSIUM SERPL-SCNC: 4.2 MMOL/L (ref 3.5–5.3)
PROT UR STRIP-MCNC: ABNORMAL MG/DL
RBC # BLD AUTO: 3.12 MILLION/UL (ref 3.88–5.62)
RBC #/AREA URNS AUTO: ABNORMAL /HPF
SODIUM SERPL-SCNC: 138 MMOL/L (ref 136–145)
SP GR UR STRIP.AUTO: 1.03 (ref 1–1.03)
UROBILINOGEN UR QL STRIP.AUTO: 1 E.U./DL
WBC # BLD AUTO: 12.77 THOUSAND/UL (ref 4.31–10.16)
WBC #/AREA URNS AUTO: ABNORMAL /HPF

## 2020-07-31 PROCEDURE — 92523 SPEECH SOUND LANG COMPREHEN: CPT

## 2020-07-31 PROCEDURE — 87186 SC STD MICRODIL/AGAR DIL: CPT | Performed by: PHYSICAL MEDICINE & REHABILITATION

## 2020-07-31 PROCEDURE — 97130 THER IVNTJ EA ADDL 15 MIN: CPT

## 2020-07-31 PROCEDURE — 85025 COMPLETE CBC W/AUTO DIFF WBC: CPT | Performed by: NURSE PRACTITIONER

## 2020-07-31 PROCEDURE — 94760 N-INVAS EAR/PLS OXIMETRY 1: CPT

## 2020-07-31 PROCEDURE — 94640 AIRWAY INHALATION TREATMENT: CPT

## 2020-07-31 PROCEDURE — 83605 ASSAY OF LACTIC ACID: CPT | Performed by: NURSE PRACTITIONER

## 2020-07-31 PROCEDURE — 87086 URINE CULTURE/COLONY COUNT: CPT | Performed by: PHYSICAL MEDICINE & REHABILITATION

## 2020-07-31 PROCEDURE — 82948 REAGENT STRIP/BLOOD GLUCOSE: CPT

## 2020-07-31 PROCEDURE — 97530 THERAPEUTIC ACTIVITIES: CPT

## 2020-07-31 PROCEDURE — 97167 OT EVAL HIGH COMPLEX 60 MIN: CPT

## 2020-07-31 PROCEDURE — 87077 CULTURE AEROBIC IDENTIFY: CPT | Performed by: PHYSICAL MEDICINE & REHABILITATION

## 2020-07-31 PROCEDURE — 87147 CULTURE TYPE IMMUNOLOGIC: CPT | Performed by: PHYSICAL MEDICINE & REHABILITATION

## 2020-07-31 PROCEDURE — 97535 SELF CARE MNGMENT TRAINING: CPT

## 2020-07-31 PROCEDURE — 97129 THER IVNTJ 1ST 15 MIN: CPT

## 2020-07-31 PROCEDURE — 92610 EVALUATE SWALLOWING FUNCTION: CPT

## 2020-07-31 PROCEDURE — 80048 BASIC METABOLIC PNL TOTAL CA: CPT | Performed by: NURSE PRACTITIONER

## 2020-07-31 PROCEDURE — 87040 BLOOD CULTURE FOR BACTERIA: CPT | Performed by: NURSE PRACTITIONER

## 2020-07-31 PROCEDURE — 81001 URINALYSIS AUTO W/SCOPE: CPT | Performed by: PHYSICAL MEDICINE & REHABILITATION

## 2020-07-31 PROCEDURE — 71046 X-RAY EXAM CHEST 2 VIEWS: CPT

## 2020-07-31 PROCEDURE — 97163 PT EVAL HIGH COMPLEX 45 MIN: CPT

## 2020-07-31 PROCEDURE — 99232 SBSQ HOSP IP/OBS MODERATE 35: CPT | Performed by: PHYSICAL MEDICINE & REHABILITATION

## 2020-07-31 RX ORDER — SODIUM CHLORIDE 9 MG/ML
75 INJECTION, SOLUTION INTRAVENOUS CONTINUOUS
Status: DISCONTINUED | OUTPATIENT
Start: 2020-07-31 | End: 2020-08-01

## 2020-07-31 RX ORDER — LEVOFLOXACIN 750 MG/1
750 TABLET ORAL EVERY 24 HOURS
Status: DISCONTINUED | OUTPATIENT
Start: 2020-07-31 | End: 2020-08-05

## 2020-07-31 RX ORDER — OLANZAPINE 5 MG/1
5 TABLET, ORALLY DISINTEGRATING ORAL
Status: DISCONTINUED | OUTPATIENT
Start: 2020-07-31 | End: 2020-08-12 | Stop reason: HOSPADM

## 2020-07-31 RX ADMIN — OLANZAPINE 5 MG: 5 TABLET, ORALLY DISINTEGRATING ORAL at 21:24

## 2020-07-31 RX ADMIN — NYSTATIN 500000 UNITS: 100000 SUSPENSION ORAL at 08:17

## 2020-07-31 RX ADMIN — LEVOFLOXACIN 750 MG: 750 TABLET, FILM COATED ORAL at 13:49

## 2020-07-31 RX ADMIN — LEVALBUTEROL HYDROCHLORIDE 1.25 MG: 1.25 SOLUTION, CONCENTRATE RESPIRATORY (INHALATION) at 19:54

## 2020-07-31 RX ADMIN — ENOXAPARIN SODIUM 90 MG: 100 INJECTION SUBCUTANEOUS at 21:26

## 2020-07-31 RX ADMIN — ACETAMINOPHEN 975 MG: 325 TABLET, FILM COATED ORAL at 21:24

## 2020-07-31 RX ADMIN — ENOXAPARIN SODIUM 90 MG: 100 INJECTION SUBCUTANEOUS at 08:17

## 2020-07-31 RX ADMIN — SODIUM CHLORIDE 75 ML/HR: 0.9 INJECTION, SOLUTION INTRAVENOUS at 13:49

## 2020-07-31 RX ADMIN — ACETAMINOPHEN 975 MG: 325 TABLET, FILM COATED ORAL at 05:57

## 2020-07-31 RX ADMIN — ISODIUM CHLORIDE 3 ML: 0.03 SOLUTION RESPIRATORY (INHALATION) at 08:01

## 2020-07-31 RX ADMIN — METOPROLOL TARTRATE 12.5 MG: 25 TABLET ORAL at 08:17

## 2020-07-31 RX ADMIN — NYSTATIN 500000 UNITS: 100000 SUSPENSION ORAL at 21:30

## 2020-07-31 RX ADMIN — LEVALBUTEROL HYDROCHLORIDE 1.25 MG: 1.25 SOLUTION, CONCENTRATE RESPIRATORY (INHALATION) at 08:01

## 2020-07-31 RX ADMIN — CHLORHEXIDINE GLUCONATE 0.12% ORAL RINSE 15 ML: 1.2 LIQUID ORAL at 08:17

## 2020-07-31 RX ADMIN — CHLORHEXIDINE GLUCONATE 0.12% ORAL RINSE 15 ML: 1.2 LIQUID ORAL at 21:29

## 2020-07-31 RX ADMIN — ISODIUM CHLORIDE 3 ML: 0.03 SOLUTION RESPIRATORY (INHALATION) at 19:54

## 2020-07-31 NOTE — PROGRESS NOTES
Physical Medicine and Rehabilitation Progress Note  Hui Larson 79 y o  male MRN: [id number]  Unit/Bed#: -01 Encounter: [id number]    HPI: 79 y o  male with left upper lobe lung cancer, s/p left upper lobe lobectomy By Dr Madelaine Fernandez on 8/36/90, complicated postop course requiring emergent intubation and tracheostomy  Now decannulated  Transferred to [facility] on 7/30/20  CC: f/u lobectomy    Subjective: denies SOB, cough, n/v  Reports sensation of incomplete voiding bladder  Also with some dysuria this AM, noted to have cloudy urine  Will check UA  ROS: No fever, chills, chest pain, cough, nausea, vomiting, diarrhea, constipation, abdominal pain, bowel/bladder incontinence, new weakness or changes in sensation  +dysuria, sensation of incomplete bladder voiding, mild SOB with therapy this AM  Complete ROS obtained and otherwise negative        Assessment/Plan:  Acute deep vein thrombosis (DVT) of axillary vein of right upper extremity (HCC)  Assessment & Plan  With DVT lower extremity on 6/20 ultrasound, right upper extremity DVT on 6/22 ultrasound  Repeat ultrasounds with resolution   On treatment dose lovenox 1mg/kg q12h, anticipate 3-6 month duration, follow up with PCP outpatient     * Malignant neoplasm of upper lobe of left lung [facility])  Assessment & Plan  S/p left upper lobe lobectomy with Dr Genaro De Guzman on 1/18  Complicated by need for emergent tracheostomy, now decannulated  Monitor trach site for signs of infection   Xopenex TID      DVT ppx  - SCDs  - on treatment dose lovenox 90mg BID for RUE and lower extremity DVT    Bowel   - on colace/senna  - monitor for constipation    Bladder  - dysuria, sediment, incomplete voiding sensation noted on 7/31  - leukocytosis to 12 77  - will check UA w/ reflex UCx  - start levofloxacin after collecting UA on 7/31    Pain   - tylenol  - oxycodone PRN    Tachycardia  - on metoprolol 12 5mg BID    Insomnia   - on melatonin QHS  - zyprexa decreased to 5mg from 10mg on 7/31, monitor for agitation and wean as tolerated          Scheduled Meds:  Current Facility-Administered Medications:  acetaminophen 975 mg Oral [facility] Deana Egan MD   albuterol 2 5 mg Nebulization Q6H PRN Deana Egan MD   calcium carbonate 500 mg Oral TID PRN Deana Egan, MD   chlorhexidine 15 mL Swish & Spit Q12H [facility] Deana Egan MD   enoxaparin 1 mg/kg Subcutaneous Q12H [facility] Deana Egan MD   guaiFENesin 200 mg Oral Q4H PRN Deana Egan, MD   levalbuterol 1 25 mg Nebulization Q12H Deana Egan, MD   Lidocaine Viscous HCl 15 mL Swish & Spit 4x Daily PRN Deana Egan, MD   melatonin 6 mg Oral HS Deana Egan, MD   metoprolol tartrate 12 5 mg Oral Q12H [facility] Deana Egan MD   nystatin 500,000 Units Swish & Swallow 4x Daily Deana Egan, MD   OLANZapine 10 mg Oral HS Deana Egan MD   ondansetron 4 mg Oral Q6H PRN Deana Egan MD   oxyCODONE 2 5 mg Oral Q4H PRN Deana Egan MD   polyethylene glycol 17 g Oral Daily Deana Egan MD   senna-docusate sodium 1 tablet Per NG Tube BID Deana Egan MD   sodium chloride 3 mL Nebulization Q12H Deana Egan MD   tamsulosin 0 4 mg Oral Daily With Jorge Brower MD       No Known Allergies     Objective:    Physical Exam:  Temp:  [97 9 °F (36 6 °C)-99 °F (37 2 °C)] 98 9 °F (37 2 °C)  HR:  [100-111] 100  Resp:  [18-20] 18  BP: ()/(58-73) 113/68  Oxygen Therapy  SpO2: 95 %    GEN: NAD, sitting comfortably in chair  Head: NCAT, no gross lesions  Eyes: PERRL, EOMI  Throat: clear, no thrush, MMM; trach site closed  Pulm: CTAB, no rales/wheezes  CV: normal s1/s2, tachycardic  Abd: soft, NTND  Ext: no pedal edema bilaterally, distal extremities warm and well perfused  Psych: normal affect, no agitation  Skin: no observable rashes; trach site closed, no drainage noted  Neuro: alert, some delay in initiation; moving bilateral UE and LE spontaneously     Diagnostic Studies: reviewed, no new imaging  No results found      Laboratory:    Results from last 7 days   Lab Units 07/31/20  0600 07/28/20  0446 07/27/20  0609   HEMOGLOBIN g/dL 9 4* 9 5* 9 8*   HEMATOCRIT % 30 8* 30 5* 32 3*   WBC Thousand/uL 12 77* 6 99 8 29     Results from last 7 days   Lab Units 07/31/20  0600 07/28/20  0446 07/27/20  0609   BUN mg/dL 10 14 14   POTASSIUM mmol/L 4 2 4 0 4 3   CHLORIDE mmol/L 108 108 108   CREATININE mg/dL 0 89 0 81 0 86            Patient Active Problem List   Diagnosis    Community acquired pneumonia    Abnormal computed tomography angiography (CTA)    Alcohol abuse    Malignant neoplasm of upper lobe of left lung (HCC)    Tobacco abuse    Acute respiratory failure with hypoxia (HCC)    Acute deep vein thrombosis (DVT) of axillary vein of right upper extremity (HCC)    Acute deep vein thrombosis (DVT) of brachial vein of right upper extremity (HCC)    Tracheostomy in place [facility])    Acute deep vein thrombosis (DVT) of calf muscle vein of left lower extremity (HCC)    Encephalopathy    Hyperactive delirium after surgical procedure    Anemia    Thrombocytosis (HCC)       ** Please Note: Fluency Direct voice to text software may have been used in the creation of this document   **

## 2020-07-31 NOTE — PROGRESS NOTES
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  per SLP Jose R Patrick will stop working soon until November and will be available to support pt upon d/c  Home Setup   Type of Home Single Level  (Pt lives in 1 level apt area in dtr's house)   Number of Stairs 1   First Floor Bathroom Shower   First Floor Setup Available Yes   Baseline Information   Transportation    Prior Device(s) Used   (none)   Prior IADL Participation   Money Management Identify Money;Estimate Costs;Estimate Change   Meal Preparation Full Participation   Laundry Full Participation   Home Cleaning Full Participation   Prior Level of Function   Self-Care 3  Independent - Patient completed the activities by him/herself, with or without an assistive device, with no assistance from a helper  Indoor-Mobility (Ambulation) 3  Independent - Patient completed the activities by him/herself, with or without an assistive device, with no assistance from a helper  Stairs 3  Independent - Patient completed the activities by him/herself, with or without an assistive device, with no assistance from a helper  Functional Cognition 3  Independent - Patient completed the activities by him/herself, with or without an assistive device, with no assistance from a helper  Prior Device Used Z  None of the above   Falls in the Last Year   Number of falls in the past 12 months 0   Psychosocial   Psychosocial (WDL) WDL   Restrictions/Precautions   Precautions Cognitive; Fall Risk;Bed/chair alarms;Supervision on toilet/commode; Impulsive;Pain;Hard of hearing;Aspiration   Pain Assessment   Pain Assessment Tool FLACC   Pain Location/Orientation Orientation: Lower; Location: Back; Location: Generalized   Pain Onset/Description Descriptor: [facility]   Hospital Pain Intervention(s) Relaxation technique; Rest   Pain Rating: FLACC (Rest) - Face 0   Pain Rating: FLACC (Rest) - Legs 0   Pain Rating: FLACC (Rest) - Activity 0   Pain Rating: FLACC (Rest) - Cry 0   Pain Rating: FLACC (Rest) - Consolability 0   Score: FLACC (Rest) 0   Pain Rating: FLACC (Activity) - Face 1   Pain Rating: FLACC (Activity) - Legs 0   Pain Rating: FLACC (Activity) - Activity 0   Pain Rating: FLACC (Activity) - Cry 1   Pain Rating: FLACC (Activity) - Consolability 0   Score: FLACC (Activity) 2   Oral Hygiene   Type of Assistance Needed Incidental touching   Amount of Physical Assistance Provided No physical assistance   Comment in stance at [facility]   Oral Hygiene CARE Score 4   Grooming   Able To Comb/Brush Hair;Wash/Dry Face;Brush/Clean Teeth;Wash/Dry Hands   Limitation Noted In Safety;Strength   Findings in stance at sink   Tub/Shower Transfer   Reason Not Assessed Medical   Findings no shower orders   Shower/Bathe Self   Type of Assistance Needed Physical assistance   Amount of Physical Assistance Provided 50%-74%   Comment In stance at [facility], pt able to wash UB and groin/bottom area w/IT  2* pt SOB after activity (despite denial from pt he had any difficulty breathing or any fatigue), OT completed LE bathing while pt seated in   Shower/Bathe Self CARE Score 2   Bathing   Assessed Bath Style Sponge Bath   Anticipated D/C Bath Style Shower   Limitations Noted in Safety;Strength; Sequencing;Problem Solving;Balance; Endurance   Positioning Seated;Standing   Dressing/Undressing Clothing   Don UB Clothes Pullover Shirt   Type of Assistance Needed Supervision   Amount of Physical Assistance Provided No physical assistance   Comment seated in wc at [facility]   Upper Body Dressing CARE Score 4   Don LB Clothes Pants   Type of Assistance Needed Physical assistance   Amount of Physical Assistance Provided 50%-74%   Comment Pt in stance w/unilateral UE support on sink to doff  OT threaded LEs while pt seated in wc 2* pt SOB after bathing and dressing UB, pt able to stand at sink to complete CM over hips  Lower Body Dressing CARE Score 2   Limitations Noted In Balance; Endurance;Strength; Safety   Positioning Supported Sit;Standing   Putting On/Taking Off Footwear   Type of Assistance Needed Physical assistance   Amount of Physical Assistance Provided Total assistance   Comment A provided 2* pt SOB after bathing   Putting On/Taking Off Footwear CARE Score 1   Toileting Hygiene   Type of Assistance Needed Incidental touching   Amount of Physical Assistance Provided No physical assistance   Comment pt in stance at toilet w/urinal; cites feeling need to go more than he did    Favian Nigel Garcia 83 Score 4   Toilet Transfer   Comment OT to assess tomorrow; pt stood for toileting today   Transfer Bed/Chair/Wheelchair   Limitations Noted In Balance; Endurance;LE Strength;UE Strength   Adaptive Equipment None   Type of Assistance Needed Incidental touching   Amount of Physical Assistance Provided No physical assistance   Comment pt able to take steps to tfxr w/no device   Chair/Bed-to-Chair Transfer CARE Score 4   Sit to Lying   Type of Assistance Needed Supervision   Amount of Physical Assistance Provided No physical assistance   Sit to Lying CARE Score 4   Sit to Stand   Type of Assistance Needed Incidental touching   Amount of Physical Assistance Provided No physical assistance   Sit to Stand CARE Score 4   Comprehension   QI: Comprehension 3  Usually Understands: Understands most conversations, but misses some part/intent of message  Requires cues at times to understand  Expression   QI: Expression 4   Express complex messages without difficulty and with speech that is clear and easy to understan   RUE Assessment   RUE Assessment WFL   LUE Assessment   LUE Assessment WFL   Coordination   Movements are Fluid and Coordinated 1   Sensation   Light Touch No apparent deficits   Cognition   Overall Cognitive Status Impaired   Arousal/Participation Alert; Cooperative   Attention Attends with cues to redirect   Orientation Level Oriented X4   Memory Decreased short term memory;Decreased recall of recent events   Following Commands Follows one step commands without difficulty   Discharge [address] Retired/not working   Patient's Discharge Plan home   Patient's Rehab Expectations to go home   Barriers to Discharge Home Limited Family Support; Unsafe Home Setup; Decreased Cognitive Function;Decreased Strength;Decreased Endurance; Safety Considerations;Pain   Impressions Ruma Ortiz is a 78 y/o male presenting w/malignant neoplasm of upper lobe of left lung, s/p VATS DOMI lobectomy on 6/10/2020, VAC to left chest wall on 6/15/2020, PEA arrest bedside cricothyroidotomy on 6/20/2020, tracheostomy 6/22/2020  In addition, pt has hypoxic brain injury, adenocarcinoma of DOMI s/p VATS, air lead s/p decompression, encephalopathy, dysphagia now tolerating an oral diet, PEA arrest s/p ACLS, tachycardia, agitation, DVT, acute respiratory failure s/p trach, and etoh  OT spoke w/Dr Kapil Arteaga as pt had previous DVT, however Dr Kapil Arteaga confirmed no restrictions to therapy  At baseline, pt is independent w/ADLs and IADLs  Pt presents with short term memory deficits (cannot recall if has DME in bathroom, etc), per chart review pt's dtr (whom he lives with, along w/son in law) reported she noticed memory issues  Currently, pt req incidental touching for tfxrs and min A for ADLs, inc to mod w/dec endurance  Pt's vitals taken seated after bathing: O2 98%, hr 103, temp 97 5, and bp 113/59  RN Lynda Delgado was notified and present for vitals   Pt c/o chills throughout duration of session, left in bed at end of session w/TRI sanchez aware, pt awaiting bladder scan  Pt's current deficits include: dec activity tolerance/endurance, standing balance, short term memory, insight, safety awareness  Barriers include limited family support, unsafe home setup, impulsivity  2* pt's current dec safety awareness and short term memory, pt would benefit from OT rehab services to d/c home safely at supervision level  ELOS 10 days     OT Therapy Minutes   OT Time In 0930   OT Time Out 1045   OT Total Time (minutes) 75   OT Mode of treatment - Individual (minutes) 75   OT Mode of treatment - Concurrent (minutes) 0   OT Mode of treatment - Group (minutes) 0   OT Mode of treatment - Co-treat (minutes) 0   OT Mode of Treatment - Total time(minutes) 75 minutes   OT Cumulative Minutes 75   Cumulative Minutes   Cumulative therapy minutes 75

## 2020-07-31 NOTE — PROGRESS NOTES
PT Initial Evaluation   07/31/20 1240   Patient Data   Rehab Impairment  Impairment of mobility, safety, Activities of Daily Living (ADLs), and cognitive/communication skills due to Neurologic Conditions:  03 8  Neuromuscular Disorders   Etiologic Diagnosis Critical illness myopathy   Date of Onset 06/10/20   Support System   Name  Peter Stacy daughter    Home Setup   Type of Home Single Level  (live on first floor (apt set up)of daughter's house )   Method of Entry Curb   Number of Stairs 1   First Floor Bathroom Shower   First Floor Setup Available Yes   Available Equipment   (unknown )   Baseline Information   Transportation    Prior Device(s) Used   (none )   Prior Level of Function   Self-Care 3  Independent - Patient completed the activities by him/herself, with or without an assistive device, with no assistance from a helper  Indoor-Mobility (Ambulation) 3  Independent - Patient completed the activities by him/herself, with or without an assistive device, with no assistance from a helper  Stairs 3  Independent - Patient completed the activities by him/herself, with or without an assistive device, with no assistance from a helper  Functional Cognition 3  Independent - Patient completed the activities by him/herself, with or without an assistive device, with no assistance from a helper  Prior Device Used Z  None of the above   Falls in the Last Year   Number of falls in the past 12 months 0   Patient Preference   Nickname (Patient Preference) Julieta Lange    Restrictions/Precautions   Precautions Bed/chair alarms;Cognitive; Fall Risk;Impulsive;Pain;Supervision on toilet/commode   Pain Assessment   Pain Assessment Tool FLACC   Pain Rating: FLACC (Rest) - Face 0   Pain Rating: FLACC (Rest) - Legs 0   Pain Rating: FLACC (Rest) - Activity 0   Pain Rating: FLACC (Rest) - Cry 0   Pain Rating: FLACC (Rest) - Consolability 0   Score: FLACC (Rest) 0   Pain Rating: FLACC (Activity) - Face 1 Pain Rating: FLACC (Activity) - Legs 0   Pain Rating: FLACC (Activity) - Activity 0   Pain Rating: FLACC (Activity) - Cry 1   Pain Rating: FLACC (Activity) - Consolability 0   Score: FLACC (Activity) 2   Toilet Transfer   Surface Assessed Standard Toilet   Transfer Technique Standard   Limitations Noted In Balance;Problem Solving   Type of Assistance Needed Incidental touching   Toilet Transfer CARE Score 4   Toileting   Able to Pull Clothing down yes, up yes  Manage Hygiene Bladder; Bowel   Adaptive Equipment Grab Bar   Transfer Bed/Chair/Wheelchair   Limitations Noted In Balance; Endurance;Problem Solving   Adaptive Equipment Roller Walker;None   Type of Assistance Needed Incidental touching   Chair/Bed-to-Chair Transfer CARE Score 4   Roll Left and Right   Type of Assistance Needed Set-up / clean-up   Roll Left and Right CARE Score 5   Sit to Lying   Type of Assistance Needed Supervision   Sit to Lying CARE Score 4   Lying to Sitting on Side of Bed   Type of Assistance Needed Supervision   Lying to Sitting on Side of Bed CARE Score 4   Sit to Stand   Type of Assistance Needed Incidental touching   Sit to Stand CARE Score 4   Picking Up Object   Reason if not Attempted Safety concerns   Picking Up Object CARE Score 88   Car Transfer   Comment unable to be asssed today , please asess tomorrow    Ambulation   Primary Mode of Locomotion Prior to Admission Walk   Distance Walked (feet) 15 ft  (bed> bathroom )   Assist Device Roller Walker   Gait Pattern Inconsistant Joanna; Forward Flexion; Improper weight shift   Limitations Noted In Balance; Coordination; Endurance; Safety   Provided Assistance with: Balance   Walk Assist Level Minimum Assist   Walk 10 Feet   Type of Assistance Needed Physical assistance   Amount of Physical Assistance Provided 25%-49%   Walk 10 Feet CARE Score 3   Walk 50 Feet with Two Turns   Comment unable to assess with pt  being med hold    Reason if not Attempted Medical concerns   Walk 50 Feet with Two Turns CARE Score 88   Walk 150 Feet   Comment Pt  is fatigued and later on was in med hold    Reason if not Attempted Safety concerns   Walk 150 Feet CARE Score 88   Walking 10 Feet on Uneven Surfaces   Comment Pt  is fatigued and later on was in med hold    Reason if not Attempted Safety concerns   Walking 10 Feet on Uneven Surfaces CARE Score 88   Curb or Single Stair   Comment Pt  is fatigued and later on was in med hold    Reason if not Attempted Safety concerns   1 Step (Curb) CARE Score 88   4 Steps   Comment Pt  is fatigued and later on was in med hold    Reason if not Attempted Safety concerns   4 Steps CARE Score 88   12 Steps   Comment Pt  is fatigued and later on was in med hold    Reason if not Attempted Safety concerns   12 Steps CARE Score 88   Stairs   Findings Pt  is fatigued and later on was in med hold    Strength RLE   RLE Overall Strength 4/5   Strength LLE   LLE Overall Strength 4/5   Sensation   Light Touch No apparent deficits   Sharp/Dull No apparent deficits   Additional Comments pt  fluctuates on his answers  Cognition   Arousal/Participation Alert   Attention Attends with cues to redirect   Memory Decreased short term memory;Decreased recall of recent events;Decreased recall of precautions   Following Commands Follows one step commands with increased time or repetition   Objective Measure   PT Findings Unable to assess some of entry above please assess tomorrow/ next session, walking 50 feet, 150 feet, steps, car transfers and picking up objects with/ without grabber  Discharge Information   Patient's Discharge Plan return home with family support   Patient's Rehab Expectations none stated but wants to feel better    Barriers to Discharge Home Decreased Cognitive Function;Decreased Strength;Decreased Endurance; Safety Considerations   Impressions Pt  Is a 80 y/o male with L upper lobe CA, was admitted for  L upper lobe lobectomy on 6/10/2020  Pt   Had complications post op requiring intubation and tracheostomy  Pt  Also has a rapid response on 6/20 for hypoxia and bradycardia  Pt  Also has PMHx / co morbidities sinus tachycardia, R/o UTI/ sepsis, nicotine abuse , ABLA , dysphagia, DVT LE, R UE, urinary retention, ETOH abuse  Pt  Now [facility] to the [facility] and was seen for brief PT eval  Pt  Noted to have dec activity tolerance with fatigue even with short distane ambulation and after going to the bathroom, dec balance and dec safety awrareness  Pt  Is very impulsive and have difficulty following instructions  Pt  Reported that he does not feel so good and had chills earlier  Nursing reported that pt  Has a 99 1 fever and is (+) for UTI  Nurse had to clarify with Dr Kaci Cee if pt  Will be on hold for PT  Able to assess functional transfers bed mobility and short distance ambulation bed> bathroom , brought PT to the gym but then nurse reported that pt  Would be a therapy hold for today  for further lab work up  Clarified this with Dr Kaci Cee and had put and order for therapy hold for the day  Pt  Is a good rehab candidate and will benefit from skilled PT inorder to address functional decline and to facilitate safe d/c to home  PT recommends 24/7 S for d/c due to pt's cognitive impairment and dec safety awareness making pt  A high risk for falls  As per OT, pt's daughter will be able to provide assistance/ 24/7 S at d/c since she is going to be off for work for the meantime     PT Therapy Minutes   PT Time In 1240   PT Time Out 1315   PT Total Time (minutes) 35   PT Mode of treatment - Individual (minutes) 35   PT Mode of treatment - Concurrent (minutes) 0   PT Mode of treatment - Group (minutes) 0   PT Mode of treatment - Co-treat (minutes) 0   PT Mode of Treatment - Total time(minutes) 35 minutes   PT Cumulative Minutes 35   Cumulative Minutes   Cumulative therapy minutes 35

## 2020-07-31 NOTE — PROGRESS NOTES
notified Irene Verma that pt temp is 101 5 F  No further order at this time  Pt no c/o pain and axox4

## 2020-07-31 NOTE — PLAN OF CARE
Problem: Potential for Falls  Goal: Patient will remain free of falls  Description  INTERVENTIONS:  - Assess patient frequently for physical needs  -  Identify cognitive and physical deficits and behaviors that affect risk of falls    -  [place] fall precautions as indicated by assessment   - Educate patient/family on patient safety including physical limitations  - Instruct patient to call for assistance with activity based on assessment  - Modify environment to reduce risk of injury  - Consider OT/PT consult to assist with strengthening/mobility  Outcome: Progressing

## 2020-07-31 NOTE — PROGRESS NOTES
Pt is resting comfortably in his chair at this time, pt has some c/o of low dull pain in his back that is newly developed  Will make MD aware   Will continue to monitor

## 2020-07-31 NOTE — PROGRESS NOTES
SLP TAA     07/31/20 0800   Patient Data   Rehab Impairment  Impairment of mobility, safety, Activities of Daily Living (ADLs), and cognitive/communication skills due to Neurologic Conditions:  03 8  Neuromuscular Disorders   Etiologic Diagnosis Critical Illness Myopathy   Date of Onset 06/10/20   Baseline Information   Transportation    Prior IADL Participation   Money Management Identify Money;Estimate Costs;Estimate Change;Combine Bills;Manage Checkbook   Meal Preparation Full Participation   Laundry Full Participation   Home Cleaning Full Participation   Prior Level of Function   Functional Cognition 3  Independent - Patient completed the activities by him/herself, with or without an assistive device, with no assistance from a helper  Restrictions/Precautions   Precautions Aspiration;Bed/chair alarms;Cognitive; Fall Risk;Impulsive;Supervision on toilet/commode   Pain Assessment   Pain Assessment Tool 0-10   Pain Score No Pain   Eating Assessment   Swallow Precautions Yes   Bedside Swallow Results Yes   VBS Study Results Yes   Food To Mouth Yes   Able To Cut Yes   Positioning Upright;Out of Bed   Safety Needs Increase Time;Cues;Freq Swallow   Meal Assessed Breakfast   QI: Swallowing/Nutritional Status Regular food;Supervision during eating   Current Diet Regular; Thin   Intake Mode PO;Self   Finishes Timely Yes   Opens Packages Yes   Findings Pt appears to be tolerating current diet of regular solids and thin liquids with functional oral and pharyngeal swallow skills  See SLP Rehab note for full details  Type of Assistance Needed Supervision   Amount of Physical Assistance Provided No physical assistance   Eating CARE Score 4   Comprehension   Assist Devices Glasses   Auditory Basic   Visual Basic   Findings Cognitive Linguistic evaluation was initiated this session  See SLP Rehab note for full details  Plan to complete remainder of assessment in follow up session  QI: Comprehension 2  Sometimes Understands: Understands only basic conversations or simple, direct phrases  Frequently requires cues to understand   Comprehension (FIM) 3 - Understands basic info/conversation 50-74% of time   Expression   Verbal Basic   Non-Verbal Basic   Intelligibility Sentence   Findings Cognitive Linguistic evaluation was initiated this session  See SLP Rehab note for full details  Plan to complete remainder of assessment in follow up session  QI: Expression 3  Exhibits some difficulty with expressing needs and ideas (e g , some words or finishing thoughts) or speech is not clear   Expression (FIM) 4 - Expresses basic info/needs 75-90% of time   Social Interaction   Cooperation with staff;with family   Participation Individual   Behaviors observed Confused; Outbursts  (low frustration tolerance)   Findings Pt requiring verbal encouragement to engage in assessment  Low frustration tolerance noted w/ verbal outburst    Social Interaction (FIM) 4 - Interacts 75-89% of time   Problem Solving   Findings Cognitive Linguistic evaluation was initiated this session  See SLP Rehab note for full details  Plan to complete remainder of assessment in follow up session  Problem solving (FIM) 2 - Needs direction more than ½ time to initiate, plan or complete simple tasks   Memory   Recognize People Yes   Remember Routine No   Short-Term Impaired   Long Term Intact   Recalls Precaution No   Findings Cognitive Linguistic evaluation was initiated this session  See SLP Rehab note for full details  Plan to complete remainder of assessment in follow up session  Memory (FIM) 2 - Recognizes, recalls/performs 25-49%   Cognition   Overall Cognitive Status Impaired   Arousal/Participation Alert; Responsive; Cooperative   Attention Attends with cues to redirect   Orientation Level Oriented X4   Memory Decreased short term memory;Decreased recall of recent events;Decreased recall of precautions   Following Commands Follows one step commands with increased time or repetition   Comments Cognitive Linguistic evaluation was initiated this session  See SLP Rehab note for full details  Plan to complete remainder of assessment in follow up session  Discharge Information   Vocational Plan Retired/not working   Patient's Discharge Plan return home with family support   Patient's Rehab Expectations to go home   Barriers to Discharge Home Decreased Cognitive Function;Decreased Strength;Decreased Endurance; Safety Considerations   Impressions Based on current evaluation, pt found to be presenting w/ cognitive linguistic deficits characterized by decreased attention, working memory, [employer] recall, processing time, problem solving, safety, insight into deficits, and impulsivity  Pt presents w/ fair rehab potential related to maximize cognition and anticipating recommendations for 24/hr supervision due to safety concerns at time of discharge  Therefore, pt is currently recommended for skilled ST services during acute rehab stay to maximize functional cognitive linguistic skills and to improve overall safety and level of independence  Clinical swallow evaluation also completed where pt appears to be tolerating regular diet and thin liquids, therefore, will continue to recommend current diet but requires cuing for use of safe swallows strategies  Pt is recommended for short term follow up of skilled ST services (anticipating 1-2 sessions) targeting dysphagia therapy to further assess and ensure tolerance of current diet w/o increased overt s/s penetration/aspiration or increased s/s of oral or pharyngeal dysphagia      SLP Therapy Minutes   SLP Time In 0800   SLP Time Out 0915   SLP Total Time (minutes) 75   SLP Mode of treatment - Individual (minutes) 75   SLP Mode of treatment - Concurrent (minutes) 0   SLP Mode of treatment - Group (minutes) 0   SLP Mode of treatment - Co-treat (minutes) 0   SLP Mode of Treatment - Total time(minutes) 75 minutes   SLP Cumulative Minutes 75   Cumulative Minutes   Cumulative therapy minutes 75

## 2020-07-31 NOTE — SOCIAL WORK
Pt resides with dtr and son in [place], [phone]  It is documented pt has no prior hx with [facility] or dme but did have hx of rehab with [facility] following an accident  It is documented [facility] [place] [facility]  Clinical update due 8/5  Cm to follow up with pt and/or dtr when present to review rehab routine and cm role

## 2020-07-31 NOTE — TREATMENT PLAN
Individualized Plan of 81 [address] Jimenes 79 y o  male MRN: [id number]  Unit/Bed#: -01 Encounter: [id number]     PATIENT INFORMATION  ADMISSION DATE: 7/30/2020 11:53 AM L[facility]LIE CATEGORY:Neurologic Conditions:  03 8  Neuromuscular Disorders   ADMISSION DIAGNOSIS: Myopathy [G72 9]  EXPECTED LOS: 7-10 days     MEDICAL/FUNCTIONAL PROGNOSIS  Based on my assessment of the patient's medical conditions and current functional status, the prognosis for attaining medical and functional goals or the IRF stay is:  Good    Medical Goals: Patient will be medically stable for discharge to [facility] upon completion of rehab program    [address]: Home - with supervision      ANTICIPATED FOLLOW-UP SERVICE:   Outpatient Therapy Services: PT, OT and SLP        DISCIPLINE SPECIFIC PLANS:  Required Disciplines & Services: Rehabillitation Nursing, Case Management, Repiratory Therapy, Dietay/Nutrition and Psychology      REQUIRED THERAPY:  Therapy Hours per Day Days per Week Total Days   Physical Therapy 1 5 5   Occupational Therapy 1 5 5   Speech/Language Therapy 1 5 5   NOTE: Additional therapy time(s) may be added as appropriate to meet patient needs and to achieve functional goals      Patient will either participate in above therapy regimen or participate in 900 minutes of therapy within 7 day week consisting of PT, OT and SLP due to the following medical procedure/condition:Neurologic Conditions:  03 8  Neuromuscular Disorders    ANTICIPATED FUNCTIONAL OUTCOMES:  ADL:  mod I   Bladder/Bowel: Patient will return to premorbid level for bladder/bowel management upon completion of rehab program   Transfers:  mod I   Locomotion:  mod I   Cognitive:       DISCHARGE PLANNING NEEDS  Equipment needs: rw vs cane, tub/shower bench    REHAB ANTICIPATED PARTICIPATION RESTRICTIONS:  None

## 2020-07-31 NOTE — PLAN OF CARE
Problem: Potential for Falls  Goal: Patient will remain free of falls  Description  INTERVENTIONS:  - Assess patient frequently for physical needs  -  Identify cognitive and physical deficits and behaviors that affect risk of falls    -  [place] fall precautions as indicated by assessment   - Educate patient/family on patient safety including physical limitations  - Instruct patient to call for assistance with activity based on assessment  - Modify environment to reduce risk of injury  - Consider OT/PT consult to assist with strengthening/mobility  Outcome: Progressing     Problem: Prexisting or High Potential for Compromised Skin Integrity  Goal: Skin integrity is maintained or improved  Description  INTERVENTIONS:  - Identify patients at risk for skin breakdown  - Assess and monitor skin integrity  - Assess and monitor nutrition and hydration status  - Monitor labs   - Assess for incontinence   - Turn and reposition patient  - Assist with mobility/ambulation  - Relieve pressure over bony prominences  - Avoid friction and shearing  - Provide appropriate hygiene as needed including keeping skin clean and dry  - Evaluate need for skin moisturizer/barrier cream  - Collaborate with interdisciplinary team   - Patient/family teaching  - Consider wound care consult   Outcome: Progressing     Problem: PAIN - ADULT  Goal: Verbalizes/displays adequate comfort level or baseline comfort level  Description  Interventions:  - Encourage patient to monitor pain and request assistance  - Assess pain using appropriate pain scale  - Administer analgesics based on type and severity of pain and evaluate response  - Implement non-pharmacological measures as appropriate and evaluate response  - Consider cultural and social influences on pain and pain management  - Notify physician/advanced practitioner if interventions unsuccessful or patient reports new pain  Outcome: Progressing     Problem: INFECTION - ADULT  Goal: Absence or prevention of progression during hospitalization  Description  INTERVENTIONS:  - Assess and monitor for signs and symptoms of infection  - Monitor lab/diagnostic results  - Monitor all insertion sites, i e  indwelling lines, tubes, and drains  - Monitor endotracheal if appropriate and nasal secretions for changes in amount and color  - [place] appropriate cooling/warming therapies per order  - Administer medications as ordered  - Instruct and encourage patient and family to use good hand hygiene technique  - Identify and instruct in appropriate isolation precautions for identified infection/condition  Outcome: Progressing  Goal: Absence of fever/infection during neutropenic period  Description  INTERVENTIONS:  - Monitor WBC    Outcome: Progressing     Problem: SAFETY ADULT  Goal: Maintain or return to baseline ADL function  Description  INTERVENTIONS:  -  Assess patient's ability to carry out ADLs; assess patient's baseline for ADL function and identify physical deficits which impact ability to perform ADLs (bathing, care of mouth/teeth, toileting, grooming, dressing, etc )  - Assess/evaluate cause of self-care deficits   - Assess range of motion  - Assess patient's mobility; develop plan if impaired  - Assess patient's need for assistive devices and provide as appropriate  - Encourage maximum independence but intervene and supervise when necessary  - Involve family in performance of ADLs  - Assess for home care needs following discharge   - Consider OT consult to assist with ADL evaluation and planning for discharge  - Provide patient education as appropriate  Outcome: Progressing  Goal: Maintain or return mobility status to optimal level  Description  INTERVENTIONS:  - Assess patient's baseline mobility status (ambulation, transfers, stairs, etc )    - Identify cognitive and physical deficits and behaviors that affect mobility  - Identify mobility aids required to assist with transfers and/or ambulation (gait belt, sit-to-stand, lift, walker, cane, etc )  - [place] fall precautions as indicated by assessment  - Record patient progress and toleration of activity level on Mobility SBAR; progress patient to next Phase/Stage  - Instruct patient to call for assistance with activity based on assessment  - Consider rehabilitation consult to assist with strengthening/weightbearing, etc   Outcome: Progressing     Problem: DISCHARGE PLANNING  Goal: Discharge to home or other facility with appropriate resources  Description  INTERVENTIONS:  - Identify barriers to discharge w/patient and caregiver  - Arrange for needed discharge resources and transportation as appropriate  - Identify discharge learning needs (meds, wound care, etc )  - Arrange for interpretive services to assist at discharge as needed  - Refer to Case Management Department for coordinating discharge planning if the patient needs post-hospital services based on physician/advanced practitioner order or complex needs related to functional status, cognitive ability, or social support system  Outcome: Progressing     Problem: Knowledge Deficit  Goal: Patient/family/caregiver demonstrates understanding of disease process, treatment plan, medications, and discharge instructions  Description  Complete learning assessment and assess knowledge base    Interventions:  - Provide teaching at level of understanding  - Provide teaching via preferred learning methods  Outcome: Progressing     Problem: RESPIRATORY - ADULT  Goal: Achieves optimal ventilation and oxygenation  Description  INTERVENTIONS:  - Assess for changes in respiratory status  - Assess for changes in mentation and behavior  - Position to facilitate oxygenation and minimize respiratory effort  - Oxygen administered by appropriate delivery if ordered  - Initiate smoking cessation education as indicated  - Encourage broncho-pulmonary hygiene including cough, deep breathe, Incentive Spirometry  - Assess the need for suctioning and aspirate as needed  - Assess and instruct to report SOB or any respiratory difficulty  - Respiratory Therapy support as indicated  Outcome: Progressing     Problem: GASTROINTESTINAL - ADULT  Goal: Minimal or absence of nausea and/or vomiting  Description  INTERVENTIONS:  - Administer IV fluids if ordered to ensure adequate hydration  - Maintain NPO status until nausea and vomiting are resolved  - Nasogastric tube if ordered  - Administer ordered antiemetic medications as needed  - Provide nonpharmacologic comfort measures as appropriate  - Advance diet as tolerated, if ordered  - Consider nutrition services referral to assist patient with adequate nutrition and appropriate food choices  Outcome: Progressing  Goal: Maintains or returns to baseline bowel function  Description  INTERVENTIONS:  - Assess bowel function  - Encourage oral fluids to ensure adequate hydration  - Administer IV fluids if ordered to ensure adequate hydration  - Administer ordered medications as needed  - Encourage mobilization and activity  - Consider nutritional services referral to assist patient with adequate nutrition and appropriate food choices  Outcome: Progressing  Goal: Maintains adequate nutritional intake  Description  INTERVENTIONS:  - Monitor percentage of each meal consumed  - Identify factors contributing to decreased intake, treat as appropriate  - Assist with meals as needed  - Monitor I&O, weight, and lab values if indicated  - Obtain nutrition services referral as needed  Outcome: Progressing     Problem: GENITOURINARY - ADULT  Goal: Maintains or returns to baseline urinary function  Description  INTERVENTIONS:  - Assess urinary function  - Encourage oral fluids to ensure adequate hydration if ordered  - Administer IV fluids as ordered to ensure adequate hydration  - Administer ordered medications as needed  - Offer frequent toileting  - Follow urinary retention protocol if ordered  Outcome: Progressing  Goal: Absence of urinary retention  Description  INTERVENTIONS:  - Assess patients ability to void and empty bladder  - Monitor I/O  - Bladder scan as needed  - Discuss with physician/AP medications to alleviate retention as needed  - Discuss catheterization for long term situations as appropriate  Outcome: Progressing     Problem: SKIN/TISSUE INTEGRITY - ADULT  Goal: Skin integrity remains intact  Description  INTERVENTIONS  - Identify patients at risk for skin breakdown  - Assess and monitor skin integrity  - Assess and monitor nutrition and hydration status  - Monitor labs (i e  albumin)  - Assess for incontinence   - Turn and reposition patient  - Assist with mobility/ambulation  - Relieve pressure over bony prominences  - Avoid friction and shearing  - Provide appropriate hygiene as needed including keeping skin clean and dry  - Evaluate need for skin moisturizer/barrier cream  - Collaborate with interdisciplinary team (i e  Nutrition, Rehabilitation, etc )   - Patient/family teaching  Outcome: Progressing  Goal: Incision(s), wounds(s) or drain site(s) healing without S/S of infection  Description  INTERVENTIONS  - Assess and document risk factors for skin impairment   - Assess and document dressing, incision, wound bed, drain sites and surrounding tissue  - Consider nutrition services referral as needed  - Oral mucous membranes remain intact  - Provide patient/ family education  Outcome: Progressing  Goal: Oral mucous membranes remain intact  Description  INTERVENTIONS  - Assess oral mucosa and hygiene practices  - Implement preventative oral hygiene regimen  - Implement oral medicated treatments as ordered  - Initiate Nutrition services referral as needed  Outcome: Progressing     Problem: HEMATOLOGIC - ADULT  Goal: Maintains hematologic stability  Description  INTERVENTIONS  - Assess for signs and symptoms of bleeding or hemorrhage  - Monitor labs  - Administer supportive blood products/factors as ordered and appropriate  Outcome: Progressing     Problem: MUSCULOSKELETAL - ADULT  Goal: Maintain or return mobility to safest level of function  Description  INTERVENTIONS:  - Assess patient's ability to carry out ADLs; assess patient's baseline for ADL function and identify physical deficits which impact ability to perform ADLs (bathing, care of mouth/teeth, toileting, grooming, dressing, etc )  - Assess/evaluate cause of self-care deficits   - Assess range of motion  - Assess patient's mobility  - Assess patient's need for assistive devices and provide as appropriate  - Encourage maximum independence but intervene and supervise when necessary  - Involve family in performance of ADLs  - Assess for home care needs following discharge   - Consider OT consult to assist with ADL evaluation and planning for discharge  - Provide patient education as appropriate  Outcome: Progressing  Goal: Maintain proper alignment of affected body part  Description  INTERVENTIONS:  - Support, maintain and protect limb and body alignment  - Provide patient/ family with appropriate education  Outcome: Progressing

## 2020-07-31 NOTE — PROGRESS NOTES
OT Long Term Goals     07/31/20 0900   Rehab Team Goals   ADL Team Goal Patient will require supervision with ADLs with least restrictive device upon completion of rehab program   Rehab Team Interventions   OT Interventions Self Care;Home Management; Therapeutic Exercise;Cognitive Reintegration;Cognitive Retraining;Energy Conservation;Patient/Family Education   Eating Goal   Eating Goal 06  Independent - Patient completes the activity by him/herself with no assistance from a helper  Status Ongoing; Target goal - one week; Target goal - two weeks   Grooming Goal   Oral Hygiene Goal 04  Supervision or touching assistance- [place] provides VERBAL CUES or supervision throughout activity  Task Wash/Dry Face;Wash/Dry Hands;Brush Teeth;Comb Hair   Environment Stand at [employer]   Status Ongoing; Target goal - one week; Target goal - two weeks  (10 days)   Intervention Assistive Device;Balance Work;Neuromuscular Education; Therapeutic Exercise; Tolerance Work   Tub/Shower Transfer Goal   Method Tub Shower  (supervision level when shower order present )   Status Ongoing; Target goal - one week; Target goal - two weeks  (10 days)   Interventions ADL Training;Assistive Device; Neuromuscular Education   Bathing Goal   Shower/bathe self Goal 04  Supervision or touching assistance- [place] provides VERBAL CUES or supervision throughout activity  Environment Seated;Standing;Tub; Shower   Status Ongoing; Target goal - one week; Target goal - two weeks  (10 days)   Intervention ADL Training;Assistive Device; Neuromuscular Education; Therapeutic Exercise   Upper Body Dressing Goal   Upper body dressing Goal 04  Supervision or touching assistance- [place] provides VERBAL CUES or supervision throughout activity  Task Upper Body   Environment Standing   Status Ongoing; Target goal - one week; Target goal - two weeks  (10 days)   Intervention Balance Work; Therapeutic Exercise; Tolerance Work   Lower Body Dressing Goal   Lower body dressing Goal 04  Supervision or touching assistance- [place] provides VERBAL CUES or supervision throughout activity  Putting on/taking off footwear Goal 04  Supervision or touching assistance- [place] provides VERBAL CUES or supervision throughout activity  Task Lower Body   Environment Seated;Standing   Status Ongoing; Target goal - one week; Target goal - two weeks  (10 days)   Intervention Balance Work; Therapeutic Exercise; Tolerance Work   Toileting Transfer Goal   Toilet transfer Goal 04  Supervision or touching assistance- [place] provides VERBAL CUES or supervision throughout activity  Status Ongoing; Target goal - one week; Target goal - two weeks  (10 days)   Intervention ADL Training;Balance Work   Toileting Goal   Toileting hygiene Goal 04  Supervision or touching assistance- [place] provides VERBAL CUES or supervision throughout activity  Task Pants Up;Pants Down;Hygiene   Safety Balance   Status Ongoing; Target goal - one week; Target goal - two weeks  (10 days)   Intervention ADL Training;Balance Work   Meal Prep and Kitchen Mobility   Assist Level Supervision   Status Ongoing; Target goal - one week; Target goal - two weeks  (10 days)

## 2020-07-31 NOTE — PROGRESS NOTES
SLP: Cognitive Linguistic Evaluation & Clinical Swallow Evaluation     07/31/20 0800   Pain Assessment   Pain Assessment Tool 0-10   Pain Score No Pain   Restrictions/Precautions   Precautions Aspiration;Bed/chair alarms;Cognitive; Fall Risk;Impulsive;Supervision on toilet/commode   Cognitive Linguisitic Assessments   Cognitive Linquistic Quick Test (CLQT) Pt completed portions of the CLQT+ on initial evaluation, however, due to pt's low frustration tolerance and verbal outburst, assessment was stopped this session  Based on tasks completed at this time, pt scored at or above criterion cut score for 5/8 tasks completed in comparison to age matched peers ranging from 65-79 y/o  Cognitive Domain scores for the following were able to be determined at this time, however, will require completion of final two tasks in order to obtain overall composite severity rating score and additional cognitive domain specific scores  Memory-score of 135, correlating to MILD deficits, Language-score of 27, correlating to MILD deficits, Clock Drawing Screen-score of 0, correlating to SEVERE deficits  Plan to complete remaining tasks in follow up session to obtain additional info  Comprehension   Assist Devices Glasses   Auditory Basic   Visual Basic   Findings Cognitive Linguistic evaluation was initiated this session  See SLP Rehab note for full details  Plan to complete remainder of assessment in follow up session  QI: Comprehension 2  Sometimes Understands: Understands only basic conversations or simple, direct phrases  Frequently requires cues to understand   Comprehension (FIM) 3 - Understands basic info/conversation 50-74% of time   Expression   Verbal Basic   Non-Verbal Basic   Intelligibility Sentence   Findings Cognitive Linguistic evaluation was initiated this session  See SLP Rehab note for full details  Plan to complete remainder of assessment in follow up session  QI: Expression 3   Exhibits some difficulty with expressing needs and ideas (e g , some words or finishing thoughts) or speech is not clear   Expression (FIM) 4 - Expresses basic info/needs 75-90% of time   Social Interaction   Cooperation with staff;with family   Participation Individual   Behaviors observed Confused; Outbursts  (low frustration tolerance)   Findings Pt requiring verbal encouragement to engage in assessment  Low frustration tolerance noted w/ verbal outburst    Social Interaction (FIM) 4 - Interacts 75-89% of time   Problem Solving   Findings Cognitive Linguistic evaluation was initiated this session  See SLP Rehab note for full details  Plan to complete remainder of assessment in follow up session  Problem solving (FIM) 2 - Needs direction more than ½ time to initiate, plan or complete simple tasks   Memory   Recognize People Yes   Remember Routine No   Short-Term Impaired   Long Term Intact   Recalls Precaution No   Findings Cognitive Linguistic evaluation was initiated this session  See SLP Rehab note for full details  Plan to complete remainder of assessment in follow up session      Memory (FIM) 2 - Recognizes, recalls/performs 25-49%   Executive Function Skills   Problem Solving X   Simple Functional Tasks Minimal   Complex Functional Tasks To be assessed in therapy   Managing Finances To be assessed in therapy   Managing Medications To be assessed in therapy   Safety/Judgement X   Routine Tasks Moderate   Insight Severe insight   Impulsive Moderately impulsive   Flexibility of Thought Reduced flexibility   Planning Reduced planning skills   Organization Moderately disorganized   Memory Skills   Orientation Level Oriented X4   Long Term Biographical Recall WFL - Within Functional Limits   Short Term Immediate Recall Mild Impairment   Short Term Recall of Paragraph Moderate Impairment   Short Term Working Recall Moderate Impairment   Auditory Comprehension   Commands X   Two Step Basic Commands Minimal   Complex/Abstract Commands Maximal Comphrehends Conversation Simple   Speech/Swallow Mechanism Exam   Labial Symmetry WFL   Labial Strength WFL   Labial ROM WFL   Facial Symmetry WFL   Facial Strength WFL   Facial ROM WFL   Lingual Symmetry WFL   Lingual Strength WFL   Lingual ROM WFL   Mandible WFL   Dentition Adequate   Volitional Cough Strong   Vocal Quality clear, adequate   Respratory Status Room air   Speaking Valve Eval   Speaking Valve   (covered stoma)   Speech/Language/Cognition Assessmetn   Treatment Assessment Cognitive linguistic evaluation was initiated this session consisting of patient interview, informal assessment and administration of portions of the CLQT+ (see above for details)  Pt oriented x4 to begin session and demonstrating biographical LTM recall to be WNL  However, across assessment, pt demonstrating notable deficits in [facility] recall, working memory, attention, processing speed and comprehension (also impacted by [address] status)  Pt also presenting w/ decreased safety awareness, impulsivity, and decreased insight into deficits, also impacting current level of independence  Pt highly impulsive and requiring multiple times of education provided in order to carryover use of call bell during session, as well as instructions to remain seated, as pt also set off alarms after session ended  During portions of the CLQT+ pt demonstrating low frustration tolerance and verbal outbursts, despite education being provided regarding reasoning for assessment and role of SLP  CLQT+ was stopped due to pt frustration  Pt's daughter was present during session, reporting that pt is noncompliant in home setting and also often will not do something if he does not want to  Pt lives in an apartment in his daughter's [facility] home which pt has his own bedroom and bathroom  Prior to recent admission, pt was independent for driving, cooking, cleaning, laundry, medication management and for some finances as his daughter assists   Pt's daughter reports that she will be home to provide supervision between her and her  as she is currently out of work due to an injury  Pt's daughter does report that she feels pt is functioning below cognitive baseline at this time  Overall, pt is presenting w/ cognitive linguistic deficits characterized by decreased attention, working memory, [employer] recall, processing time, problem solving, safety, insight into deficits, and impulsivity  Therefore, pt is currently recommended for skilled ST services during acute rehab stay to maximize functional cognitive linguistic skills and to improve overall safety and level of independence  Swallow Information   Current Risks for Dysphagia & Aspiration Respiratory compromise;General debilitation;New Neuro event;Brain injury;Cognitive deficit; Mental status change  (hx tracheostomy-decannulated on 7/29)   Current Symptoms/Concerns Cough;Clear throat;During meals; Aftermeals; Recent Pneumonia; Chronic cough  (daughter reports chronic cough 2* lung CA)   Current Diet Regular; Thin liquid   Baseline Diet Regular; Thin liquids   Consistencies Assessed and Performance   Materials Admnistered Regular/Solid; Thin liquid;Pills; Whole; With thin liquid   Oral Stage WFL; Mild impaired   Phargngeal Stage WFL; Mild impaired;Aspiration risk   Swallow Mechanics WFL; Mild delayed;Swallow initation; Appears prompt;Good Larygneal rise;Aspiration risk   Esophageal Concerns No s/s reported   Recommendations   Risk for Aspiration Present   Recommendations Dysphagia treatment  (brief follow up, anticipating 1-2 sessions)   Diet Solid Recommendation Regular consistency   Diet Liquid Recommendation Thin liquid   Recommended Form of Meds As desired; As tolerated   General Precautions Aspiration precautions; Feed only when alert;Minimize distractions;Upright as possible for all oral intake;Remain upright for 45 mins after meals; Supervision with meals  (FULL supervision for meals as per MD, OOB for all meals)   Compensatory Swallowing Strategies Alternate solids and liquids;Swallow 2 times per bite/sip; External pacing;Effortful swallow;Voluntary throat clear/cough to clear penetration   Results Reviewed with RN;PT/Family/Caregiver   Eating   Type of Assistance Needed Supervision   Amount of Physical Assistance Provided No physical assistance   Eating CARE Score 4   Swallow Assessment   Swallow Treatment Assessment Clinical swallow evaluation completed during bfast as MD requesting follow up evaluation for swallow function  Pt with hx of complicated hospital course to include tracheostomy, which he was decannulated on 7/29/20  Pt seated fully upright and OOB in recliner chair for meal  Pt is currently on a regular diet and thin liquids which he was presented w/ english muffin and guillen, consuming 100% and ~120cc thin liquids by cup sips  Full dentition  Oral mech WFL  Demo ability to self feed but noting more rapid pacing and larger bite sizes as meal progressed  Functional bolus retrieval and lip seal w/o anterior loss  Mastication appeared overall timely to occasionally mildly slower but appeared functional for bolus breakdown w/ adequate bolus formation, timely transfers and full oral clearance-no pocketing or overt oral residual present  Pt occasionally using strategy which was previously recommended of use of secondary swallows w/ solids  Swallow initiation appeared overall timely across all items, occasionally appearing mildly delayed w/ solids yet remained functional  Hyolaryngeal movement present to palpation  Pt elicited throat clear x1 during meal but no further overt s/s penetration/aspiration observed this meal  Pt also elicited cough after meal, however, pt's daughter reports that pt has baseline cough prior to recent admission due to lung CA, therefore, ?ing if baseline or delay cough secondary to PO intake   Despite more rapid rate of PO intake, pt appears to be tolerating regular diet and thin liquids, therefore, will continue to recommend current diet  However, will recommend pt have FULL supervision for meals to ensure carryover of safe swallow strategies: OOB for all meals, slow rate of intake, small bites/sips, 2 swallows per bite, alternate solids/liquids  Pt is recommended for short term follow up of skilled ST services (anticipating 1-2 sessions) targeting dysphagia therapy to further assess and ensure tolerance of current diet w/o increased overt s/s penetration/aspiration or increased s/s of oral or pharyngeal dysphagia  Swallow Assessment Prognosis   Prognosis Good   Prognosis Considerations Co-morbidities; Medical diagnosis; Medical prognosis; Severity of impairments;New learning ability;Ability to carry over   SLP Therapy Minutes   SLP Time In 0800   SLP Time Out 0915   SLP Total Time (minutes) 75   SLP Mode of treatment - Individual (minutes) 75   SLP Mode of treatment - Concurrent (minutes) 0   SLP Mode of treatment - Group (minutes) 0   SLP Mode of treatment - Co-treat (minutes) 0   SLP Mode of Treatment - Total time(minutes) 75 minutes   SLP Cumulative Minutes 75   Therapy Time missed   Time missed?  No

## 2020-07-31 NOTE — PROGRESS NOTES
Internal Medicine Progress Note  Patient: Lux Womack  Age/sex: 79 y o  male  Medical Record #: [id number]      ASSESSMENT/PLAN: (Interval History)  Lux Womack is seen and examined and management for following issues:    DOMI adenocarcinoma; s/p bronchoscopy, mediastinoscopy, VATS, left upper lobectomy and mediastinal lymph node dissection 6/10/20  · Back to Thoracic surgery as OP     PEA/respiratory arrest; emergent cricothyroidotomy 6/20; tracheostomy 6/22; decannulation 7/29/20  · Stoma covered = will watch  · He is speaking well  · Continue Xopenex nebs     hx nicotine abuse with 1ppd x 50 yrs  · quit prior to surgery  · continue nebs     Sinus tachycardia  · was on Lopressor 2 5 mg IV q12hrs on admit to [facility] and changed now to 12 5mg q12hrs  · rates are mildy elevated only  · will watch  · No room to increase 2/2 soft BPs    R/O UTI; SIRS/sepsis  · UA+  · Primary service started Levaquin  · Await cx  · Having chills, feeling of incomplete emptying/pelvic pressure  · So far afebrile but elevated WBC, persistent tachycardia and likely UTI  · Will draw LA, blood cx STAT; start IVF NSS    ABLA  · Stable  · will watch and transfuse prn hemoglobin <8 0     Dysphagia  · resolved and on regular diet but will watch     DVT  LE and RUE  · currently is on therapeutic Lovenox   · will need conversion to NOAC  · followup venous dopplers on UE/LE showed resolution of DVTs     Urine retention  · Continue Flomax  · Lucila was d/cd 7/27/20     TBI 2010  · Has memory issues from this incident but unk details since he cannot remember anything about it     ETOH abuse  · Unknown amount/details       The above assessment and plan was reviewed and updated as determined by my evaluation of the patient on 7/31/2020      Labs:   Results from last 7 days   Lab Units 07/31/20  0600 07/28/20  0446   WBC Thousand/uL 12 77* 6 99   HEMOGLOBIN g/dL 9 4* 9 5*   HEMATOCRIT % 30 8* 30 5*   PLATELETS Thousands/uL 306 288 Results from last 7 days   Lab Units 07/31/20  0600 07/28/20  0446   SODIUM mmol/L 138 141   POTASSIUM mmol/L 4 2 4 0   CHLORIDE mmol/L 108 108   CO2 mmol/L 24 26   BUN mg/dL 10 14   CREATININE mg/dL 0 89 0 81   CALCIUM mg/dL 9 4 8 6             Results from last 7 days   Lab Units 07/31/20  0555 07/31/20  0014 07/30/20  1025   POC GLUCOSE mg/dl 102 132 91       Review of Scheduled Meds:    Current Facility-Administered Medications:  acetaminophen 975 mg Oral [facility] Lewis Smith MD   albuterol 2 5 mg Nebulization Q6H PRN Lewis Smith MD   calcium carbonate 500 mg Oral TID PRN Lewis Smith MD   chlorhexidine 15 mL Swish & Spit Q12H [facility] Lewis Smith MD   enoxaparin 1 mg/kg Subcutaneous Q12H [facility] Lewis Smith MD   guaiFENesin 200 mg Oral Q4H PRN Lewis Smith MD   levalbuterol 1 25 mg Nebulization Q12H Lewis Smith MD   Lidocaine Viscous HCl 15 mL Swish & Spit 4x Daily PRN Lewis Smith MD   melatonin 6 mg Oral HS Lewis Smith MD   metoprolol tartrate 12 5 mg Oral Q12H Patrice Calle MD   nystatin 500,000 Units Swish & Swallow 4x Daily Lewis Smith MD   OLANZapine 10 mg Oral HS Lewis mSith MD   ondansetron 4 mg Oral Q6H PRN Lewis Smith MD   oxyCODONE 2 5 mg Oral Q4H PRN Lewis Smith MD   polyethylene glycol 17 g Oral Daily Lewis Smith MD   senna-docusate sodium 1 tablet Per NG Tube BID Lewis Smith MD   sodium chloride 3 mL Nebulization Q12H Lewis Smith MD   tamsulosin 0 4 mg Oral Daily With Montrell Goldberg MD       Subjective/ HPI: Patients overnight issues or events were reviewed with nursing or staff during rounds or morning huddle session  No new or overnight issues  Offers no complaints  ROS:   A 10 point ROS was performed; negative except as noted above         Imaging:     No orders to display       *Labs /Radiology studies reviewed  *Medications reviewed and reconciled as needed  *Please refer to order section for additional ordered labs studies  *Case discussed with primary attending during morning huddle case rounds    Physical Examination:  Vitals:   Vitals:    07/30/20 2212 07/31/20 0524 07/31/20 0558 07/31/20 0802   BP: 116/70 113/68     BP Location:  Left arm     Pulse:  100     Resp:  18     Temp:  98 9 °F (37 2 °C)     TempSrc:  Oral     SpO2:  96%  95%   Weight:   96 4 kg (212 lb 8 4 oz)    Height:           General Appearance: no distress, conversive  HEENT: PERRLA, conjuctiva normal; oropharynx clear; mucous membranes moist   Neck:  Supple, normal ROM, no JVD  Lungs: CTA, normal respiratory effort, no retractions, expiratory effort normal  CV: regular rate and rhythm; no rubs/murmurs/gallops, PMI normal   ABD: soft; ND/NT; +BS  EXT: no edema  Skin: normal turgor, normal texture, no rashes  Psych: affect normal, mood normal  Neuro: AA; some confusion      The above physical exam was reviewed and updated as determined by my evaluation of the patient on 7/31/2020  Invasive Devices     None                    VTE Pharmacologic Prophylaxis: Enoxaparin  Code Status: Level 2 - DNAR: but accepts endotracheal intubation  Current Length of Stay: 1 day(s)      Total time spent:  30 minutes with more than 50% spent counseling/coordinating care  Counseling includes discussion with patient re: progress  and discussion with patient of his/her current medical state/information  Coordination of patient's care was performed in conjunction with primary service  Time invested included review of patient's labs, vitals, and management of their comorbidities with continued monitoring  In addition, this patient was discussed with medical team including physician and advanced extenders  The care of the patient was extensively discussed and appropriate treatment plan was formulated unique for this patient  ** Please Note:  voice to text software may have been used in the creation of this document   Although proof errors in transcription or interpretation are a potential of such software**

## 2020-08-01 LAB
BASOPHILS # BLD AUTO: 0.04 THOUSANDS/ΜL (ref 0–0.1)
BASOPHILS NFR BLD AUTO: 0 % (ref 0–1)
EOSINOPHIL # BLD AUTO: 0.2 THOUSAND/ΜL (ref 0–0.61)
EOSINOPHIL NFR BLD AUTO: 2 % (ref 0–6)
ERYTHROCYTE [DISTWIDTH] IN BLOOD BY AUTOMATED COUNT: 15.4 % (ref 11.6–15.1)
HCT VFR BLD AUTO: 27.5 % (ref 36.5–49.3)
HGB BLD-MCNC: 8.5 G/DL (ref 12–17)
IMM GRANULOCYTES # BLD AUTO: 0.05 THOUSAND/UL (ref 0–0.2)
IMM GRANULOCYTES NFR BLD AUTO: 0 % (ref 0–2)
LYMPHOCYTES # BLD AUTO: 1.52 THOUSANDS/ΜL (ref 0.6–4.47)
LYMPHOCYTES NFR BLD AUTO: 14 % (ref 14–44)
MCH RBC QN AUTO: 30.5 PG (ref 26.8–34.3)
MCHC RBC AUTO-ENTMCNC: 30.9 G/DL (ref 31.4–37.4)
MCV RBC AUTO: 99 FL (ref 82–98)
MONOCYTES # BLD AUTO: 0.95 THOUSAND/ΜL (ref 0.17–1.22)
MONOCYTES NFR BLD AUTO: 9 % (ref 4–12)
NEUTROPHILS # BLD AUTO: 8.36 THOUSANDS/ΜL (ref 1.85–7.62)
NEUTS SEG NFR BLD AUTO: 75 % (ref 43–75)
NRBC BLD AUTO-RTO: 0 /100 WBCS
PLATELET # BLD AUTO: 322 THOUSANDS/UL (ref 149–390)
PMV BLD AUTO: 9.8 FL (ref 8.9–12.7)
RBC # BLD AUTO: 2.79 MILLION/UL (ref 3.88–5.62)
WBC # BLD AUTO: 11.12 THOUSAND/UL (ref 4.31–10.16)

## 2020-08-01 PROCEDURE — 94760 N-INVAS EAR/PLS OXIMETRY 1: CPT

## 2020-08-01 PROCEDURE — 97530 THERAPEUTIC ACTIVITIES: CPT

## 2020-08-01 PROCEDURE — 97130 THER IVNTJ EA ADDL 15 MIN: CPT

## 2020-08-01 PROCEDURE — 97129 THER IVNTJ 1ST 15 MIN: CPT

## 2020-08-01 PROCEDURE — 97535 SELF CARE MNGMENT TRAINING: CPT

## 2020-08-01 PROCEDURE — 94640 AIRWAY INHALATION TREATMENT: CPT

## 2020-08-01 PROCEDURE — 85025 COMPLETE CBC W/AUTO DIFF WBC: CPT | Performed by: NURSE PRACTITIONER

## 2020-08-01 PROCEDURE — 92526 ORAL FUNCTION THERAPY: CPT

## 2020-08-01 RX ORDER — SACCHAROMYCES BOULARDII 250 MG
250 CAPSULE ORAL 2 TIMES DAILY
Status: DISCONTINUED | OUTPATIENT
Start: 2020-08-01 | End: 2020-08-12 | Stop reason: HOSPADM

## 2020-08-01 RX ORDER — SODIUM CHLORIDE 9 MG/ML
75 INJECTION, SOLUTION INTRAVENOUS CONTINUOUS
Status: DISPENSED | OUTPATIENT
Start: 2020-08-01 | End: 2020-08-02

## 2020-08-01 RX ADMIN — ENOXAPARIN SODIUM 90 MG: 100 INJECTION SUBCUTANEOUS at 09:42

## 2020-08-01 RX ADMIN — NYSTATIN 500000 UNITS: 100000 SUSPENSION ORAL at 11:17

## 2020-08-01 RX ADMIN — Medication 250 MG: at 12:29

## 2020-08-01 RX ADMIN — ENOXAPARIN SODIUM 90 MG: 100 INJECTION SUBCUTANEOUS at 21:49

## 2020-08-01 RX ADMIN — CHLORHEXIDINE GLUCONATE 0.12% ORAL RINSE 15 ML: 1.2 LIQUID ORAL at 09:39

## 2020-08-01 RX ADMIN — OLANZAPINE 5 MG: 5 TABLET, ORALLY DISINTEGRATING ORAL at 23:51

## 2020-08-01 RX ADMIN — SODIUM CHLORIDE 75 ML/HR: 0.9 INJECTION, SOLUTION INTRAVENOUS at 03:28

## 2020-08-01 RX ADMIN — SODIUM CHLORIDE 75 ML/HR: 0.9 INJECTION, SOLUTION INTRAVENOUS at 16:46

## 2020-08-01 RX ADMIN — ISODIUM CHLORIDE 3 ML: 0.03 SOLUTION RESPIRATORY (INHALATION) at 07:20

## 2020-08-01 RX ADMIN — ACETAMINOPHEN 975 MG: 325 TABLET, FILM COATED ORAL at 21:49

## 2020-08-01 RX ADMIN — SODIUM CHLORIDE 75 ML/HR: 0.9 INJECTION, SOLUTION INTRAVENOUS at 14:44

## 2020-08-01 RX ADMIN — METOPROLOL TARTRATE 12.5 MG: 25 TABLET ORAL at 21:49

## 2020-08-01 RX ADMIN — Medication 250 MG: at 18:19

## 2020-08-01 RX ADMIN — CHLORHEXIDINE GLUCONATE 0.12% ORAL RINSE 15 ML: 1.2 LIQUID ORAL at 21:49

## 2020-08-01 RX ADMIN — LEVOFLOXACIN 750 MG: 750 TABLET, FILM COATED ORAL at 14:36

## 2020-08-01 RX ADMIN — LEVALBUTEROL HYDROCHLORIDE 1.25 MG: 1.25 SOLUTION, CONCENTRATE RESPIRATORY (INHALATION) at 07:20

## 2020-08-01 RX ADMIN — ACETAMINOPHEN 975 MG: 325 TABLET, FILM COATED ORAL at 14:36

## 2020-08-01 RX ADMIN — TAMSULOSIN HYDROCHLORIDE 0.4 MG: 0.4 CAPSULE ORAL at 16:14

## 2020-08-01 RX ADMIN — SENNOSIDES AND DOCUSATE SODIUM 1 TABLET: 8.6; 5 TABLET ORAL at 18:19

## 2020-08-01 RX ADMIN — ISODIUM CHLORIDE 3 ML: 0.03 SOLUTION RESPIRATORY (INHALATION) at 20:00

## 2020-08-01 RX ADMIN — MELATONIN 6 MG: at 21:49

## 2020-08-01 RX ADMIN — LEVALBUTEROL HYDROCHLORIDE 1.25 MG: 1.25 SOLUTION, CONCENTRATE RESPIRATORY (INHALATION) at 20:00

## 2020-08-01 RX ADMIN — NYSTATIN 500000 UNITS: 100000 SUSPENSION ORAL at 09:39

## 2020-08-01 RX ADMIN — ACETAMINOPHEN 975 MG: 325 TABLET, FILM COATED ORAL at 05:47

## 2020-08-01 NOTE — PROGRESS NOTES
Internal Medicine Progress Note  Patient: Esequiel Crabtree  Age/sex: 79 y o  male  Medical Record #: [id number]      ASSESSMENT/PLAN: (Interval History)  Esequiel Crabtree is seen and examined and management for following issues:    DOMI adenocarcinoma; s/p bronchoscopy, mediastinoscopy, VATS, left upper lobectomy and mediastinal lymph node dissection 6/10/20  · Back to Thoracic surgery as OP     PEA with multiple rounds of CPR/respiratory arrest; emergent cricothyroidotomy 6/20; tracheostomy 6/22; decannulation 7/29/20  · Stoma healing well = will watch  · He is speaking well  · Continue Xopenex nebs  · ECHO pending     hx nicotine abuse with 1ppd x 50 yrs  · quit prior to surgery  · continue nebs     Sinus tachycardia  · was on Lopressor 2 5 mg IV q12hrs on admit to [facility] and changed to 12 5mg q12hrs  · rates are mildy elevated only  · will watch  · No room to increase 2/2 soft BPs and he actually was not able to get Lopressor this AM 2/2 sbp 98 or last night 7/31  · Will get an ECHO 2/2 cardiac arrest and persistent tachycardia (may be influenced by infection/UTI)  · Suspect he gets orthostatic since he has tendency to run lowish supine = will check orthos     Fever; UTI >100,000 oxidase positive GNR; SIRS/sepsis  · UA+  · Primary service started Levaquin  · Await blood and urine cx  · Was having chills, feeling of incomplete emptying/pelvic pressure but no urine retention  · LA was normal on 7/31/20  · continue IVF NSS at 75ml/hr until tonight midnight  · CXR was negative     ABLA  · Stable  · will watch and transfuse prn hemoglobin <8 0     Dysphagia  · resolved and on regular diet but will watch     DVT  LE and RUE  · currently is on therapeutic Lovenox   · will need conversion to NOAC eventually  · followup venous dopplers on UE/LE showed resolution of DVTs     Urine retention  · on Flomax which he refused last night stating it "tasted bad"  · D/w him today that if he continually refuses to take that he may end up with urine retention again  · Gaytan was d/cd 7/27/20     TBI 2010  · Has memory issues from this incident but unk details since he cannot remember anything about it     ETOH abuse  · Unknown amount/details       The above assessment and plan was reviewed and updated as determined by my evaluation of the patient on 8/1/2020      Labs:   Results from last 7 days   Lab Units 08/01/20  0551 07/31/20  0600   WBC Thousand/uL 11 12* 12 77*   HEMOGLOBIN g/dL 8 5* 9 4*   HEMATOCRIT % 27 5* 30 8*   PLATELETS Thousands/uL 322 306     Results from last 7 days   Lab Units 07/31/20  0600 07/28/20  0446   SODIUM mmol/L 138 141   POTASSIUM mmol/L 4 2 4 0   CHLORIDE mmol/L 108 108   CO2 mmol/L 24 26   BUN mg/dL 10 14   CREATININE mg/dL 0 89 0 81   CALCIUM mg/dL 9 4 8 6             Results from last 7 days   Lab Units 07/31/20  0555 07/31/20  0014 07/30/20  1025   POC GLUCOSE mg/dl 102 132 91       Review of Scheduled Meds:    Current Facility-Administered Medications:  acetaminophen 975 mg Oral [facility] Concetta Otero MD    albuterol 2 5 mg Nebulization Q6H PRN Concetta Otero MD    calcium carbonate 500 mg Oral TID PRN Concetta Otero MD    chlorhexidine 15 mL Swish & Spit Q12H [address] Concetta Otero MD    enoxaparin 1 mg/kg Subcutaneous Q12H [address] Concetta Otero MD    guaiFENesin 200 mg Oral Q4H PRN Concetta Otero MD    levalbuterol 1 25 mg Nebulization Q12H Concetta Otero MD    levofloxacin 750 mg Oral Q24H Concetta Otero MD    Lidocaine Viscous HCl 15 mL Swish & Spit 4x Daily PRN Concetta Otero MD    melatonin 6 mg Oral HS Concetta Otero MD    metoprolol tartrate 12 5 mg Oral Q12H Mulu Holly MD    nystatin 500,000 Units Swish & Swallow 4x Daily Concetta Otero MD    OLANZapine 5 mg Oral HS Concetta Otero MD    ondansetron 4 mg Oral Q6H PRN Concetta Otero MD    oxyCODONE 2 5 mg Oral Q4H PRN Concetta Otero MD    polyethylene glycol 17 g Oral Daily Concetta Otero MD    senna-docusate sodium 1 tablet Per NG Tube BID Concetta Otero MD    sodium chloride 75 mL/hr Intravenous Continuous Lashae Bowden, CRNP Last Rate: 75 mL/hr (08/01/20 0328)   sodium chloride 3 mL Nebulization Q12H Augusta Balderas MD    tamsulosin 0 4 mg Oral Daily With Niko Marques MD        Subjective/ HPI: Patients overnight issues or events were reviewed with nursing or staff during rounds or morning huddle session  No new or overnight issues  Offers no complaints  ROS:   A 10 point ROS was performed; negative except as noted above  Imaging:     XR chest pa & lateral   Final Result by Sanaz Anderson MD (08/01 9964)      Status post removal of lines, catheters and tubes  No pneumothorax  Resolved opacity in the left lung with stable perihilar scarring and postsurgical changes of the left upper lobe              Workstation performed: [id number]             *Labs /Radiology studies reviewed  *Medications reviewed and reconciled as needed  *Please refer to order section for additional ordered labs studies  *Case discussed with primary attending during morning huddle case rounds      Physical Examination:  Vitals:   Vitals:    07/31/20 2020 08/01/20 0546 08/01/20 0553 08/01/20 0720   BP: 109/63 117/75     BP Location: Left arm Left arm     Pulse: (!) 112 97     Resp: 18 18     Temp: 97 9 °F (36 6 °C) 98 2 °F (36 8 °C)     TempSrc: Oral Oral     SpO2: 95% 95%  96%   Weight:   98 kg (216 lb 0 8 oz)    Height:           General Appearance: no distress, conversive  HEENT: PERRLA, conjuctiva normal; oropharynx clear; mucous membranes moist   Neck:  Supple, normal ROM, no JVD  Lungs: CTA, normal respiratory effort, no retractions, expiratory effort normal  CV: regular rate and rhythm; no rubs/murmurs/gallops, PMI normal   ABD: soft; ND/NT; +BS  EXT: no edema  Skin: normal turgor, normal texture, no rashes  Psych: affect normal, mood normal  Neuro: AA; has memory deficits      The above physical exam was reviewed and updated as determined by my evaluation of the patient on 8/1/2020  Invasive Devices     Peripheral Intravenous Line            Peripheral IV 07/31/20 Distal;Left;Ventral (anterior) Forearm less than 1 day                   VTE Pharmacologic Prophylaxis: Enoxaparin  Code Status: Level 2 - DNAR: but accepts endotracheal intubation  Current Length of Stay: 2 day(s)      Total time spent:  30 minutes with more than 50% spent counseling/coordinating care  Counseling includes discussion with patient re: progress  and discussion with patient of his/her current medical state/information  Coordination of patient's care was performed in conjunction with primary service  Time invested included review of patient's labs, vitals, and management of their comorbidities with continued monitoring  In addition, this patient was discussed with medical team including physician and advanced extenders  The care of the patient was extensively discussed and appropriate treatment plan was formulated unique for this patient  ** Please Note:  voice to text software may have been used in the creation of this document   Although proof errors in transcription or interpretation are a potential of such software**

## 2020-08-01 NOTE — PROGRESS NOTES
08/01/20 1340   Pain Assessment   Pain Assessment Tool 0-10   Pain Score 1   Pain Location/Orientation Location: Neck   Effect of Pain on Daily Activities does not limit   Hospital Pain Intervention(s) Repositioned; Rest   Restrictions/Precautions   Precautions Bed/chair alarms;Cognitive; Fall Risk;Impulsive;Pain;Supervision on toilet/commode;Multiple lines  (no BP RUE)   Grooming   Findings S in stance for hand hygiene   Roll Left and Right   Type of Assistance Needed Supervision   Amount of Physical Assistance Provided No physical assistance   Roll Left and Right CARE Score 4   Sit to Lying   Type of Assistance Needed Supervision   Amount of Physical Assistance Provided No physical assistance   Sit to Lying CARE Score 4   Lying to Sitting on Side of Bed   Type of Assistance Needed Supervision   Amount of Physical Assistance Provided No physical assistance   Lying to Sitting on Side of Bed CARE Score 4   Sit to Stand   Type of Assistance Needed Supervision   Amount of Physical Assistance Provided No physical assistance   Comment attempts to stand without staff ready req cuing to wait   Sit to Stand CARE Score 4   Bed-Chair Transfer   Type of Assistance Needed Physical assistance   Amount of Physical Assistance Provided 25%-49%   Comment Nathan as pt attemting to step around Rw causing unsteadiness   Chair/Bed-to-Chair Transfer CARE Score 3   Toileting Hygiene   Type of Assistance Needed Incidental touching   Amount of Physical Assistance Provided No physical assistance   Comment CGA in stance for CM, very small urine produced and pt complaint of burning   RN aware   Toileting Hygiene CARE Score 4   Toilet Transfer   Type of Assistance Needed Physical assistance   Amount of Physical Assistance Provided 25%-49%   Comment Nathan walk to bathroom 2* dec safety with RW, reliance on grab bars   Toilet Transfer CARE Score 3   Cognition   Overall Cognitive Status Impaired   Arousal/Participation Alert   Attention Attends with cues to redirect   Orientation Level Oriented to person;Oriented to place;Oriented to time;Disoriented to situation   Memory Decreased short term memory;Decreased recall of recent events;Decreased recall of precautions   Following Commands Follows one step commands with increased time or repetition   Comments MOCA version 8 1 completed with pt scoring 20/30 indicative of a mild neurocognitive impairment  Deficits in STM, attention, executive fx  Refer below for details  Activity Tolerance   Activity Tolerance Patient limited by fatigue   Assessment   Treatment Assessment Pt participation in session limited by fatigue and pt initial hesitation to participate in session req emotional support and encouragement  RN requesting ortho BP this session  Initially attempted in room; however, pt with sudden urgency to urinate standing from bed and attempting to walk to bathroom without RW and without warning therapist  P safety with RW at this time  BP then taken after pt back supine with adjustment period  BP as follows: supine 140/80, sitting 138/78, standing 132/70  No noted symptoms  Pt's indep limited at this time by STM, dec divided attention, dec safety awareness, endurance deficits inc risk of fall and injury upon dc home  Pt in bed at end of session with SCDs on, alarm engaged, and RN present to provide medication  Cont with POC with focus on above listed deficits to inc self care and transfers indep  Prognosis Fair   Problem List Decreased strength;Decreased endurance; Impaired balance;Decreased mobility; Decreased coordination;Decreased cognition; Impaired judgement;Decreased safety awareness   Barriers to Discharge Inaccessible home environment;Decreased caregiver support   Plan   Treatment/Interventions ADL retraining;Functional transfer training; Therapeutic exercise; Endurance training;Patient/family training;Equipment eval/education; Bed mobility;Cognitive reorientation   Progress Slow progress, decreased activity tolerance   Recommendation   OT Discharge Recommendation Return to previous environment with social support  (pending progress)   OT Therapy Minutes   OT Time In 1340   OT Time Out 1440   OT Total Time (minutes) 60   OT Mode of treatment - Individual (minutes) 60   OT Mode of treatment - Concurrent (minutes) 0   OT Mode of treatment - Group (minutes) 0   OT Mode of treatment - Co-treat (minutes) 0   OT Mode of Treatment - Total time(minutes) 60 minutes   OT Cumulative Minutes 135   Therapy Time missed   Time missed? No       Pt completed [place] Cognitive Assessment (MOCA) version 8 1  Pt scored overall 20 / 30  indicating a mild cognitive deficit  Visuospatial/executive: 2/5   Naming: 3/3   Memory:    (worth no points)   Attention:  4/6   Language: 0/3   Abstraction: 2/2   Delayed recall: 3/5 (MIS 3/5)  Orientation: 5/6       +1 for < or = to 12th grade      Completed MOCA in room with lights on and door closed to limit distraction  Reports some noted deficits in memory prior to hospitalization  Pt req frequent repetition of directions 2* attention  Pt utilizing humor when task difficulty req cuing to maintain focus  Will notify OTR/L who will determine rehab implication and dc recommendation  Cont with fx assessment of cognition

## 2020-08-01 NOTE — PROGRESS NOTES
08/01/20 0800   Pain Assessment   Pain Assessment Tool Pain Assessment not indicated - pt denies pain   Pain Score No Pain   Restrictions/Precautions   Precautions Bed/chair alarms;Cognitive; Fall Risk;Impulsive;Supervision on toilet/commode   Cognitive Linguisitic Assessments   Cognitive Linquist Quick Test (CLQT) Pt completed the CLQT during session w/overall score off mild deficits and the following domain scores: Mild for attention, memory, language, and visupatial skills but moderate deficits for executive functions and severe deficits on the clock drawing  Pt scored at or above the criterion cut score for 6/10 tasks when compared with his age-matched peers (this includes the same tasks mentioned during initiation of CLQT yesterday)  Comprehension   QI: Comprehension 3  Usually Understands: Understands most conversations, but misses some part/intent of message  Requires cues at times to understand  Comprehension (FIM) 3 - Understands basic info/conversation 50-74% of time   Expression   QI: Expression 3  Exhibits some difficulty with expressing needs and ideas (e g , some words or finishing thoughts) or speech is not clear   Expression (FIM) 4 - Expresses basic info/needs 75-90% of time   Social Interaction   Social Interaction (FIM) 5 - Interacts appropriately with others 90% of time   Problem Solving   Problem solving (FIM) 3 - Solves basic problmes 50-74% of time   Memory   Memory (FIM) 3 - Recognizes, recalls/performs 50-74%   Speech/Language/Cognition Assessmetn   Treatment Assessment Pt completed the CLQT during today's session w/mild-mod cognitive-linguistic deficits noted overall  Observed impulsivity, reduced recall, delayed processing, and decreased insight as pt's biggest barriers  This was also confirmed by pt's dtr, Davide Tripp, as she's observed him frequently during course of his rehab   Education provided to both pt and Davide Tripp, re: deficits and POC, w/agreement from both, though further education/review will be benefical w/pt 2* reduced recall/overall comprehension  Pt will therefore continue to benefit from skilled ST intervention to maximize overall cognitive-linguistic skills at this time  Swallow Information   Current Risks for Dysphagia & Aspiration Recent intubation; Respiratory compromise;General debilitation;New Neuro event;Brain injury;Cognitive deficit; Mental status change   Current Symptoms/Concerns Recent Pneumonia; Chronic cough   Current Diet Regular; Thin liquid   Baseline Diet Regular; Thin liquids   Consistencies Assessed and Performance   Materials Admnistered Soft/Level 3;Regular/Solid; Thin liquid   Oral Stage WFL   Phargngeal Stage WFL   Swallow Mechanics WFL;Swallow initation; Appears prompt;Good Larygneal rise   Esophageal Concerns No s/s reported   Recommendations   Risk for Aspiration Present   Recommendations   (D/C Dysphagia tx)   Diet Solid Recommendation Regular consistency   Diet Liquid Recommendation Thin liquid   Recommended Form of Meds As desired; As tolerated   General Precautions Aspiration precautions; Feed only when alert;Minimize distractions;Upright as possible for all oral intake;Remain upright for 45 mins after meals; Supervision with meals  (FULL S for meals per MD and OOB or bed in chair mode)   Compensatory Swallowing Strategies Alternate solids and liquids;Voluntary throat clear/cough to clear penetration   Results Reviewed with RN;PT/Family/Caregiver   Eating   Type of Assistance Needed Supervision   Amount of Physical Assistance Provided No physical assistance   Eating CARE Score 4   Swallow Assessment   Swallow Treatment Assessment Pt seen for f/u dysphagia tx during breakfast w/current regular/thin liquid tray of toast, guillen (pt made guillen sandwich for 3 bites), cheerios w/milk, and thin water via straw  Pt consumed 75% of meal but only 60cc liquids, despite max encouragement   Pt was independent w/tray set-up and self feeding w/good bite size noted overall  Mastication observed to be adequate for bolus formation w/prompt AP transfer and swallow initiation suspected for all consistencies  Hyolaryngeal excursion believed to be good during palpation w/no overt s/sx penetration/aspiration observed w/any consistencies  Will therefore recommend pt continue w/current regular/thin liquid diet w/no further f/u dysphagia tx at this time  Pt, his dtr, and nsg all in agreement w/POC  Swallow Assessment Prognosis   Prognosis Good   Prognosis Considerations Co-morbidities; Family/community support;Medical diagnosis; Medical prognosis;Previous level of function;Severity of impairments;Ability to carry over   SLP Therapy Minutes   SLP Time In 0800   SLP Time Out 0900   SLP Total Time (minutes) 60   SLP Mode of treatment - Individual (minutes) 60   SLP Mode of treatment - Concurrent (minutes) 0   SLP Mode of treatment - Group (minutes) 0   SLP Mode of treatment - Co-treat (minutes) 0   SLP Mode of Treatment - Total time(minutes) 60 minutes   SLP Cumulative Minutes 135   Therapy Time missed   Time missed?  No   Daily FIM Score   Eating (FIM) 5 - Patient requires supervision, cueing or coaxing

## 2020-08-02 ENCOUNTER — APPOINTMENT (INPATIENT)
Dept: NON INVASIVE DIAGNOSTICS | Facility: HOSPITAL | Age: 70
DRG: 092 | End: 2020-08-02
Payer: MEDICARE

## 2020-08-02 PROCEDURE — 97130 THER IVNTJ EA ADDL 15 MIN: CPT

## 2020-08-02 PROCEDURE — 97116 GAIT TRAINING THERAPY: CPT

## 2020-08-02 PROCEDURE — 97110 THERAPEUTIC EXERCISES: CPT

## 2020-08-02 PROCEDURE — 97112 NEUROMUSCULAR REEDUCATION: CPT

## 2020-08-02 PROCEDURE — 94640 AIRWAY INHALATION TREATMENT: CPT

## 2020-08-02 PROCEDURE — 93306 TTE W/DOPPLER COMPLETE: CPT

## 2020-08-02 PROCEDURE — 93306 TTE W/DOPPLER COMPLETE: CPT | Performed by: INTERNAL MEDICINE

## 2020-08-02 PROCEDURE — 94760 N-INVAS EAR/PLS OXIMETRY 1: CPT

## 2020-08-02 PROCEDURE — 97129 THER IVNTJ 1ST 15 MIN: CPT

## 2020-08-02 PROCEDURE — 97530 THERAPEUTIC ACTIVITIES: CPT

## 2020-08-02 RX ADMIN — ISODIUM CHLORIDE 3 ML: 0.03 SOLUTION RESPIRATORY (INHALATION) at 07:22

## 2020-08-02 RX ADMIN — ACETAMINOPHEN 975 MG: 325 TABLET, FILM COATED ORAL at 20:23

## 2020-08-02 RX ADMIN — ENOXAPARIN SODIUM 90 MG: 100 INJECTION SUBCUTANEOUS at 09:02

## 2020-08-02 RX ADMIN — LEVALBUTEROL HYDROCHLORIDE 1.25 MG: 1.25 SOLUTION, CONCENTRATE RESPIRATORY (INHALATION) at 19:53

## 2020-08-02 RX ADMIN — LEVOFLOXACIN 750 MG: 750 TABLET, FILM COATED ORAL at 16:08

## 2020-08-02 RX ADMIN — METOPROLOL TARTRATE 12.5 MG: 25 TABLET ORAL at 09:01

## 2020-08-02 RX ADMIN — SENNOSIDES AND DOCUSATE SODIUM 1 TABLET: 8.6; 5 TABLET ORAL at 09:01

## 2020-08-02 RX ADMIN — Medication 250 MG: at 17:49

## 2020-08-02 RX ADMIN — TAMSULOSIN HYDROCHLORIDE 0.4 MG: 0.4 CAPSULE ORAL at 16:09

## 2020-08-02 RX ADMIN — ACETAMINOPHEN 975 MG: 325 TABLET, FILM COATED ORAL at 14:43

## 2020-08-02 RX ADMIN — POLYETHYLENE GLYCOL 3350 17 G: 17 POWDER, FOR SOLUTION ORAL at 09:02

## 2020-08-02 RX ADMIN — CHLORHEXIDINE GLUCONATE 0.12% ORAL RINSE 15 ML: 1.2 LIQUID ORAL at 09:02

## 2020-08-02 RX ADMIN — OLANZAPINE 5 MG: 5 TABLET, ORALLY DISINTEGRATING ORAL at 20:23

## 2020-08-02 RX ADMIN — ISODIUM CHLORIDE 3 ML: 0.03 SOLUTION RESPIRATORY (INHALATION) at 19:53

## 2020-08-02 RX ADMIN — Medication 250 MG: at 09:01

## 2020-08-02 RX ADMIN — LEVALBUTEROL HYDROCHLORIDE 1.25 MG: 1.25 SOLUTION, CONCENTRATE RESPIRATORY (INHALATION) at 07:22

## 2020-08-02 RX ADMIN — ACETAMINOPHEN 975 MG: 325 TABLET, FILM COATED ORAL at 06:48

## 2020-08-02 RX ADMIN — SENNOSIDES AND DOCUSATE SODIUM 1 TABLET: 8.6; 5 TABLET ORAL at 17:49

## 2020-08-02 RX ADMIN — CHLORHEXIDINE GLUCONATE 0.12% ORAL RINSE 15 ML: 1.2 LIQUID ORAL at 20:23

## 2020-08-02 RX ADMIN — ENOXAPARIN SODIUM 90 MG: 100 INJECTION SUBCUTANEOUS at 20:22

## 2020-08-02 RX ADMIN — MELATONIN 6 MG: at 20:23

## 2020-08-02 RX ADMIN — METOPROLOL TARTRATE 12.5 MG: 25 TABLET ORAL at 20:21

## 2020-08-02 NOTE — PROGRESS NOTES
Internal Medicine Progress Note  Patient: Christa Browne  Age/sex: 79 y o  male  Medical Record #: [id number]      ASSESSMENT/PLAN: (Interval History)  Christa Browne is seen and examined and management for following issues:    DOMI adenocarcinoma; s/p bronchoscopy, mediastinoscopy, VATS, left upper lobectomy and mediastinal lymph node dissection 6/10/20  · Back to Thoracic surgery as OP     PEA with multiple rounds of CPR/respiratory arrest; emergent cricothyroidotomy 6/20; tracheostomy 6/22; decannulation 7/29/20  · Stoma closed and healing well = will watch  · He is speaking well  · Continue Xopenex nebs  · ECHO pending = they tried to get in acute but he had too much SQ air     hx nicotine abuse with 1ppd x 50 yrs  · quit prior to surgery  · continue nebs     Sinus tachycardia  · was on Lopressor 2 5 mg IV q12hrs on admit to [facility] and changed to 12 5mg q12hrs  · Rates were mildy elevated only and seem to be improved if he gets his Lopressor  · No room to increase 2/2 previously soft BPs   · Will get an ECHO 2/2 cardiac arrest and persistent tachycardia (may be influenced by infection/UTI)  · On 8/1/20, negative ortho BPs     Fever; UTI >100,000 Pseudomonas; SIRS/sepsis  · UA+ plus he had been having chills, feeling of incomplete emptying/pelvic pressure but no urine retention  · Primary service started Levaquin = will continue for 5 - 7 days total  · Blood cx NG x 24 hrs  · Final urine cx is pending  · CXR was negative  · LA was normal on 7/31/20  · Was on IVF NSS at 75ml/hr until last night at midnight  · Afebrile and doing well currently     ABLA  · Stable  · will watch and transfuse prn hemoglobin <8 0     Dysphagia  · resolved and on regular diet but will watch     DVT  LE and RUE  · currently is on therapeutic Lovenox   · will need conversion to NOAC eventually  · followup venous dopplers on UE/LE showed resolution of DVTs     Urine retention  · on Flomax which he refused 7/31 stating it "tasted bad"  · D/w him that if he continually refuses to take that he may end up with urine retention again but he took last night  · Lucila was d/cd 7/27/20     TBI 2010  · Has memory issues from this incident but unk details since he cannot remember anything about it     ETOH abuse  · Unknown amount/details       The above assessment and plan was reviewed and updated as determined by my evaluation of the patient on 8/2/2020      Labs:   Results from last 7 days   Lab Units 08/01/20  0551 07/31/20  0600   WBC Thousand/uL 11 12* 12 77*   HEMOGLOBIN g/dL 8 5* 9 4*   HEMATOCRIT % 27 5* 30 8*   PLATELETS Thousands/uL 322 306     Results from last 7 days   Lab Units 07/31/20  0600 07/28/20  0446   SODIUM mmol/L 138 141   POTASSIUM mmol/L 4 2 4 0   CHLORIDE mmol/L 108 108   CO2 mmol/L 24 26   BUN mg/dL 10 14   CREATININE mg/dL 0 89 0 81   CALCIUM mg/dL 9 4 8 6             Results from last 7 days   Lab Units 07/31/20  0555 07/31/20  0014 07/30/20  1025   POC GLUCOSE mg/dl 102 132 91       Review of Scheduled Meds:  acetaminophen, 975 mg, Oral, Q8H [address], Julisa Alba MD  albuterol, 2 5 mg, Nebulization, Q6H PRN, Julisa Alba MD  calcium carbonate, 500 mg, Oral, TID PRN, Julisa Alba MD  chlorhexidine, 15 mL, [employer], Q12H [address], Julisa Alba MD  enoxaparin, 1 mg/kg, Subcutaneous, Q12H [address], Julisa Abla MD  guaiFENesin, 200 mg, Oral, Q4H PRN, Julisa Alba MD  levalbuterol, 1 25 mg, Nebulization, Q12H, Julisa Alba MD  levofloxacin, 750 mg, Oral, Q24H, Julisa Alba MD  Lidocaine Viscous HCl, 15 mL, Swish & Spit, 4x Daily PRN, Julisa Alba MD  melatonin, 6 mg, Oral, HS, Julisa Alba MD  metoprolol tartrate, 12 5 mg, Oral, Q12H [address], Giana, KHANG  OLANZapine, 5 mg, Oral, HS, Julisa Alba MD  ondansetron, 4 mg, Oral, Q6H PRN, Julisa Alba MD  oxyCODONE, 2 5 mg, Oral, Q4H PRN, Julisa Alba MD  polyethylene glycol, 17 g, Oral, Daily, Julisa Alba MD  saccharomyces boulardii, 250 mg, Oral, BID, Giana, KHANG  senna-docusate sodium, 1 tablet, Per NG Tube, BID, Delma Boykin MD  sodium chloride, 3 mL, Nebulization, Q12H, Delma Boykin MD  tamsulosin, 0 4 mg, Oral, Daily With Hernan Downey MD        Subjective/ HPI: Patients overnight issues or events were reviewed with nursing or staff during rounds or morning huddle session  No new or overnight issues  Offers no complaints  ROS:   A 10 point ROS was performed; negative except as noted above  *Labs /Radiology studies reviewed  *Medications reviewed and reconciled as needed  *Please refer to order section for additional ordered labs studies  *Case discussed with primary attending during morning huddle case rounds      Physical Examination:  Vitals:   Vitals:    08/01/20 2014 08/02/20 0527 08/02/20 0555 08/02/20 0723   BP: 129/75 121/82     BP Location: Left arm Left arm     Pulse: 98 95     Resp: 18 18     Temp: 98 °F (36 7 °C) 97 8 °F (36 6 °C)     TempSrc: Oral Oral     SpO2: 98% 94%  96%   Weight:  100 kg (221 lb 1 9 oz) 100 kg (220 lb 8 4 oz)    Height:           General Appearance: no distress, conversive  HEENT: PERRLA, conjuctiva normal; oropharynx clear; mucous membranes moist   Neck:  Supple, normal ROM, no JVD  Lungs: CTA, normal respiratory effort, no retractions, expiratory effort normal  CV: regular rate and rhythm; no rubs/murmurs/gallops, PMI normal   ABD: soft; ND/NT; +BS  EXT: no edema  Skin: normal turgor, normal texture, no rashes  Psych: affect normal, mood normal  Neuro: AA      The above physical exam was reviewed and updated as determined by my evaluation of the patient on 8/2/2020      Invasive Devices     Peripheral Intravenous Line            Peripheral IV 07/31/20 Distal;Left;Ventral (anterior) Forearm 1 day                   VTE Pharmacologic Prophylaxis: Enoxaparin  Code Status: Level 2 - DNAR: but accepts endotracheal intubation  Current Length of Stay: 3 day(s)      Total time spent:  30 minutes with more than 50% spent counseling/coordinating care  Counseling includes discussion with patient re: progress  and discussion with patient of his/her current medical state/information  Coordination of patient's care was performed in conjunction with primary service  Time invested included review of patient's labs, vitals, and management of their comorbidities with continued monitoring  In addition, this patient was discussed with medical team including physician and advanced extenders  The care of the patient was extensively discussed and appropriate treatment plan was formulated unique for this patient  ** Please Note:  voice to text software may have been used in the creation of this document   Although proof errors in transcription or interpretation are a potential of such software**

## 2020-08-02 NOTE — PROGRESS NOTES
08/02/20 1030   Pain Assessment   Pain Assessment Tool Pain Assessment not indicated - pt denies pain   Pain Score No Pain   Restrictions/Precautions   Precautions Aspiration;Bed/chair alarms;Cognitive; Fall Risk;Supervision on toilet/commode   Comprehension   QI: Comprehension 3  Usually Understands: Understands most conversations, but misses some part/intent of message  Requires cues at times to understand  Comprehension (FIM) 4 - Understands basic info/conversation 75-90% of time   Expression   QI: Expression 3  Exhibits some difficulty with expressing needs and ideas (e g , some words or finishing thoughts) or speech is not clear   Expression (FIM) 5 - Needs help/cues only RARELY (< 10% of the time)   Social Interaction   Social Interaction (FIM) 5 - Interacts appropriately with others 90% of time   Problem Solving   Problem solving (FIM) 4 - Solves basic problems 75-89% of time   Memory   Memory (FIM) 4 - Recognizes/recalls/performs 75-89%   Memory Skills   Orientation Level Oriented to person;Oriented to time;Oriented to situation   Speech/Language/Cognition Assessmetn   Treatment Assessment Pt seen for f/u cognitive-linguistic tx and noted to be fully oriented except for recalling the city, but given prompt, he improved accuracy  Pt completed 4-step written sequencing task during which he accurately sequenced 18/24 steps across 6 sets w/improvement to 20/24 given verbal cues  Pt completed session w/visual STM task of presentation of drawing of fishing scene for 1 minute, and upon removal, pt accurately answered 9/10 ?'s w/improvement to 10/10 given verbal prompt  Pt able to recall more of his medical hx today, though some delay in processing noted  He will continue to benefit from skilled ST intervention to maximize overall skills at this time     SLP Therapy Minutes   SLP Time In 3759   SLP Time Out 1100   SLP Total Time (minutes) 30   SLP Mode of treatment - Individual (minutes) 30   SLP Mode of treatment - Concurrent (minutes) 0   SLP Mode of treatment - Group (minutes) 0   SLP Mode of treatment - Co-treat (minutes) 0   SLP Mode of Treatment - Total time(minutes) 30 minutes   SLP Cumulative Minutes 165   Therapy Time missed   Time missed?  No

## 2020-08-02 NOTE — PROGRESS NOTES
08/02/20 0830   Pain Assessment   Pain Assessment Tool Pain Assessment not indicated - pt denies pain   Pain Score No Pain   Restrictions/Precautions   Precautions Bed/chair alarms;Cognitive; Fall Risk;Supervision on toilet/commode   Cognition   Overall Cognitive Status Impaired   Arousal/Participation Cooperative;Responsive; Alert   Attention Attends with cues to redirect   Memory Decreased short term memory;Decreased recall of recent events;Decreased recall of precautions   Following Commands Follows one step commands with increased time or repetition   Subjective   Subjective pt denies any pain, reports muscle soreness and fatigue   Roll Left and Right   Type of Assistance Needed Set-up / clean-up   Roll Left and Right CARE Score 5   Sit to Lying   Type of Assistance Needed Set-up / clean-up   Sit to Lying CARE Score 5   Lying to Sitting on Side of Bed   Type of Assistance Needed Set-up / clean-up   Lying to Sitting on Side of Bed CARE Score 5   Sit to Stand   Type of Assistance Needed Incidental touching   Sit to Stand CARE Score 4   Bed-Chair Transfer   Type of Assistance Needed Incidental touching   Chair/Bed-to-Chair Transfer CARE Score 4   Transfer Bed/Chair/Wheelchair   Findings use of RW in room, no AD during therapy; gait belt only   Car Transfer   Type of Assistance Needed Incidental touching;Verbal cues   Comment cues to safely get out of car with technique   Car Transfer CARE Score 4   Walk 10 Feet   Type of Assistance Needed Physical assistance   Amount of Physical Assistance Provided Less than 25%   Walk 10 Feet CARE Score 3   Walk 50 Feet with Two Turns   Type of Assistance Needed Physical assistance   Amount of Physical Assistance Provided Less than 25%   Walk 50 Feet with Two Turns CARE Score 3   Walk 150 Feet   Type of Assistance Needed Physical assistance   Amount of Physical Assistance Provided Less than 25%   Walk 150 Feet CARE Score 3   Walking 10 Feet on Uneven Surfaces   Type of Assistance Needed Physical assistance   Amount of Physical Assistance Provided Less than 25%   Comment ramp in gym   Walking 10 Feet on Uneven Surfaces CARE Score 3   Ambulation   Does the patient walk? 2  Yes   Primary Mode of Locomotion Prior to Admission Walk   Distance Walked (feet) 150 ft  (x4)   Assist Device [employer]; Other  (no AD)   Gait Pattern Inconsistant Joanna; Slow Joanna;Decreased foot clearance;Scissoring; Improper weight shift   Limitations Noted In Balance; Endurance;Speed;Strength   Provided Assistance with: Balance   Walk Assist Level Contact Guard;Minimum Assist   Findings use of 'x1 than no AD remainder of session  occasional scissoring, fatigues quickly   Wheel 50 Feet with Two Turns   Reason if not Attempted Activity not applicable   Wheel 50 Feet with Two Turns CARE Score 9   Wheel 150 Feet   Reason if not Attempted Activity not applicable   Wheel 278 Feet CARE Score 9   Wheelchair mobility   Does the patient use a wheelchair? 0  No   Curb or Single Stair   Style negotiated Curb   Type of Assistance Needed Physical assistance   Amount of Physical Assistance Provided Less than 25%   Comment HHA   1 Step (Curb) CARE Score 3   4 Steps   Type of Assistance Needed Physical assistance; Adaptive equipment   Amount of Physical Assistance Provided Less than 25%   4 Steps CARE Score 3   12 Steps   Type of Assistance Needed Physical assistance; Adaptive equipment   Amount of Physical Assistance Provided Less than 25%   Comment MCNEIL with activity   12 Steps CARE Score 3   Stairs   Type Stairs   # of Steps 12   Weight Bearing Precautions Fall Risk   Assist Devices Single Rail   Findings pt unsure of railings and how many steps at home  initiated steps with B HRs on 6"  steps  than pt taken into stairwell for FF and able to complete at [facility]/N[facility] with gait belt with L HR ascending/ R HR descending   Picking Up Object   Type of Assistance Needed Verbal cues; Incidental touching   Comment with reacher Picking Up Object CARE Score 4   Toilet Transfer   Type of Assistance Needed Incidental touching; Adaptive equipment   Comment with grab bars   Toilet Transfer CARE Score 4   Therapeutic Interventions   Strengthening seated LAQ and hip flex with 2# AW x20 each  unsupported STS for functional strengthening   Flexibility B gastroc and HS x3mins   Balance fwd/bwd walking with ball toss 50' each direction   Assessment   Treatment Assessment pt tolerated tx well with intiation of steps and longer distances  pt amb initially with RW but demonstrates limited use  practiced remainder of session no AD   pt does get MCNEIL and rest breaks as needed  O2 sats with activity 97%,    due to cognitive deficits pt with STM and requires simple cueing for activiities  pt will require Sup upon d/c but will cont to work on strength, endurance and balance to improve activity tolerance and righting reactions to progress with functional ind with LRAD for d/c  Family/Caregiver Present no   Problem List Decreased strength;Decreased endurance; Impaired balance;Decreased mobility; Decreased coordination;Decreased cognition; Impaired judgement;Decreased safety awareness   Barriers to Discharge Inaccessible home environment;Decreased caregiver support   PT Barriers   Physical Impairment Decreased strength;Decreased endurance; Impaired balance;Decreased mobility; Decreased cognition;Decreased safety awareness   Functional Limitation Walking;Transfers;Standing;Stair negotiation   Plan   Treatment/Interventions Functional transfer training;LE strengthening/ROM; Endurance training;Patient/family training;Gait training   Progress Progressing toward goals   Recommendation   PT Discharge Recommendation Other (Comment)  (pending progress)   Equipment Recommended Other (Comment)  (LRAD)   PT Therapy Minutes   PT Time In 0830   PT Time Out 1000   PT Total Time (minutes) 90   PT Mode of treatment - Individual (minutes) 90   PT Mode of treatment - Concurrent (minutes) 0   PT Mode of treatment - Group (minutes) 0   PT Mode of treatment - Co-treat (minutes) 0   PT Mode of Treatment - Total time(minutes) 90 minutes   PT Cumulative Minutes 125   Therapy Time missed   Time missed?  No

## 2020-08-02 NOTE — PLAN OF CARE
Problem: Potential for Falls  Goal: Patient will remain free of falls  Description: INTERVENTIONS:  - Assess patient frequently for physical needs  -  Identify cognitive and physical deficits and behaviors that affect risk of falls    -  [place] fall precautions as indicated by assessment   - Educate patient/family on patient safety including physical limitations  - Instruct patient to call for assistance with activity based on assessment  - Modify environment to reduce risk of injury  - Consider OT/PT consult to assist with strengthening/mobility  Outcome: Progressing     Problem: Prexisting or High Potential for Compromised Skin Integrity  Goal: Skin integrity is maintained or improved  Description: INTERVENTIONS:  - Identify patients at risk for skin breakdown  - Assess and monitor skin integrity  - Assess and monitor nutrition and hydration status  - Monitor labs   - Assess for incontinence   - Turn and reposition patient  - Assist with mobility/ambulation  - Relieve pressure over bony prominences  - Avoid friction and shearing  - Provide appropriate hygiene as needed including keeping skin clean and dry  - Evaluate need for skin moisturizer/barrier cream  - Collaborate with interdisciplinary team   - Patient/family teaching  - Consider wound care consult   Outcome: Progressing     Problem: PAIN - ADULT  Goal: Verbalizes/displays adequate comfort level or baseline comfort level  Description: Interventions:  - Encourage patient to monitor pain and request assistance  - Assess pain using appropriate pain scale  - Administer analgesics based on type and severity of pain and evaluate response  - Implement non-pharmacological measures as appropriate and evaluate response  - Consider cultural and social influences on pain and pain management  - Notify physician/advanced practitioner if interventions unsuccessful or patient reports new pain  Outcome: Progressing     Problem: INFECTION - ADULT  Goal: Absence or prevention of progression during hospitalization  Description: INTERVENTIONS:  - Assess and monitor for signs and symptoms of infection  - Monitor lab/diagnostic results  - Monitor all insertion sites, i e  indwelling lines, tubes, and drains  - Monitor endotracheal if appropriate and nasal secretions for changes in amount and color  - [place] appropriate cooling/warming therapies per order  - Administer medications as ordered  - Instruct and encourage patient and family to use good hand hygiene technique  - Identify and instruct in appropriate isolation precautions for identified infection/condition  Outcome: Progressing  Goal: Absence of fever/infection during neutropenic period  Description: INTERVENTIONS:  - Monitor WBC    Outcome: Completed     Problem: SAFETY ADULT  Goal: Maintain or return to baseline ADL function  Description: INTERVENTIONS:  -  Assess patient's ability to carry out ADLs; assess patient's baseline for ADL function and identify physical deficits which impact ability to perform ADLs (bathing, care of mouth/teeth, toileting, grooming, dressing, etc )  - Assess/evaluate cause of self-care deficits   - Assess range of motion  - Assess patient's mobility; develop plan if impaired  - Assess patient's need for assistive devices and provide as appropriate  - Encourage maximum independence but intervene and supervise when necessary  - Involve family in performance of ADLs  - Assess for home care needs following discharge   - Consider OT consult to assist with ADL evaluation and planning for discharge  - Provide patient education as appropriate  Outcome: Progressing  Goal: Maintain or return mobility status to optimal level  Description: INTERVENTIONS:  - Assess patient's baseline mobility status (ambulation, transfers, stairs, etc )    - Identify cognitive and physical deficits and behaviors that affect mobility  - Identify mobility aids required to assist with transfers and/or ambulation (gait belt, sit-to-stand, lift, walker, cane, etc )  - [place] fall precautions as indicated by assessment  - Record patient progress and toleration of activity level on Mobility SBAR; progress patient to next Phase/Stage  - Instruct patient to call for assistance with activity based on assessment  - Consider rehabilitation consult to assist with strengthening/weightbearing, etc   Outcome: Progressing     Problem: DISCHARGE PLANNING  Goal: Discharge to home or other facility with appropriate resources  Description: INTERVENTIONS:  - Identify barriers to discharge w/patient and caregiver  - Arrange for needed discharge resources and transportation as appropriate  - Identify discharge learning needs (meds, wound care, etc )  - Arrange for interpretive services to assist at discharge as needed  - Refer to Case Management Department for coordinating discharge planning if the patient needs post-hospital services based on physician/advanced practitioner order or complex needs related to functional status, cognitive ability, or social support system  Outcome: Progressing     Problem: Knowledge Deficit  Goal: Patient/family/caregiver demonstrates understanding of disease process, treatment plan, medications, and discharge instructions  Description: Complete learning assessment and assess knowledge base    Interventions:  - Provide teaching at level of understanding  - Provide teaching via preferred learning methods  Outcome: Progressing     Problem: RESPIRATORY - ADULT  Goal: Achieves optimal ventilation and oxygenation  Description: INTERVENTIONS:  - Assess for changes in respiratory status  - Assess for changes in mentation and behavior  - Position to facilitate oxygenation and minimize respiratory effort  - Oxygen administered by appropriate delivery if ordered  - Initiate smoking cessation education as indicated  - Encourage broncho-pulmonary hygiene including cough, deep breathe, Incentive Spirometry  - Assess the need for suctioning and aspirate as needed  - Assess and instruct to report SOB or any respiratory difficulty  - Respiratory Therapy support as indicated  Outcome: Completed     Problem: GASTROINTESTINAL - ADULT  Goal: Minimal or absence of nausea and/or vomiting  Description: INTERVENTIONS:  - Administer IV fluids if ordered to ensure adequate hydration  - Maintain NPO status until nausea and vomiting are resolved  - Nasogastric tube if ordered  - Administer ordered antiemetic medications as needed  - Provide nonpharmacologic comfort measures as appropriate  - Advance diet as tolerated, if ordered  - Consider nutrition services referral to assist patient with adequate nutrition and appropriate food choices  Outcome: Completed  Goal: Maintains or returns to baseline bowel function  Description: INTERVENTIONS:  - Assess bowel function  - Encourage oral fluids to ensure adequate hydration  - Administer IV fluids if ordered to ensure adequate hydration  - Administer ordered medications as needed  - Encourage mobilization and activity  - Consider nutritional services referral to assist patient with adequate nutrition and appropriate food choices  Outcome: Completed  Goal: Maintains adequate nutritional intake  Description: INTERVENTIONS:  - Monitor percentage of each meal consumed  - Identify factors contributing to decreased intake, treat as appropriate  - Assist with meals as needed  - Monitor I&O, weight, and lab values if indicated  - Obtain nutrition services referral as needed  Outcome: Completed     Problem: GENITOURINARY - ADULT  Goal: Maintains or returns to baseline urinary function  Description: INTERVENTIONS:  - Assess urinary function  - Encourage oral fluids to ensure adequate hydration if ordered  - Administer IV fluids as ordered to ensure adequate hydration  - Administer ordered medications as needed  - Offer frequent toileting  - Follow urinary retention protocol if ordered  Outcome: Completed  Goal: Absence of urinary retention  Description: INTERVENTIONS:  - Assess patients ability to void and empty bladder  - Monitor I/O  - Bladder scan as needed  - Discuss with physician/AP medications to alleviate retention as needed  - Discuss catheterization for long term situations as appropriate  Outcome: Completed     Problem: SKIN/TISSUE INTEGRITY - ADULT  Goal: Skin integrity remains intact  Description: INTERVENTIONS  - Identify patients at risk for skin breakdown  - Assess and monitor skin integrity  - Assess and monitor nutrition and hydration status  - Monitor labs (i e  albumin)  - Assess for incontinence   - Turn and reposition patient  - Assist with mobility/ambulation  - Relieve pressure over bony prominences  - Avoid friction and shearing  - Provide appropriate hygiene as needed including keeping skin clean and dry  - Evaluate need for skin moisturizer/barrier cream  - Collaborate with interdisciplinary team (i e  Nutrition, Rehabilitation, etc )   - Patient/family teaching  Outcome: Progressing  Goal: Incision(s), wounds(s) or drain site(s) healing without S/S of infection  Description: INTERVENTIONS  - Assess and document risk factors for skin impairment   - Assess and document dressing, incision, wound bed, drain sites and surrounding tissue  - Consider nutrition services referral as needed  - Oral mucous membranes remain intact  - Provide patient/ family education  Outcome: Completed  Goal: Oral mucous membranes remain intact  Description: INTERVENTIONS  - Assess oral mucosa and hygiene practices  - Implement preventative oral hygiene regimen  - Implement oral medicated treatments as ordered  - Initiate Nutrition services referral as needed  Outcome: Completed     Problem: HEMATOLOGIC - ADULT  Goal: Maintains hematologic stability  Description: INTERVENTIONS  - Assess for signs and symptoms of bleeding or hemorrhage  - Monitor labs  - Administer supportive blood products/factors as ordered and appropriate  Outcome: Completed     Problem: MUSCULOSKELETAL - ADULT  Goal: Maintain or return mobility to safest level of function  Description: INTERVENTIONS:  - Assess patient's ability to carry out ADLs; assess patient's baseline for ADL function and identify physical deficits which impact ability to perform ADLs (bathing, care of mouth/teeth, toileting, grooming, dressing, etc )  - Assess/evaluate cause of self-care deficits   - Assess range of motion  - Assess patient's mobility  - Assess patient's need for assistive devices and provide as appropriate  - Encourage maximum independence but intervene and supervise when necessary  - Involve family in performance of ADLs  - Assess for home care needs following discharge   - Consider OT consult to assist with ADL evaluation and planning for discharge  - Provide patient education as appropriate  Outcome: Progressing  Goal: Maintain proper alignment of affected body part  Description: INTERVENTIONS:  - Support, maintain and protect limb and body alignment  - Provide patient/ family with appropriate education  Outcome: Progressing

## 2020-08-02 NOTE — PROGRESS NOTES
08/02/20 1230   Pain Assessment   Pain Assessment Tool Pain Assessment not indicated - pt denies pain   Pain Score No Pain   Restrictions/Precautions   Precautions Bed/chair alarms;Cognitive; Fall Risk;Supervision on toilet/commode   Lifestyle   Autonomy "I just feel bleh"   Grooming   Findings S for hand hygiene in stance   Tub/Shower Transfer   Findings Discussed DME recommendations for shower at home with daughter  Daughter reporting small ledge into walk-in shower with curtain  No grab bar at this time but receptive to suction grab bar if necessary  Room for small shower chair  Daughter receptive to purchase  Putting On/Taking Off Footwear   Type of Assistance Needed Supervision   Amount of Physical Assistance Provided No physical assistance   Comment don/dof sneakers   Putting On/Taking Off Footwear CARE Score 4   Sit to Stand   Type of Assistance Needed Supervision   Amount of Physical Assistance Provided No physical assistance   Sit to Stand CARE Score 4   Bed-Chair Transfer   Type of Assistance Needed Incidental touching   Amount of Physical Assistance Provided No physical assistance   Comment cuing to slow pace and to inc awareness of obstacles in environment   Chair/Bed-to-Chair Transfer CARE Score 4   Toilet Transfer   Comment educated daughter about possible need for commode over toilet as pt reliant on grab bars at this time  Receptive pending progress  Kitchen Mobility   Kitchen-Mobility Level   (no AD)   Kitchen Activity Retrieve items;Transport items   Kitchen Mobility Comments retrieval of cones from upper cabinets, low drawers, stove, fridge to assess safety and balance in kitchen  No LOB  Repeatedly checking same cabinet demo dec STM  Limited in safety by endurance req repeated rest breaks  Demo P insight into s/s of fatigue  Exercise Tools   Theraband Completed UE therex with green theraband to promote inc strength and tolerance to fx task completion  Tolerated 3x10 in all planes   Would benefit from inc resistance for bicep curls and tricep extensions  Pt with dec strength through [facility] joint req ext rest breaks between sets  No pain  Cognition   Overall Cognitive Status Impaired   Arousal/Participation Cooperative;Responsive; Alert   Attention Attends with cues to redirect   Orientation Level Oriented to person;Oriented to place;Oriented to time;Disoriented to situation   Memory Decreased short term memory;Decreased recall of recent events;Decreased recall of precautions   Following Commands Follows one step commands with increased time or repetition   Activity Tolerance   Activity Tolerance Patient limited by fatigue   Assessment   OT Family training done with: daughter Debra Thompson of family training Pt participated in skilled OT session with focus on fx transfers, UE strengthening, kitchen mobility, act tolerance, and pt/family education  Daughter Jere Christianson (who is an OR nurse) present to discuss home set-up and current A available  Reporting walk-in shower with curtain opening and small step over lip  No shower chair currently but receptive to purchase  Will provide recommendation after assessment of transfer and needs but anticipate standard shower chair with back for endurance sake  No grab bars by toilet but small sturdy vanity present for UE support, receptive to commode if necessary  Informed daughter of current cognitive level 20/30 MOCA with deficits in attention and STM  Daughter verbalized awareness of deficits since hospital stay and reporting pt not at baseline at this time  Educated daughter that pt is not to drive upon dc from hospital until cleared medical team with daughter in agreement  Pt overall cont to limited by endurance deficits, dec motivation, and cognitive deficits inc fall risk  Cont with POC with focus on endurance work, dynamic balance, mobility w/o AD, meal prep (as pt spends significant time in kitchen at baseline), medication management   Pt in bed at end of session with alarm engaged, call bell in reach, and SCDs on  Prognosis Fair   Problem List Decreased strength;Decreased endurance; Impaired balance;Decreased mobility; Decreased coordination;Decreased cognition; Impaired judgement;Decreased safety awareness   Barriers to Discharge Inaccessible home environment;Decreased caregiver support   Plan   Treatment/Interventions ADL retraining;Functional transfer training; Therapeutic exercise; Endurance training;Patient/family training;Equipment eval/education; Bed mobility;Cognitive reorientation   Progress Slow progress, decreased activity tolerance   Recommendation   OT Discharge Recommendation Return to previous environment with social support  (pending progress - will be home alone during day at times)   Equipment Recommended   (shower chair)   OT Therapy Minutes   OT Time In 1230   OT Time Out 1400   OT Total Time (minutes) 90   OT Mode of treatment - Individual (minutes) 90   OT Mode of treatment - Concurrent (minutes) 0   OT Mode of treatment - Group (minutes) 0   OT Mode of treatment - Co-treat (minutes) 0   OT Mode of Treatment - Total time(minutes) 90 minutes   OT Cumulative Minutes 225   Therapy Time missed   Time missed?  No

## 2020-08-03 PROBLEM — S06.9X9A TBI (TRAUMATIC BRAIN INJURY) (HCC): Status: ACTIVE | Noted: 2020-08-03

## 2020-08-03 PROBLEM — N39.0 UTI (URINARY TRACT INFECTION): Status: ACTIVE | Noted: 2020-08-03

## 2020-08-03 LAB
ANION GAP SERPL CALCULATED.3IONS-SCNC: 4 MMOL/L (ref 4–13)
BASOPHILS # BLD AUTO: 0.03 THOUSANDS/ΜL (ref 0–0.1)
BASOPHILS NFR BLD AUTO: 0 % (ref 0–1)
BUN SERPL-MCNC: 8 MG/DL (ref 5–25)
CALCIUM SERPL-MCNC: 9.2 MG/DL (ref 8.3–10.1)
CHLORIDE SERPL-SCNC: 114 MMOL/L (ref 100–108)
CO2 SERPL-SCNC: 23 MMOL/L (ref 21–32)
CREAT SERPL-MCNC: 0.83 MG/DL (ref 0.6–1.3)
EOSINOPHIL # BLD AUTO: 0.35 THOUSAND/ΜL (ref 0–0.61)
EOSINOPHIL NFR BLD AUTO: 5 % (ref 0–6)
ERYTHROCYTE [DISTWIDTH] IN BLOOD BY AUTOMATED COUNT: 15.3 % (ref 11.6–15.1)
GFR SERPL CREATININE-BSD FRML MDRD: 89 ML/MIN/1.73SQ M
GLUCOSE SERPL-MCNC: 112 MG/DL (ref 65–140)
HCT VFR BLD AUTO: 28.4 % (ref 36.5–49.3)
HGB BLD-MCNC: 8.6 G/DL (ref 12–17)
IMM GRANULOCYTES # BLD AUTO: 0.03 THOUSAND/UL (ref 0–0.2)
IMM GRANULOCYTES NFR BLD AUTO: 0 % (ref 0–2)
LYMPHOCYTES # BLD AUTO: 1.38 THOUSANDS/ΜL (ref 0.6–4.47)
LYMPHOCYTES NFR BLD AUTO: 20 % (ref 14–44)
MCH RBC QN AUTO: 29.9 PG (ref 26.8–34.3)
MCHC RBC AUTO-ENTMCNC: 30.3 G/DL (ref 31.4–37.4)
MCV RBC AUTO: 99 FL (ref 82–98)
MONOCYTES # BLD AUTO: 0.5 THOUSAND/ΜL (ref 0.17–1.22)
MONOCYTES NFR BLD AUTO: 7 % (ref 4–12)
NEUTROPHILS # BLD AUTO: 4.5 THOUSANDS/ΜL (ref 1.85–7.62)
NEUTS SEG NFR BLD AUTO: 68 % (ref 43–75)
NRBC BLD AUTO-RTO: 0 /100 WBCS
PLATELET # BLD AUTO: 359 THOUSANDS/UL (ref 149–390)
PMV BLD AUTO: 9.7 FL (ref 8.9–12.7)
POTASSIUM SERPL-SCNC: 3.6 MMOL/L (ref 3.5–5.3)
RBC # BLD AUTO: 2.88 MILLION/UL (ref 3.88–5.62)
SODIUM SERPL-SCNC: 141 MMOL/L (ref 136–145)
WBC # BLD AUTO: 6.79 THOUSAND/UL (ref 4.31–10.16)

## 2020-08-03 PROCEDURE — 97110 THERAPEUTIC EXERCISES: CPT

## 2020-08-03 PROCEDURE — 97130 THER IVNTJ EA ADDL 15 MIN: CPT

## 2020-08-03 PROCEDURE — 97116 GAIT TRAINING THERAPY: CPT

## 2020-08-03 PROCEDURE — 97129 THER IVNTJ 1ST 15 MIN: CPT

## 2020-08-03 PROCEDURE — 85025 COMPLETE CBC W/AUTO DIFF WBC: CPT | Performed by: NURSE PRACTITIONER

## 2020-08-03 PROCEDURE — 80048 BASIC METABOLIC PNL TOTAL CA: CPT | Performed by: NURSE PRACTITIONER

## 2020-08-03 PROCEDURE — 94760 N-INVAS EAR/PLS OXIMETRY 1: CPT

## 2020-08-03 PROCEDURE — 97535 SELF CARE MNGMENT TRAINING: CPT

## 2020-08-03 PROCEDURE — 99233 SBSQ HOSP IP/OBS HIGH 50: CPT | Performed by: PHYSICAL MEDICINE & REHABILITATION

## 2020-08-03 PROCEDURE — 97530 THERAPEUTIC ACTIVITIES: CPT

## 2020-08-03 PROCEDURE — 94640 AIRWAY INHALATION TREATMENT: CPT

## 2020-08-03 RX ORDER — ACETAMINOPHEN 325 MG/1
650 TABLET ORAL EVERY 6 HOURS PRN
Status: DISCONTINUED | OUTPATIENT
Start: 2020-08-03 | End: 2020-08-12 | Stop reason: HOSPADM

## 2020-08-03 RX ADMIN — ENOXAPARIN SODIUM 90 MG: 100 INJECTION SUBCUTANEOUS at 21:16

## 2020-08-03 RX ADMIN — LEVOFLOXACIN 750 MG: 750 TABLET, FILM COATED ORAL at 15:03

## 2020-08-03 RX ADMIN — TAMSULOSIN HYDROCHLORIDE 0.4 MG: 0.4 CAPSULE ORAL at 17:52

## 2020-08-03 RX ADMIN — Medication 250 MG: at 17:53

## 2020-08-03 RX ADMIN — METOPROLOL TARTRATE 25 MG: 25 TABLET, FILM COATED ORAL at 21:16

## 2020-08-03 RX ADMIN — OLANZAPINE 5 MG: 5 TABLET, ORALLY DISINTEGRATING ORAL at 21:16

## 2020-08-03 RX ADMIN — CHLORHEXIDINE GLUCONATE 0.12% ORAL RINSE 15 ML: 1.2 LIQUID ORAL at 21:16

## 2020-08-03 RX ADMIN — LEVALBUTEROL HYDROCHLORIDE 1.25 MG: 1.25 SOLUTION, CONCENTRATE RESPIRATORY (INHALATION) at 08:13

## 2020-08-03 RX ADMIN — METOPROLOL TARTRATE 12.5 MG: 25 TABLET ORAL at 08:41

## 2020-08-03 RX ADMIN — Medication 250 MG: at 08:41

## 2020-08-03 RX ADMIN — MELATONIN 6 MG: at 21:16

## 2020-08-03 RX ADMIN — ISODIUM CHLORIDE 3 ML: 0.03 SOLUTION RESPIRATORY (INHALATION) at 08:13

## 2020-08-03 RX ADMIN — ENOXAPARIN SODIUM 90 MG: 100 INJECTION SUBCUTANEOUS at 08:43

## 2020-08-03 NOTE — PCC SPEECH THERAPY
Pt currently being followed for cognitive linguistic tx sessions and for a brief time was followed for dysphagia tx sessions  As for swallow function, bedside swallow evaluation completed during bfast as MD requesting follow up evaluation for swallow function  Pt with hx of complicated hospital course to include tracheostomy, which he was decannulated on 7/29/20  Pt seated fully upright and OOB in recliner chair for meal  Pt is currently on a regular diet and thin liquids  Oral mech WFL  Demo ability to self feed but noting more rapid pacing and larger bite sizes as meal progressed  Functional bolus retrieval and lip seal w/o anterior loss  Mastication appeared overall timely to occasionally mildly slower but appeared functional for bolus breakdown w/ adequate bolus formation, timely transfers and full oral clearance-no pocketing or overt oral residual present  Pt occasionally using strategy which was previously recommended of use of secondary swallows w/ solids  Swallow initiation appeared overall timely across all items, occasionally appearing mildly delayed w/ solids yet remained functional  Hyolaryngeal movement present to palpation  Pt elicited throat clear x1 during meal but no further overt s/s penetration/aspiration observed this meal  Pt also elicited cough after meal, however, pt's daughter reports that pt has baseline cough prior to recent admission due to lung CA, therefore, ?ing if baseline or delay cough secondary to PO intake w/ recommendations to continue current diet  However, will recommend pt have FULL supervision for meals to ensure carryover of safe swallow strategies: OOB for all meals, slow rate of intake, small bites/sips, 2 swallows per bite, alternate solids/liquids  Brief f/u was completed to determine pt's ongoing tolerance of diet which pt able to exhibit no increased or overt aspiration sxs during meal, but recs continue for FULL supervision due to cognitive deficits   As for cognitive skills, pt completed formalized cognitive assessment, CLQT+, which overall score was 2 8 out of 4 0 which compared to age matched peers between 65-80 yrs, indicating cognition to be MILDLY impaired overall  Most prominent barriers are decreased ST (recent and remote) recall, decreased executive function skills, including problem solving, sequencing, reasoning, judgement, insight to deficits as well as decreased safety awareness w/ use of WC and RW when engaged in functional mobility tasks  Pt will continue to benefit from SLP services to maximize overall functional independence of cognitive linguistic skills to decrease burden of care for family at time of discharge  Update from week 8/10/2020: Pt was briefly f/u for dysphagia tx sessions, which pt remains tolerating regular diet w/ thin liquids w/o increased or overt aspiration sxs  Due to ability to complete swallow strategies during meals, no further recommendations needed for full supervision w/ meals, to which pt can be distant supervision  Primary focus of sessions have been towards cognitive linguistic skills, which ongoing barriers that present impulsivity, decreased attention, decreased ST and working memory skills, decreased executive function skills which impact pt's overall safety within a variety of settings as well as decreased safety for functional mobility  SLP has provided pt's dtr, Jessica Espinal, w/ education/training in regards to current limitations including decreased ability to complete financial and medication management tasks which she should take over at discharge as well as educating in regards to having supervision w/ any kitchen tasks, etc  At this time, pt will continue to benefit from SLP services to maximize overall functional independence of cognitive linguistic skills to decrease burden of care on family at time of discharge along w/ continued OP services to continue to reach those goals

## 2020-08-03 NOTE — PCC PHYSICAL THERAPY
8/3/20  Pt admitted to [facility] after L upper lobectomy  Pt with decrease strength, endurance, balance and poor activity tolerance at this time  Pt using RW for overall stability and safety but cont to practice with no AD to improve balance and righting reactions  Pt lives with dtr and son in law at this time  Pt with barriers of stairs and decrease family support during the day  Pt would benefit from cont skilled therapy to improve deficits to progress with functional ind and reduce fall risk with d/c home with family support and outpt therapy services with [facility]  8/10/20   Pt is progressing well as he is independent with bed mobility and requires S with OOB mobilities without support of AD  Current barriers to independence include decreased activity tolerance, path deviation, decrease LE strength, poor safety awareness, and fall risk as measured by DGI  Pt with anticipated d/c 8/12/20 to home with 24hr supervision and OP PT to cont to address above deficits in order to maximize pt's functional mobility and safety   Co-signed by Nahomi Mitchell DPT

## 2020-08-03 NOTE — PROGRESS NOTES
Internal Medicine Progress Note  Patient: Jacques Burton  Age/sex: 79 y o  male  Medical Record #: [id number]      ASSESSMENT/PLAN: (Interval History)  Jacques Burton is seen and examined and management for following issues:    DOMI adenocarcinoma; s/p bronchoscopy, mediastinoscopy, VATS, left upper lobectomy and mediastinal lymph node dissection 6/10/20  · Back to Thoracic surgery as OP     PEA with multiple rounds of CPR/respiratory arrest; emergent cricothyroidotomy 6/20; tracheostomy 6/22; decannulation 7/29/20  · Stoma closed and healing well = will watch  · He is speaking well  · Continue Xopenex nebs       hx nicotine abuse with 1ppd x 50 yrs  · quit prior to surgery  · continue nebs     Sinus tachycardia  · Rates were mildy elevated BP seem able to tolerate lopressor titration now  · Increase lopressor to 25 mg bid on 8/3  · reviewed ECHO WNL normal EF and no wall motion issues       Fever; UTI >100,000 Pseudomonas; SIRS/sepsis  · Improved symptom since on antibx  · Primary service started Levaquin = will continue for 5 - 7 days total  · Afebrile and doing well currently     ABLA  · Stable  · will watch and transfuse prn hemoglobin <8 0     Dysphagia  · resolved and on regular diet but will watch     DVT  LE and RUE  · currently is on therapeutic Lovenox   · will need conversion to NOAC eventually  · followup venous dopplers on UE/LE showed resolution of DVTs     Urine retention  · on Flomax   · Lucila was d/cd 7/27/20     TBI 2010  · Has memory issues from this incident but unk details since he cannot remember anything about it     ETOH abuse  · Unknown amount/details       The above assessment and plan was reviewed and updated as determined by my evaluation of the patient on 8/3/2020      Labs:   Results from last 7 days   Lab Units 08/03/20  0528 08/01/20  0551   WBC Thousand/uL 6 79 11 12*   HEMOGLOBIN g/dL 8 6* 8 5*   HEMATOCRIT % 28 4* 27 5*   PLATELETS Thousands/uL 359 322     Results from last 7 days   Lab Units 08/03/20  0528 07/31/20  0600   SODIUM mmol/L 141 138   POTASSIUM mmol/L 3 6 4 2   CHLORIDE mmol/L 114* 108   CO2 mmol/L 23 24   BUN mg/dL 8 10   CREATININE mg/dL 0 83 0 89   CALCIUM mg/dL 9 2 9 4             Results from last 7 days   Lab Units 07/31/20  0555 07/31/20  0014 07/30/20  1025   POC GLUCOSE mg/dl 102 132 91       Review of Scheduled Meds:  acetaminophen, 975 mg, Oral, Q8H [facility], Aurelia Magallanes MD  albuterol, 2 5 mg, Nebulization, Q6H PRN, Aurelia Magallanes MD  calcium carbonate, 500 mg, Oral, TID PRN, Aurelia Magallanes MD  chlorhexidine, 15 mL, [place]ish & [place], Q12H [facility], Aurelia Magallanes MD  enoxaparin, 1 mg/kg, Subcutaneous, Q12H [facility], Aurelia Magallanes MD  guaiFENesin, 200 mg, Oral, Q4H PRN, Aurelia Magallanes MD  levofloxacin, 750 mg, Oral, Q24H, Aurelia Magallanes MD  Lidocaine Viscous HCl, 15 mL, Swish & Spit, 4x Daily PRN, Aurelia Magallanes MD  melatonin, 6 mg, Oral, HS, Aurelia Magallanes MD  metoprolol tartrate, 12 5 mg, Oral, Q12H DEAN, KHANG Hooper  OLANZapine, 5 mg, Oral, HS, Aurelia Magallanes MD  ondansetron, 4 mg, Oral, Q6H PRN, Aurelia Magallanes MD  oxyCODONE, 2 5 mg, Oral, Q4H PRN, Aurelia Magallanes MD  polyethylene glycol, 17 g, Oral, Daily, Aurelia Magallanes MD  saccharomyces boulardii, 250 mg, Oral, BID, KHANG Hooper  senna-docusate sodium, 1 tablet, Per NG Tube, BID, Aurelia Magallanes MD  tamsulosin, 0 4 mg, Oral, Daily With Tere Norris MD        Subjective/ HPI: Pt without any overnight events or reported nursing issues    ROS:   A 10 point ROS was performed; negative except as noted above       *Labs /Radiology studies reviewed  *Medications reviewed and reconciled as needed  *Please refer to order section for additional ordered labs studies  *Case discussed with primary attending during morning huddle case rounds      Physical Examination:  Vitals:   Vitals:    08/03/20 0750 08/03/20 0755 08/03/20 0800 08/03/20 0813   BP: 124/77 127/78 112/78    BP Location: Left arm Left arm Left arm    Pulse: 105 88 (!) 117    Resp:       Temp:       TempSrc:       SpO2: 99% 99% 99% 98%   Weight:       Height:           General Appearance: no distress, conversive  HEENT: PERRLA, conjuctiva normal; oropharynx clear; mucous membranes moist;   Neck:  Supple, no lymphadenopathy or thyromegaly  Lungs: CTA, normal respiratory effort, no retractions, expiratory effort normal  CV: regular rate and rhythm , PMI normal   ABD: soft non tender, no masses , no hepatic or splenomegaly  EXT: DP pulses intact, no lymphadenopathy, no edema  Skin: normal turgor, normal texture, no rash  Psych: affect normal, mood normal  Neuro: AAOx3        The above physical exam was reviewed and updated as determined by my evaluation of the patient on 8/3/2020  Invasive Devices     Peripheral Intravenous Line            Peripheral IV 07/31/20 Distal;Left;Ventral (anterior) Forearm 2 days                   VTE Pharmacologic Prophylaxis: Enoxaparin  Code Status: Level 2 - DNAR: but accepts endotracheal intubation  Current Length of Stay: 4 day(s)      Total time spent:  30 minutes with more than 50% spent counseling/coordinating care  Counseling includes discussion with patient re: progress  and discussion with patient of his/her current medical state/information  Coordination of patient's care was performed in conjunction with primary service  Time invested included review of patient's labs, vitals, and management of their comorbidities with continued monitoring  In addition, this patient was discussed with medical team including physician and advanced extenders  The care of the patient was extensively discussed and appropriate treatment plan was formulated unique for this patient  ** Please Note:  voice to text software may have been used in the creation of this document   Although proof errors in transcription or interpretation are a potential of such software**

## 2020-08-03 NOTE — PROGRESS NOTES
08/03/20 1430   Pain Assessment   Pain Assessment Tool Pain Assessment not indicated - pt denies pain   Restrictions/Precautions   Precautions Aspiration;Bed/chair alarms;Cognitive; Fall Risk;Supervision on toilet/commode   Cognition   Overall Cognitive Status Impaired   Subjective   Subjective pt felt better than this morning, pt reports having two BMs   Sit to Stand   Type of Assistance Needed Incidental touching   Sit to Stand CARE Score 4   Bed-Chair Transfer   Type of Assistance Needed Incidental touching   Chair/Bed-to-Chair Transfer CARE Score 4   Equipment Use   NuStep 10mins   Assessment   Treatment Assessment pt focused on general conditioning to improve activity tolerance and functional mobility  cont to work with no AD with txs during session  will focus on LE strenghtening, balance and endurance to improve overall activity tolerance to progress with functional ind with LRAD   PT Therapy Minutes   PT Time In 1430   PT Time Out 1445   PT Total Time (minutes) 15   PT Mode of treatment - Individual (minutes) 0   PT Mode of treatment - Concurrent (minutes) 15   PT Mode of treatment - Group (minutes) 0   PT Mode of treatment - Co-treat (minutes) 0   PT Mode of Treatment - Total time(minutes) 15 minutes   PT Cumulative Minutes 215   Therapy Time missed   Time missed?  No

## 2020-08-03 NOTE — UTILIZATION REVIEW
Continued Stay Review    Date: 7/24 & 7/28                      Current Patient Class: inpatient    Current Level of Care: acute    HPI:70 y o  male initially admitted on  6/10/20 surgery  Patient is a 71 y o  male who presented with DOMI mass, now status post Bronchoscopy, mediastinoscopy, VATS LIVIER lobectomy on 4/99 complicated by subcutaneous emphysema s/p Bedside incision/VAC L chest wall on 6/15, acute hypoxic respiratory failure s/p bedside cricothyroidotomy at bedside on 6/20 and subsequent tracheostomy revision on 06/22  Londell Lofty was exchanged through the 6 [place] noncuffed on 07/13 Keofed placed on 07/21      Assessment/Plan: VSS  Saturating 90s on trach collar  keofed in place  Nurse reported one episode of confusion overnight but pt was easily redirectable  Maintain 6 cuffless trach-on trach mask 6L  NPO/Keofed  Jevity 1 2 @ goal: 55  Maintain PICC  Gaytan  Continue working with speech  discontinue 1;1 if pt remains agitated    SPEECH PATHOLOGY  Will trial first meal w/ SLP today  Pt able to self feed but needs cues to go slow & use mult swallows per bite/sips  Plan/Recommendations:  Ok for regular w/ thin  Cue pt to use 2* swallow after each bite/sip  Alternate bites w/ sips    ---------------------------------------------------------------------------------------------------------------------------------------  7/28 Progress notes;  Plan to cap trach today and see how pt tolerates it  Saturating 90% on 5L trach collar  PM&R consultation for rehab placement, [facility] at [facility]  If tolerates cap trial on trach, will plan for possible decannulation this week    Tentative d/c this week      -----------------------------------------------------------------------------------------------------------------------------------    Pertinent Labs/Diagnostic Results:   7/23 video Barium swallow l study Pt presents w/ mild oral-pharyngeal dysphagia characterized by mild difficulty w/ bolus control and mild oral cavity residue (sandi base of tongue), posterior loss to the vallecular space, minimal vallecular retention, and trace amount of PPW coating       7/22 cxr Postoperative change within the left hemithorax with scarring   Slight worsening of pulmonary opacities especially within the left lung base and superimposed infection not excluded  Small left basilar effusion  Feeding tube now present within the fundus of the stomach  Results from last 7 days   Lab Units 08/03/20  0528 08/01/20  0551 07/31/20  0600 07/28/20  0446   WBC Thousand/uL 6 79 11 12* 12 77* 6 99   HEMOGLOBIN g/dL 8 6* 8 5* 9 4* 9 5*   HEMATOCRIT % 28 4* 27 5* 30 8* 30 5*   PLATELETS Thousands/uL 359 322 306 288   NEUTROS ABS Thousands/µL 4 50 8 36* 10 17* 4 08     Results from last 7 days   Lab Units 08/03/20  0528 07/31/20  0600 07/28/20  0446   SODIUM mmol/L 141 138 141   POTASSIUM mmol/L 3 6 4 2 4 0   CHLORIDE mmol/L 114* 108 108   CO2 mmol/L 23 24 26   ANION GAP mmol/L 4 6 7   BUN mg/dL 8 10 14   CREATININE mg/dL 0 83 0 89 0 81   EGFR ml/min/1 73sq m 89 87 90   CALCIUM mg/dL 9 2 9 4 8 6         Results from last 7 days   Lab Units 07/31/20  0555 07/31/20  0014 07/30/20  1025 07/30/20  0531 07/29/20  2354 07/29/20  1210 07/28/20  1744 07/28/20  1205 07/28/20  0559 07/27/20  2354 07/27/20  1743   POC GLUCOSE mg/dl 102 132 91 92 105 97 114 117 86 92 119     Results from last 7 days   Lab Units 08/03/20  0528 07/31/20  0600 07/28/20  0446   GLUCOSE RANDOM mg/dL 112 88 81     Results from last 7 days   Lab Units 07/31/20  1335 07/31/20  1141   BLOOD CULTURE  No Growth at 48 hrs  No Growth at 48 hrs    --    URINE CULTURE   --  >100,000 cfu/ml Pseudomonas aeruginosa*  10,000-19,000 cfu/ml Staphylococcus coagulase negative*       Vital Signs:   24/20 1101  97 9 °F (36 6 °C)  90  20  137/78    100 %      Trach mask  Sitting     07/24/20 0734            99 %  28    Trach mask       07/24/20 0712  97 9 °F (36 6 °C)  97  20  113/55     99 %    5 L/min  Trach mask  Lying     BP: Map 81 at 07/24/20 0712     07/28/20 0728            95 %           07/28/20 0643  98 5 °F (36 9 °C)  95  18  128/84    93 %      None (Room air)  Lying   07/28/20 0347  97 9 °F (36 6 °C)  85  18  121/73  89  98 %      Trach mask       Step down level 1   CONTINUAL OBSERVATION  ASPIRATION PRECAUTIONS    Medications:   Scheduled Medications:  No current facility-administered medications for this encounter  Continuous IV Infusions: None  No current facility-administered medications for this encounter  PRN Meds:No current facility-administered medications for this encounter  Discharge Plan: tbd    Network Utilization Review Department  [email or web address]  ATTENTION: Please call with any questions or concerns to [phone] and carefully listen to the prompts so that you are directed to the right person  All voicemails are confidential   Char Bee all requests for admission clinical reviews, approved or denied determinations and any other requests to dedicated fax number below belonging to the campus where the patient is receiving treatment   List of dedicated fax numbers for the Facilities:  [address] DENIALS (Administrative/Medical Necessity) [phone]   [address] (Maternity/NICU/Pediatrics) [phone]   [facility] [phone]   Anny Cole [phone]   [facility] [phone]   Trenton Dunlap [phone]   [address] [phone]   [facility]  [phone]   [address] [facility]  [address] [phone]

## 2020-08-03 NOTE — UTILIZATION REVIEW
Notification of Discharge  This is a Notification of Discharge from our facility [address]  Please be advised that this patient has been discharge from our facility  Below you will find the admission and discharge date and time including the patients disposition  PRESENTATION DATE: 6/10/2020  5:52 AM  OBS ADMISSION DATE:   IP ADMISSION DATE: 6/10/20 1332   DISCHARGE DATE: 7/30/2020 11:31 AM  DISPOSITION: [address] [place] [address] Dr [facility]   Admission Orders listed below:  Admission Orders (From admission, onward)     Ordered        06/10/20 1332  Inpatient Admission  Once                   Please contact the UR Department if additional information is required to close this patient's authorization/case  [address] Utilization Review Department  Main: [phone] x carefully listen to the prompts  All voicemails are confidential   [email or web address]  Send all requests for admission clinical reviews, approved or denied determinations and any other requests to dedicated fax number below belonging to the campus where the patient is receiving treatment   List of dedicated fax numbers:  [address] DENIALS (Administrative/Medical Necessity) [phone]   [address] (Maternity/NICU/Pediatrics) [phone]   Breezy Rivas [phone]   Hetal Ochoa [phone]   [address] [place] [address] [phone]   [address] [place] [address] [phone]   [facility] [phone]   [address] [facility]  [address] [place] [address] [facility] [phone]

## 2020-08-03 NOTE — PROGRESS NOTES
08/03/20 1330   Pain Assessment   Pain Assessment Tool Pain Assessment not indicated - pt denies pain   Restrictions/Precautions   Precautions Aspiration;Bed/chair alarms;Cognitive; Fall Risk;Impulsive;Supervision on toilet/commode   Comprehension   Comprehension (FIM) 5 - Understands basic directions and conversation   Expression   Expression (FIM) 5 - Needs help/cues only RARELY (< 10% of the time)   Social Interaction   Social Interaction (FIM) 5 - Interacts appropriately with others 90% of time   Problem Solving   Problem solving (FIM) 4 - Solves basic problems 75-89% of time   Memory   Memory (FIM) 4 - Recalls 2 of 3 steps   Speech/Language/Cognition Assessmetn   Treatment Assessment Session began by rapport building by asking pt about LTM biographical information, which pt was noted to be supervision level when answering questions  Ability to recall daily events, including prior activities completed in both OT/PT sessions earlier today required moderate cues as well as even recalling items consumed at last meal (lunch)  However, pt's orientation was noted to be appropriate for place, date and situation  Engaged pt in completing a number of cognitive tasks during session  Pt completed category exclusion task provided 4 words and pt was to determine the word which did not belong in concrete and abstract categories  As for ability to ID words which did not belong in concrete categories, pt was 14/15 accurate, increasing to 15/15 provided review of information  When challenging pt in completing same task but provided abstract categories, pt was 13/15 accurate again increasing to 15/15 provided single semantic cue  Pt requesting toileting during session, which overall safety awareness in locking breaks before getting out of WC was poor as well as then use of RW to use when finishing toileting was poor, but SLP placing RW in front of pt to use to walk back to WC   Upon asking pt what was not the best idea during that instance, pt presenting w/ poor insight to overall safety in use of WC and RW effectively  Once back to therapy gym, continued to engage pt in functional money management tasks  SLP providing pt w/ reading comprehension provided a mock credit card bill to answer questions in regards to information  Pt was 10/10 accurate in ability to answer questions accordingly  When providing pt w/ functional addition of 3 different coin amounts, pt was 15/15 accurate, noting ability to self correct last item  SLP then challenging pt to complete another functional money management task provided dollar and change amounts to determine total number of dollars, but then change amounts into correct quarters, dimes, nickels and pennies  Pt was 12/15 accurate, but increased to 15/15 provided review of items, allowing pt to increase time to determine correct change amounts  At this time, pt will continue to benefit from SLP services to continue to maximize overall functional independence of cognitive skills to decrease overall burden of care for family at time of discharge home  SLP Therapy Minutes   SLP Time In 1330   SLP Time Out 1430   SLP Total Time (minutes) 60   SLP Mode of treatment - Individual (minutes) 60   SLP Mode of treatment - Concurrent (minutes) 0   SLP Mode of treatment - Group (minutes) 0   SLP Mode of treatment - Co-treat (minutes) 0   SLP Mode of Treatment - Total time(minutes) 60 minutes   SLP Cumulative Minutes 225   Therapy Time missed   Time missed?  No

## 2020-08-03 NOTE — PROGRESS NOTES
Occupational Therapy Treatment Note     08/03/20 0700   Pain Assessment   Pain Assessment Tool Pain Assessment not indicated - pt denies pain   Restrictions/Precautions   Precautions Cognitive;Bed/chair alarms; Fall Risk;Supervision on toilet/commode   Lifestyle   Autonomy Pt not very motivated/engaged throughout session   Oral Hygiene   Type of Assistance Needed Supervision   Amount of Physical Assistance Provided No physical assistance   Comment in stance at sink   Oral Hygiene CARE Score 4   Grooming   Able To Comb/Brush Hair;Wash/Dry Face;Wash/Dry Hands;Brush/Clean Teeth   Limitation Noted In Safety;Strength   Findings in stance at sink w/S   Shower/Bathe Self   Type of Assistance Needed Incidental touching   Amount of Physical Assistance Provided No physical assistance   Comment Pt in stance at sink to wash UB and bottom/groin area w/IT  Pt SOB (when asked pt did not feel tired), completed LB bathing seated  Shower/Bathe Self CARE Score 4   Bathing   Assessed Bath Style Sponge Bath   Anticipated D/C Bath Style Shower   Tub/Shower Transfer   Reason Not Assessed Sponge Bath;Medical  (no shower orders)   Upper Body Dressing   Type of Assistance Needed Supervision   Amount of Physical Assistance Provided No physical assistance   Comment seated   Upper Body Dressing CARE Score 4   Lower Body Dressing   Type of Assistance Needed Incidental touching   Amount of Physical Assistance Provided No physical assistance   Comment seated to thread LEs, in stance w/IT for CM over hips  In stance w/unilateral UE support on sink to doff   Lower Body Dressing CARE Score 4   Putting On/Taking Off Footwear   Type of Assistance Needed Supervision;Verbal cues   Amount of Physical Assistance Provided No physical assistance   Comment Don/doff socks and sneakers seated, OT educated on cross leg technique for energy conservation to prevent bending, pt cites some groin pain 2* UTI   Cues to continue breathing when bent forward    Putting On/Taking Off Footwear CARE Score 4   Sit to Lying   Type of Assistance Needed Supervision   Amount of Physical Assistance Provided No physical assistance   Sit to Lying CARE Score 4   Lying to Sitting on Side of Bed   Type of Assistance Needed Supervision   Amount of Physical Assistance Provided No physical assistance   Lying to Sitting on Side of Bed CARE Score 4   Sit to Stand   Type of Assistance Needed Supervision   Amount of Physical Assistance Provided No physical assistance   Comment no device   Sit to Stand CARE Score 4   Bed-Chair Transfer   Type of Assistance Needed Incidental touching   Amount of Physical Assistance Provided No physical assistance   Comment pt able to take steps w/no AD, cues to slow down   Chair/Bed-to-Chair Transfer CARE Score 4   Transfer Bed/Chair/Wheelchair   Limitations Noted In Balance;Problem Solving;LE Strength; Endurance   Adaptive Equipment None   Toileting Hygiene   Type of Assistance Needed Supervision   Amount of Physical Assistance Provided No physical assistance   Comment in stance at toilet w/urinal   [address] Score 4   Toilet Transfer   Type of Assistance Needed Incidental touching; Adaptive equipment   Amount of Physical Assistance Provided No physical assistance   Comment w/grab bars   Toilet Transfer CARE Score 4   Toilet Transfer   Surface Assessed Standard Toilet   Transfer Technique Standard   Functional Standing Tolerance   Time 10 min   Activity ADL routine   Comments Pt stood at sink for oral hygiene and to bathe UB, hair, groin/bottom before sitting   Cognition   Overall Cognitive Status Impaired   Arousal/Participation Cooperative;Responsive; Alert   Attention Attends with cues to redirect   Orientation Level Oriented to person;Oriented to place;Oriented to time;Oriented to situation   Memory Decreased short term memory;Decreased recall of recent events;Decreased recall of precautions   Following Commands Follows one step commands with increased time or repetition   Activity Tolerance   Activity Tolerance Patient limited by fatigue   Assessment   Treatment Assessment Pt engaged in skilled OT session focused on ADL routine  Pt w/dec activity tolerance, stood for 10 min at beginning of session and sat for most of the rest, including req rest break lying in bed after full ADL routine  Pt SOB during bathing, seated hr 104 and O2 98%  After footwear, pt SOB, hr 115 and O2 100%  After a rest break, hr dropped to low 100s  Post ADL routine, vitals taken supine: 124/77, 99% O2, 105 hr; seated: 127/78, 99% O2, 88 hr; stand 112/78, 99%, 117 hr  Once seated pt's hr dropped to 105  Pt SOB after a task throughout session, but req direct VC to take rest break  Per meeting at stand up, pt is ordered for abdominal binder but this is only as needed  Pt did not report/present with orthostatic hypotension during this session  Pt limited by poor activity tolerance/endurance, STM deficits, dec safety awareness  Plan to complete shower w/shower chair w/back and, if needed, grab bars (pt has walk-in shower at home, dtr receptive to need for DME)  Continue focus on building endurance and education on energy conservation, as well as fxnl cognitive training  Spoke with pt's daughter on phone and scheduled family training session on Thursday at 1:00  Prognosis Fair   Problem List Decreased strength;Decreased endurance; Impaired balance;Decreased mobility; Decreased coordination;Decreased cognition; Impaired judgement;Decreased safety awareness   Plan   Treatment/Interventions ADL retraining;Functional transfer training; Therapeutic exercise; Endurance training;Cognitive reorientation;Patient/family training;Equipment eval/education; Compensatory technique education   Progress Progressing toward goals   Recommendation   OT Discharge Recommendation Other (Comment)  (pending progress)   OT Therapy Minutes   OT Time In 0700   OT Time Out 0815   OT Total Time (minutes) 75   OT Mode of treatment - Individual (minutes) 75   OT Mode of treatment - Concurrent (minutes) 0   OT Mode of treatment - Group (minutes) 0   OT Mode of treatment - Co-treat (minutes) 0   OT Mode of Treatment - Total time(minutes) 75 minutes   OT Cumulative Minutes 300   Therapy Time missed   Time missed?  No

## 2020-08-03 NOTE — PROGRESS NOTES
08/03/20 1000   Pain Assessment   Pain Assessment Tool Pain Assessment not indicated - pt denies pain   Pain Score No Pain   Restrictions/Precautions   Precautions Bed/chair alarms;Aspiration;Cognitive; Fall Risk;Supervision on toilet/commode   Cognition   Overall Cognitive Status Impaired   Subjective   Subjective pt states feeling "off"  vitals taken, see vitals section   Sit to Stand   Type of Assistance Needed Supervision   Amount of Physical Assistance Provided No physical assistance   Sit to Stand CARE Score 4   Bed-Chair Transfer   Type of Assistance Needed Incidental touching   Amount of Physical Assistance Provided No physical assistance   Chair/Bed-to-Chair Transfer CARE Score 4   Transfer Bed/Chair/Wheelchair   Findings CS/CGA no AD during session   Walk 10 Feet   Type of Assistance Needed Incidental touching   Comment no AD, gait belt only for safety   Walk 10 Feet CARE Score 4   Walk 50 Feet with Two Turns   Type of Assistance Needed Incidental touching   Walk 50 Feet with Two Turns CARE Score 4   Walk 150 Feet   Type of Assistance Needed Incidental touching   Walk 150 Feet CARE Score 4   Walking 10 Feet on Uneven Surfaces   Type of Assistance Needed Incidental touching   Comment outside    Walking 10 Feet on Uneven Surfaces CARE Score 4   Ambulation   Does the patient walk? 2  Yes   Primary Mode of Locomotion Prior to Admission Walk   Distance Walked (feet) 150 ft  (x4)   Assist Device Other  (no AD, gait belt only)   Gait Pattern Inconsistant Joanna;Decreased foot clearance;Scissoring; Improper weight shift   Limitations Noted In Balance; Endurance;Speed;Strength   Provided Assistance with: Balance   Findings occasional scissoring and unsteady gait; no overt LOB during session   Wheelchair mobility   Does the patient use a wheelchair? 0  No   Type of Wheelchair Used   (N/A)   Curb or Single Stair   Style negotiated Single stair   Type of Assistance Needed Incidental touching; Adaptive equipment   1 Step (Curb) CARE Score 4   4 Steps   Type of Assistance Needed Incidental touching; Adaptive equipment   4 Steps CARE Score 4   12 Steps   Type of Assistance Needed Incidental touching; Adaptive equipment   12 Steps CARE Score 4   Stairs   # of Steps 12   Weight Bearing Precautions Fall Risk   Assist Devices Single Rail   Findings on FF; L HR on FF ascending   Therapeutic Interventions   Strengthening seated LAQ and hip flex x20 with 2# AW   standing hip abd x15 B/L with RW   Flexibility B gastroc and HS x3mins   Assessment   Treatment Assessment pt with limited activity tolerance during session and unable to complete 90min session  vitals taken O2 sats 99% HR 99 with activity  manual BP on L arm 130/100   pt cont to report fatigue and felt he needed food  pt taken back to room to eat lunch and will attempt to see pt after rest   BP rechecked again when in room and read manually 120/100   nurse Jo Ann notified about BPs and to recheck in a bit  pt resting in recliner at this time and will check on pt after rest      Problem List Decreased strength;Decreased endurance; Impaired balance;Decreased mobility; Decreased coordination;Decreased cognition; Impaired judgement;Decreased safety awareness   Barriers to Discharge Inaccessible home environment;Decreased caregiver support   PT Barriers   Physical Impairment Decreased strength;Decreased endurance; Impaired balance;Decreased mobility; Decreased cognition;Decreased safety awareness   Functional Limitation Walking;Transfers;Standing;Stair negotiation   Plan   Treatment/Interventions Functional transfer training;LE strengthening/ROM; Endurance training;Patient/family training;Gait training;Bed mobility   Progress Slow progress, decreased activity tolerance   Recommendation   PT Discharge Recommendation Other (Comment)  (TBD)   Equipment Recommended Other (Comment)  (LRAD)   PT Therapy Minutes   PT Time In 1000   PT Time Out 1115   PT Total Time (minutes) 75   PT Mode of treatment - Individual (minutes) 75   PT Mode of treatment - Concurrent (minutes) 0   PT Mode of treatment - Group (minutes) 0   PT Mode of treatment - Co-treat (minutes) 0   PT Mode of Treatment - Total time(minutes) 75 minutes   PT Cumulative Minutes 200   Therapy Time missed   Time missed?  No

## 2020-08-03 NOTE — PROGRESS NOTES
PM&R Progress Note:    HPI: 79 y o  male with left upper lobe lung cancer, s/p left upper lobe lobectomy By Dr Patrizia Tompkins on 3/45/97, complicated postop course requiring emergent intubation and tracheostomy  Now decannulated  Transferred to [facility] on 7/30/20  ASSESSMENT: Stable, progressing      PLAN:    Rehabilitation   Continue current rehabilitation plan of care to maximize function   Current functional deficits include proximal musculature weakness, gait instability, impaired mobility and self-care   Estimated Discharge: To be determined    Pain   No issues    DVT prophylaxis   Duration of treatment for recent DVT:  Managed on therapeutic Lovenox - anticoagulated needs to be determined for discharge    Bladder plan   Continent    Bowel plan   Continent    * Malignant neoplasm of upper lobe of left lung [facility])  Assessment & Plan  S/p left upper lobe lobectomy with Dr Neva Sanchez on 5/65  Complicated by need for emergent tracheostomy, now decannulated  Monitor trach site for signs of infection   Xopenex TID      History of TBI (traumatic brain injury) ([facility] [address] )  Assessment & Plan  With baseline mild cognitive deficit    UTI (urinary tract infection)  Assessment & Plan  Cultures positive for Pseudomonas  Treatment initiated with Levaquin on 7/31/20    Acute deep vein thrombosis (DVT) of axillary vein of right upper extremity ([address] )  Assessment & Plan  With DVT lower extremity on 6/20 ultrasound, right upper extremity DVT on 6/22 ultrasound  Repeat ultrasounds with resolution   On treatment dose lovenox 1mg/kg q12h, anticipate 3-6 month duration, follow up with PCP outpatient         Appreciate IM consultants medical co-management  Labs, medications, and imaging personally reviewed  SUBJECTIVE: Patient seen face to face  No acute issues  Progressing as expected in rehabilitation  ROS:  A ten point review of systems was completed 8/3/20 and pertinent positives are listed in subjective section   All other systems reviewed were negative  OBJECTIVE:   /69 (BP Location: Left arm)   Pulse 95   Temp 97 5 °F (36 4 °C) (Oral)   Resp 18   Ht 5' 8"   Wt 100 kg (220 lb 8 4 oz)   SpO2 99%   BMI 33 53 kg/m²     Physical Exam   Constitutional: He is oriented to person, place, and time  He appears well-developed  No distress  HENT:   Head: Normocephalic  Nose: Nose normal    Eyes: Conjunctivae are normal    Neck: Neck supple  Cardiovascular: Normal rate  Pulmonary/Chest: Effort normal  He has no wheezes  Abdominal: Soft  He exhibits no distension  Musculoskeletal: Normal range of motion  Neurological: He is alert and oriented to person, place, and time  Skin: Skin is warm  Healing tracheostomy site   Psychiatric: Mood normal    Nursing note and vitals reviewed         Lab Results   Component Value Date    WBC 6 79 08/03/2020    HGB 8 6 (L) 08/03/2020    HCT 28 4 (L) 08/03/2020    MCV 99 (H) 08/03/2020     08/03/2020     Lab Results   Component Value Date    SODIUM 141 08/03/2020    K 3 6 08/03/2020     (H) 08/03/2020    CO2 23 08/03/2020    BUN 8 08/03/2020    CREATININE 0 83 08/03/2020    GLUC 112 08/03/2020    CALCIUM 9 2 08/03/2020     Lab Results   Component Value Date    INR 1 12 06/22/2020    INR 1 00 06/05/2020    INR 0 98 04/29/2020    PROTIME 14 0 06/22/2020    PROTIME 12 8 06/05/2020    PROTIME 12 6 04/29/2020           Current Facility-Administered Medications:     acetaminophen (TYLENOL) tablet 650 mg, 650 mg, Oral, Q6H PRN, Claudio Short MD    albuterol inhalation solution 2 5 mg, 2 5 mg, Nebulization, Q6H PRN, Wally Guerrero MD    calcium carbonate (TUMS) chewable tablet 500 mg, 500 mg, Oral, TID PRN, Wally Guerrero MD    chlorhexidine (PERIDEX) 0 12 % oral rinse 15 mL, 15 mL, [place], Q12H [facility], Wally Guerrero MD, 15 mL at 08/02/20 2023    enoxaparin (LOVENOX) subcutaneous injection 90 mg, 1 mg/kg, Subcutaneous, Q12H [facility], Wally Guerrero MD, 90 mg at 08/03/20 2340    guaiFENesin (ROBITUSSIN) oral solution 200 mg, 200 mg, Oral, Q4H PRN, Diogenes Peterson MD    levofloxacin [facility]) tablet 750 mg, 750 mg, Oral, Q24H, Diogenes Peterson MD, 750 mg at 08/02/20 1608    Lidocaine Viscous HCl (XYLOCAINE) 2 % mucosal solution 15 mL, 15 mL, Swish & [place]it, 4x Daily PRN, Diogenes Peterson MD    melatonin tablet 6 mg, 6 mg, Oral, HS, Diogenes Peterson MD, 6 mg at 08/02/20 2023    metoprolol tartrate (LOPRESSOR) tablet 25 mg, 25 mg, Oral, Q12H [address], [place], DO    OLANZapine (ZyPREXA ZYDIS) dispersible tablet 5 mg, 5 mg, Oral, HS, Diogenes Peterson MD, 5 mg at 08/02/20 2023    ondansetron (ZOFRAN-ODT) dispersible tablet 4 mg, 4 mg, Oral, Q6H PRN, Diogenes Peterson MD    oxyCODONE (ROXICODONE) oral solution 2 5 mg, 2 5 mg, Oral, Q4H PRN, Diogenes Peterson MD    polyethylene glycol [facility]) packet 17 g, 17 g, Oral, Daily, Diogenes Peterson MD, 17 g at 08/02/20 0902    saccharomyces boulardii (FLORASTOR) capsule 250 mg, 250 mg, Oral, BID, KHANG Burton, 250 mg at 08/03/20 0841    senna-docusate sodium (SENOKOT S) 8 6-50 mg per tablet 1 tablet, 1 tablet, Per NG Tube, BID, Diogenes Peterson MD, 1 tablet at 08/02/20 1749    tamsulosin [facility]) capsule 0 4 mg, 0 4 mg, Oral, Daily With Mounika Baker MD, 0 4 mg at 08/02/20 1609    Past Medical History:   Diagnosis Date    Alcohol abuse 3/27/2020    Chest pain     Community acquired pneumonia 3/27/2020    Hypertension     Left upper lobe pulmonary nodule     Malignant neoplasm of upper lobe of left lung ([address] ) 5/7/2020       Patient Active Problem List    Diagnosis Date Noted    Malignant neoplasm of upper lobe of left lung ([facility] ) 05/07/2020     Priority: High    UTI (urinary tract infection) 08/03/2020    History of TBI (traumatic brain injury) ([facility] ) 08/03/2020    Encephalopathy 07/12/2020    Hyperactive delirium after surgical procedure 07/12/2020    Anemia 07/12/2020    Thrombocytosis ([facility] ) 07/12/2020    Acute deep vein thrombosis (DVT) of axillary vein of right upper extremity ([facility] [address] ) 06/23/2020    Acute deep vein thrombosis (DVT) of brachial vein of right upper extremity ([facility] [address] ) 06/23/2020    Tracheostomy in place [facility]) 06/23/2020    Acute deep vein thrombosis (DVT) of calf muscle vein of left lower extremity ([facility] [address] ) 06/23/2020    Acute respiratory failure with hypoxia ([facility] [address] ) 06/21/2020    Tobacco abuse 05/07/2020    Community acquired pneumonia 03/27/2020    Abnormal computed tomography angiography (CTA) 03/27/2020    Alcohol abuse 03/27/2020          Price Curiel MD    Total time spent:  30 minutes with more than 50% spent counseling/coordinating care  Counseling includes discussion with patient re: progress and discussion with patient of his/her current medical state/information  Coordination of patient's care was performed in conjunction with consulting services  Time invested included review of patient's labs, vitals, and management of their comorbidities with continued monitoring  The care of the patient was extensively discussed and appropriate treatment plan was formulated unique for this patient  ** Please Note:  voice to text software may have been used in the creation of this document   Although proof errors in transcription or interpretation are a potential of such software**

## 2020-08-03 NOTE — PCC OCCUPATIONAL THERAPY
Pt functioning at overall supervision level  Due to pt's deficits in short-term memory, problem solving, executive functioning, continue to recommend 24/7 supervision upon d/c  Pt's daughter is aware of this recommendation and has been present for training  Pt will benefit from continued cognitive rehab upon d/c as pt/family have goal to increase pt to independent level  After cognitive rehab, pt would benefit from drivers evaluation when medically appropriate

## 2020-08-04 PROBLEM — H10.9 CONJUNCTIVITIS: Status: ACTIVE | Noted: 2020-08-04

## 2020-08-04 LAB — GLUCOSE SERPL-MCNC: 81 MG/DL (ref 65–140)

## 2020-08-04 PROCEDURE — 82948 REAGENT STRIP/BLOOD GLUCOSE: CPT

## 2020-08-04 PROCEDURE — 97129 THER IVNTJ 1ST 15 MIN: CPT

## 2020-08-04 PROCEDURE — 97130 THER IVNTJ EA ADDL 15 MIN: CPT

## 2020-08-04 PROCEDURE — 97535 SELF CARE MNGMENT TRAINING: CPT

## 2020-08-04 PROCEDURE — 97530 THERAPEUTIC ACTIVITIES: CPT

## 2020-08-04 PROCEDURE — 97116 GAIT TRAINING THERAPY: CPT

## 2020-08-04 PROCEDURE — 97112 NEUROMUSCULAR REEDUCATION: CPT

## 2020-08-04 PROCEDURE — 99233 SBSQ HOSP IP/OBS HIGH 50: CPT | Performed by: PHYSICAL MEDICINE & REHABILITATION

## 2020-08-04 RX ORDER — TOBRAMYCIN 3 MG/ML
1 SOLUTION/ DROPS OPHTHALMIC 4 TIMES DAILY
Status: DISPENSED | OUTPATIENT
Start: 2020-08-04 | End: 2020-08-11

## 2020-08-04 RX ADMIN — SENNOSIDES AND DOCUSATE SODIUM 1 TABLET: 8.6; 5 TABLET ORAL at 10:58

## 2020-08-04 RX ADMIN — POLYETHYLENE GLYCOL 3350 17 G: 17 POWDER, FOR SOLUTION ORAL at 10:59

## 2020-08-04 RX ADMIN — METOPROLOL TARTRATE 25 MG: 25 TABLET, FILM COATED ORAL at 10:57

## 2020-08-04 RX ADMIN — CHLORHEXIDINE GLUCONATE 0.12% ORAL RINSE 15 ML: 1.2 LIQUID ORAL at 21:18

## 2020-08-04 RX ADMIN — TOBRAMYCIN 1 DROP: 3 SOLUTION/ DROPS OPHTHALMIC at 17:51

## 2020-08-04 RX ADMIN — LEVOFLOXACIN 750 MG: 750 TABLET, FILM COATED ORAL at 15:08

## 2020-08-04 RX ADMIN — TAMSULOSIN HYDROCHLORIDE 0.4 MG: 0.4 CAPSULE ORAL at 17:50

## 2020-08-04 RX ADMIN — Medication 250 MG: at 10:58

## 2020-08-04 RX ADMIN — TOBRAMYCIN 1 DROP: 3 SOLUTION/ DROPS OPHTHALMIC at 21:21

## 2020-08-04 RX ADMIN — OLANZAPINE 5 MG: 5 TABLET, ORALLY DISINTEGRATING ORAL at 21:19

## 2020-08-04 RX ADMIN — MELATONIN 6 MG: at 21:00

## 2020-08-04 RX ADMIN — ENOXAPARIN SODIUM 90 MG: 100 INJECTION SUBCUTANEOUS at 21:19

## 2020-08-04 RX ADMIN — Medication 250 MG: at 17:49

## 2020-08-04 RX ADMIN — CHLORHEXIDINE GLUCONATE 0.12% ORAL RINSE 15 ML: 1.2 LIQUID ORAL at 10:59

## 2020-08-04 RX ADMIN — ENOXAPARIN SODIUM 90 MG: 100 INJECTION SUBCUTANEOUS at 10:56

## 2020-08-04 RX ADMIN — METOPROLOL TARTRATE 25 MG: 25 TABLET, FILM COATED ORAL at 21:18

## 2020-08-04 NOTE — PROGRESS NOTES
08/04/20 1005   Pain Assessment   Pain Assessment Tool Pain Assessment not indicated - pt denies pain   Pain Score No Pain   Restrictions/Precautions   Precautions Aspiration;Cognitive; Fall Risk;Limb alert;Supervision on toilet/commode   Cognition   Overall Cognitive Status Impaired   Arousal/Participation Alert; Uncooperative   Attention Attends with cues to redirect   Subjective   Subjective pt had no c/o and was agreeable to have PT   Sit to Stand   Type of Assistance Needed Supervision;Verbal cues   Comment CS without AD   Sit to Stand CARE Score 4   Bed-Chair Transfer   Type of Assistance Needed Supervision;Verbal cues   Comment CS without AD   Chair/Bed-to-Chair Transfer CARE Score 4   Car Transfer   Type of Assistance Needed Incidental touching;Verbal cues   Comment CG without AD   Car Transfer CARE Score 4   Walk 10 Feet   Type of Assistance Needed Incidental touching;Verbal cues   Comment CG/CS with no AD   Walk 10 Feet CARE Score 4   Walk 50 Feet with Two Turns   Type of Assistance Needed Incidental touching;Verbal cues; Adaptive equipment   Comment CGA without AD x 50x2    Walk 50 Feet with Two Turns CARE Score 4   Walk 150 Feet   Type of Assistance Needed Incidental touching;Verbal cues   Comment CG, no AD x 150x 2 - demo SOB but SpO2 was> 97%    Walk 150 Feet CARE Score 4   Walking 10 Feet on Uneven Surfaces   Type of Assistance Needed Incidental touching;Verbal cues   Comment inside ramp with no AD   Walking 10 Feet on Uneven Surfaces CARE Score 4   Curb or Single Stair   Style negotiated Curb   Type of Assistance Needed Incidental touching;Verbal cues; Adaptive equipment   Comment x 2 reps without AD - pt required verbal cues for task completion with L foot catching step lip when ascending in both trials   1 Step (Curb) CARE Score 4   Picking Up Object   Type of Assistance Needed Incidental touching   Comment no AD with pt bending over to  pen off the floor    Picking Up Object CARE Score 4 Therapeutic Interventions   Balance ball kicking , walking fwd/bwd x 30' x 2 sets - pt act gomez limited by fatigue  alt cup tapping first with visual cues x 20 reps then progress to without visual cues x 20 reps - pt toppled cup once each with every trial   Assessment   Treatment Assessment Pt engaged in skilled PT intervention which focused on functional mobility training without using an AD  Pt demo'd occasional path deviations but no LOB during gait training  Although cont to fatigue easily with demo SOB on exertion but O2 sat remained > 97%  RN Kelley Gimenez notified of noted high NV monitored via pulse Oximeter as well as abnormal heart sound/rhythm ascultated via stethoscope  PT will cont to work on dynamic balance training and reinforcing compliance with safety practices during functional mobility performances to reduce risk for falls with anticipated progression of mobility indep with basic mobilities to reduce caregiver burden at d/c  Family/Caregiver Present no   Barriers to Discharge Inaccessible home environment;Decreased caregiver support   PT Barriers   Physical Impairment Decreased strength;Decreased endurance; Impaired balance;Decreased mobility; Decreased cognition; Impaired judgement;Decreased safety awareness   Functional Limitation Stair negotiation;Ramp negotiation; Walking   Plan   Treatment/Interventions Functional transfer training; Therapeutic exercise;Gait training;Spoke to nursing;Spoke to MD;Spoke to case management;Spoke to advanced practitioner;ST;OT   Progress Progressing toward goals   Recommendation   PT Discharge Recommendation Home with skilled therapy  (OP PT services)   PT - OK to Discharge No   PT Therapy Minutes   PT Time In 1005   PT Time Out 1100   PT Total Time (minutes) 55   PT Mode of treatment - Individual (minutes) 30   PT Mode of treatment - Concurrent (minutes) 25   PT Mode of treatment - Group (minutes) 0   PT Mode of treatment - Co-treat (minutes) 0   PT Mode of Treatment - Total time(minutes) 55 minutes   PT Cumulative Minutes 270   Therapy Time missed   Time missed?  No

## 2020-08-04 NOTE — PROGRESS NOTES
Internal Medicine Progress Note  Patient: Esequiel Crabtree  Age/sex: 79 y o  male  Medical Record #: [id number]      ASSESSMENT/PLAN: (Interval History)  Esequiel Crabtree is seen and examined and management for following issues:    DOMI adenocarcinoma; s/p bronchoscopy, mediastinoscopy, VATS, left upper lobectomy and mediastinal lymph node dissection 6/10/20  · Back to Thoracic surgery as OP     PEA with multiple rounds of CPR/respiratory arrest; emergent cricothyroidotomy 6/20; tracheostomy 6/22; decannulation 7/29/20  · Stoma closed and healing well = will watch  · He is speaking well  · Continue Albuterol neb prn     hx nicotine abuse with 1ppd x 50 yrs  · quit prior to surgery  · continue prn Albuterol     Sinus tachycardia  · Increased Lopressor to 25 mg BID on 8/3  · No changes today  · ECHO 8/2/20 WNL normal EF and no wall motion issues     Fever; UTI >100,000 Pseudomonas; SIRS/sepsis  · Afebrile  · Improved symptoms since on antibx but says still occ burn with void  · Primary service started Levaquin on 7/31/20 = will continue for 5 - 7 days total     ABLA  · Stable  · will watch and transfuse prn hemoglobin <8 0     DVT  LE and RUE  · currently is on therapeutic Lovenox   · will need conversion to NOAC eventually  · followup venous dopplers on UE/LE showed resolution of DVTs     Urine retention  · on Flomax   · Lucila was d/cd 7/27/20     TBI 2010  · Has memory issues from this incident but unk details since he cannot remember anything about it     ETOH abuse  · Unknown amount/details    Left eye conjunctivitis  · Woke up this AM with itching/burning/tearing of left eye; has slight crusting  · Will order Tobrex QID x 7 days        The above assessment and plan was reviewed and updated as determined by my evaluation of the patient on 8/4/2020      Labs:   Results from last 7 days   Lab Units 08/03/20  0528 08/01/20  0551   WBC Thousand/uL 6 79 11 12*   HEMOGLOBIN g/dL 8 6* 8 5*   HEMATOCRIT % 28 4* 27 5*   PLATELETS Thousands/uL 359 322     Results from last 7 days   Lab Units 08/03/20  0528 07/31/20  0600   SODIUM mmol/L 141 138   POTASSIUM mmol/L 3 6 4 2   CHLORIDE mmol/L 114* 108   CO2 mmol/L 23 24   BUN mg/dL 8 10   CREATININE mg/dL 0 83 0 89   CALCIUM mg/dL 9 2 9 4             Results from last 7 days   Lab Units 07/31/20  0555 07/31/20  0014 07/30/20  1025   POC GLUCOSE mg/dl 102 132 91       Review of Scheduled Meds:  acetaminophen, 650 mg, Oral, Q6H PRN, Uri Gunderson MD  albuterol, 2 5 mg, Nebulization, Q6H PRN, Diogenes Peterson MD  calcium carbonate, 500 mg, Oral, TID PRN, Diogenes Peterson MD  chlorhexidine, 15 mL, [employer] Hitchcock, Q12H [address], Diogenes Peterson MD  enoxaparin, 1 mg/kg, Subcutaneous, Q12H [address], Diogenes Peterson MD  guaiFENesin, 200 mg, Oral, Q4H PRN, Diogenes Peterson MD  levofloxacin, 750 mg, Oral, Q24H, Diogenes Peterson MD  Lidocaine Viscous HCl, 15 mL, Swish [employer] Spit, 4x Daily PRN, Diogenes Peterson MD  melatonin, 6 mg, Oral, HS, Diogenes Peterson MD  metoprolol tartrate, 25 mg, Oral, Q12H [address], [facility], DO  OLANZapine, 5 mg, Oral, HS, Diogenes Peterson MD  ondansetron, 4 mg, Oral, Q6H PRN, Diogenes Peterson MD  oxyCODONE, 2 5 mg, Oral, Q4H PRN, Diogenes Peterson MD  polyethylene glycol, 17 g, Oral, Daily, Diogenes Peterson MD  saccharomyces boulardii, 250 mg, Oral, BID, KHANG Burton  senna-docusate sodium, 1 tablet, Per NG Tube, BID, Diogenes Peterson MD  tamsulosin, 0 4 mg, Oral, Daily With Mounika Baker MD        Subjective/ HPI: Patients overnight issues or events were reviewed with nursing or staff during rounds or morning huddle session  No new or overnight issues  Offers no complaints  ROS:   A 10 point ROS was performed; negative except as noted above         *Labs /Radiology studies reviewed  *Medications reviewed and reconciled as needed  *Please refer to order section for additional ordered labs studies  *Case discussed with primary attending during morning huddle case rounds      Physical Examination:  Vitals:   Vitals:    08/03/20 1000 08/03/20 1300 08/03/20 2014 08/04/20 0549   BP: 120/100 104/69 135/77 135/76   BP Location: Left arm Left arm Left arm Left arm   Pulse:  95 97 88   Resp:  18 18 18   Temp:  97 5 °F (36 4 °C) 98 7 °F (37 1 °C) 98 2 °F (36 8 °C)   TempSrc:  Oral Oral Oral   SpO2:  99% 96% 94%   Weight:       Height:           General Appearance: no distress, conversive  HEENT: PERRLA; oropharynx clear; mucous membranes moist; left eye conjunctiva is mildly red/watery with slight crusting  Neck:  Supple, normal ROM, no JVD  Lungs: CTA, normal respiratory effort, no retractions, expiratory effort normal  CV: regular rate and rhythm; no rubs/murmurs/gallops, PMI normal   ABD: soft; ND/NT; +BS  EXT: no edema  Skin: normal turgor, normal texture, no rashes  Psych: affect normal, mood normal  Neuro: AA      The above physical exam was reviewed and updated as determined by my evaluation of the patient on 8/4/2020  Invasive Devices     None                    VTE Pharmacologic Prophylaxis: Enoxaparin  Code Status: Level 2 - DNAR: but accepts endotracheal intubation  Current Length of Stay: 5 day(s)      Total time spent:  30 minutes with more than 50% spent counseling/coordinating care  Counseling includes discussion with patient re: progress  and discussion with patient of his/her current medical state/information  Coordination of patient's care was performed in conjunction with primary service  Time invested included review of patient's labs, vitals, and management of their comorbidities with continued monitoring  In addition, this patient was discussed with medical team including physician and advanced extenders  The care of the patient was extensively discussed and appropriate treatment plan was formulated unique for this patient  ** Please Note:  voice to text software may have been used in the creation of this document   Although proof errors in transcription or interpretation are a potential of such software**

## 2020-08-04 NOTE — PROGRESS NOTES
Patient alarm sounding, this nurse went into the patient's room and found the patient reaching over his bed to his nightstand  Patient then said, "I fell and hit my head  Just kidding!" Patient assessed, no visible injuries  Educated regarding the need to call for assistance and redirection of verbal inappropriateness

## 2020-08-04 NOTE — PROGRESS NOTES
08/04/20 0900   Pain Assessment   Pain Assessment Tool Pain Assessment not indicated - pt denies pain   Restrictions/Precautions   Precautions Aspiration;Bed/chair alarms;Cognitive; Fall Risk;Impulsive;Supervision on toilet/commode   Comprehension   Comprehension (FIM) 4 - Understands basic info/conversation 75-90% of time   Expression   Expression (FIM) 5 - Needs help/cues only RARELY (< 10% of the time)   Social Interaction   Social Interaction (FIM) 5 - Interacts appropriately with others 90% of time   Problem Solving   Problem solving (FIM) 3 - Solves basic problmes 50-74% of time   Memory   Memory (FIM) 3 - Recognizes, recalls/performs 50-74%   Speech/Language/Cognition Assessmetn   Treatment Assessment refer to below for details  SLP Therapy Minutes   SLP Time In 0900   SLP Time Out 1000   SLP Total Time (minutes) 60   SLP Mode of treatment - Individual (minutes) 60   SLP Mode of treatment - Concurrent (minutes) 0   SLP Mode of treatment - Group (minutes) 0   SLP Mode of treatment - Co-treat (minutes) 0   SLP Mode of Treatment - Total time(minutes) 60 minutes   SLP Cumulative Minutes 285   Therapy Time missed   Time missed? No     Session focused on medication management to determine potential plan for management at time of discharge  SLP providing pt w/ reading comprehension task provided mock medication labels  Pt's ability to answer questions in regards to mock medication labels was 25/28 accurate, noting difficulty in pt's ability to determine the meaning of "dosage " Upon review of that information w/ SLP, pt was able to verbalize the correct information in regards to dosage as per mock medication labels   SLP then engaged pt in review of current medication list  Pt was aware of taking "some" medications prior to admission, but unable to recall name of medications, but able to verbalize taking something for "blood pressure " There was a total of 8 medications, where pt was able to recognize 2 medications, flomax and lopressor, as being medications prior to admission  Pt was unaware of all remaining names of current medications, but was able to recognize what 3 out of the 8 medications "treat " SLP providing directive education on remaining medications and what they treat  Began to introduce functional tangible medication management w/ current oral medications which pt is prescribed  SLP setup all medications which were just reviewed for pt to read and determine the type of pill box which pt would benefit from use at time of discharge  Pt was provided w/ 1x/day, 2x/day, 3x/day and 4x/day  Upon reading and comprehending the information, pt was able to "divide" the medications into 2 piles, medications for 1x/day and then medications for 2x/day  Pt was able to verbalize that use of a 2x/day pill box is most beneficial  However, pt's comprehension provided auditory directions from SLP to "fill in the pill box per the directions on the medication labels for the entire week " When pt was initiating task, pt was noting to have difficulty in comprehending the medications which read "take every 12 hrs " Upon asking pt in regards to what that direction means to him, pt stated that he'd take the medication "1x/day"  Increased education needed in regards to comprehension of every 12 hrs indicating 2x/day  Pt was placing 2 medications into 1x/day despite review of information which SLP just educated pt in regards to every 12 hrs  Those pills were held until returning back to pills later in task  Pt's ability to place the medications into pill box for the PM 1x/day medications was incomplete again, as pt only completing 1x/day  But w/ prompting from SLP to complete for the week, pt was able to do so accurately  However, when repeating medications for 2x/day, pt was able to verbalize correct that meds would be taken one in the morning and then one at night   Again, follow thru in regards to placement of medications accurately pam pill box was decreased, needing max-total A to complete  Pt was able to verbalize difficulty in completing task and did comment how his dtr will need to assist in completing medication management, which SLP was in agreement  After completing task, pt was perseverative in regards to how he won't take all this medication at discharge, etc  SLP providing education in regards to following MD's recommendations, which pt was mildly receptive in regards to education and will f/u w/ team in regards to session  At this time, pt will continue to benefit from SLP services at this time to maximize overall functional independence of cognitive linguistic skills to decrease overall burden of care for family at time of discharge

## 2020-08-04 NOTE — PROGRESS NOTES
Occupational Therapy Treatment Note     08/04/20 0700   Pain Assessment   Pain Assessment Tool Pain Assessment not indicated - pt denies pain   Restrictions/Precautions   Precautions Bed/chair alarms;Cognitive; Fall Risk;Supervision on toilet/commode; Impulsive;Aspiration   Lifestyle   Autonomy "I think if I go home today I'd be fine"   Oral Hygiene   Type of Assistance Needed Supervision   Amount of Physical Assistance Provided No physical assistance   Comment in stance at sink   Oral Hygiene CARE Score 4   Grooming   Able To Comb/Brush Hair;Wash/Dry Face;Brush/Clean Teeth;Wash/Dry Hands   Limitation Noted In Safety;Strength   Findings in stance at sink   Shower/Bathe Self   Type of Assistance Needed Incidental touching; Adaptive equipment   Amount of Physical Assistance Provided No physical assistance   Comment Seated on shower chair, pt able to wash UB and LB  In stance w/grab bar for bottom/groin hygiene w/IT for safety   Shower/Bathe Self CARE Score 4   Bathing   Assessed Bath Style Shower   Anticipated D/C Bath Style Shower   Tub/Shower Transfer   Limitations Noted In Balance; Safety;LE Strength; Endurance   Adaptive Equipment Seat with Back   Assessed Shower   Findings Pt able to take steps from wc to sit on shower chair w/grab bars w/incidental touching   Upper Body Dressing   Type of Assistance Needed Supervision   Amount of Physical Assistance Provided No physical assistance   Comment in stance to doff, seated to don   Upper Body Dressing CARE Score 4   Lower Body Dressing   Type of Assistance Needed Physical assistance   Amount of Physical Assistance Provided Less than 25%   Comment Pt in unilateral stance to fully doff, minor LOB resulted in need for min A to correct and direct VC to sit down   Seated to don and in stance for CM over hips   Lower Body Dressing CARE Score 3   Putting On/Taking Off Footwear   Type of Assistance Needed Supervision   Amount of Physical Assistance Provided No physical assistance Comment Pt seated and bends forward to doff/don  Req direct VC for rest break as needed as pt lacks awareness of need for break after SOB  Cues to breathe when bending forward   Putting On/Taking Off Footwear CARE Score 4   Lying to Sitting on Side of Bed   Type of Assistance Needed Supervision   Amount of Physical Assistance Provided No physical assistance   Lying to Sitting on Side of Bed CARE Score 4   Sit to Stand   Type of Assistance Needed Supervision   Amount of Physical Assistance Provided No physical assistance   Comment no AD   Sit to Stand CARE Score 4   Bed-Chair Transfer   Type of Assistance Needed Supervision   Amount of Physical Assistance Provided No physical assistance   Comment no AD; pt able to take steps to tfxr   Chair/Bed-to-Chair Transfer CARE Score 4   Transfer Bed/Chair/Wheelchair   Limitations Noted In Balance; Endurance;UE Strength;LE Strength; Sequencing;Problem Solving   Adaptive Equipment None   Toileting Hygiene   Type of Assistance Needed Supervision   Amount of Physical Assistance Provided No physical assistance   Toileting Hygiene CARE Score 4   Toilet Transfer   Type of Assistance Needed Supervision   Amount of Physical Assistance Provided No physical assistance   Toilet Transfer CARE Score 4   Toilet Transfer   Surface Assessed Standard Toilet   Transfer Technique Standard   Cognition   Overall Cognitive Status Impaired   Arousal/Participation Cooperative;Responsive; Alert   Attention Attends with cues to redirect   Orientation Level Oriented to person;Oriented to place;Oriented to time;Oriented to situation   Memory Decreased short term memory;Decreased recall of recent events;Decreased recall of precautions   Following Commands Follows one step commands with increased time or repetition   Comments Pt engaged in safety awareness card activity to inc functional insight and safety awareness   Pt correctly labeled 12/12 scenarios as safe or unsafe and, when prompted, identified potential consequences of the unsafe situation  However, after activity, OT asked pt if he believed he would be safe if he went home today and would not come back to the hospital, and pt responded yes  When asked, pt named only deficit as 'balance' and was unable to describe what any of the therapy staff was working on  OT provided education on pt's deficits and which therapy addresses which deficits  Assessment   Treatment Assessment Pt engaged in skilled OT session focused on ADL routine  Pt tolerated treatment well, functioning overall at supervision level for ADLs except for LOB during LB dressing req min A  However, pt continues to be impulsive and lacks insight, safety awareness, and has short term memory deficits  OT educated pt on his current deficits and who on the therapy team addresses which deficits, as well as why pt is not currently safe to d/c  Plan to complete orientation sheet for pt tomorrow stating deficits (insight, safety awareness, short term memory, balance, endurance, etc) and brief description of what the 3 therapies work on)  Pt req direct VC for rest breaks as he becomes SOB ('take a break and count to 20') and lacks insight to ask for a break  Continue to focus on cognitive reorientation into deficits to inc insight, dynamic standing balance, activity tolerance, microwave meal prep/safety awareness in kitchen  Pt's current goals are for supervision level, however, plan for OT to continue repetitive training to inc pt's insight into deficits and STM, as well as dynamic standing balance  If these areas are able to progress, potential for pt to achieve independence in toileting, grooming, sit to stand tfxrs  OT cont to recommend A for IADLs and supervision for bathing and shower tfxr  Plan for family training Thursday at [place] [id number] for pt to d/c next Wednesday  Prognosis Fair   Problem List Decreased strength;Decreased endurance; Impaired balance;Decreased mobility; Decreased coordination;Decreased cognition; Impaired judgement;Decreased safety awareness   Barriers to Discharge Inaccessible home environment;Decreased caregiver support   Plan   Treatment/Interventions ADL retraining;Functional transfer training; Therapeutic exercise; Endurance training;Cognitive reorientation;Patient/family training;Equipment eval/education; Compensatory technique education   Progress Progressing toward goals   Recommendation   OT Discharge Recommendation Home with skilled therapy   OT Therapy Minutes   OT Time In 0700   OT Time Out 0830   OT Total Time (minutes) 90   OT Mode of treatment - Individual (minutes) 90   OT Mode of treatment - Concurrent (minutes) 0   OT Mode of treatment - Group (minutes) 0   OT Mode of treatment - Co-treat (minutes) 0   OT Mode of Treatment - Total time(minutes) 90 minutes   OT Cumulative Minutes 390   Therapy Time missed   Time missed?  No

## 2020-08-04 NOTE — PROGRESS NOTES
08/04/20 1400   Pain Assessment   Pain Assessment Tool Pain Assessment not indicated - pt denies pain   Pain Score No Pain   Restrictions/Precautions   Precautions Aspiration;Cognitive; Fall Risk;Supervision on toilet/commode;Limb alert  (R UE)   Cognition   Overall Cognitive Status Impaired   Arousal/Participation Alert; Cooperative   Subjective   Subjective pt found in bed but awake and had no c/o at start of PT session and was agreeable have tx  Roll Left and Right   Type of Assistance Needed Set-up / clean-up   Roll Left and Right CARE Score 5   Lying to Sitting on Side of Bed   Type of Assistance Needed Set-up / clean-up   Lying to Sitting on Side of Bed CARE Score 5   Sit to Stand   Type of Assistance Needed Supervision   Comment no AD   Sit to Stand CARE Score 4   Bed-Chair Transfer   Type of Assistance Needed Supervision   Comment no AD   Chair/Bed-to-Chair Transfer CARE Score 4   Walk 10 Feet   Type of Assistance Needed Incidental touching;Verbal cues   Comment CG/CS without a device   Walk 10 Feet CARE Score 4   Walk 50 Feet with Two Turns   Type of Assistance Needed Incidental touching;Verbal cues   Comment CG x 50' x 2 without a device with 1x LOB during 2nd gait trial with pt reporting dizziness   Walk 50 Feet with Two Turns CARE Score 4   Walk 150 Feet   Type of Assistance Needed Physical assistance   Amount of Physical Assistance Provided Total assistance   Comment pt able to amb 300' with 3rd trial but no reported c/o dizziness nor demonstrated LOB although demo path deviations with cues to slow down when rounding corners  150 x 1 with MD Manzanares observing    Walk 150 Feet CARE Score 1   Curb or Single Stair   Style negotiated Single stair   Type of Assistance Needed Incidental touching;Verbal cues; Adaptive equipment   Comment FF with R HR ascending/descending with reciprocal pattern  and cues back of steps to facilitate foot placement when descending and to  feet to avoid catching toebox on step lip - pt educated importance of observing safety practices to dec risk for falls after pt stated " it happens to me at home but I always catch myself"   1 Step (Curb) CARE Score 4   4 Steps   Type of Assistance Needed Incidental touching;Verbal cues; Adaptive equipment   4 Steps CARE Score 4   12 Steps   Type of Assistance Needed Incidental touching;Verbal cues; Adaptive equipment   12 Steps CARE Score 4   Therapeutic Interventions   Flexibility bilat hamstring x 2 mins, gastroc x 30 SH x 2 reps   Assessment   Treatment Assessment Pt able to engaged in functional mobility training although midway through session demo slight LOB during gait training requiring min A to stabilized with pt reporting episode of dizziness  Manual BP on L UE was at 120/90 with O2 on RA at 100% and CO at 88%  Initially pt attributed onset of dizziness to having " hyper oxygenation" even after education he can never be hyper oxygenated as he is just on room air but insisted " maybe for me it is"  After a rest break pt was then able to engaged in gait training increasing gait distance to 300' without LOB demonstrated this time  However while on seated rest break he ask for have cranberry juice as he feels it would help to make him feel better and even reported he used to drink juices at home when similar situation occured  Pt had a hard time describing how he feels but stated" I know it's off but I can't point a finger on it"  TRI Brar was notified and cleared pt to have juice  During LE stretching, Pt then asked for another cup of juice as he was still not feeling well, TRI Brar was again notified and pt given 2nd cup of cranberry juice  TRI Brar them came in to PT gym and assessed pt including Bld sugar of 81  MD Manzanares and Vik Cook also came in and assessed pt including observing pt amb x 150' with no additional complaints reported  Pt assisted back to his recliner at end of session with all needs within reach, SCD and alarm on   PT will continue to work on dynamic balance training, strengthening/endurance training and functional mobility training without an AD to reduce caregiver burden at d/c     Barriers to Discharge Inaccessible home environment;Decreased caregiver support   PT Barriers   Functional Limitation Ramp negotiation;Stair negotiation;Car transfers   Plan   Treatment/Interventions Functional transfer training;LE strengthening/ROM; Therapeutic exercise; Endurance training;Bed mobility;Gait training;Spoke to nursing;Spoke to MD;Spoke to advanced practitioner;OT   Progress Progressing toward goals   Recommendation   PT Discharge Recommendation Home with skilled therapy   PT - OK to Discharge No   PT Therapy Minutes   PT Time In 1400   PT Time Out 1500   PT Total Time (minutes) 60   PT Mode of treatment - Individual (minutes) 0   PT Mode of treatment - Concurrent (minutes) 60   PT Mode of treatment - Group (minutes) 0   PT Mode of treatment - Co-treat (minutes) 0   PT Mode of Treatment - Total time(minutes) 60 minutes   PT Cumulative Minutes 330

## 2020-08-04 NOTE — PLAN OF CARE
Problem: Potential for Falls  Goal: Patient will remain free of falls  Description: INTERVENTIONS:  - Assess patient frequently for physical needs  -  Identify cognitive and physical deficits and behaviors that affect risk of falls    -  [place] fall precautions as indicated by assessment   - Educate patient/family on patient safety including physical limitations  - Instruct patient to call for assistance with activity based on assessment  - Modify environment to reduce risk of injury  - Consider OT/PT consult to assist with strengthening/mobility  Outcome: Progressing     Problem: Prexisting or High Potential for Compromised Skin Integrity  Goal: Skin integrity is maintained or improved  Description: INTERVENTIONS:  - Identify patients at risk for skin breakdown  - Assess and monitor skin integrity  - Assess and monitor nutrition and hydration status  - Monitor labs   - Assess for incontinence   - Turn and reposition patient  - Assist with mobility/ambulation  - Relieve pressure over bony prominences  - Avoid friction and shearing  - Provide appropriate hygiene as needed including keeping skin clean and dry  - Evaluate need for skin moisturizer/barrier cream  - Collaborate with interdisciplinary team   - Patient/family teaching  - Consider wound care consult   Outcome: Progressing     Problem: PAIN - ADULT  Goal: Verbalizes/displays adequate comfort level or baseline comfort level  Description: Interventions:  - Encourage patient to monitor pain and request assistance  - Assess pain using appropriate pain scale  - Administer analgesics based on type and severity of pain and evaluate response  - Implement non-pharmacological measures as appropriate and evaluate response  - Consider cultural and social influences on pain and pain management  - Notify physician/advanced practitioner if interventions unsuccessful or patient reports new pain  Outcome: Progressing     Problem: INFECTION - ADULT  Goal: Absence or prevention of progression during hospitalization  Description: INTERVENTIONS:  - Assess and monitor for signs and symptoms of infection  - Monitor lab/diagnostic results  - Monitor all insertion sites, i e  indwelling lines, tubes, and drains  - Monitor endotracheal if appropriate and nasal secretions for changes in amount and color  - [place] appropriate cooling/warming therapies per order  - Administer medications as ordered  - Instruct and encourage patient and family to use good hand hygiene technique  - Identify and instruct in appropriate isolation precautions for identified infection/condition  Outcome: Progressing     Problem: SAFETY ADULT  Goal: Maintain or return to baseline ADL function  Description: INTERVENTIONS:  -  Assess patient's ability to carry out ADLs; assess patient's baseline for ADL function and identify physical deficits which impact ability to perform ADLs (bathing, care of mouth/teeth, toileting, grooming, dressing, etc )  - Assess/evaluate cause of self-care deficits   - Assess range of motion  - Assess patient's mobility; develop plan if impaired  - Assess patient's need for assistive devices and provide as appropriate  - Encourage maximum independence but intervene and supervise when necessary  - Involve family in performance of ADLs  - Assess for home care needs following discharge   - Consider OT consult to assist with ADL evaluation and planning for discharge  - Provide patient education as appropriate  Outcome: Progressing  Goal: Maintain or return mobility status to optimal level  Description: INTERVENTIONS:  - Assess patient's baseline mobility status (ambulation, transfers, stairs, etc )    - Identify cognitive and physical deficits and behaviors that affect mobility  - Identify mobility aids required to assist with transfers and/or ambulation (gait belt, sit-to-stand, lift, walker, cane, etc )  - [place] fall precautions as indicated by assessment  - Record patient progress and toleration of activity level on Mobility SBAR; progress patient to next Phase/Stage  - Instruct patient to call for assistance with activity based on assessment  - Consider rehabilitation consult to assist with strengthening/weightbearing, etc   Outcome: Progressing     Problem: DISCHARGE PLANNING  Goal: Discharge to home or other facility with appropriate resources  Description: INTERVENTIONS:  - Identify barriers to discharge w/patient and caregiver  - Arrange for needed discharge resources and transportation as appropriate  - Identify discharge learning needs (meds, wound care, etc )  - Arrange for interpretive services to assist at discharge as needed  - Refer to Case Management Department for coordinating discharge planning if the patient needs post-hospital services based on physician/advanced practitioner order or complex needs related to functional status, cognitive ability, or social support system  Outcome: Progressing     Problem: Knowledge Deficit  Goal: Patient/family/caregiver demonstrates understanding of disease process, treatment plan, medications, and discharge instructions  Description: Complete learning assessment and assess knowledge base    Interventions:  - Provide teaching at level of understanding  - Provide teaching via preferred learning methods  Outcome: Progressing     Problem: SKIN/TISSUE INTEGRITY - ADULT  Goal: Skin integrity remains intact  Description: INTERVENTIONS  - Identify patients at risk for skin breakdown  - Assess and monitor skin integrity  - Assess and monitor nutrition and hydration status  - Monitor labs (i e  albumin)  - Assess for incontinence   - Turn and reposition patient  - Assist with mobility/ambulation  - Relieve pressure over bony prominences  - Avoid friction and shearing  - Provide appropriate hygiene as needed including keeping skin clean and dry  - Evaluate need for skin moisturizer/barrier cream  - Collaborate with interdisciplinary team (i e  Nutrition, Rehabilitation, etc )   - Patient/family teaching  Outcome: Progressing     Problem: MUSCULOSKELETAL - ADULT  Goal: Maintain or return mobility to safest level of function  Description: INTERVENTIONS:  - Assess patient's ability to carry out ADLs; assess patient's baseline for ADL function and identify physical deficits which impact ability to perform ADLs (bathing, care of mouth/teeth, toileting, grooming, dressing, etc )  - Assess/evaluate cause of self-care deficits   - Assess range of motion  - Assess patient's mobility  - Assess patient's need for assistive devices and provide as appropriate  - Encourage maximum independence but intervene and supervise when necessary  - Involve family in performance of ADLs  - Assess for home care needs following discharge   - Consider OT consult to assist with ADL evaluation and planning for discharge  - Provide patient education as appropriate  Outcome: Progressing  Goal: Maintain proper alignment of affected body part  Description: INTERVENTIONS:  - Support, maintain and protect limb and body alignment  - Provide patient/ family with appropriate education  Outcome: Progressing     Problem: PAIN - ADULT  Goal: Verbalizes/displays adequate comfort level or baseline comfort level  Description: Interventions:  - Encourage patient to monitor pain and request assistance  - Assess pain using appropriate pain scale  - Administer analgesics based on type and severity of pain and evaluate response  - Implement non-pharmacological measures as appropriate and evaluate response  - Consider cultural and social influences on pain and pain management  - Notify physician/advanced practitioner if interventions unsuccessful or patient reports new pain  Outcome: Progressing     Problem: INFECTION - ADULT  Goal: Absence or prevention of progression during hospitalization  Description: INTERVENTIONS:  - Assess and monitor for signs and symptoms of infection  - Monitor lab/diagnostic results  - Monitor all insertion sites, i e  indwelling lines, tubes, and drains  - Monitor endotracheal if appropriate and nasal secretions for changes in amount and color  - [place] appropriate cooling/warming therapies per order  - Administer medications as ordered  - Instruct and encourage patient and family to use good hand hygiene technique  - Identify and instruct in appropriate isolation precautions for identified infection/condition  Outcome: Progressing     Problem: SAFETY ADULT  Goal: Patient will remain free of falls  Description: INTERVENTIONS:  - Assess patient frequently for physical needs  -  Identify cognitive and physical deficits and behaviors that affect risk of falls    -  [place] fall precautions as indicated by assessment   - Educate patient/family on patient safety including physical limitations  - Instruct patient to call for assistance with activity based on assessment  - Modify environment to reduce risk of injury  - Consider OT/PT consult to assist with strengthening/mobility  Outcome: Progressing  Goal: Maintain or return to baseline ADL function  Description: INTERVENTIONS:  -  Assess patient's ability to carry out ADLs; assess patient's baseline for ADL function and identify physical deficits which impact ability to perform ADLs (bathing, care of mouth/teeth, toileting, grooming, dressing, etc )  - Assess/evaluate cause of self-care deficits   - Assess range of motion  - Assess patient's mobility; develop plan if impaired  - Assess patient's need for assistive devices and provide as appropriate  - Encourage maximum independence but intervene and supervise when necessary  - Involve family in performance of ADLs  - Assess for home care needs following discharge   - Consider OT consult to assist with ADL evaluation and planning for discharge  - Provide patient education as appropriate  Outcome: Progressing  Goal: Maintain or return mobility status to optimal level  Description: INTERVENTIONS:  - Assess patient's baseline mobility status (ambulation, transfers, stairs, etc )    - Identify cognitive and physical deficits and behaviors that affect mobility  - Identify mobility aids required to assist with transfers and/or ambulation (gait belt, sit-to-stand, lift, walker, cane, etc )  - [place] fall precautions as indicated by assessment  - Record patient progress and toleration of activity level on Mobility SBAR; progress patient to next Phase/Stage  - Instruct patient to call for assistance with activity based on assessment  - Consider rehabilitation consult to assist with strengthening/weightbearing, etc   Outcome: Progressing     Problem: DISCHARGE PLANNING  Goal: Discharge to home or other facility with appropriate resources  Description: INTERVENTIONS:  - Identify barriers to discharge w/patient and caregiver  - Arrange for needed discharge resources and transportation as appropriate  - Identify discharge learning needs (meds, wound care, etc )  - Arrange for interpretive services to assist at discharge as needed  - Refer to Case Management Department for coordinating discharge planning if the patient needs post-hospital services based on physician/advanced practitioner order or complex needs related to functional status, cognitive ability, or social support system  Outcome: Progressing

## 2020-08-04 NOTE — PCC NURSING
Admitted s/p left upper lobe lung cancer, s/p left upper lobe lobectomy ,1/89/88, complicated postop course requiring emergent intubation and tracheostomy  Now decannulated  HX: ETOH abuse, community acquired PNA, HTN, lung cancer, UTI, DVT, TBI, tracheostomy, anemia, tobacco abuse  Pain managed with PRN Tylenol and oxycodone  HTN managed with lopressor  UTI managed with flomax and completed levaquin  Zyprexa and melatonin ordered for sleep  PRN respiratory treatments ordered  Anticoagulation with Eliquis  Alarms required for safety  Skin is intact with exception of a tracheostomy incision s/p decannulation  Continent for bowel and bladder  This week we will encourage independence with ADLs while monitoring labs and vitals and maintaining skin integrity  Continue education on medication management  We will keep the patient free from falls through safe transfers with education and maintain safety precautions

## 2020-08-04 NOTE — PCC CARE MANAGEMENT
Pt is participating with therapy and is scheduled to return home w/ family support and outpt therapy on 8/12/2020  Pt and family have been educated on need follow through with contd therapy services  Following to assist w/dc planning needs

## 2020-08-04 NOTE — TEAM CONFERENCE
Acute RehabilitationTeam Conference Note  Date: 8/4/2020   Time: 11:20 AM       Patient Name:  Shoaib Medina       Medical Record Number: [id number]   YOB: 1950  Sex:  Male          Room/Bed:  [facility]5/[facility]5-01  Payor Info:  Payor: Tanvi Paredes / Plan: [employer] / Product Type: PPO Commercial /      Admitting Diagnosis: Myopathy [G72 9]   Admit Date/Time:  7/30/2020 11:53 AM  Admission Comments: No comment available     Primary Diagnosis:  Malignant neoplasm of upper lobe of left lung (HCC)  Principal Problem: Malignant neoplasm of upper lobe of left lung [facility])    Patient Active Problem List    Diagnosis Date Noted    UTI (urinary tract infection) 08/03/2020    History of TBI (traumatic brain injury) ([facility] [address] ) 08/03/2020    Encephalopathy 07/12/2020    Hyperactive delirium after surgical procedure 07/12/2020    Anemia 07/12/2020    Thrombocytosis ([facility] [address] ) 07/12/2020    Acute deep vein thrombosis (DVT) of axillary vein of right upper extremity ([facility] [address] ) 06/23/2020    Acute deep vein thrombosis (DVT) of brachial vein of right upper extremity ([facility] [address] ) 06/23/2020    Tracheostomy in place [facility]) 06/23/2020    Acute deep vein thrombosis (DVT) of calf muscle vein of left lower extremity ([facility] [address] ) 06/23/2020    Acute respiratory failure with hypoxia ([facility] [address] ) 06/21/2020    Malignant neoplasm of upper lobe of left lung ([facility] [address] ) 05/07/2020    Tobacco abuse 05/07/2020    Community acquired pneumonia 03/27/2020    Abnormal computed tomography angiography (CTA) 03/27/2020    Alcohol abuse 03/27/2020       Physical Therapy:    Weight Bearing Status: Full Weight Bearing  Transfers: Incidental Touching  Bed Mobility: Supervision  Amulation Distance (ft): 150 feet  Ambulation: Incidental Touching  Assistive Device for Ambulation: Roller Walker  Number of Stairs: 12  Assistive Device for Stairs: Lehft Hand Rail  Stair Assistance: Incidental Touching  Discharge Recommendations: Home with:  [address] with[de-identified] 24 Hour Supervision, Outpatient Physical Therapy    Pt admitted to [facility] after L upper lobectomy  Pt with decrease strength, endurance, balance and poor activity tolerance at this time  Pt using RW for overall stability and safety but cont to practice with no AD to improve balance and righting reactions  Pt lives with dtr and son in law at this time  Pt with barriers of stairs and decrease family support during the day  Pt would benefit from cont skilled therapy to improve deficits to progress with functional ind and reduce fall risk with d/c home with family support and outpt therapy services with [facility]  Occupational Therapy:  Eating: Supervision  Grooming: Supervision  Bathing: Incidental Touching  Bathing: Incidental Touching  Upper Body Dressing: Supervision  Lower Body Dressing: Incidental Touching  Toileting: Supervision  Toilet Transfer: Incidental Touching  Cognition: Exceptions to WNL  Cognition: Decreased Memory, Decreased Executive Functions, Impulsive, Decreased Attention, Decreased Comprehension, Decreased Safety  Orientation: Person, Place, Situation  Discharge Recommendations: Home with:  [address] with[de-identified] 24 Hour Supervision, Family Support, Outpatient Occupational Therapy       Pt is demonstrating slow progress 2* cognitive deficits  Pt's current deficits include: activity tolerance/endurance, insight into deficits, short-term memory, attention, comprehension, dynamic standing balance  Plan for family training this Thursday with pt's daughter  Anticipate d/c beginning of next week       Speech Therapy:  Mode of Communication: Verbal  Cognition: Exceptions to WNL  Cognition: Decreased Memory, Decreased Executive Functions, Decreased Attention, Decreased Comprehension, Decreased Safety, Impulsive  Orientation: Person, Place, Time, Situation  Swallowing: Within Defined Limits(aspiration risk)  Diet Recommendations: Regular Diet, Thin  Discharge Recommendations: Home with:  [address] with[de-identified] 24 Hour Supervision, 24 Hour Assisteance, Family Support, Outpatient Speech Therapy  Pt currently being followed for cognitive linguistic tx sessions and for a brief time was followed for dysphagia tx sessions  As for swallow function, bedside swallow evaluation completed during bfast as MD requesting follow up evaluation for swallow function  Pt with hx of complicated hospital course to include tracheostomy, which he was decannulated on 7/29/20  Pt seated fully upright and OOB in recliner chair for meal  Pt is currently on a regular diet and thin liquids  Oral mech WFL  Demo ability to self feed but noting more rapid pacing and larger bite sizes as meal progressed  Functional bolus retrieval and lip seal w/o anterior loss  Mastication appeared overall timely to occasionally mildly slower but appeared functional for bolus breakdown w/ adequate bolus formation, timely transfers and full oral clearance-no pocketing or overt oral residual present  Pt occasionally using strategy which was previously recommended of use of secondary swallows w/ solids  Swallow initiation appeared overall timely across all items, occasionally appearing mildly delayed w/ solids yet remained functional  Hyolaryngeal movement present to palpation  Pt elicited throat clear x1 during meal but no further overt s/s penetration/aspiration observed this meal  Pt also elicited cough after meal, however, pt's daughter reports that pt has baseline cough prior to recent admission due to lung CA, therefore, ?ing if baseline or delay cough secondary to PO intake w/ recommendations to continue current diet  However, will recommend pt have FULL supervision for meals to ensure carryover of safe swallow strategies: OOB for all meals, slow rate of intake, small bites/sips, 2 swallows per bite, alternate solids/liquids   Brief f/u was completed to determine pt's ongoing tolerance of diet which pt able to exhibit no increased or overt aspiration sxs during meal, but recs continue for FULL supervision due to cognitive deficits  As for cognitive skills, pt completed formalized cognitive assessment, CLQT+, which overall score was 2 8 out of 4 0 which compared to age matched peers between 65-80 yrs, indicating cognition to be MILDLY impaired overall  Most prominent barriers are decreased ST (recent and remote) recall, decreased executive function skills, including problem solving, sequencing, reasoning, judgement, insight to deficits as well as decreased safety awareness w/ use of WC and RW when engaged in functional mobility tasks  Pt will continue to benefit from SLP services to maximize overall functional independence of cognitive linguistic skills to decrease burden of care for family at time of discharge  Nursing Notes:  Appetite: Good  Diet Type: Regular/House                      Diet Patient/Family Education Complete: Yes                         Type of Wound Patient/Family Education: No  Bladder: Continent     Bladder Patient/Family Education: Yes  Bowel: Continent     Bowel Patient/Family Education: Yes  Pain Location/Orientation: Orientation: Bilateral, Location: Generalized  Pain Score: 0                       Hospital Pain Intervention(s): Repositioned, Rest  Pain Patient/Family Education: Yes  Medication Management/Safety  Injectable: Lovenox  Medication Patient/Family Education Complete: Yes    Patient is a 79 y  o  male with left upper lobe lung cancer, s/p left upper lobe lobectomy By Dr Cloretta Holter on 9/16/62, complicated postop course requiring emergent intubation and tracheostomy  Now decannulated  HX: ETOH abuse, community acquired PNA, HTN, lung cancer, UTI, DVT, TBI, tracheostomy, anemia, tobacco abuse  Pain managed with PRN Tylenol and oxycodone  HTN managed with lopressor  UTI managed with flomax and levaquin  Zyprexa and melatonin ordered for sleep  PRN respiratory treatments ordered  Anticoagulation managed with lovenox  Alarms required for safety   Regular diet ordered  Skin is intact with exception of a tracheostomy incision s/p decannulation  Continent for bowel and bladder  This week we will encourage independence with ADLs while monitoring labs and vitals and maintaining skin integrity  We will keep the patient free from falls through safe transfers with education and maintain safety precautions  Case Management:     Discharge Planning  Living Arrangements: Children  Support Systems: Children, Family members  Assistance Needed: lives with daughter   Type of Current Residence: Private residence  Current [address] : No  Pt is participating with therapy and is expected to return home w/family support  Pt and family have been educated on potential recommendations for contd therapy services  Following to assist w/dc planning needs  Is the patient actively participating in therapies? yes  List any modifications to the treatment plan:     Barriers Interventions   Insight, overall cog Speech therapy, family education, safety education   Noncompliant pta with medication Family education   Endurance, activity tolerance Energy conservation education   balance Therapy exercises         Is the patient making expected progress toward goals?  yes  List any update or changes to goals:     Medical Goals: Patient will be medically stable for discharge to [facility] upon completion of rehab program and Patient will be able to manage medical conditions and comorbid conditions with medications and follow up upon completion of rehab program    Weekly Team Goals:   Rehab Team Goals  ADL Team Goal: Patient will require supervision with ADLs with least restrictive device upon completion of rehab program  Bowel/Bladder Team Goal: Patient will return to premorbid level for bladder/bowel management upon completion of rehab program  Transfer Team Goal: Patient will require supervision with transfers with least restrictive device upon completion of rehab program  Locomotion Team Goal: Patient will require supervision with locomotion with least restrictive device upon completion of rehab program  Cognitive Team Goal: Patient will require assist for basic cognitive tasks upon completion of rehab program    Discussion: pt presents with the above barriers and is funcitoning at supervision bed mobility, contact guard transfers and mobility  adl's are supervision but pt has impulsivity and needs cueing  Lower body is min a  Goals are for supervision on dc, however family is reporting his cog status is below baseline  Pt is not able to conduct I ADL's at this time  Family training is scheudled for Thursday at 1p  Recommendations are for  contd outpt pt ot and slp  Anticipated Discharge Date:   8/12/2020  [facility] Team Members Present: The following team members are supervising care for this patient and were present during this Weekly Team Conference      Physician: Dr Eulas Essex, MD  : TRACI Castaneda  Registered Nurse: Bill Mccallum RN   Physical Therapist: Tk Beckman DPT  Occupational Therapist: Silverio Gardner MS, OTR/L, CBIS  Speech Therapist: Bere Howard MA, CCC-SLP  Other:

## 2020-08-05 LAB
BACTERIA BLD CULT: NORMAL
BACTERIA BLD CULT: NORMAL
GLUCOSE SERPL-MCNC: 84 MG/DL (ref 65–140)

## 2020-08-05 PROCEDURE — 97535 SELF CARE MNGMENT TRAINING: CPT

## 2020-08-05 PROCEDURE — 97129 THER IVNTJ 1ST 15 MIN: CPT

## 2020-08-05 PROCEDURE — 82948 REAGENT STRIP/BLOOD GLUCOSE: CPT

## 2020-08-05 PROCEDURE — 99232 SBSQ HOSP IP/OBS MODERATE 35: CPT | Performed by: PHYSICAL MEDICINE & REHABILITATION

## 2020-08-05 PROCEDURE — 97130 THER IVNTJ EA ADDL 15 MIN: CPT

## 2020-08-05 PROCEDURE — 97530 THERAPEUTIC ACTIVITIES: CPT

## 2020-08-05 PROCEDURE — 97112 NEUROMUSCULAR REEDUCATION: CPT

## 2020-08-05 PROCEDURE — 97110 THERAPEUTIC EXERCISES: CPT

## 2020-08-05 RX ORDER — LEVOFLOXACIN 750 MG/1
750 TABLET ORAL EVERY 24 HOURS
Status: COMPLETED | OUTPATIENT
Start: 2020-08-05 | End: 2020-08-06

## 2020-08-05 RX ADMIN — OLANZAPINE 5 MG: 5 TABLET, ORALLY DISINTEGRATING ORAL at 21:56

## 2020-08-05 RX ADMIN — METOPROLOL TARTRATE 25 MG: 25 TABLET, FILM COATED ORAL at 21:55

## 2020-08-05 RX ADMIN — TOBRAMYCIN 1 DROP: 3 SOLUTION/ DROPS OPHTHALMIC at 11:45

## 2020-08-05 RX ADMIN — TOBRAMYCIN 1 DROP: 3 SOLUTION/ DROPS OPHTHALMIC at 18:24

## 2020-08-05 RX ADMIN — TOBRAMYCIN 1 DROP: 3 SOLUTION/ DROPS OPHTHALMIC at 08:37

## 2020-08-05 RX ADMIN — MELATONIN 6 MG: at 21:55

## 2020-08-05 RX ADMIN — ENOXAPARIN SODIUM 90 MG: 100 INJECTION SUBCUTANEOUS at 21:56

## 2020-08-05 RX ADMIN — ENOXAPARIN SODIUM 90 MG: 100 INJECTION SUBCUTANEOUS at 08:38

## 2020-08-05 RX ADMIN — TOBRAMYCIN 1 DROP: 3 SOLUTION/ DROPS OPHTHALMIC at 21:56

## 2020-08-05 RX ADMIN — TAMSULOSIN HYDROCHLORIDE 0.4 MG: 0.4 CAPSULE ORAL at 15:52

## 2020-08-05 RX ADMIN — CHLORHEXIDINE GLUCONATE 0.12% ORAL RINSE 15 ML: 1.2 LIQUID ORAL at 21:55

## 2020-08-05 RX ADMIN — LEVOFLOXACIN 750 MG: 750 TABLET, FILM COATED ORAL at 15:52

## 2020-08-05 RX ADMIN — METOPROLOL TARTRATE 25 MG: 25 TABLET, FILM COATED ORAL at 08:36

## 2020-08-05 RX ADMIN — Medication 250 MG: at 08:36

## 2020-08-05 RX ADMIN — Medication 250 MG: at 18:24

## 2020-08-05 NOTE — PROGRESS NOTES
Internal Medicine Progress Note  Patient: Elise Noland  Age/sex: 79 y o  male  Medical Record #: [id number]      ASSESSMENT/PLAN: (Interval History)  Elise Noland is seen and examined and management for following issues:    DOMI adenocarcinoma; s/p bronchoscopy, mediastinoscopy, VATS, left upper lobectomy and mediastinal lymph node dissection 6/10/20  · Back to Thoracic surgery as OP     PEA with multiple rounds of CPR/respiratory arrest; emergent cricothyroidotomy 6/20; tracheostomy 6/22; decannulation 7/29/20  · Stoma closed and healing well   · Continue Albuterol neb prn     hx nicotine abuse with 1ppd x 50 yrs  · quit prior to surgery  · continue prn Albuterol     Sinus tachycardia  · Continue Lopressor 25 mg BID  · No changes today  · ECHO 8/2/20 WNL normal EF and no wall motion issues     Fever; UTI >100,000 Pseudomonas; SIRS/sepsis  · Afebrile  · Improved symptoms since on antibx but says still occ burn with void  · Primary service started Levaquin on 7/31/20 = will continue for 5 - 7 days total     ABLA  · Stable  · will watch and transfuse prn hemoglobin <8 0     DVT  LE and RUE  · currently is on therapeutic Lovenox   · will need conversion to NOAC eventually  · followup venous dopplers on UE/LE 7/14 and 6/26 respectively showed resolution of DVTs     Urine retention  · on Flomax   · Lucila was d/cd 7/27/20     TBI 2010  · Has memory issues from this incident but unk details since he cannot remember anything about it     ETOH abuse  · Unknown amount/details     Left eye conjunctivitis  · Woke up 8/4 with itching/burning/tearing of left eye; has slight crusting  · On Tobrex QID x 7 days          The above assessment and plan was reviewed and updated as determined by my evaluation of the patient on 8/5/2020      Labs:   Results from last 7 days   Lab Units 08/03/20  0528 08/01/20  0551   WBC Thousand/uL 6 79 11 12*   HEMOGLOBIN g/dL 8 6* 8 5*   HEMATOCRIT % 28 4* 27 5*   PLATELETS Thousands/uL 359 322     Results from last 7 days   Lab Units 08/03/20  0528 07/31/20  0600   SODIUM mmol/L 141 138   POTASSIUM mmol/L 3 6 4 2   CHLORIDE mmol/L 114* 108   CO2 mmol/L 23 24   BUN mg/dL 8 10   CREATININE mg/dL 0 83 0 89   CALCIUM mg/dL 9 2 9 4             Results from last 7 days   Lab Units 08/04/20  1447 07/31/20  0555 07/31/20  0014   POC GLUCOSE mg/dl 81 102 132       Review of Scheduled Meds:  acetaminophen, 650 mg, Oral, Q6H PRN, Venson Kocher, MD  albuterol, 2 5 mg, Nebulization, Q6H PRN, Michell Cha MD  calcium carbonate, 500 mg, Oral, TID PRN, Michell Cha MD  chlorhexidine, 15 mL, Swish & Domenico, Q12H [address], Michell Cha MD  enoxaparin, 1 mg/kg, Subcutaneous, Q12H [address], Michell Cha MD  guaiFENesin, 200 mg, Oral, Q4H PRN, Michell Cha MD  levofloxacin, 750 mg, Oral, Q24H, Michell Cha MD  Lidocaine Viscous HCl, 15 mL, Swish & Spit, 4x Daily PRN, Michell Cha MD  melatonin, 6 mg, Oral, HS, Michell Cha MD  metoprolol tartrate, 25 mg, Oral, Q12H [address], Joy Reddish, DO  OLANZapine, 5 mg, Oral, HS, Michell Cha MD  ondansetron, 4 mg, Oral, Q6H PRN, Michell Cha MD  oxyCODONE, 2 5 mg, Oral, Q4H PRN, Michell Cha MD  polyethylene glycol, 17 g, Oral, Daily, Michell Cha MD  saccharomyces boulardii, 250 mg, Oral, BID, KHANG Bonilla  senna-docusate sodium, 1 tablet, Per NG Tube, BID, Michell Cha MD  tamsulosin, 0 4 mg, Oral, Daily With Justina Pardo MD  tobramycin, 1 drop, Both Eyes, 4x Daily, KHANG Bonilla        Subjective/ HPI: Patients overnight issues or events were reviewed with nursing or staff during rounds or morning huddle session  No new or overnight issues  Offers no complaints except feeling like he has "brain fog"    ROS:   A 10 point ROS was performed; negative except as noted above         *Labs /Radiology studies reviewed  *Medications reviewed and reconciled as needed  *Please refer to order section for additional ordered labs studies  *Case discussed with primary attending during morning huddle case rounds      Physical Examination:  Vitals:   Vitals:    08/04/20 1440 08/04/20 2030 08/05/20 0535 08/05/20 0835   BP: 137/81 125/75 126/75 122/71   BP Location: Left arm  Left arm Left arm   Pulse: 89 97 85 88   Resp: 19  20    Temp:  98 3 °F (36 8 °C) 97 8 °F (36 6 °C)    TempSrc:  Oral Oral    SpO2:   95%    Weight:   84 3 kg (185 lb 13 6 oz)    Height:           General Appearance: no distress, conversive  HEENT: PERRLA, conjuctiva normal; oropharynx clear; mucous membranes moist   Neck:  Supple, normal ROM, no JVD  Lungs: CTA, normal respiratory effort, no retractions, expiratory effort normal  CV: regular rate and rhythm; no rubs/murmurs/gallops, PMI normal   ABD: soft; ND/NT; +BS  EXT: no edema  Skin: normal turgor, normal texture, no rashes  Psych: affect normal, mood normal  Neuro: AA      The above physical exam was reviewed and updated as determined by my evaluation of the patient on 8/5/2020  Invasive Devices     None                    VTE Pharmacologic Prophylaxis: Enoxaparin  Code Status: Level 2 - DNAR: but accepts endotracheal intubation  Current Length of Stay: 6 day(s)      Total time spent:  30 minutes with more than 50% spent counseling/coordinating care  Counseling includes discussion with patient re: progress  and discussion with patient of his/her current medical state/information  Coordination of patient's care was performed in conjunction with primary service  Time invested included review of patient's labs, vitals, and management of their comorbidities with continued monitoring  In addition, this patient was discussed with medical team including physician and advanced extenders  The care of the patient was extensively discussed and appropriate treatment plan was formulated unique for this patient  ** Please Note:  voice to text software may have been used in the creation of this document   Although proof errors in transcription or interpretation are a potential of such software**

## 2020-08-05 NOTE — PROGRESS NOTES
Occupational Therapy Treatment Note     08/05/20 0890   Pain Assessment   Pain Assessment Tool Pain Assessment not indicated - pt denies pain   Restrictions/Precautions   Precautions Aspiration;Bed/chair alarms;Cognitive; Fall Risk;Supervision on toilet/commode; Impulsive   Lifestyle   Autonomy "I feel a little off today, a little less coordinated and my head feels strange"   Eating Assessment   Swallow Precautions Yes   Tub/Shower Transfer   Limitations Noted In Balance; Safety;LE Strength; Endurance   Adaptive Equipment Seat with Back   Findings Pt's dtr Hattie Monroy present to observe shower tfxr  OT educated Hattie Monroy and pt on recommended use of shower chair w/back and grab bar installation to dec fall risk and expend less energy as pt has poor activity tolerance  Hattie Monroy verbalized understanding and plans to look into purchasing shower chair as well as installing grab bar upon entry of shower  She stated there is a small bar (intended to hold a washcloth) built into the shower that the pt could possibly use as some support, however OT recommending pt utilize grab bar  Hattie Monroy asked about arms on shower chair, OT educated that pt does not necessarily need them, so it is optional  OT recommended the grab bar be in reach for when pt goes from sitting on chair to standing  Sit to Stand   Type of Assistance Needed Supervision   Amount of Physical Assistance Provided No physical assistance   Comment no AD   Sit to Stand CARE Score 4   Bed-Chair Transfer   Type of Assistance Needed Supervision   Amount of Physical Assistance Provided No physical assistance   Comment no AD, pt able to take steps to tfxr   Chair/Bed-to-Chair Transfer CARE Score 4   Transfer Bed/Chair/Wheelchair   Limitations Noted In Balance;Problem Solving;LE Strength; Endurance   Adaptive Equipment None   Toilet Transfer   Type of Assistance Needed Supervision; Adaptive equipment   Amount of Physical Assistance Provided No physical assistance   Comment Pt able to complete tfxr with and w/out grab bar  Pt's dtr Vibha Cancel reported there is a sink in pt's home bathroom to the left of the toilet that the pt can use to boost if needed  OT educated grab bar not needed at toilet  Toilet Transfer CARE Score 4   Toilet Transfer   Surface Assessed Standard Toilet   Transfer Technique Standard   Cognition   Overall Cognitive Status Impaired   Arousal/Participation Alert; Cooperative   Attention Attends with cues to redirect   Orientation Level Oriented to person;Oriented to place;Oriented to time   Memory Decreased short term memory;Decreased recall of recent events;Decreased recall of precautions   Following Commands Follows one step commands with increased time or repetition   Comments Pt reports he thinks sitting in bed all day is setting him back further and that he is not improving  OT educated pt on his progress so far  Pt also cited he felt 'stupid in the head'', pt w/dec ability to further explain  OT asked if pt felt dizzy or lightheaded, pt said no  OT asked if pt felt foggy, he agreed  Pt and dtr concerned pt's medications are making him foggy, OT notified Hue Khan about pt's feelings and dtr questioning if Flomax can be stopped  Spoke w/PT Pankaj Kirk ([facility]) who will assess ortho bp during next session  Assessment   Treatment Assessment Pt engaged in skilled OT session focused on family training and toilet and shower tfxr  Pt's dtr Vibha Cancel visiting pt and therefore present during session  OT educated Vibha Cancel on pt's deficits of impaired short term memory, insight, safety awareness, balance, endurance  Pt completed toilet and shower tfxr to show dtr Vibha Cancel and OT educated on recommendations of grab bar in shower (for transfer and for steadying assist) and shower chair  OT is recommending pt have supervision for bathing and shower transfer upon d/c, as well as supervision/assist for all IADLS   Educated pt's daughter that goal is for pt to be independent with toileting and grooming tasks, however recommend pt is not alone in house  Due to pt's decreased insight into deficits/safety awareness, pt likely will not always call for assistance, therefore recommend family is providing pt with distant supervision in order for them to respond as needed  Arielle Mcallister reported someone is almost always at home Erica Brown herself is a nurse but hurt her foot and will be off work until end of August)  Plan for further family training w/Rosalva tomorrow focused on cont education on cog deficits and recommendations 2* cognitive deficits, including assistance w/ hot meal prep, med management, community reintegration, recommend video monitors in order to provide pt with distant supervision level for safety, will educate daughter on bed alarms as well  Pt would benefit from continued cognitive retraining in outpatient therapy as pt's daughter continues to report pt is not at baseline  From OT standpoint, pt would also benefit from drivers evaluation, when medically appropriate to drive and after continued cognitive retraining  Prognosis Fair   Problem List Decreased strength;Decreased endurance; Impaired balance;Decreased mobility; Decreased coordination;Decreased cognition; Impaired judgement;Decreased safety awareness   Barriers to Discharge Inaccessible home environment;Decreased caregiver support   Plan   Treatment/Interventions ADL retraining;Functional transfer training; Therapeutic exercise; Endurance training;Cognitive reorientation;Patient/family training;Equipment eval/education; Compensatory technique education   Progress Progressing toward goals   Recommendation   OT Discharge Recommendation Home with skilled therapy  (outpt OT)   OT Therapy Minutes   OT Time In 0835   OT Time Out 0920   OT Total Time (minutes) 45   OT Mode of treatment - Individual (minutes) 45   OT Mode of treatment - Concurrent (minutes) 0   OT Mode of treatment - Group (minutes) 0   OT Mode of treatment - Co-treat (minutes) 0   OT Mode of Treatment - Total time(minutes) 45 minutes   OT Cumulative Minutes 435   Therapy Time missed   Time missed?  No

## 2020-08-05 NOTE — PROGRESS NOTES
PM&R Progress Note:    HPI: 79 y o  male with left upper lobe lung cancer, s/p left upper lobe lobectomy By Dr Manjit Dial on 4/51/37, complicated postop course requiring emergent intubation and tracheostomy  Now decannulated  Transferred to [facility] on 7/30/20  ASSESSMENT: Stable, progressing      PLAN:    Rehabilitation   Continue current rehabilitation plan of care to maximize function   Current functional deficits include proximal musculature weakness, gait instability, impaired mobility and self-care   Expected Discharge: D/C 8/12/20 to home with outpatient PT/OT/SLP   Family training Thursday    Pain   No issues    DVT prophylaxis   Duration of treatment for recent DVT:  Managed on therapeutic Lovenox - anticoagulation needs to be determined for discharge - will discuss with Dr Kaylin Macdonald    * Malignant neoplasm of upper lobe of left lung [facility])  Assessment & Plan  S/p left upper lobe lobectomy with Dr Jostin Menendez on 8/03  Complicated by need for emergent tracheostomy, now decannulated  Monitor trach site for signs of infection   Now on albuterol nebulizer treatments p r n  for wheezing or shortness of breath  No longer requiring oxygen    Stable saturations at rest and with activity    Conjunctivitis  Assessment & Plan  Left greater than right eye  Tobramycin ophthalmic drops ordered for both eyes x 7 days    History of TBI (traumatic brain injury) [facility])  Assessment & Plan  With baseline mild cognitive deficit    UTI (urinary tract infection)  Assessment & Plan  Cultures positive for Pseudomonas  Treatment initiated with Levaquin on 7/31/20 5-7 days    Acute deep vein thrombosis (DVT) of axillary vein of right upper extremity ([address] )  Assessment & Plan  With DVT lower extremity on 6/20 ultrasound, right upper extremity DVT on 6/22 ultrasound  Repeat ultrasounds with resolution   On treatment dose lovenox 1mg/kg q12h, anticipate 3-6 month duration, follow up with PCP outpatient       Appreciate IM consultants medical co-management  Labs, medications, and imaging personally reviewed  SUBJECTIVE:  Patient seen face to face  No acute issues overnight  Improving eye symptoms  ROS:  A ten point review of systems was completed 8/5/20 and pertinent positives are listed in subjective section  All other systems reviewed were negative  OBJECTIVE:   /73 (BP Location: Left arm)   Pulse 72   Temp 98 2 °F (36 8 °C) (Oral)   Resp 18   Ht 5' 8" (1 727 m)   Wt 84 3 kg (185 lb 13 6 oz)   SpO2 96%   BMI 28 26 kg/m²     Physical Exam   Constitutional: He is oriented to person, place, and time  He appears well-developed  No distress  HENT:   Head: Normocephalic  Nose: Nose normal    Eyes:   Improved left conjunctiva   Neck: Neck supple  Cardiovascular: Normal rate  Pulmonary/Chest: Effort normal  He has no wheezes  Abdominal: Soft  He exhibits no distension  Musculoskeletal: Normal range of motion  Neurological: He is alert and oriented to person, place, and time  Skin: Skin is warm  Psychiatric: Mood normal    Nursing note and vitals reviewed         Lab Results   Component Value Date    WBC 6 79 08/03/2020    HGB 8 6 (L) 08/03/2020    HCT 28 4 (L) 08/03/2020    MCV 99 (H) 08/03/2020     08/03/2020     Lab Results   Component Value Date    SODIUM 141 08/03/2020    K 3 6 08/03/2020     (H) 08/03/2020    CO2 23 08/03/2020    BUN 8 08/03/2020    CREATININE 0 83 08/03/2020    GLUC 112 08/03/2020    CALCIUM 9 2 08/03/2020     Lab Results   Component Value Date    INR 1 12 06/22/2020    INR 1 00 06/05/2020    INR 0 98 04/29/2020    PROTIME 14 0 06/22/2020    PROTIME 12 8 06/05/2020    PROTIME 12 6 04/29/2020           Current Facility-Administered Medications:     acetaminophen (TYLENOL) tablet 650 mg, 650 mg, Oral, Q6H PRN, Prince Guadarrama MD    albuterol inhalation solution 2 5 mg, 2 5 mg, Nebulization, Q6H PRN, MD Keira Keys calcium carbonate (TUMS) chewable tablet 500 mg, 500 mg, Oral, TID PRN, Guy Paul MD    chlorhexidine (PERIDEX) 0 12 % oral rinse 15 mL, 15 mL, Swish & Domenico, Q12H [address], Guy Paul MD, 15 mL at 08/04/20 2118    enoxaparin (LOVENOX) subcutaneous injection 90 mg, 1 mg/kg, Subcutaneous, Q12H [address], Guy Paul MD, 90 mg at 08/05/20 5645    guaiFENesin (ROBITUSSIN) oral solution 200 mg, 200 mg, Oral, Q4H PRN, Guy Paul MD    levofloxacin [facility]) tablet 750 mg, 750 mg, Oral, Q24H, Wayland Poplin, CRNP    Lidocaine Viscous HCl (XYLOCAINE) 2 % mucosal solution 15 mL, 15 mL, Swish & Spit, 4x Daily PRN, Guy Paul MD    melatonin tablet 6 mg, 6 mg, Oral, HS, Guy Paul MD, 6 mg at 08/04/20 2100    metoprolol tartrate (LOPRESSOR) tablet 25 mg, 25 mg, Oral, Q12H [address], Haily Bueno, DO, 25 mg at 08/05/20 0836    OLANZapine (ZyPREXA ZYDIS) dispersible tablet 5 mg, 5 mg, Oral, HS, Guy Paul MD, 5 mg at 08/04/20 2119    ondansetron (ZOFRAN-ODT) dispersible tablet 4 mg, 4 mg, Oral, Q6H PRN, Guy Paul MD    oxyCODONE (ROXICODONE) oral solution 2 5 mg, 2 5 mg, Oral, Q4H PRN, Guy Paul MD    polyethylene glycol [facility]) packet 17 g, 17 g, Oral, Daily, Guy Paul MD, 17 g at 08/04/20 1059    saccharomyces boulardii (FLORASTOR) capsule 250 mg, 250 mg, Oral, BID, Wayland Poplin, CRNP, 250 mg at 08/05/20 0836    senna-docusate sodium (SENOKOT S) 8 6-50 mg per tablet 1 tablet, 1 tablet, Per NG Tube, BID, Guy Paul MD, 1 tablet at 08/04/20 1058    tamsulosin (FLOMAX) capsule 0 4 mg, 0 4 mg, Oral, Daily With Pantera Lopes MD, 0 4 mg at 08/04/20 1750    tobramycin (TOBREX) 0 3 % ophthalmic solution 1 drop, 1 drop, Both Eyes, 4x Daily, KHANG Villela, 1 drop at 08/05/20 1145    Past Medical History:   Diagnosis Date    Alcohol abuse 3/27/2020    Chest pain     Community acquired pneumonia 3/27/2020    Hypertension     Left upper lobe pulmonary nodule     Malignant neoplasm of upper lobe of left lung ([facility] ) 5/7/2020       Patient Active Problem List    Diagnosis Date Noted    Malignant neoplasm of upper lobe of left lung ([facility] ) 05/07/2020     Priority: High    Conjunctivitis 08/04/2020    UTI (urinary tract infection) 08/03/2020    History of TBI (traumatic brain injury) ([facility] ) 08/03/2020    Encephalopathy 07/12/2020    Hyperactive delirium after surgical procedure 07/12/2020    Anemia 07/12/2020    Thrombocytosis ([place] [address] ) 07/12/2020    Acute deep vein thrombosis (DVT) of axillary vein of right upper extremity ([facility] ) 06/23/2020    Acute deep vein thrombosis (DVT) of brachial vein of right upper extremity ([place] [address] ) 06/23/2020    Tracheostomy in place [facility]) 06/23/2020    Acute deep vein thrombosis (DVT) of calf muscle vein of left lower extremity ([place] [address] ) 06/23/2020    Acute respiratory failure with hypoxia ([place] [address] ) 06/21/2020    Tobacco abuse 05/07/2020    Community acquired pneumonia 03/27/2020    Abnormal computed tomography angiography (CTA) 03/27/2020    Alcohol abuse 03/27/2020          Brandon Alvarez MD    Total time spent:  30 minutes with more than 50% spent counseling/coordinating care  Counseling includes discussion with patient re: progress and discussion with patient of his/her current medical state/information  Coordination of patient's care was performed in conjunction with consulting services  Time invested included review of patient's labs, vitals, and management of their comorbidities with continued monitoring  The care of the patient was extensively discussed and appropriate treatment plan was formulated unique for this patient  ** Please Note:  voice to text software may have been used in the creation of this document   Although proof errors in transcription or interpretation are a potential of such software**

## 2020-08-05 NOTE — PROGRESS NOTES
08/05/20 0930   Pain Assessment   Pain Assessment Tool Pain Assessment not indicated - pt denies pain   Pain Score No Pain   Patient's Stated Pain Goal No pain   Restrictions/Precautions   Precautions Aspiration;Bed/chair alarms;Cognitive; Fall Risk;Limb alert;Supervision on toilet/commode  (R UE)   Cognition   Overall Cognitive Status Impaired   Arousal/Participation Alert; Cooperative   Attention Attends with cues to redirect   Orientation Level Oriented X4   Memory Decreased short term memory;Decreased recall of recent events   Following Commands Follows one step commands with increased time or repetition   Comments Pt has difficulty describing his symptoms and occ spaces out requiring PT redirection   Subjective   Subjective Pt brought to PT gym by PT aid and had no c/o of pain however stated he had a "stupid feeling in his head"  PT attempted to investigate further and pt denied c/o of dizziness however agreed to feeling "off"  Pts vitals were assessed for [facility] and were negative, check vitals for details and CRNP made aware   Sit to Stand   Type of Assistance Needed Verbal cues; Supervision   Amount of Physical Assistance Provided No physical assistance   Comment No AD, VC to take a minute in standing before ambulating to gain his balance   Sit to Stand CARE Score 4   Bed-Chair Transfer   Type of Assistance Needed Supervision   Amount of Physical Assistance Provided No physical assistance   Comment no AD   Chair/Bed-to-Chair Transfer CARE Score 4   Walk 10 Feet   Type of Assistance Needed Incidental touching;Verbal cues   Amount of Physical Assistance Provided No physical assistance   Comment 25'x1, CS-CGA due to path deviation to both L/R with inability to self correct requiring VC from PT   Walk 10 Feet CARE Score 4   Walk 50 Feet with Two Turns   Type of Assistance Needed Incidental touching;Verbal cues   Amount of Physical Assistance Provided No physical assistance   Comment CS-CGA due to path deviation to both L/R with inability to self correct requiring VC from PT   Walk 50 Feet with Two Turns CARE Score 4   Walk 150 Feet   Type of Assistance Needed Incidental touching;Verbal cues   Amount of Physical Assistance Provided No physical assistance   Comment 150'x1, CS-CGA due to path deviation to both L/R with inability to self correct requiring VC from PT   Walk 150 Feet CARE Score 4   Ambulation   Does the patient walk? 2  Yes   Wheelchair mobility   Does the patient use a wheelchair? 0  No   Equipment Use   NuStep 10 min, lvl 2, bilat UE/LE   Assessment   Treatment Assessment Pt engaged in 30 min skilled PT session focusing on functional mobilities and aerobic endurance  Throughout session pt reported feeling unsteady, describing it as "stupid feeling in the head" however it did not increase with mobilities and vitals were neg for [place]  Pt cont to dem path deviation to both L/R with decreased righting reaction and poor safety awareness  PT educated pt in order to maintain safety, he needs to stop and reorient himself to midline verses continuing to ambulate as that will increase his fall risk  Afternoon session to focus on mobility training with emphasis on neuroplasticity principles as well as community reintegration  Family/Caregiver Present no   Problem List Decreased strength;Decreased endurance; Impaired balance;Decreased mobility; Decreased cognition;Decreased safety awareness; Impaired judgement;Decreased coordination   Barriers to Discharge Inaccessible home environment;Decreased caregiver support   PT Barriers   Functional Limitation Car transfers; Ramp negotiation;Stair negotiation;Standing;Walking;Transfers   Plan   Treatment/Interventions Functional transfer training;LE strengthening/ROM;ADL retraining; Therapeutic exercise; Endurance training;Bed mobility;Gait training   Progress Progressing toward goals   Recommendation   PT Discharge Recommendation Home with skilled therapy   PT - OK to Discharge No   PT Therapy Minutes   PT Time In 0930   PT Time Out 1000   PT Total Time (minutes) 30   PT Mode of treatment - Individual (minutes) 30   PT Mode of treatment - Concurrent (minutes) 0   PT Mode of treatment - Group (minutes) 0   PT Mode of treatment - Co-treat (minutes) 0   PT Mode of Treatment - Total time(minutes) 30 minutes   PT Cumulative Minutes 360

## 2020-08-05 NOTE — SOCIAL WORK
Clinical update faxed to allen chen at [facility], [phone], requesting 6 additional days with dc scheduled for 8/12  Awaiting determination  Phone call placed to pts dtr Hattie Monroy and reviewed team update from yesterday  Informed of dc date for 8/12 and she is in agreement  Cm reviewed recommendations for contd outpt therapy and Hattie Monroy is hesitant due to the time constraints of driving him and the appmts as she has 4 kids  Hattie Monroy also questioned what his benefits would be from continuing with more therapy and cm encouraged her to follow up with his therapists tomorrow when she is in for training  Hattie Monroy stated she asked nursing this morning if flomax could be held to see if pts "brain fog" would disappear and questioned why he was on it anyway  She also asked if urine could be cultured once abx are completed as he has had two uti's during hospitalization and never before in his life  Dr Bessie Phoenix made aware  Cm following to assist w/dc planning needs

## 2020-08-05 NOTE — PROGRESS NOTES
Pastoral Care Progress Note    2020  Patient: Lux Womack : 1950  Admission Date & Time: 2020 1153  MRN: [id number] [facility]: [id number]                     Chaplaincy Interventions Utilized:   Empowerment: Provided chaplaincy education      Relationship Building: Jared Tyler was not interested in a  visit because he is not Zoroastrian  He indicated that "whatever will happen will happen "  relayed that [address]  is available  if he ever wants someone to talk to          Chaplaincy Outcomes Achieved:  Declined  support          Spiritual Coping Strategies Utilized:   Spiritual disengagement

## 2020-08-05 NOTE — PROGRESS NOTES
08/05/20 1330   Pain Assessment   Pain Assessment Tool Pain Assessment not indicated - pt denies pain   Patient's Stated Pain Goal No pain   Restrictions/Precautions   Precautions Aspiration;Bed/chair alarms;Cognitive; Fall Risk;Limb alert;Supervision on toilet/commode   Cognition   Overall Cognitive Status Impaired   Subjective   Subjective Pt reported a decrease in feeling "off" however reported heaviness in bilat LE stating "I feel uncoordinated, like my legs are misaligned"  Sit to Lying   Type of Assistance Needed Supervision   Amount of Physical Assistance Provided No physical assistance   Sit to Lying CARE Score 4   Sit to Stand   Type of Assistance Needed Supervision   Amount of Physical Assistance Provided No physical assistance   Comment no AD   Sit to Stand CARE Score 4   Bed-Chair Transfer   Type of Assistance Needed Supervision   Amount of Physical Assistance Provided No physical assistance   Comment no AD   Chair/Bed-to-Chair Transfer CARE Score 4   Walk 10 Feet   Type of Assistance Needed Incidental touching; Adaptive equipment   Amount of Physical Assistance Provided No physical assistance   Comment CS-CGA due to cont path deviation   Walk 10 Feet CARE Score 4   Walk 50 Feet with Two Turns   Type of Assistance Needed Incidental touching; Adaptive equipment   Amount of Physical Assistance Provided No physical assistance   Comment CS-CGA due to cont path deviation   Walk 50 Feet with Two Turns CARE Score 4   Walk 150 Feet   Type of Assistance Needed Incidental touching;Verbal cues; Adaptive equipment   Amount of Physical Assistance Provided No physical assistance   Comment 300'x1, 150'x2, CS-CGA, cont   path deviation without self correction, VC to maintain midline   Walk 150 Feet CARE Score 4   Walking 10 Feet on Uneven Surfaces   Type of Assistance Needed Incidental touching   Amount of Physical Assistance Provided No physical assistance   Comment Ramp in lobby, CGA   Walking 10 Feet on Uneven Surfaces CARE Score 4   Ambulation   Does the patient walk? 2  Yes   Wheelchair mobility   Does the patient use a wheelchair? 0  No   Therapeutic Interventions   Balance Community ambulation with uneven surface and busy environment 300'x1, CGA, noted path deviation R>L, x1 seated rest break  20'x1 obstacle course with 4 bolsters, 10'x1 tandem walking   Assessment   Treatment Assessment Pt engaged in skilled PT focusing on functional mobility and balance training  Throughout session pt dem increased fatigue requiring multiple rest breaks and Sp02 dropped to 89% momentarily but then remained above 93 rest of session  During community ambulation pt dem decreased safety awareness as he required max VC to be aware of his surrounding when crossing street or passing through congested hallway  When back inside PT gym pt reported feeling "off" and wanting apple sauce to "get a surge of energy"  PT notified nursing of possible low blood sugar and PCA was able to assess resulting in normal value however on low end, 84  Family training to be conducted tomorrow with pt's dtr  Pt would benefit from cont skilled PT focusing on functional mobilities with emphasis on neuroplasticity principles to increase balance and proprioception as well as max independence and safety  Family/Caregiver Present no   Problem List Decreased strength;Decreased endurance; Impaired balance;Decreased mobility; Decreased coordination; Impaired judgement;Decreased cognition;Decreased safety awareness   Barriers to Discharge Inaccessible home environment;Decreased caregiver support   PT Barriers   Functional Limitation Car transfers; Ramp negotiation;Stair negotiation;Standing;Transfers; Walking   Plan   Treatment/Interventions Functional transfer training;LE strengthening/ROM; Therapeutic exercise; Endurance training;Equipment eval/education;Gait training;Bed mobility   Progress Progressing toward goals   Recommendation   PT Discharge Recommendation Home with skilled therapy   PT - OK to Discharge No   PT Therapy Minutes   PT Time In 1330   PT Time Out 1430   PT Total Time (minutes) 60   PT Mode of treatment - Individual (minutes) 60   PT Mode of treatment - Concurrent (minutes) 0   PT Mode of treatment - Group (minutes) 0   PT Mode of treatment - Co-treat (minutes) 0   PT Mode of Treatment - Total time(minutes) 60 minutes   PT Cumulative Minutes 420

## 2020-08-05 NOTE — PROGRESS NOTES
08/05/20 1100   Pain Assessment   Pain Assessment Tool Pain Assessment not indicated - pt denies pain   Pain Score No Pain   Restrictions/Precautions   Precautions Aspiration;Bed/chair alarms;Cognitive; Fall Risk;Impulsive;Supervision on toilet/commode   Comprehension   QI: Comprehension 3  Usually Understands: Understands most conversations, but misses some part/intent of message  Requires cues at times to understand  Comprehension (FIM) 4 - Understands basic info/conversation 75-90% of time   Expression   QI: Expression 3  Exhibits some difficulty with expressing needs and ideas (e g , some words or finishing thoughts) or speech is not clear   Expression (FIM) 5 - Needs help/cues only RARELY (< 10% of the time)   Social Interaction   Social Interaction (FIM) 5 - Interacts appropriately with others 90% of time   Problem Solving   Problem solving (FIM) 3 - Solves basic problmes 50-74% of time   Memory   Memory (FIM) 3 - Recognizes, recalls/performs 50-74%   Memory Skills   Orientation Level Oriented X4   Speech/Language/Cognition Assessmetn   Treatment Assessment Pt seen for f/u cognitive-linguistic tx targeting attention/problem solving skills w/written financial management task  When presented w/mock check register, pt accurately answered 5/10 ?'s w/improvement to 8/10 given prompts and further improvement to 9/10 given verbal cues  Pt noted to benefit from extra time to complete task  Pt's insight into his deficits and rate of recovery still remains reduced at this time; however, he appears more receptive to education   Pt will continue to benefit from skilled ST intervention to maximize overall cognitive-linguistic skills, though suspect pt will need S following D/C from hospital    SLP Therapy Minutes   SLP Time In 1100   SLP Time Out 1130   SLP Total Time (minutes) 30   SLP Mode of treatment - Individual (minutes) 30   SLP Mode of treatment - Concurrent (minutes) 0   SLP Mode of treatment - Group (minutes) 0   SLP Mode of treatment - Co-treat (minutes) 0   SLP Mode of Treatment - Total time(minutes) 30 minutes   SLP Cumulative Minutes 315   Therapy Time missed   Time missed?  No

## 2020-08-05 NOTE — PROGRESS NOTES
08/05/20 [address]  Affiliation None   Spiritual Beliefs/Perceptions   Concept of God Nonexistent

## 2020-08-05 NOTE — PROGRESS NOTES
Occupational Therapy Treatment Note     08/05/20 1045   Pain Assessment   Pain Assessment Tool Pain Assessment not indicated - pt denies pain   Restrictions/Precautions   Precautions Aspiration;Bed/chair alarms;Cognitive; Fall Risk;Impulsive;Supervision on toilet/commode   Lifestyle   Autonomy "I think we need to scale back on things and make everything simple, not so complicated"   Sit to Stand   Type of Assistance Needed Supervision   Amount of Physical Assistance Provided No physical assistance   Comment no AD   Sit to Stand CARE Score 4   Bed-Chair Transfer   Type of Assistance Needed Supervision   Amount of Physical Assistance Provided No physical assistance   Comment no AD   Chair/Bed-to-Chair Transfer CARE Score 4   Transfer Bed/Chair/Wheelchair   Limitations Noted In Balance;Problem Solving;LE Strength; Endurance; Coordination   Toilet Transfer   Type of Assistance Needed Supervision   Amount of Physical Assistance Provided No physical assistance   Comment Pt w/inc insight, citing choice to sit to urinate rather than stand 2* feeling off balance   Toilet Transfer CARE Score 4   Toilet Transfer   Surface Assessed Standard Toilet   Transfer Technique Standard   Meal Prep   Meal Prep Level Other (Comment)  (no AD)   Meal Prep Level of Assistance Close supervision   Meal Preparation Heated up water in microwave to simulate simple meal prep  Pt able to use microwave at supervision level  Can cite what is safe and not safe to put in the microwave (metal vs plastic vs paper)  Kitchen Mobility   Kitchen-Mobility Level Other (Comment)  (no AD)   Kitchen Activity Retrieve items;Transport items   Kitchen Mobility Comments close supervision as pt cites feeling balance is off today   Pt cited feeling tired after brief simulated meal prep and initiated rest break on chair, OT reviewed energy conservation strategies in kitchen incl keeping chair nearby   Cognition   Overall Cognitive Status Impaired   Arousal/Participation Alert; Cooperative   Attention Attends with cues to redirect   Orientation Level Oriented X4   Memory Decreased short term memory;Decreased recall of recent events   Following Commands Follows one step commands with increased time or repetition   Comments Pt cited legs are 'feeling like cement' and his brain still feels foggy  Pt cited SCD leg pumps and 'nonessential' medications as possibly contributing to his brain fog and his legs feeling off, as well as lack of activity when lying in the bed or chair rather than moving around regularly  OT offered option to give pt exercises to complete seated in his room, pt potentially interested  Pt did cite his balance as feeling more off than usual today, OT educated this is the reason he should continue to call when he wants to move  Pt has tendency to perseverate on what he believes is causing symptoms, req VC to redirect and OT educated on deconditioned state of his body over prolonged hospital stay, as well as 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  OT educated pt that alarms will stay on, however will will speak with MD regarding d/c SCDS  OT created 'problems' list w/pt citing 3 main problems as balance, endurance, and short term memory  OT reviewed the main activities of each therapy pt is currently participating in, in ability to [place] and therefore inc insight into deficits  Pt w/G immediate recall and fair delayed (~5 min) recall  Assessment   Treatment Assessment Pt engaged in skilled OT session focused on insight into deficits as well as kitchen mobility/simple meal prep  Pt has tendency to perseverate and req redirection  Pt demos G safety awareness in kitchen and good potential to complete simple/microwave meal prep at independent level  Continue POC w/focus on cognitive retraining (deficits list and therapy list in pt's room), energy conservation education, endurance building, and dynamic standing balance with B/L UE release   Family training scheduled tomorrow w/pt's dtr at 1:00, refer to OT note from this morning for recommendations  Prognosis Fair   Problem List Decreased strength;Decreased endurance; Impaired balance;Decreased mobility; Decreased cognition;Decreased safety awareness; Impaired judgement;Decreased coordination   Barriers to Discharge Inaccessible home environment;Decreased caregiver support   Plan   Treatment/Interventions ADL retraining;Functional transfer training; Therapeutic exercise; Endurance training;Cognitive reorientation;Patient/family training;Equipment eval/education; Compensatory technique education   Progress Progressing toward goals   OT Therapy Minutes   OT Time In 1245   OT Time Out 1330   OT Total Time (minutes) 45   OT Mode of treatment - Individual (minutes) 45   OT Mode of treatment - Concurrent (minutes) 0   OT Mode of treatment - Group (minutes) 0   OT Mode of treatment - Co-treat (minutes) 0   OT Mode of Treatment - Total time(minutes) 45 minutes   OT Cumulative Minutes 480   Therapy Time missed   Time missed?  No

## 2020-08-06 LAB
ANION GAP SERPL CALCULATED.3IONS-SCNC: 4 MMOL/L (ref 4–13)
BASOPHILS # BLD AUTO: 0.03 THOUSANDS/ΜL (ref 0–0.1)
BASOPHILS NFR BLD AUTO: 1 % (ref 0–1)
BUN SERPL-MCNC: 12 MG/DL (ref 5–25)
CALCIUM SERPL-MCNC: 9.2 MG/DL (ref 8.3–10.1)
CHLORIDE SERPL-SCNC: 109 MMOL/L (ref 100–108)
CO2 SERPL-SCNC: 26 MMOL/L (ref 21–32)
CREAT SERPL-MCNC: 0.9 MG/DL (ref 0.6–1.3)
EOSINOPHIL # BLD AUTO: 0.29 THOUSAND/ΜL (ref 0–0.61)
EOSINOPHIL NFR BLD AUTO: 5 % (ref 0–6)
ERYTHROCYTE [DISTWIDTH] IN BLOOD BY AUTOMATED COUNT: 15.1 % (ref 11.6–15.1)
GFR SERPL CREATININE-BSD FRML MDRD: 86 ML/MIN/1.73SQ M
GLUCOSE P FAST SERPL-MCNC: 88 MG/DL (ref 65–99)
GLUCOSE SERPL-MCNC: 88 MG/DL (ref 65–140)
HCT VFR BLD AUTO: 32.6 % (ref 36.5–49.3)
HGB BLD-MCNC: 9.7 G/DL (ref 12–17)
IMM GRANULOCYTES # BLD AUTO: 0.07 THOUSAND/UL (ref 0–0.2)
IMM GRANULOCYTES NFR BLD AUTO: 1 % (ref 0–2)
LYMPHOCYTES # BLD AUTO: 1.7 THOUSANDS/ΜL (ref 0.6–4.47)
LYMPHOCYTES NFR BLD AUTO: 31 % (ref 14–44)
MCH RBC QN AUTO: 29.1 PG (ref 26.8–34.3)
MCHC RBC AUTO-ENTMCNC: 29.8 G/DL (ref 31.4–37.4)
MCV RBC AUTO: 98 FL (ref 82–98)
MONOCYTES # BLD AUTO: 0.42 THOUSAND/ΜL (ref 0.17–1.22)
MONOCYTES NFR BLD AUTO: 8 % (ref 4–12)
NEUTROPHILS # BLD AUTO: 2.97 THOUSANDS/ΜL (ref 1.85–7.62)
NEUTS SEG NFR BLD AUTO: 54 % (ref 43–75)
NRBC BLD AUTO-RTO: 0 /100 WBCS
PLATELET # BLD AUTO: 400 THOUSANDS/UL (ref 149–390)
PMV BLD AUTO: 9.2 FL (ref 8.9–12.7)
POTASSIUM SERPL-SCNC: 4.5 MMOL/L (ref 3.5–5.3)
RBC # BLD AUTO: 3.33 MILLION/UL (ref 3.88–5.62)
SODIUM SERPL-SCNC: 139 MMOL/L (ref 136–145)
WBC # BLD AUTO: 5.48 THOUSAND/UL (ref 4.31–10.16)

## 2020-08-06 PROCEDURE — 97535 SELF CARE MNGMENT TRAINING: CPT

## 2020-08-06 PROCEDURE — 97129 THER IVNTJ 1ST 15 MIN: CPT

## 2020-08-06 PROCEDURE — 80048 BASIC METABOLIC PNL TOTAL CA: CPT | Performed by: NURSE PRACTITIONER

## 2020-08-06 PROCEDURE — 97530 THERAPEUTIC ACTIVITIES: CPT

## 2020-08-06 PROCEDURE — 94760 N-INVAS EAR/PLS OXIMETRY 1: CPT

## 2020-08-06 PROCEDURE — 97130 THER IVNTJ EA ADDL 15 MIN: CPT

## 2020-08-06 PROCEDURE — 97112 NEUROMUSCULAR REEDUCATION: CPT

## 2020-08-06 PROCEDURE — 99232 SBSQ HOSP IP/OBS MODERATE 35: CPT | Performed by: PHYSICAL MEDICINE & REHABILITATION

## 2020-08-06 PROCEDURE — 85025 COMPLETE CBC W/AUTO DIFF WBC: CPT | Performed by: NURSE PRACTITIONER

## 2020-08-06 RX ADMIN — Medication 250 MG: at 10:03

## 2020-08-06 RX ADMIN — METOPROLOL TARTRATE 25 MG: 25 TABLET, FILM COATED ORAL at 10:02

## 2020-08-06 RX ADMIN — TOBRAMYCIN 1 DROP: 3 SOLUTION/ DROPS OPHTHALMIC at 10:03

## 2020-08-06 RX ADMIN — TOBRAMYCIN 1 DROP: 3 SOLUTION/ DROPS OPHTHALMIC at 21:41

## 2020-08-06 RX ADMIN — Medication 250 MG: at 16:52

## 2020-08-06 RX ADMIN — CHLORHEXIDINE GLUCONATE 0.12% ORAL RINSE 15 ML: 1.2 LIQUID ORAL at 21:41

## 2020-08-06 RX ADMIN — SENNOSIDES AND DOCUSATE SODIUM 1 TABLET: 8.6; 5 TABLET ORAL at 16:52

## 2020-08-06 RX ADMIN — TAMSULOSIN HYDROCHLORIDE 0.4 MG: 0.4 CAPSULE ORAL at 16:52

## 2020-08-06 RX ADMIN — OLANZAPINE 5 MG: 5 TABLET, ORALLY DISINTEGRATING ORAL at 21:41

## 2020-08-06 RX ADMIN — SENNOSIDES AND DOCUSATE SODIUM 1 TABLET: 8.6; 5 TABLET ORAL at 10:03

## 2020-08-06 RX ADMIN — CHLORHEXIDINE GLUCONATE 0.12% ORAL RINSE 15 ML: 1.2 LIQUID ORAL at 10:01

## 2020-08-06 RX ADMIN — POLYETHYLENE GLYCOL 3350 17 G: 17 POWDER, FOR SOLUTION ORAL at 10:02

## 2020-08-06 RX ADMIN — LEVOFLOXACIN 750 MG: 750 TABLET, FILM COATED ORAL at 16:51

## 2020-08-06 RX ADMIN — ENOXAPARIN SODIUM 90 MG: 100 INJECTION SUBCUTANEOUS at 10:02

## 2020-08-06 RX ADMIN — MELATONIN 6 MG: at 21:41

## 2020-08-06 RX ADMIN — ENOXAPARIN SODIUM 90 MG: 100 INJECTION SUBCUTANEOUS at 21:41

## 2020-08-06 RX ADMIN — METOPROLOL TARTRATE 25 MG: 25 TABLET, FILM COATED ORAL at 21:41

## 2020-08-06 NOTE — PROGRESS NOTES
08/06/20 1400   Pain Assessment   Pain Assessment Tool 0-10   Pain Score No Pain   Restrictions/Precautions   Precautions Fall Risk;Cognitive;Bed/chair alarms;Aspiration;Limb alert;Supervision on toilet/commode   Cognition   Overall Cognitive Status Impaired   Subjective   Subjective Pt found supine in bed, in company of tereza Camargo, c/o of no pain or lightheadedness and agreeable to begin FT  Roll Left and Right   Type of Assistance Needed Independent   Amount of Physical Assistance Provided No physical assistance   Comment flat Bed   Roll Left and Right CARE Score 6   Sit to Lying   Type of Assistance Needed Independent   Amount of Physical Assistance Provided No physical assistance   Sit to Lying CARE Score 6   Lying to Sitting on Side of Bed   Type of Assistance Needed Independent   Amount of Physical Assistance Provided No physical assistance   Lying to Sitting on Side of Bed CARE Score 6   Sit to Stand   Type of Assistance Needed Supervision   Amount of Physical Assistance Provided No physical assistance   Comment no AD   Sit to Stand CARE Score 4   Ambulation   Primary Mode of Locomotion Prior to Admission Walk   Distance Walked (feet) 5 ft   Findings Following STS pt reported increased fatigue with misstep x2  requiring PT assist to regain balance and requested to return to bed   Assessment   Treatment Assessment PT 30 min skilled PT session consisting of FT with tereza Camargo however pt dem misstep following STS from hospital bed requiring PT assist to regain balance and reported increased fatigue requesting to go back to bed  PT attempted to have pt use w/c and take rest break however pt cont to want to go back to bed and rest, not wanting to participate in therapy  PT assessed pt's vitals which resulted in normal reading, check vitals for details, and CRNP/MD made aware of pt's symptoms and denial of therapy   PT tried to reschedule FT with tereza Camargo however Marcy Camargo stated at this time she feels confident in providing S assist for pt when d/c home and does not need additional training despite being unable to have first FT today  Pt to cont skilled PT in order to progress towards goals, decrease caregiver burden, and max independence and safety  Family/Caregiver Present yes   PT Family training done with: tereza King, see assessment for details   Problem List Decreased strength;Decreased endurance; Impaired balance;Decreased coordination;Decreased cognition; Impaired judgement;Decreased safety awareness   Barriers to Discharge Inaccessible home environment;Decreased caregiver support   PT Barriers   Functional Limitation Walking;Stair negotiation   Plan   Treatment/Interventions Functional transfer training;LE strengthening/ROM; Endurance training; Therapeutic exercise;Gait training;Spoke to nursing   Progress Progressing toward goals   Recommendation   PT Discharge Recommendation Home with skilled therapy   PT - OK to Discharge No   PT Therapy Minutes   PT Time In 1400   PT Time Out 1420   PT Total Time (minutes) 20   PT Mode of treatment - Individual (minutes) 20   PT Mode of treatment - Concurrent (minutes) 0   PT Mode of treatment - Group (minutes) 0   PT Mode of treatment - Co-treat (minutes) 0   PT Mode of Treatment - Total time(minutes) 20 minutes   PT Cumulative Minutes 500

## 2020-08-06 NOTE — PROGRESS NOTES
PM&R Progress Note:    HPI: 79 y o  male with left upper lobe lung cancer, s/p left upper lobe lobectomy By Dr Chacko Person on 0/43/91, complicated postop course requiring emergent intubation and tracheostomy  Now decannulated  Transferred to [facility] on 7/30/20  ASSESSMENT: Stable, progressing    PLAN:    Rehabilitation   Continue current rehabilitation plan of care to maximize function   Current functional deficits include proximal musculature weakness, gait instability, impaired mobility and self-care   Expected Discharge: D/C 8/12/20 to home with outpatient PT/OT/SLP    Pain   No issues    DVT prophylaxis   Duration of treatment for recent DVT:  Managed on therapeutic Lovenox - anticoagulation needs to be determined for discharge - will discuss with vascular    Bladder plan   Continent    Bowel plan   Continent    * Malignant neoplasm of upper lobe of left lung [facility])  Assessment & Plan  S/p left upper lobe lobectomy with Dr Celia Simmons on 7/38  Complicated by need for emergent tracheostomy, now decannulated  Monitor trach site for signs of infection   Now on albuterol nebulizer treatments p r n  for wheezing or shortness of breath  No longer requiring oxygen    Stable saturations at rest and with activity    Conjunctivitis  Assessment & Plan  Left greater than right eye  Tobramycin ophthalmic drops ordered for both eyes x 7 days    History of TBI (traumatic brain injury) [facility])  Assessment & Plan  With baseline mild cognitive deficit    UTI (urinary tract infection)  Assessment & Plan  Cultures positive for Pseudomonas  Treatment initiated with Levaquin on 7/31/20 5-7 days    Acute deep vein thrombosis (DVT) of axillary vein of right upper extremity ([address] )  Assessment & Plan  With DVT lower extremity on 6/20 ultrasound, right upper extremity DVT on 6/22 ultrasound  Repeat ultrasounds with resolution   On treatment dose lovenox 1mg/kg q12h, anticipate 3-6 month duration, follow up with PCP outpatient [facility] medical co-management  Labs, medications, and imaging personally reviewed  SUBJECTIVE:  Patient seen face to face with daughter at the bedside here for family training today  No acute issues  ROS:  A ten point review of systems was completed 8/6/20 and pertinent positives are listed in subjective section  All other systems reviewed were negative  OBJECTIVE:   /54 (BP Location: Left arm)   Pulse 80   Temp 97 8 °F (36 6 °C) (Oral)   Resp 18   Ht 5' 8" (1 727 m)   Wt 84 3 kg (185 lb 13 6 oz)   SpO2 91%   BMI 28 26 kg/m²     Physical Exam   Constitutional: He is oriented to person, place, and time  He appears well-developed  No distress  HENT:   Head: Normocephalic  Nose: Nose normal    Eyes: Conjunctivae are normal    Neck: Neck supple  Cardiovascular: Normal rate  Pulmonary/Chest: Effort normal  He has no wheezes  Abdominal: Soft  He exhibits no distension  Neurological: He is alert and oriented to person, place, and time  Skin: Skin is warm  Psychiatric: Mood normal    Nursing note and vitals reviewed         Lab Results   Component Value Date    WBC 5 48 08/06/2020    HGB 9 7 (L) 08/06/2020    HCT 32 6 (L) 08/06/2020    MCV 98 08/06/2020     (H) 08/06/2020     Lab Results   Component Value Date    SODIUM 139 08/06/2020    K 4 5 08/06/2020     (H) 08/06/2020    CO2 26 08/06/2020    BUN 12 08/06/2020    CREATININE 0 90 08/06/2020    GLUC 88 08/06/2020    CALCIUM 9 2 08/06/2020     Lab Results   Component Value Date    INR 1 12 06/22/2020    INR 1 00 06/05/2020    INR 0 98 04/29/2020    PROTIME 14 0 06/22/2020    PROTIME 12 8 06/05/2020    PROTIME 12 6 04/29/2020           Current Facility-Administered Medications:     acetaminophen (TYLENOL) tablet 650 mg, 650 mg, Oral, Q6H PRN, Stefanie Marie MD    albuterol inhalation solution 2 5 mg, 2 5 mg, Nebulization, Q6H PRN, Lamont Austin MD    calcium carbonate (TUMS) chewable tablet 500 mg, 500 mg, Oral, TID PRN, Kathleen Anderson MD    chlorhexidine (PERIDEX) 0 12 % oral rinse 15 mL, 15 mL, Swish & [place], Q12H [address], Kathleen Anderson MD, 15 mL at 08/06/20 1001    enoxaparin (LOVENOX) subcutaneous injection 90 mg, 1 mg/kg, Subcutaneous, Q12H [address], Kathleen Anderson MD, 90 mg at 08/06/20 1002    guaiFENesin (ROBITUSSIN) oral solution 200 mg, 200 mg, Oral, Q4H PRN, Kathleen Anderson MD    levofloxacin [facility]) tablet 750 mg, 750 mg, Oral, Q24H, KHANG Burr, 750 mg at 08/05/20 1552    Lidocaine Viscous HCl (XYLOCAINE) 2 % mucosal solution 15 mL, 15 mL, Swish & [place], 4x Daily PRN, Kathleen Anderson MD    melatonin tablet 6 mg, 6 mg, Oral, HS, Kathleen Anderson MD, 6 mg at 08/05/20 2155    metoprolol tartrate (LOPRESSOR) tablet 25 mg, 25 mg, Oral, Q12H [address], Luis Alberto Berry DO, 25 mg at 08/06/20 1002    OLANZapine (ZyPREXA ZYDIS) dispersible tablet 5 mg, 5 mg, Oral, HS, Kathleen Anderson MD, 5 mg at 08/05/20 2156    ondansetron (ZOFRAN-ODT) dispersible tablet 4 mg, 4 mg, Oral, Q6H PRN, Kathleen Anderson MD    oxyCODONE (ROXICODONE) oral solution 2 5 mg, 2 5 mg, Oral, Q4H PRN, Kathleen Anderson MD    polyethylene glycol [facility]) packet 17 g, 17 g, Oral, Daily, Kathleen Anderson MD, 17 g at 08/06/20 1002    saccharomyces boulardii (FLORASTOR) capsule 250 mg, 250 mg, Oral, BID, KHANG Burr, 250 mg at 08/06/20 1003    senna-docusate sodium (SENOKOT S) 8 6-50 mg per tablet 1 tablet, 1 tablet, Per NG Tube, BID, Kathleen Anderson MD, 1 tablet at 08/06/20 1003    tamsulosin (FLOMAX) capsule 0 4 mg, 0 4 mg, Oral, Daily With Prince Rolon MD, 0 4 mg at 08/05/20 1552    tobramycin (TOBREX) 0 3 % ophthalmic solution 1 drop, 1 drop, Both Eyes, 4x Daily, KHANG Burr, 1 drop at 08/06/20 1003    Past Medical History:   Diagnosis Date    Alcohol abuse 3/27/2020    Chest pain     Community acquired pneumonia 3/27/2020    Hypertension     Left upper lobe pulmonary nodule     Malignant neoplasm of upper lobe of left lung ([facility] ) 5/7/2020       Patient Active Problem List    Diagnosis Date Noted    Malignant neoplasm of upper lobe of left lung ([facility] ) 05/07/2020     Priority: High    Conjunctivitis 08/04/2020    UTI (urinary tract infection) 08/03/2020    History of TBI (traumatic brain injury) ([facility] ) 08/03/2020    Encephalopathy 07/12/2020    Hyperactive delirium after surgical procedure 07/12/2020    Anemia 07/12/2020    Thrombocytosis ([facility] ) 07/12/2020    Acute deep vein thrombosis (DVT) of axillary vein of right upper extremity ([facility] ) 06/23/2020    Acute deep vein thrombosis (DVT) of brachial vein of right upper extremity ([place] [address] ) 06/23/2020    Tracheostomy in place [facility]) 06/23/2020    Acute deep vein thrombosis (DVT) of calf muscle vein of left lower extremity ([facility] ) 06/23/2020    Acute respiratory failure with hypoxia ([place] [address] ) 06/21/2020    Tobacco abuse 05/07/2020    Community acquired pneumonia 03/27/2020    Abnormal computed tomography angiography (CTA) 03/27/2020    Alcohol abuse 03/27/2020          Lexa Adrian MD    Total time spent:  30 minutes with more than 50% spent counseling/coordinating care  Counseling includes discussion with patient re: progress and discussion with patient of his/her current medical state/information  Coordination of patient's care was performed in conjunction with consulting services  Time invested included review of patient's labs, vitals, and management of their comorbidities with continued monitoring  The care of the patient was extensively discussed and appropriate treatment plan was formulated unique for this patient  ** Please Note:  voice to text software may have been used in the creation of this document   Although proof errors in transcription or interpretation are a potential of such software**

## 2020-08-06 NOTE — PROGRESS NOTES
08/06/20 1430   Pain Assessment   Pain Assessment Tool Pain Assessment not indicated - pt denies pain   Pain Score No Pain   Restrictions/Precautions   Precautions Bed/chair alarms;Cognitive; Fall Risk;Hard of hearing;Supervision on toilet/commode   Comprehension   QI: Comprehension 3  Usually Understands: Understands most conversations, but misses some part/intent of message  Requires cues at times to understand  Comprehension (FIM) 4 - Understands basic info/conversation 75-90% of time   Expression   QI: Expression 3  Exhibits some difficulty with expressing needs and ideas (e g , some words or finishing thoughts) or speech is not clear   Expression (FIM) 5 - Needs help/cues only RARELY (< 10% of the time)   Social Interaction   Social Interaction (FIM) 5 - Interacts appropriately with others 90% of time   Problem Solving   Problem solving (FIM) 3 - Solves basic problmes 50-74% of time   Memory   Memory (FIM) 3 - Recognizes, recalls/performs 50-74%   Memory Skills   Orientation Level Oriented to person;Oriented to place;Oriented to time  (Only partial recall of medical situation)   Speech/Language/Cognition Assessmetn   Treatment Assessment Pt began session w/5-step written sequencing task during which he accurately sequenced 17/20 steps accurately across 4 sets w/1 self correction  One of the tasks was to sequence completion of writing a check, and when presented w/incomplete model of check, pt accurately filled it out in correct sequence compared to written sequencing task  However, he wrote the incorrect amount and needed a cue to correct it  Pt then engaged in auditory STM task during which he was presented w/paragraphs and f/u ?'s w/recall of 6/9 pieces of information across the 3 sets and improvement to 7/9 given verbal cues  Pt's orientation responses were appropriate except for his ability to recall and sequence aspects of his hospitalization   Additionally, it should be noted pt stated "I'm in a mood", and shared his frustrations and lack of motivation/understanding w/therapy  Attempted to educate pt, but 2* noted fatigue and frustrations, pt did not appear to be interested/participative at this time  He will continue to benefit from skilled ST interventions at this time to maximize overall cognitive-linguistic skills, though S still recommended at D/C  SLP Therapy Minutes   SLP Time In 1430   SLP Time Out 1500   SLP Total Time (minutes) 30   SLP Mode of treatment - Individual (minutes) 30   SLP Mode of treatment - Concurrent (minutes) 0   SLP Mode of treatment - Group (minutes) 0   SLP Mode of treatment - Co-treat (minutes) 0   SLP Mode of Treatment - Total time(minutes) 30 minutes   SLP Cumulative Minutes 365   Therapy Time missed   Time missed?  No

## 2020-08-06 NOTE — PROGRESS NOTES
08/06/20 1300   Pain Assessment   Pain Assessment Tool Pain Assessment not indicated - pt denies pain   Restrictions/Precautions   Precautions Aspiration;Bed/chair alarms;Cognitive; Fall Risk;Impulsive;Supervision on toilet/commode   Comprehension   Comprehension (FIM) 4 - Understands basic info/conversation 75-90% of time   Expression   Expression (FIM) 5 - Needs help/cues only RARELY (< 10% of the time)   Social Interaction   Social Interaction (FIM) 5 - Interacts appropriately with others 90% of time   Problem Solving   Problem solving (FIM) 3 - Solves basic problmes 50-74% of time   Memory   Memory (FIM) 3 - Recognizes, recalls/performs 50-74%   Speech/Language/Cognition Assessmetn   Treatment Assessment Session primarily was focused towards family training/education in regards to pt's current status in regards to cognitive skills  SLP introducing self to dtr, Arielle Mcallister, in which SLP providing a generalize review of initial assessment, overall score when compared to age matched peers, but expressed how pt has demonstrated improvement in functional cognitive skills, but continues to have decreased safety awareness in regards to I ADL's, cooking, finances, medications  SLP providing education in regards to how functional financial management was fair, needing supervision to monitor functional math  However, dtr reporting that she currently is managing his finances and has transitioned a number of accounts to auto-deductions  SLP also highlighting to dtr in regards to pt's ability to complete a reading comprehension task provided medication labels, BUT when executing in a mock medication task using pill bottles, pt's overall comprehension in regards to directions "take every 12 hrs" was noted to be difficult for pt as well as ability to complete functional task of placing medications accurately as per directions on the label   SLP stating to dtr that she is to fully assist pt and benefit from her providing medications to pt, which she was in agreement w/ as well  Of note, pt was present at this session and overall insight and recall to completing these tasks, as to which SLP also stated about due to decreased ST memory recall, this is how supervision is recommended to complete I ADL tasks, including any cooking, financial and medication tasks  At this time, pt to continue to benefit from SLP services to maximize overall functional independence of cognitive linguistic skills  SLP Therapy Minutes   SLP Time In 1300   SLP Time Out 1320   SLP Total Time (minutes) 20   SLP Mode of treatment - Individual (minutes) 20   SLP Mode of treatment - Concurrent (minutes) 0   SLP Mode of treatment - Group (minutes) 0   SLP Mode of treatment - Co-treat (minutes) 0   SLP Mode of Treatment - Total time(minutes) 20 minutes   SLP Cumulative Minutes 335   Therapy Time missed   Time missed?  No

## 2020-08-06 NOTE — PROGRESS NOTES
Internal Medicine Progress Note  Patient: Padmini Zhang  Age/sex: 79 y o  male  Medical Record #: [id number]      ASSESSMENT/PLAN: (Interval History)  Padmini Zhang is seen and examined and management for following issues:    DOMI adenocarcinoma; s/p bronchoscopy, mediastinoscopy, VATS, left upper lobectomy and mediastinal lymph node dissection 6/10/20  · Back to Thoracic surgery as OP     PEA with multiple rounds of CPR/respiratory arrest; emergent cricothyroidotomy 6/20; tracheostomy 6/22; decannulation 7/29/20  · Stoma closed and healing well   · Continue Albuterol neb prn     hx nicotine abuse with 1ppd x 50 yrs  · quit prior to surgery  · continue prn Albuterol     Sinus tachycardia  · resolved  · Continue Lopressor 25 mg BID  · No changes today  · ECHO 8/2/20 WNL normal EF and no wall motion issues     Fever; UTI >100,000 Pseudomonas; SIRS/sepsis  · Afebrile  · No further sx  · Continue Levaquin thru today     ABLA  · Stable  · will watch and transfuse prn hemoglobin <8 0     DVT  LE and RUE  · currently is on therapeutic Lovenox   · will need conversion to NOAC eventually  · followup venous dopplers on UE/LE 7/14 and 6/26 respectively showed resolution of DVTs     Urine retention  · on Flomax   · Lucila was d/cd 7/27/20     TBI 2010  · Has memory issues from this incident but unknown details since he cannot remember anything about it     ETOH abuse  · Unknown amount/details     Left eye conjunctivitis  · Woke up 8/4 with itching/burning/tearing of left eye; has slight crusting  · On Tobrex QID x 7 days  · Much improved       The above assessment and plan was reviewed and updated as determined by my evaluation of the patient on 8/6/2020      Labs:   Results from last 7 days   Lab Units 08/06/20  0608 08/03/20  0528   WBC Thousand/uL 5 48 6 79   HEMOGLOBIN g/dL 9 7* 8 6*   HEMATOCRIT % 32 6* 28 4*   PLATELETS Thousands/uL 400* 359     Results from last 7 days   Lab Units 08/06/20  0608 08/03/20  0528   SODIUM mmol/L 139 141   POTASSIUM mmol/L 4 5 3 6   CHLORIDE mmol/L 109* 114*   CO2 mmol/L 26 23   BUN mg/dL 12 8   CREATININE mg/dL 0 90 0 83   CALCIUM mg/dL 9 2 9 2             Results from last 7 days   Lab Units 08/05/20  1420 08/04/20  1447 07/31/20  0555   POC GLUCOSE mg/dl 84 81 102       Review of Scheduled Meds:  acetaminophen, 650 mg, Oral, Q6H PRN, Nadia Hwak MD  albuterol, 2 5 mg, Nebulization, Q6H PRN, Beckie Osborne MD  calcium carbonate, 500 mg, Oral, TID PRN, Beckie Osborne MD  chlorhexidine, 15 mL, Swish [employer] [place], Q12H [address], Beckie Osborne MD  enoxaparin, 1 mg/kg, Subcutaneous, Q12H [address], Beckie Osborne MD  guaiFENesin, 200 mg, Oral, Q4H PRN, Beckie Osborne MD  levofloxacin, 750 mg, Oral, Q24H, KHANG Sullivan  Lidocaine Viscous HCl, 15 mL, Swish & Spit, 4x Daily PRN, Beckie Osborne MD  melatonin, 6 mg, Oral, HS, Beckie Osborne MD  metoprolol tartrate, 25 mg, Oral, Q12H [address], Dez Alert, DO  OLANZapine, 5 mg, Oral, HS, Beckie Osborne MD  ondansetron, 4 mg, Oral, Q6H PRN, Beckie Osborne MD  oxyCODONE, 2 5 mg, Oral, Q4H PRN, Beckie Osborne MD  polyethylene glycol, 17 g, Oral, Daily, Beckie Osborne MD  saccharomyces boulardii, 250 mg, Oral, BID, KHANG Sandhu  senna-docusate sodium, 1 tablet, Per NG Tube, BID, Beckie Osborne MD  tamsulosin, 0 4 mg, Oral, Daily With Robert Carnes MD  tobramycin, 1 drop, Both Eyes, 4x Daily, KHANG Sandhu        Subjective/ HPI: Patients overnight issues or events were reviewed with nursing or staff during rounds or morning huddle session  No new or overnight issues  Offers no complaints  ROS:   A 10 point ROS was performed; negative except as noted above  Imaging:     XR chest pa & lateral   Final Result by Raymund Boxer, MD (08/01 8923)      Status post removal of lines, catheters and tubes  No pneumothorax        Resolved opacity in the left lung with stable perihilar scarring and postsurgical changes of the left upper lobe  Workstation performed: [id number]             *Labs /Radiology studies reviewed  *Medications reviewed and reconciled as needed  *Please refer to order section for additional ordered labs studies  *Case discussed with primary attending during morning huddle case rounds      Physical Examination:  Vitals:   Vitals:    08/06/20 0705 08/06/20 0710 08/06/20 0821 08/06/20 1001   BP: 120/75 120/75  108/54   BP Location: Left arm Left arm  Left arm   Pulse:    80   Resp:       Temp:       TempSrc:       SpO2:   91%    Weight:       Height:           General Appearance: no distress, conversive  HEENT: PERRLA, conjuctiva normal; oropharynx clear; mucous membranes moist   Neck:  Supple, normal ROM, no JVD  Lungs: CTA, normal respiratory effort, no retractions, expiratory effort normal  CV: regular rate and rhythm; no rubs/murmurs/gallops, PMI normal   ABD: soft; ND/NT; +BS  EXT: no edema  Skin: normal turgor, normal texture, no rashes  Psych: affect normal, mood normal  Neuro: AAO      The above physical exam was reviewed and updated as determined by my evaluation of the patient on 8/6/2020  Invasive Devices     None                    VTE Pharmacologic Prophylaxis: Enoxaparin  Code Status: Level 1 - Full Code  Current Length of Stay: 7 day(s)      Total time spent:  30 minutes with more than 50% spent counseling/coordinating care  Counseling includes discussion with patient re: progress  and discussion with patient of his/her current medical state/information  Coordination of patient's care was performed in conjunction with primary service  Time invested included review of patient's labs, vitals, and management of their comorbidities with continued monitoring  In addition, this patient was discussed with medical team including physician and advanced extenders  The care of the patient was extensively discussed and appropriate treatment plan was formulated unique for this patient       ** Please Note: voice to text software may have been used in the creation of this document   Although proof errors in transcription or interpretation are a potential of such software**

## 2020-08-06 NOTE — PROGRESS NOTES
08/06/20 0930   Pain Assessment   Pain Assessment Tool Pain Assessment not indicated - pt denies pain   Pain Score No Pain   Restrictions/Precautions   Precautions Fall Risk;Cognitive;Bed/chair alarms;Aspiration;Supervision on toilet/commode, limb alert R   Cognition   Overall Cognitive Status Impaired   Arousal/Participation Alert; Cooperative   Subjective   Subjective pt was in bed and was agreeable to have PT  Also stated " It feels like I have a cold in my head"  Also apologized to PT for his behavior in the last few days as he felt he was not at his best     Lying to Sitting on Side of Bed   Type of Assistance Needed Set-up / clean-up   Comment flat bed without AD   Lying to Sitting on Side of Bed CARE Score 5   Sit to Stand   Type of Assistance Needed Supervision   Sit to Stand CARE Score 4   Bed-Chair Transfer   Type of Assistance Needed Supervision   Chair/Bed-to-Chair Transfer CARE Score 4   Car Transfer   Type of Assistance Needed Supervision;Verbal cues   Comment able to recall technique    Car Transfer CARE Score 4   Walk 10 Feet   Type of Assistance Needed Supervision   Comment CS/S without AD   Walk 10 Feet CARE Score 4   Walk 50 Feet with Two Turns   Type of Assistance Needed Supervision   Comment 50x2   Walk 50 Feet with Two Turns CARE Score 4   Walk 150 Feet   Type of Assistance Needed Supervision   Comment 150 x 3 reps without AD for balance/righting reaction/safety awareness training - although pt cont to demo path deviations /slight veering to one side at times or get too close to one side of doorframes but did not have LOB or have grazed body parts on doorways      Walk 150 Feet CARE Score 4   Walking 10 Feet on Uneven Surfaces   Type of Assistance Needed Incidental touching   Comment inside ramp withotu AD   Walking 10 Feet on Uneven Surfaces CARE Score 4   Curb or Single Stair   Style negotiated Curb   Type of Assistance Needed Incidental touching;Verbal cues   Comment CG on 8" curb - pt able to safely clear foot off the step    1 Step (Curb) CARE Score 4   Picking Up Object   Type of Assistance Needed Supervision   Comment no AD picked up pen off the floor at CS level    Picking Up Object CARE Score 4   Therapeutic Interventions   Balance ball tossing with fwd/bw walking x 40' x 2 reps (required cues to keep feet apart as he was stepping bwd), ball tossing while sidewalking x 20' x 2 reps, ball kicking - required seated break every after task due to reported fatigue with SOB -SpO2 > 94%    Assessment   Treatment Assessment Skilled PT focused on dynamic balance training and functional mobility training without an AD with emphasis on carry over of safety practices, activity pacing, recognizing limitations with activity tolerance and need for rest breaks in between activities to reduce risk for falls  At this time he is able to verbalized when feeling winded or fatigue but still does not expressed his need for seated rest breaks requiring external guidance  PT will perform hands on FT this PM with dtr with recommendation at this time for pt to have S with mobilities at d/c including use of urinal at HS  Although in the next few days OT/PT will continue to assess pt's appropriateness to be progress to mod indep level at least with basic mobilities without an AD to reduce caregiver burden at d/c  Family/Caregiver Present no   Problem List Decreased strength;Decreased endurance; Impaired balance;Decreased mobility; Decreased cognition; Impaired judgement;Decreased safety awareness   Barriers to Discharge Inaccessible home environment;Decreased caregiver support   Barriers to Discharge Comments lives in an in law apt at dtr's house  dtr is a nurse but currently not working    PT Barriers   Functional Limitation Ramp negotiation;Stair negotiation   Plan   Treatment/Interventions Functional transfer training; Therapeutic exercise; Endurance training;Bed mobility;Gait training;Spoke to nursing;Spoke to case management;Spoke to advanced practitioner;OT   Progress Progressing toward goals   Recommendation   PT Discharge Recommendation Home with skilled therapy  (OP PT)   PT - OK to Discharge No   PT Therapy Minutes   PT Time In 0930   PT Time Out 1030   PT Total Time (minutes) 60   PT Mode of treatment - Individual (minutes) 30   PT Mode of treatment - Concurrent (minutes) 30   PT Mode of treatment - Group (minutes) 0   PT Mode of treatment - Co-treat (minutes) 0   PT Mode of Treatment - Total time(minutes) 60 minutes   PT Cumulative Minutes 480   Therapy Time missed   Time missed?  No

## 2020-08-06 NOTE — SOCIAL WORK
Received call from Andre Villarreal at [facility] approving additional days with lcd 8/11 and expected dc 8/12

## 2020-08-06 NOTE — PROGRESS NOTES
08/06/20 1330   Pain Assessment   Pain Assessment Tool Pain Assessment not indicated - pt denies pain   Pain Score No Pain   Restrictions/Precautions   Precautions Fall Risk;Cognitive;Bed/chair alarms;Aspiration;Limb alert;Supervision on toilet/commode   Lifestyle   Autonomy "I'll do whatever it takes to stay home"   Bathing   Findings  Reinforced recommendation that pt is to notify family whenever he plans to shower  But also reinforced to family that pt not consistent with notifying staff before performing task so family needs to cont to check up pt  Tub/Shower Transfer   Findings daughter reporting that shower chair and grab bars have been ordered and will be installed this weekend  Putting On/Taking Off Footwear   Comment Educated pt and daughter on recommendation that pt has  socks or sneakers on at all times  Sit to Stand   Type of Assistance Needed Supervision   Amount of Physical Assistance Provided No physical assistance   Sit to Stand CARE Score 4   Bed-Chair Transfer   Type of Assistance Needed Supervision   Amount of Physical Assistance Provided No physical assistance   Chair/Bed-to-Chair Transfer CARE Score 4   Toileting Hygiene   Type of Assistance Needed Supervision   Amount of Physical Assistance Provided No physical assistance   Toileting Hygiene CARE Score 4   Toilet Transfer   Type of Assistance Needed Supervision   Amount of Physical Assistance Provided No physical assistance   Toilet Transfer CARE Score 4   Meal Prep   Meal Preparation reinforced to pt and family that pt should have S during all hot meal prep tasks 2* dec endurance and STM limiting safety around hot surfaces  Pt in agreement with this recommendation but again reinforcing to daugther to check on pt as pt does not always notify staff before performing task  Money Management   Money Management Recommending family A for finances with daughter in agreement     Health Management   Health Management Recommending family A for medication management (ie checking pill organizer and ensuring adherence) with daughter in agreement  Cognition   Overall Cognitive Status Impaired   Arousal/Participation Alert; Cooperative   Attention Attends with cues to redirect   Orientation Level Oriented X4   Memory Decreased short term memory;Decreased recall of recent events   Following Commands Follows one step commands with increased time or repetition   Activity Tolerance   Activity Tolerance Patient limited by fatigue   Assessment   OT Family training done with: daughter Carter [place] of family training Family training occurred with daughter Jos Car  Reinforced recommendation for inc S at dc specifically during showering and during hot meal prep  At this time, recommending S for all mobility but anticipating pt progression to mod(I) for toileting and mobility prior to dc  Daughter will A with IADL management  Reinforced to daughter and pt that pt is not to drive until cleared by outpatient MD and pt would benefit from outpatient therapy to ensure safety with eventual progression  Educated on use of door alarms and keeping car keys away to limit risk of pt leaving/attempting to drive  Daughter again receptive  Cont with POC with focus on dynamic balance, fx endurance, fx cognition with IADL management  Prognosis Fair   Problem List Decreased strength;Decreased endurance; Impaired balance;Decreased mobility; Decreased cognition; Impaired judgement;Decreased safety awareness   Barriers to Discharge Inaccessible home environment;Decreased caregiver support   Plan   Treatment/Interventions ADL retraining;Functional transfer training; Therapeutic exercise; Endurance training;Patient/family training;Equipment eval/education; Bed mobility;Cognitive reorientation   Progress Progressing toward goals   Recommendation   OT Discharge Recommendation Return to previous environment with social support   OT Therapy Minutes   OT Time In 1330   OT Time Out 1400   OT Total Time (minutes) 30   OT Mode of treatment - Individual (minutes) 30   OT Mode of treatment - Concurrent (minutes) 0   OT Mode of treatment - Group (minutes) 0   OT Mode of treatment - Co-treat (minutes) 0   OT Mode of Treatment - Total time(minutes) 30 minutes   OT Cumulative Minutes 600   Therapy Time missed   Time missed?  No

## 2020-08-06 NOTE — PROGRESS NOTES
08/06/20 0700   Pain Assessment   Pain Assessment Tool Pain Assessment not indicated - pt denies pain   Pain Score No Pain   Restrictions/Precautions   Precautions Fall Risk;Cognitive;Bed/chair alarms;Aspiration;Supervision on toilet/commode; Impulsive   Eating   Type of Assistance Needed Supervision   Amount of Physical Assistance Provided No physical assistance   Eating CARE Score 4   Oral Hygiene   Type of Assistance Needed Supervision   Amount of Physical Assistance Provided No physical assistance   Comment in stance   Oral Hygiene CARE Score 4   Shower/Bathe Self   Type of Assistance Needed Supervision;Verbal cues   Amount of Physical Assistance Provided No physical assistance   Comment completed sponge bathing at sink this session with majority in stance  Chair placed behind pt as pt cont to have deficits in endurance req occasional rest break  Req cuing to wash feet seated as pt with inc unstediness in single legged stance even with UE support  Reinforced to pt that family should always be home and aware that pt showering  Shower/Bathe Self CARE Score 4   Bathing   Assessed Bath Style Sponge Bath   Anticipated D/C Bath Style Shower   Tub/Shower Transfer   Reason Not Assessed Sponge Bath;Patient refusal   Upper Body Dressing   Type of Assistance Needed Supervision   Amount of Physical Assistance Provided No physical assistance   Comment in stance   Upper Body Dressing CARE Score 4   Lower Body Dressing   Type of Assistance Needed Supervision   Amount of Physical Assistance Provided No physical assistance   Comment indep initiated sitting position to thread LEs into pants      Lower Body Dressing CARE Score 4   Putting On/Taking Off Footwear   Type of Assistance Needed Supervision   Amount of Physical Assistance Provided No physical assistance   Comment leg cross and dynamic reach seated   Putting On/Taking Off Footwear CARE Score 4   Picking Up Object   Type of Assistance Needed Supervision   Amount of Physical Assistance Provided No physical assistance   Comment engaged in item retrieval from floor during bean bag task with focus on fx endurance, attention, obstacle negotiation  S overall but does req cuing to slow pace and take rest breaks as task inc fatigue  Picking Up Object CARE Score 4   Roll Left and Right   Type of Assistance Needed Independent   Amount of Physical Assistance Provided No physical assistance   Roll Left and Right CARE Score 6   Sit to Lying   Type of Assistance Needed Independent   Amount of Physical Assistance Provided No physical assistance   Sit to Lying CARE Score 6   Lying to Sitting on Side of Bed   Type of Assistance Needed Supervision   Amount of Physical Assistance Provided No physical assistance   Lying to Sitting on Side of Bed CARE Score 4   Sit to Stand   Type of Assistance Needed Supervision   Amount of Physical Assistance Provided No physical assistance   Sit to Stand CARE Score 4   Bed-Chair Transfer   Type of Assistance Needed Supervision   Amount of Physical Assistance Provided No physical assistance   Chair/Bed-to-Chair Transfer CARE Score 4   Toileting Hygiene   Type of Assistance Needed Supervision   Amount of Physical Assistance Provided No physical assistance   Toileting Hygiene CARE Score 4   Toilet Transfer   Type of Assistance Needed Supervision   Amount of Physical Assistance Provided No physical assistance   Comment seated to urinate 2* endurance   Toilet Transfer CARE Score 4   Cognition   Overall Cognitive Status Impaired   Arousal/Participation Alert; Cooperative   Attention Attends with cues to redirect   Orientation Level Oriented X4   Memory Decreased short term memory;Decreased recall of recent events   Following Commands Follows one step commands with increased time or repetition   Comments Utilized situational medical safety wkst to assess pt's response to medical situations of varying degrees   Pt able to verbalize severity of response (home treatment v primary care v emergency response)  Pt does req prompting to cont to fully explain response ie cleaning and dressing paper cut, etc     Activity Tolerance   Activity Tolerance Patient limited by fatigue   Assessment   Treatment Assessment Pt participated in OT session with focus on ADL management, fx mobility, fx cognition with medical situational safety  Completed ADL at overall S level but does req cuing for pacing, safe positioning (sitting during LB threading and with fatigue)  Engaged pt in conversation regarding dc recommendations that will be discussed in PM with daughter who will be present for training  Pt able to recall A for medication management and adherence, S when in kitchen and "letting my family know when I have to shower " Pt demo greater insight into endurance and balance deficits during discussion but P awareness during fx task completion req therapist cuing to inc awareness  Pt in bed at end of session with alarm engaged, call bell in reach  Spoke with PT Cy about possible progression to indep for mobility and toileting as pt with improved balance today  Will cont to assess  Prognosis Fair   Problem List Decreased strength;Decreased endurance; Impaired balance;Decreased mobility; Decreased coordination; Impaired judgement;Decreased cognition;Decreased safety awareness   Barriers to Discharge Inaccessible home environment;Decreased caregiver support   Plan   Treatment/Interventions ADL retraining;Functional transfer training; Therapeutic exercise; Endurance training;Patient/family training;Equipment eval/education; Bed mobility   Progress Progressing toward goals   Recommendation   OT Discharge Recommendation Return to previous environment with social support   OT Therapy Minutes   OT Time In 0700   OT Time Out 0830   OT Total Time (minutes) 90   OT Mode of treatment - Individual (minutes) 90   OT Mode of treatment - Concurrent (minutes) 0   OT Mode of treatment - Group (minutes) 0   OT Mode of treatment - Co-treat (minutes) 0   OT Mode of Treatment - Total time(minutes) 90 minutes   OT Cumulative Minutes 570   Therapy Time missed   Time missed?  No

## 2020-08-06 NOTE — PLAN OF CARE
Problem: Potential for Falls  Goal: Patient will remain free of falls  Description: INTERVENTIONS:  - Assess patient frequently for physical needs  -  Identify cognitive and physical deficits and behaviors that affect risk of falls    -  [place] fall precautions as indicated by assessment   - Educate patient/family on patient safety including physical limitations  - Instruct patient to call for assistance with activity based on assessment  - Modify environment to reduce risk of injury  - Consider OT/PT consult to assist with strengthening/mobility  Outcome: Progressing     Problem: Prexisting or High Potential for Compromised Skin Integrity  Goal: Skin integrity is maintained or improved  Description: INTERVENTIONS:  - Identify patients at risk for skin breakdown  - Assess and monitor skin integrity  - Assess and monitor nutrition and hydration status  - Monitor labs   - Assess for incontinence   - Turn and reposition patient  - Assist with mobility/ambulation  - Relieve pressure over bony prominences  - Avoid friction and shearing  - Provide appropriate hygiene as needed including keeping skin clean and dry  - Evaluate need for skin moisturizer/barrier cream  - Collaborate with interdisciplinary team   - Patient/family teaching  - Consider wound care consult   Outcome: Progressing     Problem: PAIN - ADULT  Goal: Verbalizes/displays adequate comfort level or baseline comfort level  Description: Interventions:  - Encourage patient to monitor pain and request assistance  - Assess pain using appropriate pain scale  - Administer analgesics based on type and severity of pain and evaluate response  - Implement non-pharmacological measures as appropriate and evaluate response  - Consider cultural and social influences on pain and pain management  - Notify physician/advanced practitioner if interventions unsuccessful or patient reports new pain  Outcome: Progressing     Problem: INFECTION - ADULT  Goal: Absence or prevention of progression during hospitalization  Description: INTERVENTIONS:  - Assess and monitor for signs and symptoms of infection  - Monitor lab/diagnostic results  - Monitor all insertion sites, i e  indwelling lines, tubes, and drains  - Monitor endotracheal if appropriate and nasal secretions for changes in amount and color  - [place] appropriate cooling/warming therapies per order  - Administer medications as ordered  - Instruct and encourage patient and family to use good hand hygiene technique  - Identify and instruct in appropriate isolation precautions for identified infection/condition  Outcome: Progressing     Problem: SAFETY ADULT  Goal: Maintain or return to baseline ADL function  Description: INTERVENTIONS:  -  Assess patient's ability to carry out ADLs; assess patient's baseline for ADL function and identify physical deficits which impact ability to perform ADLs (bathing, care of mouth/teeth, toileting, grooming, dressing, etc )  - Assess/evaluate cause of self-care deficits   - Assess range of motion  - Assess patient's mobility; develop plan if impaired  - Assess patient's need for assistive devices and provide as appropriate  - Encourage maximum independence but intervene and supervise when necessary  - Involve family in performance of ADLs  - Assess for home care needs following discharge   - Consider OT consult to assist with ADL evaluation and planning for discharge  - Provide patient education as appropriate  Outcome: Progressing  Goal: Maintain or return mobility status to optimal level  Description: INTERVENTIONS:  - Assess patient's baseline mobility status (ambulation, transfers, stairs, etc )    - Identify cognitive and physical deficits and behaviors that affect mobility  - Identify mobility aids required to assist with transfers and/or ambulation (gait belt, sit-to-stand, lift, walker, cane, etc )  - [place] fall precautions as indicated by assessment  - Record patient progress and toleration of activity level on Mobility SBAR; progress patient to next Phase/Stage  - Instruct patient to call for assistance with activity based on assessment  - Consider rehabilitation consult to assist with strengthening/weightbearing, etc   Outcome: Progressing     Problem: DISCHARGE PLANNING  Goal: Discharge to home or other facility with appropriate resources  Description: INTERVENTIONS:  - Identify barriers to discharge w/patient and caregiver  - Arrange for needed discharge resources and transportation as appropriate  - Identify discharge learning needs (meds, wound care, etc )  - Arrange for interpretive services to assist at discharge as needed  - Refer to Case Management Department for coordinating discharge planning if the patient needs post-hospital services based on physician/advanced practitioner order or complex needs related to functional status, cognitive ability, or social support system  Outcome: Progressing     Problem: Knowledge Deficit  Goal: Patient/family/caregiver demonstrates understanding of disease process, treatment plan, medications, and discharge instructions  Description: Complete learning assessment and assess knowledge base    Interventions:  - Provide teaching at level of understanding  - Provide teaching via preferred learning methods  Outcome: Progressing     Problem: SKIN/TISSUE INTEGRITY - ADULT  Goal: Skin integrity remains intact  Description: INTERVENTIONS  - Identify patients at risk for skin breakdown  - Assess and monitor skin integrity  - Assess and monitor nutrition and hydration status  - Monitor labs (i e  albumin)  - Assess for incontinence   - Turn and reposition patient  - Assist with mobility/ambulation  - Relieve pressure over bony prominences  - Avoid friction and shearing  - Provide appropriate hygiene as needed including keeping skin clean and dry  - Evaluate need for skin moisturizer/barrier cream  - Collaborate with interdisciplinary team (i e  Nutrition, Rehabilitation, etc )   - Patient/family teaching  Outcome: Progressing     Problem: MUSCULOSKELETAL - ADULT  Goal: Maintain or return mobility to safest level of function  Description: INTERVENTIONS:  - Assess patient's ability to carry out ADLs; assess patient's baseline for ADL function and identify physical deficits which impact ability to perform ADLs (bathing, care of mouth/teeth, toileting, grooming, dressing, etc )  - Assess/evaluate cause of self-care deficits   - Assess range of motion  - Assess patient's mobility  - Assess patient's need for assistive devices and provide as appropriate  - Encourage maximum independence but intervene and supervise when necessary  - Involve family in performance of ADLs  - Assess for home care needs following discharge   - Consider OT consult to assist with ADL evaluation and planning for discharge  - Provide patient education as appropriate  Outcome: Progressing  Goal: Maintain proper alignment of affected body part  Description: INTERVENTIONS:  - Support, maintain and protect limb and body alignment  - Provide patient/ family with appropriate education  Outcome: Progressing     Problem: PAIN - ADULT  Goal: Verbalizes/displays adequate comfort level or baseline comfort level  Description: Interventions:  - Encourage patient to monitor pain and request assistance  - Assess pain using appropriate pain scale  - Administer analgesics based on type and severity of pain and evaluate response  - Implement non-pharmacological measures as appropriate and evaluate response  - Consider cultural and social influences on pain and pain management  - Notify physician/advanced practitioner if interventions unsuccessful or patient reports new pain  Outcome: Progressing     Problem: INFECTION - ADULT  Goal: Absence or prevention of progression during hospitalization  Description: INTERVENTIONS:  - Assess and monitor for signs and symptoms of infection  - Monitor lab/diagnostic results  - Monitor all insertion sites, i e  indwelling lines, tubes, and drains  - Monitor endotracheal if appropriate and nasal secretions for changes in amount and color  - [place] appropriate cooling/warming therapies per order  - Administer medications as ordered  - Instruct and encourage patient and family to use good hand hygiene technique  - Identify and instruct in appropriate isolation precautions for identified infection/condition  Outcome: Progressing     Problem: SAFETY ADULT  Goal: Patient will remain free of falls  Description: INTERVENTIONS:  - Assess patient frequently for physical needs  -  Identify cognitive and physical deficits and behaviors that affect risk of falls    -  [place] fall precautions as indicated by assessment   - Educate patient/family on patient safety including physical limitations  - Instruct patient to call for assistance with activity based on assessment  - Modify environment to reduce risk of injury  - Consider OT/PT consult to assist with strengthening/mobility  Outcome: Progressing  Goal: Maintain or return to baseline ADL function  Description: INTERVENTIONS:  -  Assess patient's ability to carry out ADLs; assess patient's baseline for ADL function and identify physical deficits which impact ability to perform ADLs (bathing, care of mouth/teeth, toileting, grooming, dressing, etc )  - Assess/evaluate cause of self-care deficits   - Assess range of motion  - Assess patient's mobility; develop plan if impaired  - Assess patient's need for assistive devices and provide as appropriate  - Encourage maximum independence but intervene and supervise when necessary  - Involve family in performance of ADLs  - Assess for home care needs following discharge   - Consider OT consult to assist with ADL evaluation and planning for discharge  - Provide patient education as appropriate  Outcome: Progressing  Goal: Maintain or return mobility status to optimal level  Description: INTERVENTIONS:  - Assess patient's baseline mobility status (ambulation, transfers, stairs, etc )    - Identify cognitive and physical deficits and behaviors that affect mobility  - Identify mobility aids required to assist with transfers and/or ambulation (gait belt, sit-to-stand, lift, walker, cane, etc )  - [place] fall precautions as indicated by assessment  - Record patient progress and toleration of activity level on Mobility SBAR; progress patient to next Phase/Stage  - Instruct patient to call for assistance with activity based on assessment  - Consider rehabilitation consult to assist with strengthening/weightbearing, etc   Outcome: Progressing     Problem: DISCHARGE PLANNING  Goal: Discharge to home or other facility with appropriate resources  Description: INTERVENTIONS:  - Identify barriers to discharge w/patient and caregiver  - Arrange for needed discharge resources and transportation as appropriate  - Identify discharge learning needs (meds, wound care, etc )  - Arrange for interpretive services to assist at discharge as needed  - Refer to Case Management Department for coordinating discharge planning if the patient needs post-hospital services based on physician/advanced practitioner order or complex needs related to functional status, cognitive ability, or social support system  Outcome: Progressing

## 2020-08-07 PROCEDURE — 97130 THER IVNTJ EA ADDL 15 MIN: CPT

## 2020-08-07 PROCEDURE — 97129 THER IVNTJ 1ST 15 MIN: CPT

## 2020-08-07 PROCEDURE — 97530 THERAPEUTIC ACTIVITIES: CPT

## 2020-08-07 PROCEDURE — 94760 N-INVAS EAR/PLS OXIMETRY 1: CPT

## 2020-08-07 PROCEDURE — 97110 THERAPEUTIC EXERCISES: CPT

## 2020-08-07 PROCEDURE — 99232 SBSQ HOSP IP/OBS MODERATE 35: CPT | Performed by: PHYSICAL MEDICINE & REHABILITATION

## 2020-08-07 RX ADMIN — Medication 250 MG: at 18:33

## 2020-08-07 RX ADMIN — ENOXAPARIN SODIUM 90 MG: 100 INJECTION SUBCUTANEOUS at 20:24

## 2020-08-07 RX ADMIN — TOBRAMYCIN 1 DROP: 3 SOLUTION/ DROPS OPHTHALMIC at 18:35

## 2020-08-07 RX ADMIN — TOBRAMYCIN 1 DROP: 3 SOLUTION/ DROPS OPHTHALMIC at 11:42

## 2020-08-07 RX ADMIN — TOBRAMYCIN 1 DROP: 3 SOLUTION/ DROPS OPHTHALMIC at 21:23

## 2020-08-07 RX ADMIN — METOPROLOL TARTRATE 25 MG: 25 TABLET, FILM COATED ORAL at 08:23

## 2020-08-07 RX ADMIN — ENOXAPARIN SODIUM 90 MG: 100 INJECTION SUBCUTANEOUS at 08:24

## 2020-08-07 RX ADMIN — ACETAMINOPHEN 650 MG: 325 TABLET, FILM COATED ORAL at 05:54

## 2020-08-07 RX ADMIN — MELATONIN 6 MG: at 20:22

## 2020-08-07 RX ADMIN — TOBRAMYCIN 1 DROP: 3 SOLUTION/ DROPS OPHTHALMIC at 08:25

## 2020-08-07 RX ADMIN — CHLORHEXIDINE GLUCONATE 0.12% ORAL RINSE 15 ML: 1.2 LIQUID ORAL at 20:22

## 2020-08-07 RX ADMIN — Medication 250 MG: at 08:24

## 2020-08-07 RX ADMIN — OLANZAPINE 5 MG: 5 TABLET, ORALLY DISINTEGRATING ORAL at 21:23

## 2020-08-07 RX ADMIN — CHLORHEXIDINE GLUCONATE 0.12% ORAL RINSE 15 ML: 1.2 LIQUID ORAL at 08:24

## 2020-08-07 RX ADMIN — METOPROLOL TARTRATE 25 MG: 25 TABLET, FILM COATED ORAL at 20:31

## 2020-08-07 NOTE — PROGRESS NOTES
Internal Medicine Progress Note  Patient: Tequila Huertas  Age/sex: 79 y o  male  Medical Record #: [id number]      ASSESSMENT/PLAN: (Interval History)  Tequila Huertas is seen and examined and management for following issues:    DOMI adenocarcinoma; s/p bronchoscopy, mediastinoscopy, VATS, left upper lobectomy and mediastinal lymph node dissection 6/10/20  · Back to Thoracic surgery as OP     PEA with multiple rounds of CPR/respiratory arrest; emergent cricothyroidotomy 6/20; tracheostomy 6/22; decannulation 7/29/20  · Stoma closed and healing well   · Continue Albuterol neb prn     hx nicotine abuse with 1ppd x 50 yrs  · quit prior to surgery  · continue prn Albuterol     Sinus tachycardia  · resolved  · Continue Lopressor 25 mg BID  · No changes today  · ECHO 8/2/20 WNL normal EF and no wall motion issues     Fever; UTI >100,000 Pseudomonas; SIRS/sepsis  · Afebrile  · No further sx  · Completed Levaquin      ABLA  · Stable  · will watch and transfuse prn hemoglobin <8 0     DVT  LE and RUE  · currently is on therapeutic Lovenox   · will need conversion to NOAC eventually  · followup venous dopplers on UE/LE 7/14 and 6/26 respectively showed resolution of DVTs     Urine retention  · on Flomax   · Lucila was d/cd 7/27/20     TBI 2010  · Has memory issues from this incident but unknown details since he cannot remember anything about it     ETOH abuse  · Unknown amount/details     Left eye conjunctivitis  · Woke up 8/4 with itching/burning/tearing of left eye; has slight crusting  · On Tobrex QID x 7 days  · Much improved    The above assessment and plan was reviewed and updated as determined by my evaluation of the patient on 8/7/2020      Labs:   Results from last 7 days   Lab Units 08/06/20  0608 08/03/20  0528   WBC Thousand/uL 5 48 6 79   HEMOGLOBIN g/dL 9 7* 8 6*   HEMATOCRIT % 32 6* 28 4*   PLATELETS Thousands/uL 400* 359     Results from last 7 days   Lab Units 08/06/20  0608 08/03/20  0528   SODIUM mmol/L 139 141   POTASSIUM mmol/L 4 5 3 6   CHLORIDE mmol/L 109* 114*   CO2 mmol/L 26 23   BUN mg/dL 12 8   CREATININE mg/dL 0 90 0 83   CALCIUM mg/dL 9 2 9 2             Results from last 7 days   Lab Units 08/05/20  1420 08/04/20  1447   POC GLUCOSE mg/dl 84 81       Review of Scheduled Meds:  acetaminophen, 650 mg, Oral, Q6H PRN, Caryl Adams MD  albuterol, 2 5 mg, Nebulization, Q6H PRN, Lewis Smith MD  calcium carbonate, 500 mg, Oral, TID PRN, Lewis Smith MD  chlorhexidine, 15 mL, [place] & [place], Q12H [facility], Lewis Smith MD  enoxaparin, 1 mg/kg, Subcutaneous, Q12H [facility], Lewis Smith MD  guaiFENesin, 200 mg, Oral, Q4H PRN, Lewis Smith MD  Lidocaine Viscous HCl, 15 mL, Sw[place] & [place], 4x Daily PRN, Lewis Smith MD  melatonin, 6 mg, Oral, HS, Lewis Smith MD  metoprolol tartrate, 25 mg, Oral, Q12H [facility], Sudie Felty, DO  OLANZapine, 5 mg, Oral, HS, Lewis Smith MD  ondansetron, 4 mg, Oral, Q6H PRN, Lewis Smith MD  oxyCODONE, 2 5 mg, Oral, Q4H PRN, Lewis Smith MD  polyethylene glycol, 17 g, Oral, Daily, Lewis Smith MD  saccharomyces boulardii, 250 mg, Oral, BID, Mickel Dakins, CRNP  senna-docusate sodium, 1 tablet, Per NG Tube, BID, Lewis Smith MD  tamsulosin, 0 4 mg, Oral, Daily With Lemuel Rodriguez MD  tobramycin, 1 drop, Both Eyes, 4x Daily, Mickel Dakins, CRNP        Subjective/ HPI: Patients overnight issues or events were reviewed with nursing or staff during rounds or morning huddle session  No new or overnight issues  Offers no complaints  ROS:   A 10 point ROS was performed; negative except as noted above  Imaging:     XR chest pa & lateral   Final Result by Johnie Valencia MD (08/01 1921)      Status post removal of lines, catheters and tubes  No pneumothorax  Resolved opacity in the left lung with stable perihilar scarring and postsurgical changes of the left upper lobe              Workstation performed: [id number]             *Labs /Radiology studies reviewed  *Medications reviewed and reconciled as needed  *Please refer to order section for additional ordered labs studies  *Case discussed with primary attending during morning huddle case rounds      Physical Examination:  Vitals:   Vitals:    08/06/20 1955 08/06/20 2047 08/07/20 0508 08/07/20 0823   BP:  122/74 129/81 125/80   BP Location:  Left arm Left arm    Pulse:  91 90 89   Resp:  18 18    Temp:  98 5 °F (36 9 °C) 97 7 °F (36 5 °C)    TempSrc:  Oral Oral    SpO2: 96% 96% 96%    Weight:       Height:           General Appearance: no distress, conversive  HEENT: PERRLA, conjuctiva normal; oropharynx clear; mucous membranes moist   Neck:  Supple, normal ROM, no JVD  Lungs: CTA, normal respiratory effort, no retractions, expiratory effort normal  CV: regular rate and rhythm; no rubs/murmurs/gallops, PMI normal   ABD: soft; ND/NT; +BS  EXT: no edema  Skin: normal turgor, normal texture, no rashes  Psych: affect normal, mood normal  Neuro: AAO but memory poor at times      The above physical exam was reviewed and updated as determined by my evaluation of the patient on 8/7/2020  Invasive Devices     None                    VTE Pharmacologic Prophylaxis: Enoxaparin  Code Status: Level 1 - Full Code  Current Length of Stay: 8 day(s)      Total time spent:  30 minutes with more than 50% spent counseling/coordinating care  Counseling includes discussion with patient re: progress  and discussion with patient of his/her current medical state/information  Coordination of patient's care was performed in conjunction with primary service  Time invested included review of patient's labs, vitals, and management of their comorbidities with continued monitoring  In addition, this patient was discussed with medical team including physician and advanced extenders  The care of the patient was extensively discussed and appropriate treatment plan was formulated unique for this patient       ** Please Note:  voice to text software may have been used in the creation of this document   Although proof errors in transcription or interpretation are a potential of such software**

## 2020-08-07 NOTE — PROGRESS NOTES
PM&R Progress Note:    HPI: 79 y o  male with left upper lobe lung cancer, s/p left upper lobe lobectomy By Dr Cloretta Holter on 2/03/59, complicated postop course requiring emergent intubation and tracheostomy  Now decannulated  Transferred to [facility] on 7/30/20  ASSESSMENT: Stable, progressing    PLAN:    Rehabilitation   Continue current rehabilitation plan of care to maximize function   Current functional deficits include proximal musculature weakness, gait instability, impaired mobility and self-care   Expected Discharge: D/C 8/12/20 to home with outpatient PT/OT/SLP    Pain   No issues    DVT prophylaxis   Duration of treatment for recent DVT:  Managed on therapeutic Lovenox for now  Will attempt to check with pharmacy to see how much Eliquis would be to complete a 3 month duration treatment recommended by vascular    Bladder plan   Continent - completed treatment with Levaquin for UTI  Will attempt to discontinue Flomax to see if patient voids well  Check PVRs    Bowel plan   Continent    * Malignant neoplasm of upper lobe of left lung [facility])  Assessment & Plan  S/p left upper lobe lobectomy with Dr Toy López on 0/93  Complicated by need for emergent tracheostomy, now decannulated  Monitor trach site for signs of infection   Now on albuterol nebulizer treatments p r n  for wheezing or shortness of breath  No longer requiring oxygen    Stable saturations at rest and with activity    Conjunctivitis  Assessment & Plan  Left greater than right eye  Tobramycin ophthalmic drops ordered for both eyes x 7 days    History of TBI (traumatic brain injury) ([facility] [address] )  Assessment & Plan  With baseline mild cognitive deficit    UTI (urinary tract infection)  Assessment & Plan  Cultures positive for Pseudomonas  Completing Levaquin today    Acute deep vein thrombosis (DVT) of axillary vein of right upper extremity ([address] )  Assessment & Plan  With DVT lower extremity on 6/20 ultrasound, right upper extremity DVT on 6/22 ultrasound  Repeat ultrasounds with resolution   On treatment dose lovenox 1mg/kg q12h, anticipate 3-6 month duration, follow up with PCP outpatient       Appreciate IM consultants medical co-management  Labs, medications, and imaging personally reviewed  SUBJECTIVE:  Patient seen face to face today in occupational therapy  Doing well making progress with less fatigue today  Denies chest pain or shortness of breath  ROS:  A ten point review of systems was completed 8/7/20 and pertinent positives are listed in subjective section  All other systems reviewed were negative  OBJECTIVE:   /80   Pulse 89   Temp 97 7 °F (36 5 °C) (Oral)   Resp 18   Ht 5' 8" (1 727 m)   Wt 84 3 kg (185 lb 13 6 oz)   SpO2 96%   BMI 28 26 kg/m²     Physical Exam  Vitals signs and nursing note reviewed  Constitutional:       Appearance: He is well-developed  HENT:      Head: Normocephalic and atraumatic  Mouth/Throat:      Mouth: Mucous membranes are moist    Eyes:      Pupils: Pupils are equal, round, and reactive to light  Neck:      Musculoskeletal: Neck supple  Cardiovascular:      Rate and Rhythm: Normal rate and regular rhythm  Pulmonary:      Breath sounds: Normal breath sounds  No wheezing or rales  Abdominal:      General: Bowel sounds are normal  There is no distension  Palpations: Abdomen is soft  Tenderness: There is no abdominal tenderness  Musculoskeletal: Normal range of motion  Skin:     General: Skin is warm  Neurological:      Mental Status: Mental status is at baseline        Comments: Motor strength is 4+/5 throughout   Psychiatric:         Mood and Affect: Mood normal           Lab Results   Component Value Date    WBC 5 48 08/06/2020    HGB 9 7 (L) 08/06/2020    HCT 32 6 (L) 08/06/2020    MCV 98 08/06/2020     (H) 08/06/2020     Lab Results   Component Value Date    SODIUM 139 08/06/2020    K 4 5 08/06/2020     (H) 08/06/2020    CO2 26 08/06/2020 BUN 12 08/06/2020    CREATININE 0 90 08/06/2020    GLUC 88 08/06/2020    CALCIUM 9 2 08/06/2020     Lab Results   Component Value Date    INR 1 12 06/22/2020    INR 1 00 06/05/2020    INR 0 98 04/29/2020    PROTIME 14 0 06/22/2020    PROTIME 12 8 06/05/2020    PROTIME 12 6 04/29/2020           Current Facility-Administered Medications:     acetaminophen (TYLENOL) tablet 650 mg, 650 mg, Oral, Q6H PRN, Wil Alaniz MD, 650 mg at 08/07/20 0554    albuterol inhalation solution 2 5 mg, 2 5 mg, Nebulization, Q6H PRN, Roosevelt Goodson MD    calcium carbonate (TUMS) chewable tablet 500 mg, 500 mg, Oral, TID PRN, Roosevelt Goodson MD    chlorhexidine (PERIDEX) 0 12 % oral rinse 15 mL, 15 mL, [place]ish [employer] [place], Q12H [address], Roosevelt Goodson MD, 15 mL at 08/07/20 0824    enoxaparin (LOVENOX) subcutaneous injection 90 mg, 1 mg/kg, Subcutaneous, Q12H [address], Roosevelt Goodson MD, 90 mg at 08/07/20 0824    guaiFENesin (ROBITUSSIN) oral solution 200 mg, 200 mg, Oral, Q4H PRN, Roosevelt Goodson MD    Lidocaine Viscous HCl (XYLOCAINE) 2 % mucosal solution 15 mL, 15 mL, Swish & Spit, 4x Daily PRN, Roosevelt Goodson MD    melatonin tablet 6 mg, 6 mg, Oral, HS, Roosevelt Goodson MD, 6 mg at 08/06/20 2141    metoprolol tartrate (LOPRESSOR) tablet 25 mg, 25 mg, Oral, Q12H [address], Eladio Hester DO, 25 mg at 08/07/20 0823    OLANZapine (ZyPREXA ZYDIS) dispersible tablet 5 mg, 5 mg, Oral, HS, Roosevelt Goodson MD, 5 mg at 08/06/20 2141    ondansetron (ZOFRAN-ODT) dispersible tablet 4 mg, 4 mg, Oral, Q6H PRN, Roosevelt Goodson MD    oxyCODONE (ROXICODONE) oral solution 2 5 mg, 2 5 mg, Oral, Q4H PRN, Roosevelt Goodson MD    polyethylene glycol [facility]) packet 17 g, 17 g, Oral, Daily, Roosevelt Goodson MD, 17 g at 08/06/20 1002    saccharomyces boulardii (FLORASTOR) capsule 250 mg, 250 mg, Oral, BID, HKANG Arevalo, 250 mg at 08/07/20 0824    senna-docusate sodium (SENOKOT S) 8 6-50 mg per tablet 1 tablet, 1 tablet, Per NG Tube, BID, Roosevelt Goodson MD, 1 tablet at 08/06/20 1652    tobramycin (TOBREX) 0 3 % ophthalmic solution 1 drop, 1 drop, Both Eyes, 4x Daily, Gabriel RutherfordKHANG, 1 drop at 08/07/20 1142    Past Medical History:   Diagnosis Date    Alcohol abuse 3/27/2020    Chest pain     Community acquired pneumonia 3/27/2020    Hypertension     Left upper lobe pulmonary nodule     Malignant neoplasm of upper lobe of left lung ([facility] [address] ) 5/7/2020       Patient Active Problem List    Diagnosis Date Noted    Mal[facility] of [facility] lung ([facility] ) 05/07/2020     Priority: High    Conjunctivitis 08/04/2020    UTI (urinary tract infection) 08/03/2020    History of TBI (traumatic brain injury) ([facility] ) 08/03/2020    Encephalopathy 07/12/2020    Hyperactive delirium after surgical procedure 07/12/2020    Anemia 07/12/2020    Thrombocytosis ([facility] ) 07/12/2020    Acute deep vein thrombosis (DVT) of axillary vein of right upper extremity ([facility] ) 06/23/2020    Acute deep vein thrombosis (DVT) of brachial vein of right upper extremity ([facility] ) 06/23/2020    Tracheostomy in place [facility]) 06/23/2020    Acute deep vein thrombosis (DVT) of calf muscle vein of left lower extremity ([facility] ) 06/23/2020    Acute respiratory failure with hypoxia ([facility] ) 06/21/2020    Tobacco abuse 05/07/2020    Community acquired pneumonia 03/27/2020    Abnormal computed tomography angiography (CTA) 03/27/2020    Alcohol abuse 03/27/2020          Stefanie Marie MD    Total time spent:  30 minutes with more than 50% spent counseling/coordinating care  Counseling includes discussion with patient re: progress and discussion with patient of his/her current medical state/information  Coordination of patient's care was performed in conjunction with consulting services  Time invested included review of patient's labs, vitals, and management of their comorbidities with continued monitoring   The care of the patient was extensively discussed and appropriate treatment plan was formulated unique for this patient  ** Please Note:  voice to text software may have been used in the creation of this document   Although proof errors in transcription or interpretation are a potential of such software**

## 2020-08-07 NOTE — PROGRESS NOTES
08/07/20 1500   Pain Assessment   Pain Assessment Tool Pain Assessment not indicated - pt denies pain   Restrictions/Precautions   Precautions Bed/chair alarms;Cognitive; Fall Risk;Limb alert;Supervision on toilet/commode   Cognition   Overall Cognitive Status Impaired   Subjective   Subjective pt resting in bed but agreeable to therapy; pt reports feeling off balance and weak   Roll Left and Right   Type of Assistance Needed Independent   Roll Left and Right CARE Score 6   Sit to Lying   Type of Assistance Needed Independent   Sit to Lying CARE Score 6   Lying to Sitting on Side of Bed   Type of Assistance Needed Independent   Lying to Sitting on Side of Bed CARE Score 6   Sit to Stand   Type of Assistance Needed Supervision   Sit to Stand CARE Score 4   Bed-Chair Transfer   Type of Assistance Needed Supervision   Chair/Bed-to-Chair Transfer CARE Score 4   Walk 10 Feet   Type of Assistance Needed Supervision   Walk 10 Feet CARE Score 4   Walk 50 Feet with Two Turns   Type of Assistance Needed Supervision   Walk 50 Feet with Two Turns CARE Score 4   Walk 150 Feet   Type of Assistance Needed Supervision   Walk 150 Feet CARE Score 4   Ambulation   Distance Walked (feet) 200 ft   Equipment Use   [place] L2 12mins   Assessment   Treatment Assessment session focused on general conditioning to improve activity tolerance and safety  pt cont to function at CS due to impaired balance  cont to focus on balance, endurance and safety to imrpove functional mobility and ind for safe d/c home  Problem List Decreased strength;Decreased endurance; Impaired balance;Decreased coordination;Decreased cognition; Impaired judgement;Decreased safety awareness   Barriers to Discharge Inaccessible home environment;Decreased caregiver support   PT Barriers   Physical Impairment Decreased strength;Decreased endurance; Impaired balance;Decreased mobility; Decreased cognition; Impaired judgement;Decreased safety awareness   Functional Limitation Walking;Stair negotiation   PT Therapy Minutes   PT Time In 1500   PT Time Out 1530   PT Total Time (minutes) 30   PT Mode of treatment - Individual (minutes) 30   PT Mode of treatment - Concurrent (minutes) 0   PT Mode of treatment - Group (minutes) 0   PT Mode of treatment - Co-treat (minutes) 0   PT Mode of Treatment - Total time(minutes) 30 minutes   PT Cumulative Minutes 590   Therapy Time missed   Time missed?  No

## 2020-08-07 NOTE — PROGRESS NOTES
Occupational Therapy Treatment Note     08/07/20 1899   Pain Assessment   Pain Assessment Tool Pain Assessment not indicated - pt denies pain   Restrictions/Precautions   Precautions Bed/chair alarms;Cognitive; Fall Risk;Supervision on toilet/commode   Lifestyle   Autonomy "If something is important, I'll remember it  If it's not, I won't"   Sit to Stand   Type of Assistance Needed Supervision   Amount of Physical Assistance Provided No physical assistance   Comment no AD   Sit to Stand CARE Score 4   Bed-Chair Transfer   Type of Assistance Needed Supervision   Amount of Physical Assistance Provided No physical assistance   Comment Pt navigated through chairs placed throughout environment  On 2nd trial pt moved chairs out of the way, no LOB or unsteadiness   Chair/Bed-to-Chair Transfer CARE Score 4   Transfer Bed/Chair/Wheelchair   Limitations Noted In Balance; Endurance   Toileting Hygiene   Type of Assistance Needed Supervision   Amount of Physical Assistance Provided No physical assistance   Toileting Hygiene CARE Score 4   Toilet Transfer   Type of Assistance Needed Supervision   Amount of Physical Assistance Provided No physical assistance   Toilet Transfer CARE Score 4   Toilet Transfer   Surface Assessed Standard Toilet   Transfer Technique Standard   Exercise Tools   Exercise Tools Yes   UE Ergometer Pt engaged in table top UE ergometer for 2 sets of 2 min  Vitals taken as pt's heart rate has previously inc greatly w/activity  Baseline O2 99% and hr 90  After 2 min, O2 98% and hr 91  After another 2 min, 99% O2 and hr 85  Pt tolerated exercise well, OT educated on energy conservation and to focus on maintaining exercise for 2 min rather than focusing on speed, and to change speed if needed  Pt slightly SOB after exercise but reported feeling okay   Plan to trial UA ergometer SCIFIT w/OT monitoring hr and O2 in order to further inc pt's endurance   Cognition   Overall Cognitive Status Impaired Arousal/Participation Alert; Cooperative   Attention Attends with cues to redirect   Orientation Level Oriented to person;Oriented to place;Oriented to time   Memory Decreased short term memory;Decreased recall of recent events   Following Commands Follows one step commands with increased time or repetition   Comments Pt engaged in paper and pen activity, given calendar and asked to answer questions based on dates in calendar to assess problem solving, planning, and overall executive functioning skills  Pt w/70% accuracy, noted difficulty w/comprehending phrases such as 'longest consecutive series of games' and 'days in shortest consecutive series ' Based on this, OT recommending supervision when pt is scheduling future dr sena, etc  Re: memory, pt cites he will remember when something is important, but when asked by OT, said he has forgotten important things  OT briefly educated on strategies for STM deficits, including writing info down or telling someone else to remind him (pt vetoed the latter)  Pt able to recall 3/3 of his problems and req min VC to recall what each therapy is working on  Activity Tolerance   Activity Tolerance Patient tolerated treatment well   Assessment   Treatment Assessment Pt engaged in skilled OT session focused on executive functioning and endurance  Pt improving in endurance/activity tolerance as well as some inc in insight (able to acknowledge his problems and the need for their improvement)  OT recommends supervision w/important planning or scheduling tasks  2* pt's dec insight and personality, anticipate pt would not call for help with bathing or if needed if progressed to IRP  OT plans to assess over weekend if pt would be appropriate to progress to IRP on Monday   Continue OT POC w/focus on inc insight into deficits (& safety awareness--when to ask for help at home), endurance/activity tolerance, dynamic standing balance with B/L UE release, kitchen mobility/simple meal prep at supervision level  Prognosis Fair   Problem List Decreased strength;Decreased endurance; Impaired balance;Decreased coordination;Decreased cognition; Impaired judgement;Decreased safety awareness   Plan   Treatment/Interventions ADL retraining; Therapeutic exercise; Endurance training;Cognitive reorientation;Patient/family training;Equipment eval/education; Compensatory technique education   Progress Progressing toward goals   Recommendation   OT Discharge Recommendation Return to previous environment with social support   OT Therapy Minutes   OT Time In 0930   OT Time Out 1030   OT Total Time (minutes) 60   OT Mode of treatment - Individual (minutes) 60   OT Mode of treatment - Concurrent (minutes) 0   OT Mode of treatment - Group (minutes) 0   OT Mode of treatment - Co-treat (minutes) 0   OT Mode of Treatment - Total time(minutes) 60 minutes   OT Cumulative Minutes 660   Therapy Time missed   Time missed?  No

## 2020-08-07 NOTE — PROGRESS NOTES
08/07/20 1330   Pain Assessment   Pain Assessment Tool Pain Assessment not indicated - pt denies pain   Pain Score No Pain   Restrictions/Precautions   Precautions Bed/chair alarms;Cognitive; Fall Risk;Hard of hearing; Impulsive;Supervision on toilet/commode   Comprehension   QI: Comprehension 3  Usually Understands: Understands most conversations, but misses some part/intent of message  Requires cues at times to understand  Comprehension (FIM) 4 - Understands basic info/conversation 75-90% of time   Expression   QI: Expression 3  Exhibits some difficulty with expressing needs and ideas (e g , some words or finishing thoughts) or speech is not clear   Expression (FIM) 5 - Needs help/cues only RARELY (< 10% of the time)   Social Interaction   Social Interaction (FIM) 5 - Interacts appropriately with others 90% of time   Problem Solving   Problem solving (FIM) 4 - Solves basic problems 75-89% of time   Memory   Memory (FIM) 4 - Recognizes/recalls/performs 75-89%   Memory Skills   Orientation Level Oriented to person;Oriented to place;Oriented to time;Oriented to situation   Speech/Language/Cognition Assessmetn   Treatment Assessment Pt seen for f/u cognitive-linguistic tx w/session focusing on map reading task targeting attention, comprehension, problem solving, and sequencing skills  He accurately answered 10/15 f/u ?'s to map of [place] w/improvement to 14/15 given verbal prompts to check his work or reread ? again  He improved further to 15/15 given verbal cue  Pt noted to self correct 2 answers  He did take extra time to complete the activity w/some mild frustration observed; however, he was fully cooperative throughout session  Pt's insight into deficits and need for S/A is still limited, and he will continue to benefit from skilled ST intervention to maximize overall cognitive-linguistic skills at this time      SLP Therapy Minutes   SLP Time In 1330   SLP Time Out 1430   SLP Total Time (minutes) 60   SLP Mode of treatment - Individual (minutes) 60   SLP Mode of treatment - Concurrent (minutes) 0   SLP Mode of treatment - Group (minutes) 0   SLP Mode of treatment - Co-treat (minutes) 0   SLP Mode of Treatment - Total time(minutes) 60 minutes   SLP Cumulative Minutes 425   Therapy Time missed   Time missed?  No

## 2020-08-07 NOTE — PROGRESS NOTES
08/07/20 0830   Pain Assessment   Pain Assessment Tool Pain Assessment not indicated - pt denies pain   Restrictions/Precautions   Precautions Bed/chair alarms;Cognitive; Fall Risk;Supervision on toilet/commode   Cognition   Overall Cognitive Status Impaired   Subjective   Subjective pt in bathroom with A from dtr start of session  signed her off to be able to tx pt when present  pt reports having coffee and feels "unglued"   Sit to Stand   Type of Assistance Needed Supervision   Comment CS for safety   Sit to Stand CARE Score 4   Bed-Chair Transfer   Type of Assistance Needed Supervision   Comment CS for safety   Chair/Bed-to-Chair Transfer CARE Score 4   Car Transfer   Type of Assistance Needed Set-up / clean-up   Car Transfer CARE Score 5   Walk 10 Feet   Type of Assistance Needed Supervision   Walk 10 Feet CARE Score 4   Walk 50 Feet with Two Turns   Type of Assistance Needed Supervision   Walk 50 Feet with Two Turns CARE Score 4   Walk 150 Feet   Type of Assistance Needed Supervision   Walk 150 Feet CARE Score 4   Ambulation   Does the patient walk? 2  Yes   Distance Walked (feet) 150 ft  (x2; 350'x1)   Assist Device   (no AD)   Wheelchair mobility   Does the patient use a wheelchair? 0  No   Type of Wheelchair Used   (N/A)   Curb or Single Stair   Style negotiated Curb   Comment CS on 8" curb   Therapeutic Interventions   Strengthening seated LAQ and hip flex 3# AW x20 each   Flexibility B gastroc and HS   Other Comments   Comments  with activity, 103 at rest   Assessment   Treatment Assessment pt cont to report not feeling well but does not seem to be indistress and vitals WNL  HR slightly elevated at rest   pt refused to amb down to therapy and sat in w/c   pt performed LE exs until feeling ok to walk  pt's dtr Arielle Mcallister present this morning and was helping pt in bathroom    she reports she turned chair alarm off and on wall   i educated her on waiting to be given the OK to transfer pt   she was understanding and had no difficulty helping pt and told her she can tx pt while present  nursing made aware as well   will cont to focus on strength and endurance to improve activity tolerance for safe d/c home  cont recommendation for 24/7 Sup upon d/c due to cognitive deficits  Family/Caregiver Present Guerita starks, start of session   Problem List Decreased strength;Decreased endurance; Impaired balance;Decreased coordination;Decreased cognition; Impaired judgement;Decreased safety awareness   Barriers to Discharge Inaccessible home environment;Decreased caregiver support   PT Barriers   Physical Impairment Decreased strength;Decreased endurance; Impaired balance;Decreased mobility; Decreased cognition; Impaired judgement;Decreased safety awareness   Functional Limitation Walking;Stair negotiation   Plan   Treatment/Interventions Functional transfer training;LE strengthening/ROM; Endurance training;Patient/family training;Gait training   Progress Progressing toward goals   Recommendation   PT Discharge Recommendation Home with skilled therapy   PT Therapy Minutes   PT Time In 0830   PT Time Out 0930   PT Total Time (minutes) 60   PT Mode of treatment - Individual (minutes) 60   PT Mode of treatment - Concurrent (minutes) 0   PT Mode of treatment - Group (minutes) 0   PT Mode of treatment - Co-treat (minutes) 0   PT Mode of Treatment - Total time(minutes) 60 minutes   PT Cumulative Minutes 560   Therapy Time missed   Time missed?  No

## 2020-08-07 NOTE — SOCIAL WORK
In preparation for d/c, spoke to pt's dtr about outpt PT, SLP, and OT recommendations  Appointments scheduled for August 18th at 3pm, 4pm, 5pm at [facility]  Dtr asked for back to back appts because of the distance she will be driving her father but has concerns of his ability to handle 3 hours of therapy in a row

## 2020-08-07 NOTE — PLAN OF CARE
Problem: Potential for Falls  Goal: Patient will remain free of falls  Description: INTERVENTIONS:  - Assess patient frequently for physical needs  -  Identify cognitive and physical deficits and behaviors that affect risk of falls    -  [place] fall precautions as indicated by assessment   - Educate patient/family on patient safety including physical limitations  - Instruct patient to call for assistance with activity based on assessment  - Modify environment to reduce risk of injury  - Consider OT/PT consult to assist with strengthening/mobility  Outcome: Progressing

## 2020-08-08 PROCEDURE — 97112 NEUROMUSCULAR REEDUCATION: CPT

## 2020-08-08 PROCEDURE — 97530 THERAPEUTIC ACTIVITIES: CPT

## 2020-08-08 PROCEDURE — 97535 SELF CARE MNGMENT TRAINING: CPT

## 2020-08-08 PROCEDURE — 97110 THERAPEUTIC EXERCISES: CPT

## 2020-08-08 RX ADMIN — TOBRAMYCIN 1 DROP: 3 SOLUTION/ DROPS OPHTHALMIC at 21:05

## 2020-08-08 RX ADMIN — CHLORHEXIDINE GLUCONATE 0.12% ORAL RINSE 15 ML: 1.2 LIQUID ORAL at 09:22

## 2020-08-08 RX ADMIN — OLANZAPINE 5 MG: 5 TABLET, ORALLY DISINTEGRATING ORAL at 21:04

## 2020-08-08 RX ADMIN — MELATONIN 6 MG: at 20:57

## 2020-08-08 RX ADMIN — Medication 250 MG: at 17:45

## 2020-08-08 RX ADMIN — Medication 250 MG: at 09:22

## 2020-08-08 RX ADMIN — TOBRAMYCIN 1 DROP: 3 SOLUTION/ DROPS OPHTHALMIC at 13:00

## 2020-08-08 RX ADMIN — ENOXAPARIN SODIUM 90 MG: 100 INJECTION SUBCUTANEOUS at 09:23

## 2020-08-08 RX ADMIN — TOBRAMYCIN 1 DROP: 3 SOLUTION/ DROPS OPHTHALMIC at 17:45

## 2020-08-08 RX ADMIN — METOPROLOL TARTRATE 25 MG: 25 TABLET, FILM COATED ORAL at 20:57

## 2020-08-08 RX ADMIN — CHLORHEXIDINE GLUCONATE 0.12% ORAL RINSE 15 ML: 1.2 LIQUID ORAL at 20:57

## 2020-08-08 RX ADMIN — METOPROLOL TARTRATE 25 MG: 25 TABLET, FILM COATED ORAL at 09:22

## 2020-08-08 RX ADMIN — ENOXAPARIN SODIUM 90 MG: 100 INJECTION SUBCUTANEOUS at 20:58

## 2020-08-08 RX ADMIN — TOBRAMYCIN 1 DROP: 3 SOLUTION/ DROPS OPHTHALMIC at 09:23

## 2020-08-08 NOTE — PROGRESS NOTES
08/08/20 0930   Pain Assessment   Pain Assessment Tool Pain Assessment not indicated - pt denies pain   Pain Score No Pain   Restrictions/Precautions   Precautions Cognitive;Bed/chair alarms; Fall Risk;Supervision on toilet/commode;Limb alert   Cognition   Overall Cognitive Status Impaired   Arousal/Participation Alert; Cooperative   Attention Attends with cues to redirect   Subjective   Subjective pt had no c/o and was agreeable to have PT   Roll Left and Right   Type of Assistance Needed Adaptive equipment   Roll Left and Right CARE Score 6   Lying to Sitting on Side of Bed   Type of Assistance Needed Independent   Lying to Sitting on Side of Bed CARE Score 6   Sit to Stand   Type of Assistance Needed Supervision   Comment no AD   Sit to Stand CARE Score 4   Bed-Chair Transfer   Type of Assistance Needed Supervision   Chair/Bed-to-Chair Transfer CARE Score 4   Walk 10 Feet   Type of Assistance Needed Supervision   Comment S/set up without AD x 10'x 5 reps    Walk 10 Feet CARE Score 4   Walk 50 Feet with Two Turns   Type of Assistance Needed Supervision   Walk 50 Feet with Two Turns CARE Score 4   Walk 150 Feet   Type of Assistance Needed Supervision   Comment 150 x 2 , 200' without AD incorporating repeated turning around with cue to slow down to reduce risk for falls when turning , occasional cues to avoid getting too close to doorway frames   Walk 150 Feet CARE Score 4   Walking 10 Feet on Uneven Surfaces   Type of Assistance Needed Supervision   Comment CS on gym ramp   Walking 10 Feet on Uneven Surfaces CARE Score 4   Curb or Single Stair   Style negotiated Curb   Type of Assistance Needed Supervision   Comment CS on 8" curb   1 Step (Curb) CARE Score 4   Therapeutic Interventions   Neuromuscular Re-Education household distance amb with obstacle negotiation x 20' x 2 reps , stepping over objects x 20' x 4 reps with cues to fwd shift when stepping over so as not touch object with shoe   alt toe taps on 6" block without visual cues x 20 reps with cues provided to  foot off the block to safely clear it as he lowers foot back on the floor, 1x demo of slight LOB to the R as he was lowering L LE on the floor , SLS x 3 SH x 15 reps with cues to keep feet apart as he lowers back his foot back on the floor   Other Comments   Comments 100% O2 after initial 150' but dropped to 88% initially after walking 200' but recovered to >90 within seconds    Assessment   Treatment Assessment skilled PT focused on dynamic balance training to reduce risk for falls and functional mobility training without an AD with emphasis on carry over of safety practices to improve mobility indep in preparation with anticipated progression of in room indep in the next few days  Despite repeated cues to get his bearing first upon standing before walking pt demo dec carry over although no LOB demonstrated but still demo occasional path deviations/veering to the side during hallway mobility training  PT will continue to work on improving carry over/compliance with safety practices and proper technique during functional mobility training, standing dynamic balance training, strengthening to improve overall functional indep with reduce risk for falls  Family/Caregiver Present no   Barriers to Discharge Inaccessible home environment;Decreased caregiver support   PT Barriers   Functional Limitation Stair negotiation; Walking   Plan   Treatment/Interventions Functional transfer training; Therapeutic exercise; Endurance training;Bed mobility;Gait training;Spoke to nursing;OT   Progress Progressing toward goals   Recommendation   PT Discharge Recommendation Home with skilled therapy  (OP PT)   PT - OK to Discharge No   PT Therapy Minutes   PT Time In 0930   PT Time Out 1030   PT Total Time (minutes) 60   PT Mode of treatment - Individual (minutes) 60   PT Mode of treatment - Concurrent (minutes) 0   PT Mode of treatment - Group (minutes) 0   PT Mode of treatment - Co-treat (minutes) 0   PT Mode of Treatment - Total time(minutes) 60 minutes   PT Cumulative Minutes 650

## 2020-08-08 NOTE — PROGRESS NOTES
08/08/20 1230   Pain Assessment   Pain Assessment Tool Pain Assessment not indicated - pt denies pain   Pain Score No Pain   Restrictions/Precautions   Precautions Cognitive; Fall Risk;Bed/chair alarms;Limb alert;Supervision on toilet/commode   Cognition   Overall Cognitive Status Impaired   Arousal/Participation Alert; Cooperative   Attention Attends with cues to redirect   Subjective   Subjective pt was in bed napping however was agreeable to have PT upon waken up  also reported feeling hungry and asked for candy bar and was given diet shoaib and sp cracker  RN [facility] notified  Lying to Sitting on Side of Bed   Type of Assistance Needed Independent   Lying to Sitting on Side of Bed CARE Score 6   Sit to Stand   Type of Assistance Needed Supervision   Sit to Stand CARE Score 4   Bed-Chair Transfer   Type of Assistance Needed Supervision   Chair/Bed-to-Chair Transfer CARE Score 4   Walk 10 Feet   Type of Assistance Needed Supervision   Walk 10 Feet CARE Score 4   Toilet Transfer   Type of Assistance Needed Supervision   Comment PT stayed outside the toilet door while pt completing task but he required cueing to use soap while washing hands and claimed he already had soap but PT did not observed pt using soap dispenser  also got irritated when PT asked why he turned off the bathroom lights before sitting down on the toilet seat to have bowel movement - [facility] notified of BM occurrence and she charted it   Toilet Transfer CARE Score 4   Equipment Use   NuStep L2 x 10 mins using bilat UE/LE    Assessment   Treatment Assessment Skilled PT focused on functional mobility training and strengthening  Pt cont to demo dec safety when performing mobilities when he gets distracted in this case being hungry as he became restless   No LOB but more unsteady compared to this AM session while performing initial mobilities in room with noted dec attention to safe set up requiring assist from PT to move furniture safely out of his way  Although pt also just woke up from his nap requiring cues to take his time getting his bearing during initial mobilities  PT will continue to work on improving carry over/compliance with safety practices and proper technique during functional mobility training, standing dynamic balance training, strengthening to improve overall functional indep with reduce risk for falls  Family/Caregiver Present no   Barriers to Discharge Inaccessible home environment;Decreased caregiver support   PT Barriers   Functional Limitation Stair negotiation; Walking   Plan   Treatment/Interventions Functional transfer training;LE strengthening/ROM; Therapeutic exercise; Bed mobility;Spoke to nursing;OT;Gait training   Progress Progressing toward goals   PT Therapy Minutes   PT Time In 1230   PT Time Out 1300   PT Total Time (minutes) 30   PT Mode of treatment - Individual (minutes) 30   PT Mode of treatment - Concurrent (minutes) 0   PT Mode of treatment - Group (minutes) 0   PT Mode of treatment - Co-treat (minutes) 0   PT Mode of Treatment - Total time(minutes) 30 minutes   PT Cumulative Minutes 680

## 2020-08-08 NOTE — PROGRESS NOTES
Occupational Therapy Treatment Note     08/08/20 0700   Pain Assessment   Pain Assessment Tool Pain Assessment not indicated - pt denies pain   Restrictions/Precautions   Precautions Bed/chair alarms;Cognitive; Fall Risk;Supervision on toilet/commode   Eating   Type of Assistance Needed Independent   Eating CARE Score 6   Oral Hygiene   Type of Assistance Needed Supervision   Amount of Physical Assistance Provided No physical assistance   Comment in stance at sink   Oral Hygiene CARE Score 4   Grooming   Able To Comb/Brush Hair;Wash/Dry Face;Brush/Clean Teeth;Wash/Dry Hands   Limitation Noted In Safety;Strength   Findings in stance at sink   Shower/Bathe Self   Type of Assistance Needed Supervision; Adaptive equipment   Amount of Physical Assistance Provided No physical assistance   Comment Prior to going to shower, OT educated pt on recommendation for sitting and how it is safer than standing (pt's family installing grab bars and shower chair in home shower)  Pt however stood for duration of shower (<10 min)  Use of grab bars occasionally  Pt denied fatigue after despite some SOB  OT re-educated on recommendation for sitting in shower to dec fall risk 2* pt's impaired balance and endurance  Pt's dtr Jessica Espinal present at end of session, OT reiterated recommendation for supervision during shower and that pt tell her when he plans to stand so she is aware  Jessica Espinal verbalized understanding and reiterated to pt  Shower/Bathe Self CARE Score 4   Bathing   Assessed Bath Style Shower   Anticipated D/C Bath Style Shower   Tub/Shower Transfer   Limitations Noted In Balance; Safety; Endurance   Adaptive Equipment Grab Bars;Seat with Back   Assessed Shower   Findings supervision level   Upper Body Dressing   Type of Assistance Needed Supervision   Amount of Physical Assistance Provided No physical assistance   Comment in stance to doff, seated to don   Upper Body Dressing CARE Score 4   Lower Body Dressing   Type of Assistance Needed Supervision   Amount of Physical Assistance Provided No physical assistance   Comment Pt lakshmi inc in insight as he attempted to unthread LEs from pants first in stance w/unilateral support, then chose to sit to fully doff  Seated to thread when donning and in stance for CM over hips  Lower Body Dressing CARE Score 4   Putting On/Taking Off Footwear   Type of Assistance Needed Supervision   Amount of Physical Assistance Provided No physical assistance   Comment seated w/cross leg technique   Putting On/Taking Off Footwear CARE Score 4   Picking Up Object   Type of Assistance Needed Incidental touching   Amount of Physical Assistance Provided No physical assistance   Comment Pt bent to retrieve dropped clothing item on ground w/unilateral UE support, had slight R knee buckle, OT provided IT but pt able to self correct w/furniture   Picking Up Object CARE Score 4   Lying to Sitting on Side of Bed   Type of Assistance Needed Supervision   Amount of Physical Assistance Provided No physical assistance   Lying to Sitting on Side of Bed CARE Score 4   Sit to Stand   Type of Assistance Needed Supervision   Amount of Physical Assistance Provided No physical assistance   Comment no AD   Sit to Stand CARE Score 4   Bed-Chair Transfer   Type of Assistance Needed Supervision   Amount of Physical Assistance Provided No physical assistance   Comment no AD   Chair/Bed-to-Chair Transfer CARE Score 4   Transfer Bed/Chair/Wheelchair   Limitations Noted In Balance; Endurance   Adaptive Equipment None   Meal Prep   Meal Prep Level Other (Comment)  (no AD)   Meal Prep Level of Assistance Close supervision   Meal Preparation Pt heated up breakfast in microwave   Unable to set microwave at intended time (as microwave is unfamilair to pt) but retrieved food after appropriate amount of time elapsed   Light Housekeeping   Light Housekeeping Level Other (Comment)  (no AD)   Light Housekeeping Level of Assistance Close supervision   Light Housekeeping Pt engaged in laundry folding task in stance at table w/gait belt  Pt reached towards L side to retrieve item of clothing from basket, return to middle and fold item, then place item to R side  Pt instructed to take step to each side when reaching to focus on righting reactions  Improvement in dynamic standing balance with B/L UE release, no LOB, OT providing close supervision  Pt noted feeling 'a little winded' after activity  Pt's dtr Ana María Jimenezdavid verbalized she will help with laundry initially upon d/c  If pt is to complete, recommend supervision 2* impaired endurance unless pt is seated  Functional Standing Tolerance   Time 3 min   Activity ball toss and laundry   Cognition   Overall Cognitive Status Impaired   Arousal/Participation Alert; Cooperative   Attention Attends with cues to redirect   Orientation Level Oriented X4   Memory Decreased short term memory;Decreased recall of recent events   Following Commands Follows one step commands with increased time or repetition   Additional Activities   Additional Activities Other (Comment)  (ball toss)   Additional Activities Comments Pt engaged in standing ball toss as preparatory activity to focus on dynamic standing balance with B/L UE release  Pt wore gait belt but no LOB, able to side step to catch ball and recover  After 3 min, pt said he felt winded and needed a break, demo inc insight  Improved dynamic balance and righting reactions  Assessment   Treatment Assessment Pt engaged in skilled OT session focused on ADL routine and dynamic standing balance with B/L UE release  Pt demo varying progress w/insight, lacking safety awareness in shower but inc insight into endurance deficits during balance activities  Based on pt's lack of safety awareness and impaired insight, OT not recommending pt for IRPs at this time, as anticipate pt would not call if he felt fatigued or if washing up at sink   OT to assess Monday if pt can have alarms removed after staff assists w/morning ADL routine in order to allow pt independence in toileting throughout day, then staff alarming in evening  OT educated pt and dtr Jos Dumont (present at end of session) on continued recommendation for close supervision in shower and that pt let Jos Dumont know when he is standing  Also educated Jos Dumont on recommendation that she check over pt's scheduling of important appts, etc  based on difficulty w/problem solving activity yesterday  Noted poor frustration tolerance from pt at end of session when OT reviewing recommendations to dtr  Pt would continue to benefit from skilled OT focused on endurance/activity tolerance, meal prep at supervision level, inc insight into deficits to inc safety awareness, dynamic standing balance  Prognosis Fair   Problem List Decreased endurance; Impaired balance;Decreased cognition; Impaired judgement;Decreased safety awareness   Plan   Treatment/Interventions ADL retraining; Therapeutic exercise; Endurance training;Cognitive reorientation;Patient/family training;Equipment eval/education; Compensatory technique education   Progress Progressing toward goals   Recommendation   OT Discharge Recommendation Return to previous environment with social support   OT Therapy Minutes   OT Time In 0700   OT Time Out 0830   OT Total Time (minutes) 90   OT Mode of treatment - Individual (minutes) 90   OT Mode of treatment - Concurrent (minutes) 0   OT Mode of treatment - Group (minutes) 0   OT Mode of treatment - Co-treat (minutes) 0   OT Mode of Treatment - Total time(minutes) 90 minutes   OT Cumulative Minutes 750   Therapy Time missed   Time missed?  No

## 2020-08-08 NOTE — PROGRESS NOTES
Internal Medicine Progress Note  Patient: Akosua Shannon  Age/sex: 79 y o  male  Medical Record #: [id number]      ASSESSMENT/PLAN: (Interval History)  Akosua Shannon is seen and examined and management for following issues:    DOMI adenocarcinoma; s/p bronchoscopy, mediastinoscopy, VATS, left upper lobectomy and mediastinal lymph node dissection 6/10/20  · Back to Thoracic surgery as OP     PEA with multiple rounds of CPR/respiratory arrest; emergent cricothyroidotomy 6/20; tracheostomy 6/22; decannulation 7/29/20  · Stoma closed and healing well   · Continue Albuterol neb prn     hx nicotine abuse with 1ppd x 50 yrs  · quit prior to surgery  · continue prn Albuterol     Sinus tachycardia  · resolved  · Continue Lopressor 25 mg BID  · No changes today  · ECHO 8/2/20 WNL normal EF and no wall motion issues     Fever; UTI >100,000 Pseudomonas; SIRS/sepsis  · Afebrile  · No further sx  · Completed Levaquin      ABLA  · Stable  · will watch and transfuse prn hemoglobin <8 0     DVT  LE and RUE  · currently is on therapeutic Lovenox   · will need conversion to NOAC eventually  · followup venous dopplers on UE/LE 7/14 and 6/26 respectively showed resolution of DVTs     Urine retention  · on Flomax   · Lucila was d/cd 7/27/20     TBI 2010  · Has memory issues from this incident but unknown details since he cannot remember anything about it     ETOH abuse  · Unknown amount/details     Left eye conjunctivitis  · Woke up 8/4 with itching/burning/tearing of left eye; has slight crusting  · On Tobrex QID x 7 days  · Much improved    The above assessment and plan was reviewed and updated as determined by my evaluation of the patient on 8/8/2020      Labs:   Results from last 7 days   Lab Units 08/06/20  0608 08/03/20  0528   WBC Thousand/uL 5 48 6 79   HEMOGLOBIN g/dL 9 7* 8 6*   HEMATOCRIT % 32 6* 28 4*   PLATELETS Thousands/uL 400* 359     Results from last 7 days   Lab Units 08/06/20  0608 08/03/20  0528   SODIUM mmol/L 139 141   POTASSIUM mmol/L 4 5 3 6   CHLORIDE mmol/L 109* 114*   CO2 mmol/L 26 23   BUN mg/dL 12 8   CREATININE mg/dL 0 90 0 83   CALCIUM mg/dL 9 2 9 2             Results from last 7 days   Lab Units 08/05/20  1420 08/04/20  1447   POC GLUCOSE mg/dl 84 81       Review of Scheduled Meds:  Current Facility-Administered Medications   Medication Dose Route Frequency Provider Last Rate    acetaminophen  650 mg Oral Q6H PRN Leax Adrian MD      albuterol  2 5 mg Nebulization Q6H PRN Mike Nogueira MD      calcium carbonate  500 mg Oral TID PRN Mike Nogueira MD      chlorhexidine  15 mL [facility] Mike Nogueira MD      enoxaparin  1 mg/kg Subcutaneous Q12H [facility] Mike Nogueira MD      guaiFENesin  200 mg Oral Q4H PRN Mike Nogueira MD      Lidocaine Viscous HCl  15 mL [facility] 4x Daily PRN Mike Nogueira MD      melatonin  6 mg Oral HS Mike Nogueira MD      metoprolol tartrate  25 mg Oral Q12H [facility] Shannon Chowdhury DO      OLANZapine  5 mg Oral HS Mike Nogueira MD      ondansetron  4 mg Oral Q6H PRN Mike Nogueira MD      oxyCODONE  2 5 mg Oral Q4H PRN Mike Nogueira MD      polyethylene glycol  17 g Oral Daily Mike Nogueira MD      saccharomyces boulardii  250 mg Oral BID Carmencita Mohs, CRNP      senna-docusate sodium  1 tablet Per NG Tube BID Mike Nogueira MD      tobramycin  1 drop Both Eyes 4x Daily Carmencita Mohs, CRNP         Subjective/ HPI: Patient seen and examined  Patients overnight issues or events were reviewed with nursing or staff during rounds or morning huddle session  New or overnight issues include the following:     Pt denies complaints  ROS:   A 10 point ROS was performed; negative except as noted above  Imaging:     XR chest pa & lateral   Final Result by Alyssia Lerner MD (08/01 6639)      Status post removal of lines, catheters and tubes  No pneumothorax        Resolved opacity in the left lung with stable perihilar scarring and postsurgical changes of the left upper lobe  Workstation performed: [id number]             *Labs /Radiology studies Reviewed  *Medications  reviewed and reconciled as needed  *Please refer to order section for additional ordered labs studies  *Case discussed with primary attending during morning huddle case rounds    Physical Examination:  Vitals:   Vitals:    08/07/20 1319 08/07/20 2025 08/07/20 2300 08/08/20 0516   BP: 130/79 141/83  105/61   BP Location: Left arm Left arm  Left arm   Pulse: 74 80  91   Resp: 20 20  20   Temp: 98 1 °F (36 7 °C) 97 8 °F (36 6 °C)  97 7 °F (36 5 °C)   TempSrc: Oral Oral  Oral   SpO2: 96% 98% 98% 95%   Weight:       Height:           General Appearance: no distress, conversive  HEENT: PERRLA, conjuctiva normal; oropharynx clear; mucous membranes moist;   Neck:  Supple, no lymphadenopathy or thyromegaly  Lungs: CTA, normal respiratory effort, no retractions, expiratory effort normal  CV: regular rate and rhythm , PMI normal   ABD: soft non tender, no masses , no hepatic or splenomegaly  EXT: DP pulses intact, no lymphadenopathy, no edema  Skin: normal turgor, normal texture, no rash  Psych: affect normal, mood normal  Neuro: AAOx3      The above physical exam was reviewed and updated as determined by my evaluation of the patient on 8/8/2020  Invasive Devices     None                    VTE Pharmacologic Prophylaxis: Enoxaparin  Code Status: Level 1 - Full Code  Current Length of Stay: 9 day(s)      Total time spent:  30 minutes with more than 50% spent counseling/coordinating care  Counseling includes discussion with patient re: progress  and discussion with patient of his/her current medical state/information  Coordination of patient's care was performed in conjunction with primary service  Time invested included review of patient's labs, vitals, and management of their comorbidities with continued monitoring   In addition, this patient was discussed with medical team including physician and advanced extenders  The care of the patient was extensively discussed and appropriate treatment plan was formulated unique for this patient  ** Please Note:  voice to text software may have been used in the creation of this document   Although proof errors in transcription or interpretation are a potential of such software**

## 2020-08-09 PROCEDURE — 97129 THER IVNTJ 1ST 15 MIN: CPT

## 2020-08-09 PROCEDURE — 97530 THERAPEUTIC ACTIVITIES: CPT

## 2020-08-09 PROCEDURE — 97130 THER IVNTJ EA ADDL 15 MIN: CPT

## 2020-08-09 PROCEDURE — 97112 NEUROMUSCULAR REEDUCATION: CPT

## 2020-08-09 RX ADMIN — TOBRAMYCIN 1 DROP: 3 SOLUTION/ DROPS OPHTHALMIC at 21:53

## 2020-08-09 RX ADMIN — METOPROLOL TARTRATE 25 MG: 25 TABLET, FILM COATED ORAL at 09:00

## 2020-08-09 RX ADMIN — Medication 250 MG: at 17:35

## 2020-08-09 RX ADMIN — CHLORHEXIDINE GLUCONATE 0.12% ORAL RINSE 15 ML: 1.2 LIQUID ORAL at 21:54

## 2020-08-09 RX ADMIN — MELATONIN 6 MG: at 21:53

## 2020-08-09 RX ADMIN — TOBRAMYCIN 1 DROP: 3 SOLUTION/ DROPS OPHTHALMIC at 11:30

## 2020-08-09 RX ADMIN — ENOXAPARIN SODIUM 90 MG: 100 INJECTION SUBCUTANEOUS at 21:53

## 2020-08-09 RX ADMIN — CHLORHEXIDINE GLUCONATE 0.12% ORAL RINSE 15 ML: 1.2 LIQUID ORAL at 09:00

## 2020-08-09 RX ADMIN — ENOXAPARIN SODIUM 90 MG: 100 INJECTION SUBCUTANEOUS at 09:00

## 2020-08-09 RX ADMIN — TOBRAMYCIN 1 DROP: 3 SOLUTION/ DROPS OPHTHALMIC at 09:00

## 2020-08-09 RX ADMIN — OLANZAPINE 5 MG: 5 TABLET, ORALLY DISINTEGRATING ORAL at 22:26

## 2020-08-09 RX ADMIN — TOBRAMYCIN 1 DROP: 3 SOLUTION/ DROPS OPHTHALMIC at 17:35

## 2020-08-09 RX ADMIN — METOPROLOL TARTRATE 25 MG: 25 TABLET, FILM COATED ORAL at 21:54

## 2020-08-09 RX ADMIN — Medication 250 MG: at 09:01

## 2020-08-09 NOTE — NURSING NOTE
Patient resting comfortably in bed with the lights off at this time  Patient has no complaints of pain, AAOx4, no SOB noted  Heart sounds regular, bowel sounds present on all 4 quadrants, pt has healed incisions on his abdomen and bruising on b/l lower quadrants  Lung sounds clear but diminished at the bases  Patient asked about the purpose of the tobrex eye drops, advised that patient is currently being treated for conjunctivitis for a total of 7 days  Pt verbalized understanding  No further questions  Will continue to monitor

## 2020-08-09 NOTE — PROGRESS NOTES
Internal Medicine Progress Note  Patient: Shelbi Puckett  Age/sex: 79 y o  male  Medical Record #: [id number]      ASSESSMENT/PLAN: (Interval History)  Shelbi Puckett is seen and examined and management for following issues:    DOMI adenocarcinoma; s/p bronchoscopy, mediastinoscopy, VATS, left upper lobectomy and mediastinal lymph node dissection 6/10/20  · Back to Thoracic surgery as OP     PEA with multiple rounds of CPR/respiratory arrest; emergent cricothyroidotomy 6/20; tracheostomy 6/22; decannulation 7/29/20  · Stoma closed and healing well   · Continue Albuterol neb prn     hx nicotine abuse with 1ppd x 50 yrs  · quit prior to surgery  · continue prn Albuterol     Sinus tachycardia  · resolved  · Continue Lopressor 25 mg BID  · No changes today  · ECHO 8/2/20 WNL normal EF and no wall motion issues     Fever; UTI >100,000 Pseudomonas; SIRS/sepsis  · Afebrile  · No further sx  · Completed Levaquin      ABLA  · Stable  · will watch and transfuse prn hemoglobin <8 0     DVT  LE and RUE  · currently is on therapeutic Lovenox   · will need conversion to NOAC eventually  · followup venous dopplers on UE/LE 7/14 and 6/26 respectively showed resolution of DVTs     Urine retention  · on Flomax   · Lucila was d/cd 7/27/20     TBI 2010  · Has memory issues from this incident but unknown details since he cannot remember anything about it     ETOH abuse  · Unknown amount/details     Left eye conjunctivitis  · Woke up 8/4 with itching/burning/tearing of left eye; has slight crusting  · On Tobrex QID x 7 days  · Much improved      The above assessment and plan was reviewed and updated as determined by my evaluation of the patient on 8/9/2020      Labs:   Results from last 7 days   Lab Units 08/06/20  0608 08/03/20  0528   WBC Thousand/uL 5 48 6 79   HEMOGLOBIN g/dL 9 7* 8 6*   HEMATOCRIT % 32 6* 28 4*   PLATELETS Thousands/uL 400* 359     Results from last 7 days   Lab Units 08/06/20  0608 08/03/20  9692 SODIUM mmol/L 139 141   POTASSIUM mmol/L 4 5 3 6   CHLORIDE mmol/L 109* 114*   CO2 mmol/L 26 23   BUN mg/dL 12 8   CREATININE mg/dL 0 90 0 83   CALCIUM mg/dL 9 2 9 2             Results from last 7 days   Lab Units 08/05/20  1420 08/04/20  1447   POC GLUCOSE mg/dl 84 81       Review of Scheduled Meds:  Current Facility-Administered Medications   Medication Dose Route Frequency Provider Last Rate    acetaminophen  650 mg Oral Q6H PRN Mary Piedra MD      calcium carbonate  500 mg Oral TID PRN Tianna Green MD      chlorhexidine  15 mL [facility] [address] Tianna Green MD      enoxaparin  1 mg/kg Subcutaneous Q12H [address] Tianna Green MD      guaiFENesin  200 mg Oral Q4H PRN Tianna Green MD      Lidocaine Viscous HCl  15 mL [facility] 4x Daily PRN Tianna Green MD      melatonin  6 mg Oral HS Tianna Green MD      metoprolol tartrate  25 mg Oral Q12H [address] Sharie Pallas, DO      OLANZapine  5 mg Oral HS Tianna Green MD      ondansetron  4 mg Oral Q6H PRN Tianna Green MD      oxyCODONE  2 5 mg Oral Q4H PRN Tianna Green MD      polyethylene glycol  17 g Oral Daily Tianna Green MD      saccharomyces boulardii  250 mg Oral BID KHANG cSott      senna-docusate sodium  1 tablet Per NG Tube BID Tianna Green MD      tobramycin  1 drop Both Eyes 4x Daily KHANG Scott         Subjective/ HPI: Patient seen and examined  Patients overnight issues or events were reviewed with nursing or staff during rounds or morning huddle session  New or overnight issues include the following:     Patient states he is doing well  He denies any current complaints  ROS:   A 10 point ROS was performed; negative except as noted above  Imaging:     XR chest pa & lateral   Final Result by Caity Bustos MD (08/01 3410)      Status post removal of lines, catheters and tubes  No pneumothorax        Resolved opacity in the left lung with stable perihilar scarring and postsurgical changes of the left upper lobe  Workstation performed: [id number]             *Labs /Radiology studies Reviewed  *Medications  reviewed and reconciled as needed  *Please refer to order section for additional ordered labs studies  *Case discussed with primary attending during morning huddle case rounds    Physical Examination:  Vitals:   Vitals:    08/08/20 1345 08/08/20 2044 08/09/20 0512 08/09/20 0859   BP: 127/67 124/73 128/86 116/72   BP Location: Left arm Left arm Left arm Left arm   Pulse: 83 98 79 88   Resp: 18 18 20    Temp: 97 6 °F (36 4 °C) 98 3 °F (36 8 °C) 98 1 °F (36 7 °C)    TempSrc: Oral Oral Oral    SpO2: 99% 100% 96%    Weight:       Height:           General Appearance: no distress, conversive  HEENT: PERRLA, conjuctiva normal; oropharynx clear; mucous membranes moist;   Neck:  Supple, no lymphadenopathy or thyromegaly  Lungs: CTA, normal respiratory effort, no retractions, expiratory effort normal  CV: regular rate and rhythm , PMI normal   ABD: soft non tender, no masses , no hepatic or splenomegaly  EXT: DP pulses intact, no lymphadenopathy, no edema  Skin: normal turgor, normal texture, no rash  Psych: affect normal, mood normal  Neuro: AAOx3      The above physical exam was reviewed and updated as determined by my evaluation of the patient on 8/9/2020  Invasive Devices     None                    VTE Pharmacologic Prophylaxis: Enoxaparin  Code Status: Level 1 - Full Code  Current Length of Stay: 10 day(s)      Total time spent:  30 minutes with more than 50% spent counseling/coordinating care  Counseling includes discussion with patient re: progress  and discussion with patient of his/her current medical state/information  Coordination of patient's care was performed in conjunction with primary service  Time invested included review of patient's labs, vitals, and management of their comorbidities with continued monitoring   In addition, this patient was discussed with medical team including physician and advanced extenders  The care of the patient was extensively discussed and appropriate treatment plan was formulated unique for this patient  ** Please Note:  voice to text software may have been used in the creation of this document   Although proof errors in transcription or interpretation are a potential of such software**

## 2020-08-09 NOTE — PROGRESS NOTES
08/09/20 0800   Pain Assessment   Pain Assessment Tool 0-10   Pain Score No Pain   Patient's Stated Pain Goal No pain   Restrictions/Precautions   Precautions Bed/chair alarms;Cognitive; Fall Risk;Limb alert;Supervision on toilet/commode   Weight Bearing Restrictions No   ROM Restrictions No   Cognition   Overall Cognitive Status Impaired   Subjective   Subjective Pt found supine in bed, had no c/o of pain, and agreeable to therapy  Pt cont to report feeling of "off" and "uneasy" during mobility activities      Sit to Lying   Type of Assistance Needed Independent   Amount of Physical Assistance Provided No physical assistance   Sit to Lying CARE Score 6   Lying to Sitting on Side of Bed   Type of Assistance Needed Independent   Amount of Physical Assistance Provided No physical assistance   Lying to Sitting on Side of Bed CARE Score 6   Sit to Stand   Type of Assistance Needed Supervision   Amount of Physical Assistance Provided No physical assistance   Comment no AD   Sit to Stand CARE Score 4   Bed-Chair Transfer   Type of Assistance Needed Supervision   Amount of Physical Assistance Provided No physical assistance   Comment no AD   Chair/Bed-to-Chair Transfer CARE Score 4   Walk 10 Feet   Type of Assistance Needed Supervision   Amount of Physical Assistance Provided No physical assistance   Walk 10 Feet CARE Score 4   Walk 50 Feet with Two Turns   Type of Assistance Needed Supervision   Amount of Physical Assistance Provided No physical assistance   Walk 50 Feet with Two Turns CARE Score 4   Walk 150 Feet   Type of Assistance Needed Supervision   Amount of Physical Assistance Provided No physical assistance   Comment 300'x1, no AD, VC for turns and maintaining midline orientation as pt cont to path deviate R>L   Walk 150 Feet CARE Score 4   Walking 10 Feet on Uneven Surfaces   Type of Assistance Needed Supervision   Amount of Physical Assistance Provided No physical assistance   Comment gym ramp, CS   Walking 10 Feet on Uneven Surfaces CARE Score 4   Ambulation   Does the patient walk? 2  Yes   Wheelchair mobility   Does the patient use a wheelchair? 0  No   Curb or Single Stair   Style negotiated Curb   Type of Assistance Needed Supervision   Amount of Physical Assistance Provided No physical assistance   Comment CS on 8'' curb, VC for foot placement   1 Step (Curb) CARE Score 4   4 Steps   Type of Assistance Needed Incidental touching;Verbal cues   Amount of Physical Assistance Provided No physical assistance   Comment 6'' stair  x6 steps, no HR as pt does not have HR to enter home, CGx2 steps CS x4, VC for foot placement   4 Steps CARE Score 4   Therapeutic Interventions   Neuromuscular Re-Education 300'x1 dual task ambulation: head turns, gait speed variations with emphasis on righting reaction, CS  25'x2 obstacle course (4 bolsters, 2 balance discs), CG-CS  Rest break in between activities   Assessment   Treatment Assessment Pt engaged in 30 min skilled PT session focusing on functional mobilities with emphasis on NPP  Pt dem better ability to maintain midline with long distance ambulation however cont to path deviate R>L which is increased when making turns  During obstacle course as well as stair training pt did not have any mis-steps and dem increased balance and safety awareness taking his time through the task  Pt required rest breaks in between activities due to reported fatigue and SOB, SpO2 remained above 93% t/o therapy  Pt to cont skilled PT focusing on functional mobility training with emphasis on balance and safety awareness as well as endurance training to max his overall independence  Family/Caregiver Present no   Problem List Decreased endurance; Impaired balance;Decreased coordination;Decreased safety awareness; Impaired judgement;Decreased cognition   Barriers to Discharge Inaccessible home environment;Decreased caregiver support   PT Barriers   Functional Limitation Car transfers; Ramp negotiation;Stair negotiation;Standing;Transfers; Walking   Plan   Treatment/Interventions Functional transfer training;LE strengthening/ROM; Therapeutic exercise; Endurance training;Bed mobility;Gait training   Recommendation   PT Discharge Recommendation Home with skilled therapy  (OP PT)   PT - OK to Discharge No   PT Therapy Minutes   PT Time In 0800   PT Time Out 0830   PT Total Time (minutes) 30   PT Mode of treatment - Individual (minutes) 30   PT Mode of treatment - Concurrent (minutes) 0   PT Mode of treatment - Group (minutes) 0   PT Mode of treatment - Co-treat (minutes) 0   PT Mode of Treatment - Total time(minutes) 30 minutes   PT Cumulative Minutes 710

## 2020-08-09 NOTE — RESPIRATORY THERAPY NOTE
RT Protocol Note  Denzel Angelo 79 y o  male MRN: [id number]  Unit/Bed#: -01 Encounter: [id number]    Assessment    Principal Problem:    Malignant neoplasm of upper lobe of left lung (HCC)  Active Problems:    Acute deep vein thrombosis (DVT) of axillary vein of right upper extremity (HCC)    Acute deep vein thrombosis (DVT) of brachial vein of right upper extremity (HCC)    UTI (urinary tract infection)    History of TBI (traumatic brain injury) ([facility] [address] )    Conjunctivitis      Home Pulmonary Medications:  none       Past Medical History:   Diagnosis Date    Alcohol abuse 3/27/2020    Chest pain     Community acquired pneumonia 3/27/2020    Hypertension     Left upper lobe pulmonary nodule     Malignant neoplasm of upper lobe of left lung ([facility] ) 5/7/2020     Social History     Socioeconomic History    Marital status: Single     Spouse name: None    Number of children: None    Years of education: None    Highest education level: None   Occupational History    None   Social Needs    Financial resource strain: None    Food insecurity     Worry: None     Inability: None    Transportation needs     Medical: None     Non-medical: None   Tobacco Use    Smoking status: Former Smoker     Packs/day: 1 00     Years: 55 00     Pack years: 55 00     Types: Cigarettes    Smokeless tobacco: Never Used   Substance and Sexual Activity    Alcohol use: Not Currently     Frequency: 4 or more times a week     Comment: "we cant tell how much"  per pt daughter     Drug use: Not Currently    Sexual activity: None   Lifestyle    Physical activity     Days per week: None     Minutes per session: None    Stress: None   Relationships    Social connections     Talks on phone: None     Gets together: None     Attends Yazidi service: None     Active member of club or organization: None     Attends meetings of clubs or organizations: None     Relationship status: None    Intimate partner violence     Fear of current or ex partner: None     Emotionally abused: None     Physically abused: None     Forced sexual activity: None   Other Topics Concern    None   Social History Narrative    None       Subjective         Objective    Physical Exam:   Assessment Type: (P) Assess only  General Appearance: (P) Awake  Respiratory Pattern: (P) Normal  Chest Assessment: (P) Chest expansion symmetrical  Bilateral Breath Sounds: (P) Clear    Vitals:  Blood pressure 127/67, pulse 83, temperature 97 6 °F (36 4 °C), temperature source Oral, resp  rate 18, height 5' 8" (1 727 m), weight 84 3 kg (185 lb 13 6 oz), SpO2 99 %  Imaging and other studies: I have personally reviewed pertinent reports  O2 Device: RA     Plan    Respiratory Plan: (P) Discontinue Protocol        Resp Comments: (P) Pt taken off respiratory protocal  No prn UDNs needed in 5 days  No previous bronchodilator therapy prior to hospitilization  BS have been clear with no sob charted during PT or otherwise  PRN UDN therapy will be D/C'd

## 2020-08-09 NOTE — PROGRESS NOTES
08/09/20 1000   Pain Assessment   Pain Assessment Tool Pain Assessment not indicated - pt denies pain   Pain Score No Pain   Restrictions/Precautions   Precautions Bed/chair alarms;Cognitive; Fall Risk;Supervision on toilet/commode   Comprehension   QI: Comprehension 3  Usually Understands: Understands most conversations, but misses some part/intent of message  Requires cues at times to understand  Comprehension (FIM) 4 - Understands basic info/conversation 75-90% of time   Expression   QI: Expression 3  Exhibits some difficulty with expressing needs and ideas (e g , some words or finishing thoughts) or speech is not clear   Expression (FIM) 5 - Needs help/cues only RARELY (< 10% of the time)   Social Interaction   Social Interaction (FIM) 5 - Interacts appropriately with others 90% of time   Problem Solving   Problem solving (FIM) 4 - Solves basic problems 75-89% of time   Memory   Memory (FIM) 4 - Recognizes/recalls/performs 75-89%   Memory Skills   Orientation Level Oriented X4  (Only partial response for situation though)   Speech/Language/Cognition Assessmetn   Treatment Assessment Pt participated in continued cognitive-linguistic tx beginning w/word deduction task when pt was presented w/Fo3 words auditorilly and accurately ID'd the target item being described for 14/15 sets w/1 self correction and improvement to 15 given verbal cue  Pt recalled a total of 41/45 words across the 15 sets w/improvement to 42/45 given verbal cues  It should be noted that errors occured in first 7 sets only  Pt's session concluded w/auditory STM task w/presentation of paragraphs and f/u ?'s w/recall of 5 5/7 across 2 sets  Pt continues to make jokes at times asking "am I normal?", to which education given re: hypoxia and intense medical stay at hospital which leads to a more prolonged recovery  Pt''s insight into his deficits remains limited and he will benefit from S following D/C for safety   Pt to continue to benefit from skilled ST intervention to maximize overall cognitive-linguistic skills at this time  SLP Therapy Minutes   SLP Time In 1000   SLP Time Out 1030   SLP Total Time (minutes) 30   SLP Mode of treatment - Individual (minutes) 30   SLP Mode of treatment - Concurrent (minutes) 0   SLP Mode of treatment - Group (minutes) 0   SLP Mode of treatment - Co-treat (minutes) 0   SLP Mode of Treatment - Total time(minutes) 30 minutes   SLP Cumulative Minutes 455   Therapy Time missed   Time missed?  No

## 2020-08-10 LAB
ANION GAP SERPL CALCULATED.3IONS-SCNC: 6 MMOL/L (ref 4–13)
BASOPHILS # BLD AUTO: 0.04 THOUSANDS/ΜL (ref 0–0.1)
BASOPHILS NFR BLD AUTO: 1 % (ref 0–1)
BUN SERPL-MCNC: 12 MG/DL (ref 5–25)
CALCIUM SERPL-MCNC: 8.9 MG/DL (ref 8.3–10.1)
CHLORIDE SERPL-SCNC: 109 MMOL/L (ref 100–108)
CO2 SERPL-SCNC: 24 MMOL/L (ref 21–32)
CREAT SERPL-MCNC: 0.72 MG/DL (ref 0.6–1.3)
EOSINOPHIL # BLD AUTO: 0.23 THOUSAND/ΜL (ref 0–0.61)
EOSINOPHIL NFR BLD AUTO: 3 % (ref 0–6)
ERYTHROCYTE [DISTWIDTH] IN BLOOD BY AUTOMATED COUNT: 15.1 % (ref 11.6–15.1)
GFR SERPL CREATININE-BSD FRML MDRD: 95 ML/MIN/1.73SQ M
GLUCOSE P FAST SERPL-MCNC: 80 MG/DL (ref 65–99)
GLUCOSE SERPL-MCNC: 80 MG/DL (ref 65–140)
HCT VFR BLD AUTO: 34.1 % (ref 36.5–49.3)
HGB BLD-MCNC: 10.5 G/DL (ref 12–17)
IMM GRANULOCYTES # BLD AUTO: 0.04 THOUSAND/UL (ref 0–0.2)
IMM GRANULOCYTES NFR BLD AUTO: 1 % (ref 0–2)
LYMPHOCYTES # BLD AUTO: 2.1 THOUSANDS/ΜL (ref 0.6–4.47)
LYMPHOCYTES NFR BLD AUTO: 31 % (ref 14–44)
MCH RBC QN AUTO: 29.7 PG (ref 26.8–34.3)
MCHC RBC AUTO-ENTMCNC: 30.8 G/DL (ref 31.4–37.4)
MCV RBC AUTO: 96 FL (ref 82–98)
MONOCYTES # BLD AUTO: 0.46 THOUSAND/ΜL (ref 0.17–1.22)
MONOCYTES NFR BLD AUTO: 7 % (ref 4–12)
NEUTROPHILS # BLD AUTO: 3.86 THOUSANDS/ΜL (ref 1.85–7.62)
NEUTS SEG NFR BLD AUTO: 57 % (ref 43–75)
NRBC BLD AUTO-RTO: 0 /100 WBCS
PLATELET # BLD AUTO: 397 THOUSANDS/UL (ref 149–390)
PMV BLD AUTO: 9.2 FL (ref 8.9–12.7)
POTASSIUM SERPL-SCNC: 4.1 MMOL/L (ref 3.5–5.3)
RBC # BLD AUTO: 3.54 MILLION/UL (ref 3.88–5.62)
SODIUM SERPL-SCNC: 139 MMOL/L (ref 136–145)
WBC # BLD AUTO: 6.73 THOUSAND/UL (ref 4.31–10.16)

## 2020-08-10 PROCEDURE — 97129 THER IVNTJ 1ST 15 MIN: CPT

## 2020-08-10 PROCEDURE — 80048 BASIC METABOLIC PNL TOTAL CA: CPT | Performed by: NURSE PRACTITIONER

## 2020-08-10 PROCEDURE — 97130 THER IVNTJ EA ADDL 15 MIN: CPT

## 2020-08-10 PROCEDURE — 97112 NEUROMUSCULAR REEDUCATION: CPT

## 2020-08-10 PROCEDURE — 97535 SELF CARE MNGMENT TRAINING: CPT

## 2020-08-10 PROCEDURE — 97530 THERAPEUTIC ACTIVITIES: CPT

## 2020-08-10 PROCEDURE — 99232 SBSQ HOSP IP/OBS MODERATE 35: CPT | Performed by: PHYSICAL MEDICINE & REHABILITATION

## 2020-08-10 PROCEDURE — 97110 THERAPEUTIC EXERCISES: CPT

## 2020-08-10 PROCEDURE — 85025 COMPLETE CBC W/AUTO DIFF WBC: CPT | Performed by: NURSE PRACTITIONER

## 2020-08-10 RX ADMIN — CHLORHEXIDINE GLUCONATE 0.12% ORAL RINSE 15 ML: 1.2 LIQUID ORAL at 21:09

## 2020-08-10 RX ADMIN — MELATONIN 6 MG: at 21:09

## 2020-08-10 RX ADMIN — Medication 250 MG: at 09:53

## 2020-08-10 RX ADMIN — CHLORHEXIDINE GLUCONATE 0.12% ORAL RINSE 15 ML: 1.2 LIQUID ORAL at 09:54

## 2020-08-10 RX ADMIN — OXYCODONE HYDROCHLORIDE 2.5 MG: 5 SOLUTION ORAL at 17:15

## 2020-08-10 RX ADMIN — SENNOSIDES AND DOCUSATE SODIUM 1 TABLET: 8.6; 5 TABLET ORAL at 09:53

## 2020-08-10 RX ADMIN — TOBRAMYCIN 1 DROP: 3 SOLUTION/ DROPS OPHTHALMIC at 15:13

## 2020-08-10 RX ADMIN — ACETAMINOPHEN 650 MG: 325 TABLET, FILM COATED ORAL at 09:57

## 2020-08-10 RX ADMIN — APIXABAN 10 MG: 5 TABLET, FILM COATED ORAL at 17:17

## 2020-08-10 RX ADMIN — OLANZAPINE 5 MG: 5 TABLET, ORALLY DISINTEGRATING ORAL at 21:11

## 2020-08-10 RX ADMIN — POLYETHYLENE GLYCOL 3350 17 G: 17 POWDER, FOR SOLUTION ORAL at 09:53

## 2020-08-10 RX ADMIN — TOBRAMYCIN 1 DROP: 3 SOLUTION/ DROPS OPHTHALMIC at 17:19

## 2020-08-10 RX ADMIN — TOBRAMYCIN 1 DROP: 3 SOLUTION/ DROPS OPHTHALMIC at 21:11

## 2020-08-10 RX ADMIN — Medication 250 MG: at 17:17

## 2020-08-10 RX ADMIN — SENNOSIDES AND DOCUSATE SODIUM 1 TABLET: 8.6; 5 TABLET ORAL at 17:17

## 2020-08-10 RX ADMIN — METOPROLOL TARTRATE 25 MG: 25 TABLET, FILM COATED ORAL at 21:10

## 2020-08-10 RX ADMIN — TOBRAMYCIN 1 DROP: 3 SOLUTION/ DROPS OPHTHALMIC at 09:58

## 2020-08-10 RX ADMIN — METOPROLOL TARTRATE 25 MG: 25 TABLET, FILM COATED ORAL at 09:53

## 2020-08-10 RX ADMIN — ENOXAPARIN SODIUM 90 MG: 100 INJECTION SUBCUTANEOUS at 09:58

## 2020-08-10 NOTE — PROGRESS NOTES
PM&R Progress Note:    HPI: 79 y o  male with left upper lobe lung cancer, s/p left upper lobe lobectomy By Dr Valeria Mackey on 5/35/06, complicated postop course requiring emergent intubation and tracheostomy  Now decannulated  Transferred to [facility] on 7/30/20  ASSESSMENT: Stable, progressing    PLAN:    Rehabilitation   Continue current rehabilitation plan of care to maximize function   Rehab update: Min-modA cognition  [place] bed mobility  [place] transfers, ambulation 300ft RW, bathing, shower transfer, UB dressing, LB dressing   Expected Discharge: D/C 8/12/20 to home with outpatient PT/OT/SLP    Pain   No issues    DVT prophylaxis   Duration of treatment for recent DVT:  Managed on therapeutic Lovenox for now  Will check with pharmacy to see how much Eliquis would be to complete a 3 month duration treatment recommended by vascular    Bladder plan   Continent - completed treatment with Levaquin for UTI  Flomax discontinued and pt is voiding without difficulty  Bowel plan   Continent    * Malignant neoplasm of upper lobe of left lung [facility])  Assessment & Plan  S/p left upper lobe lobectomy with Dr Arevalo on 1/42  Complicated by need for emergent tracheostomy, now decannulated  Monitor trach site for signs of infection   Now on albuterol nebulizer treatments p r n  for wheezing or shortness of breath  No longer requiring oxygen  Stable saturations at rest and with activity    Conjunctivitis  Assessment & Plan  Left greater than right eye  Continue Tobramycin ophthalmic drops for a 7 day course  History of TBI (traumatic brain injury) ([facility] [address] )  Assessment & Plan  With baseline mild cognitive deficit    UTI (urinary tract infection)  Assessment & Plan  Cultures positive for Pseudomonas  Levaquin completed      Acute deep vein thrombosis (DVT) of axillary vein of right upper extremity [facility])  Assessment & Plan  With DVT lower extremity on 6/20 ultrasound, right upper extremity DVT on 6/22 ultrasound  Repeat ultrasounds with resolution   On treatment dose lovenox 1mg/kg q12h, anticipate 3-6 month duration, follow up with PCP outpatient   Check Eliquis pricing  Appreciate IM consultants medical co-management  Labs, medications, and imaging personally reviewed  SUBJECTIVE:  Patient seen face to face today in his room  He states he is doing well  He is looking forward to DC later this week  He denies any current complaints  ROS:  A ten point review of systems was completed 8/10/20 and pertinent positives are listed in subjective section  All other systems reviewed were negative  OBJECTIVE:   /56 (BP Location: Left arm)   Pulse 84   Temp 98 1 °F (36 7 °C) (Oral)   Resp 20   Ht 5' 8" (1 727 m)   Wt 84 3 kg (185 lb 13 6 oz)   SpO2 97%   BMI 28 26 kg/m²     Physical Exam  Vitals signs reviewed  Constitutional:       Appearance: He is well-developed  HENT:      Head: Normocephalic and atraumatic  Eyes:      Pupils: Pupils are equal, round, and reactive to light  Neck:      Musculoskeletal: Normal range of motion and neck supple  Cardiovascular:      Rate and Rhythm: Normal rate  Pulmonary:      Effort: Pulmonary effort is normal       Breath sounds: Normal breath sounds  Abdominal:      General: Bowel sounds are normal       Palpations: Abdomen is soft  Musculoskeletal: Normal range of motion  Skin:     General: Skin is warm and dry  Neurological:      Mental Status: He is alert and oriented to person, place, and time            Lab Results   Component Value Date    WBC 6 73 08/10/2020    HGB 10 5 (L) 08/10/2020    HCT 34 1 (L) 08/10/2020    MCV 96 08/10/2020     (H) 08/10/2020     Lab Results   Component Value Date    SODIUM 139 08/10/2020    K 4 1 08/10/2020     (H) 08/10/2020    CO2 24 08/10/2020    BUN 12 08/10/2020    CREATININE 0 72 08/10/2020    GLUC 80 08/10/2020    CALCIUM 8 9 08/10/2020     Lab Results   Component Value Date    INR 1 12 06/22/2020 INR 1 00 06/05/2020    INR 0 98 04/29/2020    PROTIME 14 0 06/22/2020    PROTIME 12 8 06/05/2020    PROTIME 12 6 04/29/2020           Current Facility-Administered Medications:     acetaminophen (TYLENOL) tablet 650 mg, 650 mg, Oral, Q6H PRN, Uri Gunderson MD, 650 mg at 08/10/20 0957    calcium carbonate (TUMS) chewable tablet 500 mg, 500 mg, Oral, TID PRN, Diogenes Peterson MD    chlorhexidine (PERIDEX) 0 12 % oral rinse 15 mL, 15 mL, [place], Q12H [address], Diogenes Peterson MD, 15 mL at 08/10/20 0954    enoxaparin (LOVENOX) subcutaneous injection 90 mg, 1 mg/kg, Subcutaneous, Q12H [address], Diogenes Peterson MD, 90 mg at 08/10/20 0958    guaiFENesin (ROBITUSSIN) oral solution 200 mg, 200 mg, Oral, Q4H PRN, Diogenes Peterson MD    Lidocaine Viscous HCl (XYLOCAINE) 2 % mucosal solution 15 mL, 15 mL, [place] [employer] [place], 4x Daily PRN, Diogenes Peterson MD    melatonin tablet 6 mg, 6 mg, Oral, HS, Diogenes Peterson MD, 6 mg at 08/09/20 2153    metoprolol tartrate (LOPRESSOR) tablet 25 mg, 25 mg, Oral, Q12H [address], [place], DO, 25 mg at 08/10/20 0953    OLANZapine (ZyPREXA ZYDIS) dispersible tablet 5 mg, 5 mg, Oral, HS, Diogenes Peterson MD, 5 mg at 08/09/20 2226    ondansetron (ZOFRAN-ODT) dispersible tablet 4 mg, 4 mg, Oral, Q6H PRN, Diogenes Peterson MD    oxyCODONE (ROXICODONE) oral solution 2 5 mg, 2 5 mg, Oral, Q4H PRN, Diogenes Peterson MD    polyethylene glycol [facility]) packet 17 g, 17 g, Oral, Daily, Diogenes Peterson MD, 17 g at 08/10/20 5483    saccharomyces boulardii (FLORASTOR) capsule 250 mg, 250 mg, Oral, BID, KHANG Burton, 250 mg at 08/10/20 3917    senna-docusate sodium (SENOKOT S) 8 6-50 mg per tablet 1 tablet, 1 tablet, Per NG Tube, BID, Diogenes Peterson MD, 1 tablet at 08/10/20 0953    tobramycin (TOBREX) 0 3 % ophthalmic solution 1 drop, 1 drop, Both Eyes, 4x Daily, KHANG Burton, 1 drop at 08/10/20 6535    Past Medical History:   Diagnosis Date    Alcohol abuse 3/27/2020    Chest pain     Community acquired pneumonia 3/27/2020    Hypertension     Left upper lobe pulmonary nodule     Malignant neoplasm of upper lobe of left lung ([facility] ) 5/7/2020       Patient Active Problem List    Diagnosis Date Noted    Malignant neoplasm of upper lobe of left lung ([facility] ) 05/07/2020     Priority: High    Conjunctivitis 08/04/2020    UTI (urinary tract infection) 08/03/2020    History of TBI (traumatic brain injury) ([facility] ) 08/03/2020    Encephalopathy 07/12/2020    Hyperactive delirium after surgical procedure 07/12/2020    Anemia 07/12/2020    Thrombocytosis ([facility] ) 07/12/2020    Acute deep vein thrombosis (DVT) of axillary vein of right upper extremity ([facility] ) 06/23/2020    Acute deep vein thrombosis (DVT) of brachial vein of right upper extremity ([place] [address] ) 06/23/2020    Tracheostomy in place [facility]) 06/23/2020    Acute deep vein thrombosis (DVT) of calf muscle vein of left lower extremity ([facility] ) 06/23/2020    Acute respiratory failure with hypoxia ([place] [address] ) 06/21/2020    Tobacco abuse 05/07/2020    Community acquired pneumonia 03/27/2020    Abnormal computed tomography angiography (CTA) 03/27/2020    Alcohol abuse 03/27/2020          Rohini Nunez PA-C    Total time spent:  30 minutes with more than 50% spent counseling/coordinating care  Counseling includes discussion with patient re: progress and discussion with patient of his/her current medical state/information  Coordination of patient's care was performed in conjunction with consulting services  Time invested included review of patient's labs, vitals, and management of their comorbidities with continued monitoring  The care of the patient was extensively discussed and appropriate treatment plan was formulated unique for this patient  ** Please Note:  voice to text software may have been used in the creation of this document   Although proof errors in transcription or interpretation are a potential of such software**

## 2020-08-10 NOTE — PLAN OF CARE
Problem: Potential for Falls  Goal: Patient will remain free of falls  Description: INTERVENTIONS:  - Assess patient frequently for physical needs  -  Identify cognitive and physical deficits and behaviors that affect risk of falls    -  [place] fall precautions as indicated by assessment   - Educate patient/family on patient safety including physical limitations  - Instruct patient to call for assistance with activity based on assessment  - Modify environment to reduce risk of injury  - Consider OT/PT consult to assist with strengthening/mobility  Outcome: Progressing     Problem: Prexisting or High Potential for Compromised Skin Integrity  Goal: Skin integrity is maintained or improved  Description: INTERVENTIONS:  - Identify patients at risk for skin breakdown  - Assess and monitor skin integrity  - Assess and monitor nutrition and hydration status  - Monitor labs   - Assess for incontinence   - Turn and reposition patient  - Assist with mobility/ambulation  - Relieve pressure over bony prominences  - Avoid friction and shearing  - Provide appropriate hygiene as needed including keeping skin clean and dry  - Evaluate need for skin moisturizer/barrier cream  - Collaborate with interdisciplinary team   - Patient/family teaching  - Consider wound care consult   Outcome: Progressing     Problem: PAIN - ADULT  Goal: Verbalizes/displays adequate comfort level or baseline comfort level  Description: Interventions:  - Encourage patient to monitor pain and request assistance  - Assess pain using appropriate pain scale  - Administer analgesics based on type and severity of pain and evaluate response  - Implement non-pharmacological measures as appropriate and evaluate response  - Consider cultural and social influences on pain and pain management  - Notify physician/advanced practitioner if interventions unsuccessful or patient reports new pain  Outcome: Progressing     Problem: INFECTION - ADULT  Goal: Absence or prevention of progression during hospitalization  Description: INTERVENTIONS:  - Assess and monitor for signs and symptoms of infection  - Monitor lab/diagnostic results  - Monitor all insertion sites, i e  indwelling lines, tubes, and drains  - Monitor endotracheal if appropriate and nasal secretions for changes in amount and color  - [place] appropriate cooling/warming therapies per order  - Administer medications as ordered  - Instruct and encourage patient and family to use good hand hygiene technique  - Identify and instruct in appropriate isolation precautions for identified infection/condition  Outcome: Progressing     Problem: SAFETY ADULT  Goal: Maintain or return to baseline ADL function  Description: INTERVENTIONS:  -  Assess patient's ability to carry out ADLs; assess patient's baseline for ADL function and identify physical deficits which impact ability to perform ADLs (bathing, care of mouth/teeth, toileting, grooming, dressing, etc )  - Assess/evaluate cause of self-care deficits   - Assess range of motion  - Assess patient's mobility; develop plan if impaired  - Assess patient's need for assistive devices and provide as appropriate  - Encourage maximum independence but intervene and supervise when necessary  - Involve family in performance of ADLs  - Assess for home care needs following discharge   - Consider OT consult to assist with ADL evaluation and planning for discharge  - Provide patient education as appropriate  Outcome: Progressing  Goal: Maintain or return mobility status to optimal level  Description: INTERVENTIONS:  - Assess patient's baseline mobility status (ambulation, transfers, stairs, etc )    - Identify cognitive and physical deficits and behaviors that affect mobility  - Identify mobility aids required to assist with transfers and/or ambulation (gait belt, sit-to-stand, lift, walker, cane, etc )  - [place] fall precautions as indicated by assessment  - Record patient progress and toleration of activity level on Mobility SBAR; progress patient to next Phase/Stage  - Instruct patient to call for assistance with activity based on assessment  - Consider rehabilitation consult to assist with strengthening/weightbearing, etc   Outcome: Progressing     Problem: DISCHARGE PLANNING  Goal: Discharge to home or other facility with appropriate resources  Description: INTERVENTIONS:  - Identify barriers to discharge w/patient and caregiver  - Arrange for needed discharge resources and transportation as appropriate  - Identify discharge learning needs (meds, wound care, etc )  - Arrange for interpretive services to assist at discharge as needed  - Refer to Case Management Department for coordinating discharge planning if the patient needs post-hospital services based on physician/advanced practitioner order or complex needs related to functional status, cognitive ability, or social support system  Outcome: Progressing     Problem: Knowledge Deficit  Goal: Patient/family/caregiver demonstrates understanding of disease process, treatment plan, medications, and discharge instructions  Description: Complete learning assessment and assess knowledge base    Interventions:  - Provide teaching at level of understanding  - Provide teaching via preferred learning methods  Outcome: Progressing     Problem: SKIN/TISSUE INTEGRITY - ADULT  Goal: Skin integrity remains intact  Description: INTERVENTIONS  - Identify patients at risk for skin breakdown  - Assess and monitor skin integrity  - Assess and monitor nutrition and hydration status  - Monitor labs (i e  albumin)  - Assess for incontinence   - Turn and reposition patient  - Assist with mobility/ambulation  - Relieve pressure over bony prominences  - Avoid friction and shearing  - Provide appropriate hygiene as needed including keeping skin clean and dry  - Evaluate need for skin moisturizer/barrier cream  - Collaborate with interdisciplinary team (i e  Nutrition, Rehabilitation, etc )   - Patient/family teaching  Outcome: Progressing     Problem: MUSCULOSKELETAL - ADULT  Goal: Maintain or return mobility to safest level of function  Description: INTERVENTIONS:  - Assess patient's ability to carry out ADLs; assess patient's baseline for ADL function and identify physical deficits which impact ability to perform ADLs (bathing, care of mouth/teeth, toileting, grooming, dressing, etc )  - Assess/evaluate cause of self-care deficits   - Assess range of motion  - Assess patient's mobility  - Assess patient's need for assistive devices and provide as appropriate  - Encourage maximum independence but intervene and supervise when necessary  - Involve family in performance of ADLs  - Assess for home care needs following discharge   - Consider OT consult to assist with ADL evaluation and planning for discharge  - Provide patient education as appropriate  Outcome: Progressing  Goal: Maintain proper alignment of affected body part  Description: INTERVENTIONS:  - Support, maintain and protect limb and body alignment  - Provide patient/ family with appropriate education  Outcome: Progressing     Problem: PAIN - ADULT  Goal: Verbalizes/displays adequate comfort level or baseline comfort level  Description: Interventions:  - Encourage patient to monitor pain and request assistance  - Assess pain using appropriate pain scale  - Administer analgesics based on type and severity of pain and evaluate response  - Implement non-pharmacological measures as appropriate and evaluate response  - Consider cultural and social influences on pain and pain management  - Notify physician/advanced practitioner if interventions unsuccessful or patient reports new pain  Outcome: Progressing     Problem: INFECTION - ADULT  Goal: Absence or prevention of progression during hospitalization  Description: INTERVENTIONS:  - Assess and monitor for signs and symptoms of infection  - Monitor lab/diagnostic results  - Monitor all insertion sites, i e  indwelling lines, tubes, and drains  - Monitor endotracheal if appropriate and nasal secretions for changes in amount and color  - [place] appropriate cooling/warming therapies per order  - Administer medications as ordered  - Instruct and encourage patient and family to use good hand hygiene technique  - Identify and instruct in appropriate isolation precautions for identified infection/condition  Outcome: Progressing     Problem: SAFETY ADULT  Goal: Patient will remain free of falls  Description: INTERVENTIONS:  - Assess patient frequently for physical needs  -  Identify cognitive and physical deficits and behaviors that affect risk of falls    -  [place] fall precautions as indicated by assessment   - Educate patient/family on patient safety including physical limitations  - Instruct patient to call for assistance with activity based on assessment  - Modify environment to reduce risk of injury  - Consider OT/PT consult to assist with strengthening/mobility  Outcome: Progressing  Goal: Maintain or return to baseline ADL function  Description: INTERVENTIONS:  -  Assess patient's ability to carry out ADLs; assess patient's baseline for ADL function and identify physical deficits which impact ability to perform ADLs (bathing, care of mouth/teeth, toileting, grooming, dressing, etc )  - Assess/evaluate cause of self-care deficits   - Assess range of motion  - Assess patient's mobility; develop plan if impaired  - Assess patient's need for assistive devices and provide as appropriate  - Encourage maximum independence but intervene and supervise when necessary  - Involve family in performance of ADLs  - Assess for home care needs following discharge   - Consider OT consult to assist with ADL evaluation and planning for discharge  - Provide patient education as appropriate  Outcome: Progressing  Goal: Maintain or return mobility status to optimal level  Description: INTERVENTIONS:  - Assess patient's baseline mobility status (ambulation, transfers, stairs, etc )    - Identify cognitive and physical deficits and behaviors that affect mobility  - Identify mobility aids required to assist with transfers and/or ambulation (gait belt, sit-to-stand, lift, walker, cane, etc )  - [place] fall precautions as indicated by assessment  - Record patient progress and toleration of activity level on Mobility SBAR; progress patient to next Phase/Stage  - Instruct patient to call for assistance with activity based on assessment  - Consider rehabilitation consult to assist with strengthening/weightbearing, etc   Outcome: Progressing     Problem: DISCHARGE PLANNING  Goal: Discharge to home or other facility with appropriate resources  Description: INTERVENTIONS:  - Identify barriers to discharge w/patient and caregiver  - Arrange for needed discharge resources and transportation as appropriate  - Identify discharge learning needs (meds, wound care, etc )  - Arrange for interpretive services to assist at discharge as needed  - Refer to Case Management Department for coordinating discharge planning if the patient needs post-hospital services based on physician/advanced practitioner order or complex needs related to functional status, cognitive ability, or social support system  Outcome: Progressing

## 2020-08-10 NOTE — PROGRESS NOTES
Occupational Therapy Treatment Note     08/10/20 1000   Pain Assessment   Pain Assessment Tool Pain Assessment not indicated - pt denies pain   Restrictions/Precautions   Precautions Bed/chair alarms;Cognitive; Fall Risk;Supervision on toilet/commode   Lifestyle   Autonomy "I don't know why my heart rate getting so high is such a big deal"   Oral Hygiene   Type of Assistance Needed Supervision   Amount of Physical Assistance Provided No physical assistance   Comment pt was seated prior to starting activity, OT told him he could complete either sitting or standing  Pt lakshmi'chavo RAYMUNDO insight by sitting during task until standing to spit in sink, as pt was fatigued after shower and dressing   Oral Hygiene CARE Score 4   Grooming   Able To Comb/Brush Hair;Wash/Dry Face;Brush/Clean Teeth;Wash/Dry Hands   Limitation Noted In Safety   Findings in stance at sink   Shower/Bathe Self   Type of Assistance Needed Supervision; Adaptive equipment   Amount of Physical Assistance Provided No physical assistance   Comment OT provided direct instructions prior to shower to sit for duration except for bottom hygiene, as pt had previously stood throughout shower  OT educated again on recommendation for sitting 2* deficits in balance and endurance  Pt carried over instruction to notify OT when planning to stand, however OT still provided direct supervision throughout shower as pt is impulsive and has STM deficits  Pt stood w/grab bars for bottom hygiene  Pt was SOB after shower, OT reiterated sitting as energy conservation strategy and importance of breaks   Shower/Bathe Self CARE Score 4   Bathing   Assessed Bath Style Shower   Anticipated D/C Bath Style Shower   Tub/Shower Transfer   Limitations Noted In Balance; Safety   Adaptive Equipment Grab Bars;Seat with Back   Findings supervision level   Upper Body Dressing   Type of Assistance Needed Supervision   Amount of Physical Assistance Provided No physical assistance   Comment seated   Upper Body Dressing CARE Score 4   Lower Body Dressing   Type of Assistance Needed Supervision   Amount of Physical Assistance Provided No physical assistance   Comment stance for CM, seated to thread   Lower Body Dressing CARE Score 4   Putting On/Taking Off Footwear   Type of Assistance Needed Supervision   Amount of Physical Assistance Provided No physical assistance   Putting On/Taking Off Footwear CARE Score 4   Lying to Sitting on Side of Bed   Type of Assistance Needed Supervision   Amount of Physical Assistance Provided No physical assistance   Lying to Sitting on Side of Bed CARE Score 4   Sit to Stand   Type of Assistance Needed Supervision   Amount of Physical Assistance Provided No physical assistance   Sit to Stand CARE Score 4   Bed-Chair Transfer   Type of Assistance Needed Supervision   Amount of Physical Assistance Provided No physical assistance   Comment no AD   Chair/Bed-to-Chair Transfer CARE Score 4   Transfer Bed/Chair/Wheelchair   Limitations Noted In Balance; Endurance;Problem Solving; Sequencing   Toileting Hygiene   Type of Assistance Needed Supervision   Amount of Physical Assistance Provided No physical assistance   Toileting Hygiene CARE Score 4   Toilet Transfer   Type of Assistance Needed Supervision   Amount of Physical Assistance Provided No physical assistance   Toilet Transfer CARE Score 4   Toilet Transfer   Surface Assessed Standard Toilet   Transfer Technique Standard   Cognition   Overall Cognitive Status Impaired   Arousal/Participation Alert; Cooperative   Attention Attends with cues to redirect   Orientation Level Oriented X4   Memory Decreased short term memory;Decreased recall of recent events   Following Commands Follows one step commands with increased time or repetition   Comments 2* pt SOB after ADL routine, completed seated cognitive activities to address problem solving   Pt accurately answered 7/8 on recipe problem solving wkst and 9/10 on scheduling wkst  Incorrect questions req VC to search/read wkst more carefully  Pt req inc time for processing and executive functioning skills throughout task  Pt reported 'this takes some work, you really gotta focus on this'  Activity Tolerance   Activity Tolerance Patient tolerated treatment well   Assessment   Treatment Assessment Pt engaged in skilled OT session focused on ADL routine and problem solving activities  Pt fatigued after ADL routine, took vitals after its duration, all seated: O2 98%, hr 115  After a 5 min rest break, O2 99%, hr 110  30 min later, at end of session, O2 99% and hr 95  Pt req direct VC throughout ADL routine to 'sit and count to 20' as pt fatigues  OT educated on why pt should work to conserve energy to keep heart rate down, as pt did not see the 'big deal' of having a high heart rate  Pt demo some inc in insight during today's session, initiating two breaks on his own and noting feeling a little tired  Tomorrow plan to complete pt d/c ADL in shower w/shower chair with back and grab bars  If extra time and pt is not fatigued, pt appropriate for endurance/activity tolerance training or IADLs at supervision level  Recommend continued cognitive outpatient therapy and 's evaluation when medically appropriate, as pt's dtr reporting while pt had cognitive deficits at baseline, cognition is currently worse than normal  PT Cypres spoke with pt's daughter this AM regarding recommendation for 24/7 supervision and supervision for all mobility 2* pt's decreased endurance as well cognitive deficits  Prognosis Fair   Problem List Decreased endurance; Impaired balance;Decreased coordination;Decreased safety awareness; Impaired judgement;Decreased cognition   Plan   Treatment/Interventions ADL retraining;Functional transfer training; Therapeutic exercise; Endurance training;Cognitive reorientation;Patient/family training;Equipment eval/education   Progress Progressing toward goals   Recommendation   OT Discharge Recommendation Home with skilled therapy  (outpt OT)   OT Therapy Minutes   OT Time In 1000   OT Time Out 1130   OT Total Time (minutes) 90   OT Mode of treatment - Individual (minutes) 65   OT Mode of treatment - Concurrent (minutes) 25   OT Mode of treatment - Group (minutes) 0   OT Mode of treatment - Co-treat (minutes) 0   OT Mode of Treatment - Total time(minutes) 90 minutes   OT Cumulative Minutes 840   Therapy Time missed   Time missed?  No

## 2020-08-10 NOTE — PROGRESS NOTES
08/10/20 0830   Pain Assessment   Pain Assessment Tool Pain Assessment not indicated - pt denies pain   Pain Score No Pain   Restrictions/Precautions   Precautions Bed/chair alarms;Cognitive; Fall Risk;Supervision on toilet/commode   Comprehension   Comprehension (FIM) 4 - Understands basic info/conversation 75-90% of time   Expression   Expression (FIM) 5 - Needs help/cues only RARELY (< 10% of the time)   Social Interaction   Social Interaction (FIM) 5 - Interacts appropriately with others 90% of time   Problem Solving   Problem solving (FIM) 4 - Solves basic problems 75-89% of time   Memory   Memory (FIM) 4 - Recognizes/recalls/performs 75-89%   Speech/Language/Cognition Assessmetn   Treatment Assessment Pt seen for skilled cognitive linguistic communication session  Pt alert and interactive- seen OOB upright in chair in room  Engaged in simple rapport regarding current situation and LTM recall of events- pt with adequate sequencing and recall  Pt completed the following tasks- simple word deduction- 15/15 benefitting from extended time for completion  Naming objects by attributes abstract 14/20 increasing with verbal and semantic cues  Pt complete check ledger with 10/10 acc however noted with decreased organization with missing/skipping lines  Pt stated that is at his baseline where he would skip lines and not keep all the wording appropriately spaced  Overall math completed in his head was correct with no errors noted  Pt assisted to bathroom- pt verbalized to therapist that he had to go and immediate stood up from chair, alarm going off  Pt is CGA for walking to bathroom and noted impulsive and not waiting for therapist to clear table tray and chair out of way prior to him walking  Pt completed toileting task independently, walked back to chair with CGA/S assistance   Pt overall is improving with skilled speech therapy services and will cont to benefit to maximize his overall cognitive linguistic communication abilities at this time  SLP Therapy Minutes   SLP Time In 0830   SLP Time Out 0930   SLP Total Time (minutes) 60   SLP Mode of treatment - Individual (minutes) 60   SLP Mode of treatment - Concurrent (minutes) 0   SLP Mode of treatment - Group (minutes) 0   SLP Mode of treatment - Co-treat (minutes) 0   SLP Mode of Treatment - Total time(minutes) 60 minutes   SLP Cumulative Minutes 515   Therapy Time missed   Time missed?  No

## 2020-08-10 NOTE — PROGRESS NOTES
08/10/20 1330   Pain Assessment   Pain Assessment Tool 0-10   Pain Score No Pain   Patient's Stated Pain Goal No pain   Restrictions/Precautions   Precautions Bed/chair alarms;Cognitive; Fall Risk;Supervision on toilet/commode;Aspiration;Limb alert   Weight Bearing Restrictions No   ROM Restrictions No   Cognition   Overall Cognitive Status Impaired   Subjective   Subjective Pt found in recliner, had no c/o of pain however reported "heaviness" of bilat LE   Pt agreeable to therapy   Roll Left and Right   Type of Assistance Needed Independent   Amount of Physical Assistance Provided No physical assistance   Comment flat bed, no use of BR   Roll Left and Right CARE Score 6   Sit to Lying   Type of Assistance Needed Independent   Amount of Physical Assistance Provided No physical assistance   Comment flat bed, no use of BR   Sit to Lying CARE Score 6   Lying to Sitting on Side of Bed   Type of Assistance Needed Independent   Amount of Physical Assistance Provided No physical assistance   Comment flat bed, no use of BR   Lying to Sitting on Side of Bed CARE Score 6   Sit to Stand   Type of Assistance Needed Supervision   Amount of Physical Assistance Provided No physical assistance   Comment no AD   Sit to Stand CARE Score 4   Bed-Chair Transfer   Type of Assistance Needed Supervision   Amount of Physical Assistance Provided No physical assistance   Comment no AD   Chair/Bed-to-Chair Transfer CARE Score 4   Car Transfer   Type of Assistance Needed Supervision   Amount of Physical Assistance Provided No physical assistance   Comment no AD, proper technique and maintain safety   Car Transfer CARE Score 4   Walk 10 Feet   Type of Assistance Needed Supervision   Amount of Physical Assistance Provided No physical assistance   Comment no AD   Walk 10 Feet CARE Score 4   Walk 50 Feet with Two Turns   Type of Assistance Needed Supervision   Amount of Physical Assistance Provided No physical assistance   Comment 110'x1, no AD Walk 50 Feet with Two Turns CARE Score 4   Walk 150 Feet   Type of Assistance Needed Supervision   Amount of Physical Assistance Provided No physical assistance   Comment 150'x2, no AD   Walk 150 Feet CARE Score 4   Walking 10 Feet on Uneven Surfaces   Type of Assistance Needed Supervision   Amount of Physical Assistance Provided No physical assistance   Comment LEILA, lindsey ramp   Walking 10 Feet on Uneven Surfaces CARE Score 4   Ambulation   Does the patient walk? 2  Yes   Wheelchair mobility   Does the patient use a wheelchair? 0   No   Curb or Single Stair   Style negotiated Single stair   Type of Assistance Needed Supervision;Verbal cues   Amount of Physical Assistance Provided No physical assistance   Comment CS 6''  step, no use of HR, non-reciprocal, occ VC for foot placement to ensure safety   1 Step (Curb) CARE Score 4   4 Steps   Type of Assistance Needed Supervision;Verbal cues   Amount of Physical Assistance Provided No physical assistance   Comment CS 6''  step x6 steps, no use of HR, non-reciprocal, occ VC for foot placement to ensure safety   4 Steps CARE Score 4   12 Steps   Reason if not Attempted Activity not applicable   12 Steps CARE Score 9   Picking Up Object   Type of Assistance Needed Supervision   Amount of Physical Assistance Provided No physical assistance   Comment CS, pt able to bend over and picked up pen off floor w/o use of reacher   Picking Up Object CARE Score 4   Therapeutic Interventions   Neuromuscular Re-Education Ambulation with dual task: 300'x1 with head turns various directions and change of speed, 150'x1 ball throws, 40'x1 backward walking with ball throws, required rest break in between activity dem LE fatigue and SOB, Sp02 remained above 95%   Other TUG test: 12 sec , DGI: 19/24 (predictive of falls), pt required x4 rest breaks during DGI assessment, Sp02 dropped to 86 and within one minute returned to above 95%   Equipment Use   NuStep 10 min, lvl 2, SPM >50 Other Comments   Comments Pt returned to recliner with all needs in reach   Assessment   Treatment Assessment Pt engaged in skilled PT focusing on functional mobilities with emphasis on NPP via dual task balance training  At start of session pt c/o of heaviness in bilat LEs however reported it dissipated as session continued  Pt is progressing well as he is independent with bed mobility and requires S with OOB mobilities without support of AD  However during ambulation pt cont to dem mild path deviation with increased time to regain midline  Pt cont to have decreased activity tolerance as he required multiple rest breaks t/o session dem SOB and LE fatigue, SpO2 dropped to 86% for one min and returned above 95% for remainder of session  Following DGI assessment, pt is currently at risk for falls and PT educated pt to take his time when performing mobilities to ensure overall safety, and pt verbalized understanding  PT also educated pt at this time our recommendation is for S with mobilities in order to maintain pt's safety when d/c home  PT Debbie discussed recommendation for S assist with all mobilities with pt's dtr Guerita Bailey and Guerita Bailey verbalized understanding and confirmed availability of family to provide S assist  Pt to cont skilled PT focusing on functional mobilities incorporating NPP, endurance, and overall safety awareness to max independence  Family/Caregiver Present no   Problem List Decreased endurance; Impaired balance;Decreased cognition;Decreased coordination;Decreased safety awareness   Barriers to Discharge Decreased caregiver support   PT Barriers   Functional Limitation Car transfers; Ramp negotiation;Transfers;Standing;Stair negotiation; Walking   Plan   Treatment/Interventions Functional transfer training;LE strengthening/ROM; Therapeutic exercise; Endurance training;Bed mobility;Gait training   Progress Progressing toward goals   Recommendation   PT Discharge Recommendation Home with skilled therapy  (OP PT)   PT - OK to Discharge No   PT Therapy Minutes   PT Time In 1330   PT Time Out 1500   PT Total Time (minutes) 90   PT Mode of treatment - Individual (minutes) 90   PT Mode of treatment - Concurrent (minutes) 0   PT Mode of treatment - Group (minutes) 0   PT Mode of treatment - Co-treat (minutes) 0   PT Mode of Treatment - Total time(minutes) 90 minutes   PT Cumulative Minutes 800 Name band;

## 2020-08-10 NOTE — TEAM CONFERENCE
Acute RehabilitationTeam Conference Note  Date: 8/11/2020   Time: 11:34 AM       Patient Name:  Shannon Collier       Medical Record Number: [id number]   YOB: 1950  Sex: Male          Room/Bed:  [facility]5/[facility]5-01  Payor Info:  Payor: Ky Morales / Plan: Franck Grate / Product Type: PPO Commercial /      Admitting Diagnosis: Myopathy [G72 9]   Admit Date/Time:  7/30/2020 11:53 AM  Admission Comments: No comment available     Primary Diagnosis:  Malignant neoplasm of upper lobe of left lung (HCC)  Principal Problem: Malignant neoplasm of upper lobe of left lung [facility])    Patient Active Problem List    Diagnosis Date Noted    Conjunctivitis 08/04/2020    UTI (urinary tract infection) 08/03/2020    History of TBI (traumatic brain injury) ([facility] ) 08/03/2020    Encephalopathy 07/12/2020    Hyperactive delirium after surgical procedure 07/12/2020    Anemia 07/12/2020    Thrombocytosis ([facility] ) 07/12/2020    Acute deep vein thrombosis (DVT) of axillary vein of right upper extremity ([facility] ) 06/23/2020    Acute deep vein thrombosis (DVT) of brachial vein of right upper extremity ([facility] ) 06/23/2020    Tracheostomy in place [facility]) 06/23/2020    Acute deep vein thrombosis (DVT) of calf muscle vein of left lower extremity ([facility] ) 06/23/2020    Acute respiratory failure with hypoxia ([facility] ) 06/21/2020    [facility] of [facility] 05/07/2020    Tobacco abuse 05/07/2020    Community acquired pneumonia 03/27/2020    Abnormal computed tomography angiography (CTA) 03/27/2020    Alcohol abuse 03/27/2020       Physical Therapy:    Weight Bearing Status: Full Weight Bearing  Transfers: Supervision(no AD)  Bed Mobility: Independent  Amulation Distance (ft): 300 feet(Simultaneous filing  User may not have seen previous data )  Ambulation: Supervision  Assistive Device for Ambulation: (no AD)  Number of Stairs: (6-12 dependent on level of fatigue)  Assistive Device for Stairs:  Other(with or without HR)  Stair Assistance: Incidental Touching(CG-CS)  Ramp: Supervision  Assistive Device for Ramp: None  Discharge Recommendations: Home Independently  [address] with[de-identified] Family Support, First Floor Setup, Outpatient Physical Therapy(S assist with mobilities)    8/3/20  Pt admitted to [facility] after L upper lobectomy  Pt with decrease strength, endurance, balance and poor activity tolerance at this time  Pt using RW for overall stability and safety but cont to practice with no AD to improve balance and righting reactions  Pt lives with dtr and son in law at this time  Pt with barriers of stairs and decrease family support during the day  Pt would benefit from cont skilled therapy to improve deficits to progress with functional ind and reduce fall risk with d/c home with family support and outpt therapy services with [facility]  8/10/20   Pt is progressing well as he is independent with bed mobility and requires S with OOB mobilities without support of AD  Current barriers to independence include decreased activity tolerance, path deviation, decrease LE strength, poor safety awareness, and fall risk as measured by DGI  Pt with anticipated d/c 8/12/20 to home with 24hr supervision and OP PT to cont to address above deficits in order to maximize pt's functional mobility and safety  Co-signed by Mahesh Morrissey DPT    Occupational Therapy:  Eating: Independent  Grooming: Supervision  Bathing: Supervision  Bathing: Supervision  Upper Body Dressing: Supervision  Lower Body Dressing: Supervision  Toileting: Supervision  Tub/Shower Transfer: Supervision  Toilet Transfer: Supervision  Cognition: Exceptions to WNL  Cognition: Decreased Executive Functions, Decreased Memory, Decreased Attention, Decreased Safety, Impulsive  Orientation: Person, Place, Situation  Discharge Recommendations: Home with:  [address] with[de-identified] 24 Hour Supervision, Outpatient Occupational Therapy       Pt functioning at overall supervision level   Due to pt's deficits in short-term memory, problem solving, executive functioning, continue to recommend 24/7 supervision upon d/c  Pt's daughter is aware of this recommendation and has been present for training  Pt will benefit from continued cognitive rehab upon d/c as pt/family have goal to increase pt to independent level  After cognitive rehab, pt would benefit from drivers evaluation when medically appropriate  Speech Therapy:  Mode of Communication: Verbal  Cognition: Exceptions to WNL  Cognition: Decreased Memory, Impulsive, Decreased Executive Functions, Decreased Attention, Decreased Comprehension, Decreased Safety  Orientation: Person, Situation, Place  Swallowing: Within Defined Limits  Diet Recommendations: Regular Diet, Thin  Discharge Recommendations: Home with:  [address] with[de-identified] 24 Hour Supervision, 24 Hour Assisteance, Family Support, Outpatient Speech Therapy  Pt currently being followed for cognitive linguistic tx sessions and for a brief time was followed for dysphagia tx sessions  As for swallow function, bedside swallow evaluation completed during bfast as MD requesting follow up evaluation for swallow function  Pt with hx of complicated hospital course to include tracheostomy, which he was decannulated on 7/29/20  Pt seated fully upright and OOB in recliner chair for meal  Pt is currently on a regular diet and thin liquids  Oral mech WFL  Demo ability to self feed but noting more rapid pacing and larger bite sizes as meal progressed  Functional bolus retrieval and lip seal w/o anterior loss  Mastication appeared overall timely to occasionally mildly slower but appeared functional for bolus breakdown w/ adequate bolus formation, timely transfers and full oral clearance-no pocketing or overt oral residual present  Pt occasionally using strategy which was previously recommended of use of secondary swallows w/ solids   Swallow initiation appeared overall timely across all items, occasionally appearing mildly delayed w/ solids yet remained functional  Hyolaryngeal movement present to palpation  Pt elicited throat clear x1 during meal but no further overt s/s penetration/aspiration observed this meal  Pt also elicited cough after meal, however, pt's daughter reports that pt has baseline cough prior to recent admission due to lung CA, therefore, ?ing if baseline or delay cough secondary to PO intake w/ recommendations to continue current diet  However, will recommend pt have FULL supervision for meals to ensure carryover of safe swallow strategies: OOB for all meals, slow rate of intake, small bites/sips, 2 swallows per bite, alternate solids/liquids  Brief f/u was completed to determine pt's ongoing tolerance of diet which pt able to exhibit no increased or overt aspiration sxs during meal, but recs continue for FULL supervision due to cognitive deficits  As for cognitive skills, pt completed formalized cognitive assessment, CLQT+, which overall score was 2 8 out of 4 0 which compared to age matched peers between 65-80 yrs, indicating cognition to be MILDLY impaired overall  Most prominent barriers are decreased ST (recent and remote) recall, decreased executive function skills, including problem solving, sequencing, reasoning, judgement, insight to deficits as well as decreased safety awareness w/ use of WC and RW when engaged in functional mobility tasks  Pt will continue to benefit from SLP services to maximize overall functional independence of cognitive linguistic skills to decrease burden of care for family at time of discharge  Update from week 8/10/2020: Pt was briefly f/u for dysphagia tx sessions, which pt remains tolerating regular diet w/ thin liquids w/o increased or overt aspiration sxs  Due to ability to complete swallow strategies during meals, no further recommendations needed for full supervision w/ meals, to which pt can be distant supervision   Primary focus of sessions have been towards cognitive linguistic skills, which ongoing barriers that present impulsivity, decreased attention, decreased ST and working memory skills, decreased executive function skills which impact pt's overall safety within a variety of settings as well as decreased safety for functional mobility  SLP has provided pt's beverlyrHal, w/ education/training in regards to current limitations including decreased ability to complete financial and medication management tasks which she should take over at discharge as well as educating in regards to having supervision w/ any kitchen tasks, etc  At this time, pt will continue to benefit from SLP services to maximize overall functional independence of cognitive linguistic skills to decrease burden of care on family at time of discharge along w/ continued OP services to continue to reach those goals  Nursing Notes:  Appetite: Good  Diet Type: Regular/House                      Diet Patient/Family Education Complete: Yes                         Type of Wound Patient/Family Education: No(decannulated trach site- healed)  Bladder: Continent     Bladder Patient/Family Education: Yes  Bowel: Continent     Bowel Patient/Family Education: Yes  Pain Location/Orientation: Orientation: Left, Location: Chest(where you took my sutures out)  Pain Score: 0                       Hospital Pain Intervention(s): Medication (See MAR), Rest  Pain Patient/Family Education: Yes  Medication Management/Safety  New Medication: Eliquis  Injectable: Lovenox  Safe Administration: Yes  Medication Patient/Family Education Complete: No(continue through discharge)    Admitted s/p left upper lobe lung cancer, s/p left upper lobe lobectomy ,1/65/12, complicated postop course requiring emergent intubation and tracheostomy  Now decannulated  HX: ETOH abuse, community acquired PNA, HTN, lung cancer, UTI, DVT, TBI, tracheostomy, anemia, tobacco abuse  Pain managed with PRN Tylenol and oxycodone   HTN managed with lopressor  UTI managed with flomax and completed levaquin  Zyprexa and melatonin ordered for sleep  PRN respiratory treatments ordered  Anticoagulation with Eliquis  Alarms required for safety  Skin is intact with exception of a tracheostomy incision s/p decannulation  Continent for bowel and bladder  This week we will encourage independence with ADLs while monitoring labs and vitals and maintaining skin integrity  Continue education on medication management  We will keep the patient free from falls through safe transfers with education and maintain safety precautions  Case Management:     Discharge Planning  Living Arrangements: Children  Support Systems: Children, Family members  Assistance Needed: lives with daughter   Type of Current Residence: Private residence  [address]: No  Pt is participating with therapy and is scheduled to return home w/ family support and outpt therapy on 8/12/2020  Pt and family have been educated on need follow through with contd therapy services  Following to assist w/dc planning needs  Is the patient actively participating in therapies? yes  List any modifications to the treatment plan:     Barriers Interventions   Insight, overall cog Family education, safety education   Non compliant pta with medication Family education   Endurance, activity tolerance Energy conservation education   Balance  Therapy exercises         Is the patient making expected progress toward goals?  yes  List any update or changes to goals:     Medical Goals: Patient will be medically stable for discharge to [facility] upon completion of rehab program and Patient will be able to manage medical conditions and comorbid conditions with medications and follow up upon completion of rehab program    Weekly Team Goals:   Rehab Team Goals  ADL Team Goal: Patient will require supervision with ADLs with least restrictive device upon completion of rehab program  Bowel/Bladder Team Goal: Patient will return to premorbid level for bladder/bowel management upon completion of rehab program  Transfer Team Goal: Patient will require supervision with transfers with least restrictive device upon completion of rehab program  Locomotion Team Goal: Patient will require supervision with locomotion with least restrictive device upon completion of rehab program  Cognitive Team Goal: Patient will require assist for basic cognitive tasks upon completion of rehab program    Discussion: Pt presents with the above barriers  He is functioning at independent level for bed mobility and supervision for ambulation, transfers, stairs, and ADLs  Family training occurred  Recommendations remain for 24/7 supervision and outpt OT, PT, SLP services which [facility]  Anticipated Discharge Date:  8/12/2020  [facility] Team Members Present: The following team members are supervising care for this patient and were present during this Weekly Team Conference      Physician: Dr Raina Dillard MD  : TRACI Quintanilla  Registered Nurse: Miley Miller RN  Physical Therapist: Reggie Guthrie DPT  Occupational Therapist: Swapna Cisneros MS, OTR/L, CBIS  Speech Therapist: Lenita Bernheim, MS, CCC-SLP  Other:

## 2020-08-10 NOTE — PROGRESS NOTES
Internal Medicine Progress Note  Patient: Faisal Hitchcock  Age/sex: 79 y o  male  Medical Record #: [id number]      ASSESSMENT/PLAN: (Interval History)  Faisal Hitchcock is seen and examined and management for following issues:    DOMI adenocarcinoma; s/p bronchoscopy, mediastinoscopy, VATS, left upper lobectomy and mediastinal lymph node dissection 6/10/20  · Back to Thoracic surgery as OP     PEA with multiple rounds of CPR/respiratory arrest; emergent cricothyroidotomy 6/20; tracheostomy 6/22; decannulation 7/29/20  · Stoma closed and healing well   · Continue Albuterol neb prn     hx nicotine abuse with 1ppd x 50 yrs  · quit prior to surgery  · continue prn Albuterol     Sinus tachycardia  · resolved  · Continue Lopressor 25 mg BID  · No changes today  · ECHO 8/2/20 WNL normal EF and no wall motion issues     Fever; UTI >100,000 Pseudomonas; SIRS/sepsis  · Afebrile  · No further sx  · Completed Levaquin      ABLA  · Stable  · will watch and transfuse prn hemoglobin <8 0     DVT  LE and RUE  · currently is on therapeutic Lovenox   · will convert to Eliquis since he has 100% coverage for it  · followup venous dopplers on UE/LE 7/14 and 6/26 respectively showed resolution of DVTs     Urine retention  · on Flomax   · Lucila was d/cd 7/27/20     TBI 2010  · Has memory issues from this incident but unknown details since he cannot remember anything about it     ETOH abuse  · Unknown amount/details     Left eye conjunctivitis  · Woke up 8/4 with itching/burning/tearing of left eye; has slight crusting  · On Tobrex QID x 7 days  · resolved       The above assessment and plan was reviewed and updated as determined by my evaluation of the patient on 8/10/2020      Labs:   Results from last 7 days   Lab Units 08/10/20  0522 08/06/20  0608   WBC Thousand/uL 6 73 5 48   HEMOGLOBIN g/dL 10 5* 9 7*   HEMATOCRIT % 34 1* 32 6*   PLATELETS Thousands/uL 397* 400*     Results from last 7 days   Lab Units 08/10/20  0522 08/06/20  0608   SODIUM mmol/L 139 139   POTASSIUM mmol/L 4 1 4 5   CHLORIDE mmol/L 109* 109*   CO2 mmol/L 24 26   BUN mg/dL 12 12   CREATININE mg/dL 0 72 0 90   CALCIUM mg/dL 8 9 9 2             Results from last 7 days   Lab Units 08/05/20  1420 08/04/20  1447   POC GLUCOSE mg/dl 84 81       Review of Scheduled Meds:  Current Facility-Administered Medications   Medication Dose Route Frequency Provider Last Rate    acetaminophen  650 mg Oral Q6H PRN Daisy Shaw MD      calcium carbonate  500 mg Oral TID PRN Deana Egan MD      chlorhexidine  15 mL [facility] Deana Egan MD      enoxaparin  1 mg/kg Subcutaneous Q12H [facility] Deana Egan MD      guaiFENesin  200 mg Oral Q4H PRN Deana Egan MD      Lidocaine Viscous HCl  15 mL Swish & Spit 4x Daily PRN Deana Egan MD      melatonin  6 mg Oral HS Wileen Landing, MD      metoprolol tartrate  25 mg Oral Q12H [facility] Vásquez [place],       OLANZapine  5 mg Oral HS Wileen Landing, MD      ondansetron  4 mg Oral Q6H PRN Deana Egan MD      oxyCODONE  2 5 mg Oral Q4H PRN Deana Egan MD      polyethylene glycol  17 g Oral Daily Deana Egan MD      saccharomyces boulardii  250 mg Oral BID KHANG Grimm      senna-docusate sodium  1 tablet Per NG Tube BID Deana Egan MD      tobramycin  1 drop Both Eyes 4x Daily KHANG Grimm         Subjective/ HPI: Patients overnight issues or events were reviewed with nursing or staff during rounds or morning huddle session  No new or overnight issues  Offers no complaints  ROS:   A 10 point ROS was performed; negative except as noted above         *Labs /Radiology studies reviewed  *Medications reviewed and reconciled as needed  *Please refer to order section for additional ordered labs studies  *Case discussed with primary attending during morning huddle case rounds      Physical Examination:  Vitals:   Vitals:    08/09/20 1300 08/09/20 2025 08/10/20 0536 08/10/20 0900   BP: 127/81 145/87 140/88 116/56   BP Location: Left arm Left arm Left arm Left arm   Pulse: 80 88 80 84   Resp: 18 18 20    Temp: 97 6 °F (36 4 °C) 98 8 °F (37 1 °C) 98 1 °F (36 7 °C)    TempSrc: Oral Oral Oral    SpO2: 96% 96% 97%    Weight:       Height:           General Appearance: no distress, conversive  HEENT: PERRLA, conjuctiva normal; oropharynx clear; mucous membranes moist   Neck:  Supple, normal ROM, no JVD  Lungs: CTA, normal respiratory effort, no retractions, expiratory effort normal  CV: regular rate and rhythm; no rubs/murmurs/gallops, PMI normal   ABD: soft; ND/NT; +BS  EXT: no edema  Skin: normal turgor, normal texture, no rashes  Psych: affect normal, mood normal  Neuro: AAO      The above physical exam was reviewed and updated as determined by my evaluation of the patient on 8/10/2020  Invasive Devices     None                    VTE Pharmacologic Prophylaxis: Enoxaparin  Code Status: Level 1 - Full Code  Current Length of Stay: 11 day(s)      Total time spent:  30 minutes with more than 50% spent counseling/coordinating care  Counseling includes discussion with patient re: progress  and discussion with patient of his/her current medical state/information  Coordination of patient's care was performed in conjunction with primary service  Time invested included review of patient's labs, vitals, and management of their comorbidities with continued monitoring  In addition, this patient was discussed with medical team including physician and advanced extenders  The care of the patient was extensively discussed and appropriate treatment plan was formulated unique for this patient  ** Please Note:  voice to text software may have been used in the creation of this document   Although proof errors in transcription or interpretation are a potential of such software**

## 2020-08-11 PROCEDURE — 97110 THERAPEUTIC EXERCISES: CPT

## 2020-08-11 PROCEDURE — 97530 THERAPEUTIC ACTIVITIES: CPT

## 2020-08-11 PROCEDURE — 97130 THER IVNTJ EA ADDL 15 MIN: CPT

## 2020-08-11 PROCEDURE — 97535 SELF CARE MNGMENT TRAINING: CPT

## 2020-08-11 PROCEDURE — 99233 SBSQ HOSP IP/OBS HIGH 50: CPT | Performed by: PHYSICAL MEDICINE & REHABILITATION

## 2020-08-11 PROCEDURE — 97112 NEUROMUSCULAR REEDUCATION: CPT

## 2020-08-11 PROCEDURE — 97129 THER IVNTJ 1ST 15 MIN: CPT

## 2020-08-11 RX ADMIN — APIXABAN 10 MG: 5 TABLET, FILM COATED ORAL at 09:23

## 2020-08-11 RX ADMIN — Medication 250 MG: at 09:23

## 2020-08-11 RX ADMIN — CHLORHEXIDINE GLUCONATE 0.12% ORAL RINSE 15 ML: 1.2 LIQUID ORAL at 21:11

## 2020-08-11 RX ADMIN — Medication 250 MG: at 17:39

## 2020-08-11 RX ADMIN — OLANZAPINE 5 MG: 5 TABLET, ORALLY DISINTEGRATING ORAL at 21:11

## 2020-08-11 RX ADMIN — TOBRAMYCIN 1 DROP: 3 SOLUTION/ DROPS OPHTHALMIC at 12:34

## 2020-08-11 RX ADMIN — POLYETHYLENE GLYCOL 3350 17 G: 17 POWDER, FOR SOLUTION ORAL at 09:23

## 2020-08-11 RX ADMIN — METOPROLOL TARTRATE 25 MG: 25 TABLET, FILM COATED ORAL at 21:11

## 2020-08-11 RX ADMIN — METOPROLOL TARTRATE 25 MG: 25 TABLET, FILM COATED ORAL at 09:23

## 2020-08-11 RX ADMIN — MELATONIN 6 MG: at 21:11

## 2020-08-11 RX ADMIN — APIXABAN 10 MG: 5 TABLET, FILM COATED ORAL at 17:39

## 2020-08-11 RX ADMIN — SENNOSIDES AND DOCUSATE SODIUM 1 TABLET: 8.6; 5 TABLET ORAL at 09:23

## 2020-08-11 RX ADMIN — CHLORHEXIDINE GLUCONATE 0.12% ORAL RINSE 15 ML: 1.2 LIQUID ORAL at 12:34

## 2020-08-11 RX ADMIN — TOBRAMYCIN 1 DROP: 3 SOLUTION/ DROPS OPHTHALMIC at 09:23

## 2020-08-11 NOTE — PLAN OF CARE
Problem: Potential for Falls  Goal: Patient will remain free of falls  Description: INTERVENTIONS:  - Assess patient frequently for physical needs  -  Identify cognitive and physical deficits and behaviors that affect risk of falls    -  [place] fall precautions as indicated by assessment   - Educate patient/family on patient safety including physical limitations  - Instruct patient to call for assistance with activity based on assessment  - Modify environment to reduce risk of injury  - Consider OT/PT consult to assist with strengthening/mobility  Outcome: Progressing     Problem: Prexisting or High Potential for Compromised Skin Integrity  Goal: Skin integrity is maintained or improved  Description: INTERVENTIONS:  - Identify patients at risk for skin breakdown  - Assess and monitor skin integrity  - Assess and monitor nutrition and hydration status  - Monitor labs   - Assess for incontinence   - Turn and reposition patient  - Assist with mobility/ambulation  - Relieve pressure over bony prominences  - Avoid friction and shearing  - Provide appropriate hygiene as needed including keeping skin clean and dry  - Evaluate need for skin moisturizer/barrier cream  - Collaborate with interdisciplinary team   - Patient/family teaching  - Consider wound care consult   Outcome: Progressing     Problem: PAIN - ADULT  Goal: Verbalizes/displays adequate comfort level or baseline comfort level  Description: Interventions:  - Encourage patient to monitor pain and request assistance  - Assess pain using appropriate pain scale  - Administer analgesics based on type and severity of pain and evaluate response  - Implement non-pharmacological measures as appropriate and evaluate response  - Consider cultural and social influences on pain and pain management  - Notify physician/advanced practitioner if interventions unsuccessful or patient reports new pain  Outcome: Progressing     Problem: INFECTION - ADULT  Goal: Absence or prevention of progression during hospitalization  Description: INTERVENTIONS:  - Assess and monitor for signs and symptoms of infection  - Monitor lab/diagnostic results  - Monitor all insertion sites, i e  indwelling lines, tubes, and drains  - Monitor endotracheal if appropriate and nasal secretions for changes in amount and color  - [place] appropriate cooling/warming therapies per order  - Administer medications as ordered  - Instruct and encourage patient and family to use good hand hygiene technique  - Identify and instruct in appropriate isolation precautions for identified infection/condition  Outcome: Progressing     Problem: SAFETY ADULT  Goal: Maintain or return to baseline ADL function  Description: INTERVENTIONS:  -  Assess patient's ability to carry out ADLs; assess patient's baseline for ADL function and identify physical deficits which impact ability to perform ADLs (bathing, care of mouth/teeth, toileting, grooming, dressing, etc )  - Assess/evaluate cause of self-care deficits   - Assess range of motion  - Assess patient's mobility; develop plan if impaired  - Assess patient's need for assistive devices and provide as appropriate  - Encourage maximum independence but intervene and supervise when necessary  - Involve family in performance of ADLs  - Assess for home care needs following discharge   - Consider OT consult to assist with ADL evaluation and planning for discharge  - Provide patient education as appropriate  Outcome: Progressing  Goal: Maintain or return mobility status to optimal level  Description: INTERVENTIONS:  - Assess patient's baseline mobility status (ambulation, transfers, stairs, etc )    - Identify cognitive and physical deficits and behaviors that affect mobility  - Identify mobility aids required to assist with transfers and/or ambulation (gait belt, sit-to-stand, lift, walker, cane, etc )  - [place] fall precautions as indicated by assessment  - Record patient progress and toleration of activity level on Mobility SBAR; progress patient to next Phase/Stage  - Instruct patient to call for assistance with activity based on assessment  - Consider rehabilitation consult to assist with strengthening/weightbearing, etc   Outcome: Progressing     Problem: DISCHARGE PLANNING  Goal: Discharge to home or other facility with appropriate resources  Description: INTERVENTIONS:  - Identify barriers to discharge w/patient and caregiver  - Arrange for needed discharge resources and transportation as appropriate  - Identify discharge learning needs (meds, wound care, etc )  - Arrange for interpretive services to assist at discharge as needed  - Refer to Case Management Department for coordinating discharge planning if the patient needs post-hospital services based on physician/advanced practitioner order or complex needs related to functional status, cognitive ability, or social support system  Outcome: Progressing     Problem: Knowledge Deficit  Goal: Patient/family/caregiver demonstrates understanding of disease process, treatment plan, medications, and discharge instructions  Description: Complete learning assessment and assess knowledge base    Interventions:  - Provide teaching at level of understanding  - Provide teaching via preferred learning methods  Outcome: Progressing     Problem: SKIN/TISSUE INTEGRITY - ADULT  Goal: Skin integrity remains intact  Description: INTERVENTIONS  - Identify patients at risk for skin breakdown  - Assess and monitor skin integrity  - Assess and monitor nutrition and hydration status  - Monitor labs (i e  albumin)  - Assess for incontinence   - Turn and reposition patient  - Assist with mobility/ambulation  - Relieve pressure over bony prominences  - Avoid friction and shearing  - Provide appropriate hygiene as needed including keeping skin clean and dry  - Evaluate need for skin moisturizer/barrier cream  - Collaborate with interdisciplinary team (i e  Nutrition, Rehabilitation, etc )   - Patient/family teaching  Outcome: Progressing     Problem: MUSCULOSKELETAL - ADULT  Goal: Maintain or return mobility to safest level of function  Description: INTERVENTIONS:  - Assess patient's ability to carry out ADLs; assess patient's baseline for ADL function and identify physical deficits which impact ability to perform ADLs (bathing, care of mouth/teeth, toileting, grooming, dressing, etc )  - Assess/evaluate cause of self-care deficits   - Assess range of motion  - Assess patient's mobility  - Assess patient's need for assistive devices and provide as appropriate  - Encourage maximum independence but intervene and supervise when necessary  - Involve family in performance of ADLs  - Assess for home care needs following discharge   - Consider OT consult to assist with ADL evaluation and planning for discharge  - Provide patient education as appropriate  Outcome: Progressing  Goal: Maintain proper alignment of affected body part  Description: INTERVENTIONS:  - Support, maintain and protect limb and body alignment  - Provide patient/ family with appropriate education  Outcome: Progressing     Problem: PAIN - ADULT  Goal: Verbalizes/displays adequate comfort level or baseline comfort level  Description: Interventions:  - Encourage patient to monitor pain and request assistance  - Assess pain using appropriate pain scale  - Administer analgesics based on type and severity of pain and evaluate response  - Implement non-pharmacological measures as appropriate and evaluate response  - Consider cultural and social influences on pain and pain management  - Notify physician/advanced practitioner if interventions unsuccessful or patient reports new pain  Outcome: Progressing     Problem: INFECTION - ADULT  Goal: Absence or prevention of progression during hospitalization  Description: INTERVENTIONS:  - Assess and monitor for signs and symptoms of infection  - Monitor lab/diagnostic results  - Monitor all insertion sites, i e  indwelling lines, tubes, and drains  - Monitor endotracheal if appropriate and nasal secretions for changes in amount and color  - [place] appropriate cooling/warming therapies per order  - Administer medications as ordered  - Instruct and encourage patient and family to use good hand hygiene technique  - Identify and instruct in appropriate isolation precautions for identified infection/condition  Outcome: Progressing     Problem: SAFETY ADULT  Goal: Patient will remain free of falls  Description: INTERVENTIONS:  - Assess patient frequently for physical needs  -  Identify cognitive and physical deficits and behaviors that affect risk of falls    -  [place] fall precautions as indicated by assessment   - Educate patient/family on patient safety including physical limitations  - Instruct patient to call for assistance with activity based on assessment  - Modify environment to reduce risk of injury  - Consider OT/PT consult to assist with strengthening/mobility  Outcome: Progressing  Goal: Maintain or return to baseline ADL function  Description: INTERVENTIONS:  -  Assess patient's ability to carry out ADLs; assess patient's baseline for ADL function and identify physical deficits which impact ability to perform ADLs (bathing, care of mouth/teeth, toileting, grooming, dressing, etc )  - Assess/evaluate cause of self-care deficits   - Assess range of motion  - Assess patient's mobility; develop plan if impaired  - Assess patient's need for assistive devices and provide as appropriate  - Encourage maximum independence but intervene and supervise when necessary  - Involve family in performance of ADLs  - Assess for home care needs following discharge   - Consider OT consult to assist with ADL evaluation and planning for discharge  - Provide patient education as appropriate  Outcome: Progressing  Goal: Maintain or return mobility status to optimal level  Description: INTERVENTIONS:  - Assess patient's baseline mobility status (ambulation, transfers, stairs, etc )    - Identify cognitive and physical deficits and behaviors that affect mobility  - Identify mobility aids required to assist with transfers and/or ambulation (gait belt, sit-to-stand, lift, walker, cane, etc )  - [place] fall precautions as indicated by assessment  - Record patient progress and toleration of activity level on Mobility SBAR; progress patient to next Phase/Stage  - Instruct patient to call for assistance with activity based on assessment  - Consider rehabilitation consult to assist with strengthening/weightbearing, etc   Outcome: Progressing     Problem: DISCHARGE PLANNING  Goal: Discharge to home or other facility with appropriate resources  Description: INTERVENTIONS:  - Identify barriers to discharge w/patient and caregiver  - Arrange for needed discharge resources and transportation as appropriate  - Identify discharge learning needs (meds, wound care, etc )  - Arrange for interpretive services to assist at discharge as needed  - Refer to Case Management Department for coordinating discharge planning if the patient needs post-hospital services based on physician/advanced practitioner order or complex needs related to functional status, cognitive ability, or social support system  Outcome: Progressing     Problem: Nutrition/Hydration-ADULT  Goal: Nutrient/Hydration intake appropriate for improving, restoring or maintaining nutritional needs  Description: Monitor and assess patient's nutrition/hydration status for malnutrition  Collaborate with interdisciplinary team and initiate plan and interventions as ordered  Monitor patient's weight and dietary intake as ordered or per policy  Utilize nutrition screening tool and intervene as necessary  Determine patient's food preferences and provide high-protein, high-caloric foods as appropriate       INTERVENTIONS:  - Monitor oral intake, urinary output, labs, and treatment plans  - Assess nutrition and hydration status and recommend course of action  - Evaluate amount of meals eaten  - Assist patient with eating if necessary   - Allow adequate time for meals  - Recommend/ encourage appropriate diets, oral nutritional supplements, and vitamin/mineral supplements  - Order, calculate, and assess calorie counts as needed  - Recommend, monitor, and adjust tube feedings and TPN/PPN based on assessed needs  - Assess need for intravenous fluids  - Provide specific nutrition/hydration education as appropriate  - Include patient/family/caregiver in decisions related to nutrition  Outcome: Progressing

## 2020-08-11 NOTE — SOCIAL WORK
Met with pt to review team meeting update  Pt is still is agreement with d/c for tomorrow and follow up outpt appointments  His dtr will transport him home tomorrow

## 2020-08-11 NOTE — PROGRESS NOTES
08/11/20 1401   Pain Assessment   Pain Assessment Tool Pain Assessment not indicated - pt denies pain   Pain Score No Pain   Restrictions/Precautions   Precautions Bed/chair alarms;Cognitive; Fall Risk;Supervision on toilet/commode   Comprehension   Comprehension (FIM) 4 - Understands basic info/conversation 75-90% of time   Expression   Expression (FIM) 5 - Needs help/cues only RARELY (< 10% of the time)   Social Interaction   Social Interaction (FIM) 5 - Interacts appropriately with others 90% of time   Problem Solving   Problem solving (FIM) 4 - Solves basic problems 75-89% of time   Memory   Memory (FIM) 4 - Recognizes/recalls/performs 75-89%   Speech/Language/Cognition Assessmetn   Treatment Assessment Pt seen for skilled speech therapy session focusing on cognitive linguistic communication skills  Pt alert and interactive- seen upright in chair in room  Pt completed the following structured tasks- simple reading comprehension for increased attention and reasoning- pt able to utilize menu to answer question with 4/5 acc increasing with verbal cues to attend to more details on the page  Pt completed an additional attention task for finding errors within a menu  Pt prompted to look very closely in that there were many errors to be found  Pt was able to find 22/30 with increased verbal cues to attention to details  Pt next completed simple problem solving of what to do FIRST within safety situations  Pt able to complete with 15/20acc increasing with verbal cues as to the item he may have selected could be correct but wasn't necessarily the FIRST thing he should be doing  Last, pt completed deduction puzzle- pt required mod to max cues to complete this higher level task  Pt overall conts with flat affect and a lax attitude when completing task in saying "this isn't going to be grade so it wouldn't really matter right?"   Discussed with pt his discharge date for tomorrow- education regarding safety however appears with not full comprehension therefore will cont to recommend supervision at home upon discharge due to continued cognitive deficits impacting his safety and insight at this time  Rec follow up with outpt SLP services upon discharge to maximize his overall cognitive linguistic communication abilities at this time  SLP Therapy Minutes   SLP Time In 1400   SLP Time Out 1500   SLP Total Time (minutes) 60   SLP Mode of treatment - Individual (minutes) 60   SLP Mode of treatment - Concurrent (minutes) 0   SLP Mode of treatment - Group (minutes) 0   SLP Mode of treatment - Co-treat (minutes) 0   SLP Mode of Treatment - Total time(minutes) 60 minutes   SLP Cumulative Minutes 575   Therapy Time missed   Time missed?  No

## 2020-08-11 NOTE — PROGRESS NOTES
Internal Medicine Progress Note  Patient: Joaquin Claros  Age/sex: 79 y o  male  Medical Record #: [id number]      ASSESSMENT/PLAN: (Interval History)  Joaquin Claros is seen and examined and management for following issues:    DOMI adenocarcinoma; s/p bronchoscopy, mediastinoscopy, VATS, left upper lobectomy and mediastinal lymph node dissection 6/10/20  · Back to Thoracic surgery as OP     PEA with multiple rounds of CPR/respiratory arrest; emergent cricothyroidotomy 6/20; tracheostomy 6/22; decannulation 7/29/20  · Stoma closed and healing well   · Continue Albuterol neb prn     hx nicotine abuse with 1ppd x 50 yrs  · quit prior to surgery  · continue prn Albuterol     Sinus tachycardia  · resolved  · Continue Lopressor 25 mg BID  · No changes today  · ECHO 8/2/20 WNL normal EF and no wall motion issues     Fever; UTI >100,000 Pseudomonas; SIRS/sepsis  · Afebrile  · No further sx  · Completed Levaquin      ABLA  · Slowly improving  · will watch and transfuse prn hemoglobin <8 0     DVT  LE and RUE  · Continue Eliquis (has 100% coverage for it)  · followup venous dopplers on UE/LE 7/14 and 6/26 respectively showed resolution of DVTs     Urine retention  · on Flomax   · Lucila was d/cd 7/27/20     TBI 2010  · Has memory issues from this incident but unknown details since he cannot remember anything about it     ETOH abuse  · Unknown amount/details       The above assessment and plan was reviewed and updated as determined by my evaluation of the patient on 8/11/2020      Labs:   Results from last 7 days   Lab Units 08/10/20  0522 08/06/20  0608   WBC Thousand/uL 6 73 5 48   HEMOGLOBIN g/dL 10 5* 9 7*   HEMATOCRIT % 34 1* 32 6*   PLATELETS Thousands/uL 397* 400*     Results from last 7 days   Lab Units 08/10/20  0522 08/06/20  0608   SODIUM mmol/L 139 139   POTASSIUM mmol/L 4 1 4 5   CHLORIDE mmol/L 109* 109*   CO2 mmol/L 24 26   BUN mg/dL 12 12   CREATININE mg/dL 0 72 0 90   CALCIUM mg/dL 8 9 9 2 Results from last 7 days   Lab Units 08/05/20  1420 08/04/20  1447   POC GLUCOSE mg/dl 84 81       Review of Scheduled Meds:  Current Facility-Administered Medications   Medication Dose Route Frequency Provider Last Rate    acetaminophen  650 mg Oral Q6H PRN Caryl Adams MD      apixaban  10 mg Oral BID Mickel Dakins, CRNP      Followed by   Gabi Luz ON 8/17/2020] apixaban  5 mg Oral BID Mickel Dakins, CRNP      calcium carbonate  500 mg Oral TID PRN Lewis Smith MD      chlorhexidine  15 mL [facility] Lewis Smith MD      guaiFENesin  200 mg Oral Q4H PRN Lewis Smith MD      Lidocaine Viscous HCl  15 mL Swish & Spit 4x Daily PRN Lewis Smith MD      melatonin  6 mg Oral HS Lewis Smith MD      metoprolol tartrate  25 mg Oral Q12H [facility] Sudie Felty, DO      OLANZapine  5 mg Oral HS Lewis Smith MD      ondansetron  4 mg Oral Q6H PRN Lewis Smith MD      oxyCODONE  2 5 mg Oral Q4H PRN Lewis Smith MD      polyethylene glycol  17 g Oral Daily Lewis Smith MD      saccharomyces boulardii  250 mg Oral BID Mickel Dakins, CRNP      senna-docusate sodium  1 tablet Per NG Tube BID Lewis Smith MD      tobramycin  1 drop Both Eyes 4x Daily Mickel Dakins, CRNP         Subjective/ HPI: Patients overnight issues or events were reviewed with nursing or staff during rounds or morning huddle session  No new or overnight issues  Offers no complaints except still feels like has some "brain fog"  ROS:   A 10 point ROS was performed; negative except as noted above         *Labs /Radiology studies reviewed  *Medications reviewed and reconciled as needed  *Please refer to order section for additional ordered labs studies  *Case discussed with primary attending during morning huddle case rounds      Physical Examination:  Vitals:   Vitals:    08/10/20 2034 08/10/20 2109 08/11/20 0543 08/11/20 0915   BP: 118/73 118/73 131/88 113/58   BP Location: Left arm   Left arm Pulse: 80 80 78 (!) 110   Resp: 20  20    Temp: 98 1 °F (36 7 °C)  97 7 °F (36 5 °C)    TempSrc: Oral  Oral    SpO2: 97%  97%    Weight:       Height:           General Appearance: no distress, conversive  HEENT: PERRLA, conjuctiva normal; oropharynx clear; mucous membranes moist   Neck:  Supple, normal ROM, no JVD  Lungs: CTA, normal respiratory effort, no retractions, expiratory effort normal  CV: regular rate and rhythm; no rubs/murmurs/gallops, PMI normal   ABD: soft; ND/NT; +BS  EXT: no edema  Skin: normal turgor, normal texture, no rashes  Psych: affect normal, mood normal  Neuro: AAO      The above physical exam was reviewed and updated as determined by my evaluation of the patient on 8/11/2020  Invasive Devices     None                    VTE Pharmacologic Prophylaxis: Eliquis  Code Status: Level 1 - Full Code  Current Length of Stay: 12 day(s)      Total time spent:  30 minutes with more than 50% spent counseling/coordinating care  Counseling includes discussion with patient re: progress  and discussion with patient of his/her current medical state/information  Coordination of patient's care was performed in conjunction with primary service  Time invested included review of patient's labs, vitals, and management of their comorbidities with continued monitoring  In addition, this patient was discussed with medical team including physician and advanced extenders  The care of the patient was extensively discussed and appropriate treatment plan was formulated unique for this patient  ** Please Note:  voice to text software may have been used in the creation of this document   Although proof errors in transcription or interpretation are a potential of such software**

## 2020-08-11 NOTE — PROGRESS NOTES
PM&R Progress Note:    HPI: 79 y o  male with left upper lobe lung cancer, s/p left upper lobe lobectomy By Dr Vinita Serrano on 2/86/13, complicated postop course requiring emergent intubation and tracheostomy  Now decannulated  Transferred to [facility] on 7/30/20  ASSESSMENT: Stable, progressing    PLAN:    Rehabilitation   Continue current rehabilitation plan of care to maximize function  I personally attended, reviewed, and discussed medical and functional updates in team conference today  Please refer to advance care planning note for details   Expected Discharge: D/C Wednesday 8/12/20 to home with outpatient PT/OT/SLP    Pain   No issues    DVT prophylaxis   Duration of treatment for recent DVT:  Was managed on therapeutic Lovenox while inpatient x 1 month however to be transitioned to Eliquis at discharge for 2 more months  Started 8/10/20  No cost at pharmacy    Bladder plan   Continent - completed treatment with Levaquin for UTI  Flomax discontinued and pt is voiding without difficulty  Bowel plan   Continent    * Malignant neoplasm of upper lobe of left lung [facility])  Assessment & Plan  S/p left upper lobe lobectomy with Dr An Sheehan on 0/66  Complicated by need for emergent tracheostomy, now decannulated  Monitor trach site for signs of infection   Now on albuterol nebulizer treatments p r n  for wheezing or shortness of breath  No longer requiring oxygen  Stable saturations at rest and with activity    Conjunctivitis  Assessment & Plan  Left greater than right eye  Continue Tobramycin ophthalmic drops for a 7 day course  History of TBI (traumatic brain injury) ([facility] [address] )  Assessment & Plan  With baseline mild cognitive deficit    UTI (urinary tract infection)  Assessment & Plan  Cultures positive for Pseudomonas  Levaquin completed      Acute deep vein thrombosis (DVT) of axillary vein of right upper extremity [facility])  Assessment & Plan  With DVT lower extremity on 6/20 ultrasound, right upper extremity DVT on 6/22 ultrasound  Repeat ultrasounds with resolution   On treatment dose lovenox 1mg/kg q12h, anticipate 3-6 month duration, follow up with PCP outpatient   Check Eliquis pricing  Appreciate IM consultants medical co-management  Labs, medications, and imaging personally reviewed  SUBJECTIVE:  Patient seen today in therapies  Doing well  Preparing for home discharge in AM  Educated NO DRIVING    ROS:  A ten point review of systems was completed 8/11/20 and pertinent positives are listed in subjective section  All other systems reviewed were negative  OBJECTIVE:   /58 (BP Location: Left arm)   Pulse (!) 110   Temp 97 7 °F (36 5 °C) (Oral)   Resp 20   Ht 5' 8" (1 727 m)   Wt 84 3 kg (185 lb 13 6 oz)   SpO2 97%   BMI 28 26 kg/m²     Physical Exam  Vitals signs and nursing note reviewed  Constitutional:       General: He is not in acute distress  Appearance: He is well-developed  HENT:      Head: Normocephalic  Nose: Nose normal    Eyes:      Conjunctiva/sclera: Conjunctivae normal    Neck:      Musculoskeletal: Neck supple  Cardiovascular:      Rate and Rhythm: Normal rate  Pulmonary:      Effort: Pulmonary effort is normal       Breath sounds: No wheezing  Abdominal:      General: There is no distension  Palpations: Abdomen is soft  Skin:     General: Skin is warm  Neurological:      Mental Status: He is alert and oriented to person, place, and time     Psychiatric:         Mood and Affect: Mood normal           Lab Results   Component Value Date    WBC 6 73 08/10/2020    HGB 10 5 (L) 08/10/2020    HCT 34 1 (L) 08/10/2020    MCV 96 08/10/2020     (H) 08/10/2020     Lab Results   Component Value Date    SODIUM 139 08/10/2020    K 4 1 08/10/2020     (H) 08/10/2020    CO2 24 08/10/2020    BUN 12 08/10/2020    CREATININE 0 72 08/10/2020    GLUC 80 08/10/2020    CALCIUM 8 9 08/10/2020     Lab Results   Component Value Date    INR 1 12 06/22/2020 INR 1 00 06/05/2020    INR 0 98 04/29/2020    PROTIME 14 0 06/22/2020    PROTIME 12 8 06/05/2020    PROTIME 12 6 04/29/2020           Current Facility-Administered Medications:     acetaminophen (TYLENOL) tablet 650 mg, 650 mg, Oral, Q6H PRN, Jus Sandoval MD, 650 mg at 08/10/20 0957    apixaban (ELIQUIS) tablet 10 mg, 10 mg, Oral, BID, 10 mg at 08/11/20 0923 **FOLLOWED BY** [START ON 8/17/2020] apixaban (ELIQUIS) tablet 5 mg, 5 mg, Oral, BID, KHANG Husain    calcium carbonate (TUMS) chewable tablet 500 mg, 500 mg, Oral, TID PRN, Catia Molina MD    chlorhexidine (PERIDEX) 0 12 % oral rinse 15 mL, 15 mL, Sw[facility] [employer] [place], Q12H [facility], Catia Molina MD, 15 mL at 08/10/20 2109    guaiFENesin (ROBITUSSIN) oral solution 200 mg, 200 mg, Oral, Q4H PRN, Catia Molina MD    Lidocaine Viscous HCl (XYLOCAINE) 2 % mucosal solution 15 mL, 15 mL, Swish & Spit, 4x Daily PRN, Catia Molina MD    melatonin tablet 6 mg, 6 mg, Oral, HS, Catia Molina MD, 6 mg at 08/10/20 2109    metoprolol tartrate (LOPRESSOR) tablet 25 mg, 25 mg, Oral, Q12H [facility], Letha Duverney, DO, 25 mg at 08/11/20 0923    OLANZapine (ZyPREXA ZYDIS) dispersible tablet 5 mg, 5 mg, Oral, HS, Catia Molina MD, 5 mg at 08/10/20 2111    ondansetron (ZOFRAN-ODT) dispersible tablet 4 mg, 4 mg, Oral, Q6H PRN, Catia Molina MD    oxyCODONE (ROXICODONE) oral solution 2 5 mg, 2 5 mg, Oral, Q4H PRN, Catia Molina MD, 2 5 mg at 08/10/20 1715    polyethylene glycol (MIRALAX) packet 17 g, 17 g, Oral, Daily, Catia Molina MD, 17 g at 08/11/20 2144    saccharomyces boulardii (FLORASTOR) capsule 250 mg, 250 mg, Oral, BID, KHANG Husain, 250 mg at 08/11/20 7410    senna-docusate sodium (SENOKOT S) 8 6-50 mg per tablet 1 tablet, 1 tablet, Per NG Tube, BID, Catia Molina MD, 1 tablet at 08/11/20 0994    tobramycin (TOBREX) 0 3 % ophthalmic solution 1 drop, 1 drop, Both Eyes, 4x Daily, KHANG Husain, 1 drop at 08/11/20 3409    Past Medical History:   Diagnosis Date    Alcohol abuse 3/27/2020    Chest pain     Community acquired pneumonia 3/27/2020    Hypertension     Left upper lobe pulmonary nodule     [facility] ) 5/7/2020       Patient Active Problem List    Diagnosis Date Noted    Malignant neoplasm of upper lobe of left lung ([facility] ) 05/07/2020     Priority: High    Conjunctivitis 08/04/2020    UTI (urinary tract infection) 08/03/2020    History of TBI (traumatic brain injury) ([facility] ) 08/03/2020    Encephalopathy 07/12/2020    Hyperactive delirium after surgical procedure 07/12/2020    Anemia 07/12/2020    Thrombocytosis ([facility] ) 07/12/2020    Acute deep vein thrombosis (DVT) of axillary vein of right upper extremity ([facility] ) 06/23/2020    Acute deep vein thrombosis (DVT) of brachial vein of right upper extremity ([facility] ) 06/23/2020    Tracheostomy in place [facility]) 06/23/2020    Acute deep vein thrombosis (DVT) of calf muscle vein of left lower extremity ([facility] ) 06/23/2020    Acute respiratory failure with hypoxia ([facility] ) 06/21/2020    Tobacco abuse 05/07/2020    Community acquired pneumonia 03/27/2020    Abnormal computed tomography angiography (CTA) 03/27/2020    Alcohol abuse 03/27/2020          Shelbie Esteban MD    Total time spent:  45 minutes with more than 50% spent counseling/coordinating care  Counseling includes discussion with patient re: progress and discussion with patient of his/her current medical state/information  Coordination of patient's care was performed in conjunction with consulting services  Time invested included review of patient's labs, vitals, and management of their comorbidities with continued monitoring  The care of the patient was extensively discussed and appropriate treatment plan was formulated unique for this patient  ** Please Note:  voice to text software may have been used in the creation of this document   Although proof errors in transcription or interpretation are a potential of such software**

## 2020-08-11 NOTE — PROGRESS NOTES
Pastoral care done via tele-visit    No needs at this time     08/11/20 1000   Clinical Encounter Type   Visited With Patient   Routine Visit Introduction   Continue Visiting No

## 2020-08-12 VITALS
HEART RATE: 82 BPM | TEMPERATURE: 97.8 F | BODY MASS INDEX: 28.3 KG/M2 | DIASTOLIC BLOOD PRESSURE: 70 MMHG | OXYGEN SATURATION: 100 % | HEIGHT: 68 IN | WEIGHT: 186.73 LBS | SYSTOLIC BLOOD PRESSURE: 114 MMHG | RESPIRATION RATE: 20 BRPM

## 2020-08-12 PROCEDURE — 99239 HOSP IP/OBS DSCHRG MGMT >30: CPT | Performed by: PHYSICAL MEDICINE & REHABILITATION

## 2020-08-12 RX ORDER — ACETAMINOPHEN 325 MG/1
650 TABLET ORAL EVERY 6 HOURS PRN
Qty: 30 TABLET | Refills: 0
Start: 2020-08-12 | End: 2021-04-23 | Stop reason: HOSPADM

## 2020-08-12 RX ORDER — SACCHAROMYCES BOULARDII 250 MG
250 CAPSULE ORAL 2 TIMES DAILY
Qty: 60 CAPSULE | Refills: 0 | Status: SHIPPED | OUTPATIENT
Start: 2020-08-12 | End: 2020-09-28 | Stop reason: ALTCHOICE

## 2020-08-12 RX ADMIN — CHLORHEXIDINE GLUCONATE 0.12% ORAL RINSE 15 ML: 1.2 LIQUID ORAL at 08:21

## 2020-08-12 RX ADMIN — METOPROLOL TARTRATE 25 MG: 25 TABLET, FILM COATED ORAL at 08:21

## 2020-08-12 RX ADMIN — Medication 250 MG: at 08:21

## 2020-08-12 RX ADMIN — APIXABAN 10 MG: 5 TABLET, FILM COATED ORAL at 08:21

## 2020-08-12 NOTE — NURSING NOTE
Rn reviewed Discharge ( AVS) instructions with Patient and joe  Both Patient and daughter Delfino Taveras  Verbalized Understanding of all AVS , follow up appts  And medications  Patient and daughter denied any further questions -discharge with all belongings

## 2020-08-12 NOTE — PROGRESS NOTES
Internal Medicine Progress Note  Patient: Syl Lagos  Age/sex: 79 y o  male  Medical Record #: [id number]      ASSESSMENT/PLAN: (Interval History)  Syl Lagos is seen and examined and management for following issues:    DOMI adenocarcinoma; s/p bronchoscopy, mediastinoscopy, VATS, left upper lobectomy and mediastinal lymph node dissection 6/10/20  · Back to Thoracic surgery as OP     PEA with multiple rounds of CPR/respiratory arrest; emergent cricothyroidotomy 6/20; tracheostomy 6/22; decannulation 7/29/20  · Stoma closed   · Continue Albuterol neb prn     hx nicotine abuse with 1ppd x 50 yrs  · quit prior to surgery  · continue prn Albuterol     Sinus tachycardia  · resolved  · Continue Lopressor 25 mg BID  · No changes today  · ECHO 8/2/20 WNL normal EF and no wall motion issues     Fever; UTI >100,000 Pseudomonas; SIRS/sepsis  · Resolved with course of Levaquin      ABLA  · Slowly improving     DVT  LE and RUE  · Continue Eliquis (has 100% coverage for it)  · followup venous dopplers on UE/LE 7/14 and 6/26 respectively showed resolution of DVTs     Urine retention  · Resolved and primary service stopped his Flomax since was not on PTA at home     TBI 2010  · Has memory issues from this incident but unknown details since he cannot remember anything about it     ETOH abuse  · Unknown amount/details       The above assessment and plan was reviewed and updated as determined by my evaluation of the patient on 8/12/2020      Labs:   Results from last 7 days   Lab Units 08/10/20  0522 08/06/20  0608   WBC Thousand/uL 6 73 5 48   HEMOGLOBIN g/dL 10 5* 9 7*   HEMATOCRIT % 34 1* 32 6*   PLATELETS Thousands/uL 397* 400*     Results from last 7 days   Lab Units 08/10/20  0522 08/06/20  0608   SODIUM mmol/L 139 139   POTASSIUM mmol/L 4 1 4 5   CHLORIDE mmol/L 109* 109*   CO2 mmol/L 24 26   BUN mg/dL 12 12   CREATININE mg/dL 0 72 0 90   CALCIUM mg/dL 8 9 9 2             Results from last 7 days   Lab Units 08/05/20  1420   POC GLUCOSE mg/dl 84       Review of Scheduled Meds:  Current Facility-Administered Medications   Medication Dose Route Frequency Provider Last Rate    acetaminophen  650 mg Oral Q6H PRN Oseas Kent MD      apixaban  10 mg Oral BID KHANG Katz      Followed by   Sana Harp ON 8/17/2020] apixaban  5 mg Oral BID KHANG Katz      calcium carbonate  500 mg Oral TID PRN Natasha Ortega MD      chlorhexidine  15 mL [facility] [employer] [address] Natasha Ortega MD      guaiFENesin  200 mg Oral Q4H PRN Natasha Ortega MD      Lidocaine Viscous HCl  15 mL Swish & Spit 4x Daily PRN Natasha Ortega MD      melatonin  6 mg Oral HS Natasha Ortega MD      metoprolol tartrate  25 mg Oral Q12H [address] Felicityia Yemi, DO      OLANZapine  5 mg Oral HS Natasha Ortega MD      ondansetron  4 mg Oral Q6H PRN Natasha Ortega MD      oxyCODONE  2 5 mg Oral Q4H PRN Natasha Ortega MD      polyethylene glycol  17 g Oral Daily Natasha Ortega MD      saccharomyces boulardii  250 mg Oral BID KHANG Katz      senna-docusate sodium  1 tablet Per NG Tube BID Natasha Ortega MD         Subjective/ HPI: Patients overnight issues or events were reviewed with nursing or staff during rounds or morning huddle session  No new or overnight issues  Offers no complaints  ROS:   A 10 point ROS was performed; negative except as noted above         *Labs /Radiology studies reviewed  *Medications reviewed and reconciled as needed  *Please refer to order section for additional ordered labs studies  *Case discussed with primary attending during morning huddle case rounds      Physical Examination:  Vitals:   Vitals:    08/11/20 1301 08/11/20 2013 08/12/20 0551 08/12/20 0820   BP: 116/63 121/73 128/85 114/70   BP Location: Left arm Left arm  Left arm   Pulse: 88 96 71 82   Resp: 18 18 20    Temp: 97 9 °F (36 6 °C) 98 1 °F (36 7 °C) 97 8 °F (36 6 °C)    TempSrc: Oral Oral Oral    SpO2: 95% 96% 100%    Weight:   84 7 kg (186 lb 11 7 oz)    Height:           General Appearance: no distress, conversive  HEENT: PERRLA, conjuctiva normal; oropharynx clear; mucous membranes moist   Neck:  Supple, normal ROM, no JVD  Lungs: CTA, normal respiratory effort, no retractions, expiratory effort normal  CV: regular rate and rhythm; no rubs/murmurs/gallops, PMI normal   ABD: soft; ND/NT; +BS  EXT: no edema  Skin: normal turgor, normal texture, no rashes  Psych: affect normal, mood normal  Neuro: AAO      The above physical exam was reviewed and updated as determined by my evaluation of the patient on 8/12/2020  Invasive Devices     None                    VTE Pharmacologic Prophylaxis: Eliquis  Code Status: Level 1 - Full Code  Current Length of Stay: 13 day(s)      Total time spent:  30 minutes with more than 50% spent counseling/coordinating care  Counseling includes discussion with patient re: progress  and discussion with patient of his/her current medical state/information  Coordination of patient's care was performed in conjunction with primary service  Time invested included review of patient's labs, vitals, and management of their comorbidities with continued monitoring  In addition, this patient was discussed with medical team including physician and advanced extenders  The care of the patient was extensively discussed and appropriate treatment plan was formulated unique for this patient  ** Please Note:  voice to text software may have been used in the creation of this document   Although proof errors in transcription or interpretation are a potential of such software**

## 2020-08-12 NOTE — DISCHARGE INSTRUCTIONS
No driving with persistent mild cognitive deficits  PCP to follow  OT can also perform a fitness to drive test in the future analyze driving abilities - PCP can order this

## 2020-08-12 NOTE — CASE MANAGEMENT
Team dc summary - pt made good progress and returned home w/family and contd outpt PT, OT, and SLP scheduled at [address]  Pt had all necessary dme  Family present for training and for dc instructions

## 2020-08-12 NOTE — PLAN OF CARE
Problem: Potential for Falls  Goal: Patient will remain free of falls  Description: INTERVENTIONS:  - Assess patient frequently for physical needs  -  Identify cognitive and physical deficits and behaviors that affect risk of falls    -  [place] fall precautions as indicated by assessment   - Educate patient/family on patient safety including physical limitations  - Instruct patient to call for assistance with activity based on assessment  - Modify environment to reduce risk of injury  - Consider OT/PT consult to assist with strengthening/mobility  Outcome: Adequate for Discharge     Problem: Prexisting or High Potential for Compromised Skin Integrity  Goal: Skin integrity is maintained or improved  Description: INTERVENTIONS:  - Identify patients at risk for skin breakdown  - Assess and monitor skin integrity  - Assess and monitor nutrition and hydration status  - Monitor labs   - Assess for incontinence   - Turn and reposition patient  - Assist with mobility/ambulation  - Relieve pressure over bony prominences  - Avoid friction and shearing  - Provide appropriate hygiene as needed including keeping skin clean and dry  - Evaluate need for skin moisturizer/barrier cream  - Collaborate with interdisciplinary team   - Patient/family teaching  - Consider wound care consult   Outcome: Adequate for Discharge     Problem: PAIN - ADULT  Goal: Verbalizes/displays adequate comfort level or baseline comfort level  Description: Interventions:  - Encourage patient to monitor pain and request assistance  - Assess pain using appropriate pain scale  - Administer analgesics based on type and severity of pain and evaluate response  - Implement non-pharmacological measures as appropriate and evaluate response  - Consider cultural and social influences on pain and pain management  - Notify physician/advanced practitioner if interventions unsuccessful or patient reports new pain  Outcome: Adequate for Discharge     Problem: INFECTION - ADULT  Goal: Absence or prevention of progression during hospitalization  Description: INTERVENTIONS:  - Assess and monitor for signs and symptoms of infection  - Monitor lab/diagnostic results  - Monitor all insertion sites, i e  indwelling lines, tubes, and drains  - Monitor endotracheal if appropriate and nasal secretions for changes in amount and color  - [place] appropriate cooling/warming therapies per order  - Administer medications as ordered  - Instruct and encourage patient and family to use good hand hygiene technique  - Identify and instruct in appropriate isolation precautions for identified infection/condition  Outcome: Adequate for Discharge     Problem: SAFETY ADULT  Goal: Maintain or return to baseline ADL function  Description: INTERVENTIONS:  -  Assess patient's ability to carry out ADLs; assess patient's baseline for ADL function and identify physical deficits which impact ability to perform ADLs (bathing, care of mouth/teeth, toileting, grooming, dressing, etc )  - Assess/evaluate cause of self-care deficits   - Assess range of motion  - Assess patient's mobility; develop plan if impaired  - Assess patient's need for assistive devices and provide as appropriate  - Encourage maximum independence but intervene and supervise when necessary  - Involve family in performance of ADLs  - Assess for home care needs following discharge   - Consider OT consult to assist with ADL evaluation and planning for discharge  - Provide patient education as appropriate  Outcome: Adequate for Discharge  Goal: Maintain or return mobility status to optimal level  Description: INTERVENTIONS:  - Assess patient's baseline mobility status (ambulation, transfers, stairs, etc )    - Identify cognitive and physical deficits and behaviors that affect mobility  - Identify mobility aids required to assist with transfers and/or ambulation (gait belt, sit-to-stand, lift, walker, cane, etc )  - [place] fall precautions as indicated by assessment  - Record patient progress and toleration of activity level on Mobility SBAR; progress patient to next Phase/Stage  - Instruct patient to call for assistance with activity based on assessment  - Consider rehabilitation consult to assist with strengthening/weightbearing, etc   Outcome: Adequate for Discharge     Problem: DISCHARGE PLANNING  Goal: Discharge to home or other facility with appropriate resources  Description: INTERVENTIONS:  - Identify barriers to discharge w/patient and caregiver  - Arrange for needed discharge resources and transportation as appropriate  - Identify discharge learning needs (meds, wound care, etc )  - Arrange for interpretive services to assist at discharge as needed  - Refer to Case Management Department for coordinating discharge planning if the patient needs post-hospital services based on physician/advanced practitioner order or complex needs related to functional status, cognitive ability, or social support system  Outcome: Adequate for Discharge     Problem: Knowledge Deficit  Goal: Patient/family/caregiver demonstrates understanding of disease process, treatment plan, medications, and discharge instructions  Description: Complete learning assessment and assess knowledge base    Interventions:  - Provide teaching at level of understanding  - Provide teaching via preferred learning methods  Outcome: Adequate for Discharge     Problem: SKIN/TISSUE INTEGRITY - ADULT  Goal: Skin integrity remains intact  Description: INTERVENTIONS  - Identify patients at risk for skin breakdown  - Assess and monitor skin integrity  - Assess and monitor nutrition and hydration status  - Monitor labs (i e  albumin)  - Assess for incontinence   - Turn and reposition patient  - Assist with mobility/ambulation  - Relieve pressure over bony prominences  - Avoid friction and shearing  - Provide appropriate hygiene as needed including keeping skin clean and dry  - Evaluate need for skin moisturizer/barrier cream  - Collaborate with interdisciplinary team (i e  Nutrition, Rehabilitation, etc )   - Patient/family teaching  Outcome: Adequate for Discharge     Problem: MUSCULOSKELETAL - ADULT  Goal: Maintain or return mobility to safest level of function  Description: INTERVENTIONS:  - Assess patient's ability to carry out ADLs; assess patient's baseline for ADL function and identify physical deficits which impact ability to perform ADLs (bathing, care of mouth/teeth, toileting, grooming, dressing, etc )  - Assess/evaluate cause of self-care deficits   - Assess range of motion  - Assess patient's mobility  - Assess patient's need for assistive devices and provide as appropriate  - Encourage maximum independence but intervene and supervise when necessary  - Involve family in performance of ADLs  - Assess for home care needs following discharge   - Consider OT consult to assist with ADL evaluation and planning for discharge  - Provide patient education as appropriate  Outcome: Adequate for Discharge  Goal: Maintain proper alignment of affected body part  Description: INTERVENTIONS:  - Support, maintain and protect limb and body alignment  - Provide patient/ family with appropriate education  Outcome: Adequate for Discharge     Problem: PAIN - ADULT  Goal: Verbalizes/displays adequate comfort level or baseline comfort level  Description: Interventions:  - Encourage patient to monitor pain and request assistance  - Assess pain using appropriate pain scale  - Administer analgesics based on type and severity of pain and evaluate response  - Implement non-pharmacological measures as appropriate and evaluate response  - Consider cultural and social influences on pain and pain management  - Notify physician/advanced practitioner if interventions unsuccessful or patient reports new pain  Outcome: Adequate for Discharge     Problem: INFECTION - ADULT  Goal: Absence or prevention of progression during hospitalization  Description: INTERVENTIONS:  - Assess and monitor for signs and symptoms of infection  - Monitor lab/diagnostic results  - Monitor all insertion sites, i e  indwelling lines, tubes, and drains  - Monitor endotracheal if appropriate and nasal secretions for changes in amount and color  - [place] appropriate cooling/warming therapies per order  - Administer medications as ordered  - Instruct and encourage patient and family to use good hand hygiene technique  - Identify and instruct in appropriate isolation precautions for identified infection/condition  Outcome: Adequate for Discharge     Problem: SAFETY ADULT  Goal: Patient will remain free of falls  Description: INTERVENTIONS:  - Assess patient frequently for physical needs  -  Identify cognitive and physical deficits and behaviors that affect risk of falls    -  [place] fall precautions as indicated by assessment   - Educate patient/family on patient safety including physical limitations  - Instruct patient to call for assistance with activity based on assessment  - Modify environment to reduce risk of injury  - Consider OT/PT consult to assist with strengthening/mobility  Outcome: Adequate for Discharge  Goal: Maintain or return to baseline ADL function  Description: INTERVENTIONS:  -  Assess patient's ability to carry out ADLs; assess patient's baseline for ADL function and identify physical deficits which impact ability to perform ADLs (bathing, care of mouth/teeth, toileting, grooming, dressing, etc )  - Assess/evaluate cause of self-care deficits   - Assess range of motion  - Assess patient's mobility; develop plan if impaired  - Assess patient's need for assistive devices and provide as appropriate  - Encourage maximum independence but intervene and supervise when necessary  - Involve family in performance of ADLs  - Assess for home care needs following discharge   - Consider OT consult to assist with ADL evaluation and planning for discharge  - Provide patient education as appropriate  Outcome: Adequate for Discharge  Goal: Maintain or return mobility status to optimal level  Description: INTERVENTIONS:  - Assess patient's baseline mobility status (ambulation, transfers, stairs, etc )    - Identify cognitive and physical deficits and behaviors that affect mobility  - Identify mobility aids required to assist with transfers and/or ambulation (gait belt, sit-to-stand, lift, walker, cane, etc )  - [place] fall precautions as indicated by assessment  - Record patient progress and toleration of activity level on Mobility SBAR; progress patient to next Phase/Stage  - Instruct patient to call for assistance with activity based on assessment  - Consider rehabilitation consult to assist with strengthening/weightbearing, etc   Outcome: Adequate for Discharge     Problem: DISCHARGE PLANNING  Goal: Discharge to home or other facility with appropriate resources  Description: INTERVENTIONS:  - Identify barriers to discharge w/patient and caregiver  - Arrange for needed discharge resources and transportation as appropriate  - Identify discharge learning needs (meds, wound care, etc )  - Arrange for interpretive services to assist at discharge as needed  - Refer to Case Management Department for coordinating discharge planning if the patient needs post-hospital services based on physician/advanced practitioner order or complex needs related to functional status, cognitive ability, or social support system  Outcome: Adequate for Discharge     Problem: Nutrition/Hydration-ADULT  Goal: Nutrient/Hydration intake appropriate for improving, restoring or maintaining nutritional needs  Description: Monitor and assess patient's nutrition/hydration status for malnutrition  Collaborate with interdisciplinary team and initiate plan and interventions as ordered  Monitor patient's weight and dietary intake as ordered or per policy   Utilize nutrition screening tool and intervene as necessary  Determine patient's food preferences and provide high-protein, high-caloric foods as appropriate       INTERVENTIONS:  - Monitor oral intake, urinary output, labs, and treatment plans  - Assess nutrition and hydration status and recommend course of action  - Evaluate amount of meals eaten  - Assist patient with eating if necessary   - Allow adequate time for meals  - Recommend/ encourage appropriate diets, oral nutritional supplements, and vitamin/mineral supplements  - Order, calculate, and assess calorie counts as needed  - Recommend, monitor, and adjust tube feedings and TPN/PPN based on assessed needs  - Assess need for intravenous fluids  - Provide specific nutrition/hydration education as appropriate  - Include patient/family/caregiver in decisions related to nutrition  Outcome: Adequate for Discharge

## 2020-08-12 NOTE — DISCHARGE SUMMARY
Discharge Summary - PMR   Kishor Valdovinos 79 y o  male MRN: [id number]  Unit/Bed#: -01 Encounter: [id number]    Admission Date: 7/30/2020     Discharge Date:  8/12/20    Etiologic/Rehabilitation Diagnosis: Impairment of mobility, safety and Activities of Daily Living (ADLs) due to Neurologic Conditions:  03 8  Neuromuscular Disorders Critical illness myopathy    HPI: Kishor Valdovinos is a 79 y o  male with left upper lobe lung cancer, who was admitted on 6/10/20 for left upper lobe lobectomy by Dr Erica Velasco and Jefferson Pacheco  Postop complicated by subcutaneous emphysema, prolonged air leak  A wound vac was placed over left chest wall  He had rapid response on 6/20 PEA (requiring multiple rounds of CPR), hypoxia and bradycardia, requiring intubation  An emergent tracheostomy was performed by surgical critical team  He had tracheostomy revision on 6/22  He was slowly weaned off ventilator  He was decannulated on 7/29  He is tolerating regular diet at this time  He was [facility] to [facility] on 07/30/20  [facility] Course: Patient participated in a comprehensive interdisciplinary inpatient rehabilitation program which included involvment of MD, therapies (PT, OT, and/or SLP), RN, CM, SW, bolivar Denise He was able to be advanced to an overall supervision level of assist and considered safe for discharge home with family  Please see below for patient's day to day management of medical needs  Rehabilitation:  · Functional deficits of impaired mobility, self-care improving  Patient continues to have mild/moderate cognitive deficits, and decreased endurance overall  · He is to continue his rehabilitative course with outpatient PT, OT, SLP  · Patient and his daughter were instructed NO DRIVING until cleared by his PCP  He may benefit from a fitness to drive test as an outpatient with OT furthermore  This was explained to his daughter on the day of discharge    She is taking his keys away from him temporarily until this can be determined as an outpatient  · See day of discharge function below:  Physical Therapy Occupational Therapy Speech Therapy   Weight Bearing Status: Full Weight Bearing  Transfers: Supervision(no AD)  Bed Mobility: Independent  Amulation Distance (ft): 300 feet(Simultaneous filing  User may not have seen previous data )  Ambulation: Supervision  Assistive Device for Ambulation: (no AD)  Number of Stairs: (6-12 dependent on level of fatigue)  Assistive Device for Stairs:  Other(with or without HR)  Stair Assistance: Incidental Touching(CG-CS)  Ramp: Supervision  Assistive Device for Ramp: None  Discharge Recommendations: Home Independently  [address] with[de-identified] Family Support, First Floor Setup, Outpatient Physical Therapy(S assist with mobilities)   Eating: Independent  Grooming: Supervision  Bathing: Supervision  Bathing: Supervision  Upper Body Dressing: Supervision  Lower Body Dressing: Supervision  Toileting: Supervision  Tub/Shower Transfer: Supervision  Toilet Transfer: Supervision  Cognition: Exceptions to WNL  Cognition: Decreased Executive Functions, Decreased Memory, Decreased Attention, Decreased Safety, Impulsive  Orientation: Person, Place, Situation   Mode of Communication: Verbal  Cognition: Exceptions to WNL  Cognition: Decreased Memory, Impulsive, Decreased Executive Functions, Decreased Attention, Decreased Comprehension, Decreased Safety  Orientation: Person, Situation, Place  Swallowing: Within Defined Limits  Diet Recommendations: Regular Diet, Thin  Discharge Recommendations: Home with:  DC Home with[de-identified] 24 Hour Supervision, 24 Hour Assisteance, Family Support, Outpatient Speech Therapy       * Malignant neoplasm of upper lobe of left lung (HCC)  Assessment & Plan  S/p left upper lobe lobectomy with Dr Rosa Maria Valenzuela on 9/39  Complicated by need for emergent tracheostomy, now decannulated - fully healed tracheostomy site  Now on albuterol inhaler as needed for any shortness of breath  No longer requiring oxygen  Stable saturations at rest and with activity  Follow-up with Dr Bethel Fortune as an outpatient    Conjunctivitis  Assessment & Plan  Left greater than right eye  Completed treatment with tobramycin x1 week    History of TBI (traumatic brain injury) ([facility] [address] )  Assessment & Plan  With baseline mild cognitive deficit - slightly worsened possibly related to ? Anoxia during PEA  UTI (urinary tract infection)  Assessment & Plan  Cultures positive for Pseudomonas  Completed treatment with Levaquin  Voiding well    Acute deep vein thrombosis (DVT) of axillary vein of right upper extremity [facility])  Assessment & Plan  Diagnosed with DVT lower extremity on 6/20 ultrasound, right upper extremity DVT on 6/22 ultrasound  Repeat ultrasounds with resolution - however patient is to complete 3 month duration of treatment (verified with vascular Dr Yulia Crook)  Patient has completed 1 month of treatment  He will continue an additional 2 months with Eliquis  Starting at 10 mg b i d  x5 days, followed by 5 mg b i d  for 2 months        Discharge Medications:   See after visit summary for reconciled discharge medications provided to patient and family  Condition at Discharge: fair     Discharge instructions/Information to patient and family:   See after visit summary for information provided to patient and family  Provisions for Follow-Up Care:  See after visit summary for information related to follow-up care and any pertinent home health orders  Future Appointments   Date Time Provider Rachel Ortiz   8/18/2020  3:00 PM Shelby Landaverde CCC-SLP [facility] [address]   8/18/2020  4:00 PM Sonali Torre, PT [address]   8/18/2020  5:00 PM Mary Barakat, OT [address]       Disposition: Home      Planned Readmission: No    Discharge Statement   I spent more than 30 minutes discharging the patient  This time was spent on the day of discharge   I had direct contact with the patient on the day of discharge  Greater than 50% of the total time was spent examining patient, answering all patient questions, arranging and discussing plan of care with patient as well as directly providing post-discharge instructions  Additional time then spent on discharge activities  Discharge Medications:  See after visit summary for reconciled discharge medications provided to patient and family

## 2020-08-12 NOTE — PHYSICAL THERAPY NOTE
[facility] PT D/C SUMMARY    Pt dem good progress in PT and at time of d/c was independent with bed mobility and required S/set-up clean-up for OOB mobilities without support of AD  Pt able to meet all mobility goals dem improved balance and righting reactions without external cueing from PT  No DME required at this time  PT attempted FT with pt and dtr however dtr expressed being capable to require S assist with pt for all mobilities and did not want to schedule further training  Pt d/c 8/12/20 to home with S assist for mobilities, family support and OP PT to cont to address rehab needs and max functional independence and safety

## 2020-08-13 LAB
BACTERIA UR CULT: ABNORMAL
BACTERIA UR CULT: ABNORMAL

## 2020-08-13 NOTE — PROGRESS NOTES
Speech-Language Pathology Initial Evaluation    Today's date: 2020  Patients name: Fam Ramos  : 1950  MRN: [id number]  Safety measures: ***  Referring provider: Adrián Reid MD    Primary diagnosis/billing code: A31 2  Secondary diagnosis/billing code: C34 12, J96 01    Visit tracking:  -Referring provider: Epic  -Billing guidelines: CMS  -Visit #1/10  -Insurance: [facility]  -RE due ***    Subjective comments: ***    How did the patient hear about us? {ReferralPT:42883}    Patient's goal(s): ***    Reason for referral: {REASON FOR REF:8650220}  Prior functional status: {PRIOR FUNCTIONAL STATUS:8019688}  Clinically complex situations: {CLINICALLY COMPLEX SITUATIONS:94113}    History: Patient is a 79 y o  male who was referred to outpatient skilled Speech Therapy services for a {ST eval:11954} evaluation  Patient with a significant PMH of L upper lobe lung cancer, TBI, DVT  Patient was admitted to the hospital on 06/10/2020 for a L upper lobe lobectomy  Per physician report, "  [place] Simple [place] Simple Postop complicated by subcutaneous emphysema, prolonged air leak  A wound vac was placed over left chest wall  He had rapid response on  PEA (requiring multiple rounds of CPR), hypoxia and bradycardia, requiring intubation  An emergent tracheostomy was performed by surgical critical team  He had tracheostomy revision on   He was slowly weaned off ventilator  He was decannulated on   He is tolerating regular diet at this time  He was discharged to [facility] on 20   "     Per review of his last speech therapy note, "    Pt seen for skilled speech therapy session focusing on cognitive linguistic communication skills  Pt alert and interactive- seen upright in chair in room   Pt completed the following structured tasks- simple reading comprehension for increased attention and reasoning- pt able to utilize menu to answer question with 4/5 acc increasing with verbal cues to attend to more details on the page  Pt completed an additional attention task for finding errors within a menu  Pt prompted to look very closely in that there were many errors to be found  Pt was able to find 22/30 with increased verbal cues to attention to details  Pt next completed simple problem solving of what to do FIRST within safety situations  Pt able to complete with 15/20acc increasing with verbal cues as to the item he may have selected could be correct but wasn't necessarily the FIRST thing he should be doing  Last, pt completed deduction puzzle- pt required mod to max cues to complete this higher level task  Pt overall conts with flat affect and a lax attitude when completing task in saying "this isn't going to be grade so it wouldn't really matter right?"  Discussed with pt his discharge date for tomorrow- education regarding safety however appears with not full comprehension therefore will cont to recommend supervision at home upon discharge due to continued cognitive deficits impacting his safety and insight at this time  Rec follow up with outpt SLP services upon discharge to maximize his overall cognitive linguistic communication abilities at this time   "    Patient was discharged home on 08/12/2020  ***    Hearing: {CBQOIYZ:85931}  Vision: {vision:62151}    Home environment/lifestyle: ***  Highest level of education: ***  Vocational status: ***    Mental status: {MENTAL STATUS:7020617}  Behavior status: {BEHAVIOR STATUS:2667734}  Communication modalities: {COMM   MODALITIES:6636278}  Rehabilitation prognosis: {REHAB PROGNOSIS:5675203}    Assessments    ***    Goals    Short-term goals:  1  ***    Long-term goals:  1  ***    Impressions/Recommendations    Impressions:   -Patient presents with ***    Recommendations:  -Patient would benefit from outpatient skilled Speech Therapy services: {BENEFIT WIMA:1930157}    -Frequency: {FREQUENCY:36726}  -Duration: {DURATION:37932}    -Intervention certification from: 8/18/2020  -Intervention certification to: ***    -Intervention comments:   ***

## 2020-08-17 NOTE — SPEECH THERAPY NOTE
[facility] Speech Therapy Discharge Summary    Pt was discharge home with family support on 8/12/2020  Pt was receiving skilled speech therapy services for cognitive linguistic communication abilities and swallow abilities and had achieved his goals during his stay  Upon initial assessment, pt completed formalized cognitive assessment, CLQT+, which overall score was 2 8 out of 4 0 which compared to age matched peers between 65-80 yrs, indicating cognition to be MILDLY impaired overall  At discharge, pt was min A for comprehension, problem solving, and memory and Supervision for expression and social interaction  Pt was followed briefly for swallow function and is tolerating regular diet with thin liquids w/o s/s aspiration  Primary focus of sessions had been towards cognitive linguistic skills, which ongoing barriers that present impulsivity, decreased attention, decreased ST and working memory skills, decreased executive function skills which impact pt's overall safety within a variety of settings as well as decreased safety for functional mobility  SLP had provided pt's dtr, Hal Mercado, w/ education/training in regards to current limitations including decreased ability to complete financial and medication management tasks which she should take over at discharge as well as educating in regards to having supervision w/ any kitchen tasks, etc  Reccomend pt cont skilled speech therapy services at outpt setting to maximize his overall cognitive linguistic communication skills to increased independence and decreased burden of care

## 2020-08-18 ENCOUNTER — EVALUATION (OUTPATIENT)
Dept: SPEECH THERAPY | Facility: CLINIC | Age: 70
End: 2020-08-18
Payer: MEDICARE

## 2020-08-18 ENCOUNTER — EVALUATION (OUTPATIENT)
Dept: OCCUPATIONAL THERAPY | Facility: CLINIC | Age: 70
End: 2020-08-18
Payer: MEDICARE

## 2020-08-18 ENCOUNTER — EVALUATION (OUTPATIENT)
Dept: PHYSICAL THERAPY | Facility: CLINIC | Age: 70
End: 2020-08-18
Payer: MEDICARE

## 2020-08-18 DIAGNOSIS — C34.12 MALIGNANT NEOPLASM OF UPPER LOBE OF LEFT LUNG (HCC): Primary | ICD-10-CM

## 2020-08-18 DIAGNOSIS — R48.8 OTHER SYMBOLIC DYSFUNCTIONS: Primary | ICD-10-CM

## 2020-08-18 DIAGNOSIS — C34.12 MALIGNANT NEOPLASM OF UPPER LOBE OF LEFT LUNG (HCC): ICD-10-CM

## 2020-08-18 DIAGNOSIS — R53.81 DEBILITY: Primary | ICD-10-CM

## 2020-08-18 DIAGNOSIS — J96.01 ACUTE RESPIRATORY FAILURE WITH HYPOXIA (HCC): ICD-10-CM

## 2020-08-18 PROCEDURE — 97162 PT EVAL MOD COMPLEX 30 MIN: CPT | Performed by: PHYSICAL THERAPIST

## 2020-08-18 PROCEDURE — 96125 COGNITIVE TEST BY HC PRO: CPT | Performed by: SPEECH-LANGUAGE PATHOLOGIST

## 2020-08-18 PROCEDURE — 97165 OT EVAL LOW COMPLEX 30 MIN: CPT

## 2020-08-18 NOTE — PROGRESS NOTES
OCCUPATIONAL THERAPY INITIAL EVALUATION:    8/18/2020  Shere Hoover  1950  [id number]  Bruna Grey MD  1  Debility        Subjective    "I dont want to do this"    PATIENT GOAL: "I just want to get back to life without hospitals and doctors visits"      Assessment/Plan    Skilled Analysis:  Pt is a 79 y o  male referred to Occupational Therapy s/p Debility [R53 81]  Pt participated in skilled OT evaluation and following formalized testing, presents with the following areas of deficit: cognitive impairments and decreased insight into deficits  Pt could benefit from skilled Occupational Therapy services 2x/week for 4-6 weeks with focus on cognitive retraining however at this time pt is deferring treatment and stating he would not like to participate in therapy to address his cognitive function  Pt expressed interest in only "returning back to normal life" which includes driving and indep with IADLs  edu provided on how participation in skilled OT can assist to improve that indep with preparation for F[employer] to drive testing if required by MD to clear for driving  Pt continued to refuse tx for aforementioned deficits  Will d/c OT services at this time    Goals:    n/a      Treatment Interventions  N/A        HISTORY OF PRESENT ILLNESS:     Pt is a 79 y o  male who was referred to Occupational Therapy s/p  Debility [R53 81]  Per chart review, pt is s/p malignant neoplasm of upper lobe of left lung, s/p VATS DOMI lobectomy on 6/10/2020, VAC to left chest wall on 6/15/2020, PEA arrest bedside cricothyroidotomy on 6/20/2020, tracheostomy 6/22/2020  In addition, pt has hypoxic brain injury, adenocarcinoma of DOMI s/p VATS, air lead s/p decompression, encephalopathy, dysphagia now tolerating an oral diet, PEA arrest s/p ACLS, tachycardia, agitation, DVT, acute respiratory failure s/p trach, and etoh  Following inpatient stay, pt was found to be below fxnl baseline and recommended for [facility] stay   Pt was d/c home last week and was recommended to f/u with OT, PT, and ST services to address deficits  Pt reports he lives with his dtr, son in law and grandchildren with "intermittent" supervision for ADLs and assist for IADL management from dtr  Pt states he is indpe with ADLs and mobility and states he is "back to normal"  PMH:   Past Medical History:   Diagnosis Date    Alcohol abuse 3/27/2020    Chest pain     Community acquired pneumonia 3/27/2020    Hypertension     Left upper lobe pulmonary nodule     Malignant neoplasm of upper lobe of left lung ([facility] [address] ) 2020               Pain Levels:     Restin    With Activity:  0    Objective    Impairment Observations:            Cognitive Function             SCORE DEFICIT RANGE Comments                                         COGNITIVE FXN                    MOCA (8 1)         Education Level = 12 years     Visuospatial/executive functioning      Naming 3/3     Memory: 1st trial:      Memory: 2nd trial:      Attention/concentration      List of letters:       Serial Seven Subtraction: 3/3     Language/sentence repetition:      Language Fluency:       Abstract/Correlational Thinkin/2     Delayed Recall:      Orientation:      Memory Index Score 7/15                                        Total Score  wnl neurocognitive fxn           CONTEXTUAL MEMORY TEST (CMT)        Pt reports has not noticed a change in his memory, rates his memory capacity at 75%, if studied 20 objects for 90 seconds would recall 10 of them, would have recalled 10 of them pre injury, frequently forgets things that happened the day before 25% of the time, forgets important details 25% of the time, frequently forgets things people have told him % of the time, frequently forgets things that happened a few minutes ago 25% of the time, would not remember facts about this form a week from now          Immediate Recall  Mild deficit    Delayed Recall  Moderate deficit    Visual Recognition 14/20 with 1 errors = 13/20                        INTERVENTION COMMENTS:  Diagnosis: Debility [R53 81]  Precautions: O2 sats  FOTO: n a  Insurance: Payor: Vinnie Reed / Plan: MEDICARE A AND B / Product Type: Medicare A & B Fee for Service /   1 of 10 visits, PN due n/a
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
## 2020-08-18 NOTE — PROGRESS NOTES
PT Evaluation     Today's date: 2020  Patient name: Quita Magaña  : 1950  MRN: [id number]  Referring provider: Gayla Pantoja MD  Dx: No diagnosis found  Assessment/Plan    Subjective Evaluation    History of Present Illness  Mechanism of injury: Pt reports having difficulty with balance which has improved  He had surgery on  for removal of lung cancer  He then was in acute rehab for about two weeks and was d/c home on   Pt reports being independent with ADLs  Denies dizziness, paresthesias or numbness  Pt feels his walking might not be as smooth as it had been  Denies fall, does report having near falls     Pain  No pain reported    Social Support  Steps to enter house: yes  Stairs in house: yes   Lives in: multiple-level home  Lives with: adult children and young children    Employment status: not working  Exercise history: na          Objective   Neuro Exam:     Functional outcomes   5x sit to stand: 11sec (seconds)             Precautions:       Manuals                                                                 Neuro Re-Ed                                                                                                        Ther Ex                                                                                                                     Ther Activity                                       Gait Training                                       Modalities

## 2020-08-26 ENCOUNTER — TELEPHONE (OUTPATIENT)
Dept: CARDIAC SURGERY | Facility: CLINIC | Age: 70
End: 2020-08-26

## 2020-08-27 ENCOUNTER — OFFICE VISIT (OUTPATIENT)
Dept: CARDIAC SURGERY | Facility: CLINIC | Age: 70
End: 2020-08-27

## 2020-08-27 VITALS
SYSTOLIC BLOOD PRESSURE: 141 MMHG | DIASTOLIC BLOOD PRESSURE: 89 MMHG | OXYGEN SATURATION: 97 % | BODY MASS INDEX: 31.16 KG/M2 | WEIGHT: 205.6 LBS | HEART RATE: 116 BPM | HEIGHT: 68 IN | TEMPERATURE: 99.1 F

## 2020-08-27 DIAGNOSIS — C34.12 MALIGNANT NEOPLASM OF UPPER LOBE OF LEFT LUNG (HCC): Primary | ICD-10-CM

## 2020-08-27 PROCEDURE — 99024 POSTOP FOLLOW-UP VISIT: CPT | Performed by: PHYSICIAN ASSISTANT

## 2020-08-27 NOTE — ASSESSMENT & PLAN NOTE
Mr Lior Esqueda is progressing well from a thoracic surgery standpoint  He is somewhat deconditioned, but is improving over time  His thoracoscopic and tracheostomy incision are well healed  We discussed his pathology, which was stage IIB (T3N0), secondary to some invasion into the chest wall and separate tumor within the same lobe  Dr Mims Feeling is recommending adjuvant chemoradiation therapy  The patient is not interested in pursuing additional treatment at this time  We will have him return in 1 month with a new CT scan, since he is over 2 months out from surgery  We will place referrals for med onc and rad onc, in the event he decides to schedule a consultation  He is in agreement with the plan

## 2020-08-27 NOTE — PROGRESS NOTES
Thoracic Follow-Up  Assessment/Plan:    Malignant neoplasm of upper lobe of left lung [facility])  Mr Noelle Lugo is progressing well from a thoracic surgery standpoint  He is somewhat deconditioned, but is improving over time  His thoracoscopic and tracheostomy incision are well healed  We discussed his pathology, which was stage IIB (T3N0), secondary to some invasion into the chest wall and separate tumor within the same lobe  Dr Shalonda Caba is recommending adjuvant chemoradiation therapy  The patient is not interested in pursuing additional treatment at this time  We will have him return in 1 month with a new CT scan, since he is over 2 months out from surgery  We will place referrals for med onc and rad onc, in the event he decides to schedule a consultation  He is in agreement with the plan  Diagnoses and all orders for this visit:    Malignant neoplasm of upper lobe of left lung (HCC)  -     Cancel: XR chest pa & lateral; Future             Thoracic History      Diagnosis: 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   Procedure: 1  Flexible bronchoscopy, cervical mediastinoscopy, left thoracoscopic upper lobectomy on 6/10/20 2  Flexible bronchoscopy, tracheostomy revision 6/22/20  Pathology: left upper lobe tumor measuring 6 0 x 4 7 x 4 7 cm revealing poorly differentiated invasive adenocarcinoma with separate tumor nodules in same lobe  + visceral pleural invasion, negative margins  All 22 lymph nodes - for carcinoma, including levels 2R,4R,2L,4L,5,6,7,8R,10R,11R  8th edition AJCC tumor stage IIB (pT3,N0)       Patient ID: Paula Winslow is a 79 y o  male  HPI    Mr Noelle Lugo was electively admitted on 6/10/20 to undergo a left thoracoscopic upper lobectomy  His post op course was complicated by progressive subcutaneous emphysema, requiring an incision and wound vac  He also had a rapid response secondary to bradycardia and desaturation, requiring a tracheostomy  He underwent a trach revision on 6/22/20  He was discharged to [facility] on 7/30/20  He was discharged home on 8/12/20  He returns today for his first post op appointment  He is feeling pretty well  He is having some generalized aches and pains, but no pain around incisions  He is walking on a daily basis, without any significant limitation  He also has a slight dry cough, but denies fever, chills, hemoptysis, chest pain, or shortness of breath at rest  He remains off cigarettes and had a pneumonia vaccine at his PCP's office  The following portions of the patient's history were reviewed and updated as appropriate: allergies, current medications, past family history, past medical history, past social history, past surgical history and problem list     Review of Systems      Objective:   Physical Exam  Vitals signs reviewed  Constitutional:       Appearance: Normal appearance  HENT:      Head: Normocephalic and atraumatic  Mouth/Throat:      Comments: masked  Eyes:      Extraocular Movements: Extraocular movements intact  Comments: glasses   Neck:      Musculoskeletal: Normal range of motion  Comments: Previous trach site healed well  Small area of healed granulation tissue  Cardiovascular:      Rate and Rhythm: Regular rhythm  Tachycardia present  Heart sounds: Normal heart sounds  Pulmonary:      Effort: Pulmonary effort is normal  No respiratory distress  Breath sounds: Normal breath sounds  No wheezing  Skin:     General: Skin is warm and dry  Comments: Left thoracoscopic incisions well healed   Neurological:      Mental Status: He is alert and oriented to person, place, and time     Psychiatric:         Mood and Affect: Mood normal       Comments: Having some anxiety      /89 (BP Location: Left arm, Patient Position: Sitting, Cuff Size: Large)   Pulse (!) 116   Temp 99 1 °F (37 3 °C)   Ht 5' 8" (1 727 m)   Wt 93 3 kg (205 lb 9 6 oz)   SpO2 97%   BMI 31 26 kg/m²     Xr Chest Pa & Lateral    Result Date: 8/1/2020  Impression Status post removal of lines, catheters and tubes  No pneumothorax  Resolved opacity in the left lung with stable perihilar scarring and postsurgical changes of the left upper lobe   Workstation performed: [id number]

## 2020-08-28 ENCOUNTER — TELEPHONE (OUTPATIENT)
Dept: OTHER | Facility: HOSPITAL | Age: 70
End: 2020-08-28

## 2020-09-01 ENCOUNTER — TELEPHONE (OUTPATIENT)
Dept: HEMATOLOGY ONCOLOGY | Facility: CLINIC | Age: 70
End: 2020-09-01

## 2020-09-01 NOTE — TELEPHONE ENCOUNTER
New Patient Encounter    New Patient Intake Form   Patient Details:  Siria Joseph  1950  [id number]    Background Information:  [address] starts by opening a telephone encounter and gathering the following information   Who is calling to schedule? If not self, relationship to patient? Pawel Cavanaugh   Referring Provider Dustin Max   What is the diagnosis? Lung ca   Is this diagnosis confirmed? Yes   When was the diagnosis? 3/2020   Is there a confirmed diagnosis from a biopsy/tissue reviewed by pathology? yes   Were outside slides requested? No   Is patient aware of diagnosis? Yes   Is there a personal history and what kind? no   Is there a family history and what kind? Unknown    Reason for visit? New Diagnosis   Have you had any imaging or labs done? If so: when, where? yes  [facility]   Are records in [email or web address]? yes   If patient has a prior history of breast cancer were old records obtained? NA   Was the patient told to bring a disk? no   Does the patient smoke or Vape? yes   If yes, how many packs or cartridges per day? Quit 5/2020 1 PPD x50 yrs   Scheduling Information:   Preferred [place]: [facility]     Are there any dates/time the patient cannot be seen? Pt prefers to wait until October, may cancel after having CT in Sept   Miscellaneous:    After completing the above information, please route to Financial Counselor and the appropriate Nurse Navigator for review

## 2020-09-04 ENCOUNTER — TELEPHONE (OUTPATIENT)
Dept: PALLIATIVE MEDICINE | Facility: CLINIC | Age: 70
End: 2020-09-04

## 2020-09-04 NOTE — TELEPHONE ENCOUNTER
Pt has active medicare part A & B  part A effective 06/01/18  [place] [employer] part B effective 07/01/20  [place] [employer] pt also has an active supplemental plan G thru [address]   Called the medicare automated line & pt has met the $19 part B deduct so this plan will pay the 20% that medicare doesn't pay

## 2020-09-21 ENCOUNTER — HOSPITAL ENCOUNTER (OUTPATIENT)
Dept: RADIOLOGY | Facility: MEDICAL CENTER | Age: 70
Discharge: HOME | End: 2020-09-21
Payer: MEDICARE

## 2020-09-21 DIAGNOSIS — C34.12 MALIGNANT NEOPLASM OF UPPER LOBE OF LEFT LUNG (HCC): ICD-10-CM

## 2020-09-21 PROCEDURE — 71250 CT THORAX DX C-: CPT

## 2020-09-21 PROCEDURE — G1004 CDSM NDSC: HCPCS

## 2020-09-24 ENCOUNTER — OFFICE VISIT (OUTPATIENT)
Dept: CARDIAC SURGERY | Facility: CLINIC | Age: 70
End: 2020-09-24
Payer: MEDICARE

## 2020-09-24 ENCOUNTER — DOCUMENTATION (OUTPATIENT)
Dept: CARDIAC SURGERY | Facility: CLINIC | Age: 70
End: 2020-09-24

## 2020-09-24 VITALS
WEIGHT: 203.6 LBS | DIASTOLIC BLOOD PRESSURE: 102 MMHG | OXYGEN SATURATION: 91 % | HEART RATE: 99 BPM | HEIGHT: 68 IN | BODY MASS INDEX: 30.86 KG/M2 | SYSTOLIC BLOOD PRESSURE: 180 MMHG | TEMPERATURE: 95.7 F

## 2020-09-24 DIAGNOSIS — C34.12 MALIGNANT NEOPLASM OF UPPER LOBE OF LEFT LUNG (HCC): Primary | ICD-10-CM

## 2020-09-24 PROCEDURE — 99213 OFFICE O/P EST LOW 20 MIN: CPT | Performed by: PHYSICIAN ASSISTANT

## 2020-09-24 NOTE — PROGRESS NOTES
I met with Fanny Butt at his visit with Dr Kathlean Holstein  I introduced myself and explained my role to him  Questions answered, support provided  He eas given my business card for any future questions or concenrs

## 2020-09-24 NOTE — ASSESSMENT & PLAN NOTE
We had a discussion with Mr Aleta Roman regarding his most recent imaging  There is a sub-centimeter right lower lobe lung nodule now seen  However, it is difficult to determine whether it was present on the last CT scan from 3 months ago, since he had acute pulmonary changes at that time  The differential could include infection remnant or lymph node  There is also a pre vascular soft tissue mass, which was present on his last scan, but possibly slightly larger now  We will have him presented at [facility] and call him with the recommendations  For now, we will schedule a follow up CT scan in 3 months  The patient and his daughter are in agreement with the plan

## 2020-09-24 NOTE — PROGRESS NOTES
Thoracic Follow-Up  Assessment/Plan:    Malignant neoplasm of upper lobe of left lung [facility])  We had a discussion with Mr Nico Cespedes regarding his most recent imaging  There is a sub-centimeter right lower lobe lung nodule now seen  However, it is difficult to determine whether it was present on the last CT scan from 3 months ago, since he had acute pulmonary changes at that time  The differential could include infection remnant or lymph node  There is also a pre vascular soft tissue mass, which was present on his last scan, but possibly slightly larger now  We will have him presented at 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   Procedure: 1  Flexible bronchoscopy, cervical mediastinoscopy, left thoracoscopic upper lobectomy on 6/10/20 2  Flexible bronchoscopy, tracheostomy revision 6/22/20  Pathology: left upper lobe tumor measuring 6 0 x 4 7 x 4 7 cm revealing poorly differentiated invasive adenocarcinoma with separate tumor nodules in same lobe  + visceral pleural invasion, negative margins  All 22 lymph nodes - for carcinoma, including levels 2R,4R,2L,4L,5,6,7,8R,10R,11R  8th edition AJCC tumor stage IIB (pT3,N0)       Patient ID: Wilfrido Bowser is a 79 y o  male  HPI   Mr Nico Cespedes is a 78 yo gentleman who underwent a left thoracoscopic upper lobectomy on 6/10/20 for stage IIB adenocarcinoma  His post op course was complicated by severe subcutaneous emphysema and bradycardia/ desaturation requiring tracheostomy  He was discharged to [facility] on 7/30/20 and discharged to home on 8/12/20   He was last seen in our office on 8/27/20 at which point he was feeling pretty well  He had a slight dry cough and some general aches and pains  He had remained off cigarettes  We had recommended consideration for adjuvant chemotherapy, but the patient was not interested at the time  We had recommended a follow up visit in one month with a new CT  He returns today with a CT from 9/21/20, which revealed a new 0 7 cm right lower lobe nodule, with additional small nodules all unchanged since 3/2020  There is also a pre vascular soft tissue lesion measuring 2 6 x 2 8 cm similar ot 6/29/20, but slightly increased since 3/2020  He has an appt with Dr Elizabeth Whalen is 9/28/20 and Dr Sandra Wheeler is 11/2/20, since he wanted to wait for that appt  He returns today, 3 months out from resection and on discussion, he is feeling somewhat anxious recently  He can no longer have caffeine and feels the patches are also making his anxiety worse  He remains off cigarettes  He denies fever, chills, hemoptysis, dizziness, new bone pains, or weight loss  He has a slight cough and some ankle pain in the morning  The following portions of the patient's history were reviewed and updated as appropriate: allergies, current medications, past family history, past medical history, past social history, past surgical history and problem list     Review of Systems      Objective:   Physical Exam  Vitals signs reviewed  Constitutional:       Appearance: Normal appearance  Comments: ECOG 1   HENT:      Head: Normocephalic and atraumatic  Mouth/Throat:      Comments: Masked   Eyes:      Extraocular Movements: Extraocular movements intact  Comments: glasses   Neck:      Musculoskeletal: Normal range of motion and neck supple  Comments: Previous trach site well healed   Cardiovascular:      Rate and Rhythm: Normal rate and regular rhythm  Heart sounds: No murmur  Pulmonary:      Effort: Pulmonary effort is normal  No respiratory distress  Breath sounds: Normal breath sounds  No wheezing  Lymphadenopathy:      Cervical: No cervical adenopathy  Skin:     General: Skin is warm and dry  Comments: Left thoracoscopic incisions well healed    Neurological:      Mental Status: He is alert and oriented to person, place, and time  Psychiatric:         Mood and Affect: Mood normal        BP (!) 180/102 (BP Location: Left arm, Patient Position: Sitting, Cuff Size: Large)   Pulse 99   Temp (!) 95 7 °F (35 4 °C)   Ht 5' 8" (1 727 m)   Wt 92 4 kg (203 lb 9 6 oz)   SpO2 91%   BMI 30 96 kg/m²      Ct Chest Wo Contrast    Result Date: 9/23/2020  Narrative CT CHEST WITHOUT IV CONTRAST INDICATION:   C34 12: Malignant neoplasm of upper lobe, left bronchus or lung  post a VATS left upper lobectomy 06/10/2020  staged as IIB secondary to both the size of his tumor and a satellite metastasis identified in the left upper lobe  COMPARISON:  None  TECHNIQUE: CT examination of the chest was performed without intravenous contrast   Axial, sagittal, and coronal 2D reformatted images were created from the source data and submitted for interpretation  Radiation dose length product (DLP) for this visit:  444 mGy-cm   This examination, like all CT scans performed in the [facility], was performed utilizing techniques to minimize radiation dose exposure, including the use of iterative reconstruction and automated exposure control  FINDINGS: LUNGS:  Patient is status post left upper lobectomy  Emphysematous changes are noted throughout the lungs  Calcified granuloma in the right lower lobe  In the right lower lobe, there is a peripherally located 0 7 cm solid pulmonary nodule (series 3, image 87), which is not definitively visualized on prior examinations  0 3 cm right upper lobe pulmonary nodule is unchanged since 3/27/2020 (series 3, image 53)  0 5 cm pulmonary nodule in the base of the left lower lobe is unchanged since study of 3/27/2020 (series 3, image 74)  PLEURA:  Unremarkable   HEART/GREAT VESSELS:  Atherosclerotic changes are noted in thoracic aorta and coronary arteries  MEDIASTINUM AND ALYSE:  Prevascular structure measures 2 6 x 2 8 cm in greatest axial dimensions, similar in size to study of 6/29/2020, but appear slightly increased in size since study of 3/27/2020  There are scattered small additional mediastinal and hilar subcentimeter lymph nodes, nonspecific  CHEST WALL AND LOWER NECK:   Unremarkable  VISUALIZED STRUCTURES IN THE UPPER ABDOMEN:  Cholelithiasis  Diastases of the abdominal wall is unchanged  OSSEOUS STRUCTURES:  No acute fracture or destructive osseous lesion  Impression Postoperative changes status post left upper lobectomy  New 0 7 cm solid pulmonary nodule in the right lower lobe, raising suspicion for metastatic disease  Additional smaller pulmonary nodules appear unchanged since study of 3/27/2020  Prevascular structure measuring lower attenuation than soft tissue measuring 2 6 x 2 8 cm, similar in size to study of 6/29/2020 but slightly increased in size since study of 3/27/2020  This could represent an enlarged lymph node or other structure  The study was marked in EPIC for significant notification   Workstation performed: [id number]

## 2020-09-28 ENCOUNTER — CLINICAL SUPPORT (OUTPATIENT)
Dept: RADIATION ONCOLOGY | Facility: HOSPITAL | Age: 70
End: 2020-09-28
Attending: RADIOLOGY
Payer: MEDICARE

## 2020-09-28 ENCOUNTER — DOCUMENTATION (OUTPATIENT)
Dept: CARDIAC SURGERY | Facility: CLINIC | Age: 70
End: 2020-09-28

## 2020-09-28 ENCOUNTER — TELEPHONE (OUTPATIENT)
Dept: CARDIAC SURGERY | Facility: CLINIC | Age: 70
End: 2020-09-28

## 2020-09-28 VITALS
TEMPERATURE: 98.1 F | WEIGHT: 205.2 LBS | DIASTOLIC BLOOD PRESSURE: 82 MMHG | RESPIRATION RATE: 20 BRPM | OXYGEN SATURATION: 98 % | HEART RATE: 88 BPM | HEIGHT: 68 IN | SYSTOLIC BLOOD PRESSURE: 122 MMHG | BODY MASS INDEX: 31.1 KG/M2

## 2020-09-28 DIAGNOSIS — C34.12 MALIGNANT NEOPLASM OF UPPER LOBE OF LEFT LUNG (HCC): Primary | ICD-10-CM

## 2020-09-28 DIAGNOSIS — C34.12 MALIGNANT NEOPLASM OF UPPER LOBE OF LEFT LUNG (HCC): ICD-10-CM

## 2020-09-28 PROCEDURE — 99211 OFF/OP EST MAY X REQ PHY/QHP: CPT | Performed by: RADIOLOGY

## 2020-09-28 NOTE — PROGRESS NOTES
Sherice Jimenes 1950 is a 79 y o  male      Patient presents for radiation consult for left lung cancer, referred by Dr Rosa Maria Valenzuela  79year old male presented to the ED 3/27/20 with complaints of chest pain  CTA of the chest and abdomen to rule out dissection revealed  a left upper lobe mass measuring 4 5 x 4 3 x 4 8 cm was seen concerning for lung cancer  Imaging also showed evidence of left upper lobe pneumonia, bronchitis and 4 4 cm ascending thoracic aneurysm  COVID-19 was negative  History of smoking abuse, smoking 1 ppd x35 years,  alcohol abuse, drinking 1 bottle whiskey per day  PMH of HTN       4/12/20 PET CT  ADDENDUM: The apical lung mass is on the LEFT, not right  IMPRESSION:  1   4 4 x 5 4 cm right apical lung mass demonstrates intense FDG activity, most compatible with malignancy  This mass extends to the adjacent pleural surface and left suprahilar region  Tissue sampling recommended  2   No right hilar or mediastinal hypermetabolic adenopathy  3   No hypermetabolic metastases visualized in the neck, abdomen, pelvis  4   Aneurysmal ascending thoracic aorta measuring 4 3 x 4 cm        4/29/20 CT- guided left upper lobe lung mass biopsy - Adenocarcinoma consistent with lung primary  5/12/20 PFT's  FEV1/FVC Ratio: 71 %  Forced Vital Capacity: 4 45 L    112 % predicted  FEV1: 3 14 L     104 % predicted       6/10 - 7/30/20 admitted for  bronchoscopy, mediastinoscopy, video-assisted thorascopic surgery and left upper lobe lobectomy  Post op course complicated by subcutaneous emphysema, prolonged air leak requiring wound VAC to left chest wall  6/20/20 he had rapid response for hypoxia and bradycardia, required intubation with emergent tracheostomy  Condition stablized and improved and was discharged to [facility] on 7/30/20  He was discharged to home on 8/12/20  Pathology: Moderately to poorly differentiated adenocarcinoma of the lung with tumor necrosis, measuring  6 0 x 4 7 x 4 5 cm  Separate focus  adenocarcinoma, lepidic pattern, 4 mm  Tumor invades the pleura into chest wall  Negative margins  The inked chest wall margin is negative for tumor by 1 mm  All 22 lymph nodes negative for carcinoma, including levels 2R,4R,2L,4L,5,6,7,8R,10R,11R    8th edition AJCC tumor stage IIB (pT3,N0)    Per OP Note: The entire lung was then mobilized using a combination of electrocautery and sharp dissection to divide adhesions to the mediastinum  The tumor was densely adherent to the anterior chest wall  This was taken down with Bovie electrocautery  Clips were left in place at the site of where the tumor was adherent to the chest wall  8/27/20 Dr Wendy Mullen post operative follow up   Deconditioned but improving, thoracoscopic and tracheostomy incision are well healed  Adjuvant chemotherapy and possible local radiation therapy was discussed, patient declined pursuing additional treatment at this time  Plan - return in 1 month with a new CT scan, since he is over 2 months out from surgery  Referrals to med onc and rad onc, in the event he decides to schedule a consultation  9/21/20 CT Chest  IMPRESSION:   Postoperative changes status post left upper lobectomy  New 0 7 cm solid pulmonary nodule in the right lower lobe, raising suspicion for metastatic disease  Additional smaller pulmonary nodules appear unchanged since study of 3/27/2020  Prevascular structure measuring lower attenuation than soft tissue measuring 2 6 x 2 8 cm, similar in size to study of 6/29/2020 but slightly increased in size since study of 3/27/2020  This could represent an enlarged lymph node or other structure  9/24/20 Dr Wendy Mullen follow up  Recent CT scan reviewed  Plan: present case at Lung tumor board, CT scan in 3 months    Today, patient is accompanied by his daughter, Daniel Davis which he lives with  He reports that he hasn't smoked since prior to his lung surgery in July   He is using a nicotine pouch, Zyn, under the tongue several times a day  His last alcoholic intake was approximately one week ago  He reports occasional "heaviness" with breathing, sometimes at rest and with exertion  No hemoptysis  Appetite is stable and weight has improved since discharge to home on 20  Upcomin20 Dr Joe Veras  20 CT scan  20 Dr Toy López      Oncology History   Malignant neoplasm of upper lobe of left lung ([facility] [address] )   2020 Initial Diagnosis    Malignant neoplasm of upper lobe of left lung ([facility] [address] )     2020 Biopsy    Lung, Left upper lobe, Biopsy:  - Adenocarcinoma consistent with lung primary  6/10/2020 Surgery    Flexible bronchoscopy, cervical mediastinoscopy, left thoracoscopic upper lobectomy   Surgeons: Dr Michael Jeronimo and Dr Svitlana Sheehan  Lymph Node, level 4R biopsy  -Fragments of lymph node with anthracotic pigmentation, negative for malignancy, confirmed by Pankeratin MCK stain (0/1)  B  Lymph Node, Level 2R biopsy  -Fragments of lymph node with anthracotic pigmentation, negative for malignancy (0/1)  C  Lymph Node, Level 4L #1 biopsy  -Fragments of lymph node with anthracotic pigmentation, negative for malignancy (0/1)  D  Lymph Node, Level 4L #2 biopsy  -Fragments of lymph node with anthracotic pigmentation, negative for malignancy, confirmed by Pankeratin MCK stain (0/1)  E  Lymph Node, Level 7 biopsy  -Fragments of lymph node with anthracotic pigmentation, negative for malignancy, confirmed by Pankeratin MCK stain (0/1)  F  Lymph Node, Level 2L biopsy  -Fragments of fatty lymph node with anthracotic pigmentation, negative for malignancy (0/1)  G  Lymph Node, Level 10 posterior biopsy  -Fragments of lymph node with anthracotic pigmentation, negative for malignancy (0/1)  H  Lymph Node, Level 8 biopsy  -Fragments of lymph node with anthracotic pigmentation, negative for malignancy, confirmed by Pankeratin MCK stain (0/1)       I  Lymph Node, Level 10 anterior biopsy  -Fragments of lymph node with anthracotic pigmentation, negative for malignancy (0/1)  J  Lymph Node, Level 11 biopsy  -Fragments of lymph node with anthracotic pigmentation, negative for malignancy (0/1)  K  Lung, left upper lobe, lobectomy  :  -Moderately to poorly differentiated adenocarcinoma of the lung with tumor necrosis, measuring  6 0 x 4 7 x 4 5 cm   -Separate focus  adenocarcinoma, lepidic pattern, 4 mm   -Tumor invades the pleura into chest wall  -Foci of Intraparenchymal hemorrhage and hemosiderin laden macrophages seen  -Mild centrilobular emphysematous changes   -Moderate interstitial chronic inflammation  -Twelve hilar lymph nodes, negative for malignancy, confirmed by Pankeratin MCK stain (0/12)  -The inked chest wall margin is negative for tumor by 1 mm  -The bronchial , vascular and stapled parenchyma margins are negative for  tumor by 2 0 cm     L  Lymph Node, Level 5, biopsy  -Single lymph node with anthracotic pigmentation, ,negative for malignancy, confirmed by Pankeratin MCK stain (0/1)  M  Lymph Node, Level 6, biopsy  -Single lymph node with anthracotic pigmentation, negative for malignancy (0/1)                Clinical Trial: no      Health Maintenance   Topic Date Due    Hepatitis C Screening  1950    Medicare Annual Wellness Visit (AWV)  1950    PT PLAN OF CARE  1950    Depression Screening PHQ  07/26/1962    BMI: Followup Plan  07/26/1968    DTaP,Tdap,and Td Vaccines (1 - Tdap) 07/26/1971    Colorectal Cancer Screening  07/26/2000    Pneumococcal Vaccine: 65+ Years (1 of 1 - PPSV23) 07/26/2015    Influenza Vaccine  07/01/2020    OT PLAN OF CARE  09/17/2020    SLP PLAN OF CARE  09/17/2020    Fall Risk  08/18/2021    BMI: Adult  09/28/2021    HIB Vaccine  Aged Out    Hepatitis B Vaccine  Aged Out    IPV Vaccine  Aged Out    Hepatitis A Vaccine  Aged Out    Meningococcal ACWY Vaccine  Aged Out    HPV Vaccine  Aged Out Past Medical History:   Diagnosis Date    Alcohol abuse 3/27/2020    Chest pain     Community acquired pneumonia 3/27/2020    DVT (deep venous thrombosis) ([facility] [address] )     Hypertension     Malignant neoplasm of upper lobe of left lung ([facility] [address] ) 2020    Stage IIB       Past Surgical History:   Procedure Laterality Date    COLON SURGERY  2009    COLONOSCOPY      CT NEEDLE BIOPSY LUNG  2020    MD BRONCHOSCOPY,DIAGNOSTIC N/A 6/10/2020    Procedure: BRONCHOSCOPY FLEXIBLE;  Surgeon: Marlen Schroeder MD;  [facility];  Service: Thoracic    MD MEDIASTINOSCOPY WITH LYMPH NODE BIOPSY/IES N/A 6/10/2020    Procedure: MEDIASTINOSCOPY;  Surgeon: Marlen Schroeder MD;  [facility];  Service: Thoracic    MD THORACOSCOPY SURG LOBECTOMY Left 6/10/2020    Procedure: THORACOSCOPY VIDEO ASSISTED SURGERY (VATS);   Surgeon: Marlen Schroeder MD;  [facility];  Service: Thoracic    MD THORACOSCOPY SURG LOBECTOMY Left 6/10/2020    Procedure: UPPER LOBECTOMY OF LUNG; MEDIASTINAL  LYMPH NODE DISSECTION;  Surgeon: Marlen Schroeder MD;  [facility];  Service: Thoracic    TRACHEOSTOMY N/A 2020    Procedure: TRACHEOSTOMY REVISION, BRONCHOSCOPY;  Surgeon: Marlen Schroeder MD;  [facility];  Service: Thoracic    WRIST SURGERY Right     orif       Family History   Problem Relation Age of Onset    Ovarian cancer Mother     Liver cancer Father     Cancer Maternal Grandmother        Social History     Tobacco Use    Smoking status: Former Smoker     Packs/day: 1 00     Years: 55 00     Pack years: 55 00     Types: Cigarettes     Last attempt to quit: 2020     Years since quittin 2    Smokeless tobacco: Never Used   Substance Use Topics    Alcohol use: Not Currently     Frequency: Monthly or less     Drinks per session: 1 or 2     Binge frequency: Never     Comment: Last alocohol intake was approximately 20    Drug use: Not Currently Current Outpatient Medications:     apixaban (ELIQUIS) 5 mg, Take 2 tablets (10mg) twice a day for 5 days, then transition to 1 tablet (5mg) twice a day for 2 months , Disp: 140 tablet, Rfl: 0    metoprolol tartrate (LOPRESSOR) 25 mg tablet, Take 1 tablet (25 mg total) by mouth every 12 (twelve) hours, Disp: 60 tablet, Rfl: 0    acetaminophen (TYLENOL) 325 mg tablet, Take 2 tablets (650 mg total) by mouth every 6 (six) hours as needed for mild pain (Patient not taking: Reported on 9/28/2020), Disp: 30 tablet, Rfl: 0    No Known Allergies     Review of Systems:  Review of Systems   Constitutional: Positive for appetite change (slightly decreased, feels it's related to using nicotene pouch ) and fatigue (decreased)  Negative for chills, fever and unexpected weight change  HENT: Positive for hearing loss and postnasal drip (chronic)  Negative for ear pain, tinnitus, trouble swallowing and voice change  Eyes: Negative  Wears glasses   Respiratory: Positive for cough (sometimes) and shortness of breath (heavier breathing sometimes at rest and with activity)  Negative for wheezing  Cardiovascular: Negative  Gastrointestinal: Negative  Endocrine: Negative  Genitourinary: Negative  Musculoskeletal: Negative  Skin: Negative  Allergic/Immunologic: Negative  Neurological: Negative  Hematological: Negative  Psychiatric/Behavioral: Negative  Vitals:    09/28/20 0928   BP: 122/82   Pulse: 88   Resp: 20   Temp: 98 1 °F (36 7 °C)   TempSrc: Temporal   SpO2: 98%   Weight: 93 1 kg (205 lb 3 2 oz)   Height: 5' 8" (1 727 m)       Pain Score: 0-No pain    Pain assessment: 0    Imaging:No images are attached to the encounter       Teaching; NCI RT packet given    MST completed

## 2020-09-28 NOTE — PROGRESS NOTES
Consultation - Radiation Oncology      WVK:[id number] : 1950  Encounter: [id number]  Patient Information: Shelbi Puckett      CHIEF COMPLAINT  Chief Complaint   Patient presents with    Consult     radiation oncology     Cancer Staging  Malignant neoplasm of upper lobe of left lung [facility])  Staging form: Lung, AJCC 8th Edition  - Clinical: No stage assigned - Unsigned           History of Present Illness   Shelbi Puckett is a 79y o  year old male who presents with Patient presents for radiation consult for left lung cancer, referred by Dr Phan Landis      79year old male presented to the ED 3/27/20 with complaints of chest pain  CTA of the chest and abdomen to rule out dissection revealed  a left upper lobe mass measuring 4 5 x 4 3 x 4 8 cm was seen concerning for lung cancer  Imaging also showed evidence of left upper lobe pneumonia, bronchitis and 4 4 cm ascending thoracic aneurysm  COVID-19 was negative      History of smoking abuse, smoking 1 ppd x35 years,  alcohol abuse, drinking 1 bottle whiskey per day  PMH of HTN        20 PET CT  ADDENDUM: The apical lung mass is on the LEFT, not right  IMPRESSION:  1   4 4 x 5 4 cm right apical lung mass demonstrates intense FDG activity, most compatible with malignancy   This mass extends to the adjacent pleural surface and left suprahilar region   Tissue sampling recommended  2   No right hilar or mediastinal hypermetabolic adenopathy  3   No hypermetabolic metastases visualized in the neck, abdomen, pelvis  4   Aneurysmal ascending thoracic aorta measuring 4 3 x 4 cm         20 CT- guided left upper lobe lung mass biopsy - Adenocarcinoma consistent with lung primary      20 PFT's  FEV1/FVC Ratio: 71 %  Forced Vital Capacity: 4 45 L    112 % predicted  FEV1: 3 14 L     104 % predicted        6/10 - 20 admitted for  bronchoscopy, mediastinoscopy, video-assisted thorascopic surgery and left upper lobe lobectomy   Post op course complicated by subcutaneous emphysema, prolonged air leak requiring wound VAC to left chest wall  6/20/20 he had rapid response for hypoxia and bradycardia, required intubation with emergent tracheostomy  Condition stablized and improved and was discharged to [facility] on 7/30/20  He was discharged to home on 8/12/20      Pathology: Moderately to poorly differentiated adenocarcinoma of the lung with tumor necrosis, measuring  6 0 x 4 7 x 4 5 cm  Separate focus  adenocarcinoma, lepidic pattern, 4 mm  Tumor invades the pleura into chest wall  Negative margins  The inked chest wall margin is negative for tumor by 1 mm  All 22 lymph nodes negative for carcinoma, including levels 2R,4R,2L,4L,5,6,7,8R,10R,11R    8th edition AJCC tumor stage IIB (pT3,N0)     Per OP Note: The entire lung was then mobilized using a combination of electrocautery and sharp dissection to divide adhesions to the mediastinum  The tumor was densely adherent to the anterior chest wall  This was taken down with Bovie electrocautery  Clips were left in place at the site of where the tumor was adherent to the chest wall    8/27/20 Dr Abraham Francisco post operative follow up   Deconditioned but improving, thoracoscopic and tracheostomy incision are well healed  Adjuvant chemotherapy and possible local radiation therapy was discussed, patient declined pursuing additional treatment at this time  Plan - return in 1 month with a new CT scan, since he is over 2 months out from surgery   Referrals to med onc and rad onc, in the event he decides to schedule a consultation      9/21/20 CT Chest  IMPRESSION:   Postoperative changes status post left upper lobectomy      New 0 7 cm solid pulmonary nodule in the right lower lobe, raising suspicion for metastatic disease   Additional smaller pulmonary nodules appear unchanged since study of 3/27/2020      Prevascular structure measuring lower attenuation than soft tissue measuring 2 6 x 2 8 cm, similar in size to study of 2020 but slightly increased in size since study of 3/27/2020   This could represent an enlarged lymph node or other structure         20 Dr Archie Tran follow up  Recent CT scan reviewed  Plan: present case at Lung tumor board, CT scan in 3 months     Today, patient is accompanied by his daughter, Natasha Munson which he lives with  He reports that he hasn't smoked since prior to his lung surgery in July  He is using a nicotine pouch, Zyn, under the tongue several times a day  His last alcoholic intake was approximately one week ago  He reports occasional "heaviness" with breathing, sometimes at rest and with exertion  No hemoptysis  Appetite is stable and weight has improved since discharge to home on 20           Upcomin20 Dr Lee Snare  20 CT scan  20 Dr Amish Pretty   Oncology History   Malignant neoplasm of upper lobe of left lung ([facility] [address] )   2020 Initial Diagnosis    Malignant neoplasm of upper lobe of left lung ([facility] [address] )     2020 Biopsy    Lung, Left upper lobe, Biopsy:  - Adenocarcinoma consistent with lung primary  6/10/2020 Surgery    Flexible bronchoscopy, cervical mediastinoscopy, left thoracoscopic upper lobectomy   Surgeons: Dr Andrew Koroma and Dr Keyonna Guerrero  Lymph Node, level 4R biopsy  -Fragments of lymph node with anthracotic pigmentation, negative for malignancy, confirmed by Pankeratin MCK stain (0/1)  B  Lymph Node, Level 2R biopsy  -Fragments of lymph node with anthracotic pigmentation, negative for malignancy (0/1)  C  Lymph Node, Level 4L #1 biopsy  -Fragments of lymph node with anthracotic pigmentation, negative for malignancy (0/1)  D  Lymph Node, Level 4L #2 biopsy  -Fragments of lymph node with anthracotic pigmentation, negative for malignancy, confirmed by Pankeratin MCK stain (0/1)       E  Lymph Node, Level 7 biopsy  -Fragments of lymph node with anthracotic pigmentation, negative for malignancy, confirmed by Pankeratin MCK stain (0/1)  F  Lymph Node, Level 2L biopsy  -Fragments of fatty lymph node with anthracotic pigmentation, negative for malignancy (0/1)  G  Lymph Node, Level 10 posterior biopsy  -Fragments of lymph node with anthracotic pigmentation, negative for malignancy (0/1)  H  Lymph Node, Level 8 biopsy  -Fragments of lymph node with anthracotic pigmentation, negative for malignancy, confirmed by Pankeratin MCK stain (0/1)  I  Lymph Node, Level 10 anterior biopsy  -Fragments of lymph node with anthracotic pigmentation, negative for malignancy (0/1)  J  Lymph Node, Level 11 biopsy  -Fragments of lymph node with anthracotic pigmentation, negative for malignancy (0/1)  K  Lung, left upper lobe, lobectomy  :  -Moderately to poorly differentiated adenocarcinoma of the lung with tumor necrosis, measuring  6 0 x 4 7 x 4 5 cm   -Separate focus  adenocarcinoma, lepidic pattern, 4 mm   -Tumor invades the pleura into chest wall  -Foci of Intraparenchymal hemorrhage and hemosiderin laden macrophages seen  -Mild centrilobular emphysematous changes   -Moderate interstitial chronic inflammation  -Twelve hilar lymph nodes, negative for malignancy, confirmed by Pankeratin MCK stain (0/12)  -The inked chest wall margin is negative for tumor by 1 mm  -The bronchial , vascular and stapled parenchyma margins are negative for  tumor by 2 0 cm     L  Lymph Node, Level 5, biopsy  -Single lymph node with anthracotic pigmentation, ,negative for malignancy, confirmed by Pankeratin MCK stain (0/1)  M  Lymph Node, Level 6, biopsy  -Single lymph node with anthracotic pigmentation, negative for malignancy (0/1)                  Past Medical History:   Diagnosis Date    Alcohol abuse 3/27/2020    Chest pain     Community acquired pneumonia 3/27/2020    DVT (deep venous thrombosis) (HCC)     Hypertension     Malignant neoplasm of upper lobe of left lung ([facility] [address] ) 05/07/2020    Stage IIB     Past Surgical History: Procedure Laterality Date    COLON SURGERY      COLONOSCOPY      CT NEEDLE BIOPSY LUNG  2020    WI BRONCHOSCOPY,DIAGNOSTIC N/A 6/10/2020    Procedure: BRONCHOSCOPY FLEXIBLE;  Surgeon: Radhika Mayo MD;  [facility];  Service: Thoracic    WI MEDIASTINOSCOPY WITH LYMPH NODE BIOPSY/IES N/A 6/10/2020    Procedure: MEDIASTINOSCOPY;  Surgeon: Radhika Mayo MD;  [facility];  Service: Thoracic    WI THORACOSCOPY SURG LOBECTOMY Left 6/10/2020    Procedure: THORACOSCOPY VIDEO ASSISTED SURGERY (VATS);   Surgeon: Radhika Mayo MD;  [facility];  Service: Thoracic    WI THORACOSCOPY SURG LOBECTOMY Left 6/10/2020    Procedure: UPPER LOBECTOMY OF LUNG; MEDIASTINAL  LYMPH NODE DISSECTION;  Surgeon: Radhika Mayo MD;  [facility];  Service: Thoracic    TRACHEOSTOMY N/A 2020    Procedure: TRACHEOSTOMY REVISION, BRONCHOSCOPY;  Surgeon: Radhika Mayo MD;  [facility];  Service: Thoracic    WRIST SURGERY Right     orif       Family History   Problem Relation Age of Onset    Ovarian cancer Mother     Liver cancer Father     Cancer Maternal Grandmother        Social History   Social History     Substance and Sexual Activity   Alcohol Use Not Currently    Frequency: Monthly or less    Drinks per session: 1 or 2    Binge frequency: Never    Comment: Last alocohol intake was approximately 20     Social History     Substance and Sexual Activity   Drug Use Not Currently     Social History     Tobacco Use   Smoking Status Former Smoker    Packs/day: 1 00    Years: 55 00    Pack years: 55 00    Types: Cigarettes    Last attempt to quit: 2020    Years since quittin 2   Smokeless Tobacco Never Used         Meds/Allergies     Current Outpatient Medications:     apixaban (ELIQUIS) 5 mg, Take 2 tablets (10mg) twice a day for 5 days, then transition to 1 tablet (5mg) twice a day for 2 months , Disp: 140 tablet, Rfl: 0   metoprolol tartrate (LOPRESSOR) 25 mg tablet, Take 1 tablet (25 mg total) by mouth every 12 (twelve) hours, Disp: 60 tablet, Rfl: 0    acetaminophen (TYLENOL) 325 mg tablet, Take 2 tablets (650 mg total) by mouth every 6 (six) hours as needed for mild pain (Patient not taking: Reported on 9/28/2020), Disp: 30 tablet, Rfl: 0  No Known Allergies      Review of Systems   Constitutional: Positive for appetite change (slightly decreased, feels it's related to using nicotene pouch ) and fatigue (decreased)  Negative for chills, fever and unexpected weight change  HENT: Positive for hearing loss and postnasal drip (chronic)  Negative for ear pain, tinnitus, trouble swallowing and voice change  Eyes: Negative  Wears glasses   Respiratory: Positive for cough (sometimes) and shortness of breath (heavier breathing sometimes at rest and with activity)  Negative for wheezing  Cardiovascular: Negative  Gastrointestinal: Negative  Endocrine: Negative  Genitourinary: Negative  Musculoskeletal: Negative  Skin: Negative  Allergic/Immunologic: Negative  Neurological: Negative  Hematological: Negative  Psychiatric/Behavioral: Negative  OBJECTIVE:   /82   Pulse 88   Temp 98 1 °F (36 7 °C) (Temporal)   Resp 20   Ht 5' 8" (1 727 m)   Wt 93 1 kg (205 lb 3 2 oz)   SpO2 98%   BMI 31 20 kg/m²   Pain Assessment:  0  Performance Status: ECOG/Zubrod/WHO: 1 - Symptomatic but completely ambulatory    Physical Exam   His breathing is unlabored  He is ambulating independently  Conversing appropriately        RESULTS  Lab Results    Chemistry        Component Value Date/Time    K 4 1 08/10/2020 0522     (H) 08/10/2020 0522    CO2 24 08/10/2020 0522    CO2  06/20/2020 0534      Comment:      Out of Reportable Range    BUN 12 08/10/2020 0522    CREATININE 0 72 08/10/2020 0522        Component Value Date/Time    CALCIUM 8 9 08/10/2020 0522    ALKPHOS 96 07/20/2020 0519    AST 13 07/20/2020 0519 ALT 39 07/20/2020 0519            Lab Results   Component Value Date    WBC 6 73 08/10/2020    HGB 10 5 (L) 08/10/2020    HCT 34 1 (L) 08/10/2020    MCV 96 08/10/2020     (H) 08/10/2020         Imaging Studies  Ct Chest Wo Contrast    Result Date: 9/23/2020  Narrative: CT CHEST WITHOUT IV CONTRAST INDICATION:   C34 12: Malignant neoplasm of upper lobe, left bronchus or lung  post a VATS left upper lobectomy 06/10/2020  staged as IIB secondary to both the size of his tumor and a satellite metastasis identified in the left upper lobe  COMPARISON:  None  TECHNIQUE: CT examination of the chest was performed without intravenous contrast   Axial, sagittal, and coronal 2D reformatted images were created from the source data and submitted for interpretation  Radiation dose length product (DLP) for this visit:  444 mGy-cm   This examination, like all CT scans performed in the [facility], was performed utilizing techniques to minimize radiation dose exposure, including the use of iterative reconstruction and automated exposure control  FINDINGS: LUNGS:  Patient is status post left upper lobectomy  Emphysematous changes are noted throughout the lungs  Calcified granuloma in the right lower lobe  In the right lower lobe, there is a peripherally located 0 7 cm solid pulmonary nodule (series 3, image 87), which is not definitively visualized on prior examinations  0 3 cm right upper lobe pulmonary nodule is unchanged since 3/27/2020 (series 3, image 53)  0 5 cm pulmonary nodule in the base of the left lower lobe is unchanged since study of 3/27/2020 (series 3, image 74)  PLEURA:  Unremarkable  HEART/GREAT VESSELS:  Atherosclerotic changes are noted in thoracic aorta and coronary arteries  MEDIASTINUM AND ALYSE:  Prevascular structure measures 2 6 x 2 8 cm in greatest axial dimensions, similar in size to study of 6/29/2020, but appear slightly increased in size since study of 3/27/2020    There are scattered small additional mediastinal and hilar subcentimeter lymph nodes, nonspecific  CHEST WALL AND LOWER NECK:   Unremarkable  VISUALIZED STRUCTURES IN THE UPPER ABDOMEN:  Cholelithiasis  Diastases of the abdominal wall is unchanged  OSSEOUS STRUCTURES:  No acute fracture or destructive osseous lesion  Impression: Postoperative changes status post left upper lobectomy  New 0 7 cm solid pulmonary nodule in the right lower lobe, raising suspicion for metastatic disease  Additional smaller pulmonary nodules appear unchanged since study of 3/27/2020  Prevascular structure measuring lower attenuation than soft tissue measuring 2 6 x 2 8 cm, similar in size to study of 6/29/2020 but slightly increased in size since study of 3/27/2020  This could represent an enlarged lymph node or other structure  The study was marked in EPIC for significant notification   Workstation performed: [id number]         Pathology:  LUNG   Lung - All Specimens   8th Edition - Protocol posted: 8/28/2019   SPECIMEN    Procedure   Lobectomy    Specimen Laterality   Left    TUMOR    Tumor Site   Upper lobe of lung    Histologic Type   Invasive adenocarcinoma, acinar predominant    Other Subtypes Present   solid , mucinous , lepidic    Histologic Grade   G3: Poorly differentiated    Spread Through Air Spaces (CYNTHIA)   Present         Total Tumor Size (size of entire tumor)   Greatest Dimension (Centimeters): 6 0 cm    Additional Dimension (Centimeters)   4 7 cm      4 5 cm    Size of Invasive Component   Greatest Dimension (Centimeters): 6 0 cm    Tumor Focality   Separate tumor nodules (metastases) in same lobe (pT3)    Number of Intrapulmonary Metastases Identified   1    Visceral Pleura Invasion   Present    Direct Invasion of Adjacent Structures   Adjacent structures present and involved      Parietal pleura      Chest wall    Treatment Effect   No known presurgical therapy    Lymphovascular Invasion   Present      Lymphatic    MARGINS Margins   All margins are uninvolved by tumor    Margins Examined   Bronchial      Vascular      Parenchymal      chest wall    Distance of Invasive Carcinoma from Closest Margin (Centimeters)   At least: 0 1 cm    Closest Margin   chest wall    LYMPH NODES    Number of Lymph Nodes Involved   0    Number of Lymph Nodes Examined   22    Gunnar Stations Examined   2R: Upper paratracheal      4R: Lower paratracheal      8R: Para-esophageal (below mitchel)      10R: Hilar      11R: Interlobar      7: Subcarinal      2L: Upper paratracheal      4L: Lower paratracheal      5: Subaortic/ aortopulmonary (AP) / AP window      6: Para-aortic (ascending aorta or phrenic)    PATHOLOGIC STAGE CLASSIFICATION (pTNM, AJCC 8th Edition)    TNM Descriptors   m (multiple primary tumors)    Primary Tumor (pT)   pT3    Regional Lymph Nodes (pN)   pN0    ADDITIONAL FINDINGS    Additional Findings   Atypical adenomatous hyperplasia      Emphysema                 ASSESSMENT  1  Malignant neoplasm of upper lobe of left lung [facility])  Ambulatory referral to Radiation Oncology     Cancer Staging  Malignant neoplasm of upper lobe of left lung ([facility] [address] )  Staging form: Lung, AJCC 8th Edition  - Clinical: No stage assigned - Unsigned        PLAN/DISCUSSION  No orders of the defined types were placed in this encounter  Edward Knowles is a 79y o  year old male with T3 N0 non-small cell lung carcinoma presenting in the left upper lobe  He is status post lobectomy with a complicated postoperative course  He feels improved from a clinical standpoint currently  I reviewed with him his pathology which reveals chest wall invasion with very close margin  At the time of his surgery the tumor was adherent to his chest wall and clips were placed at this site of the close margin  I reviewed with him local radiation treatment to this site would likely reduce his risk of local recurrence  We reviewed CT simulation planning    We discussed a course of treatment to dose of 6000 centigray to this region  He understands the goal of treatment  We also reviewed his most recent CT of the chest which shows a 7 millimeter right lower lobe nodule  We discussed possible PET scan however this is likely under the limit of sensitivity for this study  Alternatively follow-up CT can be obtained in the next few months to assess for any growth  He has consult with Medical Oncology in November  His case will be reviewed at Lung tumor Board today  Of note he left stating that he is undecided if he would like to proceed with any treatment  We discussed that the thoracic nurse navigator will contact him regarding the group's recommendation  We will be available should he wish to proceed with radiation treatment and he understands that he would need CT simulation as his 1st step  He was here with his daughter today  Hellen Tyson MD  9/28/2020,10:50 AM      Portions of the record may have been created with voice recognition software  Occasional wrong word or "sound a like" substitutions may have occurred due to the inherent limitations of voice recognition software  Read the chart carefully and recognize, using context, where substitutions have occurred

## 2020-09-28 NOTE — TELEPHONE ENCOUNTER
I contacted Violetta Lover with Lung Tumor Board recommendations  I explained to her the group strongly recommended her father get local treatment RT to DOMI nodule  He should keep his appointment with Dr Adriana Basilio on 11/4/20 and have CTC done in 3 months  I offered Palliative Care referral that declined  All questions answered

## 2020-09-28 NOTE — PROGRESS NOTES
Patient was discussed at today's Thoracic Oncology Multidisciplinary Tumor Conference  Patient is S/P VATS left upper lobectomy  Pathology revealed Stage IIB T3, N0 adenocarcinoma, positive margins invading the plerua  Patient had extended hospital stay and rehabilitation  New imaging from 9/21 revealed a new contralateral 7mm RLL nodule, not present on 3/27/2020 CTC  RLL is oo small for PET/CT and to biopsy, recommendation is short interval CTC in 3 months for the RLL nodule  Patient should get local treatment in the form of radiation to the DOMI  Patient to keep appt with Dr Reji Burnham and should consider following up with Palliative care as well

## 2020-10-08 ENCOUNTER — DOCUMENTATION (OUTPATIENT)
Dept: INFUSION CENTER | Facility: CLINIC | Age: 70
End: 2020-10-08

## 2020-10-08 ENCOUNTER — RADIATION THERAPY TREATMENT (OUTPATIENT)
Dept: RADIATION ONCOLOGY | Facility: HOSPITAL | Age: 70
End: 2020-10-08
Attending: RADIOLOGY
Payer: MEDICARE

## 2020-10-08 PROCEDURE — 77334 RADIATION TREATMENT AID(S): CPT | Performed by: RADIOLOGY

## 2020-10-15 PROCEDURE — 77300 RADIATION THERAPY DOSE PLAN: CPT | Performed by: RADIOLOGY

## 2020-10-15 PROCEDURE — 77301 RADIOTHERAPY DOSE PLAN IMRT: CPT | Performed by: RADIOLOGY

## 2020-10-15 PROCEDURE — 77338 DESIGN MLC DEVICE FOR IMRT: CPT | Performed by: RADIOLOGY

## 2020-10-16 DIAGNOSIS — C34.12 MALIGNANT NEOPLASM OF UPPER LOBE OF LEFT LUNG (HCC): Primary | ICD-10-CM

## 2020-10-19 ENCOUNTER — APPOINTMENT (OUTPATIENT)
Dept: RADIATION ONCOLOGY | Facility: HOSPITAL | Age: 70
End: 2020-10-19
Attending: RADIOLOGY
Payer: MEDICARE

## 2020-10-20 ENCOUNTER — APPOINTMENT (OUTPATIENT)
Dept: RADIATION ONCOLOGY | Facility: HOSPITAL | Age: 70
End: 2020-10-20
Attending: RADIOLOGY
Payer: MEDICARE

## 2020-10-20 PROCEDURE — 77386 HB NTSTY MODUL RAD TX DLVR CPLX: CPT | Performed by: RADIOLOGY

## 2020-10-21 ENCOUNTER — APPOINTMENT (OUTPATIENT)
Dept: RADIATION ONCOLOGY | Facility: HOSPITAL | Age: 70
End: 2020-10-21
Attending: RADIOLOGY
Payer: MEDICARE

## 2020-10-21 PROCEDURE — 77386 HB NTSTY MODUL RAD TX DLVR CPLX: CPT | Performed by: RADIOLOGY

## 2020-10-22 ENCOUNTER — APPOINTMENT (OUTPATIENT)
Dept: RADIATION ONCOLOGY | Facility: HOSPITAL | Age: 70
End: 2020-10-22
Payer: MEDICARE

## 2020-10-22 ENCOUNTER — APPOINTMENT (OUTPATIENT)
Dept: RADIATION ONCOLOGY | Facility: HOSPITAL | Age: 70
End: 2020-10-22
Attending: RADIOLOGY
Payer: MEDICARE

## 2020-10-22 PROCEDURE — 77386 HB NTSTY MODUL RAD TX DLVR CPLX: CPT | Performed by: RADIOLOGY

## 2020-10-23 ENCOUNTER — APPOINTMENT (OUTPATIENT)
Dept: RADIATION ONCOLOGY | Facility: HOSPITAL | Age: 70
End: 2020-10-23
Attending: RADIOLOGY
Payer: MEDICARE

## 2020-10-23 PROCEDURE — 77386 HB NTSTY MODUL RAD TX DLVR CPLX: CPT | Performed by: RADIOLOGY

## 2020-10-26 ENCOUNTER — APPOINTMENT (OUTPATIENT)
Dept: RADIATION ONCOLOGY | Facility: HOSPITAL | Age: 70
End: 2020-10-26
Attending: RADIOLOGY
Payer: MEDICARE

## 2020-10-26 PROCEDURE — 77336 RADIATION PHYSICS CONSULT: CPT | Performed by: RADIOLOGY

## 2020-10-26 PROCEDURE — 77386 HB NTSTY MODUL RAD TX DLVR CPLX: CPT | Performed by: RADIOLOGY

## 2020-10-27 ENCOUNTER — APPOINTMENT (OUTPATIENT)
Dept: RADIATION ONCOLOGY | Facility: HOSPITAL | Age: 70
End: 2020-10-27
Attending: RADIOLOGY
Payer: MEDICARE

## 2020-10-27 PROCEDURE — 77386 HB NTSTY MODUL RAD TX DLVR CPLX: CPT | Performed by: RADIOLOGY

## 2020-10-28 ENCOUNTER — APPOINTMENT (OUTPATIENT)
Dept: RADIATION ONCOLOGY | Facility: HOSPITAL | Age: 70
End: 2020-10-28
Attending: RADIOLOGY
Payer: MEDICARE

## 2020-10-28 ENCOUNTER — LAB (OUTPATIENT)
Dept: LAB | Facility: MEDICAL CENTER | Age: 70
End: 2020-10-28
Payer: MEDICARE

## 2020-10-28 LAB
BASOPHILS # BLD AUTO: 0.04 THOUSANDS/ΜL (ref 0–0.1)
BASOPHILS NFR BLD AUTO: 1 % (ref 0–1)
EOSINOPHIL # BLD AUTO: 0.11 THOUSAND/ΜL (ref 0–0.61)
EOSINOPHIL NFR BLD AUTO: 2 % (ref 0–6)
ERYTHROCYTE [DISTWIDTH] IN BLOOD BY AUTOMATED COUNT: 15.2 % (ref 11.6–15.1)
HCT VFR BLD AUTO: 43.2 % (ref 36.5–49.3)
HGB BLD-MCNC: 14.1 G/DL (ref 12–17)
IMM GRANULOCYTES # BLD AUTO: 0.05 THOUSAND/UL (ref 0–0.2)
IMM GRANULOCYTES NFR BLD AUTO: 1 % (ref 0–2)
LYMPHOCYTES # BLD AUTO: 1.63 THOUSANDS/ΜL (ref 0.6–4.47)
LYMPHOCYTES NFR BLD AUTO: 24 % (ref 14–44)
MCH RBC QN AUTO: 31 PG (ref 26.8–34.3)
MCHC RBC AUTO-ENTMCNC: 32.6 G/DL (ref 31.4–37.4)
MCV RBC AUTO: 95 FL (ref 82–98)
MONOCYTES # BLD AUTO: 0.63 THOUSAND/ΜL (ref 0.17–1.22)
MONOCYTES NFR BLD AUTO: 9 % (ref 4–12)
NEUTROPHILS # BLD AUTO: 4.23 THOUSANDS/ΜL (ref 1.85–7.62)
NEUTS SEG NFR BLD AUTO: 63 % (ref 43–75)
NRBC BLD AUTO-RTO: 0 /100 WBCS
PLATELET # BLD AUTO: 300 THOUSANDS/UL (ref 149–390)
PMV BLD AUTO: 9.8 FL (ref 8.9–12.7)
RBC # BLD AUTO: 4.55 MILLION/UL (ref 3.88–5.62)
WBC # BLD AUTO: 6.69 THOUSAND/UL (ref 4.31–10.16)

## 2020-10-28 PROCEDURE — 36415 COLL VENOUS BLD VENIPUNCTURE: CPT

## 2020-10-28 PROCEDURE — 85025 COMPLETE CBC W/AUTO DIFF WBC: CPT

## 2020-10-28 PROCEDURE — 77386 HB NTSTY MODUL RAD TX DLVR CPLX: CPT | Performed by: RADIOLOGY

## 2020-10-29 ENCOUNTER — APPOINTMENT (OUTPATIENT)
Dept: RADIATION ONCOLOGY | Facility: HOSPITAL | Age: 70
End: 2020-10-29
Attending: RADIOLOGY
Payer: MEDICARE

## 2020-10-29 PROCEDURE — 77386 HB NTSTY MODUL RAD TX DLVR CPLX: CPT | Performed by: RADIOLOGY

## 2020-10-30 ENCOUNTER — APPOINTMENT (OUTPATIENT)
Dept: RADIATION ONCOLOGY | Facility: HOSPITAL | Age: 70
End: 2020-10-30
Attending: RADIOLOGY
Payer: MEDICARE

## 2020-10-30 PROCEDURE — 77386 HB NTSTY MODUL RAD TX DLVR CPLX: CPT | Performed by: RADIOLOGY

## 2020-11-02 ENCOUNTER — APPOINTMENT (OUTPATIENT)
Dept: RADIATION ONCOLOGY | Facility: HOSPITAL | Age: 70
End: 2020-11-02
Attending: RADIOLOGY
Payer: MEDICARE

## 2020-11-02 PROCEDURE — 77386 HB NTSTY MODUL RAD TX DLVR CPLX: CPT | Performed by: RADIOLOGY

## 2020-11-02 PROCEDURE — 77336 RADIATION PHYSICS CONSULT: CPT | Performed by: RADIOLOGY

## 2020-11-03 ENCOUNTER — APPOINTMENT (OUTPATIENT)
Dept: RADIATION ONCOLOGY | Facility: HOSPITAL | Age: 70
End: 2020-11-03
Attending: RADIOLOGY
Payer: MEDICARE

## 2020-11-03 PROCEDURE — 77386 HB NTSTY MODUL RAD TX DLVR CPLX: CPT | Performed by: RADIOLOGY

## 2020-11-04 ENCOUNTER — APPOINTMENT (OUTPATIENT)
Dept: RADIATION ONCOLOGY | Facility: HOSPITAL | Age: 70
End: 2020-11-04
Attending: RADIOLOGY
Payer: MEDICARE

## 2020-11-04 PROCEDURE — 77386 HB NTSTY MODUL RAD TX DLVR CPLX: CPT | Performed by: RADIOLOGY

## 2020-11-05 ENCOUNTER — APPOINTMENT (OUTPATIENT)
Dept: RADIATION ONCOLOGY | Facility: HOSPITAL | Age: 70
End: 2020-11-05
Attending: RADIOLOGY
Payer: MEDICARE

## 2020-11-05 PROCEDURE — 77386 HB NTSTY MODUL RAD TX DLVR CPLX: CPT | Performed by: RADIOLOGY

## 2020-11-06 ENCOUNTER — APPOINTMENT (OUTPATIENT)
Dept: RADIATION ONCOLOGY | Facility: HOSPITAL | Age: 70
End: 2020-11-06
Attending: RADIOLOGY
Payer: MEDICARE

## 2020-11-06 PROCEDURE — 77386 HB NTSTY MODUL RAD TX DLVR CPLX: CPT | Performed by: RADIOLOGY

## 2020-11-09 ENCOUNTER — APPOINTMENT (OUTPATIENT)
Dept: RADIATION ONCOLOGY | Facility: HOSPITAL | Age: 70
End: 2020-11-09
Attending: RADIOLOGY
Payer: MEDICARE

## 2020-11-09 PROCEDURE — 77386 HB NTSTY MODUL RAD TX DLVR CPLX: CPT | Performed by: RADIOLOGY

## 2020-11-09 PROCEDURE — 77336 RADIATION PHYSICS CONSULT: CPT | Performed by: RADIOLOGY

## 2020-11-10 ENCOUNTER — APPOINTMENT (OUTPATIENT)
Dept: RADIATION ONCOLOGY | Facility: HOSPITAL | Age: 70
End: 2020-11-10
Attending: RADIOLOGY
Payer: MEDICARE

## 2020-11-10 PROCEDURE — 77386 HB NTSTY MODUL RAD TX DLVR CPLX: CPT | Performed by: RADIOLOGY

## 2020-11-11 ENCOUNTER — APPOINTMENT (OUTPATIENT)
Dept: RADIATION ONCOLOGY | Facility: HOSPITAL | Age: 70
End: 2020-11-11
Attending: RADIOLOGY
Payer: MEDICARE

## 2020-11-11 PROCEDURE — 77386 HB NTSTY MODUL RAD TX DLVR CPLX: CPT | Performed by: RADIOLOGY

## 2020-11-12 ENCOUNTER — APPOINTMENT (OUTPATIENT)
Dept: RADIATION ONCOLOGY | Facility: HOSPITAL | Age: 70
End: 2020-11-12
Attending: RADIOLOGY
Payer: MEDICARE

## 2020-11-12 PROCEDURE — 77386 HB NTSTY MODUL RAD TX DLVR CPLX: CPT | Performed by: RADIOLOGY

## 2020-11-13 ENCOUNTER — APPOINTMENT (OUTPATIENT)
Dept: RADIATION ONCOLOGY | Facility: HOSPITAL | Age: 70
End: 2020-11-13
Attending: RADIOLOGY
Payer: MEDICARE

## 2020-11-13 ENCOUNTER — LAB (OUTPATIENT)
Dept: LAB | Facility: MEDICAL CENTER | Age: 70
End: 2020-11-13
Payer: MEDICARE

## 2020-11-13 PROCEDURE — 77386 HB NTSTY MODUL RAD TX DLVR CPLX: CPT | Performed by: RADIOLOGY

## 2020-11-16 ENCOUNTER — APPOINTMENT (OUTPATIENT)
Dept: RADIATION ONCOLOGY | Facility: HOSPITAL | Age: 70
End: 2020-11-16
Attending: RADIOLOGY
Payer: MEDICARE

## 2020-11-16 PROCEDURE — 77386 HB NTSTY MODUL RAD TX DLVR CPLX: CPT | Performed by: RADIOLOGY

## 2020-11-16 PROCEDURE — 77336 RADIATION PHYSICS CONSULT: CPT | Performed by: RADIOLOGY

## 2020-11-17 ENCOUNTER — APPOINTMENT (OUTPATIENT)
Dept: RADIATION ONCOLOGY | Facility: HOSPITAL | Age: 70
End: 2020-11-17
Attending: RADIOLOGY
Payer: MEDICARE

## 2020-11-17 PROCEDURE — 77386 HB NTSTY MODUL RAD TX DLVR CPLX: CPT | Performed by: RADIOLOGY

## 2020-11-18 ENCOUNTER — APPOINTMENT (OUTPATIENT)
Dept: RADIATION ONCOLOGY | Facility: HOSPITAL | Age: 70
End: 2020-11-18
Attending: RADIOLOGY
Payer: MEDICARE

## 2020-11-18 PROCEDURE — 77386 HB NTSTY MODUL RAD TX DLVR CPLX: CPT | Performed by: RADIOLOGY

## 2020-11-19 ENCOUNTER — APPOINTMENT (OUTPATIENT)
Dept: RADIATION ONCOLOGY | Facility: HOSPITAL | Age: 70
End: 2020-11-19
Attending: RADIOLOGY
Payer: MEDICARE

## 2020-11-19 PROCEDURE — 77386 HB NTSTY MODUL RAD TX DLVR CPLX: CPT | Performed by: RADIOLOGY

## 2020-11-20 ENCOUNTER — APPOINTMENT (OUTPATIENT)
Dept: RADIATION ONCOLOGY | Facility: HOSPITAL | Age: 70
End: 2020-11-20
Payer: MEDICARE

## 2020-11-20 ENCOUNTER — APPOINTMENT (OUTPATIENT)
Dept: RADIATION ONCOLOGY | Facility: HOSPITAL | Age: 70
End: 2020-11-20
Attending: RADIOLOGY
Payer: MEDICARE

## 2020-11-20 PROCEDURE — 77386 HB NTSTY MODUL RAD TX DLVR CPLX: CPT | Performed by: RADIOLOGY

## 2020-11-22 ENCOUNTER — APPOINTMENT (OUTPATIENT)
Dept: RADIATION ONCOLOGY | Facility: HOSPITAL | Age: 70
End: 2020-11-22
Attending: RADIOLOGY
Payer: MEDICARE

## 2020-11-22 PROCEDURE — 77386 HB NTSTY MODUL RAD TX DLVR CPLX: CPT | Performed by: RADIOLOGY

## 2020-11-22 PROCEDURE — 77336 RADIATION PHYSICS CONSULT: CPT | Performed by: RADIOLOGY

## 2020-11-23 ENCOUNTER — APPOINTMENT (OUTPATIENT)
Dept: RADIATION ONCOLOGY | Facility: HOSPITAL | Age: 70
End: 2020-11-23
Attending: RADIOLOGY
Payer: MEDICARE

## 2020-11-23 PROCEDURE — 77386 HB NTSTY MODUL RAD TX DLVR CPLX: CPT | Performed by: RADIOLOGY

## 2020-11-24 ENCOUNTER — APPOINTMENT (OUTPATIENT)
Dept: RADIATION ONCOLOGY | Facility: HOSPITAL | Age: 70
End: 2020-11-24
Attending: RADIOLOGY
Payer: MEDICARE

## 2020-11-24 PROCEDURE — 77386 HB NTSTY MODUL RAD TX DLVR CPLX: CPT | Performed by: RADIOLOGY

## 2020-11-25 ENCOUNTER — APPOINTMENT (OUTPATIENT)
Dept: RADIATION ONCOLOGY | Facility: HOSPITAL | Age: 70
End: 2020-11-25
Attending: RADIOLOGY
Payer: MEDICARE

## 2020-11-25 PROCEDURE — 77386 HB NTSTY MODUL RAD TX DLVR CPLX: CPT | Performed by: RADIOLOGY

## 2020-11-27 ENCOUNTER — APPOINTMENT (OUTPATIENT)
Dept: RADIATION ONCOLOGY | Facility: HOSPITAL | Age: 70
End: 2020-11-27
Payer: MEDICARE

## 2020-11-30 ENCOUNTER — APPOINTMENT (OUTPATIENT)
Dept: RADIATION ONCOLOGY | Facility: HOSPITAL | Age: 70
End: 2020-11-30
Attending: RADIOLOGY
Payer: MEDICARE

## 2020-11-30 PROCEDURE — 77386 HB NTSTY MODUL RAD TX DLVR CPLX: CPT | Performed by: RADIOLOGY

## 2020-12-01 ENCOUNTER — APPOINTMENT (OUTPATIENT)
Dept: RADIATION ONCOLOGY | Facility: HOSPITAL | Age: 70
End: 2020-12-01
Attending: RADIOLOGY
Payer: MEDICARE

## 2020-12-01 PROCEDURE — 77386 HB NTSTY MODUL RAD TX DLVR CPLX: CPT | Performed by: RADIOLOGY

## 2020-12-01 PROCEDURE — 77336 RADIATION PHYSICS CONSULT: CPT | Performed by: RADIOLOGY

## 2020-12-03 ENCOUNTER — APPOINTMENT (OUTPATIENT)
Dept: RADIATION ONCOLOGY | Facility: HOSPITAL | Age: 70
End: 2020-12-03
Payer: MEDICARE

## 2020-12-14 ENCOUNTER — HOSPITAL ENCOUNTER (OUTPATIENT)
Dept: RADIOLOGY | Facility: MEDICAL CENTER | Age: 70
Discharge: HOME | End: 2020-12-14
Payer: MEDICARE

## 2020-12-14 ENCOUNTER — TELEPHONE (OUTPATIENT)
Dept: CARDIAC SURGERY | Facility: CLINIC | Age: 70
End: 2020-12-14

## 2020-12-14 DIAGNOSIS — C34.12 MALIGNANT NEOPLASM OF UPPER LOBE OF LEFT LUNG (HCC): ICD-10-CM

## 2020-12-14 PROCEDURE — G1004 CDSM NDSC: HCPCS

## 2020-12-14 PROCEDURE — 71250 CT THORAX DX C-: CPT

## 2020-12-17 ENCOUNTER — TELEMEDICINE (OUTPATIENT)
Dept: CARDIAC SURGERY | Facility: CLINIC | Age: 70
End: 2020-12-17
Payer: MEDICARE

## 2020-12-17 DIAGNOSIS — Z85.118 HISTORY OF LUNG CANCER: ICD-10-CM

## 2020-12-17 DIAGNOSIS — C34.12 MALIGNANT NEOPLASM OF UPPER LOBE OF LEFT LUNG (HCC): Primary | ICD-10-CM

## 2020-12-17 PROCEDURE — 99213 OFFICE O/P EST LOW 20 MIN: CPT | Performed by: THORACIC SURGERY (CARDIOTHORACIC VASCULAR SURGERY)

## 2021-01-07 ENCOUNTER — TELEMEDICINE (OUTPATIENT)
Dept: RADIATION ONCOLOGY | Facility: HOSPITAL | Age: 71
End: 2021-01-07
Attending: RADIOLOGY

## 2021-01-07 DIAGNOSIS — C34.12 MALIGNANT NEOPLASM OF UPPER LOBE OF LEFT LUNG (HCC): Primary | ICD-10-CM

## 2021-01-07 NOTE — PROGRESS NOTES
Virtual Brief Visit    Assessment/Plan:    The patient is 1 month status post adjuvant radiation therapy to the left upper lobe in the region of his chest wall invasion  He has tolerated treatment well without any significant side effects  In particular no esophagitis or symptoms of pneumonitis  He did have CT scan which reveals some small pulmonary lesions 1 in the right lung which previously measured 7 mm and now resolved  He is scheduled for follow-up imaging in March and will be seeing Dr Olive Miller thereafter  He will return to our office in 6 months for follow-up  COVID precautions were reviewed with him  Problem List Items Addressed This Visit        Respiratory    Malignant neoplasm of upper lobe of left lung ([facility] [address] ) - Primary                Reason for visit is No chief complaint on file  Encounter provider Sung Dumont MD    Provider located at [place] [phone]    Recent Visits  No visits were found meeting these conditions  Showing recent visits within past 7 days and meeting all other requirements     Future Appointments  No visits were found meeting these conditions  Showing future appointments within next 150 days and meeting all other requirements        After connecting through telephone, the patient was identified by name and date of birth  Madalyn De León was informed that this is a telemedicine visit and that the visit is being conducted through telephone  My office door was closed  No one else was in the room  He acknowledged consent and understanding of privacy and security of the platform  The patient has agreed to participate and understands he can discontinue the visit at any time  Patient is aware this is a billable service       Subjective        Past Medical History:   Diagnosis Date    Alcohol abuse 3/27/2020    Chest pain     Community acquired pneumonia 3/27/2020    DVT (deep venous thrombosis) ([facility] [address] )     Hypertension     Malignant neoplasm of upper lobe of left lung ([facility] [address] ) 05/07/2020    Stage IIB       Past Surgical History:   Procedure Laterality Date    COLON SURGERY  2009    COLONOSCOPY      CT NEEDLE BIOPSY LUNG  4/29/2020    IN BRONCHOSCOPY,DIAGNOSTIC N/A 6/10/2020    Procedure: BRONCHOSCOPY FLEXIBLE;  Surgeon: Fe Lyons MD;  [facility];  Service: Thoracic    IN MEDIASTINOSCOPY WITH LYMPH NODE BIOPSY/IES N/A 6/10/2020    Procedure: MEDIASTINOSCOPY;  Surgeon: Fe Lyons MD;  [facility];  Service: Thoracic    IN THORACOSCOPY SURG LOBECTOMY Left 6/10/2020    Procedure: THORACOSCOPY VIDEO ASSISTED SURGERY (VATS); Surgeon: Fe Lyons MD;  [facility];  Service: Thoracic    IN THORACOSCOPY SURG LOBECTOMY Left 6/10/2020    Procedure: UPPER LOBECTOMY OF LUNG; MEDIASTINAL  LYMPH NODE DISSECTION;  Surgeon: Fe Lyons MD;  [facility];  Service: Thoracic    TRACHEOSTOMY N/A 6/22/2020    Procedure: TRACHEOSTOMY REVISION, BRONCHOSCOPY;  Surgeon: Fe Lyons MD;  [facility];  Service: Thoracic    WRIST SURGERY Right     orif       Current Outpatient Medications   Medication Sig Dispense Refill    acetaminophen (TYLENOL) 325 mg tablet Take 2 tablets (650 mg total) by mouth every 6 (six) hours as needed for mild pain (Patient not taking: Reported on 9/28/2020) 30 tablet 0    apixaban (ELIQUIS) 5 mg Take 2 tablets (10mg) twice a day for 5 days, then transition to 1 tablet (5mg) twice a day for 2 months  140 tablet 0    metoprolol tartrate (LOPRESSOR) 25 mg tablet Take 1 tablet (25 mg total) by mouth every 12 (twelve) hours 60 tablet 0     No current facility-administered medications for this visit  No Known Allergies    Review of Systems    There were no vitals filed for this visit        I spent 15 minutes directly with the patient during this visit    VIRTUAL VISIT DISCLAIMER    Michelle Sosa acknowledges that he has consented to an online visit or consultation  He understands that the online visit is based solely on information provided by him, and that, in the absence of a face-to-face physical evaluation by the physician, the diagnosis he receives is both limited and provisional in terms of accuracy and completeness  This is not intended to replace a full medical face-to-face evaluation by the physician  Michelle Sosa understands and accepts these terms

## 2021-02-13 DIAGNOSIS — Z23 ENCOUNTER FOR IMMUNIZATION: ICD-10-CM

## 2021-02-23 ENCOUNTER — IMMUNIZATIONS (OUTPATIENT)
Dept: FAMILY MEDICINE CLINIC | Facility: HOSPITAL | Age: 71
End: 2021-02-23

## 2021-02-23 DIAGNOSIS — Z23 ENCOUNTER FOR IMMUNIZATION: Primary | ICD-10-CM

## 2021-02-23 PROCEDURE — 91300 SARS-COV-2 / COVID-19 MRNA VACCINE (PFIZER-BIONTECH) 30 MCG: CPT

## 2021-02-23 PROCEDURE — 0001A SARS-COV-2 / COVID-19 MRNA VACCINE (PFIZER-BIONTECH) 30 MCG: CPT

## 2021-03-15 ENCOUNTER — HOSPITAL ENCOUNTER (OUTPATIENT)
Dept: RADIOLOGY | Facility: MEDICAL CENTER | Age: 71
Discharge: HOME | End: 2021-03-15
Payer: MEDICARE

## 2021-03-15 DIAGNOSIS — C34.12 MALIGNANT NEOPLASM OF UPPER LOBE OF LEFT LUNG (HCC): ICD-10-CM

## 2021-03-15 PROCEDURE — 71250 CT THORAX DX C-: CPT

## 2021-03-15 PROCEDURE — G1004 CDSM NDSC: HCPCS

## 2021-03-16 ENCOUNTER — IMMUNIZATIONS (OUTPATIENT)
Dept: FAMILY MEDICINE CLINIC | Facility: HOSPITAL | Age: 71
End: 2021-03-16

## 2021-03-16 DIAGNOSIS — Z23 ENCOUNTER FOR IMMUNIZATION: Primary | ICD-10-CM

## 2021-03-16 PROCEDURE — 91300 SARS-COV-2 / COVID-19 MRNA VACCINE (PFIZER-BIONTECH) 30 MCG: CPT

## 2021-03-16 PROCEDURE — 0002A SARS-COV-2 / COVID-19 MRNA VACCINE (PFIZER-BIONTECH) 30 MCG: CPT

## 2021-03-18 ENCOUNTER — TELEPHONE (OUTPATIENT)
Dept: SURGICAL ONCOLOGY | Facility: CLINIC | Age: 71
End: 2021-03-18

## 2021-03-18 ENCOUNTER — TELEMEDICINE (OUTPATIENT)
Dept: CARDIAC SURGERY | Facility: CLINIC | Age: 71
End: 2021-03-18
Payer: MEDICARE

## 2021-03-18 DIAGNOSIS — Z85.118 HISTORY OF LUNG CANCER: Primary | ICD-10-CM

## 2021-03-18 DIAGNOSIS — C34.12 MALIGNANT NEOPLASM OF UPPER LOBE OF LEFT LUNG (HCC): ICD-10-CM

## 2021-03-18 PROCEDURE — 99213 OFFICE O/P EST LOW 20 MIN: CPT | Performed by: THORACIC SURGERY (CARDIOTHORACIC VASCULAR SURGERY)

## 2021-03-18 NOTE — PROGRESS NOTES
Virtual Regular Visit      Assessment/Plan:    Problem List Items Addressed This Visit        Respiratory    Malignant neoplasm of upper lobe of left lung ([facility] [address] )       Other    History of lung cancer - Primary     Ashlie Carter is a 79 y o  male   Who is well known to me  He underwent a VATS left upper lobectomy in June of 2020  I am overall happy with his progress  We reviewed his CT scan during the visit today  This demonstrates stability and no signs of any kind of recurrence  At this time, he is approaching the 1 year lisa since resection  I would like to keep him on a 3 month surveillance schedule since he had an aggressive tumor and declined adjuvant therapy  We will repeat a CT scan in 3 months  At that time, he will be approximately 1 year out from surgery and as long as his CT scan remains stable at that time, I will space him out to 6 month intervals  Latanya Arreaga MD  Thoracic Surgery  (Available on [employer])  Office: [phone]           Relevant Orders    CT chest wo contrast               Reason for visit is   Chief Complaint   Patient presents with    Virtual Regular Visit        Encounter provider Salvatore Ohara MD    Provider located at [address] [place] [id number] [phone]      Recent Visits  No visits were found meeting these conditions  Showing recent visits within past 7 days and meeting all other requirements     Today's Visits  Date Type Provider Dept   03/18/21 Duy Mcleod MD [facility]   Showing today's visits and meeting all other requirements     Future Appointments  No visits were found meeting these conditions  Showing future appointments within next 150 days and meeting all other requirements        The patient was identified by name and date of birth   Ashlie Carter was informed that this is a telemedicine visit and that the visit is being conducted through [facility] and patient was informed that this is a secure, HIPAA-compliant platform  He agrees to proceed     My office door was closed  No one else was in the room  He acknowledged consent and understanding of privacy and security of the video platform  The patient has agreed to participate and understands they can discontinue the visit at any time  Patient is aware this is a billable service  Thoracic History:  Diagnosis: 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   Procedure: 1  Flexible bronchoscopy, cervical mediastinoscopy, left thoracoscopic upper lobectomy on 6/10/20 2  Flexible bronchoscopy, tracheostomy revision 6/22/20  Pathology: left upper lobe tumor measuring 6 0 x 4 7 x 4 7 cm revealing poorly differentiated invasive adenocarcinoma with separate tumor nodules in same lobe  + visceral pleural invasion, negative margins  All 22 lymph nodes - for carcinoma, including levels 2R,4R,2L,4L,5,6,7,8R,10R,11R  8th edition AJCC tumor stage IIB (pT3,N0)     Cherise Wright is a 79 y o  male   Who is well known to me  He underwent a VATS left upper lobectomy in June of 2020  His surgery was complicated by a prolonged ICU stay  With ventilator dependency and tracheostomy placement  He has recovered successfully from this surgery and he presents today for routine surveillance  Today during the visit, he states that he has been feeling a little under the weather since he got the sac in COVID vaccination 2 days ago  However, prior to this, he was feeling fine  He reports that he still gets a little short of breath with exertion but this waxes and wanes  He denies chest pain  He denies a cough or hemoptysis  He denies any recent fevers or chills or upper respiratory infection or pneumonia  He denies any weight loss  He reports some weight gain  I personally reviewed his CT scan in PACS    This does demonstrate some new traction bronchiectasis in the upper segment of the lower lobe which now states that the apex  Additionally, he has an anterior mediastinal nodule that remains stable in size  HPI     Past Medical History:   Diagnosis Date    Alcohol abuse 3/27/2020    Chest pain     Community acquired pneumonia 3/27/2020    DVT (deep venous thrombosis) ([facility])     Hypertension     Malignant neoplasm of upper lobe of left lung ([facility] [address] ) 05/07/2020    Stage IIB       Past Surgical History:   Procedure Laterality Date    COLON SURGERY  2009    COLONOSCOPY      CT NEEDLE BIOPSY LUNG  4/29/2020    MA BRONCHOSCOPY,DIAGNOSTIC N/A 6/10/2020    Procedure: BRONCHOSCOPY FLEXIBLE;  Surgeon: Enrique Daniels MD;  [facility];  Service: Thoracic    MA MEDIASTINOSCOPY WITH LYMPH NODE BIOPSY/IES N/A 6/10/2020    Procedure: MEDIASTINOSCOPY;  Surgeon: Enrique Daniels MD;  [facility];  Service: Thoracic    MA THORACOSCOPY SURG LOBECTOMY Left 6/10/2020    Procedure: THORACOSCOPY VIDEO ASSISTED SURGERY (VATS); Surgeon: Enrique Daniels MD;  [facility];  Service: Thoracic    MA THORACOSCOPY SURG LOBECTOMY Left 6/10/2020    Procedure: UPPER LOBECTOMY OF LUNG; MEDIASTINAL  LYMPH NODE DISSECTION;  Surgeon: Enrique Daniels MD;  [facility];  Service: Thoracic    TRACHEOSTOMY N/A 6/22/2020    Procedure: TRACHEOSTOMY REVISION, BRONCHOSCOPY;  Surgeon: Enrique Daniels MD;  [facility];  Service: Thoracic    WRIST SURGERY Right     orif       Current Outpatient Medications   Medication Sig Dispense Refill    acetaminophen (TYLENOL) 325 mg tablet Take 2 tablets (650 mg total) by mouth every 6 (six) hours as needed for mild pain (Patient not taking: Reported on 9/28/2020) 30 tablet 0    apixaban (ELIQUIS) 5 mg Take 2 tablets (10mg) twice a day for 5 days, then transition to 1 tablet (5mg) twice a day for 2 months   140 tablet 0    metoprolol tartrate (LOPRESSOR) 25 mg tablet Take 1 tablet (25 mg total) by mouth every 12 (twelve) hours 60 tablet 0     No current facility-administered medications for this visit  No Known Allergies    Review of Systems   Constitutional: Negative  Negative for activity change, chills, fatigue, fever and unexpected weight change  HENT: Negative for sore throat, trouble swallowing and voice change  Eyes: Negative  Negative for visual disturbance  Respiratory: Positive for shortness of breath  Negative for cough, chest tightness, wheezing and stridor  Cardiovascular: Negative for chest pain and leg swelling  Gastrointestinal: Negative  Endocrine: Negative  Genitourinary: Negative  Musculoskeletal: Negative  Skin: Negative  Allergic/Immunologic: Negative  Neurological: Negative for seizures, syncope, speech difficulty, weakness, light-headedness and headaches  Hematological: Negative for adenopathy  Psychiatric/Behavioral: Negative  Video Exam    There were no vitals filed for this visit  Physical Exam  Constitutional:       General: He is not in acute distress  Appearance: He is well-developed  He is not diaphoretic  HENT:      Head: Normocephalic and atraumatic  Nose: Nose normal       Mouth/Throat:      Mouth: Mucous membranes are moist       Pharynx: Oropharynx is clear  Eyes:      General: No scleral icterus  Right eye: No discharge  Left eye: No discharge  Conjunctiva/sclera: Conjunctivae normal    Neck:      Musculoskeletal: Normal range of motion  Pulmonary:      Effort: Pulmonary effort is normal  No respiratory distress  Breath sounds: No stridor  No wheezing  Musculoskeletal: Normal range of motion  Skin:     Coloration: Skin is not pale  Findings: No erythema or rash  Neurological:      Mental Status: He is alert and oriented to person, place, and time  Psychiatric:         Behavior: Behavior normal          Thought Content:  Thought content normal  Judgment: Judgment normal           I spent 20 minutes directly with the patient during this visit      VIRTUAL VISIT DISCLAIMER    Elisha Nazario acknowledges that he has consented to an online visit or consultation  He understands that the online visit is based solely on information provided by him, and that, in the absence of a face-to-face physical evaluation by the physician, the diagnosis he receives is both limited and provisional in terms of accuracy and completeness  This is not intended to replace a full medical face-to-face evaluation by the physician  Elisha Nazario understands and accepts these terms

## 2021-03-18 NOTE — ASSESSMENT & PLAN NOTE
Michelle Sosa is a 79 y o  male   Who is well known to me  He underwent a VATS left upper lobectomy in June of 2020  I am overall happy with his progress  We reviewed his CT scan during the visit today  This demonstrates stability and no signs of any kind of recurrence  At this time, he is approaching the 1 year lisa since resection  I would like to keep him on a 3 month surveillance schedule since he had an aggressive tumor and declined adjuvant therapy  We will repeat a CT scan in 3 months  At that time, he will be approximately 1 year out from surgery and as long as his CT scan remains stable at that time, I will space him out to 6 month intervals      Marilee Townsend MD  Thoracic Surgery  (Available on [employer])  Office: [phone]

## 2021-03-18 NOTE — TELEPHONE ENCOUNTER
Pt's daughter called in to request to change Denzel's 9:00 am office visit with Dr Samia Juarez to virtual via telephone  Pt had his COVID booster and isn't feeling well   Phone: [phone]

## 2021-04-16 NOTE — PLAN OF CARE
Problem: CARDIOVASCULAR - ADULT  Goal: Maintains optimal cardiac output and hemodynamic stability  Description  INTERVENTIONS:  - Monitor I/O, vital signs and rhythm  - Monitor for S/S and trends of decreased cardiac output  - Administer and titrate ordered vasoactive medications to optimize hemodynamic stability  - Assess quality of pulses, skin color and temperature  - Assess for signs of decreased coronary artery perfusion  - Instruct patient to report change in severity of symptoms  Outcome: Progressing  Goal: Absence of cardiac dysrhythmias or at baseline rhythm  Description  INTERVENTIONS:  - Continuous cardiac monitoring, vital signs, obtain 12 lead EKG if ordered  - Administer antiarrhythmic and heart rate control medications as ordered  - Monitor electrolytes and administer replacement therapy as ordered  Outcome: Progressing     Problem: RESPIRATORY - ADULT  Goal: Achieves optimal ventilation and oxygenation  Description  INTERVENTIONS:  - Assess for changes in respiratory status  - Assess for changes in mentation and behavior  - Position to facilitate oxygenation and minimize respiratory effort  - Oxygen administered by appropriate delivery if ordered  - Initiate smoking cessation education as indicated  - Encourage broncho-pulmonary hygiene including cough, deep breathe, Incentive Spirometry  - Assess the need for suctioning and aspirate as needed  - Assess and instruct to report SOB or any respiratory difficulty  - Respiratory Therapy support as indicated  Outcome: Progressing     Problem: SKIN/TISSUE INTEGRITY - ADULT  Goal: Skin integrity remains intact  Description  INTERVENTIONS  - Identify patients at risk for skin breakdown  - Assess and monitor skin integrity  - Assess and monitor nutrition and hydration status  - Monitor labs (i e  albumin)  - Assess for incontinence   - Turn and reposition patient  - Assist with mobility/ambulation  - Relieve pressure over bony prominences  - Avoid friction and shearing  - Provide appropriate hygiene as needed including keeping skin clean and dry  - Evaluate need for skin moisturizer/barrier cream  - Collaborate with interdisciplinary team (i e  Nutrition, Rehabilitation, etc )   - Patient/family teaching  Outcome: Progressing  Goal: Incision(s), wounds(s) or drain site(s) healing without S/S of infection  Description  INTERVENTIONS  - Assess and document risk factors for skin impairment   - Assess and document dressing, incision, wound bed, drain sites and surrounding tissue  - Consider nutrition services referral as needed  - Oral mucous membranes remain intact  - Provide patient/ family education  Outcome: Progressing  Goal: Oral mucous membranes remain intact  Description  INTERVENTIONS  - Assess oral mucosa and hygiene practices  - Implement preventative oral hygiene regimen  - Implement oral medicated treatments as ordered  - Initiate Nutrition services referral as needed  Outcome: Progressing     Problem: MUSCULOSKELETAL - ADULT  Goal: Maintain or return mobility to safest level of function  Description  INTERVENTIONS:  - Assess patient's ability to carry out ADLs; assess patient's baseline for ADL function and identify physical deficits which impact ability to perform ADLs (bathing, care of mouth/teeth, toileting, grooming, dressing, etc )  - Assess/evaluate cause of self-care deficits   - Assess range of motion  - Assess patient's mobility  - Assess patient's need for assistive devices and provide as appropriate  - Encourage maximum independence but intervene and supervise when necessary  - Involve family in performance of ADLs  - Assess for home care needs following discharge   - Consider OT consult to assist with ADL evaluation and planning for discharge  - Provide patient education as appropriate  Outcome: Progressing  Goal: Maintain proper alignment of affected body part  Description  INTERVENTIONS:  - Support, maintain and protect limb and body alignment  - Provide patient/ family with appropriate education  Outcome: Progressing     Problem: Potential for Falls  Goal: Patient will remain free of falls  Description  INTERVENTIONS:  - Assess patient frequently for physical needs  -  Identify cognitive and physical deficits and behaviors that affect risk of falls  -  [place] fall precautions as indicated by assessment   - Educate patient/family on patient safety including physical limitations  - Instruct patient to call for assistance with activity based on assessment  - Modify environment to reduce risk of injury  - Consider OT/PT consult to assist with strengthening/mobility  Outcome: Progressing     Problem: Prexisting or High Potential for Compromised Skin Integrity  Goal: Skin integrity is maintained or improved  Description  INTERVENTIONS:  - Identify patients at risk for skin breakdown  - Assess and monitor skin integrity  - Assess and monitor nutrition and hydration status  - Monitor labs   - Assess for incontinence   - Turn and reposition patient  - Assist with mobility/ambulation  - Relieve pressure over bony prominences  - Avoid friction and shearing  - Provide appropriate hygiene as needed including keeping skin clean and dry  - Evaluate need for skin moisturizer/barrier cream  - Collaborate with interdisciplinary team   - Patient/family teaching  - Consider wound care consult   Outcome: Progressing     Problem: Nutrition/Hydration-ADULT  Goal: Nutrient/Hydration intake appropriate for improving, restoring or maintaining nutritional needs  Description  Monitor and assess patient's nutrition/hydration status for malnutrition  Collaborate with interdisciplinary team and initiate plan and interventions as ordered  Monitor patient's weight and dietary intake as ordered or per policy  Utilize nutrition screening tool and intervene as necessary  Determine patient's food preferences and provide high-protein, high-caloric foods as appropriate  INTERVENTIONS:  - Monitor oral intake, urinary output, labs, and treatment plans  - Assess nutrition and hydration status and recommend course of action  - Evaluate amount of meals eaten  - Assist patient with eating if necessary   - Allow adequate time for meals  - Recommend/ encourage appropriate diets, oral nutritional supplements, and vitamin/mineral supplements  - Order, calculate, and assess calorie counts as needed  - Recommend, monitor, and adjust tube feedings and TPN/PPN based on assessed needs  - Assess need for intravenous fluids  - Provide specific nutrition/hydration education as appropriate  - Include patient/family/caregiver in decisions related to nutrition  Outcome: Not Progressing     Problem: SAFETY,RESTRAINT: NV/NON-SELF DESTRUCTIVE BEHAVIOR  Goal: Remains free of harm/injury (restraint for non violent/non self-detsructive behavior)  Description  INTERVENTIONS:  - Instruct patient/family regarding restraint use   - Assess and monitor physiologic and psychological status   - Provide interventions and comfort measures to meet assessed patient needs   - Identify and implement measures to help patient regain control  - Assess readiness for release of restraint   Outcome: Not Progressing  Goal: Returns to optimal restraint-free functioning  Description  INTERVENTIONS:  - Assess the patient's behavior and symptoms that indicate continued need for restraint  - Identify and implement measures to help patient regain control  - Assess readiness for release of restraint   Outcome: Not Progressing     Problem: Knowledge Deficit  Goal: Patient/family/caregiver demonstrates understanding of disease process, treatment plan, medications, and discharge instructions  Description  Complete learning assessment and assess knowledge base    Interventions:  - Provide teaching at level of understanding  - Provide teaching via preferred learning methods  Outcome: Not Progressing     Problem: COPING  Goal: Pt/Family able to verbalize concerns and demonstrate effective coping strategies  Description  INTERVENTIONS:  - Assist patient/family to identify coping skills, available support systems and cultural and spiritual values  - Provide emotional support, including active listening and acknowledgement of concerns of patient and caregivers  - Reduce environmental stimuli, as able  - Provide patient education  - Assess for spiritual pain/suffering and initiate spiritual care, including notification of Pastoral Care or [place] based community as needed  - Assess effectiveness of coping strategies  Outcome: Progressing  Goal: Will report anxiety at manageable levels  Description  INTERVENTIONS:  - Administer medication as ordered  - Teach and encourage coping skills  - Provide emotional support  - Assess patient/family for anxiety and ability to cope  Outcome: Progressing     Problem: DECISION MAKING  Goal: Pt/Family able to effectively weigh alternatives and participate in decision making related to treatment and care  Description  INTERVENTIONS:  - Identify decision maker  - Determine when there are differences among patient's view, family's view, and healthcare provider's view of patient condition and care goals  - Facilitate patient/family articulation of goals for care  - Help patient/family identify pros/cons of alternative solutions  - Provide information as requested by patient/family  - Respect patient/family rights related to privacy and sharing information   - Serve as a liaison between patient, family and health care team  - Initiate consults as appropriate (Ethics Team, Palliative Care, Family Care Conference, etc )  Outcome: Not Progressing     Problem: CONFUSION/THOUGHT DISTURBANCE  Goal: Thought disturbances (confusion, delirium, depression, dementia or psychosis) are managed to maintain or return to baseline mental status and functional level  Description  INTERVENTIONS:  - Assess for possible contributors to  thought disturbance, including but not limited to medications, infection, impaired vision or hearing, underlying metabolic abnormalities, dehydration, respiratory compromise,  psychiatric diagnoses and notify attending PHYSICAN/AP  - Monitor and intervene to maintain adequate nutrition, hydration, elimination, sleep and activity  - Decrease environmental stimuli, including noise as appropriate  - Provide frequent contacts to provide refocusing, direction and reassurance as needed  Approach patient calmly with eye contact and at their level  - [place] high risk fall precautions, aspiration precautions and other safety measures, as indicated  - If delirium suspected, notify physician/AP of change in condition and request immediate in-person evaluation  - Pursue consults as appropriate including Geriatric (campus dependent), OT for cognitive evaluation/activity planning, psychiatric, pastoral care, etc   Outcome: Not Progressing     Problem: BEHAVIOR  Goal: Pt/Family maintain appropriate behavior and adhere to behavioral management agreement, if implemented  Description  INTERVENTIONS:  - Assess the family dynamic   - Encourage verbalization of thoughts and concerns in a socially appropriate manner  - Assess patient/family's coping skills and non-compliant behavior (including use of illegal substances)  - Utilize positive, consistent limit setting strategies supporting safety of patient, staff and others  - Initiate consult with Case Management, Spiritual Care or other ancillary services as appropriate  - If a patient's/visitor's behavior jeopardizes the safety of the patient, staff, or others, refer to organization procedure     - Notify Security of behavior or suspected illegal substances which indicate the need for search of the patient and/or belongings  - Encourage participation in the decision making process about a behavioral management agreement; implement if patient meets criteria  Outcome: Not Progressing Yes

## 2021-04-21 ENCOUNTER — APPOINTMENT (EMERGENCY)
Dept: CT IMAGING | Facility: HOSPITAL | Age: 71
DRG: 054 | End: 2021-04-21
Payer: MEDICARE

## 2021-04-21 ENCOUNTER — HOSPITAL ENCOUNTER (INPATIENT)
Facility: HOSPITAL | Age: 71
LOS: 2 days | Discharge: HOSPICE | DRG: 054 | End: 2021-04-23
Attending: EMERGENCY MEDICINE | Admitting: INTERNAL MEDICINE
Payer: MEDICARE

## 2021-04-21 ENCOUNTER — APPOINTMENT (EMERGENCY)
Dept: RADIOLOGY | Facility: HOSPITAL | Age: 71
DRG: 054 | End: 2021-04-21
Payer: MEDICARE

## 2021-04-21 DIAGNOSIS — C79.31 METASTASIS TO BRAIN (HCC): ICD-10-CM

## 2021-04-21 DIAGNOSIS — R29.90 STROKE-LIKE SYMPTOMS: ICD-10-CM

## 2021-04-21 DIAGNOSIS — N17.9 AKI (ACUTE KIDNEY INJURY) (HCC): ICD-10-CM

## 2021-04-21 DIAGNOSIS — R56.9 SEIZURE (HCC): ICD-10-CM

## 2021-04-21 DIAGNOSIS — E03.9 HYPOTHYROIDISM: ICD-10-CM

## 2021-04-21 DIAGNOSIS — R41.0 ACUTE CONFUSION: Primary | ICD-10-CM

## 2021-04-21 PROBLEM — R79.89 ABNORMAL TSH: Status: ACTIVE | Noted: 2021-04-21

## 2021-04-21 LAB
AMMONIA PLAS-SCNC: 11 UMOL/L (ref 11–35)
AMPHETAMINES SERPL QL SCN: NEGATIVE
ANION GAP SERPL CALCULATED.3IONS-SCNC: 14 MMOL/L (ref 4–13)
APAP SERPL-MCNC: <2 UG/ML (ref 10–20)
APTT PPP: 28 SECONDS (ref 23–37)
ATRIAL RATE: 94 BPM
BARBITURATES UR QL: NEGATIVE
BENZODIAZ UR QL: NEGATIVE
BUN SERPL-MCNC: 30 MG/DL (ref 5–25)
CALCIUM SERPL-MCNC: 9.5 MG/DL (ref 8.3–10.1)
CHLORIDE SERPL-SCNC: 102 MMOL/L (ref 100–108)
CHOLEST SERPL-MCNC: 216 MG/DL (ref 50–200)
CO2 SERPL-SCNC: 22 MMOL/L (ref 21–32)
COCAINE UR QL: NEGATIVE
CREAT SERPL-MCNC: 1.43 MG/DL (ref 0.6–1.3)
ERYTHROCYTE [DISTWIDTH] IN BLOOD BY AUTOMATED COUNT: 13.1 % (ref 11.6–15.1)
ETHANOL SERPL-MCNC: <3 MG/DL (ref 0–3)
FLUAV RNA RESP QL NAA+PROBE: NEGATIVE
FLUBV RNA RESP QL NAA+PROBE: NEGATIVE
GFR SERPL CREATININE-BSD FRML MDRD: 49 ML/MIN/1.73SQ M
GLUCOSE SERPL-MCNC: 102 MG/DL (ref 65–140)
GLUCOSE SERPL-MCNC: 92 MG/DL (ref 65–140)
HCT VFR BLD AUTO: 43.7 % (ref 36.5–49.3)
HDLC SERPL-MCNC: 43 MG/DL
HGB BLD-MCNC: 14.5 G/DL (ref 12–17)
INR PPP: 0.97 (ref 0.84–1.19)
LDLC SERPL CALC-MCNC: 109 MG/DL (ref 0–100)
MAGNESIUM SERPL-MCNC: 2.3 MG/DL (ref 1.6–2.6)
MCH RBC QN AUTO: 33 PG (ref 26.8–34.3)
MCHC RBC AUTO-ENTMCNC: 33.2 G/DL (ref 31.4–37.4)
MCV RBC AUTO: 99 FL (ref 82–98)
METHADONE UR QL: NEGATIVE
OPIATES UR QL SCN: NEGATIVE
OXYCODONE+OXYMORPHONE UR QL SCN: NEGATIVE
P AXIS: 43 DEGREES
PCP UR QL: NEGATIVE
PLATELET # BLD AUTO: 313 THOUSANDS/UL (ref 149–390)
PMV BLD AUTO: 8.9 FL (ref 8.9–12.7)
POTASSIUM SERPL-SCNC: 5 MMOL/L (ref 3.5–5.3)
PR INTERVAL: 154 MS
PROTHROMBIN TIME: 12.9 SECONDS (ref 11.6–14.5)
QRS AXIS: -8 DEGREES
QRSD INTERVAL: 76 MS
QT INTERVAL: 326 MS
QTC INTERVAL: 407 MS
RBC # BLD AUTO: 4.4 MILLION/UL (ref 3.88–5.62)
RSV RNA RESP QL NAA+PROBE: NEGATIVE
SALICYLATES SERPL-MCNC: 3.8 MG/DL (ref 3–20)
SARS-COV-2 RNA RESP QL NAA+PROBE: NEGATIVE
SODIUM SERPL-SCNC: 138 MMOL/L (ref 136–145)
T WAVE AXIS: 65 DEGREES
T4 FREE SERPL-MCNC: 0.73 NG/DL (ref 0.76–1.46)
THC UR QL: NEGATIVE
TRIGL SERPL-MCNC: 318 MG/DL
TROPONIN I SERPL-MCNC: <0.02 NG/ML
TSH SERPL DL<=0.05 MIU/L-ACNC: 6.25 UIU/ML (ref 0.36–3.74)
VENTRICULAR RATE: 94 BPM
WBC # BLD AUTO: 9.51 THOUSAND/UL (ref 4.31–10.16)

## 2021-04-21 PROCEDURE — 70496 CT ANGIOGRAPHY HEAD: CPT

## 2021-04-21 PROCEDURE — 1124F ACP DISCUSS-NO DSCNMKR DOCD: CPT | Performed by: INTERNAL MEDICINE

## 2021-04-21 PROCEDURE — 99223 1ST HOSP IP/OBS HIGH 75: CPT | Performed by: INTERNAL MEDICINE

## 2021-04-21 PROCEDURE — 80143 DRUG ASSAY ACETAMINOPHEN: CPT | Performed by: EMERGENCY MEDICINE

## 2021-04-21 PROCEDURE — 36415 COLL VENOUS BLD VENIPUNCTURE: CPT | Performed by: EMERGENCY MEDICINE

## 2021-04-21 PROCEDURE — 85610 PROTHROMBIN TIME: CPT | Performed by: EMERGENCY MEDICINE

## 2021-04-21 PROCEDURE — 80048 BASIC METABOLIC PNL TOTAL CA: CPT | Performed by: EMERGENCY MEDICINE

## 2021-04-21 PROCEDURE — 80061 LIPID PANEL: CPT | Performed by: NURSE PRACTITIONER

## 2021-04-21 PROCEDURE — 99285 EMERGENCY DEPT VISIT HI MDM: CPT | Performed by: EMERGENCY MEDICINE

## 2021-04-21 PROCEDURE — 82140 ASSAY OF AMMONIA: CPT | Performed by: EMERGENCY MEDICINE

## 2021-04-21 PROCEDURE — 84425 ASSAY OF VITAMIN B-1: CPT | Performed by: NURSE PRACTITIONER

## 2021-04-21 PROCEDURE — 80307 DRUG TEST PRSMV CHEM ANLYZR: CPT | Performed by: EMERGENCY MEDICINE

## 2021-04-21 PROCEDURE — 93005 ELECTROCARDIOGRAM TRACING: CPT

## 2021-04-21 PROCEDURE — G1004 CDSM NDSC: HCPCS

## 2021-04-21 PROCEDURE — 85730 THROMBOPLASTIN TIME PARTIAL: CPT | Performed by: EMERGENCY MEDICINE

## 2021-04-21 PROCEDURE — 99223 1ST HOSP IP/OBS HIGH 75: CPT | Performed by: PSYCHIATRY & NEUROLOGY

## 2021-04-21 PROCEDURE — 84439 ASSAY OF FREE THYROXINE: CPT | Performed by: EMERGENCY MEDICINE

## 2021-04-21 PROCEDURE — 71045 X-RAY EXAM CHEST 1 VIEW: CPT

## 2021-04-21 PROCEDURE — 84443 ASSAY THYROID STIM HORMONE: CPT | Performed by: EMERGENCY MEDICINE

## 2021-04-21 PROCEDURE — 83036 HEMOGLOBIN GLYCOSYLATED A1C: CPT | Performed by: NURSE PRACTITIONER

## 2021-04-21 PROCEDURE — 84484 ASSAY OF TROPONIN QUANT: CPT | Performed by: EMERGENCY MEDICINE

## 2021-04-21 PROCEDURE — 93010 ELECTROCARDIOGRAM REPORT: CPT | Performed by: INTERNAL MEDICINE

## 2021-04-21 PROCEDURE — 0241U HB NFCT DS VIR RESP RNA 4 TRGT: CPT | Performed by: EMERGENCY MEDICINE

## 2021-04-21 PROCEDURE — 96360 HYDRATION IV INFUSION INIT: CPT

## 2021-04-21 PROCEDURE — 70498 CT ANGIOGRAPHY NECK: CPT

## 2021-04-21 PROCEDURE — 80179 DRUG ASSAY SALICYLATE: CPT | Performed by: EMERGENCY MEDICINE

## 2021-04-21 PROCEDURE — 82077 ASSAY SPEC XCP UR&BREATH IA: CPT | Performed by: EMERGENCY MEDICINE

## 2021-04-21 PROCEDURE — 99285 EMERGENCY DEPT VISIT HI MDM: CPT

## 2021-04-21 PROCEDURE — 85027 COMPLETE CBC AUTOMATED: CPT | Performed by: EMERGENCY MEDICINE

## 2021-04-21 PROCEDURE — 83735 ASSAY OF MAGNESIUM: CPT | Performed by: EMERGENCY MEDICINE

## 2021-04-21 PROCEDURE — 82948 REAGENT STRIP/BLOOD GLUCOSE: CPT

## 2021-04-21 RX ORDER — ACETAMINOPHEN 325 MG/1
650 TABLET ORAL EVERY 6 HOURS PRN
Status: DISCONTINUED | OUTPATIENT
Start: 2021-04-21 | End: 2021-04-23 | Stop reason: HOSPADM

## 2021-04-21 RX ORDER — CLOPIDOGREL BISULFATE 75 MG/1
300 TABLET ORAL ONCE
Status: COMPLETED | OUTPATIENT
Start: 2021-04-21 | End: 2021-04-21

## 2021-04-21 RX ORDER — SODIUM CHLORIDE 9 MG/ML
50 INJECTION, SOLUTION INTRAVENOUS CONTINUOUS
Status: DISCONTINUED | OUTPATIENT
Start: 2021-04-21 | End: 2021-04-23

## 2021-04-21 RX ORDER — ASPIRIN 325 MG
325 TABLET ORAL ONCE
Status: COMPLETED | OUTPATIENT
Start: 2021-04-21 | End: 2021-04-21

## 2021-04-21 RX ADMIN — IOHEXOL 100 ML: 350 INJECTION, SOLUTION INTRAVENOUS at 14:36

## 2021-04-21 RX ADMIN — CLOPIDOGREL BISULFATE 300 MG: 75 TABLET ORAL at 15:45

## 2021-04-21 RX ADMIN — SODIUM CHLORIDE 125 ML/HR: 0.9 INJECTION, SOLUTION INTRAVENOUS at 19:25

## 2021-04-21 RX ADMIN — SODIUM CHLORIDE 1000 ML: 0.9 INJECTION, SOLUTION INTRAVENOUS at 15:00

## 2021-04-21 RX ADMIN — APIXABAN 5 MG: 5 TABLET, FILM COATED ORAL at 19:24

## 2021-04-21 RX ADMIN — ASPIRIN 325 MG ORAL TABLET 325 MG: 325 PILL ORAL at 15:45

## 2021-04-21 NOTE — ASSESSMENT & PLAN NOTE
· States that he drinks approximately 1/2 pint of hard liquor daily  His last drink was Friday     · Denies withdrawal symptoms at this time   · CIWA score 0   · Continue to monitor the patient clinically w/ CIWA protocol

## 2021-04-21 NOTE — ASSESSMENT & PLAN NOTE
· Baseline creatinine appears 0 7-0 9   · Currently 1 4   · Will begin IV hydration with 125 cc/hr NS
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
## 2021-04-21 NOTE — ASSESSMENT & PLAN NOTE
Daughter brought patient into the hospital this morning  Patient was not "feeling well" over the past few days  This morning c/o double vision for about 1 minute duration that resolved on its own  Patient states that this has happened before a few months ago  Also exhibited confusion noted by his daughter  No facial drooping or slurred speech  No weakness, numbness, tingling, or focal deficits  Ct Stroke Alert Brain  Impression: No acute intracranial CT abnormality     Cta Stroke Alert (head/neck)  Severe stenosis of left vertebral artery origin  Focal ectasia versus blisterlike aneurysm of proximal cavernous segment of right ICA  Nonurgent neurovascular consultation is recommended    Unclear etiology  Differential includes stroke vs TIA vs seizure  Plan:  Stroke pathway  · MRI brain  · Echo  · Lipid Panel  · Hemoglobin A1c  · Aspirin 325 mg and Plavix 300 mg load x 1, followed by starting DAPT with aspirin 81 mg and Plavix 75 mg tomorrow  · Atorvastatin 40 mg  · Permissive HTN, labetalol if SBP >200  · Continue telemetry  · PT/OT/ST  · Frequent neuro checks  Continue to monitor and notify neurology with any changes     · Check thiamine level  ·  Start thiamine 100 mg IM daily
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
## 2021-04-22 ENCOUNTER — APPOINTMENT (INPATIENT)
Dept: NON INVASIVE DIAGNOSTICS | Facility: HOSPITAL | Age: 71
DRG: 054 | End: 2021-04-22
Payer: MEDICARE

## 2021-04-22 ENCOUNTER — TELEPHONE (OUTPATIENT)
Dept: CT IMAGING | Facility: HOSPITAL | Age: 71
End: 2021-04-22

## 2021-04-22 ENCOUNTER — APPOINTMENT (INPATIENT)
Dept: MRI IMAGING | Facility: HOSPITAL | Age: 71
DRG: 054 | End: 2021-04-22
Payer: MEDICARE

## 2021-04-22 ENCOUNTER — APPOINTMENT (INPATIENT)
Dept: CT IMAGING | Facility: HOSPITAL | Age: 71
DRG: 054 | End: 2021-04-22
Payer: MEDICARE

## 2021-04-22 ENCOUNTER — APPOINTMENT (INPATIENT)
Dept: NEUROLOGY | Facility: HOSPITAL | Age: 71
DRG: 054 | End: 2021-04-22
Payer: MEDICARE

## 2021-04-22 PROBLEM — K59.00 CONSTIPATION: Status: ACTIVE | Noted: 2021-04-22

## 2021-04-22 LAB
ALBUMIN SERPL BCP-MCNC: 2.9 G/DL (ref 3.5–5)
ALP SERPL-CCNC: 93 U/L (ref 46–116)
ALT SERPL W P-5'-P-CCNC: 26 U/L (ref 12–78)
ANION GAP SERPL CALCULATED.3IONS-SCNC: 12 MMOL/L (ref 4–13)
AST SERPL W P-5'-P-CCNC: 16 U/L (ref 5–45)
BILIRUB SERPL-MCNC: 0.29 MG/DL (ref 0.2–1)
BUN SERPL-MCNC: 19 MG/DL (ref 5–25)
CALCIUM ALBUM COR SERPL-MCNC: 8.9 MG/DL (ref 8.3–10.1)
CALCIUM SERPL-MCNC: 8 MG/DL (ref 8.3–10.1)
CHLORIDE SERPL-SCNC: 107 MMOL/L (ref 100–108)
CO2 SERPL-SCNC: 20 MMOL/L (ref 21–32)
CREAT SERPL-MCNC: 1.1 MG/DL (ref 0.6–1.3)
EST. AVERAGE GLUCOSE BLD GHB EST-MCNC: 117 MG/DL
GFR SERPL CREATININE-BSD FRML MDRD: 68 ML/MIN/1.73SQ M
GLUCOSE SERPL-MCNC: 93 MG/DL (ref 65–140)
HBA1C MFR BLD: 5.7 %
POTASSIUM SERPL-SCNC: 4.1 MMOL/L (ref 3.5–5.3)
PROT SERPL-MCNC: 6.7 G/DL (ref 6.4–8.2)
SODIUM SERPL-SCNC: 139 MMOL/L (ref 136–145)

## 2021-04-22 PROCEDURE — 93306 TTE W/DOPPLER COMPLETE: CPT

## 2021-04-22 PROCEDURE — G1004 CDSM NDSC: HCPCS

## 2021-04-22 PROCEDURE — 74177 CT ABD & PELVIS W/CONTRAST: CPT

## 2021-04-22 PROCEDURE — 93306 TTE W/DOPPLER COMPLETE: CPT | Performed by: INTERNAL MEDICINE

## 2021-04-22 PROCEDURE — 70553 MRI BRAIN STEM W/O & W/DYE: CPT

## 2021-04-22 PROCEDURE — 80053 COMPREHEN METABOLIC PANEL: CPT | Performed by: INTERNAL MEDICINE

## 2021-04-22 PROCEDURE — 95816 EEG AWAKE AND DROWSY: CPT | Performed by: PSYCHIATRY & NEUROLOGY

## 2021-04-22 PROCEDURE — 95816 EEG AWAKE AND DROWSY: CPT

## 2021-04-22 PROCEDURE — 99233 SBSQ HOSP IP/OBS HIGH 50: CPT | Performed by: PSYCHIATRY & NEUROLOGY

## 2021-04-22 PROCEDURE — 71260 CT THORAX DX C+: CPT

## 2021-04-22 PROCEDURE — 99232 SBSQ HOSP IP/OBS MODERATE 35: CPT | Performed by: INTERNAL MEDICINE

## 2021-04-22 PROCEDURE — A9585 GADOBUTROL INJECTION: HCPCS | Performed by: NURSE PRACTITIONER

## 2021-04-22 PROCEDURE — 92610 EVALUATE SWALLOWING FUNCTION: CPT

## 2021-04-22 PROCEDURE — 97167 OT EVAL HIGH COMPLEX 60 MIN: CPT

## 2021-04-22 PROCEDURE — 97163 PT EVAL HIGH COMPLEX 45 MIN: CPT

## 2021-04-22 RX ORDER — ASPIRIN 81 MG/1
81 TABLET ORAL DAILY
Status: DISCONTINUED | OUTPATIENT
Start: 2021-04-22 | End: 2021-04-23 | Stop reason: HOSPADM

## 2021-04-22 RX ORDER — SENNOSIDES 8.6 MG
2 TABLET ORAL
Status: DISCONTINUED | OUTPATIENT
Start: 2021-04-22 | End: 2021-04-23 | Stop reason: HOSPADM

## 2021-04-22 RX ORDER — LEVETIRACETAM 500 MG/1
750 TABLET ORAL EVERY 12 HOURS SCHEDULED
Status: DISCONTINUED | OUTPATIENT
Start: 2021-04-22 | End: 2021-04-23 | Stop reason: HOSPADM

## 2021-04-22 RX ORDER — LEVOTHYROXINE SODIUM 0.03 MG/1
25 TABLET ORAL
Status: DISCONTINUED | OUTPATIENT
Start: 2021-04-22 | End: 2021-04-23 | Stop reason: HOSPADM

## 2021-04-22 RX ADMIN — SENNOSIDES 17.2 MG: 8.6 TABLET, FILM COATED ORAL at 11:34

## 2021-04-22 RX ADMIN — SENNOSIDES 17.2 MG: 8.6 TABLET, FILM COATED ORAL at 21:35

## 2021-04-22 RX ADMIN — IOHEXOL 100 ML: 350 INJECTION, SOLUTION INTRAVENOUS at 20:10

## 2021-04-22 RX ADMIN — APIXABAN 5 MG: 5 TABLET, FILM COATED ORAL at 08:11

## 2021-04-22 RX ADMIN — ASPIRIN 81 MG: 81 TABLET, COATED ORAL at 14:02

## 2021-04-22 RX ADMIN — LEVETIRACETAM 750 MG: 500 TABLET, FILM COATED ORAL at 21:35

## 2021-04-22 RX ADMIN — GADOBUTROL 9 ML: 604.72 INJECTION INTRAVENOUS at 13:47

## 2021-04-22 RX ADMIN — SODIUM CHLORIDE 50 ML/HR: 0.9 INJECTION, SOLUTION INTRAVENOUS at 14:07

## 2021-04-22 RX ADMIN — METOPROLOL TARTRATE 25 MG: 25 TABLET, FILM COATED ORAL at 21:35

## 2021-04-22 RX ADMIN — LEVETIRACETAM 1000 MG: 100 INJECTION, SOLUTION INTRAVENOUS at 17:34

## 2021-04-22 RX ADMIN — ENOXAPARIN SODIUM 40 MG: 30 INJECTION SUBCUTANEOUS at 17:32

## 2021-04-22 RX ADMIN — METOPROLOL TARTRATE 25 MG: 25 TABLET, FILM COATED ORAL at 08:11

## 2021-04-22 RX ADMIN — LEVOTHYROXINE SODIUM 25 MCG: 25 TABLET ORAL at 11:34

## 2021-04-22 NOTE — CASE MANAGEMENT
Attempted to meet with patient, but speech currently doing swallow eval  PT eval recommending home care vs out-patient therapy - will need to follow-up with patient re: AAKASH Stoll  4/22/2021  4:25 PM

## 2021-04-22 NOTE — PROGRESS NOTES
Unable to speak with pt at this time, will reattempt at review to provide heart healthy diet, modest weight loss given A1C 5 7  Good intake, 100% meal completion x 2/2 meals  Consider thiamine/folic acid supplementation given alcohol abuse  Will adjust diet to cardiac given abnormal lipid panel
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
## 2021-04-22 NOTE — PROGRESS NOTES
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  Focal ectasia versus blisterlike aneurysm of proximal cavernous segment of right ICA  Nonurgent neurovascular consultation is recommended    Unclear etiology  Differential includes stroke vs TIA vs  CVA vs  seizure  Plan:  Stroke pathway  · MRI brain  · Echo  · EEG  · ASA/Plavix  · Atorvastatin 40mg  · Permissive HTN, labetalol if SBP >200  · Continue telemetry  · PT/OT/ST  · Frequent neuro checks  Continue to monitor and notify neurology with any changes  · Check thiamine level for possible wernicke-korsakoff  Continue thiamine supplementation     Acute confusion  Assessment & Plan  - Presented with worsening confusion as per daughter  - at baseline, patient has 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  - treat for hypothyroidism  - treat for possible constipation as patient complaining of abdominal discomfort   Will add senna to regimen    Abnormal TSH  Assessment & Plan  · TSH: 6 24, FT4: 0 73  · Suggesting new dx of hypothyroidism  · Due to age, started on levothyroxine 25mg daily  · Will need to FU w/ PCP for repeat thyroid function test in 4-6wks for further adjustments in dosage if needed     Acute kidney injury [facility])  Assessment & Plan  · Cr on admission 1 4  · Baseline creatinine appears 0 7-0 9   · Currently 1 1  · Gentle IVF hydration for now  · Monitor BMP    Alcohol abuse  Assessment & Plan  · States that he drinks approximately 1/2 pint of hard liquor daily  His last drink was Friday  · Denies withdrawal symptoms at this time   · Continue to monitor the patient clinically w/ CIWA protocol         VTE Pharmacologic Prophylaxis:   Pharmacologic: Apixaban (Eliquis)  Mechanical VTE Prophylaxis in Place: No    Discussions with Specialists or Other Care Team Provider:  Attending, case management    Education and Discussions with Family / Patient:  Plan of care discussed with patient    Current Length of Stay: 1 day(s)    Current Patient Status: Inpatient     Discharge Plan / Estimated Discharge Date:  Awaiting Neurology recommendations    Code Status: Level 1 - Full Code      Subjective:   No overnight events  Patient remains alert and oriented to person place and time without any focal neurological deficits  Reports mild abdominal discomfort without nausea or vomiting  According to patient, his last bowel movement was yesterday evening  Denies chest pain or shortness of breath  Objective:     Vitals:   Temp (24hrs), Av 3 °F (36 8 °C), Min:98 °F (36 7 °C), Max:98 5 °F (36 9 °C)    Temp:  [98 °F (36 7 °C)-98 5 °F (36 9 °C)] 98 4 °F (36 9 °C)  HR:  [] 78  Resp:  [18-19] 19  BP: (118-150)/(68-96) 150/90  SpO2:  [89 %-97 %] 94 %  Body mass index is 33 15 kg/m²  Input and Output Summary (last 24 hours): Intake/Output Summary (Last 24 hours) at 2021 1158  Last data filed at 2021 0900  Gross per 24 hour   Intake 1070 ml   Output 1775 ml   Net -705 ml       Physical Exam:     Physical Exam  Constitutional:       General: He is not in acute distress  Appearance: He is not ill-appearing  HENT:      Head: Normocephalic and atraumatic  Mouth/Throat:      Mouth: Mucous membranes are moist       Pharynx: Oropharynx is clear  Eyes:      Extraocular Movements: Extraocular movements intact  Pupils: Pupils are equal, round, and reactive to light     Neck: Musculoskeletal: Normal range of motion and neck supple  Cardiovascular:      Rate and Rhythm: Normal rate and regular rhythm  Heart sounds: No murmur  Pulmonary:      Effort: Pulmonary effort is normal       Breath sounds: Normal breath sounds  No wheezing, rhonchi or rales  Abdominal:      General: Abdomen is flat  Palpations: Abdomen is soft  Tenderness: There is no abdominal tenderness  There is no guarding or rebound  Musculoskeletal: Normal range of motion  Right lower leg: No edema  Left lower leg: No edema  Skin:     General: Skin is warm and dry  Findings: No lesion or rash  Neurological:      General: No focal deficit present  Mental Status: He is alert and oriented to person, place, and time  Mental status is at baseline  Cranial Nerves: No cranial nerve deficit  Sensory: No sensory deficit  Motor: No weakness  Comments: CN 2-12 intact  No focal neurologic deficits  Psychiatric:         Mood and Affect: Mood normal          Behavior: Behavior normal              Additional Data:     Labs:    Results from last 7 days   Lab Units 04/21/21  1433   WBC Thousand/uL 9 51   HEMOGLOBIN g/dL 14 5   HEMATOCRIT % 43 7   PLATELETS Thousands/uL 313     Results from last 7 days   Lab Units 04/22/21  0448   POTASSIUM mmol/L 4 1   CHLORIDE mmol/L 107   CO2 mmol/L 20*   BUN mg/dL 19   CREATININE mg/dL 1 10   CALCIUM mg/dL 8 0*   ALK PHOS U/L 93   ALT U/L 26   AST U/L 16     Results from last 7 days   Lab Units 04/21/21  1433   INR  0 97       * I Have Reviewed All Lab Data Listed Above  * Additional Pertinent Lab Tests Reviewed:  Thien [facility] Admission Reviewed    Imaging:    Imaging Reports Reviewed Today Include:  None  Imaging Personally Reviewed by Myself Includes:  None    Recent Cultures (last 7 days):           Last 24 Hours Medication List:   Current Facility-Administered Medications   Medication Dose Route Frequency Provider Last Rate    acetaminophen  650 mg Oral Q6H PRN Dev Lizama, DO      apixaban  5 mg Oral BID Dev Lizama, DO      levothyroxine  25 mcg Oral Early Morning Silverio Kirkpatrick MD      metoprolol tartrate  25 mg Oral Q12H [address] Méndez Gang, DO      senna  2 tablet Oral HS Silverio Kirkpatrick MD      sodium chloride  50 mL/hr Intravenous Continuous Zulma Curry MD 50 mL/hr (04/22/21 0850)        Today, Patient Was Seen By: Louisa Doe MD    ** Please Note: This note has been constructed using a voice recognition system   **

## 2021-04-22 NOTE — ASSESSMENT & PLAN NOTE
· TSH: 6 24, FT4: 0 73  · Suggesting new dx of hypothyroidism  · Due to age, started on levothyroxine 25mg daily  · Will need to FU w/ PCP for repeat thyroid function test in 4-6wks for further adjustments in dosage if needed

## 2021-04-22 NOTE — PLAN OF CARE
Problem: Potential for Falls  Goal: Patient will remain free of falls  Description: INTERVENTIONS:  - Assess patient frequently for physical needs  -  Identify cognitive and physical deficits and behaviors that affect risk of falls  -  [place] fall precautions as indicated by assessment   - Educate patient/family on patient safety including physical limitations  - Instruct patient to call for assistance with activity based on assessment  - Modify environment to reduce risk of injury  - Consider OT/PT consult to assist with strengthening/mobility  Outcome: Progressing     Problem: Potential for Falls  Goal: Patient will remain free of falls  Description: INTERVENTIONS:  - Assess patient frequently for physical needs  -  Identify cognitive and physical deficits and behaviors that affect risk of falls    -  [place] fall precautions as indicated by assessment   - Educate patient/family on patient safety including physical limitations  - Instruct patient to call for assistance with activity based on assessment  - Modify environment to reduce risk of injury  - Consider OT/PT consult to assist with strengthening/mobility  Outcome: Progressing     Problem: PAIN - ADULT  Goal: Verbalizes/displays adequate comfort level or baseline comfort level  Description: Interventions:  - Encourage patient to monitor pain and request assistance  - Assess pain using appropriate pain scale  - Administer analgesics based on type and severity of pain and evaluate response  - Implement non-pharmacological measures as appropriate and evaluate response  - Consider cultural and social influences on pain and pain management  - Notify physician/advanced practitioner if interventions unsuccessful or patient reports new pain  Outcome: Progressing     Problem: INFECTION - ADULT  Goal: Absence or prevention of progression during hospitalization  Description: INTERVENTIONS:  - Assess and monitor for signs and symptoms of infection  - Monitor lab/diagnostic results  - Monitor all insertion sites, i e  indwelling lines, tubes, and drains  - Monitor endotracheal if appropriate and nasal secretions for changes in amount and color  - [place] appropriate cooling/warming therapies per order  - Administer medications as ordered  - Instruct and encourage patient and family to use good hand hygiene technique  - Identify and instruct in appropriate isolation precautions for identified infection/condition  Outcome: Progressing     Problem: SAFETY ADULT  Goal: Patient will remain free of falls  Description: INTERVENTIONS:  - Assess patient frequently for physical needs  -  Identify cognitive and physical deficits and behaviors that affect risk of falls    -  [place] fall precautions as indicated by assessment   - Educate patient/family on patient safety including physical limitations  - Instruct patient to call for assistance with activity based on assessment  - Modify environment to reduce risk of injury  - Consider OT/PT consult to assist with strengthening/mobility  Outcome: Progressing  Goal: Maintain or return to baseline ADL function  Description: INTERVENTIONS:  -  Assess patient's ability to carry out ADLs; assess patient's baseline for ADL function and identify physical deficits which impact ability to perform ADLs (bathing, care of mouth/teeth, toileting, grooming, dressing, etc )  - Assess/evaluate cause of self-care deficits   - Assess range of motion  - Assess patient's mobility; develop plan if impaired  - Assess patient's need for assistive devices and provide as appropriate  - Encourage maximum independence but intervene and supervise when necessary  - Involve family in performance of ADLs  - Assess for home care needs following discharge   - Consider OT consult to assist with ADL evaluation and planning for discharge  - Provide patient education as appropriate  Outcome: Progressing  Goal: Maintain or return mobility status to optimal level  Description: INTERVENTIONS:  - Assess patient's baseline mobility status (ambulation, transfers, stairs, etc )    - Identify cognitive and physical deficits and behaviors that affect mobility  - Identify mobility aids required to assist with transfers and/or ambulation (gait belt, sit-to-stand, lift, walker, cane, etc )  - [place] fall precautions as indicated by assessment  - Record patient progress and toleration of activity level on Mobility SBAR; progress patient to next Phase/Stage  - Instruct patient to call for assistance with activity based on assessment  - Consider rehabilitation consult to assist with strengthening/weightbearing, etc   Outcome: Progressing     Problem: DISCHARGE PLANNING  Goal: Discharge to home or other facility with appropriate resources  Description: INTERVENTIONS:  - Identify barriers to discharge w/patient and caregiver  - Arrange for needed discharge resources and transportation as appropriate  - Identify discharge learning needs (meds, wound care, etc )  - Arrange for interpretive services to assist at discharge as needed  - Refer to Case Management Department for coordinating discharge planning if the patient needs post-hospital services based on physician/advanced practitioner order or complex needs related to functional status, cognitive ability, or social support system  Outcome: Progressing     Problem: Knowledge Deficit  Goal: Patient/family/caregiver demonstrates understanding of disease process, treatment plan, medications, and discharge instructions  Description: Complete learning assessment and assess knowledge base    Interventions:  - Provide teaching at level of understanding  - Provide teaching via preferred learning methods  Outcome: Progressing     Problem: NEUROSENSORY - ADULT  Goal: Achieves stable or improved neurological status  Description: INTERVENTIONS  - Monitor and report changes in neurological status  - Monitor vital signs such as temperature, blood pressure, glucose, and any other labs ordered   - Initiate measures to prevent increased intracranial pressure  - Monitor for seizure activity and implement precautions if appropriate      Outcome: Progressing  Goal: Remains free of injury related to seizures activity  Description: INTERVENTIONS  - Maintain airway, patient safety  and administer oxygen as ordered  - Monitor patient for seizure activity, document and report duration and description of seizure to physician/advanced practitioner  - If seizure occurs,  ensure patient safety during seizure  - Reorient patient post seizure  - Seizure pads on all 4 side rails  - Instruct patient/family to notify RN of any seizure activity including if an aura is experienced  - Instruct patient/family to call for assistance with activity based on nursing assessment  - Administer anti-seizure medications if ordered    Outcome: Progressing  Goal: Achieves maximal functionality and self care  Description: INTERVENTIONS  - Monitor swallowing and airway patency with patient fatigue and changes in neurological status  - Encourage and assist patient to increase activity and self care     - Encourage visually impaired, hearing impaired and aphasic patients to use assistive/communication devices  Outcome: Progressing     Problem: CARDIOVASCULAR - ADULT  Goal: Maintains optimal cardiac output and hemodynamic stability  Description: INTERVENTIONS:  - Monitor I/O, vital signs and rhythm  - Monitor for S/S and trends of decreased cardiac output  - Administer and titrate ordered vasoactive medications to optimize hemodynamic stability  - Assess quality of pulses, skin color and temperature  - Assess for signs of decreased coronary artery perfusion  - Instruct patient to report change in severity of symptoms  Outcome: Progressing  Goal: Absence of cardiac dysrhythmias or at baseline rhythm  Description: INTERVENTIONS:  - Continuous cardiac monitoring, vital signs, obtain 12 lead EKG if ordered  - Administer antiarrhythmic and heart rate control medications as ordered  - Monitor electrolytes and administer replacement therapy as ordered  Outcome: Progressing     Problem: METABOLIC, FLUID AND ELECTROLYTES - ADULT  Goal: Electrolytes maintained within normal limits  Description: INTERVENTIONS:  - Monitor labs and assess patient for signs and symptoms of electrolyte imbalances  - Administer electrolyte replacement as ordered  - Monitor response to electrolyte replacements, including repeat lab results as appropriate  - Instruct patient on fluid and nutrition as appropriate  Outcome: Progressing  Goal: Fluid balance maintained  Description: INTERVENTIONS:  - Monitor labs   - Monitor I/O and WT  - Instruct patient on fluid and nutrition as appropriate  - Assess for signs & symptoms of volume excess or deficit  Outcome: Progressing  Goal: Glucose maintained within target range  Description: INTERVENTIONS:  - Monitor Blood Glucose as ordered  - Assess for signs and symptoms of hyperglycemia and hypoglycemia  - Administer ordered medications to maintain glucose within target range  - Assess nutritional intake and initiate nutrition service referral as needed  Outcome: Progressing     Problem: MUSCULOSKELETAL - ADULT  Goal: Maintain or return mobility to safest level of function  Description: INTERVENTIONS:  - Assess patient's ability to carry out ADLs; assess patient's baseline for ADL function and identify physical deficits which impact ability to perform ADLs (bathing, care of mouth/teeth, toileting, grooming, dressing, etc )  - Assess/evaluate cause of self-care deficits   - Assess range of motion  - Assess patient's mobility  - Assess patient's need for assistive devices and provide as appropriate  - Encourage maximum independence but intervene and supervise when necessary  - Involve family in performance of ADLs  - Assess for home care needs following discharge   - Consider OT consult to assist with ADL evaluation and planning for discharge  - Provide patient education as appropriate  Outcome: Progressing  Goal: Maintain proper alignment of affected body part  Description: INTERVENTIONS:  - Support, maintain and protect limb and body alignment  - Provide patient/ family with appropriate education  Outcome: Progressing
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
## 2021-04-22 NOTE — PLAN OF CARE
Problem: Potential for Falls  Goal: Patient will remain free of falls  Description: INTERVENTIONS:  - Assess patient frequently for physical needs  -  Identify cognitive and physical deficits and behaviors that affect risk of falls    -  [place] fall precautions as indicated by assessment   - Educate patient/family on patient safety including physical limitations  - Instruct patient to call for assistance with activity based on assessment  - Modify environment to reduce risk of injury  - Consider OT/PT consult to assist with strengthening/mobility  Outcome: Progressing     Problem: PAIN - ADULT  Goal: Verbalizes/displays adequate comfort level or baseline comfort level  Description: Interventions:  - Encourage patient to monitor pain and request assistance  - Assess pain using appropriate pain scale  - Administer analgesics based on type and severity of pain and evaluate response  - Implement non-pharmacological measures as appropriate and evaluate response  - Consider cultural and social influences on pain and pain management  - Notify physician/advanced practitioner if interventions unsuccessful or patient reports new pain  Outcome: Progressing     Problem: INFECTION - ADULT  Goal: Absence or prevention of progression during hospitalization  Description: INTERVENTIONS:  - Assess and monitor for signs and symptoms of infection  - Monitor lab/diagnostic results  - Monitor all insertion sites, i e  indwelling lines, tubes, and drains  - Monitor endotracheal if appropriate and nasal secretions for changes in amount and color  - [place] appropriate cooling/warming therapies per order  - Administer medications as ordered  - Instruct and encourage patient and family to use good hand hygiene technique  - Identify and instruct in appropriate isolation precautions for identified infection/condition  Outcome: Progressing     Problem: SAFETY ADULT  Goal: Patient will remain free of falls  Description: INTERVENTIONS:  - Assess patient frequently for physical needs  -  Identify cognitive and physical deficits and behaviors that affect risk of falls    -  [place] fall precautions as indicated by assessment   - Educate patient/family on patient safety including physical limitations  - Instruct patient to call for assistance with activity based on assessment  - Modify environment to reduce risk of injury  - Consider OT/PT consult to assist with strengthening/mobility  Outcome: Progressing  Goal: Maintain or return to baseline ADL function  Description: INTERVENTIONS:  -  Assess patient's ability to carry out ADLs; assess patient's baseline for ADL function and identify physical deficits which impact ability to perform ADLs (bathing, care of mouth/teeth, toileting, grooming, dressing, etc )  - Assess/evaluate cause of self-care deficits   - Assess range of motion  - Assess patient's mobility; develop plan if impaired  - Assess patient's need for assistive devices and provide as appropriate  - Encourage maximum independence but intervene and supervise when necessary  - Involve family in performance of ADLs  - Assess for home care needs following discharge   - Consider OT consult to assist with ADL evaluation and planning for discharge  - Provide patient education as appropriate  Outcome: Progressing  Goal: Maintain or return mobility status to optimal level  Description: INTERVENTIONS:  - Assess patient's baseline mobility status (ambulation, transfers, stairs, etc )    - Identify cognitive and physical deficits and behaviors that affect mobility  - Identify mobility aids required to assist with transfers and/or ambulation (gait belt, sit-to-stand, lift, walker, cane, etc )  - [place] fall precautions as indicated by assessment  - Record patient progress and toleration of activity level on Mobility SBAR; progress patient to next Phase/Stage  - Instruct patient to call for assistance with activity based on assessment  - Consider rehabilitation consult to assist with strengthening/weightbearing, etc   Outcome: Progressing     Problem: DISCHARGE PLANNING  Goal: Discharge to home or other facility with appropriate resources  Description: INTERVENTIONS:  - Identify barriers to discharge w/patient and caregiver  - Arrange for needed discharge resources and transportation as appropriate  - Identify discharge learning needs (meds, wound care, etc )  - Arrange for interpretive services to assist at discharge as needed  - Refer to Case Management Department for coordinating discharge planning if the patient needs post-hospital services based on physician/advanced practitioner order or complex needs related to functional status, cognitive ability, or social support system  Outcome: Progressing     Problem: Knowledge Deficit  Goal: Patient/family/caregiver demonstrates understanding of disease process, treatment plan, medications, and discharge instructions  Description: Complete learning assessment and assess knowledge base    Interventions:  - Provide teaching at level of understanding  - Provide teaching via preferred learning methods  Outcome: Progressing     Problem: NEUROSENSORY - ADULT  Goal: Achieves stable or improved neurological status  Description: INTERVENTIONS  - Monitor and report changes in neurological status  - Monitor vital signs such as temperature, blood pressure, glucose, and any other labs ordered   - Initiate measures to prevent increased intracranial pressure  - Monitor for seizure activity and implement precautions if appropriate      Outcome: Progressing  Goal: Remains free of injury related to seizures activity  Description: INTERVENTIONS  - Maintain airway, patient safety  and administer oxygen as ordered  - Monitor patient for seizure activity, document and report duration and description of seizure to physician/advanced practitioner  - If seizure occurs,  ensure patient safety during seizure  - Reorient patient post seizure  - Seizure pads on all 4 side rails  - Instruct patient/family to notify RN of any seizure activity including if an aura is experienced  - Instruct patient/family to call for assistance with activity based on nursing assessment  - Administer anti-seizure medications if ordered    Outcome: Progressing  Goal: Achieves maximal functionality and self care  Description: INTERVENTIONS  - Monitor swallowing and airway patency with patient fatigue and changes in neurological status  - Encourage and assist patient to increase activity and self care     - Encourage visually impaired, hearing impaired and aphasic patients to use assistive/communication devices  Outcome: Progressing     Problem: CARDIOVASCULAR - ADULT  Goal: Maintains optimal cardiac output and hemodynamic stability  Description: INTERVENTIONS:  - Monitor I/O, vital signs and rhythm  - Monitor for S/S and trends of decreased cardiac output  - Administer and titrate ordered vasoactive medications to optimize hemodynamic stability  - Assess quality of pulses, skin color and temperature  - Assess for signs of decreased coronary artery perfusion  - Instruct patient to report change in severity of symptoms  Outcome: Progressing  Goal: Absence of cardiac dysrhythmias or at baseline rhythm  Description: INTERVENTIONS:  - Continuous cardiac monitoring, vital signs, obtain 12 lead EKG if ordered  - Administer antiarrhythmic and heart rate control medications as ordered  - Monitor electrolytes and administer replacement therapy as ordered  Outcome: Progressing     Problem: METABOLIC, FLUID AND ELECTROLYTES - ADULT  Goal: Electrolytes maintained within normal limits  Description: INTERVENTIONS:  - Monitor labs and assess patient for signs and symptoms of electrolyte imbalances  - Administer electrolyte replacement as ordered  - Monitor response to electrolyte replacements, including repeat lab results as appropriate  - Instruct patient on fluid and nutrition as appropriate  Outcome: Progressing  Goal: Fluid balance maintained  Description: INTERVENTIONS:  - Monitor labs   - Monitor I/O and WT  - Instruct patient on fluid and nutrition as appropriate  - Assess for signs & symptoms of volume excess or deficit  Outcome: Progressing  Goal: Glucose maintained within target range  Description: INTERVENTIONS:  - Monitor Blood Glucose as ordered  - Assess for signs and symptoms of hyperglycemia and hypoglycemia  - Administer ordered medications to maintain glucose within target range  - Assess nutritional intake and initiate nutrition service referral as needed  Outcome: Progressing     Problem: MUSCULOSKELETAL - ADULT  Goal: Maintain or return mobility to safest level of function  Description: INTERVENTIONS:  - Assess patient's ability to carry out ADLs; assess patient's baseline for ADL function and identify physical deficits which impact ability to perform ADLs (bathing, care of mouth/teeth, toileting, grooming, dressing, etc )  - Assess/evaluate cause of self-care deficits   - Assess range of motion  - Assess patient's mobility  - Assess patient's need for assistive devices and provide as appropriate  - Encourage maximum independence but intervene and supervise when necessary  - Involve family in performance of ADLs  - Assess for home care needs following discharge   - Consider OT consult to assist with ADL evaluation and planning for discharge  - Provide patient education as appropriate  Outcome: Progressing  Goal: Maintain proper alignment of affected body part  Description: INTERVENTIONS:  - Support, maintain and protect limb and body alignment  - Provide patient/ family with appropriate education  Outcome: Progressing

## 2021-04-22 NOTE — QUICK NOTE
Spoke to patient's daughter over the phone about today's plan of care  As per daughter, patient completed his course of anticoagulation for hx of DVT since his prior hospital discharge in 8/2020  Was not taking eliquis before this admission  Will discontinue Eliquis and start on VTE ppx instead  She also reports patient is very noncompliant with all his medications  Will encourage compliance at this time  All questions were answered
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
## 2021-04-22 NOTE — ASSESSMENT & PLAN NOTE
· Cr on admission 1 4  · Baseline creatinine appears 0 7-0 9   · Currently 1 1  · Gentle IVF hydration for now  · Monitor BMP

## 2021-04-22 NOTE — ASSESSMENT & PLAN NOTE
Presented with not "feeling well", double vision which has since resolved and worsening confusion  No focal neurological deficits appreciated at this time    Ct Stroke Alert Brain  Impression: No acute intracranial CT abnormality     Cta Stroke Alert (head/neck)  Severe stenosis of left vertebral artery origin  Focal ectasia versus blisterlike aneurysm of proximal cavernous segment of right ICA  Nonurgent neurovascular consultation is recommended    Unclear etiology  Differential includes stroke vs TIA vs  CVA vs  seizure  Plan:  Stroke pathway  · MRI brain  · Echo  · EEG  · ASA/Plavix  · Atorvastatin 40mg  · Permissive HTN, labetalol if SBP >200  · Continue telemetry  · PT/OT/ST  · Frequent neuro checks  Continue to monitor and notify neurology with any changes  · Check thiamine level for possible wernicke-korsakoff   Continue thiamine supplementation

## 2021-04-22 NOTE — ASSESSMENT & PLAN NOTE
· States that he drinks approximately 1/2 pint of hard liquor daily  His last drink was Friday     · Denies withdrawal symptoms at this time   · Continue to monitor the patient clinically w/ CIWA protocol
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
## 2021-04-22 NOTE — PHYSICAL THERAPY NOTE
PHYSICAL THERAPY CANCELLATION NOTE    Patient Name: Shree Hoover  Today's Date: 4/22/2021 04/22/21 1025   PT Last Visit   PT Visit Date 04/22/21   Note Type   Cancel Reasons   (Echo at bedside)   Assessment   Assessment PT orders received, chart review performed  Spoke to [employer]  Attempted to see pt for PT evaluation however echo at bedside at this time   PT will continue to follow as schedule allows and as appropriate to perform PT evaluation     Mariaelena Marino, PT, DPT

## 2021-04-22 NOTE — PLAN OF CARE
Problem: Potential for Falls  Goal: Patient will remain free of falls  Description: INTERVENTIONS:  - Assess patient frequently for physical needs  -  Identify cognitive and physical deficits and behaviors that affect risk of falls    -  [place] fall precautions as indicated by assessment   - Educate patient/family on patient safety including physical limitations  - Instruct patient to call for assistance with activity based on assessment  - Modify environment to reduce risk of injury  - Consider OT/PT consult to assist with strengthening/mobility  Outcome: Progressing
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
## 2021-04-22 NOTE — ASSESSMENT & PLAN NOTE
- Presented with worsening confusion as per daughter  - at baseline, patient has 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  - treat for hypothyroidism  - treat for possible constipation as patient complaining of abdominal discomfort   Will add senna to regimen

## 2021-04-23 VITALS
BODY MASS INDEX: 33.04 KG/M2 | HEART RATE: 80 BPM | HEIGHT: 68 IN | OXYGEN SATURATION: 94 % | TEMPERATURE: 98.1 F | RESPIRATION RATE: 18 BRPM | DIASTOLIC BLOOD PRESSURE: 92 MMHG | SYSTOLIC BLOOD PRESSURE: 140 MMHG | WEIGHT: 218.03 LBS

## 2021-04-23 LAB
ANION GAP SERPL CALCULATED.3IONS-SCNC: 10 MMOL/L (ref 4–13)
BUN SERPL-MCNC: 12 MG/DL (ref 5–25)
CALCIUM SERPL-MCNC: 8.4 MG/DL (ref 8.3–10.1)
CHLORIDE SERPL-SCNC: 106 MMOL/L (ref 100–108)
CO2 SERPL-SCNC: 21 MMOL/L (ref 21–32)
CREAT SERPL-MCNC: 0.95 MG/DL (ref 0.6–1.3)
GFR SERPL CREATININE-BSD FRML MDRD: 81 ML/MIN/1.73SQ M
GLUCOSE SERPL-MCNC: 89 MG/DL (ref 65–140)
POTASSIUM SERPL-SCNC: 3.8 MMOL/L (ref 3.5–5.3)
SODIUM SERPL-SCNC: 137 MMOL/L (ref 136–145)

## 2021-04-23 PROCEDURE — 99239 HOSP IP/OBS DSCHRG MGMT >30: CPT | Performed by: INTERNAL MEDICINE

## 2021-04-23 PROCEDURE — 94760 N-INVAS EAR/PLS OXIMETRY 1: CPT

## 2021-04-23 PROCEDURE — 80048 BASIC METABOLIC PNL TOTAL CA: CPT | Performed by: PHYSICAL MEDICINE & REHABILITATION

## 2021-04-23 PROCEDURE — 99233 SBSQ HOSP IP/OBS HIGH 50: CPT | Performed by: PSYCHIATRY & NEUROLOGY

## 2021-04-23 RX ORDER — ASPIRIN 81 MG/1
81 TABLET ORAL DAILY
Qty: 30 TABLET | Refills: 0 | Status: SHIPPED | OUTPATIENT
Start: 2021-04-23 | End: 2021-05-23

## 2021-04-23 RX ORDER — LEVOTHYROXINE SODIUM 0.03 MG/1
25 TABLET ORAL
Qty: 30 TABLET | Refills: 0 | Status: SHIPPED | OUTPATIENT
Start: 2021-04-24 | End: 2021-05-24

## 2021-04-23 RX ORDER — DEXAMETHASONE 2 MG/1
2 TABLET ORAL EVERY 12 HOURS SCHEDULED
Status: DISCONTINUED | OUTPATIENT
Start: 2021-04-23 | End: 2021-04-23 | Stop reason: HOSPADM

## 2021-04-23 RX ORDER — DEXAMETHASONE 2 MG/1
TABLET ORAL
Qty: 9 TABLET | Refills: 0 | Status: SHIPPED | OUTPATIENT
Start: 2021-04-23 | End: 2021-04-29

## 2021-04-23 RX ORDER — LEVETIRACETAM 750 MG/1
750 TABLET ORAL EVERY 12 HOURS SCHEDULED
Qty: 60 TABLET | Refills: 0 | Status: SHIPPED | OUTPATIENT
Start: 2021-04-23 | End: 2021-05-23

## 2021-04-23 RX ADMIN — METOPROLOL TARTRATE 25 MG: 25 TABLET, FILM COATED ORAL at 07:57

## 2021-04-23 RX ADMIN — LEVOTHYROXINE SODIUM 25 MCG: 25 TABLET ORAL at 05:06

## 2021-04-23 RX ADMIN — ASPIRIN 81 MG: 81 TABLET, COATED ORAL at 07:58

## 2021-04-23 RX ADMIN — DEXAMETHASONE 2 MG: 2 TABLET ORAL at 12:13

## 2021-04-23 RX ADMIN — LEVETIRACETAM 750 MG: 500 TABLET, FILM COATED ORAL at 07:57

## 2021-04-23 NOTE — PLAN OF CARE
Problem: Potential for Falls  Goal: Patient will remain free of falls  Description: INTERVENTIONS:  - Assess patient frequently for physical needs  -  Identify cognitive and physical deficits and behaviors that affect risk of falls    -  [place] fall precautions as indicated by assessment   - Educate patient/family on patient safety including physical limitations  - Instruct patient to call for assistance with activity based on assessment  - Modify environment to reduce risk of injury  - Consider OT/PT consult to assist with strengthening/mobility  Outcome: Progressing

## 2021-04-23 NOTE — CONSULTS
Consultation - Palliative & Supportive Care   Siria Joseph  71 y o   male  ICU 02/ICU 02   MRN: [id number]  Encounter: [id number]    ASSESSMENT:    Patient Active Problem List   Diagnosis    Community acquired pneumonia    Abnormal computed tomography angiography (CTA)    Alcohol abuse    Malignant neoplasm of upper lobe of left lung (HCC)    Tobacco abuse    Acute respiratory failure with hypoxia (HCC)    Subcutaneous emphysema (HCC)    Acute deep vein thrombosis (DVT) of axillary vein of right upper extremity (HCC)    Acute deep vein thrombosis (DVT) of brachial vein of right upper extremity (HCC)    Tracheostomy in place ([address]    Postprocedural pneumothorax    Acute deep vein thrombosis (DVT) of calf muscle vein of left lower extremity ([address] )    Pneumonia       69M with biopsy-proven DOMI lung adenocarcinoma who was admitted on 6/10 for an elective flexible bronchoscopy, mediastinoscopy and left VATS possible open upper lobectomy    PLAN:    1  Goals:    Full Cares, improve agitation and pain control for ventilator liberation  2  Social Support:   Call placed to daughter to introduce palliative cares- no answer (was informed by bedside RN recently left to go to work) will follow up with her to provide psychosocial support during Mr Bettyann Nageotte critical illness      3  Symptom management:   Agitation and Pain:  o Attempt to wean off ketamine and then versed- drop ketamine to 0 1 mg/kg/hr (9 mg/hr)  o Agree with scheduled doses of oxyIR 10 mg  o Schedule Tylenol 975 TID  o Continue BID Seroquel   o Continue Ativan 2 mg q4H scheduled- will wean after Versed is off  o Consideration for methadone for ER pain control   o D/C PRN Fentanyl and replace with:  - Oxycodone 5 mg q2H PRN for pain  - Olanzapine 5 mg q3H PRN for agitation      Controlled Substance Review    [place]MP or [place]  reviewed: No red flags were identified; safe to proceed with prescription         I have reviewed the patient's controlled substance dispensing history in the Prescription Drug Monitoring Program in compliance with the [facility] regulations before prescribing any controlled substances  We appreciate the opportunity to participate in this patient's care  We will continue to follow  Please do not hesitate to contact our on-call provider through our clinic answering service at [phone] should you have acute symptom control concerns  IDENTIFICATION:  Inpatient consult to Palliative Care  Consult performed by: Gale Sánchez DO  Consult ordered by: Ras Kendrick        Reason for Consult / Principal Problem: delirium and pain    HISTORY OF PRESENT ILLNESS:    Jacques Burton is a 71 y o  male who presented for left VATS for adenocarcinoma of the DOMI on [address]  Post-operatively course was complicated with following chain of events  On 6/12 worsening crepitus/subQ emphysema in upper torso/neck/face, concerns for airleak, Worsened over the next few days, underwent decompression via small incision to L chest wall w/ VAC placement on 6/15, which provided only minimal-moderate results as per notes  On 6/20, RRT was called for SOB and hypoxia requiring NRB  Went into [facility] and underwent ACLS  Unfortunately, he was a difficult airway and had to undergo emergent cricoidotomy   He had ROSC after a few minutes  He has been in the ICU since  Cric was formalized to tracheostomy, he has a NGT in place  Noted was agitation on the [facility] consulted to help with pain  He resides in [address] [place] [address] with his daughter Makenna Lopez and her   Daughter Daniel Davis had been point person to contact for updates  Interview and exam limited by: Patient heavily sedated       Review of Systems   Unable to perform ROS: Intubated       Past Medical History:   Diagnosis Date    Alcohol abuse 3/27/2020    Chest pain     Community acquired pneumonia 3/27/2020    Hypertension     Left upper lobe pulmonary nodule     Malignant neoplasm of upper lobe of left lung ([facility] ) 5/7/2020     Past Surgical History:   Procedure Laterality Date    COLON SURGERY      CT NEEDLE BIOPSY LUNG  4/29/2020    MS BRONCHOSCOPY,DIAGNOSTIC N/A 6/10/2020    Procedure: BRONCHOSCOPY FLEXIBLE;  Surgeon: Hawk Hoskins MD;  [facility]: [facility];  Service: Thoracic    MS MEDIASTINOSCOPY WITH LYMPH NODE BIOPSY/IES N/A 6/10/2020    Procedure: MEDIASTINOSCOPY;  Surgeon: Hawk Hoskins MD;  [facility];  Service: Thoracic    MS THORACOSCOPY SURG LOBECTOMY Left 6/10/2020    Procedure: THORACOSCOPY VIDEO ASSISTED SURGERY (VATS);   Surgeon: Hawk Hoskins MD;  [facility];  Service: Thoracic    MS THORACOSCOPY SURG LOBECTOMY Left 6/10/2020    Procedure: UPPER LOBECTOMY OF LUNG; MEDIASTINAL  LYMPH NODE DISSECTION;  Surgeon: Hawk Hoskins MD;  [facility];  Service: Thoracic    TRACHEOSTOMY N/A 6/22/2020    Procedure: TRACHEOSTOMY REVISION, BRONCHOSCOPY;  Surgeon: Hawk Hoskins MD;  [facility];  Service: Thoracic    WRIST SURGERY Right     orif     Social History     Socioeconomic History    Marital status: Single     Spouse name: Not on file    Number of children: Not on file    Years of education: Not on file    Highest education level: Not on file   Occupational History    Not on file   Social Needs    Financial resource strain: Not on file    Food insecurity:     Worry: Not on file     Inability: Not on file    Transportation needs:     Medical: Not on file     Non-medical: Not on file   Tobacco Use    Smoking status: Former Smoker     Packs/day: 1 00     Years: 55 00     Pack years: 55 00     Types: Cigarettes    Smokeless tobacco: Never Used   Substance and Sexual Activity    Alcohol use: Yes     Frequency: 4 or more times a week     Comment: "we cant tell how much"  per pt daughter     Drug use: Never    Sexual activity: Not on file   Lifestyle    Physical activity:     Days per week: Not on file     Minutes per session: Not on file    Stress: Not on file   Relationships    Social connections:     Talks on phone: Not on file     Gets together: Not on file     Attends Faith service: Not on file     Active member of club or organization: Not on file     Attends meetings of clubs or organizations: Not on file     Relationship status: Not on file    Intimate partner violence:     Fear of current or ex partner: Not on file     Emotionally abused: Not on file     Physically abused: Not on file     Forced sexual activity: Not on file   Other Topics Concern    Not on file   Social History Narrative    Not on file     Family History   Problem Relation Age of Onset    Ovarian cancer Mother     Liver cancer Father        MEDICATIONS / ALLERGIES:  all current active meds have been reviewed and current meds:   Current Facility-Administered Medications   Medication Dose Route Frequency    acetaminophen (TYLENOL) oral suspension 975 mg  975 mg Oral Q8H [facility]    albuterol inhalation solution 2 5 mg  2 5 mg Nebulization Q4H PRN    cefepime (MAXIPIME) 2,000 mg in dextrose 5 % 50 mL IVPB  2,000 mg Intravenous Q8H    chlorhexidine (PERIDEX) 0 12 % oral rinse 15 mL  15 mL Swish & Spit Q12H [facility]    dexmedetomidine (PRECEDEX) 400 mcg in sodium chloride 0 9 % 100 mL infusion  0 1-1 4 mcg/kg/hr Intravenous Titrated    enoxaparin (LOVENOX) subcutaneous injection 100 mg  1 mg/kg Subcutaneous Q12H [facility]    fentaNYL 1000 mcg in sodium chloride 0 9% 100mL infusion  100 mcg/hr Intravenous Continuous    ketamine 250 mg (STANDARD CONCENTRATION) IV in sodium chloride 0 9% 250 mL  9 mg/hr Intravenous Continuous    levalbuterol (XOPENEX) inhalation solution 1 25 mg  1 25 mg Nebulization Q6H    Lidocaine Viscous HCl (XYLOCAINE) 2 % mucosal solution 15 mL  15 mL Swish & Spit 4x Daily PRN    LORazepam (ATIVAN) tablet 2 mg  2 mg Per NG Tube Q4H    melatonin tablet 3 mg  3 mg Oral HS    metroNIDAZOLE (FLAGYL) tablet 500 mg 500 mg Oral Q8H    midazolam (VERSED) 0 5 mg/mL (STANDARD CONCENTRATION) 100 mL infusion  0 5 mg/hr Intravenous Continuous    norepinephrine (LEVOPHED) 4 mg (STANDARD CONCENTRATION) IV in sodium chloride 0 9% 250 mL  1-30 mcg/min Intravenous Titrated    OLANZapine (ZyPREXA) tablet 5 mg  5 mg Per NG Tube Q3H PRN    ondansetron (ZOFRAN) injection 4 mg  4 mg Intravenous Q6H PRN    oxyCODONE (ROXICODONE) oral solution 10 mg  10 mg Oral Q4H    oxyCODONE (ROXICODONE) oral solution 5 mg  5 mg Oral Q2H PRN    polyethylene glycol (MIRALAX) packet 17 g  17 g Oral Daily    potassium chloride 10 % oral solution 40 mEq  40 mEq Oral Once    QUEtiapine (SEROquel) tablet 50 mg  50 mg Oral BID    senna-docusate sodium (SENOKOT S) 8 6-50 mg per tablet 1 tablet  1 tablet Per NG Tube BID    sodium chloride 0 9 % inhalation solution 3 mL  3 mL Nebulization Q6H    sodium chloride 3 % inhalation solution **ADS Override Pull**           No Known Allergies    OBJECTIVE:  BP (!) 86/55   Pulse 70   Temp 99 7 °F (37 6 °C)   Resp 13   Ht 5' 10" (1 778 m)   Wt 95 2 kg (209 lb 14 1 oz)   SpO2 92%   BMI 30 11 kg/m²   Physical Exam   Constitutional: No distress  Heavily sedated   HENT:   Head: Normocephalic and atraumatic  Right Ear: External ear normal    Left Ear: External ear normal    Nose: Nose normal    Eyes: EOM are normal  Right eye exhibits no discharge  Left eye exhibits no discharge  No scleral icterus  Neck:   Trach in place   Cardiovascular: Normal rate, regular rhythm and intact distal pulses  Pulmonary/Chest: Effort normal and breath sounds normal  No respiratory distress  Abdominal: Soft  Bowel sounds are normal  He exhibits no distension  Musculoskeletal: He exhibits no edema  Skin: Skin is warm and dry  There is pallor  Subcutaneous air noted   Nursing note and vitals reviewed  Lab Results:   I have personally reviewed pertinent labs  , CBC:   Lab Results   Component Value Date    WBC 9 98 06/25/2020    HGB 9 1 (L) 06/25/2020    HCT 29 3 (L) 06/25/2020     (H) 06/25/2020     06/25/2020    MCH 31 4 06/25/2020    MCHC 31 1 (L) 06/25/2020    RDW 14 4 06/25/2020    MPV 10 7 06/25/2020    NRBC 0 06/25/2020   , CMP:   Lab Results   Component Value Date    SODIUM 143 06/25/2020    K 3 3 (L) 06/25/2020     (H) 06/25/2020    CO2 27 06/25/2020    BUN 13 06/25/2020    CREATININE 0 81 06/25/2020    CALCIUM 7 3 (L) 06/25/2020    EGFR 91 06/25/2020   , PT/PTT:  Lab Results   Component Value Date     (HH) 06/25/2020     Imaging Studies: I have personally reviewed pertinent reports  EKG, Pathology, and Other Studies: I have personally reviewed pertinent reports  Counseling / Coordination of Care:  Counseling / Coordination of Care  Total floor / unit time spent today 75+ minutes  Greater than 50% of total time was spent with the patient and / or family counseling and / or coordination of care  A description of the counseling / coordination of care: chart review, medication review with adjustments, discussion with bedside RN  50.8

## 2021-04-23 NOTE — PLAN OF CARE
Problem: Potential for Falls  Goal: Patient will remain free of falls  Description: INTERVENTIONS:  - Assess patient frequently for physical needs  -  Identify cognitive and physical deficits and behaviors that affect risk of falls    -  [place] fall precautions as indicated by assessment   - Educate patient/family on patient safety including physical limitations  - Instruct patient to call for assistance with activity based on assessment  - Modify environment to reduce risk of injury  - Consider OT/PT consult to assist with strengthening/mobility  Outcome: Progressing     Problem: PAIN - ADULT  Goal: Verbalizes/displays adequate comfort level or baseline comfort level  Description: Interventions:  - Encourage patient to monitor pain and request assistance  - Assess pain using appropriate pain scale  - Administer analgesics based on type and severity of pain and evaluate response  - Implement non-pharmacological measures as appropriate and evaluate response  - Consider cultural and social influences on pain and pain management  - Notify physician/advanced practitioner if interventions unsuccessful or patient reports new pain  Outcome: Progressing     Problem: INFECTION - ADULT  Goal: Absence or prevention of progression during hospitalization  Description: INTERVENTIONS:  - Assess and monitor for signs and symptoms of infection  - Monitor lab/diagnostic results  - Monitor all insertion sites, i e  indwelling lines, tubes, and drains  - Monitor endotracheal if appropriate and nasal secretions for changes in amount and color  - [place] appropriate cooling/warming therapies per order  - Administer medications as ordered  - Instruct and encourage patient and family to use good hand hygiene technique  - Identify and instruct in appropriate isolation precautions for identified infection/condition  Outcome: Progressing     Problem: SAFETY ADULT  Goal: Patient will remain free of falls  Description: INTERVENTIONS:  - Assess patient frequently for physical needs  -  Identify cognitive and physical deficits and behaviors that affect risk of falls    -  [place] fall precautions as indicated by assessment   - Educate patient/family on patient safety including physical limitations  - Instruct patient to call for assistance with activity based on assessment  - Modify environment to reduce risk of injury  - Consider OT/PT consult to assist with strengthening/mobility  Outcome: Progressing  Goal: Maintain or return to baseline ADL function  Description: INTERVENTIONS:  -  Assess patient's ability to carry out ADLs; assess patient's baseline for ADL function and identify physical deficits which impact ability to perform ADLs (bathing, care of mouth/teeth, toileting, grooming, dressing, etc )  - Assess/evaluate cause of self-care deficits   - Assess range of motion  - Assess patient's mobility; develop plan if impaired  - Assess patient's need for assistive devices and provide as appropriate  - Encourage maximum independence but intervene and supervise when necessary  - Involve family in performance of ADLs  - Assess for home care needs following discharge   - Consider OT consult to assist with ADL evaluation and planning for discharge  - Provide patient education as appropriate  Outcome: Progressing  Goal: Maintain or return mobility status to optimal level  Description: INTERVENTIONS:  - Assess patient's baseline mobility status (ambulation, transfers, stairs, etc )    - Identify cognitive and physical deficits and behaviors that affect mobility  - Identify mobility aids required to assist with transfers and/or ambulation (gait belt, sit-to-stand, lift, walker, cane, etc )  - [place] fall precautions as indicated by assessment  - Record patient progress and toleration of activity level on Mobility SBAR; progress patient to next Phase/Stage  - Instruct patient to call for assistance with activity based on assessment  - Consider rehabilitation consult to assist with strengthening/weightbearing, etc   Outcome: Progressing     Problem: DISCHARGE PLANNING  Goal: Discharge to home or other facility with appropriate resources  Description: INTERVENTIONS:  - Identify barriers to discharge w/patient and caregiver  - Arrange for needed discharge resources and transportation as appropriate  - Identify discharge learning needs (meds, wound care, etc )  - Arrange for interpretive services to assist at discharge as needed  - Refer to Case Management Department for coordinating discharge planning if the patient needs post-hospital services based on physician/advanced practitioner order or complex needs related to functional status, cognitive ability, or social support system  Outcome: Progressing     Problem: Knowledge Deficit  Goal: Patient/family/caregiver demonstrates understanding of disease process, treatment plan, medications, and discharge instructions  Description: Complete learning assessment and assess knowledge base    Interventions:  - Provide teaching at level of understanding  - Provide teaching via preferred learning methods  Outcome: Progressing     Problem: NEUROSENSORY - ADULT  Goal: Achieves stable or improved neurological status  Description: INTERVENTIONS  - Monitor and report changes in neurological status  - Monitor vital signs such as temperature, blood pressure, glucose, and any other labs ordered   - Initiate measures to prevent increased intracranial pressure  - Monitor for seizure activity and implement precautions if appropriate      Outcome: Progressing  Goal: Remains free of injury related to seizures activity  Description: INTERVENTIONS  - Maintain airway, patient safety  and administer oxygen as ordered  - Monitor patient for seizure activity, document and report duration and description of seizure to physician/advanced practitioner  - If seizure occurs,  ensure patient safety during seizure  - Reorient patient post seizure  - Seizure pads on all 4 side rails  - Instruct patient/family to notify RN of any seizure activity including if an aura is experienced  - Instruct patient/family to call for assistance with activity based on nursing assessment  - Administer anti-seizure medications if ordered    Outcome: Progressing  Goal: Achieves maximal functionality and self care  Description: INTERVENTIONS  - Monitor swallowing and airway patency with patient fatigue and changes in neurological status  - Encourage and assist patient to increase activity and self care     - Encourage visually impaired, hearing impaired and aphasic patients to use assistive/communication devices  Outcome: Progressing     Problem: CARDIOVASCULAR - ADULT  Goal: Maintains optimal cardiac output and hemodynamic stability  Description: INTERVENTIONS:  - Monitor I/O, vital signs and rhythm  - Monitor for S/S and trends of decreased cardiac output  - Administer and titrate ordered vasoactive medications to optimize hemodynamic stability  - Assess quality of pulses, skin color and temperature  - Assess for signs of decreased coronary artery perfusion  - Instruct patient to report change in severity of symptoms  Outcome: Progressing  Goal: Absence of cardiac dysrhythmias or at baseline rhythm  Description: INTERVENTIONS:  - Continuous cardiac monitoring, vital signs, obtain 12 lead EKG if ordered  - Administer antiarrhythmic and heart rate control medications as ordered  - Monitor electrolytes and administer replacement therapy as ordered  Outcome: Progressing     Problem: METABOLIC, FLUID AND ELECTROLYTES - ADULT  Goal: Electrolytes maintained within normal limits  Description: INTERVENTIONS:  - Monitor labs and assess patient for signs and symptoms of electrolyte imbalances  - Administer electrolyte replacement as ordered  - Monitor response to electrolyte replacements, including repeat lab results as appropriate  - Instruct patient on fluid and nutrition as appropriate  Outcome: Progressing  Goal: Fluid balance maintained  Description: INTERVENTIONS:  - Monitor labs   - Monitor I/O and WT  - Instruct patient on fluid and nutrition as appropriate  - Assess for signs & symptoms of volume excess or deficit  Outcome: Progressing  Goal: Glucose maintained within target range  Description: INTERVENTIONS:  - Monitor Blood Glucose as ordered  - Assess for signs and symptoms of hyperglycemia and hypoglycemia  - Administer ordered medications to maintain glucose within target range  - Assess nutritional intake and initiate nutrition service referral as needed  Outcome: Progressing     Problem: MUSCULOSKELETAL - ADULT  Goal: Maintain or return mobility to safest level of function  Description: INTERVENTIONS:  - Assess patient's ability to carry out ADLs; assess patient's baseline for ADL function and identify physical deficits which impact ability to perform ADLs (bathing, care of mouth/teeth, toileting, grooming, dressing, etc )  - Assess/evaluate cause of self-care deficits   - Assess range of motion  - Assess patient's mobility  - Assess patient's need for assistive devices and provide as appropriate  - Encourage maximum independence but intervene and supervise when necessary  - Involve family in performance of ADLs  - Assess for home care needs following discharge   - Consider OT consult to assist with ADL evaluation and planning for discharge  - Provide patient education as appropriate  Outcome: Progressing  Goal: Maintain proper alignment of affected body part  Description: INTERVENTIONS:  - Support, maintain and protect limb and body alignment  - Provide patient/ family with appropriate education  Outcome: Progressing

## 2021-04-23 NOTE — PHYSICAL THERAPY NOTE
Physical Therapy Cancellation Note       04/23/21 1030   PT Last Visit   PT Visit Date 04/23/21   Note Type   Cancel Reasons Other  (refusal)   Subjective   Subjective Pt declining therapy session today, states he is upset and wishes to not participate  Then asked where the button when on his tray table, no button seen  Assessment   Assessment Attempted to see pt for theapy session, however pt is decling therapy today due to being upset  Pt encuraged to participate and becoming upset  Will continue to follow          Bebe Mccoy, PTA

## 2021-04-23 NOTE — INCIDENTAL FINDINGS
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
## 2021-04-23 NOTE — HOSPICE NOTE
Spoke with pts daughter and reviewed hospice care and services per Medicare guidelines  She is in agreement with the same  She is denying the need for any DME at this time  She will take pt home herself today  She requested a Monday open to hospice and will sign consents at time of open  Went and spoke to patient  I talked to him about hospice care and services he is in agreement but is unsure why he needs hospice   Very forgetful

## 2021-04-23 NOTE — INCIDENTAL FINDINGS
The following findings require follow up:  Radiographic finding   Finding:  Asymmetric enlarged left laryngeal ventricle and piriform sinus suggestive of left vocal cord palsy   Follow up required:  None      Please notify the following clinician to assist with the follow up:   Dr Safia Joiner

## 2021-04-23 NOTE — CASE MANAGEMENT
Spoke with Alan Jiang at [facility] - confirmed that she had spoken with patient's daughter who was requesting a start of care for Monday, 4/26  Patient does not require any home DME  Discharge plan is home with hospice services through [facility]  Return call made to patient's daughter, Loida Galvez; reviewed d/c for today and plan for hospice admission  Daughter in agreement to same and will be to hospital around 3:00pm to pick patient up      AAKASH Meraz  4/23/2021  1:52 PM

## 2021-04-23 NOTE — QUICK NOTE
Spoke to patient's daughter about today's plan of care and discharge plan  Made daughter aware of [employer] form completion and patient's driving restrictions  As per daughter, patient would not want to follow-up with outpatient oncology

## 2021-04-23 NOTE — DISCHARGE INSTR - AVS FIRST PAGE
Dear Louie Fields,     It was our pleasure to care for you here at [facility]  It is our hope that we were always able to exceed the expected standards for your care during your stay  You were hospitalized due to a seizure and brain metastases  You were cared for on the [facility] 3rd floor by Luis Figueroa MD under the service of Isidro Pizarro MD with the [facility] who covers for your primary care physician (PCP), Roly Aaron MD, while you were hospitalized  If you have any questions or concerns related to this hospitalization, you may contact us at [phone]  For follow up as well as any medication refills, we recommend that you follow up with your primary care physician  A registered nurse will reach out to you by phone within a few days after your discharge to answer any additional questions that you may have after going home  However, at this time we provide for you here, the most important instructions / recommendations at discharge:     · Notable Medication Adjustments -   · Please take aspirin 81 mg daily  · Please take Decadron 2 mg every 12 hours for the next 3 days, followed by 2 mg daily for 3 days  · Please take Keppra 750 mg every 12 hours  · Please take levothyroxine 25 mcg daily  · Testing Required after Discharge -   · None  · Important follow up information -   · Please follow-up with PCP in 1 week  Consider repeating thyroid function tests in 4-6 weeks to adjust dosage of levothyroxine if necessary  · Please follow-up with [facility]  · Please follow-up with outpatient neurology for further evaluation and management  · Other Instructions  ?  Please refrain from driving, lifting heavy machinery, climbing heights, swimming unattended, hot tub baths or any other activity that will place you in danger in case of a seizure for at least six months  · Please review this entire after visit summary as additional general instructions including medication list, appointments, activity, diet, any pertinent wound care, and other additional recommendations from your care team that may be provided for you        Sincerely,     Kana Krause MD

## 2021-04-23 NOTE — HOSPICE NOTE
Hospice referral received  Pt approved  Called daughter to discuss had to leave a message for her to call me back
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
## 2021-04-26 ENCOUNTER — TELEPHONE (OUTPATIENT)
Dept: NEUROLOGY | Facility: CLINIC | Age: 71
End: 2021-04-26

## 2021-04-26 NOTE — TELEPHONE ENCOUNTER
1ST ATTEMPT LMOM for pt to call in to sched HFU appt  [employer]/Stroke/Medicare/[employer]    NOTE FROM CHART:  Reason for Consult / Principal Problem: Stroke  Wilfrido Bowser will need follow up in in 6 weeks with epilepsy attending or advance practitioner  He will not require outpatient neurological testing

## 2021-04-27 ENCOUNTER — EPISODE CHANGES (OUTPATIENT)
Dept: CASE MANAGEMENT | Facility: HOSPITAL | Age: 71
End: 2021-04-27

## 2021-04-27 NOTE — TELEPHONE ENCOUNTER
Reviewed patient chart, appears he was discharged to home with hospice care services  No appt to be scheduled at this time  No follow up call to be completed       Closing encounter and removed from follow up list

## 2021-04-29 LAB — VIT B1 BLD-SCNC: 130 NMOL/L (ref 66.5–200)

## 2021-05-25 ENCOUNTER — TELEPHONE (OUTPATIENT)
Dept: CARDIAC SURGERY | Facility: CLINIC | Age: 71
End: 2021-05-25
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
## (undated) DEVICE — SUT VICRYL 3-0 SH 27 IN J416H

## (undated) DEVICE — VESSEL LOOPS X-RAY DETECTABLE: Brand: DEROYAL

## (undated) DEVICE — PAD GROUNDING ADULT

## (undated) DEVICE — TRAY FOLEY 16FR URIMETER SURESTEP

## (undated) DEVICE — COTTON TIP APPLICTOR 2 PK

## (undated) DEVICE — SUT VICRYL 0 CT-1 27 IN J260H

## (undated) DEVICE — PLUMEPEN PRO 10FT

## (undated) DEVICE — GLOVE INDICATOR PI UNDERGLOVE SZ 6.5 BLUE

## (undated) DEVICE — DRAIN SPONGES,6 PLY: Brand: EXCILON

## (undated) DEVICE — 3M™ STERI-STRIP™ REINFORCED ADHESIVE SKIN CLOSURES, R1547, 1/2 IN X 4 IN (12 MM X 100 MM), 6 STRIPS/ENVELOPE: Brand: 3M™ STERI-STRIP™

## (undated) DEVICE — SUT PROLENE 2-0 SH 36 IN 8523H

## (undated) DEVICE — GAUZE SPONGES,USP TYPE VII GAUZE, 12 PLY: Brand: CURITY

## (undated) DEVICE — THE ECHELON FLEX POWERED PLUS ARTICULATING ENDOSCOPIC LINEAR CUTTERS ARE STERILE, SINGLE PATIENT USE INSTRUMENTS THAT SIMULTANEOUSLYCUT AND STAPLE TISSUE. THERE ARE SIX STAGGERED ROWS OF STAPLES, THREE ON EITHER SIDE OF THE CUT LINE. THE ECHELON FLEX 45 POWERED PLUSINSTRUMENTS HAVE A STAPLE LINE THAT IS APPROXIMATELY 45 MM LONG AND A CUT LINE THAT IS APPROXIMATELY 42 MM LONG. THE SHAFT CAN ROTATE FREELYIN BOTH DIRECTIONS AND AN ARTICULATION MECHANISM ENABLES THE DISTAL PORTION OF THE SHAFT TO PIVOT TO FACILITATE LATERAL ACCESS TO THE OPERATIVESITE.THE INSTRUMENTS ARE PACKAGED WITH A PRIMARY LITHIUM BATTERY PACK THAT MUST BE INSTALLED PRIOR TO USE. THERE ARE SPECIFIC REQUIREMENTS FORDISPOSING OF THE BATTERY PACK. REFER TO THE BATTERY PACK DISPOSAL SECTION.THE INSTRUMENTS ARE PACKAGED WITHOUT A RELOAD AND MUST BE LOADED PRIOR TO USE. A STAPLE RETAINING CAP ON THE RELOAD PROTECTS THE STAPLE LEGPOINTS DURING SHIPPING AND TRANSPORTATION. THE INSTRUMENTS’ LOCK-OUT FEATURE IS DESIGNED TO PREVENT A USED OR IMPROPERLY INSTALLED RELOADFROM BEING REFIRED OR AN INSTRUMENT FROM BEING FIRED WITHOUT A RELOAD.: Brand: ECHELON FLEX

## (undated) DEVICE — ADHESIVE SKIN HIGH VISCOSITY EXOFIN 1ML

## (undated) DEVICE — ENDOPATH ECHELON VASCULAR  RELOADS, WHITE, 35MM: Brand: ECHELON ENDOPATH

## (undated) DEVICE — GLOVE SRG BIOGEL ECLIPSE 6

## (undated) DEVICE — SUT PROLENE 0 CT-1 30 IN 8424H

## (undated) DEVICE — ADULT FLEXIBLE TRACHEOSTOMY TUBE WITH TAPERGUARD CUFF DISPOSABLE INNER CANNULA: Brand: SHILEY

## (undated) DEVICE — CHLORAPREP HI-LITE 10.5ML ORANGE

## (undated) DEVICE — 28 FR STRAIGHT – SOFT PVC CATHETER: Brand: PVC THORACIC CATHETERS

## (undated) DEVICE — INTENDED FOR TISSUE SEPARATION, AND OTHER PROCEDURES THAT REQUIRE A SHARP SURGICAL BLADE TO PUNCTURE OR CUT.: Brand: BARD-PARKER SAFETY BLADES SIZE 10, STERILE

## (undated) DEVICE — PROGEL

## (undated) DEVICE — SINGLE TUBING WITH LARGE CONNECTOR FOR THORACIC SUCTION SYSTEM PUMP: Brand: THOPAZ TUBING SINGLE

## (undated) DEVICE — TUBING SUCTION 5MM X 12 FT

## (undated) DEVICE — OCCLUSIVE GAUZE STRIP,3% BISMUTH TRIBROMOPHENATE IN PETROLATUM BLEND: Brand: XEROFORM

## (undated) DEVICE — PACK UNIVERSAL NECK

## (undated) DEVICE — ADAPTOR TRACH SWIVEL

## (undated) DEVICE — ELECTRODE BLADE MOD E-Z CLEAN 2.5IN 6.4CM -0012M

## (undated) DEVICE — LIGACLIP 10-M/L, 10MM ENDOSCOPIC ROTATING MULTIPLE CLIP APPLIERS: Brand: LIGACLIP

## (undated) DEVICE — THE ECHELON, ECHELON ENDOPATH™ AND ECHELON FLEX™ FAMILIES OF ENDOSCOPIC LINEAR CUTTERS AND RELOADS ARE STERILE, SINGLE PATIENT USE INSTRUMENTS THAT SIMULTANEOUSLY CUT AND STAPLE TISSUE. THERE ARE SIX STAGGERED ROWS OF STAPLES, THREE ON EITHER SIDE OF THE CUT LINE. THE 45 MM INSTRUMENTS HAVE A STAPLE LINE THATIS APPROXIMATELY 45 MM LONG AND A CUT LINE THAT IS APPROXIMATELY 42 MM LONG. THE SHAFT CAN ROTATE FREELY IN BOTH DIRECTIONS AND AN ARTICULATION MECHANISM ON ARTICULATING INSTRUMENTS ENABLES BENDING THE DISTAL PORTIONOF THE SHAFT TO FACILITATE LATERAL ACCESS OF THE OPERATIVE SITE.THE INSTRUMENTS ARE SHIPPED WITHOUT A RELOAD AND MUST BE LOADED PRIOR TO USE. A STAPLE RETAINING CAP ON THE RELOAD PROTECTS THE STAPLE LEG POINTS DURING SHIPPING AND TRANSPORTATION. THE INSTRUMENTS’ LOCK-OUT FEATURE IS DESIGNED TO PREVENT A USED RELOAD FROM BEING REFIRED.: Brand: ECHELON ENDOPATH

## (undated) DEVICE — ENDOPATH 5MM ENDOSCOPIC BLUNT TIP DISSECTORS (12 POUCHES CONTAINING 3 DISSECTORS EACH): Brand: ENDOPATH

## (undated) DEVICE — DRAPE FLUID WARMER (BIRD BATH)

## (undated) DEVICE — TRACHEOSTOMY TUBE XLT CUFFED,PROXIMAL WITH DISPOSABLE INNER CANNULA: Brand: SHILEY

## (undated) DEVICE — INTENDED FOR TISSUE SEPARATION, AND OTHER PROCEDURES THAT REQUIRE A SHARP SURGICAL BLADE TO PUNCTURE OR CUT.: Brand: BARD-PARKER SAFETY BLADES SIZE 15, STERILE

## (undated) DEVICE — SUT VICRYL 2-0 SH 27 IN UNDYED J417H

## (undated) DEVICE — WOUND RETRACTOR AND PROTECTOR: Brand: ALEXIS WOUND PROTECTOR-RETRACTOR

## (undated) DEVICE — THYROID SHEET: Brand: CONVERTORS

## (undated) DEVICE — SUT VICRYL 0 UR-6 27 IN J603H

## (undated) DEVICE — Device

## (undated) DEVICE — FIRST STEP BEDSIDE KIT - STAND-UP POUCH, ENDOSCOPIC CLEANING PAD - 1 POUCH: Brand: FIRST STEP BEDSIDE KIT - STAND-UP POUCH, ENDOSCOPIC CLEANING PAD

## (undated) DEVICE — Device: Brand: TISSUE RETRIEVAL SYSTEM

## (undated) DEVICE — SURGICEL 4 X 8

## (undated) DEVICE — SINGLE USE SUCTION VALVE MAJ-209: Brand: SINGLE USE SUCTION VALVE (STERILE)

## (undated) DEVICE — SUT SILK 3-0 SH 30 IN K832H

## (undated) DEVICE — NEEDLE SPINAL 20G X 3.5 LF

## (undated) DEVICE — TELFA NON-ADHERENT ABSORBENT DRESSING: Brand: TELFA

## (undated) DEVICE — PACKING VAGINAL 2 IN

## (undated) DEVICE — CHLORAPREP HI-LITE 26ML ORANGE

## (undated) DEVICE — STERILE THORACIC PACK: Brand: CARDINAL HEALTH

## (undated) DEVICE — SUT MONOCRYL 4-0 PS-2 18 IN Y496G

## (undated) DEVICE — 3M™ IOBAN™ 2 ANTIMICROBIAL INCISE DRAPE 6640EZ: Brand: IOBAN™ 2

## (undated) DEVICE — ECHELON FLEX  POWERED VASCULAR STAPLER WITH ADVANCED PLACEMENT TIP, 35MM: Brand: ECHELON FLEX

## (undated) DEVICE — SINGLE USE BIOPSY VALVE MAJ-210: Brand: SINGLE USE BIOPSY VALVE (STERILE)

## (undated) DEVICE — GAUZE SPONGES,16 PLY: Brand: CURITY

## (undated) DEVICE — PROGEL LONG TIP 11 INCH

## (undated) DEVICE — 3000CC GUARDIAN II: Brand: GUARDIAN

## (undated) DEVICE — 2000CC GUARDIAN II: Brand: GUARDIAN

## (undated) DEVICE — CANISTER FOR THORACIC SUCTION SYSTEM: Brand: THOPAZ DISPOSABLE CANISTER 0.8L

## (undated) DEVICE — ANTI-FOG SOLUTION WITH FOAM PAD: Brand: DEVON

## (undated) DEVICE — BETHLEHEM UNIVERSAL MINOR GEN: Brand: CARDINAL HEALTH

## (undated) DEVICE — ELECTRODE LAP L WIRE E-Z CLEAN 33CM -0100